# Patient Record
Sex: FEMALE | Race: WHITE | NOT HISPANIC OR LATINO | Employment: UNEMPLOYED | URBAN - METROPOLITAN AREA
[De-identification: names, ages, dates, MRNs, and addresses within clinical notes are randomized per-mention and may not be internally consistent; named-entity substitution may affect disease eponyms.]

---

## 2017-01-04 ENCOUNTER — NON-PROVIDER VISIT (OUTPATIENT)
Dept: VASCULAR LAB | Facility: MEDICAL CENTER | Age: 42
End: 2017-01-04
Attending: INTERNAL MEDICINE
Payer: COMMERCIAL

## 2017-01-04 DIAGNOSIS — R03.0 ELEVATED BLOOD PRESSURE READING WITHOUT DIAGNOSIS OF HYPERTENSION: ICD-10-CM

## 2017-01-04 PROCEDURE — 93788 AMBL BP MNTR W/SW A/R: CPT

## 2017-01-09 ENCOUNTER — TELEPHONE (OUTPATIENT)
Dept: VASCULAR LAB | Facility: MEDICAL CENTER | Age: 42
End: 2017-01-09

## 2017-01-09 NOTE — TELEPHONE ENCOUNTER
ABPM reviewed.  Patient with mean daytime 106/74 with normal nocturnal dip.  Some episodes of relative hypotension.  May be able to decrease meds a bit.  Will discuss details with patient at f/u appt in a couple of weeks.    Michael J Bloch, MD  Vascular Care    Cc: CARLINE Dasilva MD

## 2017-02-07 ENCOUNTER — TELEPHONE (OUTPATIENT)
Dept: VASCULAR LAB | Facility: MEDICAL CENTER | Age: 42
End: 2017-02-07

## 2017-02-07 ENCOUNTER — OFFICE VISIT (OUTPATIENT)
Dept: VASCULAR LAB | Facility: MEDICAL CENTER | Age: 42
End: 2017-02-07
Attending: INTERNAL MEDICINE
Payer: COMMERCIAL

## 2017-02-07 VITALS
WEIGHT: 148 LBS | HEIGHT: 65 IN | SYSTOLIC BLOOD PRESSURE: 120 MMHG | BODY MASS INDEX: 24.66 KG/M2 | DIASTOLIC BLOOD PRESSURE: 91 MMHG | HEART RATE: 87 BPM

## 2017-02-07 DIAGNOSIS — E78.00 HYPERCHOLESTEROLEMIA: ICD-10-CM

## 2017-02-07 DIAGNOSIS — I10 ESSENTIAL HYPERTENSION: ICD-10-CM

## 2017-02-07 PROCEDURE — 99212 OFFICE O/P EST SF 10 MIN: CPT | Performed by: NURSE PRACTITIONER

## 2017-02-07 PROCEDURE — 99213 OFFICE O/P EST LOW 20 MIN: CPT | Performed by: NURSE PRACTITIONER

## 2017-02-07 ASSESSMENT — ENCOUNTER SYMPTOMS
BLOOD IN STOOL: 0
NERVOUS/ANXIOUS: 1
ABDOMINAL PAIN: 0
MYALGIAS: 0
DEPRESSION: 0
FOCAL WEAKNESS: 0
DIZZINESS: 0
VOMITING: 0
CLAUDICATION: 0
LOSS OF CONSCIOUSNESS: 0
DIARRHEA: 0
DIAPHORESIS: 0
CHILLS: 0
SEIZURES: 0
BRUISES/BLEEDS EASILY: 0
SHORTNESS OF BREATH: 0
FEVER: 0
HEADACHES: 0
COUGH: 0
WEAKNESS: 0
PALPITATIONS: 0
NAUSEA: 1
ORTHOPNEA: 0

## 2017-02-07 NOTE — MR AVS SNAPSHOT
"        Minerva Knott Primo   2017 4:00 PM   Office Visit   MRN: [id number]    Department:  Vascular Medicine   Dept Phone:  [phone]    Description:  Female : 1975   Provider:  GENO Tyson           Reason for Visit     Follow-Up           Allergies as of 2017     Allergen Noted Reactions    Sulfa Drugs 2014       hives      You were diagnosed with     Hypercholesterolemia   [078788]       Essential hypertension   [9017905]         Vital Signs     Blood Pressure Pulse Height Weight Body Mass Index Smoking Status    120/91 mmHg 87 1.651 m (5' 5\") 67.132 kg (148 lb) 24.63 kg/m2 Never Smoker       Basic Information     Date Of Birth Sex Race Ethnicity Preferred Language    1975 Female White Non- English      Your appointments     Mar 28, 2017  3:40 PM   Follow Up Visit with GENO Butterfield   [facility]  (--)    [address] [facility] [address] [address]   [phone]              Problem List              ICD-10-CM Priority Class Noted - Resolved    Anxiety associated with depression F41.8   2013 - Present    Weight gain R63.5   2016 - Present    Hypercholesterolemia E78.00   2016 - Present    Essential hypertension I10   10/26/2016 - Present      Health Maintenance        Date Due Completion Dates    MAMMOGRAM 2016, 2011    IMM INFLUENZA (1) 2016 10/21/2015, 2014, 10/27/2011    PAP SMEAR 3/11/2018 3/11/2015    IMM DTaP/Tdap/Td Vaccine (2 - Td) 2026            Current Immunizations     Influenza TIV (IM) 10/21/2015, 2014, 10/27/2011    Tdap Vaccine 2016      Below and/or attached are the medications your provider expects you to take. Review all of your home medications and newly ordered medications with your provider and/or pharmacist. Follow medication instructions as directed by your provider and/or pharmacist. Please keep your medication list with " you and share with your provider. Update the information when medications are discontinued, doses are changed, or new medications (including over-the-counter products) are added; and carry medication information at all times in the event of emergency situations     Allergies:  SULFA DRUGS - (reactions not documented)               Medications  Valid as of: February 07, 2017 -  4:29 PM    Generic Name Brand Name Tablet Size Instructions for use    Amlodipine Besylate-Valsartan (Tab) EXFORGE 5-320 MG Take 1 Tab by mouth every day.        .                 Medicines prescribed today were sent to:     [facility]AC[facility] #[id number] - [place], [place] - [address]    [address] [place] [address]    Phone: [phone] Fax: [phone]    Open 24 Hours?: No      Medication refill instructions:       If your prescription bottle indicates you have medication refills left, it is not necessary to call your provider’s office. Please contact your pharmacy and they will refill your medication.    If your prescription bottle indicates you do not have any refills left, you may request refills at any time through one of the following ways: The online [employer] system (except Urgent Care), by calling your provider’s office, or by asking your pharmacy to contact your provider’s office with a refill request. Medication refills are processed only during regular business hours and may not be available until the next business day. Your provider may request additional information or to have a follow-up visit with you prior to refilling your medication.   *Please Note: Medication refills are assigned a new Rx number when refilled electronically. Your pharmacy may indicate that no refills were authorized even though a new prescription for the same medication is available at the pharmacy. Please request the medicine by name with the pharmacy before contacting your provider for a refill.        Your To Do List     Future Labs/Procedures Complete  By Expires    COMP METABOLIC PANEL  As directed 2/7/2018    LIPID PROFILE  As directed 2/7/2018         MyChart Access Code: Activation code not generated  Current [place] Status: Active

## 2017-02-08 ENCOUNTER — TELEPHONE (OUTPATIENT)
Dept: VASCULAR LAB | Facility: MEDICAL CENTER | Age: 42
End: 2017-02-08

## 2017-02-08 DIAGNOSIS — E78.00 HYPERCHOLESTEROLEMIA: ICD-10-CM

## 2017-02-08 NOTE — PROGRESS NOTES
"  FOLLOW-UP VASCULAR VISIT  Subjective:   Minerva Tillman is a 40 y.o. female who presents today 02/07/17 for   Chief Complaint   Patient presents with   • Follow-Up     HPI:  Patient here for f/u of hypertension.    ABPM completed.  Good medication adherence since last visit.  No CV symptoms.  Denies SOB, headaches, dizziness/lightheadedness, palpitations.  Recently had BLE swelling on longer flight-- resolved.  Had labwork done-- did not f/u with PCP.    DIET AND EXERCISE:  Weight Change: no change  Diet: common adult, relatively heart healthy  Exercise: moderate regular exercise program - 30 min walk daily + kickboxing a couple of days per week.    Review of Systems   Constitutional: Negative for fever, chills, malaise/fatigue and diaphoresis.   Respiratory: Negative for cough and shortness of breath.    Cardiovascular: Negative for chest pain, palpitations, orthopnea, claudication and leg swelling.   Gastrointestinal: Positive for nausea. Negative for vomiting, abdominal pain, diarrhea and blood in stool.   Musculoskeletal: Negative for myalgias and joint pain.   Skin: Negative for itching and rash.   Neurological: Negative for dizziness, focal weakness, seizures, loss of consciousness, weakness and headaches.   Endo/Heme/Allergies: Does not bruise/bleed easily.   Psychiatric/Behavioral: Negative for depression. The patient is nervous/anxious.       Objective:     Filed Vitals:    02/07/17 1608   BP: 120/91   Pulse: 87   Height: 1.651 m (5' 5\")   Weight: 67.132 kg (148 lb)      Body mass index is 24.63 kg/(m^2).  Physical Exam   Constitutional: She is oriented to person, place, and time. She appears well-developed and well-nourished. No distress.   HENT:   Head: Normocephalic.   Eyes: Pupils are equal, round, and reactive to light.   Neck: Normal range of motion.   Cardiovascular: Normal rate, regular rhythm, normal heart sounds and intact distal pulses.    No murmur heard.  Pulmonary/Chest: Effort normal " and breath sounds normal. No respiratory distress. She has no wheezes.   Musculoskeletal: Normal range of motion. She exhibits no edema.   Neurological: She is alert and oriented to person, place, and time. No cranial nerve deficit. Coordination normal.   Skin: Skin is warm and dry. No rash noted. She is not diaphoretic. No erythema. No pallor.   Psychiatric: She has a normal mood and affect. Her behavior is normal. Thought content normal.   Vitals reviewed.    Lab Results   Component Value Date    CHOLSTRLTOT 287* 10/27/2016    CHOLSTRLTOT 262 10/01/2015    CHOLSTRLTOT 261* 06/02/2015    * 10/27/2016    * 06/02/2015    HDL 79 10/27/2016    HDL 97 10/01/2015    HDL 93 06/02/2015    TRIGLYCERIDE 92 10/27/2016    TRIGLYCERIDE 145 10/01/2015    TRIGLYCERIDE 70 06/02/2015    LDLPART 967 10/01/2015    LDLPART 21.3 10/01/2015    SMLLDL <90 10/01/2015    LHDLPART 19.9 10/01/2015    LVLDLPT 6.1 10/01/2015    LDLCHOL 136 10/01/2015    HDLSIZE 10.4 10/01/2015    VLDLSIZE 49.2 10/01/2015    HDLPART 35.8 10/01/2015    LPIRSCORE 27 10/01/2015           Lab Results   Component Value Date    SODIUM 135 10/27/2016    SODIUM 141 06/02/2015    POTASSIUM 4.2 10/27/2016    POTASSIUM 4.4 06/02/2015    CHLORIDE 101 10/27/2016    CHLORIDE 102 06/02/2015    CO2 23 10/27/2016    CO2 21 06/02/2015    GLUCOSE 84 10/27/2016    GLUCOSE 70 06/02/2015    BUN 13 10/27/2016    BUN 15 06/02/2015    CREATININE 0.72 10/27/2016    CREATININE 0.82 06/02/2015    BUNCREATRAT 18 06/02/2015    IFAFRICA >60 10/27/2016    IFAFRICA 103 06/02/2015    IFNOTAFR >60 10/27/2016    IFNOTAFR 89 06/02/2015      Lab Results   Component Value Date    TSH 3.410 06/02/2015      Lab Results   Component Value Date    WBC 6.7 10/27/2016    WBC 5.6 06/02/2015    RBC 4.37 10/27/2016    RBC 4.18 06/02/2015    HEMOGLOBIN 15.9 10/27/2016    HEMOGLOBIN 14.6 06/02/2015    HEMATOCRIT 45.9 10/27/2016    HEMATOCRIT 42.5 06/02/2015    .0* 10/27/2016    *  06/02/2015    MCH 36.4* 10/27/2016    MCH 34.9* 06/02/2015    MCHC 34.6 10/27/2016    MCHC 34.4 06/02/2015    MPV 9.2 10/27/2016      EKG 9/24/2015- unremarkable; no lvh    Medical Decision Making:  Today's Assessment / Status / Plan:     1. Hypercholesterolemia  LIPID PROFILE   2. Essential hypertension  COMP METABOLIC PANEL                                                                                      Patient Type: Primary Prevention    Etiology of Established CVD if Present: None    Lipid Management: Qualifies for Statin Therapy Based on 2013 ACC/AHA Guidelines: no  Calculated 10-Year Risk of ASCVD: 1%  Currently on Statin: No  Patient does not qualify for lipid-lowering therapy based on ACC AHA guidelines  Labwork from October .  She states she may not have been fasting.  Will repeat.  - Continue lifestyle modification  - Recheck fasting lipids    Blood Pressure Management:Goal: JNC8 (2013) Office BP Goal:<140/90; Under Control: no  She does have a family history so I suspect primary hypertension.  Normal to high renin HTN.  No micro albunuria  Has stopped NSAIDs  Never had swelling on amlodipine-- although some bilateral LE swelling on recent plane flight.  Has not had any further swelling.  24 ABPM shows mean daytime 106/74 with some episodes of hypotension-- may be slightly over treated.  - Continue Exforge (fixed dose combination valsartan and amlodipine) 320/5 daily  - STOP spironolactone 12.5mg daily (restart for BP sustained above 150/100 x 3 readings)  - Call if notices any leg swelling  - Continue to limit NSAIDs to minimal effective dose  - Continue home blood pressure monitoring    Other:  1. Elevated liver enzymes.  Labwork from October shows elevated AST/ALT.  Will repeat.  - CMP prior to next visit.    Glycemic Status: Normal    Anti-Platelet/Anti-Coagulant Tx: not indicated    Smoking: Continue complete avoidance    Physical Activity: Continue daily exercise-- planning to meet with  her  to increase her physical activity.    Weight Management and Nutrition: Dietary plan was discussed with patient at this visit including heart healthy diet.    Other:    Instructed to follow-up with PCP for remainder of adult medical needs: yes  We will partner with other providers in the management of established vascular disease and cardiometabolic risk factors.    Studies to Be Obtained: None  Labs to Be Obtained: CMP, lipids    Follow up in: 6 weeks    Sophia Arguello, A.P.N.    LDL-C may not be an accurate representation of overall LDLP or apoB load given low trigs.  Would check nmr lipoprofile, lp(a) and crp with next blood work  CT scan for coronary calcium may be helpful as well    Michael Bloch, MD  Vascular Care      Romelia Dasilva MD

## 2017-03-07 ENCOUNTER — HOSPITAL ENCOUNTER (OUTPATIENT)
Dept: RADIOLOGY | Facility: MEDICAL CENTER | Age: 42
End: 2017-03-07
Attending: OBSTETRICS & GYNECOLOGY
Payer: COMMERCIAL

## 2017-03-07 DIAGNOSIS — N64.4 BREAST PAIN: ICD-10-CM

## 2017-03-07 PROCEDURE — G0204 DX MAMMO INCL CAD BI: HCPCS

## 2017-03-08 DIAGNOSIS — I10 ESSENTIAL HYPERTENSION: ICD-10-CM

## 2017-03-10 RX ORDER — AMLODIPINE AND VALSARTAN 5; 320 MG/1; MG/1
TABLET ORAL
Qty: 30 TAB | Refills: 4 | Status: SHIPPED | OUTPATIENT
Start: 2017-03-10 | End: 2017-06-21 | Stop reason: SDUPTHER

## 2017-03-28 ENCOUNTER — APPOINTMENT (OUTPATIENT)
Dept: VASCULAR LAB | Facility: MEDICAL CENTER | Age: 42
End: 2017-03-28
Payer: COMMERCIAL

## 2017-04-14 ENCOUNTER — HOSPITAL ENCOUNTER (OUTPATIENT)
Dept: LAB | Facility: MEDICAL CENTER | Age: 42
End: 2017-04-14
Attending: OBSTETRICS & GYNECOLOGY
Payer: COMMERCIAL

## 2017-04-14 ENCOUNTER — HOSPITAL ENCOUNTER (OUTPATIENT)
Dept: RADIOLOGY | Facility: MEDICAL CENTER | Age: 42
End: 2017-04-14
Attending: OBSTETRICS & GYNECOLOGY
Payer: COMMERCIAL

## 2017-04-14 DIAGNOSIS — R10.2 ADNEXAL TENDERNESS, RIGHT: ICD-10-CM

## 2017-04-14 LAB
BASOPHILS # BLD AUTO: 0.9 % (ref 0–1.8)
BASOPHILS # BLD: 0.07 K/UL (ref 0–0.12)
EOSINOPHIL # BLD AUTO: 0.05 K/UL (ref 0–0.51)
EOSINOPHIL NFR BLD: 0.7 % (ref 0–6.9)
ERYTHROCYTE [DISTWIDTH] IN BLOOD BY AUTOMATED COUNT: 45.5 FL (ref 35.9–50)
HCT VFR BLD AUTO: 43.1 % (ref 37–47)
HGB BLD-MCNC: 14.7 G/DL (ref 12–16)
IMM GRANULOCYTES # BLD AUTO: 0.02 K/UL (ref 0–0.11)
IMM GRANULOCYTES NFR BLD AUTO: 0.3 % (ref 0–0.9)
LYMPHOCYTES # BLD AUTO: 1.72 K/UL (ref 1–4.8)
LYMPHOCYTES NFR BLD: 22.7 % (ref 22–41)
MCH RBC QN AUTO: 37.3 PG (ref 27–33)
MCHC RBC AUTO-ENTMCNC: 34.1 G/DL (ref 33.6–35)
MCV RBC AUTO: 109.4 FL (ref 81.4–97.8)
MONOCYTES # BLD AUTO: 0.89 K/UL (ref 0–0.85)
MONOCYTES NFR BLD AUTO: 11.7 % (ref 0–13.4)
NEUTROPHILS # BLD AUTO: 4.83 K/UL (ref 2–7.15)
NEUTROPHILS NFR BLD: 63.7 % (ref 44–72)
NRBC # BLD AUTO: 0 K/UL
NRBC BLD AUTO-RTO: 0 /100 WBC
PLATELET # BLD AUTO: 289 K/UL (ref 164–446)
PMV BLD AUTO: 9.9 FL (ref 9–12.9)
RBC # BLD AUTO: 3.94 M/UL (ref 4.2–5.4)
WBC # BLD AUTO: 7.6 K/UL (ref 4.8–10.8)

## 2017-04-14 PROCEDURE — 76830 TRANSVAGINAL US NON-OB: CPT

## 2017-04-14 PROCEDURE — 85025 COMPLETE CBC W/AUTO DIFF WBC: CPT

## 2017-04-14 PROCEDURE — 36415 COLL VENOUS BLD VENIPUNCTURE: CPT

## 2017-04-27 ENCOUNTER — HOSPITAL ENCOUNTER (OUTPATIENT)
Dept: LAB | Facility: MEDICAL CENTER | Age: 42
End: 2017-04-27
Attending: NURSE PRACTITIONER
Payer: COMMERCIAL

## 2017-04-27 DIAGNOSIS — I10 ESSENTIAL HYPERTENSION: ICD-10-CM

## 2017-04-27 DIAGNOSIS — E78.00 HYPERCHOLESTEROLEMIA: ICD-10-CM

## 2017-04-27 LAB
ALBUMIN SERPL BCP-MCNC: 4.6 G/DL (ref 3.2–4.9)
ALBUMIN/GLOB SERPL: 1.5 G/DL
ALP SERPL-CCNC: 63 U/L (ref 30–99)
ALT SERPL-CCNC: 54 U/L (ref 2–50)
ANION GAP SERPL CALC-SCNC: 9 MMOL/L (ref 0–11.9)
AST SERPL-CCNC: 40 U/L (ref 12–45)
BILIRUB SERPL-MCNC: 0.7 MG/DL (ref 0.1–1.5)
BUN SERPL-MCNC: 11 MG/DL (ref 8–22)
CALCIUM SERPL-MCNC: 9.4 MG/DL (ref 8.5–10.5)
CHLORIDE SERPL-SCNC: 105 MMOL/L (ref 96–112)
CHOLEST SERPL-MCNC: 288 MG/DL (ref 100–199)
CO2 SERPL-SCNC: 24 MMOL/L (ref 20–33)
CREAT SERPL-MCNC: 0.78 MG/DL (ref 0.5–1.4)
CRP SERPL HS-MCNC: 4.6 MG/L (ref 0–7.5)
GFR SERPL CREATININE-BSD FRML MDRD: >60 ML/MIN/1.73 M 2
GLOBULIN SER CALC-MCNC: 3.1 G/DL (ref 1.9–3.5)
GLUCOSE SERPL-MCNC: 82 MG/DL (ref 65–99)
HDLC SERPL-MCNC: 86 MG/DL
LDLC SERPL CALC-MCNC: 178 MG/DL
POTASSIUM SERPL-SCNC: 4.2 MMOL/L (ref 3.6–5.5)
PROT SERPL-MCNC: 7.7 G/DL (ref 6–8.2)
SODIUM SERPL-SCNC: 138 MMOL/L (ref 135–145)
TRIGL SERPL-MCNC: 119 MG/DL (ref 0–149)

## 2017-04-27 PROCEDURE — 83695 ASSAY OF LIPOPROTEIN(A): CPT

## 2017-04-27 PROCEDURE — 80053 COMPREHEN METABOLIC PANEL: CPT

## 2017-04-27 PROCEDURE — 86141 C-REACTIVE PROTEIN HS: CPT

## 2017-04-27 PROCEDURE — 80061 LIPID PANEL: CPT | Mod: 91

## 2017-04-27 PROCEDURE — 80061 LIPID PANEL: CPT

## 2017-04-27 PROCEDURE — 36415 COLL VENOUS BLD VENIPUNCTURE: CPT

## 2017-04-27 PROCEDURE — 83704 LIPOPROTEIN BLD QUAN PART: CPT

## 2017-04-28 ENCOUNTER — TELEPHONE (OUTPATIENT)
Dept: VASCULAR LAB | Facility: MEDICAL CENTER | Age: 42
End: 2017-04-28

## 2017-04-28 ENCOUNTER — OFFICE VISIT (OUTPATIENT)
Dept: VASCULAR LAB | Facility: MEDICAL CENTER | Age: 42
End: 2017-04-28
Attending: INTERNAL MEDICINE
Payer: COMMERCIAL

## 2017-04-28 VITALS
BODY MASS INDEX: 24.99 KG/M2 | WEIGHT: 150 LBS | SYSTOLIC BLOOD PRESSURE: 116 MMHG | HEART RATE: 79 BPM | HEIGHT: 65 IN | DIASTOLIC BLOOD PRESSURE: 79 MMHG

## 2017-04-28 DIAGNOSIS — E78.00 HYPERCHOLESTEROLEMIA: ICD-10-CM

## 2017-04-28 LAB — LPA SERPL-MCNC: 4 MG/DL

## 2017-04-28 PROCEDURE — 99212 OFFICE O/P EST SF 10 MIN: CPT | Performed by: NURSE PRACTITIONER

## 2017-04-28 PROCEDURE — 99213 OFFICE O/P EST LOW 20 MIN: CPT | Performed by: NURSE PRACTITIONER

## 2017-04-28 RX ORDER — ATORVASTATIN CALCIUM 10 MG/1
10 TABLET, FILM COATED ORAL
Qty: 30 TAB | Refills: 6 | Status: SHIPPED | OUTPATIENT
Start: 2017-04-28 | End: 2019-04-09

## 2017-04-28 ASSESSMENT — ENCOUNTER SYMPTOMS
COUGH: 0
SHORTNESS OF BREATH: 0
WHEEZING: 0
HEADACHES: 0
CLAUDICATION: 0
FOCAL WEAKNESS: 0
NERVOUS/ANXIOUS: 0
WEIGHT LOSS: 0
MYALGIAS: 0
PALPITATIONS: 0

## 2017-04-28 NOTE — PROGRESS NOTES
"  FOLLOW-UP VASCULAR VISIT  Subjective:   Minerva Tillman is a 42 y.o. female who presents today 4/28/17 for   Chief Complaint   Patient presents with   • Follow-Up     HPI:  Patient here for f/u of hypertension and hypercholesterolemia  Home BP's 120's/70's  No CV complaints/symptoms  Denies SOB, headaches, dizziness/lightheadedness, or palpitations  No swelling  Has focused a lot on healthy eating/exercise since last visit  Had labs drawn      DIET AND EXERCISE:  Weight Change: stable  Diet: common adult, relatively heart healthy; increased fruits/veggies and lean meats  Exercise: moderate regular exercise program - 30 min walk daily + kickboxing a couple of days per week.    Review of Systems   Constitutional: Negative for weight loss and malaise/fatigue.   Respiratory: Negative for cough, shortness of breath and wheezing.    Cardiovascular: Negative for chest pain, palpitations, claudication and leg swelling.   Musculoskeletal: Negative for myalgias and joint pain.   Neurological: Negative for focal weakness and headaches.   Psychiatric/Behavioral: The patient is not nervous/anxious.       Objective:     Filed Vitals:    04/28/17 1347   BP: 116/79   Pulse: 79   Height: 1.651 m (5' 5\")   Weight: 68.04 kg (150 lb)      Body mass index is 24.96 kg/(m^2).  Physical Exam   Constitutional: She is oriented to person, place, and time. She appears well-developed and well-nourished. No distress.   Cardiovascular: Normal rate, regular rhythm, normal heart sounds and intact distal pulses.    No murmur heard.  Pulmonary/Chest: Effort normal and breath sounds normal. No respiratory distress. She has no wheezes.   Musculoskeletal: Normal range of motion. She exhibits no edema.   Neurological: She is alert and oriented to person, place, and time.   Skin: Skin is warm and dry. She is not diaphoretic.   Psychiatric: She has a normal mood and affect. Her behavior is normal. Thought content normal.   Vitals reviewed.    Lab " Results   Component Value Date    CHOLSTRLTOT 288* 04/27/2017    CHOLSTRLTOT 262 10/01/2015    CHOLSTRLTOT 261* 06/02/2015    * 04/27/2017    * 06/02/2015    HDL 86 04/27/2017    HDL 97 10/01/2015    HDL 93 06/02/2015    TRIGLYCERIDE 119 04/27/2017    TRIGLYCERIDE 145 10/01/2015    TRIGLYCERIDE 70 06/02/2015    LDLPART 967 10/01/2015    LDLPART 21.3 10/01/2015    SMLLDL <90 10/01/2015    LHDLPART 19.9 10/01/2015    LVLDLPT 6.1 10/01/2015    LDLCHOL 136 10/01/2015    HDLSIZE 10.4 10/01/2015    VLDLSIZE 49.2 10/01/2015    HDLPART 35.8 10/01/2015    LPIRSCORE 27 10/01/2015           Lab Results   Component Value Date    SODIUM 138 04/27/2017    SODIUM 141 06/02/2015    POTASSIUM 4.2 04/27/2017    POTASSIUM 4.4 06/02/2015    CHLORIDE 105 04/27/2017    CHLORIDE 102 06/02/2015    CO2 24 04/27/2017    CO2 21 06/02/2015    GLUCOSE 82 04/27/2017    GLUCOSE 70 06/02/2015    BUN 11 04/27/2017    BUN 15 06/02/2015    CREATININE 0.78 04/27/2017    CREATININE 0.82 06/02/2015    BUNCREATRAT 18 06/02/2015    IFAFRICA >60 04/27/2017    IFAFRICA 103 06/02/2015    IFNOTAFR >60 04/27/2017    IFNOTAFR 89 06/02/2015      Lab Results   Component Value Date    TSH 3.410 06/02/2015      Lab Results   Component Value Date    WBC 7.6 04/14/2017    WBC 5.6 06/02/2015    RBC 3.94* 04/14/2017    RBC 4.18 06/02/2015    HEMOGLOBIN 14.7 04/14/2017    HEMOGLOBIN 14.6 06/02/2015    HEMATOCRIT 43.1 04/14/2017    HEMATOCRIT 42.5 06/02/2015    .4* 04/14/2017    * 06/02/2015    MCH 37.3* 04/14/2017    MCH 34.9* 06/02/2015    MCHC 34.1 04/14/2017    MCHC 34.4 06/02/2015    MPV 9.9 04/14/2017      EKG 9/24/2015- unremarkable; no lvh    Medical Decision Making:  Today's Assessment / Status / Plan:     1. Hypercholesterolemia  atorvastatin (LIPITOR) 10 MG Tab    NMR LIPO PROFILE    C-REACTIVE PROTEIN CARDIAC    COMP METABOLIC PANEL    CT-CARDIAC SCORING    CANCELED: [facility] CT HEART W/O CONTRAST QUANT EVAL CORONARY CALCIUM                                                                                       Patient Type: Primary Prevention    Etiology of Established CVD if Present: None    Lipid Management: Qualifies for Statin Therapy Based on 2013 ACC/AHA Guidelines: no  Calculated 10-Year Risk of ASCVD: 1%  Currently on Statin: No, but given her treatment for HTN and LDL>160, will start statin at this visit  4/27/17 NonHDL-202, LDL-178  - Start atorvastatin 10mg qHS  - Call for myalgias  - Continue lifestyle modification  - Recheck fasting lipids in 4-6wks    Blood Pressure Management:Goal: JNC8 (2013) Office BP Goal:<140/90; Under Control: yes  She does have a family history so I suspect primary hypertension.  Normal to high renin HTN.  No microalbuminuria  Has stopped NSAIDs  24 ABPM shows mean daytime 106/74 with some episodes of hypotension-- may be slightly over treated.  - Continue Exforge (fixed dose combination valsartan and amlodipine) 320/5 daily  - Continue to limit NSAIDs to minimal effective dose  - Continue home blood pressure monitoring      Glycemic Status: Normal    Anti-Platelet/Anti-Coagulant Tx: not indicated    Smoking: Continue complete avoidance    Physical Activity: Continue improved exercise regimen    Weight Management and Nutrition: Dietary plan was discussed with patient at this visit including heart healthy diet.      Instructed to follow-up with PCP for remainder of adult medical needs: yes  We will partner with other providers in the management of established vascular disease and cardiometabolic risk factors.    Studies to Be Obtained: CT scan for coronary calcium  Labs to Be Obtained: NMR, CRP, CMP prior to next visit    Follow up in: 6 weeks with Dr. Bloch, as previously scheduled    GENO Dickerson    CC:  Romelia Dasilva MD

## 2017-04-28 NOTE — MR AVS SNAPSHOT
"        Minerva Tillman   2017 1:40 PM   Office Visit   MRN: [id number]    Department:  Vascular Medicine   Dept Phone:  [phone]    Description:  Female : 1975   Provider:  GENO Dickerson           Reason for Visit     Follow-Up           Allergies as of 2017     Allergen Noted Reactions    Sulfa Drugs 2014       hives      You were diagnosed with     Hypercholesterolemia   [[id number]]         Vital Signs     Blood Pressure Pulse Height Weight Body Mass Index Smoking Status    116/79 mmHg 79 1.651 m (5' 5\") 68.04 kg (150 lb) 24.96 kg/m2 Never Smoker       Basic Information     Date Of Birth Sex Race Ethnicity Preferred Language    1975 Female White Non- English      Your appointments     2017 11:00 AM   Follow Up Visit with Michael J Bloch, M.D.   [facility]  (--)    [address] [address]   [phone]              Problem List              ICD-10-CM Priority Class Noted - Resolved    Anxiety associated with depression F41.8   2013 - Present    Weight gain R63.5   2016 - Present    Hypercholesterolemia E78.00   2016 - Present    Essential hypertension I10   10/26/2016 - Present      Health Maintenance        Date Due Completion Dates    MAMMOGRAM 3/7/2018 3/7/2017, 2015, 2011    PAP SMEAR 3/11/2018 3/11/2015    IMM DTaP/Tdap/Td Vaccine (2 - Td) 2026            Current Immunizations     Influenza TIV (IM) 10/21/2015, 2014, 10/27/2011    Tdap Vaccine 2016      Below and/or attached are the medications your provider expects you to take. Review all of your home medications and newly ordered medications with your provider and/or pharmacist. Follow medication instructions as directed by your provider and/or pharmacist. Please keep your medication list with you and share with your provider. Update the information when medications are " discontinued, doses are changed, or new medications (including over-the-counter products) are added; and carry medication information at all times in the event of emergency situations     Allergies:  SULFA DRUGS - (reactions not documented)               Medications  Valid as of: April 28, 2017 -  2:03 PM    Generic Name Brand Name Tablet Size Instructions for use    Amlodipine Besylate-Valsartan (Tab) EXFORGE 5-320 MG TAKE 1 TABLET BY MOUTH EVERY DAY        Atorvastatin Calcium (Tab) LIPITOR 10 MG Take 1 Tab by mouth every bedtime.        .                 Medicines prescribed today were sent to:     [facility]/PHARMACY #[id number] - [place], [place] - [address]    [address] [place] [address]    Phone: [phone] Fax: [phone]    Open 24 Hours?: No      Medication refill instructions:       If your prescription bottle indicates you have medication refills left, it is not necessary to call your provider’s office. Please contact your pharmacy and they will refill your medication.    If your prescription bottle indicates you do not have any refills left, you may request refills at any time through one of the following ways: The online [employer] system (except Urgent Care), by calling your provider’s office, or by asking your pharmacy to contact your provider’s office with a refill request. Medication refills are processed only during regular business hours and may not be available until the next business day. Your provider may request additional information or to have a follow-up visit with you prior to refilling your medication.   *Please Note: Medication refills are assigned a new Rx number when refilled electronically. Your pharmacy may indicate that no refills were authorized even though a new prescription for the same medication is available at the pharmacy. Please request the medicine by name with the pharmacy before contacting your provider for a refill.        Your To Do List     Future Labs/Procedures  Complete By Expires    NMR LIPO PROFILE  As directed 4/28/2018         [employer] Access Code: Activation code not generated  Current [employer] Status: Active

## 2017-04-30 LAB
CHOLEST SERPL-MCNC: 264 MG/DL (ref 100–199)
HDL PARTICAL NO Q4363: 40.8 UMOL/L
HDL SERPL QN: 9.7 NM
HDLC SERPL-MCNC: 73 MG/DL
HLD.LARGE SERPL-SCNC: 11.8 UMOL/L
LDL MED SERPL QN: 21.9 NM
LDL SERPL QN: 21.9 NM
LDL SERPL-SCNC: 1658 NMOL/L
LDL SMALL SERPL-SCNC: 131 NMOL/L
LDL SMALL SERPL-SCNC: 131 NMOL/L
LDLC SERPL CALC-MCNC: 169 MG/DL (ref 0–99)
LP IR SCORE Q4364: 57
TRIGL SERPL-MCNC: 110 MG/DL (ref 0–149)
VLDL LARGE SERPL-SCNC: 4.4 NMOL/L
VLDL SERPL QN: 77 NM

## 2017-05-08 ENCOUNTER — HOSPITAL ENCOUNTER (OUTPATIENT)
Dept: RADIOLOGY | Facility: MEDICAL CENTER | Age: 42
End: 2017-05-08
Attending: NURSE PRACTITIONER
Payer: COMMERCIAL

## 2017-05-08 DIAGNOSIS — E78.00 HYPERCHOLESTEROLEMIA: ICD-10-CM

## 2017-05-08 PROCEDURE — 4410556 CT-CARDIAC SCORING

## 2017-06-16 ENCOUNTER — HOSPITAL ENCOUNTER (OUTPATIENT)
Dept: LAB | Facility: MEDICAL CENTER | Age: 42
End: 2017-06-16
Attending: NURSE PRACTITIONER
Payer: COMMERCIAL

## 2017-06-16 DIAGNOSIS — E78.00 HYPERCHOLESTEROLEMIA: ICD-10-CM

## 2017-06-16 LAB
ALBUMIN SERPL BCP-MCNC: 4.2 G/DL (ref 3.2–4.9)
ALBUMIN/GLOB SERPL: 1.4 G/DL
ALP SERPL-CCNC: 68 U/L (ref 30–99)
ALT SERPL-CCNC: 48 U/L (ref 2–50)
ANION GAP SERPL CALC-SCNC: 10 MMOL/L (ref 0–11.9)
AST SERPL-CCNC: 41 U/L (ref 12–45)
BILIRUB SERPL-MCNC: 0.8 MG/DL (ref 0.1–1.5)
BUN SERPL-MCNC: 11 MG/DL (ref 8–22)
CALCIUM SERPL-MCNC: 9.3 MG/DL (ref 8.5–10.5)
CHLORIDE SERPL-SCNC: 104 MMOL/L (ref 96–112)
CO2 SERPL-SCNC: 25 MMOL/L (ref 20–33)
CREAT SERPL-MCNC: 0.7 MG/DL (ref 0.5–1.4)
CRP SERPL HS-MCNC: 2 MG/L (ref 0–7.5)
GFR SERPL CREATININE-BSD FRML MDRD: >60 ML/MIN/1.73 M 2
GLOBULIN SER CALC-MCNC: 3.1 G/DL (ref 1.9–3.5)
GLUCOSE SERPL-MCNC: 87 MG/DL (ref 65–99)
POTASSIUM SERPL-SCNC: 4.2 MMOL/L (ref 3.6–5.5)
PROT SERPL-MCNC: 7.3 G/DL (ref 6–8.2)
SODIUM SERPL-SCNC: 139 MMOL/L (ref 135–145)

## 2017-06-16 PROCEDURE — 36415 COLL VENOUS BLD VENIPUNCTURE: CPT

## 2017-06-16 PROCEDURE — 83704 LIPOPROTEIN BLD QUAN PART: CPT

## 2017-06-16 PROCEDURE — 80061 LIPID PANEL: CPT

## 2017-06-16 PROCEDURE — 80053 COMPREHEN METABOLIC PANEL: CPT

## 2017-06-16 PROCEDURE — 86141 C-REACTIVE PROTEIN HS: CPT

## 2017-06-20 ENCOUNTER — OFFICE VISIT (OUTPATIENT)
Dept: VASCULAR LAB | Facility: MEDICAL CENTER | Age: 42
End: 2017-06-20
Attending: INTERNAL MEDICINE
Payer: COMMERCIAL

## 2017-06-20 VITALS
BODY MASS INDEX: 24.32 KG/M2 | HEIGHT: 65 IN | SYSTOLIC BLOOD PRESSURE: 110 MMHG | WEIGHT: 146 LBS | HEART RATE: 74 BPM | DIASTOLIC BLOOD PRESSURE: 78 MMHG

## 2017-06-20 DIAGNOSIS — E78.00 HYPERCHOLESTEROLEMIA: ICD-10-CM

## 2017-06-20 DIAGNOSIS — I10 ESSENTIAL HYPERTENSION: ICD-10-CM

## 2017-06-20 LAB
CHOLEST SERPL-MCNC: 253 MG/DL (ref 100–199)
HDL PARTICAL NO Q4363: 43.3 UMOL/L
HDL SERPL QN: 10 NM
HDLC SERPL-MCNC: 88 MG/DL
HLD.LARGE SERPL-SCNC: 13.8 UMOL/L
LDL MED SERPL QN: 22.1 NM
LDL SERPL QN: 22.1 NM
LDL SERPL-SCNC: 982 NMOL/L
LDL SMALL SERPL-SCNC: <90 NMOL/L
LDL SMALL SERPL-SCNC: <90 NMOL/L
LDLC SERPL CALC-MCNC: 141 MG/DL (ref 0–99)
LP IR SCORE Q4364: 30
TRIGL SERPL-MCNC: 120 MG/DL (ref 0–149)
VLDL LARGE SERPL-SCNC: 5.5 NMOL/L
VLDL SERPL QN: 52.1 NM

## 2017-06-20 PROCEDURE — 99212 OFFICE O/P EST SF 10 MIN: CPT

## 2017-06-20 PROCEDURE — 99213 OFFICE O/P EST LOW 20 MIN: CPT | Performed by: INTERNAL MEDICINE

## 2017-06-20 ASSESSMENT — ENCOUNTER SYMPTOMS
NERVOUS/ANXIOUS: 0
WHEEZING: 0
MYALGIAS: 0
HEADACHES: 0
SHORTNESS OF BREATH: 0
FOCAL WEAKNESS: 0
PALPITATIONS: 0
COUGH: 0
CLAUDICATION: 0
WEIGHT LOSS: 0

## 2017-06-20 NOTE — PROGRESS NOTES
"  FOLLOW-UP VASCULAR VISIT  Subjective:   Minerva Tillman is a 42 y.o. female who presents today 6/20/17 for vascular followup  No chief complaint on file.    HPI:  Patient here for f/u of hypertension and hypercholesterolemia  Home BP's 110s-120's/70's-80s  No CV complaints/symptoms  Denies SOB, headaches, dizziness/lightheadedness, or palpitations  No swelling  No myalgias on statin  Blood work pending    DIET AND EXERCISE:  Weight Change: down a few pounds  Diet: common adult, relatively heart healthy; increased fruits/veggies and lean meats  Exercise: moderate regular exercise program - 30 min walk daily + kickboxing a couple of days per week, some resistance training.    Review of Systems   Constitutional: Negative for weight loss and malaise/fatigue.   Respiratory: Negative for cough, shortness of breath and wheezing.    Cardiovascular: Negative for chest pain, palpitations, claudication and leg swelling.   Musculoskeletal: Negative for myalgias and joint pain.   Neurological: Negative for focal weakness and headaches.   Psychiatric/Behavioral: The patient is not nervous/anxious.       Objective:     Filed Vitals:    06/20/17 1109   BP: 110/78   Pulse: 74   Height: 1.651 m (5' 5\")   Weight: 66.225 kg (146 lb)      Body mass index is 24.3 kg/(m^2).  Physical Exam   Constitutional: She is oriented to person, place, and time. She appears well-developed and well-nourished. No distress.   Cardiovascular: Normal rate, regular rhythm, normal heart sounds and intact distal pulses.    No murmur heard.  Pulmonary/Chest: Effort normal and breath sounds normal. No respiratory distress. She has no wheezes.   Musculoskeletal: Normal range of motion. She exhibits no edema.   Neurological: She is alert and oriented to person, place, and time.   Skin: Skin is warm and dry. She is not diaphoretic.   Psychiatric: She has a normal mood and affect. Her behavior is normal. Thought content normal.   Vitals reviewed.    Lab " Results   Component Value Date    CHOLSTRLTOT 288* 04/27/2017    CHOLSTRLTOT 264* 04/27/2017    * 04/27/2017    HDL 86 04/27/2017    HDL 73 04/27/2017    TRIGLYCERIDE 119 04/27/2017    TRIGLYCERIDE 110 04/27/2017    LDLPART 1658* 04/27/2017    LDLPART 21.9 04/27/2017    SMLLDL 131 04/27/2017    LHDLPART 11.8 04/27/2017    LVLDLPT 4.4* 04/27/2017    LDLCHOL 169* 04/27/2017    HDLSIZE 9.7 04/27/2017    VLDLSIZE 77.0* 04/27/2017    HDLPART 40.8 04/27/2017    LPIRSCORE 57* 04/27/2017           Lab Results   Component Value Date    SODIUM 139 06/16/2017    POTASSIUM 4.2 06/16/2017    CHLORIDE 104 06/16/2017    CO2 25 06/16/2017    GLUCOSE 87 06/16/2017    BUN 11 06/16/2017    CREATININE 0.70 06/16/2017    IFAFR[place] >60 06/16/2017    IFNOTAFR >60 06/16/2017          Lab Results   Component Value Date    WBC 7.6 04/14/2017    RBC 3.94* 04/14/2017    HEMOGLOBIN 14.7 04/14/2017    HEMATOCRIT 43.1 04/14/2017    .4* 04/14/2017    MCH 37.3* 04/14/2017    MCHC 34.1 04/14/2017    MPV 9.9 04/14/2017      EKG 9/24/2015- unremarkable; no lvh    Coronary Calcium Score (april 2017) - Zero    Medical Decision Making:  Today's Assessment / Status / Plan:     1. Hypercholesterolemia     2. Essential hypertension                                                                                        Patient Type: Primary Prevention    Etiology of Established CVD if Present: None    Lipid Management: Qualifies for Statin Therapy Based on 2013 ACC/AHA Guidelines: no  Calculated 10-Year Risk of ASCVD: 1%  Currently on Statin: Yes  Lp(a) normal  Cor calcium score = 0  Baseline LDL high  CRP intermediate  Repeat Lipids pending - will call with results  - continue atorva 10 mg daily pending results of lipid panel  - Call for myalgias  - Continue lifestyle modification  - Recheck fasting lipids in 6 months    Blood Pressure Management:Goal: JNC8 (2013) Office BP Goal:<140/90; Under Control: yes  She does have a family history so I  suspect primary hypertension.  Normal to high renin HTN.  No microalbuminuria  Has stopped NSAIDs  BP appears under reasonable control at home, in office and on abpm  - Continue Exforge (fixed dose combination valsartan and amlodipine) 320/5 daily  - Continue to limit NSAIDs to minimal effective dose  - Continue home blood pressure monitoring    Glycemic Status: Normal    Anti-Platelet/Anti-Coagulant Tx: not indicated    Smoking: Continue complete avoidance    Physical Activity: Continue improved exercise regimen    Weight Management and Nutrition: Dietary plan was discussed with patient at this visit including heart healthy diet.    Instructed to follow-up with PCP for remainder of adult medical needs: yes  We will partner with other providers in the management of established vascular disease and cardiometabolic risk factors.    Studies to Be Obtained: none  Labs to Be Obtained: NMR, CRP, CMP, urine for albumin prior to next visit    Follow up in: 6 months    Michael J Bloch, M.D.    CC:  Romelia Dasilva MD

## 2017-06-26 ENCOUNTER — TELEPHONE (OUTPATIENT)
Dept: VASCULAR LAB | Facility: MEDICAL CENTER | Age: 42
End: 2017-06-26

## 2017-06-26 NOTE — TELEPHONE ENCOUNTER
"Let patient know results of Lipid panel reviewed by Dr. Bloch shown in message below:    \" Please call her and let her know that her lipid panel looks awesome!   Have her stay on her atorvastatin for now, but you can tell her if it continues to look this good in 6 months we may consider stopping.\"  "

## 2017-07-24 DIAGNOSIS — Z01.812 PRE-OPERATIVE LABORATORY EXAMINATION: ICD-10-CM

## 2017-07-24 DIAGNOSIS — Z01.810 PRE-OPERATIVE CARDIOVASCULAR EXAMINATION: ICD-10-CM

## 2017-07-24 LAB
ANION GAP SERPL CALC-SCNC: 10 MMOL/L (ref 0–11.9)
BASOPHILS # BLD AUTO: 1 % (ref 0–1.8)
BASOPHILS # BLD: 0.08 K/UL (ref 0–0.12)
BUN SERPL-MCNC: 14 MG/DL (ref 8–22)
CALCIUM SERPL-MCNC: 10 MG/DL (ref 8.5–10.5)
CHLORIDE SERPL-SCNC: 104 MMOL/L (ref 96–112)
CO2 SERPL-SCNC: 22 MMOL/L (ref 20–33)
CREAT SERPL-MCNC: 0.66 MG/DL (ref 0.5–1.4)
EKG IMPRESSION: NORMAL
EOSINOPHIL # BLD AUTO: 0.14 K/UL (ref 0–0.51)
EOSINOPHIL NFR BLD: 1.8 % (ref 0–6.9)
ERYTHROCYTE [DISTWIDTH] IN BLOOD BY AUTOMATED COUNT: 42.4 FL (ref 35.9–50)
GFR SERPL CREATININE-BSD FRML MDRD: >60 ML/MIN/1.73 M 2
GLUCOSE SERPL-MCNC: 102 MG/DL (ref 65–99)
HCG SERPL QL: NEGATIVE
HCT VFR BLD AUTO: 41.2 % (ref 37–47)
HGB BLD-MCNC: 14.2 G/DL (ref 12–16)
IMM GRANULOCYTES # BLD AUTO: 0.02 K/UL (ref 0–0.11)
IMM GRANULOCYTES NFR BLD AUTO: 0.3 % (ref 0–0.9)
LYMPHOCYTES # BLD AUTO: 1.62 K/UL (ref 1–4.8)
LYMPHOCYTES NFR BLD: 21.2 % (ref 22–41)
MCH RBC QN AUTO: 35.9 PG (ref 27–33)
MCHC RBC AUTO-ENTMCNC: 34.5 G/DL (ref 33.6–35)
MCV RBC AUTO: 104.3 FL (ref 81.4–97.8)
MONOCYTES # BLD AUTO: 0.78 K/UL (ref 0–0.85)
MONOCYTES NFR BLD AUTO: 10.2 % (ref 0–13.4)
NEUTROPHILS # BLD AUTO: 5.01 K/UL (ref 2–7.15)
NEUTROPHILS NFR BLD: 65.5 % (ref 44–72)
NRBC # BLD AUTO: 0 K/UL
NRBC BLD AUTO-RTO: 0 /100 WBC
PLATELET # BLD AUTO: 320 K/UL (ref 164–446)
PMV BLD AUTO: 9.7 FL (ref 9–12.9)
POTASSIUM SERPL-SCNC: 3.6 MMOL/L (ref 3.6–5.5)
RBC # BLD AUTO: 3.95 M/UL (ref 4.2–5.4)
SODIUM SERPL-SCNC: 136 MMOL/L (ref 135–145)
WBC # BLD AUTO: 7.7 K/UL (ref 4.8–10.8)

## 2017-07-24 PROCEDURE — 84703 CHORIONIC GONADOTROPIN ASSAY: CPT

## 2017-07-24 PROCEDURE — 93010 ELECTROCARDIOGRAM REPORT: CPT | Performed by: INTERNAL MEDICINE

## 2017-07-24 PROCEDURE — 36415 COLL VENOUS BLD VENIPUNCTURE: CPT

## 2017-07-24 PROCEDURE — 93005 ELECTROCARDIOGRAM TRACING: CPT

## 2017-07-24 PROCEDURE — 85025 COMPLETE CBC W/AUTO DIFF WBC: CPT

## 2017-07-24 PROCEDURE — 80048 BASIC METABOLIC PNL TOTAL CA: CPT

## 2017-07-24 RX ORDER — AMLODIPINE AND VALSARTAN 5; 320 MG/1; MG/1
1 TABLET ORAL EVERY MORNING
COMMUNITY
End: 2019-04-09

## 2017-08-02 ENCOUNTER — HOSPITAL ENCOUNTER (OUTPATIENT)
Facility: MEDICAL CENTER | Age: 42
End: 2017-08-02
Attending: OBSTETRICS & GYNECOLOGY | Admitting: OBSTETRICS & GYNECOLOGY
Payer: COMMERCIAL

## 2017-08-02 VITALS
BODY MASS INDEX: 25.56 KG/M2 | OXYGEN SATURATION: 95 % | HEIGHT: 64 IN | WEIGHT: 149.69 LBS | TEMPERATURE: 97.7 F | RESPIRATION RATE: 18 BRPM | HEART RATE: 74 BPM

## 2017-08-02 PROBLEM — N92.0 EXCESSIVE OR FREQUENT MENSTRUATION: Status: ACTIVE | Noted: 2017-08-02

## 2017-08-02 LAB
HCG UR QL: NEGATIVE
SP GR UR REFRACTOMETRY: 1.02

## 2017-08-02 PROCEDURE — 160009 HCHG ANES TIME/MIN: Performed by: OBSTETRICS & GYNECOLOGY

## 2017-08-02 PROCEDURE — 160029 HCHG SURGERY MINUTES - 1ST 30 MINS LEVEL 4: Performed by: OBSTETRICS & GYNECOLOGY

## 2017-08-02 PROCEDURE — 700101 HCHG RX REV CODE 250

## 2017-08-02 PROCEDURE — 700111 HCHG RX REV CODE 636 W/ 250 OVERRIDE (IP)

## 2017-08-02 PROCEDURE — 160047 HCHG PACU  - EA ADDL 30 MINS PHASE II: Performed by: OBSTETRICS & GYNECOLOGY

## 2017-08-02 PROCEDURE — 81025 URINE PREGNANCY TEST: CPT

## 2017-08-02 PROCEDURE — 700102 HCHG RX REV CODE 250 W/ 637 OVERRIDE(OP)

## 2017-08-02 PROCEDURE — 160025 RECOVERY II MINUTES (STATS): Performed by: OBSTETRICS & GYNECOLOGY

## 2017-08-02 PROCEDURE — 501667 HCHG TUBING, HYSTEROSCOPY: Performed by: OBSTETRICS & GYNECOLOGY

## 2017-08-02 PROCEDURE — 502240 HCHG MISC OR SUPPLY RC 0272: Performed by: OBSTETRICS & GYNECOLOGY

## 2017-08-02 PROCEDURE — 160036 HCHG PACU - EA ADDL 30 MINS PHASE I: Performed by: OBSTETRICS & GYNECOLOGY

## 2017-08-02 PROCEDURE — A9270 NON-COVERED ITEM OR SERVICE: HCPCS

## 2017-08-02 PROCEDURE — 160046 HCHG PACU - 1ST 60 MINS PHASE II: Performed by: OBSTETRICS & GYNECOLOGY

## 2017-08-02 PROCEDURE — 502560 HCHG KIT, NOVASURE ENDOMETRIAL: Performed by: OBSTETRICS & GYNECOLOGY

## 2017-08-02 PROCEDURE — 160002 HCHG RECOVERY MINUTES (STAT): Performed by: OBSTETRICS & GYNECOLOGY

## 2017-08-02 PROCEDURE — A6404 STERILE GAUZE > 48 SQ IN: HCPCS | Performed by: OBSTETRICS & GYNECOLOGY

## 2017-08-02 PROCEDURE — 88305 TISSUE EXAM BY PATHOLOGIST: CPT | Mod: 59

## 2017-08-02 PROCEDURE — 500766 HCHG KIT, D & E COLLECTION: Performed by: OBSTETRICS & GYNECOLOGY

## 2017-08-02 PROCEDURE — 160035 HCHG PACU - 1ST 60 MINS PHASE I: Performed by: OBSTETRICS & GYNECOLOGY

## 2017-08-02 PROCEDURE — 160048 HCHG OR STATISTICAL LEVEL 1-5: Performed by: OBSTETRICS & GYNECOLOGY

## 2017-08-02 PROCEDURE — 502587 HCHG PACK, D&C: Performed by: OBSTETRICS & GYNECOLOGY

## 2017-08-02 RX ORDER — KETOROLAC TROMETHAMINE 30 MG/ML
30 INJECTION, SOLUTION INTRAMUSCULAR; INTRAVENOUS
Status: DISCONTINUED | OUTPATIENT
Start: 2017-08-02 | End: 2017-08-02 | Stop reason: HOSPADM

## 2017-08-02 RX ORDER — ONDANSETRON 2 MG/ML
INJECTION INTRAMUSCULAR; INTRAVENOUS
Status: COMPLETED
Start: 2017-08-02 | End: 2017-08-02

## 2017-08-02 RX ORDER — SODIUM CHLORIDE, SODIUM LACTATE, POTASSIUM CHLORIDE, CALCIUM CHLORIDE 600; 310; 30; 20 MG/100ML; MG/100ML; MG/100ML; MG/100ML
INJECTION, SOLUTION INTRAVENOUS CONTINUOUS
Status: DISCONTINUED | OUTPATIENT
Start: 2017-08-02 | End: 2017-08-02 | Stop reason: HOSPADM

## 2017-08-02 RX ORDER — GLYCOPYRROLATE 0.2 MG/ML
INJECTION INTRAMUSCULAR; INTRAVENOUS
Status: DISCONTINUED
Start: 2017-08-02 | End: 2017-08-02 | Stop reason: HOSPADM

## 2017-08-02 RX ORDER — PROMETHAZINE HYDROCHLORIDE 25 MG/1
25 SUPPOSITORY RECTAL EVERY 4 HOURS PRN
Status: DISCONTINUED | OUTPATIENT
Start: 2017-08-02 | End: 2017-08-02 | Stop reason: HOSPADM

## 2017-08-02 RX ORDER — ONDANSETRON 2 MG/ML
4 INJECTION INTRAMUSCULAR; INTRAVENOUS EVERY 6 HOURS PRN
Status: DISCONTINUED | OUTPATIENT
Start: 2017-08-02 | End: 2017-08-02 | Stop reason: HOSPADM

## 2017-08-02 RX ORDER — HYDROCODONE BITARTRATE AND ACETAMINOPHEN 5; 325 MG/1; MG/1
2 TABLET ORAL EVERY 6 HOURS PRN
Status: DISCONTINUED | OUTPATIENT
Start: 2017-08-02 | End: 2017-08-02 | Stop reason: HOSPADM

## 2017-08-02 RX ORDER — HYDROCODONE BITARTRATE AND ACETAMINOPHEN 5; 325 MG/1; MG/1
1 TABLET ORAL EVERY 4 HOURS PRN
Status: DISCONTINUED | OUTPATIENT
Start: 2017-08-02 | End: 2017-08-02 | Stop reason: HOSPADM

## 2017-08-02 RX ORDER — GLYCOPYRROLATE 0.2 MG/ML
0.1 INJECTION INTRAMUSCULAR; INTRAVENOUS ONCE
Status: DISCONTINUED | OUTPATIENT
Start: 2017-08-02 | End: 2017-08-02 | Stop reason: HOSPADM

## 2017-08-02 RX ORDER — PROMETHAZINE HYDROCHLORIDE 25 MG/1
SUPPOSITORY RECTAL
Status: COMPLETED
Start: 2017-08-02 | End: 2017-08-02

## 2017-08-02 RX ORDER — MEPERIDINE HYDROCHLORIDE 25 MG/ML
INJECTION INTRAMUSCULAR; INTRAVENOUS; SUBCUTANEOUS
Status: COMPLETED
Start: 2017-08-02 | End: 2017-08-02

## 2017-08-02 RX ORDER — BUPIVACAINE HYDROCHLORIDE AND EPINEPHRINE 2.5; 5 MG/ML; UG/ML
INJECTION, SOLUTION EPIDURAL; INFILTRATION; INTRACAUDAL; PERINEURAL
Status: DISCONTINUED | OUTPATIENT
Start: 2017-08-02 | End: 2017-08-02 | Stop reason: HOSPADM

## 2017-08-02 RX ORDER — IBUPROFEN 200 MG
600 TABLET ORAL EVERY 6 HOURS PRN
Status: DISCONTINUED | OUTPATIENT
Start: 2017-08-02 | End: 2017-08-02 | Stop reason: HOSPADM

## 2017-08-02 RX ADMIN — MEPERIDINE HYDROCHLORIDE 25 MG: 25 INJECTION INTRAMUSCULAR; INTRAVENOUS; SUBCUTANEOUS at 13:39

## 2017-08-02 RX ADMIN — PROMETHAZINE HYDROCHLORIDE 25 MG: 25 SUPPOSITORY RECTAL at 13:52

## 2017-08-02 RX ADMIN — ONDANSETRON 4 MG: 2 INJECTION INTRAMUSCULAR; INTRAVENOUS at 13:26

## 2017-08-02 RX ADMIN — SODIUM CHLORIDE, SODIUM LACTATE, POTASSIUM CHLORIDE, CALCIUM CHLORIDE 1000 ML: 600; 310; 30; 20 INJECTION, SOLUTION INTRAVENOUS at 11:00

## 2017-08-02 ASSESSMENT — PAIN SCALES - GENERAL
PAINLEVEL_OUTOF10: 3
PAINLEVEL_OUTOF10: 0

## 2017-08-02 NOTE — OP REPORT
DATE OF SERVICE:  08/02/2017    PREOPERATIVE DIAGNOSES:  Heavy painful periods, pelvic pain.    POSTOPERATIVE DIAGNOSES:  Heavy painful periods, pelvic pain.    PROCEDURES:  Dilation and curettage, hysteroscopy, and NovaSure endometrial   ablation.    SURGEON:  Hedy Hardy MD    ANESTHESIOLOGIST:  Hossein Emmanuel MD    ANESTHESIA:  General endotracheal.    ESTIMATED BLOOD LOSS:  Minimal.    SPECIMEN:  ECC and EMC.    FINDINGS AT THE TIME OF SURGERY:  Exam under anesthesia reveals a normal sized   and positioned uterus, which is smooth in contour and there are no adnexal   masses.  Hysteroscopic findings showed the cervix to be normal, the uterine   cavity to appear normal, the ostia were visualized bilaterally.  The settings   for NovaSure were 4 cm length, 4.4 cm width, 50 seconds total ablation time   and power was 97.  Hysteroscopic findings post-ablation showed there to be a   great ablative effect throughout the cavity.    COMPLICATIONS:  None.    TECHNIQUE:  Patient was taken to the operating room where general anesthesia   was placed.  She was then placed in the [facility] with excellent   positioning, prepped and draped in the normal sterile fashion.  A weighted   speculum was placed in the posterior vagina, Ogden retractor placed anteriorly.    The anterior lip of the cervix was then grasped with a single tooth   tenaculum and 0.25% Marcaine with epinephrine was then injected at the   anterior lip site as well as at the 3 o'clock and 9 o'clock position deep into   the uterosacral complex.  The cervix was then easily dilated to 8 mm and the   diagnostic scope was placed, the findings were as above.  The scope was   removed and a fractional dilation and curettage was then performed.  A   Kevorkian curette was used for the cervical specimen, this was sent as ECC,   and a small sharp curette was used for the endometrial curettings, this was   sent separately to pathology.  The uterus was sounded to 8  cm.  The cervix was   4 cm in length, so we entered 4 cm as the length in the NovaSure machine.    The NovaSure device was then introduced and deployed with appropriate seating   technique.  The width was noted to be 4.4 cm and this was placed in the   NovaSure machine as well.  Once in the appropriate positioning, I then did a   cavity assessment test, which we passed without difficulty.  I then did the   ablation.  It lasted 50 seconds before automatically turning off.  The device   was then removed and hysteroscopy postprocedure revealed the above findings.    The tenaculum was then removed and silver nitrate placed at the tenaculum site   as well as at the ectocervix.  The speculum was removed.  The patient   tolerated the procedure very well and was brought to recovery room in stable   condition.       ____________________________________     NAHUM HAMMONDS MD E[facility] / AMOL    DD:  08/02/2017 13:15:41  DT:  08/02/2017 13:37:20    D#:  [id number]  Job#:  [id number]

## 2017-08-02 NOTE — H&P
DATE OF OPERATION:  2017    CHIEF COMPLAINT:  Heavy painful periods.    HISTORY OF PRESENT ILLNESS:  Patient is a 42-year-old , AB1 with heavy   painful periods her whole life.  The patient also has pain.  They last for   7-10 days.  There is terrible cramping and the problem for 15 years.  I   suspect adenomyosis.  She did have a pelvic ultrasound done and endometrial   biopsy, these were essentially unremarkable.  The patient initially wanted to   have a hysterectomy, but was denied by her insurance company.  At this time,   the patient would like to proceed with NovaSure endometrial ablation as her   friend had good results with this.  Risks, benefits, alternatives and   procedure were discussed with the patient in detail and she agrees to proceed.    PAST MEDICAL HISTORY:  High blood pressure, depression, anxiety, and kidney   infection.    PAST SURGICAL HISTORY:  Breast augmentation in .    MENSTRUAL HISTORY:  The patient underwent menarche at age 11.  Her periods   last for 5-7 days and are quite heavy and incapacitating.    OB HISTORY:  She has had 4 pregnancies, 3 babies born alive, the last delivery   was in .    MEDICATIONS:  Include amlodipine and valsartan.    SOCIAL HISTORY:  The patient does not smoke, drinks 5 drinks a week, does not   do recreational drugs.    ALLERGIES:  SULFA.    PHYSICAL EXAMINATION:  VITAL SIGNS:  The patient's blood pressure is 110/64.  Her weight is 149.    Please see Epic vitals.  HEENT:  Within normal limits.  NECK:  Supple without lymphadenopathy or thyromegaly.  CARDIOVASCULAR:  Regular rate and rhythm.  LUNGS:  Clear to auscultation.  BACK:  No CVA tenderness.  ABDOMEN:  Soft and nontender, nondistended.  No masses are palpable.  PELVIC:  EGBUS appears normal.  Vagina appears normal.  Cervix appears normal.    Bimanual exam reveals a fairly normal size, but tender uterus, which is   otherwise smooth in contour and there are no adnexal  masses.  EXTREMITIES:  Benign.    ASSESSMENT:  1.  A 42-year-old , AB1.  2.  Heavy painful periods for many years.  3.  Pelvic pressure.  4.  Desires NovaSure endometrial ablation.  5.  Otherwise, overall healthy.    PLAN:  The patient is scheduled for a NovaSure endometrial ablation with   hysteroscopy, D and C.  Risks, benefits, alternatives and procedure were   discussed with the patient in detail and she agrees to proceed.  Preoperative   labs will be obtained.  Preoperative antibiotics will be administered.  If she   has any problems or questions, she can contact my office.       ____________________________________     MD PASCUAL LARSON / AMOL    DD:  2017 22:03:03  DT:  2017 22:23:04    D#:  [id number]  Job#:  [id number]

## 2017-08-02 NOTE — DISCHARGE INSTRUCTIONS
ACTIVITY: Rest and take it easy for the first 24 hours.  A responsible adult is recommended to remain with you during that time.  It is normal to feel sleepy.  We encourage you to not do anything that requires balance, judgment or coordination.    MILD FLU-LIKE SYMPTOMS ARE NORMAL. YOU MAY EXPERIENCE GENERALIZED MUSCLE ACHES, THROAT IRRITATION, HEADACHE AND/OR SOME NAUSEA.    FOR 24 HOURS DO NOT:  Drive, operate machinery or run household appliances.  Drink beer or alcoholic beverages.   Make important decisions or sign legal documents.    SPECIAL INSTRUCTIONS: Keep bowels soft and regular, Use Colace or Milk of Magnesia as directed Over the Counter    DIET: To avoid nausea, slowly advance diet as tolerated, avoiding spicy or greasy foods for the first day.  Add more substantial food to your diet according to your physician's instructions.  Babies can be fed formula or breast milk as soon as they are hungry.  INCREASE FLUIDS AND FIBER TO AVOID CONSTIPATION.    SURGICAL DRESSING/BATHING: Bath or shower okay    FOLLOW-UP APPOINTMENT:  A follow-up appointment should be arranged with your doctor; call to schedule.    You should CALL YOUR PHYSICIAN if you develop:  Fever greater than 101 degrees F.  Pain not relieved by medication, or persistent nausea or vomiting.  Excessive bleeding (blood soaking through dressing) or unexpected drainage from the wound.  Extreme redness or swelling around the incision site, drainage of pus or foul smelling drainage.  Inability to urinate or empty your bladder within 8 hours.  Problems with breathing or chest pain.    You should call 911 if you develop problems with breathing or chest pain.  If you are unable to contact your doctor or surgical center, you should go to the nearest emergency room or urgent care center.  Physician's telephone #: Dr. Hardy, [phone]    If any questions arise, call your doctor.  If your doctor is not available, please feel free to call the Surgical Center  at [phone].  The Center is open Monday through Friday from 7AM to 7PM.  You can also call the HEALTH HOTLINE open 24 hours/day, 7 days/week and speak to a nurse at [phone], or toll free at [phone].    A registered nurse may call you a few days after your surgery to see how you are doing after your procedure.    MEDICATIONS: Resume taking daily medication.  Take prescribed pain medication with food.  If no medication is prescribed, you may take non-aspirin pain medication if needed.  PAIN MEDICATION CAN BE VERY CONSTIPATING.  Take a stool softener or laxative such as senokot, pericolace, or milk of magnesia if needed.    Prescription given for (May use Ibuprofen over the counter for pain).     If your physician has prescribed pain medication that includes Acetaminophen (Tylenol), do not take additional Acetaminophen (Tylenol) while taking the prescribed medication.    Depression / Suicide Risk    As you are discharged from this [facility] facility, it is important to learn how to keep safe from harming yourself.    Recognize the warning signs:  · Abrupt changes in personality, positive or negative- including increase in energy   · Giving away possessions  · Change in eating patterns- significant weight changes-  positive or negative  · Change in sleeping patterns- unable to sleep or sleeping all the time   · Unwillingness or inability to communicate  · Depression  · Unusual sadness, discouragement and loneliness  · Talk of wanting to die  · Neglect of personal appearance   · Rebelliousness- reckless behavior  · Withdrawal from people/activities they love  · Confusion- inability to concentrate     If you or a loved one observes any of these behaviors or has concerns about self-harm, here's what you can do:  · Talk about it- your feelings and reasons for harming yourself  · Remove any means that you might use to hurt yourself (examples: pills, rope, extension cords, firearm)  · Get professional  help from the community (Mental Health, Substance Abuse, psychological counseling)  · Do not be alone:Call your Safe Contact- someone whom you trust who will be there for you.  · Call your local CRISIS HOTLINE 505-5978 or 414-358-2242  · Call your local Children's Mobile Crisis Response Team [place] (249) 872-0824 or www.datatracker  · Call the toll free National Suicide Prevention Hotlines   · National Suicide Prevention Lifeline 886-993-FICW (3460)  · National Hope Line Network 800-SUICIDE (431-3364)

## 2017-08-02 NOTE — IP AVS SNAPSHOT
8/2/2017    Minerva Tillman  [address] [address]    Dear Minerva:    [facility] wants to ensure your discharge home is safe and you or your loved ones have had all of your questions answered regarding your care after you leave the hospital.    Below is a list of resources and contact information should you have any questions regarding your hospital stay, follow-up instructions, or active medical symptoms.    Questions or Concerns Regarding… Contact   Medical Questions Related to Your Discharge  (7 days a week, 8am-5pm) Contact a Nurse Care Coordinator   [phone]   Medical Questions Not Related to Your Discharge  (24 hours a day / 7 days a week)  Contact the Nurse Health Line   [phone]    Medications or Discharge Instructions Refer to your discharge packet   or contact your [facility] Primary Care Provider   [phone]   Follow-up Appointment(s) Schedule your appointment via DataContact   or contact Scheduling [phone]   Billing Review your statement via DataContact  or contact Billing [phone]   Medical Records Review your records via DataContact   or contact Medical Records [phone]     You may receive a telephone call within two days of discharge. This call is to make certain you understand your discharge instructions and have the opportunity to have any questions answered. You can also easily access your medical information, test results and upcoming appointments via the DataContact free online health management tool. You can learn more and sign up at [employer]/DataContact. For assistance setting up your DataContact account, please call [phone].    Once again, we want to ensure your discharge home is safe and that you have a clear understanding of any next steps in your care. If you have any questions or concerns, please do not hesitate to contact us, we are here for you. Thank you for choosing [facility] for your healthcare needs.    Sincerely,    Your [facility] Healthcare Team

## 2017-08-02 NOTE — IP AVS SNAPSHOT
" Home Care Instructions                                                                                                                Name:Minerva Tillman  Medical Record Number:[id number]  CSN: [id number]    YOB: 1975   Age: 42 y.o.  Sex: female  HT:1.626 m (5' 4\") WT: 67.9 kg (149 lb 11.1 oz)          Admit Date: 8/2/2017     Discharge Date:   Today's Date: 8/2/2017  Attending Doctor:  Hedy Hardy M.D.                  Allergies:  Sulfa drugs                Discharge Instructions         ACTIVITY: Rest and take it easy for the first 24 hours.  A responsible adult is recommended to remain with you during that time.  It is normal to feel sleepy.  We encourage you to not do anything that requires balance, judgment or coordination.    MILD FLU-LIKE SYMPTOMS ARE NORMAL. YOU MAY EXPERIENCE GENERALIZED MUSCLE ACHES, THROAT IRRITATION, HEADACHE AND/OR SOME NAUSEA.    FOR 24 HOURS DO NOT:  Drive, operate machinery or run household appliances.  Drink beer or alcoholic beverages.   Make important decisions or sign legal documents.    SPECIAL INSTRUCTIONS: Keep bowels soft and regular, Use Colace or Milk of Magnesia as directed Over the Counter    DIET: To avoid nausea, slowly advance diet as tolerated, avoiding spicy or greasy foods for the first day.  Add more substantial food to your diet according to your physician's instructions.  Babies can be fed formula or breast milk as soon as they are hungry.  INCREASE FLUIDS AND FIBER TO AVOID CONSTIPATION.    SURGICAL DRESSING/BATHING: Bath or shower okay    FOLLOW-UP APPOINTMENT:  A follow-up appointment should be arranged with your doctor; call to schedule.    You should CALL YOUR PHYSICIAN if you develop:  Fever greater than 101 degrees F.  Pain not relieved by medication, or persistent nausea or vomiting.  Excessive bleeding (blood soaking through dressing) or unexpected drainage from the wound.  Extreme redness or swelling around the incision site, " drainage of pus or foul smelling drainage.  Inability to urinate or empty your bladder within 8 hours.  Problems with breathing or chest pain.    You should call 911 if you develop problems with breathing or chest pain.  If you are unable to contact your doctor or surgical center, you should go to the nearest emergency room or urgent care center.  Physician's telephone #: Dr. Hardy, [phone]    If any questions arise, call your doctor.  If your doctor is not available, please feel free to call the Surgical Center at [phone].  The Center is open Monday through Friday from 7AM to 7PM.  You can also call the HEALTH HOTLINE open 24 hours/day, 7 days/week and speak to a nurse at [phone], or toll free at [phone].    A registered nurse may call you a few days after your surgery to see how you are doing after your procedure.    MEDICATIONS: Resume taking daily medication.  Take prescribed pain medication with food.  If no medication is prescribed, you may take non-aspirin pain medication if needed.  PAIN MEDICATION CAN BE VERY CONSTIPATING.  Take a stool softener or laxative such as senokot, pericolace, or milk of magnesia if needed.    Prescription given for (May use Ibuprofen over the counter for pain).     If your physician has prescribed pain medication that includes Acetaminophen (Tylenol), do not take additional Acetaminophen (Tylenol) while taking the prescribed medication.    Depression / Suicide Risk    As you are discharged from this [facility] facility, it is important to learn how to keep safe from harming yourself.    Recognize the warning signs:  · Abrupt changes in personality, positive or negative- including increase in energy   · Giving away possessions  · Change in eating patterns- significant weight changes-  positive or negative  · Change in sleeping patterns- unable to sleep or sleeping all the time   · Unwillingness or inability to communicate  · Depression  · Unusual sadness,  discouragement and loneliness  · Talk of wanting to die  · Neglect of personal appearance   · Rebelliousness- reckless behavior  · Withdrawal from people/activities they love  · Confusion- inability to concentrate     If you or a loved one observes any of these behaviors or has concerns about self-harm, here's what you can do:  · Talk about it- your feelings and reasons for harming yourself  · Remove any means that you might use to hurt yourself (examples: pills, rope, extension cords, firearm)  · Get professional help from the community (Mental Health, Substance Abuse, psychological counseling)  · Do not be alone:Call your Safe Contact- someone whom you trust who will be there for you.  · Call your local CRISIS HOTLINE 796-9446 or 479-439-6211  · Call your local Children's Mobile Crisis Response Team [place] (069) 921-5632 or www.[place]  · Call the toll free National Suicide Prevention Hotlines   · National Suicide Prevention Lifeline 445-225-BCTQ (3602)  · [place] [facility] Hope Line Network 800-SUICIDE (341-9419)       Medication List      CONTINUE taking these medications        Instructions    Morning Afternoon Evening Bedtime    atorvastatin 10 MG Tabs   Commonly known as:  LIPITOR        Take 1 Tab by mouth every bedtime.   Dose:  10 mg                        EXFORGE 5-320 MG per tablet   Generic drug:  amlodipine-valsartan        Take 1 Tab by mouth every morning.   Dose:  1 Tab                        QC WOMENS DAILY MULTIVITAMIN PO        Take 1 Tab by mouth every morning.   Dose:  1 Tab                                Medication Information     Above and/or attached are the medications your physician expects you to take upon discharge. Review all of your home medications and newly ordered medications with your doctor and/or pharmacist. Follow medication instructions as directed by your doctor and/or pharmacist. Please keep your medication list with you and share with your physician. Update the  information when medications are discontinued, doses are changed, or new medications (including over-the-counter products) are added; and carry medication information at all times in the event of emergency situations.        Resources     Quit Smoking / Tobacco Use:    I understand the use of any tobacco products increases my chance of suffering from future heart disease or stroke and could cause other illnesses which may shorten my life. Quitting the use of tobacco products is the single most important thing I can do to improve my health. For further information on smoking / tobacco cessation call a Toll Free Quit Line at 1-600.552.2046 (*National Cancer [place]) or 1-312.753.3238 (American Lung Association) or you can access the web based program at www.lungusa.org.    [place] Tobacco Users Help Line:  (157) 436-7869       Toll Free: 1-535.121.2481  Quit Tobacco Program [facility] (361)195-1405    Crisis Hotline:    [place] Crisis Hotline:  0-900-RBRVLWZ or 1-692.225.1340    [place] Crisis Hotline:    1-274.169.5217 or 664-400-5056    Discharge Survey:   Thank you for choosing [facility]. We hope we did everything we could to make your hospital stay a pleasant one. You may be receiving a survey and we would appreciate your time and participation in answering the questions. Your input is very valuable to us in our efforts to improve our service to our patients and their families.            Signatures     My signature on this form indicates that:    1. I acknowledge receipt and understanding of these Home Care Instruction.  2. My questions regarding this information have been answered to my satisfaction.  3. I have formulated a plan with my discharge nurse to obtain my prescribed medications for home.    __________________________________      __________________________________                   Patient Signature                                 Guardian/Responsible Adult  Signature      __________________________________                 __________       ________                       Nurse Signature                                               Date                 Time

## 2017-08-02 NOTE — OR SURGEON
Operative Report    PreOp Diagnosis: Heavy painful periods, pelvic pain     PostOp Diagnosis: Same    Procedure(s):  HYSTEROSCOPY NOVASURE - Wound Class: Clean Contaminated  DILATION AND CURETTAGE - Wound Class: Clean Contaminated    Surgeon(s):  Hedy Hardy M.D.    Anesthesiologist/Type of Anesthesia:  Anesthesiologist: Hossein Emmanuel M.D./General    Surgical Staff:  Circulator: Nirmal Lin R.N.; Neida Bernabe R.N.  Scrub Person: Kaykay Delano    Specimens:  ECC, EMC    Estimated Blood Loss: Min    Findings: Uterus is normal size and position,  Cavity appears normal with scant tissue, settings 4 cm length, 4.4 cm width, 50 secs, power 97, h/s post ablation shows great ablative effect throughout cavity    Complications: None        8/2/2017 1:08 PM Hedy Hardy

## 2017-10-20 ENCOUNTER — HOSPITAL ENCOUNTER (OUTPATIENT)
Facility: MEDICAL CENTER | Age: 42
End: 2017-10-20
Attending: NURSE PRACTITIONER
Payer: COMMERCIAL

## 2017-10-20 ENCOUNTER — OFFICE VISIT (OUTPATIENT)
Dept: URGENT CARE | Facility: CLINIC | Age: 42
End: 2017-10-20
Payer: COMMERCIAL

## 2017-10-20 VITALS
BODY MASS INDEX: 26.22 KG/M2 | DIASTOLIC BLOOD PRESSURE: 78 MMHG | RESPIRATION RATE: 18 BRPM | TEMPERATURE: 97.7 F | OXYGEN SATURATION: 98 % | HEIGHT: 63 IN | HEART RATE: 72 BPM | WEIGHT: 148 LBS | SYSTOLIC BLOOD PRESSURE: 140 MMHG

## 2017-10-20 DIAGNOSIS — R30.0 DYSURIA: ICD-10-CM

## 2017-10-20 DIAGNOSIS — Z87.440 HISTORY OF RECURRENT UTI (URINARY TRACT INFECTION): ICD-10-CM

## 2017-10-20 LAB
APPEARANCE UR: CLEAR
BILIRUB UR STRIP-MCNC: NORMAL MG/DL
COLOR UR AUTO: YELLOW
GLUCOSE UR STRIP.AUTO-MCNC: NORMAL MG/DL
INT CON NEG: NEGATIVE
INT CON POS: POSITIVE
KETONES UR STRIP.AUTO-MCNC: NORMAL MG/DL
LEUKOCYTE ESTERASE UR QL STRIP.AUTO: NORMAL
NITRITE UR QL STRIP.AUTO: NORMAL
PH UR STRIP.AUTO: 6 [PH] (ref 5–8)
POC URINE PREGNANCY TEST: NORMAL
PROT UR QL STRIP: NORMAL MG/DL
RBC UR QL AUTO: NORMAL
SP GR UR STRIP.AUTO: 1
UROBILINOGEN UR STRIP-MCNC: NORMAL MG/DL

## 2017-10-20 PROCEDURE — 99000 SPECIMEN HANDLING OFFICE-LAB: CPT | Performed by: FAMILY MEDICINE

## 2017-10-20 PROCEDURE — 99214 OFFICE O/P EST MOD 30 MIN: CPT | Performed by: FAMILY MEDICINE

## 2017-10-20 PROCEDURE — 81025 URINE PREGNANCY TEST: CPT | Performed by: FAMILY MEDICINE

## 2017-10-20 PROCEDURE — 81002 URINALYSIS NONAUTO W/O SCOPE: CPT | Performed by: FAMILY MEDICINE

## 2017-10-20 PROCEDURE — 87086 URINE CULTURE/COLONY COUNT: CPT

## 2017-10-20 RX ORDER — NITROFURANTOIN 25; 75 MG/1; MG/1
100 CAPSULE ORAL EVERY 12 HOURS
Qty: 14 CAP | Refills: 0 | Status: SHIPPED | OUTPATIENT
Start: 2017-10-20 | End: 2017-10-27

## 2017-10-20 RX ORDER — NITROFURANTOIN 25; 75 MG/1; MG/1
100 CAPSULE ORAL EVERY 12 HOURS
Qty: 14 CAP | Refills: 0 | Status: SHIPPED | OUTPATIENT
Start: 2017-10-20 | End: 2017-10-20 | Stop reason: SDUPTHER

## 2017-10-20 ASSESSMENT — ENCOUNTER SYMPTOMS: CHILLS: 1

## 2017-10-20 NOTE — PROGRESS NOTES
Subjective:      Minerva Tillman is a 42 y.o. female who presents with Dysuria (frequency, bodyaches, chills, prone to UTI's )          She's been having UTI like sx for past week including overall malaise, body aches and noticeable blood in urine this morning. She does have a hx of UTI with the most recent being 4 months ago.  Her  Dr. Tillman treated her with Keflex 500mg QID x3days. She has had little relief in sx. She had an ablation done 3 months ago with no complication following. She has not had a menstrual cycle since.       Dysuria    This is a recurrent problem. The current episode started 1 to 4 weeks ago. The problem has been gradually worsening. The quality of the pain is described as aching. There has been no fever. She is sexually active. There is no history of pyelonephritis. Associated symptoms include chills, frequency and hematuria. She has tried antibiotics for the symptoms. The treatment provided no relief. Her past medical history is significant for recurrent UTIs.   She had a remote h/o pyelo with Hospitalization when she was 11. Denies any current vomiting or flank pain. She has felt slightly feverish  Her last urine culture in our system was in 2014 which showed Escherichia coli at greater than 100,000 colony-forming units that was resistant to quinolones.      painReview of Systems   Constitutional: Positive for chills and malaise/fatigue.   Genitourinary: Positive for dysuria, frequency and hematuria.     PMH:  has a past medical history of Anxiety; Depression; Gynecological disorder; High cholesterol; Hypertension; Snoring; and Urinary tract infection.  MEDS:   Current Outpatient Prescriptions:   •  nitrofurantoin monohydr macro (MACROBID) 100 MG Cap, Take 1 Cap by mouth every 12 hours for 7 days., Disp: 14 Cap, Rfl: 0  •  Multiple Vitamins-Minerals (QC WOMENS DAILY MULTIVITAMIN PO), Take 1 Tab by mouth every morning., Disp: , Rfl:   •  amlodipine-valsartan (EXFORGE) 5-320 MG  "per tablet, Take 1 Tab by mouth every morning., Disp: , Rfl:   •  atorvastatin (LIPITOR) 10 MG Tab, Take 1 Tab by mouth every bedtime., Disp: 30 Tab, Rfl: 6  ALLERGIES:   Allergies   Allergen Reactions   • Sulfa Drugs Hives     RYC=[id number]     SURGHX:   Past Surgical History:   Procedure Laterality Date   • HYSTEROSCOPY NOVASURE-2  8/2/2017    Procedure: HYSTEROSCOPY NOVASURE;  Surgeon: Hedy Hardy M.D.;  Location: SURGERY [facility];  Service:    • DILATION AND CURETTAGE  8/2/2017    Procedure: DILATION AND CURETTAGE;  Surgeon: Hedy Hardy M.D.;  Location: SURGERY [facility];  Service:    • PB ENLARGE BREAST WITH IMPLANT  2006   • MAMMOPLASTY AUGMENTATION       SOCHX:  reports that she has never smoked. She has never used smokeless tobacco. She reports that she drinks about 8.4 oz of alcohol per week . She reports that she does not use drugs.  FH: Family history was reviewed, no pertinent findings to report     Objective:     /78   Pulse 72   Temp 36.5 °C (97.7 °F)   Resp 18   Ht 1.6 m (5' 3\")   Wt 67.1 kg (148 lb)   SpO2 98%   Breastfeeding? No   BMI 26.22 kg/m²      Physical Exam   Constitutional: She is oriented to person, place, and time. She appears well-developed and well-nourished.   HENT:   Mouth/Throat: Oropharynx is clear and moist.   Cardiovascular: Normal rate and regular rhythm.    Pulmonary/Chest: Effort normal.   Abdominal: Soft. There is no CVA tenderness.   Musculoskeletal: Normal range of motion.   Neurological: She is alert and oriented to person, place, and time.   Skin: Skin is warm and dry.   Psychiatric: She has a normal mood and affect.     Diagnostics: urine dip with rbc's hcg negative       Assessment/Plan:     1. Dysuria    - POCT Urinalysis  - POCT PREGNANCY  - Urine Culture; Future  - nitrofurantoin monohydr macro (MACROBID) 100 MG Cap; Take 1 Cap by mouth every 12 hours for 7 days.  Dispense: 14 Cap; Refill: 0    2. History of recurrent UTI (urinary " tract infection)    - Urine Culture; Future  - nitrofurantoin monohydr macro (MACROBID) 100 MG Cap; Take 1 Cap by mouth every 12 hours for 7 days.  Dispense: 14 Cap; Refill: 0   We have placed her on this based on her most recent susceptibilities of urine culture in our system  ER precautions are given.    Patient treated in conjunction with Valente SKINNER during her training.

## 2017-10-21 DIAGNOSIS — R30.0 DYSURIA: ICD-10-CM

## 2017-10-21 DIAGNOSIS — Z87.440 HISTORY OF RECURRENT UTI (URINARY TRACT INFECTION): ICD-10-CM

## 2017-10-23 LAB
BACTERIA UR CULT: NORMAL
SIGNIFICANT IND 70042: NORMAL
SOURCE SOURCE: NORMAL

## 2017-11-02 ENCOUNTER — HOSPITAL ENCOUNTER (OUTPATIENT)
Dept: RADIOLOGY | Facility: MEDICAL CENTER | Age: 42
End: 2017-11-02
Attending: COLON & RECTAL SURGERY
Payer: COMMERCIAL

## 2017-11-02 DIAGNOSIS — R10.9 STOMACH ACHE: ICD-10-CM

## 2017-11-02 DIAGNOSIS — N20.0 URIC ACID NEPHROLITHIASIS: ICD-10-CM

## 2017-11-02 PROCEDURE — 74020 DX-ABDOMEN-2 VIEWS: CPT

## 2017-12-12 ENCOUNTER — TELEPHONE (OUTPATIENT)
Dept: VASCULAR LAB | Facility: MEDICAL CENTER | Age: 42
End: 2017-12-12

## 2018-01-05 ENCOUNTER — OFFICE VISIT (OUTPATIENT)
Dept: URGENT CARE | Facility: CLINIC | Age: 43
End: 2018-01-05
Payer: COMMERCIAL

## 2018-01-05 VITALS
TEMPERATURE: 97.5 F | RESPIRATION RATE: 16 BRPM | DIASTOLIC BLOOD PRESSURE: 96 MMHG | BODY MASS INDEX: 25.51 KG/M2 | OXYGEN SATURATION: 97 % | SYSTOLIC BLOOD PRESSURE: 140 MMHG | WEIGHT: 149.4 LBS | HEART RATE: 82 BPM | HEIGHT: 64 IN

## 2018-01-05 DIAGNOSIS — H10.32 ACUTE CONJUNCTIVITIS OF LEFT EYE, UNSPECIFIED ACUTE CONJUNCTIVITIS TYPE: ICD-10-CM

## 2018-01-05 PROCEDURE — 99213 OFFICE O/P EST LOW 20 MIN: CPT | Performed by: PHYSICIAN ASSISTANT

## 2018-01-05 RX ORDER — POLYMYXIN B SULFATE AND TRIMETHOPRIM 1; 10000 MG/ML; [USP'U]/ML
SOLUTION OPHTHALMIC
Qty: 5 ML | Refills: 0 | Status: SHIPPED | OUTPATIENT
Start: 2018-01-05 | End: 2019-04-09

## 2018-01-05 ASSESSMENT — ENCOUNTER SYMPTOMS
EYE ITCHING: 1
VOMITING: 0
BLURRED VISION: 0
WHEEZING: 0
SORE THROAT: 0
CHILLS: 0
EYE REDNESS: 1
NAUSEA: 0
PHOTOPHOBIA: 0
FOREIGN BODY SENSATION: 0
EYE DISCHARGE: 1
FEVER: 0
COUGH: 0
EYE PAIN: 0
DOUBLE VISION: 0

## 2018-01-05 NOTE — PROGRESS NOTES
"Subjective:      Minerva Tillman is a 42 y.o. female who presents with Itchy Eye (x4-5days, left itchy eye, crusted shut)            Eye Problem    The left eye is affected. This is a new problem. Episode onset: 3 days ago. The problem occurs constantly. There was no injury mechanism. The pain is at a severity of 0/10. The patient is experiencing no pain. There is no known exposure to pink eye. She does not wear contacts. Associated symptoms include an eye discharge, eye redness and itching. Pertinent negatives include no blurred vision, double vision, fever, foreign body sensation, nausea, photophobia, recent URI or vomiting. She has tried nothing for the symptoms.       Review of Systems   Constitutional: Negative for chills, fever and malaise/fatigue.   HENT: Negative for congestion and sore throat.    Eyes: Positive for discharge, redness and itching. Negative for blurred vision, double vision, photophobia and pain.   Respiratory: Negative for cough and wheezing.    Gastrointestinal: Negative for nausea and vomiting.   Genitourinary:        History of recurrent UTIs     Skin: Negative for itching and rash.   All other systems reviewed and are negative.         Objective:     /96   Pulse 82   Temp 36.4 °C (97.5 °F)   Resp 16   Ht 1.626 m (5' 4\")   Wt 67.8 kg (149 lb 6.4 oz)   SpO2 97%   BMI 25.64 kg/m²    PMH:  has a past medical history of Anxiety; Depression; Gynecological disorder; High cholesterol; Hypertension; Snoring; and Urinary tract infection.  MEDS:   Current Outpatient Prescriptions:   •  polymixin-trimethoprim (POLYTRIM) 43400-3.1 UNIT/ML-% Solution, Use 1 gtt to left eye q4 hours x 7 days., Disp: 5 mL, Rfl: 0  •  Multiple Vitamins-Minerals (QC WOMENS DAILY MULTIVITAMIN PO), Take 1 Tab by mouth every morning., Disp: , Rfl:   •  amlodipine-valsartan (EXFORGE) 5-320 MG per tablet, Take 1 Tab by mouth every morning., Disp: , Rfl:   •  atorvastatin (LIPITOR) 10 MG Tab, Take 1 Tab by " mouth every bedtime., Disp: 30 Tab, Rfl: 6  ALLERGIES:   Allergies   Allergen Reactions   • Sulfa Drugs Hives     EVB=[id number]     SURGHX:   Past Surgical History:   Procedure Laterality Date   • HYSTEROSCOPY NOVASURE-2  8/2/2017    Procedure: HYSTEROSCOPY NOVASURE;  Surgeon: Hedy Hardy M.D.;  Location: SURGERY [facility];  Service:    • DILATION AND CURETTAGE  8/2/2017    Procedure: DILATION AND CURETTAGE;  Surgeon: Hedy Hardy M.D.;  Location: SURGERY [facility];  Service:    • PB ENLARGE BREAST WITH IMPLANT  2006   • MAMMOPLASTY AUGMENTATION       SOCHX:  reports that she has never smoked. She has never used smokeless tobacco. She reports that she drinks about 8.4 oz of alcohol per week . She reports that she does not use drugs.  FH: Family history was reviewed, no pertinent findings to report    Physical Exam   Constitutional: She is oriented to person, place, and time. She appears well-developed and well-nourished.   HENT:   Head: Normocephalic and atraumatic.   Nose: Nose normal.   Mouth/Throat: Oropharynx is clear and moist.   Eyes: EOM and lids are normal. Pupils are equal, round, and reactive to light. Left eye exhibits discharge. No foreign body present in the left eye. Left conjunctiva is injected.   Slit lamp exam:       The left eye shows no corneal abrasion and no fluorescein uptake.   Neck: Normal range of motion. Neck supple.   Cardiovascular: Normal rate.    Pulmonary/Chest: Effort normal. No respiratory distress.   Neurological: She is alert and oriented to person, place, and time.   Skin: Skin is warm. Capillary refill takes less than 2 seconds. No rash noted.   Psychiatric: She has a normal mood and affect. Her behavior is normal. Judgment and thought content normal.               Assessment/Plan:     1. Acute conjunctivitis of left eye, unspecified acute conjunctivitis type  - polymixin-trimethoprim (POLYTRIM) 36287-4.1 UNIT/ML-% Solution; Use 1 gtt to left eye q4 hours x  7 days.  Dispense: 5 mL; Refill: 0    Discard current eye cosmetics, avoid rubbing. Pt. Without red Flag symptoms- discussed symptoms that would require [id number], pain, change to vision, foreign body sensation, or not improving.  RTC PRN

## 2018-01-24 ENCOUNTER — HOSPITAL ENCOUNTER (OUTPATIENT)
Dept: LAB | Facility: MEDICAL CENTER | Age: 43
End: 2018-01-24
Attending: INTERNAL MEDICINE
Payer: COMMERCIAL

## 2018-01-24 DIAGNOSIS — E78.00 HYPERCHOLESTEROLEMIA: ICD-10-CM

## 2018-01-24 DIAGNOSIS — I10 ESSENTIAL HYPERTENSION: ICD-10-CM

## 2018-01-24 PROCEDURE — 83704 LIPOPROTEIN BLD QUAN PART: CPT

## 2018-01-24 PROCEDURE — 82043 UR ALBUMIN QUANTITATIVE: CPT

## 2018-01-24 PROCEDURE — 86141 C-REACTIVE PROTEIN HS: CPT

## 2018-01-24 PROCEDURE — 82570 ASSAY OF URINE CREATININE: CPT

## 2018-01-24 PROCEDURE — 36415 COLL VENOUS BLD VENIPUNCTURE: CPT

## 2018-01-24 PROCEDURE — 80061 LIPID PANEL: CPT

## 2018-01-24 PROCEDURE — 80053 COMPREHEN METABOLIC PANEL: CPT

## 2018-01-25 LAB
ALBUMIN SERPL BCP-MCNC: 4.5 G/DL (ref 3.2–4.9)
ALBUMIN/GLOB SERPL: 1.5 G/DL
ALP SERPL-CCNC: 63 U/L (ref 30–99)
ALT SERPL-CCNC: 45 U/L (ref 2–50)
ANION GAP SERPL CALC-SCNC: 11 MMOL/L (ref 0–11.9)
AST SERPL-CCNC: 64 U/L (ref 12–45)
BILIRUB SERPL-MCNC: 1.2 MG/DL (ref 0.1–1.5)
BUN SERPL-MCNC: 9 MG/DL (ref 8–22)
CALCIUM SERPL-MCNC: 9.5 MG/DL (ref 8.4–10.2)
CHLORIDE SERPL-SCNC: 103 MMOL/L (ref 96–112)
CO2 SERPL-SCNC: 22 MMOL/L (ref 20–33)
CREAT SERPL-MCNC: 0.81 MG/DL (ref 0.5–1.4)
CREAT UR-MCNC: 159.3 MG/DL
CRP SERPL HS-MCNC: 0.3 MG/L (ref 0–7.5)
GLOBULIN SER CALC-MCNC: 3.1 G/DL (ref 1.9–3.5)
GLUCOSE SERPL-MCNC: 83 MG/DL (ref 65–99)
MICROALBUMIN UR-MCNC: 1 MG/DL
MICROALBUMIN/CREAT UR: 6 MG/G (ref 0–30)
POTASSIUM SERPL-SCNC: 3.9 MMOL/L (ref 3.6–5.5)
PROT SERPL-MCNC: 7.6 G/DL (ref 6–8.2)
SODIUM SERPL-SCNC: 136 MMOL/L (ref 135–145)

## 2018-01-28 LAB
CHOLEST SERPL-MCNC: 337 MG/DL
HDL PARTICAL NO Q4363: ABNORMAL UMOL/L
HDL SIZE Q4361: ABNORMAL NM
HDLC SERPL-MCNC: 58 MG/DL (ref 40–59)
HLD.LARGE SERPL-SCNC: ABNORMAL UMOL/L
L VLDL PART NO Q4357: ABNORMAL NMOL/L
LDL SERPL QN: ABNORMAL NM
LDL SERPL-SCNC: ABNORMAL NMOL/L
LDL SMALL SERPL-SCNC: ABNORMAL NMOL/L
LDLC SERPL CALC-MCNC: ABNORMAL MG/DL
PATHOLOGY STUDY: ABNORMAL
TRIGL SERPL-MCNC: 420 MG/DL (ref 30–149)
VLDL SIZE Q4362: ABNORMAL NM

## 2018-01-29 ENCOUNTER — APPOINTMENT (OUTPATIENT)
Dept: VASCULAR LAB | Facility: MEDICAL CENTER | Age: 43
End: 2018-01-29
Payer: COMMERCIAL

## 2018-01-30 ENCOUNTER — OFFICE VISIT (OUTPATIENT)
Dept: VASCULAR LAB | Facility: MEDICAL CENTER | Age: 43
End: 2018-01-30
Attending: PSYCHIATRY & NEUROLOGY
Payer: COMMERCIAL

## 2018-01-30 VITALS
HEIGHT: 64 IN | WEIGHT: 146.4 LBS | BODY MASS INDEX: 25 KG/M2 | SYSTOLIC BLOOD PRESSURE: 112 MMHG | HEART RATE: 75 BPM | DIASTOLIC BLOOD PRESSURE: 77 MMHG

## 2018-01-30 DIAGNOSIS — E78.00 HYPERCHOLESTEROLEMIA: ICD-10-CM

## 2018-01-30 DIAGNOSIS — I10 ESSENTIAL HYPERTENSION: ICD-10-CM

## 2018-01-30 DIAGNOSIS — E78.5 DYSLIPIDEMIA: ICD-10-CM

## 2018-01-30 PROCEDURE — 99213 OFFICE O/P EST LOW 20 MIN: CPT | Performed by: INTERNAL MEDICINE

## 2018-01-30 ASSESSMENT — ENCOUNTER SYMPTOMS
PALPITATIONS: 0
CLAUDICATION: 0
MYALGIAS: 0
WHEEZING: 0
SHORTNESS OF BREATH: 0
HEADACHES: 0
FOCAL WEAKNESS: 0
NERVOUS/ANXIOUS: 0
WEIGHT LOSS: 0
COUGH: 0

## 2018-01-30 NOTE — PROGRESS NOTES
"  FOLLOW-UP VASCULAR VISIT  Subjective:   Minerva Tillman is a 42 y.o. female who presents [id number] for vascular followup  Chief Complaint   Patient presents with   • Follow-Up     HPI:  Patient here for f/u of hypertension and hypercholesterolemia  Home BP's 110s-120's/70's-80s  Had some leg swelling on long flights.  No CV complaints/symptoms  Denies SOB, headaches, dizziness/lightheadedness, or palpitations  No swelling  Stopped atorvastatin a couple of months ago.  No myalgias - just wanted to try a trial of TLC  Not currently on vit d    DIET AND EXERCISE:  Weight Change: down a few pounds  Diet: common adult, relatively heart healthy; increased fruits/veggies and lean meats  Exercise: moderate regular exercise program - 30 min walk daily + kickboxing a couple of days per week, some resistance training.  Less alcohol    Review of Systems   Constitutional: Negative for malaise/fatigue and weight loss.   Respiratory: Negative for cough, shortness of breath and wheezing.    Cardiovascular: Negative for chest pain, palpitations, claudication and leg swelling.   Musculoskeletal: Negative for joint pain and myalgias.   Neurological: Negative for focal weakness and headaches.   Psychiatric/Behavioral: The patient is not nervous/anxious.       Objective:     Vitals:    01/30/18 0903   BP: 112/77   Pulse: 75   Weight: 66.4 kg (146 lb 6.4 oz)   Height: 1.626 m (5' 4\")      Body mass index is 25.13 kg/m².  Physical Exam   Constitutional: She is oriented to person, place, and time. She appears well-developed and well-nourished. No distress.   Cardiovascular: Normal rate, regular rhythm, normal heart sounds and intact distal pulses.    No murmur heard.  Pulmonary/Chest: Effort normal and breath sounds normal. No respiratory distress. She has no wheezes.   Musculoskeletal: Normal range of motion. She exhibits no edema.   Neurological: She is alert and oriented to person, place, and time.   Skin: Skin is warm and " dry. She is not diaphoretic.   Psychiatric: She has a normal mood and affect. Her behavior is normal. Thought content normal.   Vitals reviewed.    Lab Results   Component Value Date    CHOLSTRLTOT 337 (H) 01/24/2018    CHOLSTRLTOT 288 (H) 04/27/2017     (H) 04/27/2017    HDL 58 01/24/2018    HDL 86 04/27/2017    TRIGLYCERIDE 420 (H) 01/24/2018    TRIGLYCERIDE 119 04/27/2017    LDLPART See Note 01/24/2018    LDLPART Interference 01/24/2018    SMLLDL See Note 01/24/2018    LHDLPART See Note 01/24/2018    LVLDLPT See Note 01/24/2018    LDLCHOL Not Applicable 01/24/2018    HDLSIZE See Note 01/24/2018    VLDLSIZE See Note 01/24/2018    HDLPART See Note 01/24/2018    LPIRSCORE 30 06/16/2017           Lab Results   Component Value Date    SODIUM 136 01/24/2018    POTASSIUM 3.9 01/24/2018    CHLORIDE 103 01/24/2018    CO2 22 01/24/2018    GLUCOSE 83 01/24/2018    BUN 9 01/24/2018    CREATININE 0.81 01/24/2018    IFAFRICA >60 01/24/2018    IFNOTAFR >60 01/24/2018          Lab Results   Component Value Date    WBC 7.7 07/24/2017    RBC 3.95 (L) 07/24/2017    HEMOGLOBIN 14.2 07/24/2017    HEMATOCRIT 41.2 07/24/2017    .3 (H) 07/24/2017    MCH 35.9 (H) 07/24/2017    MCHC 34.5 07/24/2017    MPV 9.7 07/24/2017      EKG 9/24/2015- unremarkable; no lvh    Coronary Calcium Score (april 2017) - Zero    Medical Decision Making:  Today's Assessment / Status / Plan:     1. Essential hypertension  COMP METABOLIC PANEL   2. Hypercholesterolemia  COMP METABOLIC PANEL    LIPOPROTEIN QT BLOOD BY NMR    CRP HIGH SENSITIVE (CARDIAC)    TSH    Cholecalciferol (VITAMIN D3) 2000 units Chew Tab   3. Dyslipidemia  Cholecalciferol (VITAMIN D3) 2000 units Chew Tab                                                                                      Patient Type: Primary Prevention    Etiology of Established CVD if Present: None    Lipid Management: Qualifies for Statin Therapy Based on 2013 ACC/AHA Guidelines: no  Calculated 10-Year  Risk of ASCVD: 1%  Currently on Statin: Yes  Lp(a) normal  Cor calcium score = 0  Baseline LDL high  CRP intermediate  Repeat Lipids panel significantly worse since stopping atorva - ? Outlier - nontheless patient willing to go back on atrova for long term risk  - restart atorva 10  - add vit d 2000 IU daily  - Call for myalgias  - Continue lifestyle modification  - Recheck fasting lipids in 6 months    Blood Pressure Management:  ACC/AHA goal BP <130/80  BP in office at goal; home readings a bit more elevated but still seem reasonable  Previous ABPM with good control  She does have a family history so I suspect primary hypertension.  Normal to high renin HTN.  No microalbuminuria  Has stopped NSAIDs  Minimal, tolerable swelling  - Continue Exforge (fixed dose combination valsartan and amlodipine) 320/5 daily  - Continue to limit NSAIDs to minimal effective dose  - Continue home blood pressure monitoring    Glycemic Status: Normal    Anti-Platelet/Anti-Coagulant Tx: not indicated    Smoking: Continue complete avoidance    Physical Activity: Continue improved exercise regimen    Weight Management and Nutrition: Dietary plan was discussed with patient at this visit including heart healthy diet.    Instructed to follow-up with PCP for remainder of adult medical needs: yes  We will partner with other providers in the management of established vascular disease and cardiometabolic risk factors.    Studies to Be Obtained: none  Labs to Be Obtained: NMR, CRP, CMP, TSH, urine for albumin prior to next visit    Follow up in: 6 months    Michael J Bloch, M.D.    CC:  Romelia Dasilva MD

## 2018-06-06 ENCOUNTER — HOSPITAL ENCOUNTER (OUTPATIENT)
Dept: RADIOLOGY | Facility: MEDICAL CENTER | Age: 43
End: 2018-06-06
Attending: COLON & RECTAL SURGERY
Payer: COMMERCIAL

## 2018-06-06 ENCOUNTER — HOSPITAL ENCOUNTER (OUTPATIENT)
Dept: LAB | Facility: MEDICAL CENTER | Age: 43
End: 2018-06-06
Attending: COLON & RECTAL SURGERY
Payer: COMMERCIAL

## 2018-06-06 DIAGNOSIS — R11.15 PERSISTENT VOMITING: ICD-10-CM

## 2018-06-06 DIAGNOSIS — R11.10 VOMITING, INTRACTABILITY OF VOMITING NOT SPECIFIED, PRESENCE OF NAUSEA NOT SPECIFIED, UNSPECIFIED VOMITING TYPE: ICD-10-CM

## 2018-06-06 DIAGNOSIS — R10.11 ABDOMINAL PAIN, RIGHT UPPER QUADRANT: ICD-10-CM

## 2018-06-06 LAB
ALBUMIN SERPL BCP-MCNC: 4.5 G/DL (ref 3.2–4.9)
ALBUMIN/GLOB SERPL: 1.4 G/DL
ALP SERPL-CCNC: 69 U/L (ref 30–99)
ALT SERPL-CCNC: 89 U/L (ref 2–50)
ANION GAP SERPL CALC-SCNC: 13 MMOL/L (ref 0–11.9)
AST SERPL-CCNC: 110 U/L (ref 12–45)
BASOPHILS # BLD AUTO: 1 % (ref 0–1.8)
BASOPHILS # BLD: 0.09 K/UL (ref 0–0.12)
BILIRUB SERPL-MCNC: 0.9 MG/DL (ref 0.1–1.5)
BUN SERPL-MCNC: 11 MG/DL (ref 8–22)
CALCIUM SERPL-MCNC: 10.1 MG/DL (ref 8.5–10.5)
CHLORIDE SERPL-SCNC: 100 MMOL/L (ref 96–112)
CO2 SERPL-SCNC: 24 MMOL/L (ref 20–33)
CREAT SERPL-MCNC: 0.84 MG/DL (ref 0.5–1.4)
EOSINOPHIL # BLD AUTO: 0.03 K/UL (ref 0–0.51)
EOSINOPHIL NFR BLD: 0.3 % (ref 0–6.9)
ERYTHROCYTE [DISTWIDTH] IN BLOOD BY AUTOMATED COUNT: 45.8 FL (ref 35.9–50)
GLOBULIN SER CALC-MCNC: 3.2 G/DL (ref 1.9–3.5)
GLUCOSE SERPL-MCNC: 93 MG/DL (ref 65–99)
HCT VFR BLD AUTO: 46.4 % (ref 37–47)
HGB BLD-MCNC: 16.4 G/DL (ref 12–16)
IMM GRANULOCYTES # BLD AUTO: 0.02 K/UL (ref 0–0.11)
IMM GRANULOCYTES NFR BLD AUTO: 0.2 % (ref 0–0.9)
LYMPHOCYTES # BLD AUTO: 1.58 K/UL (ref 1–4.8)
LYMPHOCYTES NFR BLD: 18.1 % (ref 22–41)
MCH RBC QN AUTO: 37.5 PG (ref 27–33)
MCHC RBC AUTO-ENTMCNC: 35.3 G/DL (ref 33.6–35)
MCV RBC AUTO: 106.2 FL (ref 81.4–97.8)
MONOCYTES # BLD AUTO: 1.11 K/UL (ref 0–0.85)
MONOCYTES NFR BLD AUTO: 12.7 % (ref 0–13.4)
NEUTROPHILS # BLD AUTO: 5.92 K/UL (ref 2–7.15)
NEUTROPHILS NFR BLD: 67.7 % (ref 44–72)
NRBC # BLD AUTO: 0 K/UL
NRBC BLD-RTO: 0 /100 WBC
PLATELET # BLD AUTO: 334 K/UL (ref 164–446)
PMV BLD AUTO: 8.9 FL (ref 9–12.9)
POTASSIUM SERPL-SCNC: 4.2 MMOL/L (ref 3.6–5.5)
PROT SERPL-MCNC: 7.7 G/DL (ref 6–8.2)
RBC # BLD AUTO: 4.37 M/UL (ref 4.2–5.4)
SODIUM SERPL-SCNC: 137 MMOL/L (ref 135–145)
WBC # BLD AUTO: 8.8 K/UL (ref 4.8–10.8)

## 2018-06-06 PROCEDURE — 85025 COMPLETE CBC W/AUTO DIFF WBC: CPT

## 2018-06-06 PROCEDURE — 74019 RADEX ABDOMEN 2 VIEWS: CPT

## 2018-06-06 PROCEDURE — 80053 COMPREHEN METABOLIC PANEL: CPT

## 2018-06-06 PROCEDURE — 76700 US EXAM ABDOM COMPLETE: CPT

## 2018-06-06 PROCEDURE — 36415 COLL VENOUS BLD VENIPUNCTURE: CPT

## 2018-07-05 ENCOUNTER — HOSPITAL ENCOUNTER (OUTPATIENT)
Dept: RADIOLOGY | Facility: MEDICAL CENTER | Age: 43
End: 2018-07-05
Attending: OBSTETRICS & GYNECOLOGY
Payer: COMMERCIAL

## 2018-07-05 DIAGNOSIS — Z12.39 SCREENING BREAST EXAMINATION: ICD-10-CM

## 2018-07-05 PROCEDURE — 77067 SCR MAMMO BI INCL CAD: CPT

## 2018-09-12 ENCOUNTER — PATIENT MESSAGE (OUTPATIENT)
Dept: MEDICAL GROUP | Age: 43
End: 2018-09-12

## 2018-09-12 DIAGNOSIS — J30.89 SEASONAL ALLERGIC RHINITIS DUE TO OTHER ALLERGIC TRIGGER: ICD-10-CM

## 2018-09-12 DIAGNOSIS — Z01.82 ENCOUNTER FOR ALLERGY TESTING: ICD-10-CM

## 2018-09-12 NOTE — TELEPHONE ENCOUNTER
1. Caller Name: Minerva Tillman                                           Call Back Number: [phone] (home)         Patient approves a detailed voicemail message: no    2. SPECIFIC Action To Be Taken: Referral pending, please sign.    3. Diagnosis/Clinical Reason for Request: Allergy testing    4. Specialty & Provider Name/Lab/Imaging Location: Allergy    5. Is appointment scheduled for requested order/referral: no    Patient was informed they will receive a return phone call from the office ONLY if there are any questions before processing their request. Advised to call back if they haven't received a call from the referral department in 5 days.

## 2019-02-05 ENCOUNTER — PATIENT MESSAGE (OUTPATIENT)
Dept: MEDICAL GROUP | Age: 44
End: 2019-02-05

## 2019-02-07 ENCOUNTER — APPOINTMENT (RX ONLY)
Dept: URBAN - METROPOLITAN AREA CLINIC 35 | Facility: CLINIC | Age: 44
Setting detail: DERMATOLOGY
End: 2019-02-07

## 2019-02-07 DIAGNOSIS — L82.1 OTHER SEBORRHEIC KERATOSIS: ICD-10-CM

## 2019-02-07 DIAGNOSIS — L72.0 EPIDERMAL CYST: ICD-10-CM

## 2019-02-07 PROBLEM — L20.84 INTRINSIC (ALLERGIC) ECZEMA: Status: ACTIVE | Noted: 2019-02-07

## 2019-02-07 PROBLEM — L55.1 SUNBURN OF SECOND DEGREE: Status: ACTIVE | Noted: 2019-02-07

## 2019-02-07 PROBLEM — L57.0 ACTINIC KERATOSIS: Status: ACTIVE | Noted: 2019-02-07

## 2019-02-07 PROBLEM — F32.9 MAJOR DEPRESSIVE DISORDER, SINGLE EPISODE, UNSPECIFIED: Status: ACTIVE | Noted: 2019-02-07

## 2019-02-07 PROBLEM — I10 ESSENTIAL (PRIMARY) HYPERTENSION: Status: ACTIVE | Noted: 2019-02-07

## 2019-02-07 PROBLEM — L70.0 ACNE VULGARIS: Status: ACTIVE | Noted: 2019-02-07

## 2019-02-07 PROCEDURE — 99212 OFFICE O/P EST SF 10 MIN: CPT

## 2019-02-07 PROCEDURE — ? COUNSELING

## 2019-02-07 ASSESSMENT — LOCATION ZONE DERM: LOCATION ZONE: FACE

## 2019-02-07 ASSESSMENT — LOCATION DETAILED DESCRIPTION DERM
LOCATION DETAILED: RIGHT MEDIAL MALAR CHEEK
LOCATION DETAILED: RIGHT CENTRAL MALAR CHEEK

## 2019-02-07 ASSESSMENT — LOCATION SIMPLE DESCRIPTION DERM: LOCATION SIMPLE: RIGHT CHEEK

## 2019-02-07 NOTE — HPI: SKIN LESIONS
Is This A New Presentation, Or A Follow-Up?: Follow Up Skin Lesions
How Severe Is Your Skin Lesion?: mild
Have Your Skin Lesions Been Treated?: been treated
Additional History: Previously used 5 fluorouracil cream for actinic keratoses in this location and had an appropriate reaction with resolution.
When Was It Treated?: 11/2017

## 2019-02-07 NOTE — PROCEDURE: REASSURANCE
Detail Level: Detailed
Include Location In Plan?: No
Additional Note: Can be treated if becomes bothersome but pt declines tx today.

## 2019-02-08 ENCOUNTER — TELEPHONE (OUTPATIENT)
Dept: VASCULAR LAB | Facility: MEDICAL CENTER | Age: 44
End: 2019-02-08

## 2019-02-14 ENCOUNTER — HOSPITAL ENCOUNTER (OUTPATIENT)
Dept: LAB | Facility: MEDICAL CENTER | Age: 44
End: 2019-02-14
Attending: PHYSICIAN ASSISTANT
Payer: COMMERCIAL

## 2019-02-14 LAB
ALBUMIN SERPL BCP-MCNC: 4.6 G/DL (ref 3.2–4.9)
ALBUMIN/GLOB SERPL: 1.8 G/DL
ALP SERPL-CCNC: 62 U/L (ref 30–99)
ALT SERPL-CCNC: 66 U/L (ref 2–50)
ANION GAP SERPL CALC-SCNC: 15 MMOL/L (ref 0–11.9)
AST SERPL-CCNC: 94 U/L (ref 12–45)
BASOPHILS # BLD AUTO: 1.5 % (ref 0–1.8)
BASOPHILS # BLD: 0.08 K/UL (ref 0–0.12)
BILIRUB SERPL-MCNC: 0.6 MG/DL (ref 0.1–1.5)
BUN SERPL-MCNC: 9 MG/DL (ref 8–22)
CALCIUM SERPL-MCNC: 9.1 MG/DL (ref 8.5–10.5)
CHLORIDE SERPL-SCNC: 104 MMOL/L (ref 96–112)
CO2 SERPL-SCNC: 21 MMOL/L (ref 20–33)
CREAT SERPL-MCNC: 0.78 MG/DL (ref 0.5–1.4)
EOSINOPHIL # BLD AUTO: 0.05 K/UL (ref 0–0.51)
EOSINOPHIL NFR BLD: 1 % (ref 0–6.9)
ERYTHROCYTE [DISTWIDTH] IN BLOOD BY AUTOMATED COUNT: 51.5 FL (ref 35.9–50)
FASTING STATUS PATIENT QL REPORTED: NORMAL
GLOBULIN SER CALC-MCNC: 2.5 G/DL (ref 1.9–3.5)
GLUCOSE SERPL-MCNC: 72 MG/DL (ref 65–99)
HCT VFR BLD AUTO: 46.5 % (ref 37–47)
HGB BLD-MCNC: 15.9 G/DL (ref 12–16)
IMM GRANULOCYTES # BLD AUTO: 0.02 K/UL (ref 0–0.11)
IMM GRANULOCYTES NFR BLD AUTO: 0.4 % (ref 0–0.9)
LYMPHOCYTES # BLD AUTO: 1.41 K/UL (ref 1–4.8)
LYMPHOCYTES NFR BLD: 27 % (ref 22–41)
MCH RBC QN AUTO: 39.2 PG (ref 27–33)
MCHC RBC AUTO-ENTMCNC: 34.2 G/DL (ref 33.6–35)
MCV RBC AUTO: 114.5 FL (ref 81.4–97.8)
MONOCYTES # BLD AUTO: 0.75 K/UL (ref 0–0.85)
MONOCYTES NFR BLD AUTO: 14.4 % (ref 0–13.4)
NEUTROPHILS # BLD AUTO: 2.91 K/UL (ref 2–7.15)
NEUTROPHILS NFR BLD: 55.7 % (ref 44–72)
NRBC # BLD AUTO: 0 K/UL
NRBC BLD-RTO: 0 /100 WBC
PLATELET # BLD AUTO: 232 K/UL (ref 164–446)
PMV BLD AUTO: 9.8 FL (ref 9–12.9)
POTASSIUM SERPL-SCNC: 3.7 MMOL/L (ref 3.6–5.5)
PROT SERPL-MCNC: 7.1 G/DL (ref 6–8.2)
RBC # BLD AUTO: 4.06 M/UL (ref 4.2–5.4)
SODIUM SERPL-SCNC: 140 MMOL/L (ref 135–145)
T4 FREE SERPL-MCNC: 0.65 NG/DL (ref 0.53–1.43)
TSH SERPL DL<=0.005 MIU/L-ACNC: 4.97 UIU/ML (ref 0.38–5.33)
WBC # BLD AUTO: 5.2 K/UL (ref 4.8–10.8)

## 2019-02-14 PROCEDURE — 84443 ASSAY THYROID STIM HORMONE: CPT

## 2019-02-14 PROCEDURE — 85025 COMPLETE CBC W/AUTO DIFF WBC: CPT

## 2019-02-14 PROCEDURE — 36415 COLL VENOUS BLD VENIPUNCTURE: CPT

## 2019-02-14 PROCEDURE — 80053 COMPREHEN METABOLIC PANEL: CPT

## 2019-02-14 PROCEDURE — 84439 ASSAY OF FREE THYROXINE: CPT

## 2019-03-04 ENCOUNTER — TELEPHONE (OUTPATIENT)
Dept: VASCULAR LAB | Facility: MEDICAL CENTER | Age: 44
End: 2019-03-04

## 2019-04-09 ENCOUNTER — OFFICE VISIT (OUTPATIENT)
Dept: VASCULAR LAB | Facility: MEDICAL CENTER | Age: 44
End: 2019-04-09
Attending: INTERNAL MEDICINE
Payer: COMMERCIAL

## 2019-04-09 VITALS
DIASTOLIC BLOOD PRESSURE: 90 MMHG | SYSTOLIC BLOOD PRESSURE: 143 MMHG | WEIGHT: 134.6 LBS | HEART RATE: 79 BPM | HEIGHT: 64 IN | BODY MASS INDEX: 22.98 KG/M2

## 2019-04-09 DIAGNOSIS — I10 ESSENTIAL HYPERTENSION: ICD-10-CM

## 2019-04-09 DIAGNOSIS — E78.5 DYSLIPIDEMIA: ICD-10-CM

## 2019-04-09 PROCEDURE — 99213 OFFICE O/P EST LOW 20 MIN: CPT | Performed by: INTERNAL MEDICINE

## 2019-04-09 PROCEDURE — 99212 OFFICE O/P EST SF 10 MIN: CPT

## 2019-04-09 RX ORDER — VALSARTAN 160 MG/1
160 TABLET ORAL DAILY
Qty: 30 TAB | Refills: 3 | Status: SHIPPED | OUTPATIENT
Start: 2019-04-09 | End: 2019-06-14 | Stop reason: SDUPTHER

## 2019-04-09 ASSESSMENT — ENCOUNTER SYMPTOMS
MYALGIAS: 0
WEIGHT LOSS: 0
NERVOUS/ANXIOUS: 0
SHORTNESS OF BREATH: 0
WHEEZING: 0
HEADACHES: 0
CLAUDICATION: 0
COUGH: 0
PALPITATIONS: 0
FOCAL WEAKNESS: 0

## 2019-04-09 NOTE — PROGRESS NOTES
"  FOLLOW-UP VASCULAR VISIT  Subjective:   Minerva Tillman is a 42 y.o. female who presents today 4-9-19 for vascular followup  Chief Complaint   Patient presents with   • Follow-Up     HPI:  Patient here for f/u of hypertension and hypercholesterolemia  Has not been seen in over one year  Has been off all meds for 6 months  BP was in high 80s diastolic at home, but hasn't taken in a few months  No symptoms of high bp  Off statin  No myalgias  Did have occ leg swelling  No cv complaints  Lost 20 pounds  Less etoh    DIET AND EXERCISE:  Weight Change: down about 20 pounds  Diet: common adult, relatively heart healthy; increased fruits/veggies and lean meats  Exercise: moderate regular exercise program - 30 min walk daily + kickboxing a couple of days per week, some resistance training.  Less alcohol    Review of Systems   Constitutional: Negative for malaise/fatigue and weight loss.   Respiratory: Negative for cough, shortness of breath and wheezing.    Cardiovascular: Negative for chest pain, palpitations, claudication and leg swelling.   Musculoskeletal: Negative for joint pain and myalgias.   Neurological: Negative for focal weakness and headaches.   Psychiatric/Behavioral: The patient is not nervous/anxious.       Objective:     Vitals:    04/09/19 1000 04/09/19 1004   BP: 152/91 143/90   BP Location: Left arm Left arm   Patient Position: Sitting Sitting   BP Cuff Size: Small adult Small adult   Pulse: 81 79   Weight: 61.1 kg (134 lb 9.6 oz)    Height: 1.62 m (5' 3.78\")       Body mass index is 23.26 kg/m².  Physical Exam   Constitutional: She is oriented to person, place, and time. She appears well-developed and well-nourished. No distress.   Cardiovascular: Normal rate, regular rhythm, normal heart sounds and intact distal pulses.    No murmur heard.  Pulmonary/Chest: Effort normal and breath sounds normal. No respiratory distress. She has no wheezes.   Musculoskeletal: Normal range of motion. She exhibits " no edema.   Neurological: She is alert and oriented to person, place, and time.   Skin: Skin is warm and dry. She is not diaphoretic.   Psychiatric: She has a normal mood and affect. Her behavior is normal. Thought content normal.   Vitals reviewed.    Lab Results   Component Value Date    CHOLSTRLTOT 337 (H) 01/24/2018    CHOLSTRLTOT 288 (H) 04/27/2017     (H) 04/27/2017    HDL 58 01/24/2018    HDL 86 04/27/2017    TRIGLYCERIDE 420 (H) 01/24/2018    TRIGLYCERIDE 119 04/27/2017    LDLPART See Note 01/24/2018    LDLPART Interference 01/24/2018    SMLLDL See Note 01/24/2018    LHDLPART See Note 01/24/2018    LVLDLPT See Note 01/24/2018    LDLCHOL Not Applicable 01/24/2018    HDLSIZE See Note 01/24/2018    VLDLSIZE See Note 01/24/2018    HDLPART See Note 01/24/2018    LPIRSCORE 30 06/16/2017           Lab Results   Component Value Date    SODIUM 140 02/14/2019    POTASSIUM 3.7 02/14/2019    CHLORIDE 104 02/14/2019    CO2 21 02/14/2019    GLUCOSE 72 02/14/2019    BUN 9 02/14/2019    CREATININE 0.78 02/14/2019    IF[place] >60 02/14/2019    IFNOTAFR >60 02/14/2019          Lab Results   Component Value Date    WBC 5.2 02/14/2019    RBC 4.06 (L) 02/14/2019    HEMOGLOBIN 15.9 02/14/2019    HEMATOCRIT 46.5 02/14/2019    .5 (H) 02/14/2019    MCH 39.2 (H) 02/14/2019    MCHC 34.2 02/14/2019    MPV 9.8 02/14/2019      EKG 9/24/2015- unremarkable; no lvh    Coronary Calcium Score (april 2017) - Zero    Medical Decision Making:  Today's Assessment / Status / Plan:     1. Essential hypertension  Comp Metabolic Panel    Lipid Profile    MICROALBUMIN CREAT RATIO URINE    TSH   2. Dyslipidemia  Comp Metabolic Panel    Lipid Profile    MICROALBUMIN CREAT RATIO URINE    TSH                                                                                      Patient Type: Primary Prevention    Etiology of Established CVD if Present: None    Lipid Management: Qualifies for Statin Therapy Based on 2013 ACC/AHA Guidelines:  no  Calculated 10-Year Risk of ASCVD: 1%  Currently on Statin: No  Lp(a) normal  Cor calcium score = 0  Baseline LDL high  CRP intermediate  Repeat Lipids panel significantly worse since stopping atorva - ? Outlier - nontheless patient willing to go back on atrova for long term risk  Plan:  - hold statin pending blood work  - Recheck fasting lipid panel    Blood Pressure Management:  ACC/AHA goal BP <130/80  Previous ABPM with good control on valsartan-amlodipine  Now off meds high in office and at home  May be easier to control now that weight down  She does have a family history so I suspect primary hypertension.  Normal to high renin HTN.  No microalbuminuria in past  Limited NSAIDs  Did have some leg swelling on amlodipine  Plan:  - restart valsartan  - Continue to limit NSAIDs to minimal effective dose  - restart home blood pressure monitoring  - consider addition of thiazide if not at goal    Glycemic Status: Normal   - recheck fasting glucose    Anti-Platelet/Anti-Coagulant Tx: not indicated    Smoking: Continue complete avoidance    Physical Activity: Continue improved exercise regimen    Weight Management and Nutrition: Dietary plan was discussed with patient at this visit including heart healthy diet.    Instructed to follow-up with PCP for remainder of adult medical needs: yes  We will partner with other providers in the management of established vascular disease and cardiometabolic risk factors.    Studies to Be Obtained: none  Labs to Be Obtained: as above    Follow up in: 2 months    Michael J Bloch, M.D.    CC:  Romelia Dasilva MD

## 2019-05-02 ENCOUNTER — APPOINTMENT (RX ONLY)
Dept: URBAN - METROPOLITAN AREA CLINIC 35 | Facility: CLINIC | Age: 44
Setting detail: DERMATOLOGY
End: 2019-05-02

## 2019-05-02 DIAGNOSIS — Z71.89 OTHER SPECIFIED COUNSELING: ICD-10-CM

## 2019-05-02 DIAGNOSIS — L81.4 OTHER MELANIN HYPERPIGMENTATION: ICD-10-CM

## 2019-05-02 DIAGNOSIS — H61.03 CHONDRITIS OF EXTERNAL EAR: ICD-10-CM

## 2019-05-02 DIAGNOSIS — D22 MELANOCYTIC NEVI: ICD-10-CM

## 2019-05-02 PROBLEM — H61.032 CHONDRITIS OF LEFT EXTERNAL EAR: Status: ACTIVE | Noted: 2019-05-02

## 2019-05-02 PROBLEM — D22.5 MELANOCYTIC NEVI OF TRUNK: Status: ACTIVE | Noted: 2019-05-02

## 2019-05-02 PROCEDURE — ? COUNSELING

## 2019-05-02 PROCEDURE — 99213 OFFICE O/P EST LOW 20 MIN: CPT

## 2019-05-02 PROCEDURE — ? ADDITIONAL NOTES

## 2019-05-02 ASSESSMENT — LOCATION ZONE DERM
LOCATION ZONE: NECK
LOCATION ZONE: EAR
LOCATION ZONE: ARM
LOCATION ZONE: TRUNK

## 2019-05-02 ASSESSMENT — LOCATION SIMPLE DESCRIPTION DERM
LOCATION SIMPLE: RIGHT UPPER BACK
LOCATION SIMPLE: POSTERIOR NECK
LOCATION SIMPLE: LEFT EAR
LOCATION SIMPLE: RIGHT SHOULDER

## 2019-05-02 ASSESSMENT — LOCATION DETAILED DESCRIPTION DERM
LOCATION DETAILED: RIGHT LATERAL TRAPEZIAL NECK
LOCATION DETAILED: RIGHT SUPERIOR UPPER BACK
LOCATION DETAILED: LEFT ANTIHELIX
LOCATION DETAILED: RIGHT POSTERIOR SHOULDER

## 2019-05-02 NOTE — PROCEDURE: ADDITIONAL NOTES
Detail Level: Detailed
Additional Notes: Recommended the Elta product line for moisturizer.
Additional Notes: No treatment today.  Will continue to monitor.

## 2019-05-06 ENCOUNTER — APPOINTMENT (RX ONLY)
Dept: URBAN - METROPOLITAN AREA CLINIC 35 | Facility: CLINIC | Age: 44
Setting detail: DERMATOLOGY
End: 2019-05-06

## 2019-05-22 ENCOUNTER — HOSPITAL ENCOUNTER (OUTPATIENT)
Dept: LAB | Facility: MEDICAL CENTER | Age: 44
End: 2019-05-22
Attending: INTERNAL MEDICINE
Payer: COMMERCIAL

## 2019-05-22 DIAGNOSIS — I10 ESSENTIAL HYPERTENSION: ICD-10-CM

## 2019-05-22 DIAGNOSIS — E78.5 DYSLIPIDEMIA: ICD-10-CM

## 2019-05-22 LAB
ALBUMIN SERPL BCP-MCNC: 4.5 G/DL (ref 3.2–4.9)
ALBUMIN/GLOB SERPL: 1.4 G/DL
ALP SERPL-CCNC: 67 U/L (ref 30–99)
ALT SERPL-CCNC: 56 U/L (ref 2–50)
ANION GAP SERPL CALC-SCNC: 12 MMOL/L (ref 0–11.9)
AST SERPL-CCNC: 61 U/L (ref 12–45)
BILIRUB SERPL-MCNC: 1.4 MG/DL (ref 0.1–1.5)
BUN SERPL-MCNC: 13 MG/DL (ref 8–22)
CALCIUM SERPL-MCNC: 9.7 MG/DL (ref 8.5–10.5)
CHLORIDE SERPL-SCNC: 101 MMOL/L (ref 96–112)
CHOLEST SERPL-MCNC: 287 MG/DL (ref 100–199)
CO2 SERPL-SCNC: 25 MMOL/L (ref 20–33)
CREAT SERPL-MCNC: 0.97 MG/DL (ref 0.5–1.4)
CREAT UR-MCNC: 400.6 MG/DL
GLOBULIN SER CALC-MCNC: 3.2 G/DL (ref 1.9–3.5)
GLUCOSE SERPL-MCNC: 96 MG/DL (ref 65–99)
HDLC SERPL-MCNC: 88 MG/DL
LDLC SERPL CALC-MCNC: 184 MG/DL
MICROALBUMIN UR-MCNC: 0.9 MG/DL
MICROALBUMIN/CREAT UR: 2 MG/G (ref 0–30)
POTASSIUM SERPL-SCNC: 3.9 MMOL/L (ref 3.6–5.5)
PROT SERPL-MCNC: 7.7 G/DL (ref 6–8.2)
SODIUM SERPL-SCNC: 138 MMOL/L (ref 135–145)
TRIGL SERPL-MCNC: 76 MG/DL (ref 0–149)
TSH SERPL DL<=0.005 MIU/L-ACNC: 8.76 UIU/ML (ref 0.38–5.33)

## 2019-05-22 PROCEDURE — 36415 COLL VENOUS BLD VENIPUNCTURE: CPT

## 2019-05-22 PROCEDURE — 80061 LIPID PANEL: CPT

## 2019-05-22 PROCEDURE — 82043 UR ALBUMIN QUANTITATIVE: CPT

## 2019-05-22 PROCEDURE — 84443 ASSAY THYROID STIM HORMONE: CPT

## 2019-05-22 PROCEDURE — 80053 COMPREHEN METABOLIC PANEL: CPT

## 2019-05-22 PROCEDURE — 82570 ASSAY OF URINE CREATININE: CPT

## 2019-05-28 ENCOUNTER — HOSPITAL ENCOUNTER (OUTPATIENT)
Dept: LAB | Facility: MEDICAL CENTER | Age: 44
End: 2019-05-28
Attending: COLON & RECTAL SURGERY
Payer: COMMERCIAL

## 2019-05-28 PROCEDURE — 84439 ASSAY OF FREE THYROXINE: CPT

## 2019-05-28 PROCEDURE — 36415 COLL VENOUS BLD VENIPUNCTURE: CPT

## 2019-05-29 ENCOUNTER — OFFICE VISIT (OUTPATIENT)
Dept: MEDICAL GROUP | Age: 44
End: 2019-05-29
Payer: COMMERCIAL

## 2019-05-29 VITALS
SYSTOLIC BLOOD PRESSURE: 110 MMHG | OXYGEN SATURATION: 97 % | TEMPERATURE: 98.4 F | BODY MASS INDEX: 23.32 KG/M2 | HEART RATE: 84 BPM | DIASTOLIC BLOOD PRESSURE: 72 MMHG | WEIGHT: 136.6 LBS | HEIGHT: 64 IN

## 2019-05-29 DIAGNOSIS — I10 ESSENTIAL HYPERTENSION: ICD-10-CM

## 2019-05-29 DIAGNOSIS — E03.9 ACQUIRED HYPOTHYROIDISM: ICD-10-CM

## 2019-05-29 DIAGNOSIS — E55.9 VITAMIN D INSUFFICIENCY: ICD-10-CM

## 2019-05-29 DIAGNOSIS — R79.89 ELEVATED LFTS: ICD-10-CM

## 2019-05-29 DIAGNOSIS — K70.0 FATTY LIVER, ALCOHOLIC: ICD-10-CM

## 2019-05-29 DIAGNOSIS — E78.00 HYPERCHOLESTEROLEMIA: ICD-10-CM

## 2019-05-29 PROBLEM — N92.0 EXCESSIVE OR FREQUENT MENSTRUATION: Status: RESOLVED | Noted: 2017-08-02 | Resolved: 2019-05-29

## 2019-05-29 LAB — T4 FREE SERPL-MCNC: 0.66 NG/DL (ref 0.53–1.43)

## 2019-05-29 PROCEDURE — 99214 OFFICE O/P EST MOD 30 MIN: CPT | Performed by: INTERNAL MEDICINE

## 2019-05-29 RX ORDER — LEVOTHYROXINE SODIUM 0.03 MG/1
25 TABLET ORAL
Qty: 90 TAB | Refills: 3 | Status: SHIPPED | OUTPATIENT
Start: 2019-05-29 | End: 2019-09-11

## 2019-05-29 ASSESSMENT — PATIENT HEALTH QUESTIONNAIRE - PHQ9: CLINICAL INTERPRETATION OF PHQ2 SCORE: 0

## 2019-05-29 ASSESSMENT — PAIN SCALES - GENERAL: PAINLEVEL: NO PAIN

## 2019-05-29 NOTE — LETTER
[facility] hospital  Romelia Dasilva M.D.  [address]  [place] [address]  Fax: [phone]   Authorization for Release/Disclosure of   Protected Health Information   Name: GIULIA HERNANDEZ : 1975 SSN: [id number]   Address: [address] [place] [address]  [place] [address] Phone:    [phone] (home)    I authorize the entity listed below to release/disclose the PHI below to:   Critical access hospital/Romelia Dasilva M.D. and Romelia Dasilva M.D.   Provider or Entity Name:  [facility]: Dr. Hardy   [facility], Zip  [address] [place] [address] Phone  [phone]      Fax: [phone]     Reason for request: continuity of care   Information to be released:    [  ] LAST COLONOSCOPY,  including any PATH REPORT and follow-up  [  ] LAST FIT/COLOGUARD RESULT [  ] LAST DEXA  [  ] LAST MAMMOGRAM  [XXX ] LAST PAP  [  ] LAST LABS [  ] RETINA EXAM REPORT  [  ] IMMUNIZATION RECORDS  [  ] Release all info      [  ] Check here and initial the line next to each item to release ALL health information INCLUDING  _____ Care and treatment for drug and / or alcohol abuse  _____ HIV testing, infection status, or AIDS  _____ Genetic Testing    DATES OF SERVICE OR TIME PERIOD TO BE DISCLOSED: _____________  I understand and acknowledge that:  * This Authorization may be revoked at any time by you in writing, except if your health information has already been used or disclosed.  * Your health information that will be used or disclosed as a result of you signing this authorization could be re-disclosed by the recipient. If this occurs, your re-disclosed health information may no longer be protected by State or Federal laws.  * You may refuse to sign this Authorization. Your refusal will not affect your ability to obtain treatment.  * This Authorization becomes effective upon signing and will  on (date) __________.      If no date is indicated, this Authorization will  one (1) year from the signature date.       Name: Minerva Hedy Primo    Signature:   Date:     5/29/2019       PLEASE FAX REQUESTED RECORDS BACK TO: [phone]

## 2019-05-29 NOTE — PROGRESS NOTES
Received signed consent for last Pap. Pt is scheduled to have Pap in 07/17/19 w/Dr. Hardy. Scanned Consent to pt media and Faxed to Dr. Hardy's office.

## 2019-05-29 NOTE — PROGRESS NOTES
Subjective:   Minerva Hernandez is a 44 y.o. female here today for evaluation and management of:    Hypercholesterolemia  Chronic. Patient is not currently controlling with medication. I did review recent blood work with the patient.  Patient reported that she is healthy diet and does regular physical exercise.  She has follow-up appointment with Dr. Bloch next week to discuss her hypertension and cholesterol treatment.    Results for MINERVA HERNANDEZ (MRN [id number]) as of 5/29/2019 14:47   Ref. Range 6/16/2017 09:40 1/24/2018 15:07 5/22/2019 07:52   Cholesterol,Tot Latest Ref Range: 100 - 199 mg/dL 253 (H) 337 (H) 287 (H)   Triglycerides Latest Ref Range: 0 - 149 mg/dL 120 420 (H) 76   HDL Latest Ref Range: >=40 mg/dL 88 58 88   EER LipoFit by NMR Unknown  See Note    LDL Latest Ref Range: <100 mg/dL   184 (H)       Essential hypertension  Chronic. She last saw Dr. Bloch (vascular medicine) 4/9/19 at which point she was reinitiated on Valsartan 160 mg QD. She denies side effects from this medication. She reports her systolic ranges from 120-140 and for the past 3 weeks her diastolic has not been below 100. She has a follow up appointment with Dr. Bloch on 6/4/19.     Fatty liver, alcoholic  Elevated LFTs  Chronic. Recent blood work had AST of 61 and ALT of 56, which I reviewed with patient today in clinic. 6/6/18 she had US of liver which showed fatty liver, cysts in liver up to 2.5 cm, and no solid tumor. She denies any current hormone treatment. No previous screening for HepB or HepC. She drinks daily alcohol, at least 2 glasses of wine each day. She previously had tremors with alcohol cessation and cut down to 1 glass of daily, but has gradually started drinking again.  Patient denies seizure or delirium tremor.  She denies jaundice, tremors, decreased appetite, nausea. No family history of liver disease, no previous tobacco use.    Results for MINERVA HERNANDEZ (MRN [id number]) as of 5/29/2019 14:47    Ref. Range 2/14/2019 08:17 5/22/2019 07:52   AST(SGOT) Latest Ref Range: 12 - 45 U/L 94 (H) 61 (H)   ALT(SGPT) Latest Ref Range: 2 - 50 U/L 66 (H) 56 (H)     Vitamin D insufficiency  Chronic. Patient states she has been taking vitamin D3 5000 IU QD. She denies side effects from this supplement.    Results for GIULIA HERNANDEZ (MRN [id number]) as of 5/29/2019 14:47   Ref. Range 2/1/2013 08:46   25-Hydroxy   Vitamin D 25 Latest Ref Range: 30.0 - 100.0 ng/mL 19.0 (L)     Acquired hypothyroidism  Recent blood work showed TSH of 8.760, which is an isolated elevated level for her. Previously 4.970 on 2/14/19. She has pending blood work for free T-4. She states for the past 3 months she has had poor sleep quality, severe night sweats, increased fatigue, increased irritability, worsened memory, and a mild headache. She denies thyroid radiation exposure as a child.  She was treated with [place] [facility] in 2010 for hypothyroid by gynecologist.  She only took [place] Thy[facility] for 1 year.  She requested to have thyroid hormone treatment as she has symptoms of low thyroid and has elevated TSH on recent blood test.    Results for GIULIA HERNANDEZ (MRN [id number]) as of 5/29/2019 14:47   Ref. Range 2/14/2019 08:17 5/22/2019 07:52   TSH Latest Ref Range: 0.380 - 5.330 uIU/mL 4.970 8.760 (H)         Current medicines (including changes today)  Current Outpatient Prescriptions   Medication Sig Dispense Refill   • levothyroxine (SYNTHROID) 25 MCG Tab Take 1 Tab by mouth Every morning on an empty stomach. 90 Tab 3   • valsartan (DIOVAN) 160 MG Tab Take 1 Tab by mouth every day. 30 Tab 3   • Cholecalciferol (VITAMIN D3) 2000 units Chew Tab Take 1 Cap by mouth every day. 30 Tab 11   • Multiple Vitamins-Minerals (QC WOMENS DAILY MULTIVITAMIN PO) Take 1 Tab by mouth every morning.       No current facility-administered medications for this visit.      She  has a past medical history of Anxiety; Depression; Gynecological disorder; High  "cholesterol; Hypertension; Snoring; and Urinary tract infection.    ROS   No chest pain, no shortness of breath, no abdominal pain, no jaundice, no tremor, no nausea, no decreased appetite       Objective:     /72 (BP Location: Right arm, Patient Position: Sitting, BP Cuff Size: Adult)   Pulse 84   Temp 36.9 °C (98.4 °F) (Temporal)   Ht 1.62 m (5' 3.78\")   Wt 62 kg (136 lb 9.6 oz)   SpO2 97%  Body mass index is 23.61 kg/m².   Physical Exam:  General: Alert, oriented and no acute distress.  Eye contact is good, speech goal directed, affect calm  HEENT: conjunctiva non-injected, sclera non-icteric.  Oral mucous membranes pink and moist with no lesions.  Pinna normal.   Neck No supraclavicular, submandibular, submental lymphadenopathy or masses in the neck or supraclavicular regions.  Lungs: Normal respiratory effort, clear to auscultation bilaterally with good excursion.  CV: regular rate and rhythm. No murmurs. No carotid bruits.  Abdomen: soft, non distended, nontender, Bowel sound normal. No hepatosplenomegaly appreciated on exam.  Ext: no edema, color normal, vascularity normal, temperature normal        Assessment and Plan:   The following treatment plan was discussed     1. Hypercholesterolemia  - Cholesterol is still elevated but is slightly improved. Plan to discuss treatment plan with Dr. Bloch (vascular medicine) at follow up in 1 week.  - Advised to eat low fat, low carbohydrate and high fiber diet as well as do cardio physical exercise regularly.   - CBC WITH DIFFERENTIAL; Future  - Comp Metabolic Panel; Future  - TSH; Future  - FREE THYROXINE; Future    2. Essential hypertension  - Well-controlled.  Patient to continue current regimen of Valsartan 160 mg QD. BP in clinic today of 110/72.  - Recommend to monitor blood pressure and heart rate at home.  Advised to eat low-sodium diet.  - CBC WITH DIFFERENTIAL; Future  - Comp Metabolic Panel; Future    3. Fatty liver, alcoholic  - US of liver " 6/2018 indicated fatty liver and cysts. Patient to have follow up US in the future.  - Patient declines assistance with alcohol cessation, stating she plans to cut back on alcohol intake. For symptoms of alcohol withdrawal symptoms she should seek medical treatment.  Patient denies to refer to psychiatrist or any specialist for alcohol cessation.  She states that she is able to cut down slowly by herself.  Denies previous seizure or delirium tremor with complete withdrawal.  - HEPATITIS PANEL ACUTE(4 COMPONENTS); Future  - US-RUQ; Future    4. Elevated LFTs  - See above (#3).  Patient has not been screening for acute viral hepatitis.  Given elevated LFT, I will order acute viral hepatitis panel.  - HEPATITIS PANEL ACUTE(4 COMPONENTS); Future  - US-RUQ; Future    5. Vitamin D insufficiency  - Patient advised on proper dose of vitamin D supplement. Plan to recheck prior to next visit.  - VITAMIN D,25 HYDROXY; Future    6. Acquired hypothyroidism  - Patient initiated on levothyroxine 25 mcg every morning on empty stomach. I did discuss potential side effects and risks of this medication with the patient today.  Recheck lab in 3 months for follow-up.  - levothyroxine (SYNTHROID) 25 MCG Tab; Take 1 Tab by mouth Every morning on an empty stomach.  Dispense: 90 Tab; Refill: 3    7. Health Maintenance  - 06/2019 appointment with Dr. Carballo (GYN) for pap smear.  - Mammogram completed in 2018 with Dr. Carballo, who will continue to manage.    Follow up: Return in about 3 months (around 8/29/2019), or if symptoms worsen or fail to improve, for Hypertension, Hyperlipidemia, Hypothyroid, Vitamin D insufficiency, Lab review.      Please note that this dictation was created using voice recognition software. I have made every reasonable attempt to correct obvious errors, but I expect that there may have unintended errors in text, spelling, punctuation, or grammar that I did not discover.

## 2019-06-04 ENCOUNTER — OFFICE VISIT (OUTPATIENT)
Dept: VASCULAR LAB | Facility: MEDICAL CENTER | Age: 44
End: 2019-06-04
Attending: INTERNAL MEDICINE
Payer: COMMERCIAL

## 2019-06-04 VITALS
DIASTOLIC BLOOD PRESSURE: 86 MMHG | HEART RATE: 75 BPM | BODY MASS INDEX: 22.93 KG/M2 | HEIGHT: 64 IN | WEIGHT: 134.3 LBS | SYSTOLIC BLOOD PRESSURE: 125 MMHG

## 2019-06-04 DIAGNOSIS — E78.5 DYSLIPIDEMIA: ICD-10-CM

## 2019-06-04 DIAGNOSIS — I10 ESSENTIAL HYPERTENSION: ICD-10-CM

## 2019-06-04 DIAGNOSIS — E03.9 ACQUIRED HYPOTHYROIDISM: ICD-10-CM

## 2019-06-04 PROCEDURE — 99214 OFFICE O/P EST MOD 30 MIN: CPT | Performed by: INTERNAL MEDICINE

## 2019-06-04 PROCEDURE — 99212 OFFICE O/P EST SF 10 MIN: CPT

## 2019-06-04 RX ORDER — CETIRIZINE HYDROCHLORIDE 10 MG/1
10 TABLET ORAL DAILY
COMMUNITY
End: 2021-05-25

## 2019-06-04 RX ORDER — VITAMIN E 268 MG
400 CAPSULE ORAL DAILY
COMMUNITY
End: 2021-05-25

## 2019-06-04 RX ORDER — ATORVASTATIN CALCIUM 10 MG/1
10 TABLET, FILM COATED ORAL DAILY
Qty: 30 TAB | Refills: 11 | Status: SHIPPED | OUTPATIENT
Start: 2019-06-04 | End: 2021-08-06

## 2019-06-04 ASSESSMENT — ENCOUNTER SYMPTOMS
MYALGIAS: 0
SHORTNESS OF BREATH: 0
COUGH: 0
HEADACHES: 0
CLAUDICATION: 0
WEIGHT LOSS: 0
NERVOUS/ANXIOUS: 0
PALPITATIONS: 0
WHEEZING: 0
FOCAL WEAKNESS: 0

## 2019-06-04 NOTE — PROGRESS NOTES
"  FOLLOW-UP VASCULAR VISIT  Subjective:   Minerva Tillman is a 42 y.o. female who presents today 6-4-19 for vascular followup  Chief Complaint   Patient presents with   • Follow-Up     HPI:  Patient here for f/u of hypertension hypothyroidism and hypercholesterolemia  Not on statin yet  No cv complaints  Started valsartan  No symptoms of high bp  Home BPs are all above 90 diastolic  Has been fatigued and had trouble sleeping  Prescribed levothyroxine by pcp, but has not yet started taking it    DIET AND EXERCISE:  Weight Change: down about 20 pounds and maintained  Diet: common adult, relatively heart healthy; increased fruits/veggies and lean meats  Exercise: moderate regular exercise program - 30 min walk daily + kickboxing a couple of days per week, some resistance training.  Less alcohol    Review of Systems   Constitutional: Negative for malaise/fatigue and weight loss.   Respiratory: Negative for cough, shortness of breath and wheezing.    Cardiovascular: Negative for chest pain, palpitations, claudication and leg swelling.   Musculoskeletal: Negative for joint pain and myalgias.   Neurological: Negative for focal weakness and headaches.   Psychiatric/Behavioral: The patient is not nervous/anxious.       Objective:     Vitals:    06/04/19 1154   BP: 125/86   BP Location: Left arm   Patient Position: Sitting   BP Cuff Size: Small adult   Pulse: 75   Weight: 60.9 kg (134 lb 4.8 oz)   Height: 1.62 m (5' 3.78\")      Body mass index is 23.21 kg/m².  Physical Exam   Constitutional: She is oriented to person, place, and time. She appears well-developed and well-nourished. No distress.   Cardiovascular: Normal rate, regular rhythm, normal heart sounds and intact distal pulses.    No murmur heard.  Pulmonary/Chest: Effort normal and breath sounds normal. No respiratory distress. She has no wheezes.   Musculoskeletal: Normal range of motion. She exhibits no edema.   Neurological: She is alert and oriented to " person, place, and time.   Skin: Skin is warm and dry. She is not diaphoretic.   Psychiatric: She has a normal mood and affect. Her behavior is normal. Thought content normal.   Vitals reviewed.    Lab Results   Component Value Date    CHOLSTRLTOT 287 (H) 05/22/2019     (H) 05/22/2019    HDL 88 05/22/2019    TRIGLYCERIDE 76 05/22/2019    LDLPART See Note 01/24/2018    LDLPART Interference 01/24/2018    SMLLDL See Note 01/24/2018    LHDLPART See Note 01/24/2018    LVLDLPT See Note 01/24/2018    LDLCHOL Not Applicable 01/24/2018    HDLSIZE See Note 01/24/2018    VLDLSIZE See Note 01/24/2018    HDLPART See Note 01/24/2018    LPIRSCORE 30 06/16/2017           Lab Results   Component Value Date    SODIUM 138 05/22/2019    POTASSIUM 3.9 05/22/2019    CHLORIDE 101 05/22/2019    CO2 25 05/22/2019    GLUCOSE 96 05/22/2019    BUN 13 05/22/2019    CREATININE 0.97 05/22/2019    IFAFRICA >60 05/22/2019    IFNOTAFR >60 05/22/2019          Lab Results   Component Value Date    WBC 5.2 02/14/2019    RBC 4.06 (L) 02/14/2019    HEMOGLOBIN 15.9 02/14/2019    HEMATOCRIT 46.5 02/14/2019    .5 (H) 02/14/2019    MCH 39.2 (H) 02/14/2019    MCHC 34.2 02/14/2019    MPV 9.8 02/14/2019      EKG 9/24/2015- unremarkable; no lvh    Coronary Calcium Score (april 2017) - Zero    Medical Decision Making:  Today's Assessment / Status / Plan:     1. Essential hypertension  atorvastatin (LIPITOR) 10 MG Tab    Comp Metabolic Panel   2. Dyslipidemia  Comp Metabolic Panel    Lipid Profile    REFERRAL TO VASCULAR MEDICINE Reason for Referral? Lipids   3. Acquired hypothyroidism  TSH    ANTITHYROGLOBULIN AB                                                                                      Patient Type: Primary Prevention    Etiology of Established CVD if Present: None    Lipid Management: Qualifies for Statin Therapy Based on 2013 ACC/AHA Guidelines: no  Calculated 10-Year Risk of ASCVD: 1%  Currently on Statin: No  Lp(a) normal  Cor calcium  score = 0  Baseline LDL high  CRP intermediate  Repeat Lipids panel significantly worse since stopping atorva  Despite cor calcium score, lifetime ASCVD risk remains elevated  Plan:  - restart atorva 10 mg daily  - Recheck fasting lipid panel in a few months    Blood Pressure Management:  ACC/AHA goal BP <130/80  Previous ABPM with good control on valsartan-amlodipine  Had leg swelling on amlodipine  BP not adequately controlled at home or in the office - mostly diastolic  Her home bp monitor appears to be accurate  She does have a family history so I suspect primary hypertension.  Normal to high renin HTN.  No microalbuminuria  Limited NSAIDs  She is interested in device trial  Plan:  - continue valsartan monotherapy for now  - she will consider device study and come in for screening visit  - if decides not to participate or does not qualify, we will add hctz  - Continue to limit NSAIDs to minimal effective dose  - continue home blood pressure monitoring    Glycemic Status: Normal   - continue lifestyle modification    Anti-Platelet/Anti-Coagulant Tx: not indicated    Smoking: Continue complete avoidance    Physical Activity: Continue improved exercise regimen    Weight Management and Nutrition: maintain weight loss    Other:    1.  Hypothyroidism - symptomatic.  Agree with trial of low dose levothyroxine as started by pcp. Check tsh, t4 and anti-tpo ab      2.  High LFTs - likey fatty liver, but will defer any further diagnostic evaluation to pcp.  <3xULN so not a contraindication to statin use.  Will repeat LFTs in a few months.    Instructed to follow-up with PCP for remainder of adult medical needs: yes  We will partner with other providers in the management of established vascular disease and cardiometabolic risk factors.    Studies to Be Obtained: none  Labs to Be Obtained: as above    Follow up in: 3 months    Michael J Bloch, M.D.    CC:  Romelia Dasilva MD

## 2019-06-14 RX ORDER — VALSARTAN 160 MG/1
160 TABLET ORAL DAILY
Qty: 30 TAB | Refills: 9 | Status: SHIPPED | OUTPATIENT
Start: 2019-06-14 | End: 2019-07-16

## 2019-07-16 ENCOUNTER — TELEPHONE (OUTPATIENT)
Dept: VASCULAR LAB | Facility: MEDICAL CENTER | Age: 44
End: 2019-07-16

## 2019-07-16 RX ORDER — VALSARTAN AND HYDROCHLOROTHIAZIDE 160; 25 MG/1; MG/1
1 TABLET ORAL DAILY
Qty: 30 TAB | Refills: 3 | Status: SHIPPED | OUTPATIENT
Start: 2019-07-16 | End: 2019-11-13

## 2019-07-16 NOTE — TELEPHONE ENCOUNTER
BPs have been elevated.   Will change valsartan to valsartan hct  Follow up as scheduled with blood work.    Michael Bloch, MD  [facility]

## 2019-07-16 NOTE — TELEPHONE ENCOUNTER
Patient left message asking that amlodipine be added to her regimen  I called her back and left a message saying that if her blood pressure is high we actually add hydrochlorthiazide rather than amlodipine given the swelling she has had with amlodipine in the past.    Await patient call back    Michael Bloch, MD  [facility]

## 2019-07-31 ENCOUNTER — HOSPITAL ENCOUNTER (OUTPATIENT)
Dept: RADIOLOGY | Facility: MEDICAL CENTER | Age: 44
End: 2019-07-31
Attending: OBSTETRICS & GYNECOLOGY
Payer: COMMERCIAL

## 2019-07-31 DIAGNOSIS — N63.25 BREAST LUMP ON LEFT SIDE AT 3 O'CLOCK POSITION: ICD-10-CM

## 2019-07-31 DIAGNOSIS — N64.4 MASTODYNIA: ICD-10-CM

## 2019-07-31 PROCEDURE — G0279 TOMOSYNTHESIS, MAMMO: HCPCS

## 2019-07-31 PROCEDURE — 76642 ULTRASOUND BREAST LIMITED: CPT | Mod: LT

## 2019-08-29 ENCOUNTER — HOSPITAL ENCOUNTER (OUTPATIENT)
Dept: LAB | Facility: MEDICAL CENTER | Age: 44
End: 2019-08-29
Attending: INTERNAL MEDICINE
Payer: COMMERCIAL

## 2019-08-29 DIAGNOSIS — E78.5 DYSLIPIDEMIA: ICD-10-CM

## 2019-08-29 DIAGNOSIS — E55.9 VITAMIN D INSUFFICIENCY: ICD-10-CM

## 2019-08-29 DIAGNOSIS — I10 ESSENTIAL HYPERTENSION: ICD-10-CM

## 2019-08-29 DIAGNOSIS — K70.0 FATTY LIVER, ALCOHOLIC: ICD-10-CM

## 2019-08-29 DIAGNOSIS — R79.89 ELEVATED LFTS: ICD-10-CM

## 2019-08-29 DIAGNOSIS — E78.00 HYPERCHOLESTEROLEMIA: ICD-10-CM

## 2019-08-29 DIAGNOSIS — E03.9 ACQUIRED HYPOTHYROIDISM: ICD-10-CM

## 2019-08-29 LAB
25(OH)D3 SERPL-MCNC: 35 NG/ML (ref 30–100)
ALBUMIN SERPL BCP-MCNC: 4.7 G/DL (ref 3.2–4.9)
ALBUMIN SERPL BCP-MCNC: 4.7 G/DL (ref 3.2–4.9)
ALBUMIN/GLOB SERPL: 1.5 G/DL
ALBUMIN/GLOB SERPL: 1.6 G/DL
ALP SERPL-CCNC: 60 U/L (ref 30–99)
ALP SERPL-CCNC: 64 U/L (ref 30–99)
ALT SERPL-CCNC: 56 U/L (ref 2–50)
ALT SERPL-CCNC: 59 U/L (ref 2–50)
ANION GAP SERPL CALC-SCNC: 13 MMOL/L (ref 0–11.9)
ANION GAP SERPL CALC-SCNC: 15 MMOL/L (ref 0–11.9)
AST SERPL-CCNC: 59 U/L (ref 12–45)
AST SERPL-CCNC: 62 U/L (ref 12–45)
BASOPHILS # BLD AUTO: 1.5 % (ref 0–1.8)
BASOPHILS # BLD: 0.12 K/UL (ref 0–0.12)
BILIRUB SERPL-MCNC: 1 MG/DL (ref 0.1–1.5)
BILIRUB SERPL-MCNC: 1 MG/DL (ref 0.1–1.5)
BUN SERPL-MCNC: 12 MG/DL (ref 8–22)
BUN SERPL-MCNC: 13 MG/DL (ref 8–22)
CALCIUM SERPL-MCNC: 9.5 MG/DL (ref 8.5–10.5)
CALCIUM SERPL-MCNC: 9.5 MG/DL (ref 8.5–10.5)
CHLORIDE SERPL-SCNC: 96 MMOL/L (ref 96–112)
CHLORIDE SERPL-SCNC: 96 MMOL/L (ref 96–112)
CHOLEST SERPL-MCNC: 244 MG/DL (ref 100–199)
CO2 SERPL-SCNC: 25 MMOL/L (ref 20–33)
CO2 SERPL-SCNC: 26 MMOL/L (ref 20–33)
CREAT SERPL-MCNC: 0.89 MG/DL (ref 0.5–1.4)
CREAT SERPL-MCNC: 0.93 MG/DL (ref 0.5–1.4)
EOSINOPHIL # BLD AUTO: 0.11 K/UL (ref 0–0.51)
EOSINOPHIL NFR BLD: 1.3 % (ref 0–6.9)
ERYTHROCYTE [DISTWIDTH] IN BLOOD BY AUTOMATED COUNT: 46 FL (ref 35.9–50)
GLOBULIN SER CALC-MCNC: 3 G/DL (ref 1.9–3.5)
GLOBULIN SER CALC-MCNC: 3.1 G/DL (ref 1.9–3.5)
GLUCOSE SERPL-MCNC: 77 MG/DL (ref 65–99)
GLUCOSE SERPL-MCNC: 80 MG/DL (ref 65–99)
HCT VFR BLD AUTO: 47.1 % (ref 37–47)
HDLC SERPL-MCNC: 73 MG/DL
HGB BLD-MCNC: 15.5 G/DL (ref 12–16)
IMM GRANULOCYTES # BLD AUTO: 0.02 K/UL (ref 0–0.11)
IMM GRANULOCYTES NFR BLD AUTO: 0.2 % (ref 0–0.9)
LDLC SERPL CALC-MCNC: 128 MG/DL
LYMPHOCYTES # BLD AUTO: 2.07 K/UL (ref 1–4.8)
LYMPHOCYTES NFR BLD: 25.1 % (ref 22–41)
MCH RBC QN AUTO: 35.3 PG (ref 27–33)
MCHC RBC AUTO-ENTMCNC: 32.9 G/DL (ref 33.6–35)
MCV RBC AUTO: 107.3 FL (ref 81.4–97.8)
MONOCYTES # BLD AUTO: 0.97 K/UL (ref 0–0.85)
MONOCYTES NFR BLD AUTO: 11.7 % (ref 0–13.4)
NEUTROPHILS # BLD AUTO: 4.97 K/UL (ref 2–7.15)
NEUTROPHILS NFR BLD: 60.2 % (ref 44–72)
NRBC # BLD AUTO: 0 K/UL
NRBC BLD-RTO: 0 /100 WBC
PLATELET # BLD AUTO: 277 K/UL (ref 164–446)
PMV BLD AUTO: 9.6 FL (ref 9–12.9)
POTASSIUM SERPL-SCNC: 3.5 MMOL/L (ref 3.6–5.5)
POTASSIUM SERPL-SCNC: 3.5 MMOL/L (ref 3.6–5.5)
PROT SERPL-MCNC: 7.7 G/DL (ref 6–8.2)
PROT SERPL-MCNC: 7.8 G/DL (ref 6–8.2)
RBC # BLD AUTO: 4.39 M/UL (ref 4.2–5.4)
SODIUM SERPL-SCNC: 135 MMOL/L (ref 135–145)
SODIUM SERPL-SCNC: 136 MMOL/L (ref 135–145)
TRIGL SERPL-MCNC: 216 MG/DL (ref 0–149)
TSH SERPL DL<=0.005 MIU/L-ACNC: 5.47 UIU/ML (ref 0.38–5.33)
WBC # BLD AUTO: 8.3 K/UL (ref 4.8–10.8)

## 2019-08-29 PROCEDURE — 84439 ASSAY OF FREE THYROXINE: CPT

## 2019-08-29 PROCEDURE — 85025 COMPLETE CBC W/AUTO DIFF WBC: CPT

## 2019-08-29 PROCEDURE — 82306 VITAMIN D 25 HYDROXY: CPT

## 2019-08-29 PROCEDURE — 80074 ACUTE HEPATITIS PANEL: CPT

## 2019-08-29 PROCEDURE — 80053 COMPREHEN METABOLIC PANEL: CPT | Mod: 91

## 2019-08-29 PROCEDURE — 36415 COLL VENOUS BLD VENIPUNCTURE: CPT

## 2019-08-29 PROCEDURE — 84443 ASSAY THYROID STIM HORMONE: CPT

## 2019-08-29 PROCEDURE — 84443 ASSAY THYROID STIM HORMONE: CPT | Mod: 91

## 2019-08-29 PROCEDURE — 80053 COMPREHEN METABOLIC PANEL: CPT

## 2019-08-29 PROCEDURE — 80061 LIPID PANEL: CPT

## 2019-08-29 PROCEDURE — 86800 THYROGLOBULIN ANTIBODY: CPT

## 2019-08-30 LAB
HAV IGM SERPL QL IA: NEGATIVE
HBV CORE IGM SER QL: NEGATIVE
HBV SURFACE AG SER QL: NEGATIVE
HCV AB SER QL: NEGATIVE
T4 FREE SERPL-MCNC: 0.72 NG/DL (ref 0.53–1.43)
TSH SERPL DL<=0.005 MIU/L-ACNC: 5.66 UIU/ML (ref 0.38–5.33)

## 2019-08-31 LAB — THYROGLOB AB SERPL-ACNC: <0.9 IU/ML (ref 0–4)

## 2019-08-31 NOTE — RESULT ENCOUNTER NOTE
Please inform patient that she still has elevated liver enzymes, slightly elevated TSH, and slightly low potassium. Please advise to follow up in clinic to discuss the results and treatment option.     Romelia Dasilva MD

## 2019-09-04 ENCOUNTER — TELEPHONE (OUTPATIENT)
Dept: MEDICAL GROUP | Age: 44
End: 2019-09-04

## 2019-09-05 NOTE — TELEPHONE ENCOUNTER
Phone Number Called: [phone] (home)       Call outcome: left message for patient to call back regarding message below    Message: needs to be informed of note below.

## 2019-09-05 NOTE — TELEPHONE ENCOUNTER
----- Message from Romelia Dasilva M.D. sent at 8/31/2019 12:00 PM PDT -----  Please inform patient that she still has elevated liver enzymes, slightly elevated TSH, and slightly low potassium. Please advise to follow up in clinic to discuss the results and treatment option.     Romelia Dasilva MD

## 2019-09-06 NOTE — TELEPHONE ENCOUNTER
Patient advised of message below     [place] and [place] next [place]. She will contact office to schedule.

## 2019-09-11 ENCOUNTER — OFFICE VISIT (OUTPATIENT)
Dept: VASCULAR LAB | Facility: MEDICAL CENTER | Age: 44
End: 2019-09-11
Attending: INTERNAL MEDICINE
Payer: COMMERCIAL

## 2019-09-11 VITALS
WEIGHT: 138.4 LBS | HEART RATE: 92 BPM | BODY MASS INDEX: 23.63 KG/M2 | HEIGHT: 64 IN | SYSTOLIC BLOOD PRESSURE: 128 MMHG | DIASTOLIC BLOOD PRESSURE: 95 MMHG

## 2019-09-11 DIAGNOSIS — E03.9 HYPOTHYROIDISM, UNSPECIFIED TYPE: ICD-10-CM

## 2019-09-11 DIAGNOSIS — G47.33 OSA (OBSTRUCTIVE SLEEP APNEA): ICD-10-CM

## 2019-09-11 DIAGNOSIS — E87.6 HYPOKALEMIA: ICD-10-CM

## 2019-09-11 DIAGNOSIS — E78.5 DYSLIPIDEMIA: ICD-10-CM

## 2019-09-11 DIAGNOSIS — R03.0 WHITE COAT SYNDROME WITH HIGH BLOOD PRESSURE BUT WITHOUT HYPERTENSION: ICD-10-CM

## 2019-09-11 PROCEDURE — 99212 OFFICE O/P EST SF 10 MIN: CPT

## 2019-09-11 PROCEDURE — 99214 OFFICE O/P EST MOD 30 MIN: CPT | Performed by: INTERNAL MEDICINE

## 2019-09-11 RX ORDER — POTASSIUM CHLORIDE 1.5 G/1.58G
20 POWDER, FOR SOLUTION ORAL DAILY
Qty: 1 PACKET | Refills: 11 | Status: SHIPPED | OUTPATIENT
Start: 2019-09-11 | End: 2019-11-13

## 2019-09-11 RX ORDER — LEVOTHYROXINE SODIUM 0.03 MG/1
25 TABLET ORAL
Qty: 40 TAB | Refills: 11 | Status: SHIPPED | OUTPATIENT
Start: 2019-09-11 | End: 2019-11-13

## 2019-09-11 ASSESSMENT — ENCOUNTER SYMPTOMS
WEIGHT LOSS: 0
NERVOUS/ANXIOUS: 0
MYALGIAS: 0
PALPITATIONS: 0
WHEEZING: 0
HEADACHES: 0
DEPRESSION: 0
SHORTNESS OF BREATH: 0
CLAUDICATION: 0
FOCAL WEAKNESS: 0
COUGH: 0

## 2019-09-11 NOTE — PATIENT INSTRUCTIONS
CHOLESTEROL:  - continue atorvastatin for now  - continue Vit D    HYPERTENSION  - continue valsartan HCT  - we will arrange for a 24 hour blood pressure monitor  - add a potassium packet every day (new prescription)    THYROID  - increase levothyroxine to 9 tablets per week.    We will order a sleep study    Blood work (non-fasting) one week before followup    Follow up:  Nov 13 at 220p    Michael Bloch, MD  [facility]  [phone]

## 2019-09-11 NOTE — TELEPHONE ENCOUNTER
Phone Number Called: [phone] (home)       Call outcome: spoke to patient regarding message below    Message: Called & spoke to pt. Advised pt call is in regards of test results & scheduling follow up visit w/Dr. Dasilva. Pt stated she was driving during call & will call back later today.

## 2019-09-11 NOTE — PROGRESS NOTES
"  FOLLOW-UP VASCULAR VISIT  Subjective:   Minerva Tillman is a 44 y.o. female who presents today 9-11-19 for vascular followup  Chief Complaint   Patient presents with   • Follow-Up     HPI:  Patient here for f/u of high bp, hypothyroidism and hypercholesterolemia, renetta and low k  Started atorvastatin - having a lot of muscle cramps but no willa myalgias  On vit D  No cv complaints  Started valsartan and then increased to valsartan hct  No symptoms of high bp  Home bps are really quite variable - many readings with great control but some high diastolics  Fatigue and trouble sleeping better since starting levo  Still snoring; + daytime somnulence    DIET AND EXERCISE:  Weight Change: down about 20 pounds and maintained  Diet: common adult, relatively heart healthy; increased fruits/veggies and lean meats  Exercise: moderate regular exercise program - 30 min walk daily + kickboxing a couple of days per week, some resistance training.  Less alcohol    Review of Systems   Constitutional: Negative for malaise/fatigue and weight loss.   Respiratory: Negative for cough, shortness of breath and wheezing.    Cardiovascular: Negative for chest pain, palpitations, claudication and leg swelling.   Musculoskeletal: Negative for joint pain and myalgias.   Neurological: Negative for focal weakness and headaches.   Psychiatric/Behavioral: Negative for depression. The patient is not nervous/anxious.       Objective:     Vitals:    09/11/19 1341 09/11/19 1344   BP: 143/95 128/95   BP Location: Left arm Left arm   Patient Position: Sitting Sitting   BP Cuff Size: Small adult Small adult   Pulse: 90 92   Weight: 62.8 kg (138 lb 6.4 oz)    Height: 1.62 m (5' 3.78\")       Body mass index is 23.92 kg/m².  Physical Exam   Constitutional: She is oriented to person, place, and time. No distress.   Cardiovascular: Normal rate, regular rhythm, normal heart sounds and intact distal pulses.   No murmur heard.  Pulmonary/Chest: Effort normal " and breath sounds normal. No respiratory distress. She has no wheezes. She has no rales.   Musculoskeletal: She exhibits no edema or tenderness.   Neurological: She is alert and oriented to person, place, and time. No cranial nerve deficit. Coordination normal.   Skin: She is not diaphoretic.   Psychiatric: She has a normal mood and affect. Her behavior is normal.   Vitals reviewed.    Lab Results   Component Value Date    CHOLSTRLTOT 244 (H) 08/29/2019     (H) 08/29/2019    HDL 73 08/29/2019    TRIGLYCERIDE 216 (H) 08/29/2019    LDLPART See Note 01/24/2018    LDLPART Interference 01/24/2018    SMLLDL See Note 01/24/2018    LHDLPART See Note 01/24/2018    LVLDLPT See Note 01/24/2018    LDLCHOL Not Applicable 01/24/2018    HDLSIZE See Note 01/24/2018    VLDLSIZE See Note 01/24/2018    HDLPART See Note 01/24/2018           Lab Results   Component Value Date    SODIUM 136 08/29/2019    POTASSIUM 3.5 (L) 08/29/2019    CHLORIDE 96 08/29/2019    CO2 25 08/29/2019    GLUCOSE 77 08/29/2019    BUN 12 08/29/2019    CREATININE 0.89 08/29/2019    IF[place]FR[place] >60 08/29/2019    IFNOTAFR >60 08/29/2019          Lab Results   Component Value Date    WBC 8.3 08/29/2019    RBC 4.39 08/29/2019    HEMOGLOBIN 15.5 08/29/2019    HEMATOCRIT 47.1 (H) 08/29/2019    .3 (H) 08/29/2019    MCH 35.3 (H) 08/29/2019    MCHC 32.9 (L) 08/29/2019    MPV 9.6 08/29/2019      EKG 9/24/2015- unremarkable; no lvh    Coronary Calcium Score (april 2017) - Zero    Medical Decision Making:  Today's Assessment / Status / Plan:     1. White coat syndrome with high blood pressure but without hypertension  REFERRAL TO 24-HOUR BLOOD PRESSURE MONITORING    TSH    THYROXINE (TOTAL)    Basic Metabolic Panel   2. Hypothyroidism, unspecified type  TSH    THYROXINE (TOTAL)    Basic Metabolic Panel   3. Dyslipidemia     4. CLOTILDE (obstructive sleep apnea)  REFERRAL TO SLEEP STUDIES   5. Hypokalemia                                                                                         Patient Type: Primary Prevention    Etiology of Established CVD if Present: None    Lipid Management: Qualifies for Statin Therapy Based on 2013 ACC/AHA Guidelines: no  Calculated 10-Year Risk of ASCVD: 1%  Currently on Statin: No  Lp(a) normal  Cor calcium score = 0  Baseline LDL high  CRP intermediate  Repeat Lipids panel significantly worse since stopping atorva  Despite cor calcium score, lifetime ASCVD risk remains elevated  Possible statin induced myalgias, but sounds more like muscle cramps  Improvement in lipids, but control still not optimal  Plan:  - continue atorva 10 mg daily for now  - continue vit d 5000 IU daily to help tolerability  - intensify treatment of thyroid to help tolerability  - Recheck fasting lipid panel in a few months    Blood Pressure Management:  ACC/AHA goal BP <130/80  Previous ABPM with good control on valsartan-amlodipine  Had leg swelling on amlodipine  Her home bp monitor appears to be accurate  BP elevated in office with decent control at home suggestive of white coat  She does have a family history so I suspect primary hypertension.  Normal to high renin HTN.  No microalbuminuria  Limited NSAIDs  She is not currently interested in device trial  Mild hypokalemia on thiazide  Plan:  - continue valsartan hct for now  - add kcl  - Continue to limit NSAIDs to minimal effective dose  - continue home blood pressure monitoring  - check abpm  - screen for renetta per below    Glycemic Status: Normal   - continue lifestyle modification    Anti-Platelet/Anti-Coagulant Tx: not indicated    Smoking: Continue complete avoidance    Physical Activity: Continue improved exercise regimen    Weight Management and Nutrition: maintain weight loss    Other:    1.  Hypothyroidism - symptomatic, but improved with starting levo. TSH not optimal.  Will increase to 9 tabs (25 micrograms each) a week.  Recheck tsh in a couple of months.      2.  High LFTs - likey fatty liver, but will  defer any further diagnostic evaluation to pcp. <3xULN so not a contraindication to statin use.  Await liver sono ordered by pcp. Limit etoh    3.  Possible CLOTILDE - snores and with daytime somnulence. Will check sleep study    Instructed to follow-up with PCP for remainder of adult medical needs: yes  We will partner with other providers in the management of established vascular disease and cardiometabolic risk factors.    Studies to Be Obtained: none  Labs to Be Obtained: as above    Follow up in: 3 months    Michael J Bloch, M.D.    CC:  Romelia Dasilva MD

## 2019-09-12 ENCOUNTER — HOSPITAL ENCOUNTER (OUTPATIENT)
Dept: RADIOLOGY | Facility: MEDICAL CENTER | Age: 44
End: 2019-09-12
Attending: INTERNAL MEDICINE
Payer: COMMERCIAL

## 2019-09-12 DIAGNOSIS — K70.0 FATTY LIVER, ALCOHOLIC: ICD-10-CM

## 2019-09-12 DIAGNOSIS — R79.89 ELEVATED LFTS: ICD-10-CM

## 2019-09-12 PROCEDURE — 76705 ECHO EXAM OF ABDOMEN: CPT

## 2019-09-18 ENCOUNTER — OFFICE VISIT (OUTPATIENT)
Dept: MEDICAL GROUP | Age: 44
End: 2019-09-18
Payer: COMMERCIAL

## 2019-09-18 VITALS
OXYGEN SATURATION: 100 % | DIASTOLIC BLOOD PRESSURE: 72 MMHG | SYSTOLIC BLOOD PRESSURE: 110 MMHG | WEIGHT: 139.4 LBS | TEMPERATURE: 98.3 F | BODY MASS INDEX: 24.7 KG/M2 | HEART RATE: 82 BPM | HEIGHT: 63 IN

## 2019-09-18 DIAGNOSIS — K70.0 FATTY LIVER, ALCOHOLIC: ICD-10-CM

## 2019-09-18 DIAGNOSIS — E78.00 HYPERCHOLESTEROLEMIA: ICD-10-CM

## 2019-09-18 DIAGNOSIS — E03.9 ACQUIRED HYPOTHYROIDISM: ICD-10-CM

## 2019-09-18 DIAGNOSIS — R71.8 ELEVATED HEMATOCRIT: ICD-10-CM

## 2019-09-18 DIAGNOSIS — I10 ESSENTIAL HYPERTENSION: ICD-10-CM

## 2019-09-18 PROCEDURE — 99214 OFFICE O/P EST MOD 30 MIN: CPT | Performed by: INTERNAL MEDICINE

## 2019-09-18 SDOH — HEALTH STABILITY: MENTAL HEALTH: HOW OFTEN DO YOU HAVE 6 OR MORE DRINKS ON ONE OCCASION?: NEVER

## 2019-09-18 SDOH — HEALTH STABILITY: MENTAL HEALTH: HOW MANY STANDARD DRINKS CONTAINING ALCOHOL DO YOU HAVE ON A TYPICAL DAY?: 1 OR 2

## 2019-09-18 SDOH — HEALTH STABILITY: MENTAL HEALTH: HOW OFTEN DO YOU HAVE A DRINK CONTAINING ALCOHOL?: 4 OR MORE TIMES A WEEK

## 2019-09-18 ASSESSMENT — PAIN SCALES - GENERAL: PAINLEVEL: NO PAIN

## 2019-09-18 NOTE — PROGRESS NOTES
Subjective:   Minerva Hernandez is a 44 y.o. female here today for evaluation and management of:    Fatty liver, alcoholic  Patient has a history of elevated LFT's and was previously drinking 2-3 glasses of wine nightly, however she's gradually decreased her alcohol intake. She reports now drinking 1 glass of wine nightly, rarely 2, and has no alcohol at least 2 days a week. Today we reviewed blood work from 8/29/19 which indicated elevated LFT's with AST 62 and ALT 56.  Viral hepatitis A, B, C are negative on 8/29/2019.    Liver-US impression from 08/12/19 indicated:  1.  Increased hepatic echotexture is consistent with fatty infiltration.  2.  No sonographic evidence for biliary obstruction  3.  Multiple hepatic cysts    Results for MINERVA HERNANDEZ (MRN [id number]) as of 9/18/2019 11:33   Ref. Range 2/14/2019 08:17 5/22/2019 07:52 8/29/2019 11:45 8/29/2019 11:46   AST(SGOT) Latest Ref Range: 12 - 45 U/L 94 (H) 61 (H) 59 (H) 62 (H)   ALT(SGPT) Latest Ref Range: 2 - 50 U/L 66 (H) 56 (H) 59 (H) 56 (H)     I reviewed liver ultrasound report and blood test result with her in clinic today.    Hypercholesterolemia  Chronic. Patient reports compliancy with atorvastatin 10 mg QD, which she was initiated on 3 months ago by Dr. Michael Bloch (Vascular medicine), and she denies any associated side effects. Today we reviewed blood work from 8/29/19 which overall showed improvement with the exception of elevated triglycerides at 216. She does currently take a daily omega-3 supplement and reports recent diet and exercise modifications. She reports exercising at least 30 minutes every day.    I reviewed recent blood work with the patient in clinic today.  Results for MINERVA HERNANDEZ (MRN [id number]) as of 9/18/2019 11:33   Ref. Range 1/24/2018 15:07 5/22/2019 07:52 8/29/2019 11:45   Cholesterol,Tot Latest Ref Range: 100 - 199 mg/dL 337 (H) 287 (H) 244 (H)   Triglycerides Latest Ref Range: 0 - 149 mg/dL 420 (H) 76 216 (H)  "  HDL Latest Ref Range: >=40 mg/dL 58 88 73   EER LipoFit by NMR Unknown See Note     LDL Latest Ref Range: <100 mg/dL  184 (H) 128 (H)       Essential hypertension  Chronic. Patient reports compliancy with valsartan-hydrochlorothiazide 160-25 mg QD and denies any associated side effects. Her BP in clinic today is 110/72. She does regularly follow with Dr. Michael Bloch (Vascular medicine). She denies any chest pain or shortness of breath.      Acquired hypothyroidism  Chronic. Patient reports compliancy with levothyroxine 25 mcg every morning on an empty stomach and denies any associated side effects. Patient was last seen by Dr. Michael Bloch (Vascular medicine) on 6/04/19 at which point he advised her to maintain current regimen however to take a double dose on Saturday's and Sunday's. She additionally started taking a daily potassium supplement. Blood work from 8/29/19 indicated elevated TSH at 5.660.    I reviewed recent blood work with the patient in clinic today.  Results for GIULIA HERNANDEZ (MRN [id number]) as of 9/18/2019 11:33   Ref. Range 2/14/2019 08:17 5/22/2019 07:52 8/29/2019 11:45 8/29/2019 11:46   TSH Latest Ref Range: 0.380 - 5.330 uIU/mL 4.970 8.760 (H) 5.470 (H) 5.660 (H)   Free T-4 Latest Ref Range: 0.53 - 1.43 ng/dL 0.65   0.72       Elevated hematocrit  Patient's  does believe she has sleep apnea, as she snores and sometimes \"gasps herself awake\". She does have an upcoming sleep study. Her hematocrit was elevated at 47.1 on 8/29/19, which we reviewed today.     Results for GIULIA HERNANDEZ (MRN [id number]) as of 9/18/2019 11:55   Ref. Range 6/6/2018 09:52 2/14/2019 08:17 8/29/2019 11:46   Hematocrit Latest Ref Range: 37.0 - 47.0 % 46.4 46.5 47.1 (H)       Current medicines (including changes today)  Current Outpatient Medications   Medication Sig Dispense Refill   • levothyroxine (SYNTHROID) 25 MCG Tab Take 1 Tab by mouth Every morning on an empty stomach. Tab daily except 2 tabs " "on sunday and thur 40 Tab 11   • potassium chloride (KLOR-CON) 20 MEQ Pack Take 1 Packet by mouth every day. 1 Packet 11   • valsartan-hydrochlorothiazide (DIOVAN-HCT) 160-25 MG per tablet Take 1 Tab by mouth every day. 30 Tab 3   • Omega-3 300 MG Cap Take  by mouth.     • vitamin e (VITAMIN E) 400 UNIT Cap Take 400 Units by mouth every day.     • Garlic 1000 MG Cap Take  by mouth.     • cetirizine (ZYRTEC) 10 MG Tab Take 10 mg by mouth every day.     • atorvastatin (LIPITOR) 10 MG Tab Take 1 Tab by mouth every day. 30 Tab 11   • Cholecalciferol (VITAMIN D3) 2000 units Chew Tab Take 1 Cap by mouth every day. 30 Tab 11   • Multiple Vitamins-Minerals (QC WOMENS DAILY MULTIVITAMIN PO) Take 1 Tab by mouth every morning.       No current facility-administered medications for this visit.      She  has a past medical history of Anxiety, Depression, Gynecological disorder, High cholesterol, Hypertension, Snoring, and Urinary tract infection.    ROS   No chest pain, no shortness of breath, no abdominal pain       Objective:     /72 (BP Location: Right arm, Patient Position: Sitting, BP Cuff Size: Adult)   Pulse 82   Temp 36.8 °C (98.3 °F) (Temporal)   Ht 1.597 m (5' 2.87\")   Wt 63.2 kg (139 lb 6.4 oz)   SpO2 100%  Body mass index is 24.79 kg/m².   Physical Exam:  General: Alert, oriented and no acute distress.  Eye contact is good, speech goal directed, affect calm  HEENT: conjunctiva non-injected, sclera non-icteric.  Oral mucous membranes pink and moist with no lesions.  Pinna normal.  Lungs: Normal respiratory effort, clear to auscultation bilaterally with good excursion.  CV: regular rate and rhythm. No murmurs.  Abdomen: soft, non distended, nontender, Bowel sound normal.  Ext: no edema, color normal, vascularity normal, temperature normal  Musculoskeletal exam: moving all extremities freely      Assessment and Plan:   The following treatment plan was discussed:    1. Hypercholesterolemia  - Cholesterol level " is not at goal yet.  Continue current regimen, atorvastatin 10 mg QD. Plan to recheck with blood work 1-2 weeks prior to next visit.   - Advised to eat low fat, low carbohydrate and high fiber diet as well as do cardio physical exercise regularly.   - Comp Metabolic Panel; Future  - Lipid Profile; Future    2. Essential hypertension  - Well-controlled.  Patient to continue current regimen of valsartan-hydrochlorothiazide 160-25 mg QD. BP in clinic today of 110/72.  - Recommend to monitor blood pressure and heart rate at home.  Advised to eat low-sodium diet.  - CBC WITH DIFFERENTIAL; Future  - Comp Metabolic Panel; Future    3. Acquired hypothyroidism  - Not yet well controlled. Patient should continue current regimen as advised by Dr. Bloch (Vascular medicine), taking levothyroxine 25 mcg every morning on an empty stomach, for 5 days a week and taking 50 mcg dose on Saturday's and Sunday's.   - We reviewed potential side effects of this medication as well as symptoms of hyper- and hypothyroidism.   - Recheck labs 1-2 weeks prior to next visit.  Patient already has TSH tests ordered by Dr. Bloch to be completed before her next appointment with him on 11/13/2019.  - TSH; Future  - FREE THYROXINE; Future    4. Elevated hematocrit  - Patient with elevated hematocrit at 47.1 on 8/29/19. She believes she may have sleep apnea and has an upcoming sleep study.  - CBC WITH DIFFERENTIAL; Future    5. Fatty liver, alcoholic  - Discussed the nature of the disease with patient.  Advised to continuing working on cut down alcohol intake and lifestyle modification with healthy diet and regular physical exercise.  Continue to control cholesterol with current regimens.  Patient will recheck CMP in 6 months.    6. Health Maintenance: Patient advised to receive flu vaccine at local pharmacy due to clinic shortage. We reviewed potential risks, benefits, and side effects.     Follow up: Return in about 6 months (around 3/18/2020), or if  symptoms worsen or fail to improve, for Hyperlipidemia, Hypertension, Hypothyroid, elevated hematocrit, lab review.      I, Martina De Santiago (Scribe), am scribing for, and in the presence of, Romelia Dasilva M.D..    Electronically signed by: Martina De Santiago (Scribe), 9/18/2019.    I, Romelia Dasilva M.D., personally performed the services described in this documentation, as scribed by Martina De Santiago in my presence, and it is both accurate and complete.    Please note that this dictation was created using voice recognition software. I have made every reasonable attempt to correct obvious errors, but I expect that there may have unintended errors in text, spelling, punctuation, or grammar that I did not discover.

## 2019-09-23 ENCOUNTER — TELEPHONE (OUTPATIENT)
Dept: VASCULAR LAB | Facility: MEDICAL CENTER | Age: 44
End: 2019-09-23

## 2019-09-23 NOTE — TELEPHONE ENCOUNTER
Called patient to schedule ABPM appointment. LM instructing patient to call back when available to schedule.    Frank Bull, Med. Ass't  [facility] [place]

## 2019-10-02 ENCOUNTER — NON-PROVIDER VISIT (OUTPATIENT)
Dept: VASCULAR LAB | Facility: MEDICAL CENTER | Age: 44
End: 2019-10-02
Attending: INTERNAL MEDICINE
Payer: COMMERCIAL

## 2019-10-02 DIAGNOSIS — R03.0 ELEVATED BLOOD PRESSURE READING WITHOUT DIAGNOSIS OF HYPERTENSION: ICD-10-CM

## 2019-10-02 PROCEDURE — 93788 AMBL BP MNTR W/SW A/R: CPT

## 2019-10-02 PROCEDURE — 93790 AMBL BP MNTR W/SW I&R: CPT | Performed by: INTERNAL MEDICINE

## 2019-10-02 PROCEDURE — 93786 AMBL BP MNTR W/SW REC ONLY: CPT

## 2019-10-03 ENCOUNTER — TELEPHONE (OUTPATIENT)
Dept: VASCULAR LAB | Facility: MEDICAL CENTER | Age: 44
End: 2019-10-03

## 2019-10-03 ENCOUNTER — APPOINTMENT (OUTPATIENT)
Dept: VASCULAR LAB | Facility: MEDICAL CENTER | Age: 44
End: 2019-10-03
Attending: INTERNAL MEDICINE
Payer: COMMERCIAL

## 2019-10-03 NOTE — PROGRESS NOTES
Ambulatory blood pressure monitor results reviewed  Full report under media tab  Reasonable data acquisition    Mean daytime: 112/84 but with considerable variability    Nocturnal dip: Present - if anything exaggerated.    Clinical correlation needed.  Will d/w patient at follow up visit    Michael Bloch, MD  [facility]

## 2019-10-03 NOTE — TELEPHONE ENCOUNTER
Called and spoke to patient regarding ABPM results. Explained that per Dr. Bloch, her readings were all over the place but nothing to be immediately concerned about. Instructed her to continue with current treatment until next appointment in November. Patient understands and will do so.    Frank Bull, [facility]

## 2019-10-29 ENCOUNTER — APPOINTMENT (RX ONLY)
Dept: URBAN - METROPOLITAN AREA CLINIC 35 | Facility: CLINIC | Age: 44
Setting detail: DERMATOLOGY
End: 2019-10-29

## 2019-10-29 DIAGNOSIS — L57.0 ACTINIC KERATOSIS: ICD-10-CM

## 2019-10-29 PROCEDURE — 99212 OFFICE O/P EST SF 10 MIN: CPT

## 2019-10-29 PROCEDURE — ? PRESCRIPTION

## 2019-10-29 PROCEDURE — ? COUNSELING

## 2019-10-29 PROCEDURE — ? ADDITIONAL NOTES

## 2019-10-29 PROCEDURE — ? TREATMENT REGIMEN

## 2019-10-29 RX ORDER — FLUOROURACIL 50 MG/G
CREAM TOPICAL BID
Qty: 1 | Refills: 0 | Status: ERX | COMMUNITY
Start: 2019-10-29

## 2019-10-29 RX ORDER — CALCIPOTRIENE 50 UG/G
ACTINIC KERATOSIS CREAM TOPICAL BID
Qty: 1 | Refills: 1 | Status: ERX | COMMUNITY
Start: 2019-10-29

## 2019-10-29 RX ADMIN — CALCIPOTRIENE THIN LAYER: 50 CREAM TOPICAL at 00:00

## 2019-10-29 RX ADMIN — FLUOROURACIL THIN LAYER: 50 CREAM TOPICAL at 00:00

## 2019-10-29 ASSESSMENT — LOCATION SIMPLE DESCRIPTION DERM
LOCATION SIMPLE: RIGHT CHEEK
LOCATION SIMPLE: LEFT CHEEK

## 2019-10-29 ASSESSMENT — LOCATION ZONE DERM: LOCATION ZONE: FACE

## 2019-10-29 ASSESSMENT — LOCATION DETAILED DESCRIPTION DERM
LOCATION DETAILED: LEFT INFERIOR MEDIAL MALAR CHEEK
LOCATION DETAILED: RIGHT INFERIOR CENTRAL MALAR CHEEK

## 2019-10-29 NOTE — PROCEDURE: TREATMENT REGIMEN
Detail Level: Zone
Initiate Treatment: Fluorouracil 5% topical cream apply to face twice daily for four days\\nCalcipotriene 0.05% topical cream apply directly on top of fluorouracil twice daily to face for four days

## 2019-10-29 NOTE — PROCEDURE: ADDITIONAL NOTES
Detail Level: Detailed
Additional Notes: Patient planning to schedule cosmetic consult regarding laser treatments.

## 2019-11-06 ENCOUNTER — HOSPITAL ENCOUNTER (OUTPATIENT)
Dept: LAB | Facility: MEDICAL CENTER | Age: 44
End: 2019-11-06
Attending: INTERNAL MEDICINE
Payer: COMMERCIAL

## 2019-11-06 DIAGNOSIS — E78.00 HYPERCHOLESTEROLEMIA: ICD-10-CM

## 2019-11-06 DIAGNOSIS — E03.9 ACQUIRED HYPOTHYROIDISM: ICD-10-CM

## 2019-11-06 DIAGNOSIS — I10 ESSENTIAL HYPERTENSION: ICD-10-CM

## 2019-11-06 DIAGNOSIS — R71.8 ELEVATED HEMATOCRIT: ICD-10-CM

## 2019-11-06 DIAGNOSIS — E03.9 HYPOTHYROIDISM, UNSPECIFIED TYPE: ICD-10-CM

## 2019-11-06 DIAGNOSIS — R03.0 WHITE COAT SYNDROME WITH HIGH BLOOD PRESSURE BUT WITHOUT HYPERTENSION: ICD-10-CM

## 2019-11-06 LAB
ALBUMIN SERPL BCP-MCNC: 4.7 G/DL (ref 3.2–4.9)
ALBUMIN/GLOB SERPL: 1.7 G/DL
ALP SERPL-CCNC: 58 U/L (ref 30–99)
ALT SERPL-CCNC: 34 U/L (ref 2–50)
ANION GAP SERPL CALC-SCNC: 13 MMOL/L (ref 0–11.9)
AST SERPL-CCNC: 53 U/L (ref 12–45)
BASOPHILS # BLD AUTO: 1.3 % (ref 0–1.8)
BASOPHILS # BLD: 0.09 K/UL (ref 0–0.12)
BILIRUB SERPL-MCNC: 0.9 MG/DL (ref 0.1–1.5)
BUN SERPL-MCNC: 13 MG/DL (ref 8–22)
CALCIUM SERPL-MCNC: 9.1 MG/DL (ref 8.5–10.5)
CHLORIDE SERPL-SCNC: 98 MMOL/L (ref 96–112)
CHOLEST SERPL-MCNC: 177 MG/DL (ref 100–199)
CO2 SERPL-SCNC: 26 MMOL/L (ref 20–33)
CREAT SERPL-MCNC: 0.83 MG/DL (ref 0.5–1.4)
EOSINOPHIL # BLD AUTO: 0.08 K/UL (ref 0–0.51)
EOSINOPHIL NFR BLD: 1.1 % (ref 0–6.9)
ERYTHROCYTE [DISTWIDTH] IN BLOOD BY AUTOMATED COUNT: 44.7 FL (ref 35.9–50)
GLOBULIN SER CALC-MCNC: 2.7 G/DL (ref 1.9–3.5)
GLUCOSE SERPL-MCNC: 79 MG/DL (ref 65–99)
HCT VFR BLD AUTO: 43.1 % (ref 37–47)
HDLC SERPL-MCNC: 70 MG/DL
HGB BLD-MCNC: 15 G/DL (ref 12–16)
IMM GRANULOCYTES # BLD AUTO: 0.03 K/UL (ref 0–0.11)
IMM GRANULOCYTES NFR BLD AUTO: 0.4 % (ref 0–0.9)
LDLC SERPL CALC-MCNC: 58 MG/DL
LYMPHOCYTES # BLD AUTO: 2.04 K/UL (ref 1–4.8)
LYMPHOCYTES NFR BLD: 28.5 % (ref 22–41)
MCH RBC QN AUTO: 36.7 PG (ref 27–33)
MCHC RBC AUTO-ENTMCNC: 34.8 G/DL (ref 33.6–35)
MCV RBC AUTO: 105.4 FL (ref 81.4–97.8)
MONOCYTES # BLD AUTO: 0.91 K/UL (ref 0–0.85)
MONOCYTES NFR BLD AUTO: 12.7 % (ref 0–13.4)
NEUTROPHILS # BLD AUTO: 4 K/UL (ref 2–7.15)
NEUTROPHILS NFR BLD: 56 % (ref 44–72)
NRBC # BLD AUTO: 0 K/UL
NRBC BLD-RTO: 0 /100 WBC
PLATELET # BLD AUTO: 261 K/UL (ref 164–446)
PMV BLD AUTO: 9.6 FL (ref 9–12.9)
POTASSIUM SERPL-SCNC: 3.2 MMOL/L (ref 3.6–5.5)
PROT SERPL-MCNC: 7.4 G/DL (ref 6–8.2)
RBC # BLD AUTO: 4.09 M/UL (ref 4.2–5.4)
SODIUM SERPL-SCNC: 137 MMOL/L (ref 135–145)
T4 SERPL-MCNC: 7.3 UG/DL (ref 4–12)
TRIGL SERPL-MCNC: 244 MG/DL (ref 0–149)
WBC # BLD AUTO: 7.2 K/UL (ref 4.8–10.8)

## 2019-11-06 PROCEDURE — 85025 COMPLETE CBC W/AUTO DIFF WBC: CPT

## 2019-11-06 PROCEDURE — 80053 COMPREHEN METABOLIC PANEL: CPT

## 2019-11-06 PROCEDURE — 84439 ASSAY OF FREE THYROXINE: CPT

## 2019-11-06 PROCEDURE — 36415 COLL VENOUS BLD VENIPUNCTURE: CPT

## 2019-11-06 PROCEDURE — 80061 LIPID PANEL: CPT

## 2019-11-06 PROCEDURE — 84443 ASSAY THYROID STIM HORMONE: CPT

## 2019-11-06 PROCEDURE — 84436 ASSAY OF TOTAL THYROXINE: CPT

## 2019-11-07 LAB
T4 FREE SERPL-MCNC: 0.87 NG/DL (ref 0.53–1.43)
TSH SERPL DL<=0.005 MIU/L-ACNC: 5.09 UIU/ML (ref 0.38–5.33)

## 2019-11-13 ENCOUNTER — OFFICE VISIT (OUTPATIENT)
Dept: VASCULAR LAB | Facility: MEDICAL CENTER | Age: 44
End: 2019-11-13
Attending: INTERNAL MEDICINE
Payer: COMMERCIAL

## 2019-11-13 VITALS
HEIGHT: 63 IN | HEART RATE: 96 BPM | BODY MASS INDEX: 24.27 KG/M2 | DIASTOLIC BLOOD PRESSURE: 83 MMHG | WEIGHT: 137 LBS | SYSTOLIC BLOOD PRESSURE: 115 MMHG

## 2019-11-13 DIAGNOSIS — E03.9 ACQUIRED HYPOTHYROIDISM: ICD-10-CM

## 2019-11-13 DIAGNOSIS — I10 ESSENTIAL HYPERTENSION: ICD-10-CM

## 2019-11-13 DIAGNOSIS — E78.5 DYSLIPIDEMIA: ICD-10-CM

## 2019-11-13 DIAGNOSIS — E03.9 HYPOTHYROIDISM, UNSPECIFIED TYPE: ICD-10-CM

## 2019-11-13 PROCEDURE — 99212 OFFICE O/P EST SF 10 MIN: CPT

## 2019-11-13 PROCEDURE — 99214 OFFICE O/P EST MOD 30 MIN: CPT | Performed by: INTERNAL MEDICINE

## 2019-11-13 RX ORDER — VALSARTAN 160 MG/1
160 TABLET ORAL DAILY
Qty: 30 TAB | Refills: 3 | Status: SHIPPED | OUTPATIENT
Start: 2019-11-13 | End: 2020-04-30

## 2019-11-13 RX ORDER — LEVOTHYROXINE SODIUM 0.05 MG/1
50 TABLET ORAL
Qty: 30 TAB | Refills: 11 | Status: SHIPPED | OUTPATIENT
Start: 2019-11-13 | End: 2020-07-22

## 2019-11-13 RX ORDER — SPIRONOLACTONE 25 MG/1
25 TABLET ORAL DAILY
Qty: 30 TAB | Refills: 3 | Status: SHIPPED | OUTPATIENT
Start: 2019-11-13 | End: 2020-04-30

## 2019-11-13 ASSESSMENT — ENCOUNTER SYMPTOMS
FOCAL WEAKNESS: 0
WEIGHT LOSS: 0
WHEEZING: 0
SHORTNESS OF BREATH: 0
CLAUDICATION: 0
COUGH: 0
HEADACHES: 0
PALPITATIONS: 0
DEPRESSION: 0
NERVOUS/ANXIOUS: 0
MYALGIAS: 0

## 2019-11-13 NOTE — PROGRESS NOTES
"  FOLLOW-UP VASCULAR VISIT  Subjective:   Minerva Tillman is a 44 y.o. female who presents today 11-13-19 for vascular followup  Chief Complaint   Patient presents with   • Follow-Up     HPI:  Patient here for f/u of high bp, hypothyroidism and hypercholesterolemia, renetta and low k  No myalgias on atorva  On vit D  No cv complaints  No further leg swelling since stopped amlodipine  No symptoms of high bp  Had abpm  Home readings 110s-130s/90s-100s  Better energy on increased levo, but not perfect.  Less snoring and daytime somnulence with weight loss  Has sleep study scheduled for feb.     DIET AND EXERCISE:  Weight Change: down about 20 pounds and maintained  Diet: common adult, relatively heart healthy; increased fruits/veggies and lean meats  Exercise: moderate regular exercise program - 30 min walk daily + kickboxing a couple of days per week, some resistance training.    Less alcohol    Review of Systems   Constitutional: Negative for malaise/fatigue and weight loss.   Respiratory: Negative for cough, shortness of breath and wheezing.    Cardiovascular: Negative for chest pain, palpitations, claudication and leg swelling.   Musculoskeletal: Negative for joint pain and myalgias.   Neurological: Negative for focal weakness and headaches.   Psychiatric/Behavioral: Negative for depression. The patient is not nervous/anxious.       Objective:     Vitals:    11/13/19 1440   BP: 115/83   BP Location: Left arm   Patient Position: Sitting   BP Cuff Size: Adult   Pulse: 96   Weight: 62.1 kg (137 lb)   Height: 1.6 m (5' 3\")      Body mass index is 24.27 kg/m².  Physical Exam   Constitutional: She is oriented to person, place, and time. No distress.   Cardiovascular: Normal rate, regular rhythm, normal heart sounds and intact distal pulses.   No murmur heard.  Pulmonary/Chest: Effort normal and breath sounds normal. No respiratory distress. She has no wheezes. She has no rales.   Musculoskeletal:         General: No " tenderness or edema.   Neurological: She is alert and oriented to person, place, and time. No cranial nerve deficit. Coordination normal.   Skin: She is not diaphoretic.   Psychiatric: She has a normal mood and affect. Her behavior is normal.   Vitals reviewed.    Lab Results   Component Value Date    CHOLSTRLTOT 177 11/06/2019    LDL 58 11/06/2019    HDL 70 11/06/2019    TRIGLYCERIDE 244 (H) 11/06/2019    LDLPART See Note 01/24/2018    LDLPART Interference 01/24/2018    SMLLDL See Note 01/24/2018    LHDLPART See Note 01/24/2018    LVLDLPT See Note 01/24/2018    LDLCHOL Not Applicable 01/24/2018    HDLSIZE See Note 01/24/2018    VLDLSIZE See Note 01/24/2018    HDLPART See Note 01/24/2018           Lab Results   Component Value Date    SODIUM 137 11/06/2019    POTASSIUM 3.2 (L) 11/06/2019    CHLORIDE 98 11/06/2019    CO2 26 11/06/2019    GLUCOSE 79 11/06/2019    BUN 13 11/06/2019    CREATININE 0.83 11/06/2019    IF[place] >60 11/06/2019    IFNOTAFR >60 11/06/2019          Lab Results   Component Value Date    WBC 7.2 11/06/2019    RBC 4.09 (L) 11/06/2019    HEMOGLOBIN 15.0 11/06/2019    HEMATOCRIT 43.1 11/06/2019    .4 (H) 11/06/2019    MCH 36.7 (H) 11/06/2019    MCHC 34.8 11/06/2019    MPV 9.6 11/06/2019      EKG 9/24/2015- unremarkable; no lvh    Coronary Calcium Score (april 2017) - Zero    RUQ sono sep 2019:  1.  Increased hepatic echotexture is consistent with fatty infiltration.  2.  No sonographic evidence for biliary obstruction  3.  Multiple hepatic cysts    ABPM oct 2019:  Mean daytime: 112/84 but with considerable variability  Nocturnal dip: Present - if anything exaggerated.    Medical Decision Making:  Today's Assessment / Status / Plan:     1. Hypothyroidism, unspecified type     2. Dyslipidemia  Lipid Profile    Comp Metabolic Panel   3. Essential hypertension  Comp Metabolic Panel    MICROALBUMIN CREAT RATIO URINE    RENIN ACTIVITY    METANEPHRINES PLASMA    ALDOSTERONE   4. Acquired  hypothyroidism  TSH                                                                                      Patient Type: Primary Prevention    Etiology of Established CVD if Present: None    Lipid Management: Qualifies for Statin Therapy Based on 2018 ACC/AHA Guidelines: no  Calculated 10-Year Risk of ASCVD: 1%  Currently on Statin: No  Lp(a) normal  Cor calcium score = 0  Baseline LDL high  CRP intermediate  Despite cor calcium score, lifetime ASCVD risk remains elevated  Possible statin induced myalgias, but sounds more like muscle cramps - reasonable on decreased dose  LDL and nonHDL under reasonable control; trigs still a bit high  Plan:  - continue atorva 10 mg daily for now  - continue vit d 5000 IU daily to help tolerability  - intensify treatment of thyroid to help tolerability  - intensify tlc to treat trigs  - Recheck fasting lipid panel in a few months    Blood Pressure Management:  ACC/AHA goal BP <130/80  Previous ABPM with good control on valsartan-amlodipine  Had leg swelling on amlodipine  Her home bp monitor appears to be accurate  Diastolics high at home, but may be artifact of monitor  Diastolic control much better on repeat abpm and in office  Has significant variability on abpm with exaggerated nocturnal dip  She does have a family history so I suspect primary hypertension.  Previous PRA not c/w PA, but will recheck  Hypokalemia persistent on hctz  No microalbuminuria  Limited NSAIDs  She is not currently interested in device trial  Plan:  - change valsartan hct to valsartan 160 mg daily  - change hct to spironolactone 25 mg daily  - hold k supp  - Continue to limit NSAIDs to minimal effective dose  - continue home blood pressure monitoring  - screen for renetta per below  - consider repeat abpm in future    Glycemic Status: Normal   - continue lifestyle modification    Anti-Platelet/Anti-Coagulant Tx: not indicated    Smoking: Continue complete avoidance    Physical Activity: Continue improved  exercise regimen    Weight Management and Nutrition: maintain weight loss    Other:    1.  Hypothyroidism - symptomatic, but improved with starting levo. TSH still not optimal.  Will increase levo to 0.050 6 days per week.  Recheck tsh in a couple of months.      2.  High LFTs - likey fatty liver, and u/s c/w fatty liver, but will defer any further diagnostic evaluation to pcp. <3xULN so not a contraindication to statin use.      3.  Possible CLOTILDE - snores and with daytime somnulence - but improved with weight loss. await sleep study    Instructed to follow-up with PCP for remainder of adult medical needs: yes  We will partner with other providers in the management of established vascular disease and cardiometabolic risk factors.    Studies to Be Obtained: none  Labs to Be Obtained: as above    Follow up in: 3 months    Michael J Bloch, M.D.    CC:  Romelia Dasilva MD

## 2019-11-13 NOTE — PATIENT INSTRUCTIONS
CHOLESTEROL  - continue atorvastatin    BLOOD PRESSURE  - stop valsartan hct  - stop potassium  - start valsartan 160 mg daily  - start spironolactone 25 mg daily    THYROID   - change levothyroxine to 0.050 mg 6 days per week

## 2019-11-27 ENCOUNTER — RX ONLY (OUTPATIENT)
Age: 44
Setting detail: RX ONLY
End: 2019-11-27

## 2020-02-12 ENCOUNTER — APPOINTMENT (OUTPATIENT)
Dept: VASCULAR LAB | Facility: MEDICAL CENTER | Age: 45
End: 2020-02-12
Payer: COMMERCIAL

## 2020-02-20 ENCOUNTER — APPOINTMENT (OUTPATIENT)
Dept: SLEEP MEDICINE | Facility: MEDICAL CENTER | Age: 45
End: 2020-02-20

## 2020-04-07 ENCOUNTER — APPOINTMENT (OUTPATIENT)
Dept: VASCULAR LAB | Facility: MEDICAL CENTER | Age: 45
End: 2020-04-07
Payer: COMMERCIAL

## 2020-04-08 ENCOUNTER — TELEPHONE (OUTPATIENT)
Dept: VASCULAR LAB | Facility: MEDICAL CENTER | Age: 45
End: 2020-04-08

## 2020-04-29 ENCOUNTER — OFFICE VISIT (OUTPATIENT)
Dept: VASCULAR LAB | Facility: MEDICAL CENTER | Age: 45
End: 2020-04-29
Attending: INTERNAL MEDICINE
Payer: COMMERCIAL

## 2020-04-29 DIAGNOSIS — I10 ESSENTIAL HYPERTENSION: ICD-10-CM

## 2020-04-29 DIAGNOSIS — E78.5 DYSLIPIDEMIA: ICD-10-CM

## 2020-04-29 DIAGNOSIS — E03.9 HYPOTHYROIDISM, UNSPECIFIED TYPE: ICD-10-CM

## 2020-04-29 PROCEDURE — 99213 OFFICE O/P EST LOW 20 MIN: CPT | Mod: 95,CR | Performed by: INTERNAL MEDICINE

## 2020-04-29 ASSESSMENT — ENCOUNTER SYMPTOMS
WEIGHT LOSS: 0
MYALGIAS: 0
DEPRESSION: 0
WHEEZING: 0
PALPITATIONS: 0
CLAUDICATION: 0
COUGH: 0
HEADACHES: 0
FOCAL WEAKNESS: 0
NERVOUS/ANXIOUS: 0
SHORTNESS OF BREATH: 0

## 2020-04-29 NOTE — PROGRESS NOTES
FOLLOW-UP VASCULAR VISIT  Subjective:   Minerva Tillman is a 44 y.o. female who presents today 4-29-20 for vascular followup    Visit completed via Facetime due to restrictions of COVID-19 pandemic.  All issues as below were discussed and addressed by no physical exam was performed unless allowed by visual confirmation on Facetime.  If it was felt that patient should be evaluated in the clinic, there were directed there.  Patient verbally consented to Facetime tele-video visit.     HPI:  Patient seen for f/u of high bp, hypothyroidism and hypercholesterolemia, renetta and low k  No myalgias on atorva  On vit D  No cv complaints  No further leg swelling since stopped amlodipine  No symptoms of high bp  Home readings mostly 130s/mid 80s  Better energy on increased levo - feels great  Less snoring and daytime somnulence with weight loss and decreased etoh  Decided against sleep study  Did not get blood work done    DIET AND EXERCISE:  Weight Change: down about 20 pounds and maintained or lost a bit more  Diet: common adult, relatively heart healthy; increased fruits/veggies and lean meats  Exercise: moderate regular exercise program - every day  Less alcohol    Review of Systems   Constitutional: Negative for malaise/fatigue and weight loss.   Respiratory: Negative for cough, shortness of breath and wheezing.    Cardiovascular: Negative for chest pain, palpitations, claudication and leg swelling.   Musculoskeletal: Negative for joint pain and myalgias.   Neurological: Negative for focal weakness and headaches.   Psychiatric/Behavioral: Negative for depression. The patient is not nervous/anxious.       Objective:     There were no vitals filed for this visit.   There is no height or weight on file to calculate BMI.  Physical Exam   Constitutional: She is oriented to person, place, and time. No distress.   HENT:   Head: Normocephalic and atraumatic.   Eyes: Conjunctivae and EOM are normal. No scleral icterus.    Pulmonary/Chest: Effort normal.   Neurological: She is alert and oriented to person, place, and time. No cranial nerve deficit. Coordination normal.   Skin: No rash noted. She is not diaphoretic. No pallor.   Psychiatric: She has a normal mood and affect. Her behavior is normal.     Lab Results   Component Value Date    CHOLSTRLTOT 177 11/06/2019    LDL 58 11/06/2019    HDL 70 11/06/2019    TRIGLYCERIDE 244 (H) 11/06/2019           Lab Results   Component Value Date    SODIUM 137 11/06/2019    POTASSIUM 3.2 (L) 11/06/2019    CHLORIDE 98 11/06/2019    CO2 26 11/06/2019    GLUCOSE 79 11/06/2019    BUN 13 11/06/2019    CREATININE 0.83 11/06/2019    IFAFRICA >60 11/06/2019    IFNOTAFR >60 11/06/2019          Lab Results   Component Value Date    WBC 7.2 11/06/2019    RBC 4.09 (L) 11/06/2019    HEMOGLOBIN 15.0 11/06/2019    HEMATOCRIT 43.1 11/06/2019    .4 (H) 11/06/2019    MCH 36.7 (H) 11/06/2019    MCHC 34.8 11/06/2019    MPV 9.6 11/06/2019      EKG 9/24/2015- unremarkable; no lvh    Coronary Calcium Score (april 2017) - Zero    RUQ sono sep 2019:  1.  Increased hepatic echotexture is consistent with fatty infiltration.  2.  No sonographic evidence for biliary obstruction  3.  Multiple hepatic cysts    ABPM oct 2019:  Mean daytime: 112/84 but with considerable variability  Nocturnal dip: Present - if anything exaggerated.    Medical Decision Making:  Today's Assessment / Status / Plan:     1. Hypothyroidism, unspecified type     2. Dyslipidemia     3. Essential hypertension                                                                                        Patient Type: Primary Prevention    Etiology of Established CVD if Present: None    Lipid Management: Qualifies for Statin Therapy Based on 2018 ACC/AHA Guidelines: no  Calculated 10-Year Risk of ASCVD: 1%  Currently on Statin: No  Lp(a) normal  Cor calcium score = 0  Baseline LDL high  CRP intermediate  Despite cor calcium score, lifetime ASCVD risk  remains elevated  Possible statin induced myalgias, but sounds more like muscle cramps - reasonable on decreased dose  LDL and nonHDL under reasonable control; trigs still a bit high  Plan:  - continue atorva 10 mg daily for now  - continue vit d 5000 IU daily to help tolerability  - continue intensified tlc to treat trigs  - Recheck fasting lipid panel in a few months    Blood Pressure Management:  ACC/AHA goal BP <130/80  Previous ABPM with good control on valsartan-amlodipine  Had leg swelling on amlodipine  Her home bp monitor appears to be accurate  Diastolics high at home, but may be artifact of monitor  Diastolic control much better on repeat abpm and in office  Has significant variability on abpm with exaggerated nocturnal dip  She does have a family history so I suspect primary hypertension.  Previous PRA not c/w PA, but will recheck  Hypokalemia persistent on hctz - now changed to cherelle  No microalbuminuria  Limited NSAIDs  She was not previously interested in device trial  Plan:  - continue valsartan 160 mg daily  - continue spironolactone 25 mg daily  - Continue to limit NSAIDs to minimal effective dose  - continue to limit etoh  - continue home blood pressure monitoring  - screen for clotilde per below  - consider repeat abpm in future    Glycemic Status: Normal   - continue lifestyle modification    Anti-Platelet/Anti-Coagulant Tx: not indicated    Smoking: Continue complete avoidance    Physical Activity: Continue improved exercise regimen    Weight Management and Nutrition: maintain weight loss    Other:    1.  Hypothyroidism - symptoms improved with decrease in dose. continue levo to 0.050 6 days per week.  Recheck tsh in a couple of months.      2.  High LFTs - likey fatty liver, and u/s c/w fatty liver, but will defer any further diagnostic evaluation to pcp. <3xULN so not a contraindication to statin use. recheck cmp. Limit etoh     3.  Possible CLOTILDE - previously snored and with daytime somnulence -  but improved with weight loss. Normal dipping status on abpm speaks against significant renetta. Agree with deferring sleep study at present    Instructed to follow-up with PCP for remainder of adult medical needs: yes  We will partner with other providers in the management of established vascular disease and cardiometabolic risk factors.    Studies to Be Obtained: none  Labs to Be Obtained: as per previous order    Follow up in: 3 months    Michael J Bloch, M.D.    CC:  Romelia Dasilva MD

## 2020-05-07 ENCOUNTER — TELEMEDICINE (OUTPATIENT)
Dept: MEDICAL GROUP | Facility: MEDICAL CENTER | Age: 45
End: 2020-05-07
Payer: COMMERCIAL

## 2020-05-07 VITALS
DIASTOLIC BLOOD PRESSURE: 85 MMHG | TEMPERATURE: 98.6 F | HEIGHT: 62 IN | HEART RATE: 101 BPM | BODY MASS INDEX: 24.29 KG/M2 | SYSTOLIC BLOOD PRESSURE: 113 MMHG | WEIGHT: 132 LBS

## 2020-05-07 DIAGNOSIS — I10 ESSENTIAL HYPERTENSION: ICD-10-CM

## 2020-05-07 DIAGNOSIS — E55.9 VITAMIN D INSUFFICIENCY: ICD-10-CM

## 2020-05-07 DIAGNOSIS — K70.0 ALCOHOLIC FATTY LIVER: ICD-10-CM

## 2020-05-07 DIAGNOSIS — E78.00 HYPERCHOLESTEROLEMIA: ICD-10-CM

## 2020-05-07 DIAGNOSIS — E03.9 ACQUIRED HYPOTHYROIDISM: ICD-10-CM

## 2020-05-07 PROCEDURE — 99214 OFFICE O/P EST MOD 30 MIN: CPT | Mod: 95,CR | Performed by: FAMILY MEDICINE

## 2020-05-07 ASSESSMENT — FIBROSIS 4 INDEX: FIB4 SCORE: 1.6

## 2020-05-07 NOTE — PROGRESS NOTES
Telemedicine Visit: Established Patient     This encounter was conducted via [place].   Verbal consent was obtained. Patient's identity was verified.    Subjective:   CC:   Minerva Tillman is a 45 y.o. female presenting for evaluation and management of:    Essential hypertension  Chronic problem.  The patient is currently managed by Dr. Perry, vascular medicine.  The patient is currently on valsartan 160 mg, Spironolactone 25 mg.  They are continuing work-up.  She has labs that she will be doing prior to her next visit in July.    Hypercholesterolemia  Chronic problem.  Currently on a atorvastatin 10 mg.  The patient again is followed by Dr. Perry, vascular medicine.    Acquired hypothyroidism  Chronic problem.  The patient was recently diagnosed.  She is currently on levothyroxine 50 mcg.  She notes significant improvement in her symptoms.  Her dose was recently increased, repeat TSH has been ordered which she will get done in July.  She has noticed significant improvement in her weight since starting the medication.    Vitamin D insufficiency  Chronic problem.  The patient is currently on vitamin D supplementation.    Fatty liver, alcoholic  Chronic problem.  Etiology unclear, likely fatty liver disease.  Repeat CMP has been ordered.  Her AST/ALT do appear to be trending downward with weight loss.  Peak 1 year ago with an AST of 94 and ALT 66 most recent labs done 11/6/2019 with AST 53 and ALT 34.  Currently on vitamin E.      ROS   Denies any recent fevers or chills. No nausea or vomiting. No chest pains or shortness of breath.     Allergies   Allergen Reactions   • Sulfa Drugs Hives     ELP=[id number]       Current medicines (including changes today)  Current Outpatient Medications   Medication Sig Dispense Refill   • spironolactone (ALDACTONE) 25 MG Tab TAKE ONE TABLET BY MOUTH EVERY DAY 30 Tab 11   • valsartan (DIOVAN) 160 MG Tab TAKE ONE TABLET BY MOUTH EVERY DAY 30 Tab 11   • levothyroxine (SYNTHROID) 50 MCG  "Tab Take 1 Tab by mouth Every morning on an empty stomach. 30 Tab 11   • Omega-3 300 MG Cap Take  by mouth.     • vitamin e (VITAMIN E) 400 UNIT Cap Take 400 Units by mouth every day.     • cetirizine (ZYRTEC) 10 MG Tab Take 10 mg by mouth every day.     • atorvastatin (LIPITOR) 10 MG Tab Take 1 Tab by mouth every day. 30 Tab 11   • Cholecalciferol (VITAMIN D3) 2000 units Chew Tab Take 1 Cap by mouth every day. 30 Tab 11   • Multiple Vitamins-Minerals (QC WOMENS DAILY MULTIVITAMIN PO) Take 1 Tab by mouth every morning.       No current facility-administered medications for this visit.        Patient Active Problem List    Diagnosis Date Noted   • Elevated hematocrit 09/18/2019   • Fatty liver, alcoholic 05/29/2019   • Vitamin D insufficiency 05/29/2019   • Acquired hypothyroidism 05/29/2019   • Essential hypertension 10/26/2016   • Weight gain 08/26/2016   • Hypercholesterolemia 08/26/2016   • Anxiety associated with depression 01/13/2013       Family History   Problem Relation Age of Onset   • Cancer Maternal Grandmother 60        breast and skin   • Stroke Maternal Grandmother    • Hypertension Mother    • Hyperlipidemia Mother    • Depression Father    • Hypertension Father    • Asthma Father    • Hyperlipidemia Father    • Hypertension Brother    • Cancer Maternal Grandfather 60        esphogus   • Other Paternal Grandmother         complications from surgery   • Hypertension Brother        She  has a past medical history of Anxiety, Depression, Gynecological disorder, High cholesterol, Hypertension, Snoring, and Urinary tract infection.  She  has a past surgical history that includes pr enlarge breast with implant (2006); mammoplasty augmentation; hysteroscopy novasure-2 (8/2/2017); and dilation and curettage (8/2/2017).       Objective:   Vitals obtained by patient:  Blood pressure: 113/85, Pulse: 101, Temp: 98.6, Height: 5'2\" and Weight: 132lb    Physical Exam:  Constitutional: Alert, no distress, " well-groomed.  Skin: No rashes in visible areas.  Eye: Round. Conjunctiva clear, lids normal. No icterus.   ENMT: Lips pink without lesions, good dentition, moist mucous membranes. Phonation normal.  Neck: No masses, no thyromegaly. Moves freely without pain.  CV: Pulse as reported by patient  Respiratory: Unlabored respiratory effort, no cough or audible wheeze  Psych: Alert and oriented x3, normal affect and mood.       Assessment and Plan:   The following treatment plan was discussed:     1. Essential hypertension  Chronic problem.  Currently on Spironolactone 25 mg and valsartan 160 mg.  Blood pressure well controlled.  Followed by Dr. Bloch, vascular medicine.  Labs ordered, the patient plans to get these done in July.    2. Hypercholesterolemia  Chronic problem.  Currently on atorvastatin 10 mg.  Last lipid panel showed significant improvement in LDL.  Triglycerides remain elevated.  Repeat lipid panel in place, patient plans to get these done in the next couple months.    3. Acquired hypothyroidism  Chronic problem, the patient notes significant symptomatic improvement on levothyroxine 50 mcg.  TSH ordered.    4. Vitamin D insufficiency  Chronic problem.  Currently on vitamin D.    5. Fatty liver, alcoholic  Chronic problem.  Currently on vitamin E.  Likely fatty liver.  The patient has had a downtrending and liver function testing in correlation with weight loss.  Now with a BMI of 24.  As above, CMP was ordered by her vascular medicine specialist, she will get this done in July.      Follow-up: Return in about 1 year (around 5/7/2021), or Pending labs.

## 2020-05-08 NOTE — ASSESSMENT & PLAN NOTE
Chronic problem.  The patient is currently managed by Dr. Perry, vascular medicine.  The patient is currently on valsartan 160 mg, Spironolactone 25 mg.  They are continuing work-up.  She has labs that she will be doing prior to her next visit in July.

## 2020-05-08 NOTE — ASSESSMENT & PLAN NOTE
Chronic problem.  Currently on a atorvastatin 10 mg.  The patient again is followed by Dr. Perry, vascular medicine.

## 2020-05-08 NOTE — ASSESSMENT & PLAN NOTE
Chronic problem.  The patient was recently diagnosed.  She is currently on levothyroxine 50 mcg.  She notes significant improvement in her symptoms.  Her dose was recently increased, repeat TSH has been ordered which she will get done in July.  She has noticed significant improvement in her weight since starting the medication.

## 2020-06-11 ENCOUNTER — OFFICE VISIT (OUTPATIENT)
Dept: MEDICAL GROUP | Facility: MEDICAL CENTER | Age: 45
End: 2020-06-11
Payer: COMMERCIAL

## 2020-06-11 VITALS
HEART RATE: 86 BPM | BODY MASS INDEX: 23.92 KG/M2 | DIASTOLIC BLOOD PRESSURE: 70 MMHG | HEIGHT: 63 IN | OXYGEN SATURATION: 94 % | SYSTOLIC BLOOD PRESSURE: 106 MMHG | WEIGHT: 135 LBS | TEMPERATURE: 98.1 F

## 2020-06-11 DIAGNOSIS — F51.01 PRIMARY INSOMNIA: ICD-10-CM

## 2020-06-11 DIAGNOSIS — F41.9 ANXIETY: ICD-10-CM

## 2020-06-11 DIAGNOSIS — F33.0 MILD EPISODE OF RECURRENT MAJOR DEPRESSIVE DISORDER (HCC): ICD-10-CM

## 2020-06-11 PROCEDURE — 99214 OFFICE O/P EST MOD 30 MIN: CPT | Performed by: FAMILY MEDICINE

## 2020-06-11 RX ORDER — BUSPIRONE HYDROCHLORIDE 10 MG/1
TABLET ORAL
Qty: 60 TAB | Refills: 1 | Status: SHIPPED
Start: 2020-06-11 | End: 2020-08-13

## 2020-06-11 RX ORDER — TRAZODONE HYDROCHLORIDE 50 MG/1
50-100 TABLET ORAL
Qty: 30 TAB | Refills: 0 | Status: SHIPPED | OUTPATIENT
Start: 2020-06-11 | End: 2021-08-06

## 2020-06-11 ASSESSMENT — ANXIETY QUESTIONNAIRES
7. FEELING AFRAID AS IF SOMETHING AWFUL MIGHT HAPPEN: NEARLY EVERY DAY
4. TROUBLE RELAXING: MORE THAN HALF THE DAYS
3. WORRYING TOO MUCH ABOUT DIFFERENT THINGS: MORE THAN HALF THE DAYS
5. BEING SO RESTLESS THAT IT IS HARD TO SIT STILL: NOT AT ALL
1. FEELING NERVOUS, ANXIOUS, OR ON EDGE: MORE THAN HALF THE DAYS
6. BECOMING EASILY ANNOYED OR IRRITABLE: MORE THAN HALF THE DAYS
2. NOT BEING ABLE TO STOP OR CONTROL WORRYING: MORE THAN HALF THE DAYS
GAD7 TOTAL SCORE: 13

## 2020-06-11 ASSESSMENT — PATIENT HEALTH QUESTIONNAIRE - PHQ9
CLINICAL INTERPRETATION OF PHQ2 SCORE: 2
5. POOR APPETITE OR OVEREATING: 3 - NEARLY EVERY DAY
SUM OF ALL RESPONSES TO PHQ QUESTIONS 1-9: 16

## 2020-06-11 ASSESSMENT — FIBROSIS 4 INDEX: FIB4 SCORE: 1.6

## 2020-06-11 NOTE — ASSESSMENT & PLAN NOTE
The patient does report a significant amount of stress recently.   Feels situational.   Not sleeping well  Generally sleeps from 8pm to midnight, then doesn't fall asleep again until early in the morning.

## 2020-06-11 NOTE — ASSESSMENT & PLAN NOTE
Chronic problem. The patient has struggled with anxiety for about 15 yrs now. Has not been on meds for the past 4 meds.   Meditation, weighted blanket, relaxing teas.   Has been on a daily medication in the past.

## 2020-06-12 NOTE — PROGRESS NOTES
Subjective:     CC: Diagnoses of Anxiety, Mild episode of recurrent major depressive disorder (HCC), and Primary insomnia were pertinent to this visit.    HPI: Patient is a 45 y.o. female established patient who presents today with the following concerns.       Anxiety  Chronic problem. The patient has struggled with anxiety for about 15 yrs now. Has not been on meds for the past 4 meds. She is unsure what meds she was on in the past. She does know that she had significant weight gain.   She uses Meditation, weighted blanket, relaxing teas to help.   She has a very difficult time sleeping.   She has a significant amount of stress at home. Marital strife.       Mild episode of recurrent major depressive disorder (HCC)  The patient does report a significant amount of stress recently which is leading to her depression. Mostly marital strife.    Feels situational.   Not sleeping well  Generally sleeps from 8pm to midnight, then doesn't fall asleep again until early in the morning.   She is drinking rather heavily, about 1 bottle of wine every evening.         Past Medical History:   Diagnosis Date   • Anxiety    • Depression    • Gynecological disorder    • High cholesterol    • Hypertension    • Snoring    • Urinary tract infection        Social History     Tobacco Use   • Smoking status: Never Smoker   • Smokeless tobacco: Never Used   Substance Use Topics   • Alcohol use: Yes     Alcohol/week: 8.4 oz     Types: 14 Glasses of wine per week     Frequency: 4 or more times a week     Drinks per session: 1 or 2     Binge frequency: Never     Comment: 1-2 glasses per day   • Drug use: No       Current Outpatient Medications Ordered in Epic   Medication Sig Dispense Refill   • traZODone (DESYREL) 50 MG Tab Take 1-2 Tabs by mouth every bedtime. 30 Tab 0   • busPIRone (BUSPAR) 10 MG Tab tablet Take 1/2 tab po BID x 1 week then increase to 10mg BID there after. 60 Tab 1   • spironolactone (ALDACTONE) 25 MG Tab TAKE ONE TABLET  "BY MOUTH EVERY DAY 30 Tab 11   • valsartan (DIOVAN) 160 MG Tab TAKE ONE TABLET BY MOUTH EVERY DAY 30 Tab 11   • levothyroxine (SYNTHROID) 50 MCG Tab Take 1 Tab by mouth Every morning on an empty stomach. 30 Tab 11   • Omega-3 300 MG Cap Take  by mouth.     • vitamin e (VITAMIN E) 400 UNIT Cap Take 400 Units by mouth every day.     • cetirizine (ZYRTEC) 10 MG Tab Take 10 mg by mouth every day.     • atorvastatin (LIPITOR) 10 MG Tab Take 1 Tab by mouth every day. 30 Tab 11   • Cholecalciferol (VITAMIN D3) 2000 units Chew Tab Take 1 Cap by mouth every day. 30 Tab 11   • Multiple Vitamins-Minerals (QC WOMENS DAILY MULTIVITAMIN PO) Take 1 Tab by mouth every morning.       No current [facility]-ordered facility-administered medications on file.        Allergies:  Sulfa drugs    ROS:  Pulm: no sob, no cough  CV: no chest pain, no palpitations        Objective:       Exam:  /70 (BP Location: Right arm, Patient Position: Sitting, BP Cuff Size: Adult long)   Pulse 86   Temp 36.7 °C (98.1 °F) (Temporal)   Ht 1.6 m (5' 3\")   Wt 61.2 kg (135 lb)   SpO2 94%   BMI 23.91 kg/m²  Body mass index is 23.91 kg/m².    General: Normal appearing. No distress.  HEAD: NCAT  EYES: conjunctiva clear, lids without ptosis, pupils equal and reactive to light  EARS: ears normal shape and contour.  MOUTH: normal dentition   Neck:  Normal ROM  Pulmonary: CTAB, no W/R/R. Normal effort. Normal respiratory rate.  Cardiovascular: RRR, no M/R/G. Well perfused. No LE edema  Neurologic: Grossly normal, no focal deficits  Skin: Warm and dry.  No obvious lesions.  Musculoskeletal: Normal gait and station.   Psych: Visibly anxious throughout exam. Normal mood and affect. Alert and oriented x3. Judgment and insight is normal.        Assessment & Plan:     45 y.o. female with the following -     1. Anxiety  New to discuss. Patient struggling with anxiety, insomnia and depression. Attending marital therapy but no person therapy currently. Referral placed " to Dr. Avilez for counseling, especially helpful for sleep. Plan to start buspar.   - REFERRAL TO PSYCHOLOGY  - busPIRone (BUSPAR) 10 MG Tab tablet; Take 1/2 tab po BID x 1 week then increase to 10mg BID there after.  Dispense: 60 Tab; Refill: 1    2. Mild episode of recurrent major depressive disorder (HCC)  New problem. See above for plan.   - REFERRAL TO PSYCHOLOGY    3. Primary insomnia  New problem. Referral placed to Dr. AVILEZ. Rx given for trazodone.   - REFERRAL TO PSYCHOLOGY  - traZODone (DESYREL) 50 MG Tab; Take 1-2 Tabs by mouth every bedtime.  Dispense: 30 Tab; Refill: 0    4. Alcohol Abuse  New problem. Patient has been drinking a bottle of wine per night for the past 6 months or so. We discussed the absolute need to cut back and stop entirely. She is willing to go to therapy. She will try to cut back. Plan to f/u in 1  Month.     Return in about 4 weeks (around 7/9/2020).    Please note that this dictation was created using voice recognition software. I have made every reasonable attempt to correct obvious errors, but I expect that there are errors of grammar and possibly content that I did not discover before finalizing the note.

## 2020-07-08 ENCOUNTER — TELEPHONE (OUTPATIENT)
Dept: VASCULAR LAB | Facility: MEDICAL CENTER | Age: 45
End: 2020-07-08

## 2020-07-08 DIAGNOSIS — I10 ESSENTIAL HYPERTENSION: ICD-10-CM

## 2020-07-08 RX ORDER — VALSARTAN 160 MG/1
160 TABLET ORAL
Qty: 30 TAB | Refills: 11 | OUTPATIENT
Start: 2020-07-08

## 2020-07-08 RX ORDER — OLMESARTAN MEDOXOMIL 20 MG/1
20 TABLET ORAL DAILY
Qty: 90 TAB | Refills: 3 | Status: SHIPPED
Start: 2020-07-08 | End: 2020-07-22

## 2020-07-08 NOTE — PROGRESS NOTES
Pt requesting change from valsartan due to low availability.  Start olmesartan 20mg.    Sophia AHN  [facility]

## 2020-07-08 NOTE — TELEPHONE ENCOUNTER
LM for pt letting her know we have sent in olmesartan due to Valsartan being out of stock sent to [facility]

## 2020-07-08 NOTE — TELEPHONE ENCOUNTER
"Pt lft vm requesting a different medication because  valsartan has been out of stock and she has not taken any for a month now because they cant get it from the  and her blood pressure has been running \"pretty high\"   "

## 2020-07-14 ENCOUNTER — HOSPITAL ENCOUNTER (OUTPATIENT)
Dept: LAB | Facility: MEDICAL CENTER | Age: 45
End: 2020-07-14
Attending: INTERNAL MEDICINE
Payer: COMMERCIAL

## 2020-07-14 DIAGNOSIS — E03.9 ACQUIRED HYPOTHYROIDISM: ICD-10-CM

## 2020-07-14 DIAGNOSIS — E78.5 DYSLIPIDEMIA: ICD-10-CM

## 2020-07-14 DIAGNOSIS — I10 ESSENTIAL HYPERTENSION: ICD-10-CM

## 2020-07-14 LAB
ALBUMIN SERPL BCP-MCNC: 5 G/DL (ref 3.2–4.9)
ALBUMIN/GLOB SERPL: 1.9 G/DL
ALP SERPL-CCNC: 72 U/L (ref 30–99)
ALT SERPL-CCNC: 57 U/L (ref 2–50)
ANION GAP SERPL CALC-SCNC: 15 MMOL/L (ref 7–16)
AST SERPL-CCNC: 65 U/L (ref 12–45)
BILIRUB SERPL-MCNC: 0.9 MG/DL (ref 0.1–1.5)
BUN SERPL-MCNC: 10 MG/DL (ref 8–22)
CALCIUM SERPL-MCNC: 10 MG/DL (ref 8.5–10.5)
CHLORIDE SERPL-SCNC: 99 MMOL/L (ref 96–112)
CHOLEST SERPL-MCNC: 214 MG/DL (ref 100–199)
CO2 SERPL-SCNC: 22 MMOL/L (ref 20–33)
CREAT SERPL-MCNC: 0.75 MG/DL (ref 0.5–1.4)
CREAT UR-MCNC: 230.19 MG/DL
FASTING STATUS PATIENT QL REPORTED: NORMAL
GLOBULIN SER CALC-MCNC: 2.6 G/DL (ref 1.9–3.5)
GLUCOSE SERPL-MCNC: 99 MG/DL (ref 65–99)
HDLC SERPL-MCNC: 109 MG/DL
LDLC SERPL CALC-MCNC: 90 MG/DL
MICROALBUMIN UR-MCNC: <1.2 MG/DL
MICROALBUMIN/CREAT UR: NORMAL MG/G (ref 0–30)
POTASSIUM SERPL-SCNC: 4.3 MMOL/L (ref 3.6–5.5)
PROT SERPL-MCNC: 7.6 G/DL (ref 6–8.2)
SODIUM SERPL-SCNC: 136 MMOL/L (ref 135–145)
TRIGL SERPL-MCNC: 75 MG/DL (ref 0–149)
TSH SERPL DL<=0.005 MIU/L-ACNC: 6.04 UIU/ML (ref 0.38–5.33)

## 2020-07-14 PROCEDURE — 82088 ASSAY OF ALDOSTERONE: CPT

## 2020-07-14 PROCEDURE — 84244 ASSAY OF RENIN: CPT

## 2020-07-14 PROCEDURE — 84443 ASSAY THYROID STIM HORMONE: CPT

## 2020-07-14 PROCEDURE — 80053 COMPREHEN METABOLIC PANEL: CPT

## 2020-07-14 PROCEDURE — 80061 LIPID PANEL: CPT

## 2020-07-14 PROCEDURE — 36415 COLL VENOUS BLD VENIPUNCTURE: CPT

## 2020-07-14 PROCEDURE — 82043 UR ALBUMIN QUANTITATIVE: CPT

## 2020-07-14 PROCEDURE — 83835 ASSAY OF METANEPHRINES: CPT

## 2020-07-14 PROCEDURE — 82570 ASSAY OF URINE CREATININE: CPT

## 2020-07-17 LAB — ALDOST SERPL-MCNC: 44.5 NG/DL

## 2020-07-18 LAB
METANEPHS SERPL-SCNC: 0.16 NMOL/L (ref 0–0.49)
NORMETANEPHRINE SERPL-SCNC: 0.63 NMOL/L (ref 0–0.89)
RENIN PLAS-CCNC: 9.5 NG/ML/HR

## 2020-07-22 ENCOUNTER — OFFICE VISIT (OUTPATIENT)
Dept: VASCULAR LAB | Facility: MEDICAL CENTER | Age: 45
End: 2020-07-22
Attending: INTERNAL MEDICINE
Payer: COMMERCIAL

## 2020-07-22 DIAGNOSIS — E03.9 HYPOTHYROIDISM, UNSPECIFIED TYPE: ICD-10-CM

## 2020-07-22 DIAGNOSIS — E78.5 DYSLIPIDEMIA: ICD-10-CM

## 2020-07-22 DIAGNOSIS — I10 ESSENTIAL HYPERTENSION: ICD-10-CM

## 2020-07-22 PROCEDURE — 99213 OFFICE O/P EST LOW 20 MIN: CPT | Mod: 95,CR | Performed by: INTERNAL MEDICINE

## 2020-07-22 RX ORDER — OLMESARTAN MEDOXOMIL 40 MG/1
40 TABLET ORAL DAILY
Qty: 90 TAB | Refills: 3 | Status: SHIPPED | OUTPATIENT
Start: 2020-07-22 | End: 2021-08-06

## 2020-07-22 RX ORDER — LEVOTHYROXINE SODIUM 0.05 MG/1
50 TABLET ORAL
Qty: 90 TAB | Refills: 3 | Status: SHIPPED | OUTPATIENT
Start: 2020-07-22 | End: 2022-07-24

## 2020-07-22 RX ORDER — OLMESARTAN MEDOXOMIL 40 MG/1
40 TABLET ORAL DAILY
Qty: 30 TAB | Refills: 11 | Status: SHIPPED | OUTPATIENT
Start: 2020-07-22 | End: 2022-12-20

## 2020-07-22 RX ORDER — LEVOTHYROXINE SODIUM 0.05 MG/1
50 TABLET ORAL
Qty: 30 TAB | Refills: 11
Start: 2020-07-22 | End: 2020-07-22

## 2020-07-22 ASSESSMENT — ENCOUNTER SYMPTOMS
WHEEZING: 0
SHORTNESS OF BREATH: 0
PALPITATIONS: 0
MYALGIAS: 0
HEADACHES: 0
FOCAL WEAKNESS: 0
DEPRESSION: 0
WEIGHT LOSS: 0
NERVOUS/ANXIOUS: 0
COUGH: 0
CLAUDICATION: 0

## 2020-07-22 NOTE — PROGRESS NOTES
FOLLOW-UP VASCULAR VISIT  Subjective:   Minerva Tillman is a 44 y.o. female who presents today 7-22-20 for vascular followup    Visit completed via Facetime due to restrictions of COVID-19 pandemic.  All issues as below were discussed and addressed by no physical exam was performed unless allowed by visual confirmation on Facetime.  If it was felt that patient should be evaluated in the clinic, there were directed there.  Patient verbally consented to Facetime tele-video visit.     HPI:  Patient seen for f/u of high bp, hypothyroidism and hypercholesterolemia, renetta and low k  Now on losartan nstead of valsartan  Remains on spironolactone  Had a break in therapy due to issues with pharmacy  Had high BP and n/v with vertigo when off meds  Now back on meds her symptoms have resolved  DBPs are in 90s-100s  No myalgias on atorva  On vit D  No cv complaints  Better energy on increased levo - feels great  Less snoring and daytime somnulence with weight loss and decreased etoh  Decided against sleep study    DIET AND EXERCISE:  Weight Change: down about 20 pounds and maintained or lost a bit more  Diet: common adult, relatively heart healthy; increased fruits/veggies and lean meats  Exercise: moderate regular exercise program - every day  Less alcohol    Review of Systems   Constitutional: Negative for malaise/fatigue and weight loss.   Respiratory: Negative for cough, shortness of breath and wheezing.    Cardiovascular: Negative for chest pain, palpitations, claudication and leg swelling.   Musculoskeletal: Negative for joint pain and myalgias.   Neurological: Negative for focal weakness and headaches.   Psychiatric/Behavioral: Negative for depression. The patient is not nervous/anxious.       Objective:     There were no vitals filed for this visit.   There is no height or weight on file to calculate BMI.  Physical Exam   Constitutional: She is oriented to person, place, and time. No distress.   HENT:   Head:  Normocephalic and atraumatic.   Eyes: Conjunctivae and EOM are normal. No scleral icterus.   Pulmonary/Chest: Effort normal.   Neurological: She is alert and oriented to person, place, and time. No cranial nerve deficit. Coordination normal.   Skin: No rash noted. She is not diaphoretic. No pallor.   Psychiatric: She has a normal mood and affect. Her behavior is normal.     Lab Results   Component Value Date    CHOLSTRLTOT 214 (H) 07/14/2020    LDL 90 07/14/2020     07/14/2020    TRIGLYCERIDE 75 07/14/2020           Lab Results   Component Value Date    SODIUM 136 07/14/2020    POTASSIUM 4.3 07/14/2020    CHLORIDE 99 07/14/2020    CO2 22 07/14/2020    GLUCOSE 99 07/14/2020    BUN 10 07/14/2020    CREATININE 0.75 07/14/2020    IFAFRICA >60 07/14/2020    IFNOTAFR >60 07/14/2020      Lab Results   Component Value Date    ALDOSTERONE 44.5 07/14/2020      Lab Results   Component Value Date    WBC 7.2 11/06/2019    RBC 4.09 (L) 11/06/2019    HEMOGLOBIN 15.0 11/06/2019    HEMATOCRIT 43.1 11/06/2019    .4 (H) 11/06/2019    MCH 36.7 (H) 11/06/2019    MCHC 34.8 11/06/2019    MPV 9.6 11/06/2019      EKG 9/24/2015- unremarkable; no lvh    Coronary Calcium Score (april 2017) - Zero    RUQ sono sep 2019:  1.  Increased hepatic echotexture is consistent with fatty infiltration.  2.  No sonographic evidence for biliary obstruction  3.  Multiple hepatic cysts    ABPM oct 2019:  Mean daytime: 112/84 but with considerable variability  Nocturnal dip: Present - if anything exaggerated.    Medical Decision Making:  Today's Assessment / Status / Plan:     1. Essential hypertension     2. Hypothyroidism, unspecified type     3. Dyslipidemia                                                                                        Patient Type: Primary Prevention    Etiology of Established CVD if Present: None    Lipid Management: Qualifies for Statin Therapy Based on 2018 ACC/AHA Guidelines: no  Calculated 10-Year Risk of ASCVD:  1%  Currently on Statin: No  Lp(a) normal  Cor calcium score = 0  Baseline LDL high  CRP intermediate  Despite cor calcium score, lifetime ASCVD risk remains elevated  Possible statin induced myalgias, but sounds more like muscle cramps - reasonable on decreased dose  LDL and nonHDL under reasonable control;   Trigs much improved  Plan:  - continue atorva 10 mg daily for now  - continue vit d 5000 IU daily to help tolerability  - continue intensified tlc to treat trigs  - Recheck fasting lipid panel in 6-12 months    Blood Pressure Management:  ACC/AHA goal BP <130/80  Previous ABPM with good control on valsartan-amlodipine  Had leg swelling on amlodipine  Her home bp monitor appears to be accurate  Diastolics high at home,   BP went up considerably with break in therapy  Plasma mets normal  Alphonse/PRA c/w secondary aldosteronism  Diastolic control still no optimal likely due to fairly weak ARB  Has significant variability on abpm with exaggerated nocturnal dip  She does have a family history so I suspect primary hypertension.  Hypokalemia persistent on hctz - now changed to cherelle  No microalbuminuria  Limited NSAIDs  She was not previously interested in device trial  Plan:  -Change losartan to olmesartan 40 mg a day  - continue spironolactone 25 mg daily  - Continue to limit NSAIDs to minimal effective dose  - continue to limit etoh  - continue home blood pressure monitoring  - screen for renetta per below  - consider repeat abpm in future    Glycemic Status: Normal   - continue lifestyle modification    Anti-Platelet/Anti-Coagulant Tx: not indicated    Smoking: Continue complete avoidance    Physical Activity: Continue improved exercise regimen    Weight Management and Nutrition: maintain weight loss    Other:    1.  Hypothyroidism -asymptomatic but TSH consistent with suboptimal control.  Increase levothyroxine to 7 days a week.  Recheck tsh in a couple of months.      2.  High LFTs - likey fatty liver, and u/s c/w  fatty liver, but will defer any further diagnostic evaluation to pcp. <3xULN so not a contraindication to statin use. recheck cmp in a few months. Limit etoh     3.  Possible CLOTILDE - previously snored and with daytime somnulence - but improved with weight loss. Normal dipping status on abpm speaks against significant clotilde. Agree with deferring sleep study at present    Instructed to follow-up with PCP for remainder of adult medical needs: yes  We will partner with other providers in the management of established vascular disease and cardiometabolic risk factors.    Studies to Be Obtained: none  Labs to Be Obtained: None currently    Follow up in: 6 weeks    Michael J Bloch, M.D.    CC:  Romelia Dasilva MD

## 2020-08-03 ENCOUNTER — TELEPHONE (OUTPATIENT)
Dept: VASCULAR LAB | Facility: MEDICAL CENTER | Age: 45
End: 2020-08-03

## 2020-08-03 DIAGNOSIS — I10 ESSENTIAL HYPERTENSION: ICD-10-CM

## 2020-08-03 RX ORDER — SPIRONOLACTONE 25 MG/1
25 TABLET ORAL 2 TIMES DAILY
Qty: 60 TAB | Refills: 11
Start: 2020-08-03 | End: 2021-08-06

## 2020-08-04 ENCOUNTER — TELEPHONE (OUTPATIENT)
Dept: VASCULAR LAB | Facility: MEDICAL CENTER | Age: 45
End: 2020-08-04

## 2020-08-04 NOTE — TELEPHONE ENCOUNTER
Received notification of her  that her blood pressures been elevated    Will increase spironolactone to twice daily for now.  Continue olmesartan.    Her blood pressure elevations have been associated with headache and some nausea and vomiting.    She may be having an element of migraine.  We may want to consider a novel beta-blocker like bystolic if above is not effective    She should report back with her blood pressures in 7 to 10 days    MA to discuss with patient    Michael Bloch, MD  [facility].

## 2020-08-04 NOTE — TELEPHONE ENCOUNTER
LM instructing pt to increase her spironolactone to 2 times daily.  She can continue the olmesartan.  Call us in 7 to 10 days and let us know how her blood pressure is doing.

## 2020-08-13 ENCOUNTER — OFFICE VISIT (OUTPATIENT)
Dept: MEDICAL GROUP | Facility: MEDICAL CENTER | Age: 45
End: 2020-08-13
Payer: COMMERCIAL

## 2020-08-13 VITALS
BODY MASS INDEX: 23.74 KG/M2 | OXYGEN SATURATION: 95 % | HEIGHT: 63 IN | WEIGHT: 134 LBS | SYSTOLIC BLOOD PRESSURE: 118 MMHG | DIASTOLIC BLOOD PRESSURE: 64 MMHG | HEART RATE: 104 BPM | TEMPERATURE: 97.5 F

## 2020-08-13 DIAGNOSIS — G43.109 MIGRAINE WITH AURA AND WITHOUT STATUS MIGRAINOSUS, NOT INTRACTABLE: ICD-10-CM

## 2020-08-13 DIAGNOSIS — F41.9 ANXIETY: ICD-10-CM

## 2020-08-13 DIAGNOSIS — H91.92 DECREASED HEARING, LEFT: ICD-10-CM

## 2020-08-13 PROCEDURE — 99214 OFFICE O/P EST MOD 30 MIN: CPT | Performed by: FAMILY MEDICINE

## 2020-08-13 RX ORDER — ONDANSETRON 4 MG/1
4-8 TABLET, ORALLY DISINTEGRATING ORAL EVERY 6 HOURS PRN
Qty: 10 TAB | Refills: 1 | Status: SHIPPED
Start: 2020-08-13 | End: 2021-08-06

## 2020-08-13 RX ORDER — SUMATRIPTAN 25 MG/1
25-100 TABLET, FILM COATED ORAL
COMMUNITY
End: 2021-05-25

## 2020-08-13 RX ORDER — FLUTICASONE PROPIONATE 50 MCG
1 SPRAY, SUSPENSION (ML) NASAL 2 TIMES DAILY
Qty: 16 G | Refills: 1 | Status: SHIPPED
Start: 2020-08-13 | End: 2021-08-06

## 2020-08-13 ASSESSMENT — FIBROSIS 4 INDEX: FIB4 SCORE: 1.52

## 2020-08-13 NOTE — ASSESSMENT & PLAN NOTE
Woke up int he mornign with the symptoms  Very dizzy  Extreme pain  Vomiting  Does have imitrex at home, but took too late  Has only had 5 total but has had 2 int he past month.   Had tingling

## 2020-08-13 NOTE — PROGRESS NOTES
Subjective:     CC: Diagnoses of Decreased hearing, left, Migraine with aura and without status migrainosus, not intractable, and Anxiety were pertinent to this visit.    HPI: Patient is a 45 y.o. female established patient who presents today to discuss the following.       Decreased hearing, left  New problem. Started about 2 week sago. Has also had some pain and pressure in the face on that side.  Denies any fevers or chills.  Denies any dizziness aside from when she got her to severe migraines.    Migraine with aura and without status migrainosus, not intractable  Chronic problem.  The patient states that she has had 2 severe migraines in the past couple weeks.  Woke up in the morning with the symptoms.  She reports extreme dizziness, severe pain, intractable vomiting.  She does have Imitrex at home but took them too late.  The patient states she is only ever had 5 migraines in her life.  The last one was a few years ago.  She did note tingling of the extremities during her migraine as well.  She had severe light and sound sensitivity.  She has had an endometrial ablation so no longer has vaginal bleeding, however, does think that she may be perimenopausal as she has been experiencing some hot flashes.    Anxiety  Chronic problem. Seeing Dr. Diaz now. Notes some improvement. Not taking the buspar. Cut back on drinking significantly.       Past Medical History:   Diagnosis Date   • Anxiety    • Depression    • Gynecological disorder    • High cholesterol    • Hypertension    • Snoring    • Urinary tract infection        Social History     Tobacco Use   • Smoking status: Never Smoker   • Smokeless tobacco: Never Used   Substance Use Topics   • Alcohol use: Yes     Alcohol/week: 8.4 oz     Types: 14 Glasses of wine per week     Frequency: 4 or more times a week     Drinks per session: 1 or 2     Binge frequency: Never     Comment: 1-2 glasses per day   • Drug use: No       Current Outpatient Medications Ordered  "in Epic   Medication Sig Dispense Refill   • SUMAtriptan (IMITREX) 25 MG Tab tablet Take  mg by mouth Once PRN for Migraine.     • ondansetron (ZOFRAN ODT) 4 MG TABLET DISPERSIBLE Take 1-2 Tabs by mouth every 6 hours as needed for Nausea. 10 Tab 1   • fluticasone (FLONASE) 50 MCG/ACT nasal spray Spray 1 Spray in nose 2 times a day. 16 g 1   • spironolactone (ALDACTONE) 25 MG Tab Take 1 Tab by mouth 2 times a day. 60 Tab 11   • levothyroxine (SYNTHROID) 50 MCG Tab Take 1 Tab by mouth Every morning on an empty stomach. 90 Tab 3   • olmesartan (BENICAR) 40 MG Tab Take 1 Tab by mouth every day. 90 Tab 3   • Omega-3 300 MG Cap Take  by mouth.     • vitamin e (VITAMIN E) 400 UNIT Cap Take 400 Units by mouth every day.     • cetirizine (ZYRTEC) 10 MG Tab Take 10 mg by mouth every day.     • atorvastatin (LIPITOR) 10 MG Tab Take 1 Tab by mouth every day. 30 Tab 11   • Cholecalciferol (VITAMIN D3) 2000 units Chew Tab Take 1 Cap by mouth every day. 30 Tab 11   • Multiple Vitamins-Minerals (QC WOMENS DAILY MULTIVITAMIN PO) Take 1 Tab by mouth every morning.     • traZODone (DESYREL) 50 MG Tab Take 1-2 Tabs by mouth every bedtime. (Patient not taking: Reported on 8/13/2020) 30 Tab 0     No current Epic-ordered facility-administered medications on file.        Allergies:  Sulfa drugs    Health Maintenance: Completed    ROS:  Pulm: no sob, no cough  CV: no chest pain, no palpitations        Objective:       Exam:  /64 (BP Location: Right arm, Patient Position: Sitting, BP Cuff Size: Adult long)   Pulse (!) 104   Temp 36.4 °C (97.5 °F) (Temporal)   Ht 1.6 m (5' 3\")   Wt 60.8 kg (134 lb)   SpO2 95%   BMI 23.74 kg/m²  Body mass index is 23.74 kg/m².    General: Normal appearing. No distress.  HEAD: NCAT  EYES: conjunctiva clear, lids without ptosis, pupils equal and reactive to light  EARS: ears normal shape and contour.  MOUTH: normal dentition   Neck:  Normal ROM  Pulmonary: Clear to auscultation bilaterally, no " wheezes rales or rhonchi.  Normal effort. Normal respiratory rate.  Cardiovascular: Regular rate and rhythm, no murmurs rubs or gallops.  Well perfused. No LE edema  Neurologic: Grossly normal, no focal deficits  Skin: Warm and dry.  No obvious lesions.  Musculoskeletal: Normal gait and station.   Psych: Normal mood and affect. Alert and oriented x3. Judgment and insight is normal.      Labs: 7/14/2020 results reviewed and discussed with the patient, questions answered.    Assessment & Plan:     45 y.o. female with the following -     1. Decreased hearing, left  New problem.  Given that the patient notes some pressure and pain in the left maxillary sinuses I believe her decreased hearing is likely due to eustachian tube dysfunction.  Advised to try Flonase for the next couple weeks.  If no improvement, advised to please follow-up and we can consider formal hearing evaluation.  - fluticasone (FLONASE) 50 MCG/ACT nasal spray; Spray 1 Spray in nose 2 times a day.  Dispense: 16 g; Refill: 1    2. Migraine with aura and without status migrainosus, not intractable  New to discuss.  The patient reports 2 severe migraines over the past couple weeks.  I did give her prescription for Zofran.  She has Imitrex at home.  I advised when she first notices the migraine to take Zofran, Imitrex, Benadryl and Aleve or Advil.  If her symptoms are as severe the next go around I advised that she could come in for a Toradol shot.  - ondansetron (ZOFRAN ODT) 4 MG TABLET DISPERSIBLE; Take 1-2 Tabs by mouth every 6 hours as needed for Nausea.  Dispense: 10 Tab; Refill: 1    3. Anxiety  Chronic problem, the patient does note some improvement since starting personal therapy.  She did not end up trying the BuSpar.  She has been cutting down on her alcohol.      Return if symptoms worsen or fail to improve.    Please note that this dictation was created using voice recognition software. I have made every reasonable attempt to correct obvious  errors, but I expect that there are errors of grammar and possibly content that I did not discover before finalizing the note.

## 2020-08-13 NOTE — ASSESSMENT & PLAN NOTE
Chronic problem. Seeing Dr. Diaz now. Notes some improvement. Not taking the buspar. Cut back significantly.

## 2020-08-14 ENCOUNTER — TELEPHONE (OUTPATIENT)
Dept: VASCULAR LAB | Facility: MEDICAL CENTER | Age: 45
End: 2020-08-14

## 2020-08-14 NOTE — TELEPHONE ENCOUNTER
Patient called in to let us know that since the medication change that Dr. Bloch made, she is doing very well and her bp is doing great.

## 2020-09-17 ENCOUNTER — OFFICE VISIT (OUTPATIENT)
Dept: VASCULAR LAB | Facility: MEDICAL CENTER | Age: 45
End: 2020-09-17
Attending: INTERNAL MEDICINE
Payer: COMMERCIAL

## 2020-09-17 DIAGNOSIS — G47.00 INSOMNIA, UNSPECIFIED TYPE: ICD-10-CM

## 2020-09-17 DIAGNOSIS — G47.33 OSA (OBSTRUCTIVE SLEEP APNEA): ICD-10-CM

## 2020-09-17 DIAGNOSIS — E03.9 HYPOTHYROIDISM, UNSPECIFIED TYPE: ICD-10-CM

## 2020-09-17 PROCEDURE — 99213 OFFICE O/P EST LOW 20 MIN: CPT | Mod: 95,CR | Performed by: INTERNAL MEDICINE

## 2020-09-17 ASSESSMENT — ENCOUNTER SYMPTOMS
WEIGHT LOSS: 0
WHEEZING: 0
PALPITATIONS: 0
MYALGIAS: 0
DEPRESSION: 0
COUGH: 0
FOCAL WEAKNESS: 0
HEADACHES: 0
NERVOUS/ANXIOUS: 0
SHORTNESS OF BREATH: 0
CLAUDICATION: 0

## 2020-09-17 NOTE — PROGRESS NOTES
FOLLOW-UP VASCULAR VISIT  Subjective:   Minerva Tillman is a 44 y.o. female who presents today 9-17-20 for vascular followup    Visit completed via Facetime due to restrictions of COVID-19 pandemic.  All issues as below were discussed and addressed by no physical exam was performed unless allowed by visual confirmation on Facetime.  If it was felt that patient should be evaluated in the clinic, there were directed there.  Patient verbally consented to Facetime tele-video visit.     HPI:  Patient seen for f/u of high bp, hypothyroidism and hypercholesterolemia, renetta and low k  Has diagnosed with migraine - started on triptan prn.   Now on olmesartan   Remains on spironolactone bid  No myalgias on statin  No further vertigo  BPs mostly 100s-120s/60s  A bit of lightheadedness  On vit D  No cv complaints  Still with some fatigue    DIET AND EXERCISE:  Weight Change: down about 20 pounds and maintained or lost a bit more  Diet: common adult, relatively heart healthy; increased fruits/veggies and lean meats  Exercise: moderate regular exercise program - every day  Less alcohol    Review of Systems   Constitutional: Negative for malaise/fatigue and weight loss.   Respiratory: Negative for cough, shortness of breath and wheezing.    Cardiovascular: Negative for chest pain, palpitations, claudication and leg swelling.   Musculoskeletal: Negative for joint pain and myalgias.   Neurological: Negative for focal weakness and headaches.   Psychiatric/Behavioral: Negative for depression. The patient is not nervous/anxious.       Objective:     There were no vitals filed for this visit.   There is no height or weight on file to calculate BMI.  Physical Exam   Constitutional: She is oriented to person, place, and time. No distress.   HENT:   Head: Normocephalic and atraumatic.   Eyes: Conjunctivae and EOM are normal. No scleral icterus.   Pulmonary/Chest: Effort normal.   Neurological: She is alert and oriented to person,  place, and time. No cranial nerve deficit. Coordination normal.   Skin: No rash noted. She is not diaphoretic. No pallor.   Psychiatric: She has a normal mood and affect. Her behavior is normal.     Lab Results   Component Value Date    CHOLSTRLTOT 214 (H) 07/14/2020    LDL 90 07/14/2020     07/14/2020    TRIGLYCERIDE 75 07/14/2020           Lab Results   Component Value Date    SODIUM 136 07/14/2020    POTASSIUM 4.3 07/14/2020    CHLORIDE 99 07/14/2020    CO2 22 07/14/2020    GLUCOSE 99 07/14/2020    BUN 10 07/14/2020    CREATININE 0.75 07/14/2020    IFAFRICA >60 07/14/2020    IFNOTAFR >60 07/14/2020      Lab Results   Component Value Date    ALDOSTERONE 44.5 07/14/2020   PRA 9.5  Plasma free metanephrines normal    Lab Results   Component Value Date    WBC 7.2 11/06/2019    RBC 4.09 (L) 11/06/2019    HEMOGLOBIN 15.0 11/06/2019    HEMATOCRIT 43.1 11/06/2019    .4 (H) 11/06/2019    MCH 36.7 (H) 11/06/2019    MCHC 34.8 11/06/2019    MPV 9.6 11/06/2019      EKG 9/24/2015- unremarkable; no lvh    Coronary Calcium Score (april 2017) - Zero    RUQ sono sep 2019:  1.  Increased hepatic echotexture is consistent with fatty infiltration.  2.  No sonographic evidence for biliary obstruction  3.  Multiple hepatic cysts    ABPM oct 2019:  Mean daytime: 112/84 but with considerable variability  Nocturnal dip: Present - if anything exaggerated.    Medical Decision Making:  Today's Assessment / Status / Plan:     1. Hypothyroidism, unspecified type  TSH    THYROXINE (TOTAL)   2. Insomnia, unspecified type  REFERRAL TO PULMONARY AND SLEEP MEDICINE Sleep Medicine   3. CLOTILDE (obstructive sleep apnea)  REFERRAL TO PULMONARY AND SLEEP MEDICINE Sleep Medicine                                                                                      Patient Type: Primary Prevention    Etiology of Established CVD if Present: None    Lipid Management: Qualifies for Statin Therapy Based on 2018 ACC/AHA Guidelines: no  Calculated  10-Year Risk of ASCVD: 1%  Currently on Statin: No  Lp(a) normal  Cor calcium score = 0  Baseline LDL high  CRP intermediate  Despite cor calcium score, lifetime ASCVD risk remains elevated  Possible statin induced myalgias, but sounds more like muscle cramps - reasonable on decreased dose  LDL and nonHDL under reasonable control;   Trigs much improved  Plan:  - continue atorva 10 mg daily for now  - continue vit d 5000 IU daily to help tolerability  - continue intensified tlc to treat trigs  - Recheck fasting lipid panel in 6-12 months    Blood Pressure Management:  ACC/AHA goal BP <130/80  Previous ABPM with good control on valsartan-amlodipine  Had leg swelling on amlodipine  Her home bp monitor appears to be accurate  Diastolics previously high at home, now much improved  BP went up considerably with break in therapy  Plasma mets normal  Alphonse/PRA c/w secondary aldosteronism  Has significant variability on abpm with exaggerated nocturnal dip  She does have a family history so I suspect primary hypertension.  Hypokalemia persistent on hctz - now changed to cherelle  No microalbuminuria  Limited NSAIDs  She was not previously interested in device trial  Plan:  -Continue olmesartan 40 mg a day  - continue spironolactone 25 mg twice daily  - Continue to limit NSAIDs to minimal effective dose  - continue to limit etoh  - continue home blood pressure monitoring  - screen for renetta per below  - consider repeat abpm in future    Glycemic Status: Normal   - continue lifestyle modification    Anti-Platelet/Anti-Coagulant Tx: not indicated    Smoking: Continue complete avoidance    Physical Activity: Continue improved exercise regimen    Weight Management and Nutrition: maintain weight loss    Other:    1.  Hypothyroidism -TSH consistent with suboptimal control.  Perhaps symptomatic increase levothyroxine to 8 pills a week.  Recheck tsh in a couple of months.      2.  High LFTs - likey fatty liver, and u/s c/w fatty liver, but  will defer any further diagnostic evaluation to pcp. <3xULN so not a contraindication to statin use. recheck cmp in a few months. Limit etoh     3.  Possible CLOTILDE -intermittent snoring and with daytime somnulence - but improved with weight loss.  Also seems to have some insomnia normal dipping status on abpm speaks against significant clotilde.  Will refer to sleep medicine    4.  Migraine headache -recently diagnosed.  Continue as needed triptan.  Otherwise defer management to PCP    Instructed to follow-up with PCP for remainder of adult medical needs: yes  We will partner with other providers in the management of established vascular disease and cardiometabolic risk factors.    Studies to Be Obtained: none  Labs to Be Obtained: TSH and T4 prior to next visit    Follow up in: 3 months    Michael J Bloch, M.D.    CC:  CHRISTIANA Young

## 2021-01-15 ENCOUNTER — HOSPITAL ENCOUNTER (OUTPATIENT)
Dept: LAB | Facility: MEDICAL CENTER | Age: 46
End: 2021-01-15
Attending: COLON & RECTAL SURGERY
Payer: COMMERCIAL

## 2021-01-15 PROCEDURE — 36415 COLL VENOUS BLD VENIPUNCTURE: CPT

## 2021-01-15 PROCEDURE — 86769 SARS-COV-2 COVID-19 ANTIBODY: CPT

## 2021-01-16 LAB — SARS-COV-2 AB SERPL QL IA: NORMAL

## 2021-05-25 ENCOUNTER — APPOINTMENT (OUTPATIENT)
Dept: RADIOLOGY | Facility: MEDICAL CENTER | Age: 46
End: 2021-05-25
Attending: EMERGENCY MEDICINE
Payer: COMMERCIAL

## 2021-05-25 ENCOUNTER — HOSPITAL ENCOUNTER (OUTPATIENT)
Facility: MEDICAL CENTER | Age: 46
End: 2021-05-26
Attending: EMERGENCY MEDICINE | Admitting: INTERNAL MEDICINE
Payer: COMMERCIAL

## 2021-05-25 DIAGNOSIS — R41.82 ALTERED MENTAL STATUS, UNSPECIFIED ALTERED MENTAL STATUS TYPE: ICD-10-CM

## 2021-05-25 DIAGNOSIS — R55 SYNCOPE, UNSPECIFIED SYNCOPE TYPE: ICD-10-CM

## 2021-05-25 DIAGNOSIS — S09.90XA CLOSED HEAD INJURY, INITIAL ENCOUNTER: ICD-10-CM

## 2021-05-25 PROBLEM — F10.929 ACUTE ALCOHOL INTOXICATION (HCC): Status: ACTIVE | Noted: 2021-05-25

## 2021-05-25 PROBLEM — R47.1 DYSARTHRIA: Status: ACTIVE | Noted: 2021-05-25

## 2021-05-25 LAB
ABO + RH BLD: NORMAL
ABO GROUP BLD: NORMAL
ALBUMIN SERPL BCP-MCNC: 4.8 G/DL (ref 3.2–4.9)
ALBUMIN/GLOB SERPL: 1.8 G/DL
ALP SERPL-CCNC: 101 U/L (ref 30–99)
ALT SERPL-CCNC: 74 U/L (ref 2–50)
ANION GAP SERPL CALC-SCNC: 18 MMOL/L (ref 7–16)
APTT PPP: 29.6 SEC (ref 24.7–36)
AST SERPL-CCNC: 132 U/L (ref 12–45)
BASOPHILS # BLD AUTO: 1.2 % (ref 0–1.8)
BASOPHILS # BLD: 0.07 K/UL (ref 0–0.12)
BILIRUB SERPL-MCNC: 0.6 MG/DL (ref 0.1–1.5)
BLD GP AB SCN SERPL QL: NORMAL
BUN SERPL-MCNC: 10 MG/DL (ref 8–22)
CALCIUM SERPL-MCNC: 9.4 MG/DL (ref 8.4–10.2)
CHLORIDE SERPL-SCNC: 101 MMOL/L (ref 96–112)
CO2 SERPL-SCNC: 21 MMOL/L (ref 20–33)
CREAT SERPL-MCNC: 0.62 MG/DL (ref 0.5–1.4)
EKG IMPRESSION: NORMAL
EOSINOPHIL # BLD AUTO: 0.03 K/UL (ref 0–0.51)
EOSINOPHIL NFR BLD: 0.5 % (ref 0–6.9)
ERYTHROCYTE [DISTWIDTH] IN BLOOD BY AUTOMATED COUNT: 51.8 FL (ref 35.9–50)
ETHANOL BLD-MCNC: 275.5 MG/DL (ref 0–10)
GLOBULIN SER CALC-MCNC: 2.6 G/DL (ref 1.9–3.5)
GLUCOSE BLD-MCNC: 79 MG/DL (ref 65–99)
GLUCOSE SERPL-MCNC: 86 MG/DL (ref 65–99)
HCT VFR BLD AUTO: 38.5 % (ref 37–47)
HGB BLD-MCNC: 13.1 G/DL (ref 12–16)
IMM GRANULOCYTES # BLD AUTO: 0.08 K/UL (ref 0–0.11)
IMM GRANULOCYTES NFR BLD AUTO: 1.4 % (ref 0–0.9)
INR PPP: 1.02 (ref 0.87–1.13)
LYMPHOCYTES # BLD AUTO: 2.18 K/UL (ref 1–4.8)
LYMPHOCYTES NFR BLD: 37 % (ref 22–41)
MCH RBC QN AUTO: 38.4 PG (ref 27–33)
MCHC RBC AUTO-ENTMCNC: 34 G/DL (ref 33.6–35)
MCV RBC AUTO: 112.9 FL (ref 81.4–97.8)
MONOCYTES # BLD AUTO: 0.85 K/UL (ref 0–0.85)
MONOCYTES NFR BLD AUTO: 14.4 % (ref 0–13.4)
NEUTROPHILS # BLD AUTO: 2.68 K/UL (ref 2–7.15)
NEUTROPHILS NFR BLD: 45.5 % (ref 44–72)
NRBC # BLD AUTO: 0 K/UL
NRBC BLD-RTO: 0 /100 WBC
NT-PROBNP SERPL IA-MCNC: 111 PG/ML (ref 0–125)
PLATELET # BLD AUTO: 158 K/UL (ref 164–446)
PMV BLD AUTO: 8.7 FL (ref 9–12.9)
POTASSIUM SERPL-SCNC: 4.3 MMOL/L (ref 3.6–5.5)
PROT SERPL-MCNC: 7.4 G/DL (ref 6–8.2)
PROTHROMBIN TIME: 13.1 SEC (ref 12–14.6)
RBC # BLD AUTO: 3.41 M/UL (ref 4.2–5.4)
RH BLD: NORMAL
SODIUM SERPL-SCNC: 140 MMOL/L (ref 135–145)
TROPONIN T SERPL-MCNC: <6 NG/L (ref 6–19)
WBC # BLD AUTO: 5.9 K/UL (ref 4.8–10.8)

## 2021-05-25 PROCEDURE — 86900 BLOOD TYPING SEROLOGIC ABO: CPT

## 2021-05-25 PROCEDURE — 85730 THROMBOPLASTIN TIME PARTIAL: CPT

## 2021-05-25 PROCEDURE — 96374 THER/PROPH/DIAG INJ IV PUSH: CPT

## 2021-05-25 PROCEDURE — U0003 INFECTIOUS AGENT DETECTION BY NUCLEIC ACID (DNA OR RNA); SEVERE ACUTE RESPIRATORY SYNDROME CORONAVIRUS 2 (SARS-COV-2) (CORONAVIRUS DISEASE [COVID-19]), AMPLIFIED PROBE TECHNIQUE, MAKING USE OF HIGH THROUGHPUT TECHNOLOGIES AS DESCRIBED BY CMS-2020-01-R: HCPCS

## 2021-05-25 PROCEDURE — 99285 EMERGENCY DEPT VISIT HI MDM: CPT

## 2021-05-25 PROCEDURE — 85610 PROTHROMBIN TIME: CPT

## 2021-05-25 PROCEDURE — 70450 CT HEAD/BRAIN W/O DYE: CPT

## 2021-05-25 PROCEDURE — 700111 HCHG RX REV CODE 636 W/ 250 OVERRIDE (IP): Performed by: EMERGENCY MEDICINE

## 2021-05-25 PROCEDURE — 85025 COMPLETE CBC W/AUTO DIFF WBC: CPT

## 2021-05-25 PROCEDURE — 86901 BLOOD TYPING SEROLOGIC RH(D): CPT

## 2021-05-25 PROCEDURE — 70496 CT ANGIOGRAPHY HEAD: CPT

## 2021-05-25 PROCEDURE — 700117 HCHG RX CONTRAST REV CODE 255: Performed by: EMERGENCY MEDICINE

## 2021-05-25 PROCEDURE — U0005 INFEC AGEN DETEC AMPLI PROBE: HCPCS

## 2021-05-25 PROCEDURE — 70498 CT ANGIOGRAPHY NECK: CPT

## 2021-05-25 PROCEDURE — 36415 COLL VENOUS BLD VENIPUNCTURE: CPT

## 2021-05-25 PROCEDURE — 84484 ASSAY OF TROPONIN QUANT: CPT

## 2021-05-25 PROCEDURE — G0378 HOSPITAL OBSERVATION PER HR: HCPCS

## 2021-05-25 PROCEDURE — 700102 HCHG RX REV CODE 250 W/ 637 OVERRIDE(OP): Performed by: INTERNAL MEDICINE

## 2021-05-25 PROCEDURE — 80053 COMPREHEN METABOLIC PANEL: CPT

## 2021-05-25 PROCEDURE — 93005 ELECTROCARDIOGRAM TRACING: CPT | Performed by: EMERGENCY MEDICINE

## 2021-05-25 PROCEDURE — 99220 PR INITIAL OBSERVATION CARE,LEVL III: CPT | Performed by: INTERNAL MEDICINE

## 2021-05-25 PROCEDURE — 86850 RBC ANTIBODY SCREEN: CPT

## 2021-05-25 PROCEDURE — 83880 ASSAY OF NATRIURETIC PEPTIDE: CPT

## 2021-05-25 PROCEDURE — 71045 X-RAY EXAM CHEST 1 VIEW: CPT

## 2021-05-25 PROCEDURE — A9270 NON-COVERED ITEM OR SERVICE: HCPCS | Performed by: INTERNAL MEDICINE

## 2021-05-25 PROCEDURE — 700101 HCHG RX REV CODE 250: Performed by: INTERNAL MEDICINE

## 2021-05-25 PROCEDURE — 0042T CT-CEREBRAL PERFUSION ANALYSIS: CPT

## 2021-05-25 PROCEDURE — 82962 GLUCOSE BLOOD TEST: CPT

## 2021-05-25 PROCEDURE — 82077 ASSAY SPEC XCP UR&BREATH IA: CPT

## 2021-05-25 RX ORDER — LEVOTHYROXINE SODIUM 0.05 MG/1
50 TABLET ORAL
Status: DISCONTINUED | OUTPATIENT
Start: 2021-05-26 | End: 2021-05-26 | Stop reason: HOSPADM

## 2021-05-25 RX ORDER — LORAZEPAM 2 MG/ML
1 INJECTION INTRAMUSCULAR
Status: DISCONTINUED | OUTPATIENT
Start: 2021-05-25 | End: 2021-05-26 | Stop reason: HOSPADM

## 2021-05-25 RX ORDER — BISACODYL 10 MG
10 SUPPOSITORY, RECTAL RECTAL
Status: DISCONTINUED | OUTPATIENT
Start: 2021-05-25 | End: 2021-05-26 | Stop reason: HOSPADM

## 2021-05-25 RX ORDER — LORAZEPAM 1 MG/1
4 TABLET ORAL
Status: DISCONTINUED | OUTPATIENT
Start: 2021-05-25 | End: 2021-05-26 | Stop reason: HOSPADM

## 2021-05-25 RX ORDER — PROMETHAZINE HYDROCHLORIDE 25 MG/1
12.5-25 SUPPOSITORY RECTAL EVERY 4 HOURS PRN
Status: DISCONTINUED | OUTPATIENT
Start: 2021-05-25 | End: 2021-05-26 | Stop reason: HOSPADM

## 2021-05-25 RX ORDER — ATORVASTATIN CALCIUM 20 MG/1
20 TABLET, FILM COATED ORAL DAILY
Status: DISCONTINUED | OUTPATIENT
Start: 2021-05-26 | End: 2021-05-26 | Stop reason: HOSPADM

## 2021-05-25 RX ORDER — LORAZEPAM 1 MG/1
1 TABLET ORAL EVERY 4 HOURS PRN
Status: DISCONTINUED | OUTPATIENT
Start: 2021-05-25 | End: 2021-05-26 | Stop reason: HOSPADM

## 2021-05-25 RX ORDER — LORAZEPAM 1 MG/1
3 TABLET ORAL
Status: DISCONTINUED | OUTPATIENT
Start: 2021-05-25 | End: 2021-05-26 | Stop reason: HOSPADM

## 2021-05-25 RX ORDER — PROCHLORPERAZINE EDISYLATE 5 MG/ML
5-10 INJECTION INTRAMUSCULAR; INTRAVENOUS EVERY 4 HOURS PRN
Status: DISCONTINUED | OUTPATIENT
Start: 2021-05-25 | End: 2021-05-26 | Stop reason: HOSPADM

## 2021-05-25 RX ORDER — GAUZE BANDAGE 2" X 2"
100 BANDAGE TOPICAL DAILY
Status: DISCONTINUED | OUTPATIENT
Start: 2021-05-26 | End: 2021-05-26 | Stop reason: HOSPADM

## 2021-05-25 RX ORDER — LORAZEPAM 1 MG/1
2 TABLET ORAL
Status: DISCONTINUED | OUTPATIENT
Start: 2021-05-25 | End: 2021-05-26 | Stop reason: HOSPADM

## 2021-05-25 RX ORDER — LORAZEPAM 2 MG/ML
0.5 INJECTION INTRAMUSCULAR ONCE
Status: COMPLETED | OUTPATIENT
Start: 2021-05-25 | End: 2021-05-25

## 2021-05-25 RX ORDER — PROMETHAZINE HYDROCHLORIDE 25 MG/1
12.5-25 TABLET ORAL EVERY 4 HOURS PRN
Status: DISCONTINUED | OUTPATIENT
Start: 2021-05-25 | End: 2021-05-26 | Stop reason: HOSPADM

## 2021-05-25 RX ORDER — ONDANSETRON 2 MG/ML
4 INJECTION INTRAMUSCULAR; INTRAVENOUS EVERY 4 HOURS PRN
Status: DISCONTINUED | OUTPATIENT
Start: 2021-05-25 | End: 2021-05-26 | Stop reason: HOSPADM

## 2021-05-25 RX ORDER — IBUPROFEN 200 MG
400 TABLET ORAL EVERY 6 HOURS PRN
COMMUNITY
End: 2021-08-06

## 2021-05-25 RX ORDER — AMOXICILLIN 250 MG
2 CAPSULE ORAL 2 TIMES DAILY
Status: DISCONTINUED | OUTPATIENT
Start: 2021-05-25 | End: 2021-05-26 | Stop reason: HOSPADM

## 2021-05-25 RX ORDER — LORAZEPAM 2 MG/ML
0.5 INJECTION INTRAMUSCULAR EVERY 4 HOURS PRN
Status: DISCONTINUED | OUTPATIENT
Start: 2021-05-25 | End: 2021-05-26 | Stop reason: HOSPADM

## 2021-05-25 RX ORDER — OLMESARTAN MEDOXOMIL 20 MG/1
40 TABLET ORAL DAILY
Status: DISCONTINUED | OUTPATIENT
Start: 2021-05-26 | End: 2021-05-26 | Stop reason: HOSPADM

## 2021-05-25 RX ORDER — LORAZEPAM 2 MG/ML
1.5 INJECTION INTRAMUSCULAR
Status: DISCONTINUED | OUTPATIENT
Start: 2021-05-25 | End: 2021-05-26 | Stop reason: HOSPADM

## 2021-05-25 RX ORDER — ACETAMINOPHEN 325 MG/1
650 TABLET ORAL EVERY 6 HOURS PRN
Status: DISCONTINUED | OUTPATIENT
Start: 2021-05-25 | End: 2021-05-26 | Stop reason: HOSPADM

## 2021-05-25 RX ORDER — POLYETHYLENE GLYCOL 3350 17 G/17G
1 POWDER, FOR SOLUTION ORAL
Status: DISCONTINUED | OUTPATIENT
Start: 2021-05-25 | End: 2021-05-26 | Stop reason: HOSPADM

## 2021-05-25 RX ORDER — ONDANSETRON 4 MG/1
4 TABLET, ORALLY DISINTEGRATING ORAL EVERY 4 HOURS PRN
Status: DISCONTINUED | OUTPATIENT
Start: 2021-05-25 | End: 2021-05-26 | Stop reason: HOSPADM

## 2021-05-25 RX ORDER — FOLIC ACID 1 MG/1
1 TABLET ORAL DAILY
Status: DISCONTINUED | OUTPATIENT
Start: 2021-05-26 | End: 2021-05-26 | Stop reason: HOSPADM

## 2021-05-25 RX ORDER — LORAZEPAM 2 MG/ML
2 INJECTION INTRAMUSCULAR
Status: DISCONTINUED | OUTPATIENT
Start: 2021-05-25 | End: 2021-05-26 | Stop reason: HOSPADM

## 2021-05-25 RX ORDER — LORAZEPAM 0.5 MG/1
0.5 TABLET ORAL EVERY 4 HOURS PRN
Status: DISCONTINUED | OUTPATIENT
Start: 2021-05-25 | End: 2021-05-26 | Stop reason: HOSPADM

## 2021-05-25 RX ADMIN — THIAMINE HYDROCHLORIDE: 100 INJECTION, SOLUTION INTRAMUSCULAR; INTRAVENOUS at 21:11

## 2021-05-25 RX ADMIN — LORAZEPAM 0.5 MG: 0.5 TABLET ORAL at 21:20

## 2021-05-25 RX ADMIN — IOHEXOL 100 ML: 350 INJECTION, SOLUTION INTRAVENOUS at 17:08

## 2021-05-25 RX ADMIN — IOHEXOL 40 ML: 350 INJECTION, SOLUTION INTRAVENOUS at 17:08

## 2021-05-25 RX ADMIN — LORAZEPAM 0.5 MG: 2 INJECTION INTRAMUSCULAR; INTRAVENOUS at 18:03

## 2021-05-25 ASSESSMENT — ENCOUNTER SYMPTOMS
FEVER: 0
MYALGIAS: 0
FALLS: 1
NAUSEA: 0
DIZZINESS: 0
SPEECH CHANGE: 1
DEPRESSION: 0
CHILLS: 0
HEADACHES: 0
SHORTNESS OF BREATH: 0
VOMITING: 0
ABDOMINAL PAIN: 0

## 2021-05-25 ASSESSMENT — LIFESTYLE VARIABLES
NAUSEA AND VOMITING: NO NAUSEA AND NO VOMITING
TOTAL SCORE: 3
CONSUMPTION TOTAL: POSITIVE
AUDITORY DISTURBANCES: NOT PRESENT
HEADACHE, FULLNESS IN HEAD: NOT PRESENT
ANXIETY: MILDLY ANXIOUS
AGITATION: SOMEWHAT MORE THAN NORMAL ACTIVITY
TREMOR: *
TOTAL SCORE: 1
PAROXYSMAL SWEATS: NO SWEAT VISIBLE
TOTAL SCORE: 1
HEADACHE, FULLNESS IN HEAD: NOT PRESENT
ORIENTATION AND CLOUDING OF SENSORIUM: ORIENTED AND CAN DO SERIAL ADDITIONS
TREMOR: *
EVER HAD A DRINK FIRST THING IN THE MORNING TO STEADY YOUR NERVES TO GET RID OF A HANGOVER: NO
VISUAL DISTURBANCES: NOT PRESENT
NAUSEA AND VOMITING: NO NAUSEA AND NO VOMITING
AVERAGE NUMBER OF DAYS PER WEEK YOU HAVE A DRINK CONTAINING ALCOHOL: 6
PAROXYSMAL SWEATS: NO SWEAT VISIBLE
ON A TYPICAL DAY WHEN YOU DRINK ALCOHOL HOW MANY DRINKS DO YOU HAVE: 3
ALCOHOL_USE: YES
EVER FELT BAD OR GUILTY ABOUT YOUR DRINKING: NO
HOW MANY TIMES IN THE PAST YEAR HAVE YOU HAD 5 OR MORE DRINKS IN A DAY: 2
AGITATION: NORMAL ACTIVITY
AUDITORY DISTURBANCES: NOT PRESENT
ANXIETY: MILDLY ANXIOUS
VISUAL DISTURBANCES: NOT PRESENT
ORIENTATION AND CLOUDING OF SENSORIUM: ORIENTED AND CAN DO SERIAL ADDITIONS
HAVE PEOPLE ANNOYED YOU BY CRITICIZING YOUR DRINKING: NO
HAVE YOU EVER FELT YOU SHOULD CUT DOWN ON YOUR DRINKING: YES
TOTAL SCORE: 1
TOTAL SCORE: 5

## 2021-05-25 ASSESSMENT — COGNITIVE AND FUNCTIONAL STATUS - GENERAL
DAILY ACTIVITIY SCORE: 23
MOBILITY SCORE: 23
SUGGESTED CMS G CODE MODIFIER DAILY ACTIVITY: CI
SUGGESTED CMS G CODE MODIFIER MOBILITY: CI
TOILETING: A LITTLE
WALKING IN HOSPITAL ROOM: A LITTLE

## 2021-05-25 ASSESSMENT — FIBROSIS 4 INDEX
FIB4 SCORE: 1.55
FIB4 SCORE: 4.56

## 2021-05-25 ASSESSMENT — PATIENT HEALTH QUESTIONNAIRE - PHQ9
1. LITTLE INTEREST OR PLEASURE IN DOING THINGS: NOT AT ALL
SUM OF ALL RESPONSES TO PHQ9 QUESTIONS 1 AND 2: 0
2. FEELING DOWN, DEPRESSED, IRRITABLE, OR HOPELESS: NOT AT ALL

## 2021-05-25 NOTE — ED NOTES
Pt to er with spouse with c/o memoroy loss, intermitt confusion and word finding difficulties since fall weeks ago.  describes syncopal event prior to fall, pt roomed immed and code stroke called per erp dr chapa. Er charge also aware, pt amb to room, participating in triage

## 2021-05-26 ENCOUNTER — APPOINTMENT (OUTPATIENT)
Dept: RADIOLOGY | Facility: MEDICAL CENTER | Age: 46
End: 2021-05-26
Attending: INTERNAL MEDICINE
Payer: COMMERCIAL

## 2021-05-26 ENCOUNTER — APPOINTMENT (OUTPATIENT)
Dept: CARDIOLOGY | Facility: MEDICAL CENTER | Age: 46
End: 2021-05-26
Attending: INTERNAL MEDICINE
Payer: COMMERCIAL

## 2021-05-26 VITALS
DIASTOLIC BLOOD PRESSURE: 98 MMHG | TEMPERATURE: 98.5 F | HEART RATE: 84 BPM | RESPIRATION RATE: 16 BRPM | BODY MASS INDEX: 23.56 KG/M2 | HEIGHT: 64 IN | SYSTOLIC BLOOD PRESSURE: 140 MMHG | WEIGHT: 138.01 LBS | OXYGEN SATURATION: 95 %

## 2021-05-26 LAB
ALBUMIN SERPL BCP-MCNC: 4 G/DL (ref 3.2–4.9)
ALBUMIN/GLOB SERPL: 1.6 G/DL
ALP SERPL-CCNC: 80 U/L (ref 30–99)
ALT SERPL-CCNC: 57 U/L (ref 2–50)
ANION GAP SERPL CALC-SCNC: 16 MMOL/L (ref 7–16)
AST SERPL-CCNC: 85 U/L (ref 12–45)
BASOPHILS # BLD AUTO: 1 % (ref 0–1.8)
BASOPHILS # BLD: 0.05 K/UL (ref 0–0.12)
BILIRUB SERPL-MCNC: 0.7 MG/DL (ref 0.1–1.5)
BUN SERPL-MCNC: 8 MG/DL (ref 8–22)
CALCIUM SERPL-MCNC: 8.9 MG/DL (ref 8.4–10.2)
CHLORIDE SERPL-SCNC: 100 MMOL/L (ref 96–112)
CHOLEST SERPL-MCNC: 268 MG/DL (ref 100–199)
CO2 SERPL-SCNC: 23 MMOL/L (ref 20–33)
CREAT SERPL-MCNC: 0.67 MG/DL (ref 0.5–1.4)
EOSINOPHIL # BLD AUTO: 0.01 K/UL (ref 0–0.51)
EOSINOPHIL NFR BLD: 0.2 % (ref 0–6.9)
ERYTHROCYTE [DISTWIDTH] IN BLOOD BY AUTOMATED COUNT: 50.7 FL (ref 35.9–50)
EST. AVERAGE GLUCOSE BLD GHB EST-MCNC: 91 MG/DL
FOLATE SERPL-MCNC: 26.5 NG/ML
GLOBULIN SER CALC-MCNC: 2.5 G/DL (ref 1.9–3.5)
GLUCOSE SERPL-MCNC: 73 MG/DL (ref 65–99)
HBA1C MFR BLD: 4.8 % (ref 4–5.6)
HCT VFR BLD AUTO: 35.7 % (ref 37–47)
HDLC SERPL-MCNC: 112 MG/DL
HGB BLD-MCNC: 12.2 G/DL (ref 12–16)
IMM GRANULOCYTES # BLD AUTO: 0.03 K/UL (ref 0–0.11)
IMM GRANULOCYTES NFR BLD AUTO: 0.6 % (ref 0–0.9)
LDLC SERPL CALC-MCNC: 147 MG/DL
LV EJECT FRACT  99904: 60
LV EJECT FRACT MOD 2C 99903: 62.44
LV EJECT FRACT MOD 4C 99902: 55.67
LV EJECT FRACT MOD BP 99901: 57.17
LYMPHOCYTES # BLD AUTO: 0.93 K/UL (ref 1–4.8)
LYMPHOCYTES NFR BLD: 19.4 % (ref 22–41)
MCH RBC QN AUTO: 38.7 PG (ref 27–33)
MCHC RBC AUTO-ENTMCNC: 34.2 G/DL (ref 33.6–35)
MCV RBC AUTO: 113.3 FL (ref 81.4–97.8)
MONOCYTES # BLD AUTO: 0.94 K/UL (ref 0–0.85)
MONOCYTES NFR BLD AUTO: 19.6 % (ref 0–13.4)
NEUTROPHILS # BLD AUTO: 2.84 K/UL (ref 2–7.15)
NEUTROPHILS NFR BLD: 59.2 % (ref 44–72)
NRBC # BLD AUTO: 0.02 K/UL
NRBC BLD-RTO: 0.4 /100 WBC
PLATELET # BLD AUTO: 138 K/UL (ref 164–446)
PMV BLD AUTO: 9.3 FL (ref 9–12.9)
POTASSIUM SERPL-SCNC: 3.8 MMOL/L (ref 3.6–5.5)
PROT SERPL-MCNC: 6.5 G/DL (ref 6–8.2)
RBC # BLD AUTO: 3.15 M/UL (ref 4.2–5.4)
SARS-COV-2 RNA RESP QL NAA+PROBE: NOTDETECTED
SODIUM SERPL-SCNC: 139 MMOL/L (ref 135–145)
SPECIMEN SOURCE: NORMAL
T4 FREE SERPL-MCNC: 1 NG/DL (ref 0.93–1.7)
TRIGL SERPL-MCNC: 43 MG/DL (ref 0–149)
TSH SERPL DL<=0.005 MIU/L-ACNC: 8.97 UIU/ML (ref 0.38–5.33)
VIT B12 SERPL-MCNC: 419 PG/ML (ref 211–911)
WBC # BLD AUTO: 4.8 K/UL (ref 4.8–10.8)

## 2021-05-26 PROCEDURE — G0378 HOSPITAL OBSERVATION PER HR: HCPCS

## 2021-05-26 PROCEDURE — 82746 ASSAY OF FOLIC ACID SERUM: CPT

## 2021-05-26 PROCEDURE — 99217 PR OBSERVATION CARE DISCHARGE: CPT | Performed by: STUDENT IN AN ORGANIZED HEALTH CARE EDUCATION/TRAINING PROGRAM

## 2021-05-26 PROCEDURE — 700111 HCHG RX REV CODE 636 W/ 250 OVERRIDE (IP): Performed by: INTERNAL MEDICINE

## 2021-05-26 PROCEDURE — 96376 TX/PRO/DX INJ SAME DRUG ADON: CPT

## 2021-05-26 PROCEDURE — 84443 ASSAY THYROID STIM HORMONE: CPT

## 2021-05-26 PROCEDURE — 97535 SELF CARE MNGMENT TRAINING: CPT

## 2021-05-26 PROCEDURE — 93306 TTE W/DOPPLER COMPLETE: CPT

## 2021-05-26 PROCEDURE — 70553 MRI BRAIN STEM W/O & W/DYE: CPT

## 2021-05-26 PROCEDURE — 92610 EVALUATE SWALLOWING FUNCTION: CPT

## 2021-05-26 PROCEDURE — 700102 HCHG RX REV CODE 250 W/ 637 OVERRIDE(OP): Performed by: INTERNAL MEDICINE

## 2021-05-26 PROCEDURE — 83036 HEMOGLOBIN GLYCOSYLATED A1C: CPT

## 2021-05-26 PROCEDURE — 700117 HCHG RX CONTRAST REV CODE 255: Performed by: INTERNAL MEDICINE

## 2021-05-26 PROCEDURE — 82607 VITAMIN B-12: CPT

## 2021-05-26 PROCEDURE — 97165 OT EVAL LOW COMPLEX 30 MIN: CPT

## 2021-05-26 PROCEDURE — A9576 INJ PROHANCE MULTIPACK: HCPCS | Performed by: INTERNAL MEDICINE

## 2021-05-26 PROCEDURE — 99244 OFF/OP CNSLTJ NEW/EST MOD 40: CPT | Performed by: PSYCHIATRY & NEUROLOGY

## 2021-05-26 PROCEDURE — 85025 COMPLETE CBC W/AUTO DIFF WBC: CPT

## 2021-05-26 PROCEDURE — 80061 LIPID PANEL: CPT

## 2021-05-26 PROCEDURE — 93306 TTE W/DOPPLER COMPLETE: CPT | Mod: 26 | Performed by: INTERNAL MEDICINE

## 2021-05-26 PROCEDURE — 97161 PT EVAL LOW COMPLEX 20 MIN: CPT

## 2021-05-26 PROCEDURE — 84439 ASSAY OF FREE THYROXINE: CPT

## 2021-05-26 PROCEDURE — A9270 NON-COVERED ITEM OR SERVICE: HCPCS | Performed by: INTERNAL MEDICINE

## 2021-05-26 PROCEDURE — 80053 COMPREHEN METABOLIC PANEL: CPT

## 2021-05-26 RX ORDER — LORAZEPAM 2 MG/ML
1 INJECTION INTRAMUSCULAR
Status: COMPLETED | OUTPATIENT
Start: 2021-05-26 | End: 2021-05-26

## 2021-05-26 RX ORDER — FOLIC ACID 1 MG/1
1 TABLET ORAL DAILY
Qty: 30 TABLET | Refills: 0 | Status: SHIPPED | OUTPATIENT
Start: 2021-05-27 | End: 2021-06-26

## 2021-05-26 RX ORDER — LANOLIN ALCOHOL/MO/W.PET/CERES
100 CREAM (GRAM) TOPICAL DAILY
Qty: 30 TABLET | Refills: 0 | Status: SHIPPED | OUTPATIENT
Start: 2021-05-27 | End: 2021-06-26

## 2021-05-26 RX ADMIN — LEVOTHYROXINE SODIUM 50 MCG: 0.05 TABLET ORAL at 05:22

## 2021-05-26 RX ADMIN — THERA TABS 1 TABLET: TAB at 05:21

## 2021-05-26 RX ADMIN — THIAMINE HCL TAB 100 MG 100 MG: 100 TAB at 05:21

## 2021-05-26 RX ADMIN — GADOTERIDOL 13 ML: 279.3 INJECTION, SOLUTION INTRAVENOUS at 09:24

## 2021-05-26 RX ADMIN — LORAZEPAM 0.5 MG: 0.5 TABLET ORAL at 05:31

## 2021-05-26 RX ADMIN — FOLIC ACID 1 MG: 1 TABLET ORAL at 05:22

## 2021-05-26 RX ADMIN — LORAZEPAM 0.5 MG: 0.5 TABLET ORAL at 01:10

## 2021-05-26 RX ADMIN — LORAZEPAM 0.5 MG: 0.5 TABLET ORAL at 02:17

## 2021-05-26 RX ADMIN — LORAZEPAM 1 MG: 2 INJECTION INTRAMUSCULAR; INTRAVENOUS at 08:03

## 2021-05-26 ASSESSMENT — LIFESTYLE VARIABLES
NAUSEA AND VOMITING: NO NAUSEA AND NO VOMITING
TOTAL SCORE: 4
TREMOR: *
ORIENTATION AND CLOUDING OF SENSORIUM: ORIENTED AND CAN DO SERIAL ADDITIONS
VISUAL DISTURBANCES: NOT PRESENT
PAROXYSMAL SWEATS: NO SWEAT VISIBLE
TREMOR: *
ANXIETY: *
HEADACHE, FULLNESS IN HEAD: NOT PRESENT
ORIENTATION AND CLOUDING OF SENSORIUM: ORIENTED AND CAN DO SERIAL ADDITIONS
PAROXYSMAL SWEATS: NO SWEAT VISIBLE
VISUAL DISTURBANCES: NOT PRESENT
AGITATION: SOMEWHAT MORE THAN NORMAL ACTIVITY
AUDITORY DISTURBANCES: NOT PRESENT
TREMOR: *
VISUAL DISTURBANCES: NOT PRESENT
HEADACHE, FULLNESS IN HEAD: NOT PRESENT
TREMOR: *
HEADACHE, FULLNESS IN HEAD: NOT PRESENT
PAROXYSMAL SWEATS: NO SWEAT VISIBLE
VISUAL DISTURBANCES: NOT PRESENT
NAUSEA AND VOMITING: NO NAUSEA AND NO VOMITING
TREMOR: *
AUDITORY DISTURBANCES: NOT PRESENT
NAUSEA AND VOMITING: NO NAUSEA AND NO VOMITING
HEADACHE, FULLNESS IN HEAD: NOT PRESENT
ANXIETY: MILDLY ANXIOUS
NAUSEA AND VOMITING: NO NAUSEA AND NO VOMITING
ANXIETY: MILDLY ANXIOUS
NAUSEA AND VOMITING: NO NAUSEA AND NO VOMITING
AGITATION: NORMAL ACTIVITY
AUDITORY DISTURBANCES: NOT PRESENT
VISUAL DISTURBANCES: NOT PRESENT
NAUSEA AND VOMITING: NO NAUSEA AND NO VOMITING
TOTAL SCORE: 5
ORIENTATION AND CLOUDING OF SENSORIUM: ORIENTED AND CAN DO SERIAL ADDITIONS
ANXIETY: MILDLY ANXIOUS
PAROXYSMAL SWEATS: NO SWEAT VISIBLE
TREMOR: MODERATE TREMOR WITH ARMS EXTENDED
AUDITORY DISTURBANCES: NOT PRESENT
ORIENTATION AND CLOUDING OF SENSORIUM: ORIENTED AND CAN DO SERIAL ADDITIONS
TOTAL SCORE: 5
ORIENTATION AND CLOUDING OF SENSORIUM: ORIENTED AND CAN DO SERIAL ADDITIONS
PAROXYSMAL SWEATS: BARELY PERCEPTIBLE SWEATING. PALMS MOIST
AUDITORY DISTURBANCES: NOT PRESENT
TOTAL SCORE: 8
VISUAL DISTURBANCES: NOT PRESENT
HEADACHE, FULLNESS IN HEAD: NOT PRESENT
AUDITORY DISTURBANCES: NOT PRESENT
AGITATION: SOMEWHAT MORE THAN NORMAL ACTIVITY
ORIENTATION AND CLOUDING OF SENSORIUM: ORIENTED AND CAN DO SERIAL ADDITIONS
ANXIETY: MILDLY ANXIOUS
AGITATION: NORMAL ACTIVITY
AGITATION: SOMEWHAT MORE THAN NORMAL ACTIVITY
AGITATION: NORMAL ACTIVITY
ANXIETY: MILDLY ANXIOUS
TOTAL SCORE: 5
TOTAL SCORE: 4
HEADACHE, FULLNESS IN HEAD: NOT PRESENT
PAROXYSMAL SWEATS: BARELY PERCEPTIBLE SWEATING. PALMS MOIST

## 2021-05-26 ASSESSMENT — COGNITIVE AND FUNCTIONAL STATUS - GENERAL
STANDING UP FROM CHAIR USING ARMS: A LITTLE
TURNING FROM BACK TO SIDE WHILE IN FLAT BAD: A LITTLE
CLIMB 3 TO 5 STEPS WITH RAILING: A LITTLE
TOILETING: A LITTLE
MOVING TO AND FROM BED TO CHAIR: A LITTLE
WALKING IN HOSPITAL ROOM: A LITTLE
DAILY ACTIVITIY SCORE: 18
MOVING FROM LYING ON BACK TO SITTING ON SIDE OF FLAT BED: A LITTLE
MOBILITY SCORE: 18
SUGGESTED CMS G CODE MODIFIER MOBILITY: CK
EATING MEALS: A LITTLE
DRESSING REGULAR UPPER BODY CLOTHING: A LITTLE
SUGGESTED CMS G CODE MODIFIER DAILY ACTIVITY: CK
HELP NEEDED FOR BATHING: A LITTLE
DRESSING REGULAR LOWER BODY CLOTHING: A LITTLE
PERSONAL GROOMING: A LITTLE

## 2021-05-26 ASSESSMENT — ACTIVITIES OF DAILY LIVING (ADL): TOILETING: INDEPENDENT

## 2021-05-26 ASSESSMENT — GAIT ASSESSMENTS
GAIT LEVEL OF ASSIST: MINIMAL ASSIST
DISTANCE (FEET): 75
ASSISTIVE DEVICE: NONE;FRONT WHEEL WALKER

## 2021-05-26 NOTE — PROGRESS NOTES
Tele strip at 0244 shows SR.      Measurements from am strip were as follows:  FL=0.20  QRS=0.10  QT=0.40    Tele Shift Summary:    Rhythm : SR  Rate : 81-86  Ectopy : Per CCT Jennifer, pt had no ectopy.     Telemetry monitoring strips placed in pt's chart.

## 2021-05-26 NOTE — ASSESSMENT & PLAN NOTE
Possibly orthostatic as  states patient's blood pressure low after event  Also possibly 2/2 vasovagal vs alcohol   Echo pending   CTA head neck wnl   Tele monitoring

## 2021-05-26 NOTE — ASSESSMENT & PLAN NOTE
Hold ARB and spironolactone for now patient reports she has not been taking them for concern of hypotension

## 2021-05-26 NOTE — PROGRESS NOTES
Report received from TONYA Kline. Dx, medications, O2 needs, and poc reviewed. Safety precautions in place and pt has no sign of distress or acute needs at this time. Care fully assumed. Will continue to monitor.

## 2021-05-26 NOTE — CARE PLAN
Problem: Nutritional:  Goal: Achieve adequate nutritional intake  Description: Patient will consume >50% of meals  Outcome: Not Met

## 2021-05-26 NOTE — CONSULTS
"Hospital Neurology Consult:    Referring Physician: Ly Grider M.D.    Reason for consultation: Cognitive difficulties    HPI: Minerva Tillman is a 46 y.o. female with history of anxiety, depression, HTN, HLD, alcohol abuse presenting to the hospital for cognitive difficulties and consulted for cognitive difficulties. Approximately 3 weeks ago she had a syncopal event. She states she \"felt something was wrong\" and tried to make it to a table but instead fell from a standing height and hit her head on carpet covered concrete. She had LOC prior to hitting the ground, but remained unconscious for \"several minutes\". She came back to and since then she's been having some cognitive difficulties. She complains of speech difficulties; specifically w/ finding words and difficulty spelling when writing. These symptoms appear to be getting worse since onset. She also complains of memory blackouts, at times she will forget where her  went after he went out for a run.     She presented to [facility] for the above symptoms on 5/25/21 and a stroke alert was called. No telemedicine evaluation was pursued given that LKW was well over 4.5 hours. CT Head W/O CST showed mild atrophy, and CTA head/neck did not show LVO.    She states she drinks 3-4 glasses of wine per day, and is cutting back.     ROS:     As above. All other systems reviewed and are negative.    Past Medical History:    has a past medical history of Anxiety, Depression, Gynecological disorder, High cholesterol, Hypertension, Snoring, and Urinary tract infection.    FHx:  family history includes Asthma in her father; Cancer (age of onset: 60) in her maternal grandfather and maternal grandmother; Depression in her father; Hyperlipidemia in her father and mother; Hypertension in her brother, brother, father, and mother; Other in her paternal grandmother; Stroke in her maternal grandmother.    SHx:   reports that she has never smoked. She has never used smokeless " tobacco. She reports current alcohol use of about 8.4 oz of alcohol per week. She reports that she does not use drugs.    Allergies:  Allergies   Allergen Reactions   • Sulfa Drugs Hives     [id number]       Medications:    Current Facility-Administered Medications:   •  senna-docusate (PERICOLACE or SENOKOT S) 8.6-50 MG per tablet 2 tablet, 2 tablet, Oral, BID **AND** polyethylene glycol/lytes (MIRALAX) PACKET 1 Packet, 1 Packet, Oral, QDAY PRN **AND** magnesium hydroxide (MILK OF MAGNESIA) suspension 30 mL, 30 mL, Oral, QDAY PRN **AND** bisacodyl (DULCOLAX) suppository 10 mg, 10 mg, Rectal, QDAY PRN, Shivani Dela Cruz, D.O.  •  enoxaparin (LOVENOX) inj 40 mg, 40 mg, Subcutaneous, DAILY, Shivani Dela Cruz, D.O.  •  ondansetron (ZOFRAN) syringe/vial injection 4 mg, 4 mg, Intravenous, Q4HRS PRN, Shivani Dela Cruz, D.O.  •  ondansetron (ZOFRAN ODT) dispertab 4 mg, 4 mg, Oral, Q4HRS PRN, Shivani Dela Cruz, D.O.  •  promethazine (PHENERGAN) tablet 12.5-25 mg, 12.5-25 mg, Oral, Q4HRS PRN, Shivani Dela Cruz, D.O.  •  promethazine (PHENERGAN) suppository 12.5-25 mg, 12.5-25 mg, Rectal, Q4HRS PRN, Shivani Dela Cruz, D.O.  •  prochlorperazine (COMPAZINE) injection 5-10 mg, 5-10 mg, Intravenous, Q4HRS PRN, Shivani Dela Cruz, D.O.  •  acetaminophen (Tylenol) tablet 650 mg, 650 mg, Oral, Q6HRS PRN, Shivani Dela Cruz, D.O.  •  atorvastatin (LIPITOR) tablet 20 mg, 20 mg, Oral, DAILY, Shivani Dela Cruz D.O.  •  levothyroxine (SYNTHROID) tablet 50 mcg, 50 mcg, Oral, AM ES, Shivani Dela Cruz D.O., 50 mcg at 05/26/21 0522  •  [Held by provider] olmesartan (BENICAR) tablet 40 mg, 40 mg, Oral, DAILY, Shivani Dela Cruz D.O.  •  [COMPLETED] detox IV 1000 mL (D5LR + magnesium 1 g + thiamine 100 mg + folic acid 1 mg) infusion, , Intravenous, Once, Given at 05/25/21 2111 **AND** thiamine (Vitamin B-1) tablet 100 mg, 100 mg, Oral, DAILY, 100 mg at 05/26/21 0521 **AND** multivitamin (THERAGRAN) tablet 1 tablet, 1 tablet, Oral, DAILY, 1 tablet at 05/26/21 0521  **AND** folic acid (FOLVITE) tablet 1 mg, 1 mg, Oral, DAILY, Shivani Dela Cruz D.O., 1 mg at 05/26/21 0522  •  LORazepam (ATIVAN) tablet 0.5 mg, 0.5 mg, Oral, Q4HRS PRN, Shivani Dela Cruz D.O., 0.5 mg at 05/26/21 0531  •  LORazepam (ATIVAN) tablet 1 mg, 1 mg, Oral, Q4HRS PRN **OR** LORazepam (ATIVAN) injection 0.5 mg, 0.5 mg, Intravenous, Q4HRS PRN, KAT Varela.O.  •  LORazepam (ATIVAN) tablet 2 mg, 2 mg, Oral, Q2HRS PRN **OR** LORazepam (ATIVAN) injection 1 mg, 1 mg, Intravenous, Q2HRS PRN, KAT Varela.O.  •  LORazepam (ATIVAN) tablet 3 mg, 3 mg, Oral, Q HOUR PRN **OR** LORazepam (ATIVAN) injection 1.5 mg, 1.5 mg, Intravenous, Q HOUR PRN, Shivani Dela Cruz D.O.  •  LORazepam (ATIVAN) tablet 4 mg, 4 mg, Oral, Q15 MIN PRN **OR** LORazepam (ATIVAN) injection 2 mg, 2 mg, Intravenous, Q15 MIN PRN, Shivani Dela Cruz D.O.    Vitals:   Vitals:    05/25/21 1947 05/25/21 2014 05/25/21 2344 05/26/21 0400   BP: 113/76 122/74 119/81 122/81   Pulse: 78 75 88 89   Resp: 20 20 16 16   Temp:  36.8 °C (98.2 °F) 37.1 °C (98.7 °F) 37.2 °C (99 °F)   TempSrc:  Oral Oral Oral   SpO2: 90% 94% 92% 94%   Weight:  62.6 kg (138 lb 0.1 oz)     Height:           Labs:  Lab Results   Component Value Date/Time    PROTHROMBTM 13.1 05/25/2021 04:53 PM    INR 1.02 05/25/2021 04:53 PM      Lab Results   Component Value Date/Time    WBC 4.8 05/26/2021 05:37 AM    WBC 5.2 02/01/2013 08:46 AM    RBC 3.15 (L) 05/26/2021 05:37 AM    RBC 4.30 02/01/2013 08:46 AM    HEMOGLOBIN 12.2 05/26/2021 05:37 AM    HEMATOCRIT 35.7 (L) 05/26/2021 05:37 AM    .3 (H) 05/26/2021 05:37 AM    MCV 99 (H) 02/01/2013 08:46 AM    MCH 38.7 (H) 05/26/2021 05:37 AM    MCH 34.4 (H) 02/01/2013 08:46 AM    MCHC 34.2 05/26/2021 05:37 AM    MPV 9.3 05/26/2021 05:37 AM    NEUTSPOLYS 59.20 05/26/2021 05:37 AM    LYMPHOCYTES 19.40 (L) 05/26/2021 05:37 AM    MONOCYTES 19.60 (H) 05/26/2021 05:37 AM    EOSINOPHILS 0.20 05/26/2021 05:37 AM    BASOPHILS 1.00 05/26/2021 05:37  AM      Lab Results   Component Value Date/Time    SODIUM 139 05/26/2021 05:37 AM    POTASSIUM 3.8 05/26/2021 05:37 AM    CHLORIDE 100 05/26/2021 05:37 AM    CO2 23 05/26/2021 05:37 AM    GLUCOSE 73 05/26/2021 05:37 AM    BUN 8 05/26/2021 05:37 AM    CREATININE 0.67 05/26/2021 05:37 AM    CREATININE 0.83 02/01/2013 08:46 AM    BUNCREATRAT 18 06/02/2015 08:49 AM    BUNCREATRAT 14 02/01/2013 08:46 AM      Lab Results   Component Value Date/Time    CHOLSTRLTOT 268 (H) 05/26/2021 05:37 AM     (H) 05/26/2021 05:37 AM     05/26/2021 05:37 AM    TRIGLYCERIDE 43 05/26/2021 05:37 AM       Lab Results   Component Value Date/Time    ALKPHOSPHAT 80 05/26/2021 05:37 AM    ASTSGOT 85 (H) 05/26/2021 05:37 AM    ALTSGPT 57 (H) 05/26/2021 05:37 AM    TBILIRUBIN 0.7 05/26/2021 05:37 AM      Lab Results   Component Value Date/Time    TSH 3.410 06/02/2015 08:49 AM    TSH 3.110 02/01/2013 08:46 AM     Lab Results   Component Value Date/Time    FREET4 1.00 05/26/2021 05:37 AM    FREET4 0.87 11/06/2019 08:28 AM     Imaging/Testing:  MRI Brain W/WO CST on 5/26/21 personally reviewed w/o evidence of ischemia.     CT Head W/O CST reviewed in chart.     CTA Head/Neck reviewed in chart.     Physical Exam:     General: 45 y/o female in bed in NAD  Cardio: Normal S1/S2. No peripheral edema.   Pulm: CTAX2. No respiratory distress.   Skin: Warm, dry, no rashes or lesions   Psychiatric: Appropriate affect. No active psychosis.  HEENT: Atraumatic head, normal sclera and conjunctiva, moist oral mucosa. No lid lag.  Abdomen: Soft, non tender. No masses or hepatosplenomegaly.    Neurologic:  Mental Status:  AAOx4. Able to follow commands/cross midline. Speech fluent/nondysarthric. Language functions intact. No neglect/apraxia.  Cranial Nerves:  PERRL. EOMi. Face symmetric, palate/tongue midline.   Motor:  Normal muscle tone and bulk. Strength is 4+/5 throughout. Presence of exaggerated physiological tremor  Reflexes:  Deferred  Coordination: Finger-nose w/o ataxia.   Sensation: Normal to light touch throughout  Gait/Station: Deferred    Assessment/Plan:    Minerva Tillman is a 46 y.o. female with history of anxiety, depression, HTN, HLD, alcohol abuse presenting to the hospital for cognitive difficulties and consulted for cognitive difficulties. The patient's neuroimaging has been reassuring. At this time it's most likely her cognitive difficulties are secondary to concussion. I believe her concussion might be more severe than initially thought given her cognitive symptoms. I also explained to her that she has some atrophy in her brain (likely a sequela of alcohol consumption) which likely lowers her cognitive reserve. Would recommend PMR evaluation for concussion management. Will also need neuropsychologic testing. EEG can be pursued on an outpatient basis.     Plan:  -PMR consult.  -MRI reassuring.   -Labs reassuring.  -Would check Vitamin B12 and Folate levels given alcohol consumption. Note MCV of 113. (ordered)  -EEG outpatient.   -No further recommendations at this time. Please call w/ further questions or concerns.   -Plan discussed with consulting physician and patient's nurse.     Babar Horn M.D., Diplomat of the American Board of Psychiatry and Neurology  Diplomat of [employer] Epilepsy Subspecialty   Assistant Clinical Professor, [facility] Neurology Consultant

## 2021-05-26 NOTE — DISCHARGE PLANNING
ATTN: Case Management  RE: Referral for Home Health    Reason for referral denial: due to a limited capacity we are unable to see this patient. Currently, we are only taking SCP at this time.              Unfortunately, we are not able to accept this referral for the reason listed above. If further clarity is needed, our Transitional Care Specialists are available to discuss any barriers to service at [phone].      We look forward to collaborating with you in the future,  [facility]

## 2021-05-26 NOTE — THERAPY
"Physical Therapy   Initial Evaluation     Patient Name: Minerva Tillman  Age:  46 y.o., Sex:  female  Medical Record #: [id number]  Today's Date: 5/26/2021     Precautions: (P) Fall Risk    Assessment  Patient is 46 y.o. female with syncope and fall 3 weeks ago, now with reports of slurred speech, difficulty concentrating, and weakness since initial event.  Other PMH includes anxiety, depression, HCL, HTN. Pt with alcohol intoxication at admission. Pt reports she lives with spouse and 16 year old daughter in 2 story home with \"a few\" steps to enter. She reports needs are met on main level. She reports she was independent with self care and mobility at baseline sans AD. Today, pt reporting she feels she is getting stronger, but noting some deficit of 4+/5 on LEs with testing today. Pt also requiring increased time and effort with bed mobility, due in part to report of R side rib pain from her fall. Pt with ataxic wide base gait with increased lateral sway, though had no overt LOB with ambulation. Pt was not able to demonstrate safe performance of stairs today. Pt will continue to benefit from skilled PT to address deficits, and to ensure safety with gait and stairs prior to D/C home. Recommend pt to have SBA with all mobility. She will benefit from further PT in the home health setting upon D/C home once she demonstrates safety with stairs.    Plan    Recommend Physical Therapy 5 times per week until therapy goals are met for the following treatments:  Bed Mobility, Gait Training, Neuro Re-Education / Balance, Stair Training, Therapeutic Activities and Therapeutic Exercises    DC Equipment Recommendations: (P) None (declines FWW)  Discharge Recommendations: (P) Other - (home health PT with support from family for gait)       Subjective    Pt reporting she is feeling stronger today.     Objective       05/26/21 1045   Pain 0 - 10 Group   Location Rib Cage   Location Orientation Right   Therapist Pain Assessment " "During Activity   Prior Living Situation   Prior Services Home-Independent   Housing / Facility 2 Story House   Steps Into Home 4   Steps In Home   (1 flight; needs met on main level)   Bathroom Set up Walk In Shower;Shower Chair   Equipment Owned None   Lives with - Patient's Self Care Capacity Spouse;Child Less than 18 Years of Age  (daughter age 16; distance learning)   Comments pt reports indpendence with self care and mobility at PLOF;  deficits new over past 3 weeks   Prior Level of Functional Mobility   Bed Mobility Independent   Transfer Status Independent   Ambulation Independent   Distance Ambulation (Feet)   (community)   Assistive Devices Used None   Stairs Independent   History of Falls   History of Falls Yes   Date of Last Fall   (syncope with fall 3 weeks ago)   Cognition    Level of Consciousness Alert   Comments pleasant, cooperative, quiet   Strength Lower Body   Comments 4/5 bilat hip flexion, 4+/5 grossly for remainder bilat LEs   Sensation Lower Body   Comments denies N/T   Lower Body Muscle Tone   Comments resting tremor   Neurological Concerns   Comments tremor   Coordination Lower Body    Coordination Lower Body  WDL   Balance Assessment   Sitting Balance (Static) Fair   Sitting Balance (Dynamic) Fair   Standing Balance (Static) Fair   Standing Balance (Dynamic) Fair -   Weight Shift Sitting Fair   Weight Shift Standing Fair   Comments no AD   Gait Analysis   Gait Level Of Assist Minimal Assist  (SBA)   Assistive Device None;Front Wheel Walker   Distance (Feet) 75   # of Times Distance was Traveled 1   Deviation Ataxic;Increased Base Of Support;Decreased Heel Strike;Decreased Toe Off;Other (Comment)  (guarded gait pattern; slow pace)   # of Stairs Climbed 0   Weight Bearing Status no restrictions   Comments attempted gait with FWW, but pt stating she felt it was \"getting in the way\" and stated she felt better sans AD; increased lateral sway with wide MARIIA   Bed Mobility    Supine to Sit " Minimal Assist  (SBA)   Scooting Supervised   Rolling   (SBA)   Comments slow and effortful with roll to left; reviewed logroll; required increased time and effor to complete bed mobility   Functional Mobility   Sit to Stand Minimal Assist  (CGA)   Toilet Transfers Minimal Assist  (SBA)   Transfer Method Stand Step   Mobility supine->sit EOB->stand->amb->toilet->amb->chair   Comments tremor/shaky with activity   How much difficulty does the patient currently have...   Turning over in bed (including adjusting bedclothes, sheets and blankets)? 3   Sitting down on and standing up from a chair with arms (e.g., wheelchair, bedside commode, etc.) 3   Moving from lying on back to sitting on the side of the bed? 3   How much help from another person does the patient currently need...   Moving to and from a bed to a chair (including a wheelchair)? 3   Need to walk in a hospital room? 3   Climbing 3-5 steps with a railing? 3   6 clicks Mobility Score 18   Activity Tolerance   Sitting in Chair left up in chair   Sitting Edge of Bed 5 minutes approx   Standing 2x4 minutes approx   Comments limited by fatigue   Patient / Family Goals    Patient / Family Goal #1 go home today   Short Term Goals    Short Term Goal # 1 Pt will demonstrate bed mobility with supv within 6 visits in order to return home   Short Term Goal # 2 Pt will transfer bed<->chair with supv within 6 visits in order to return home   Short Term Goal # 3 Pt will ambulate 300' with supv and LRAD or no AD within 6 visits in order to return home   Short Term Goal # 4 Pt will perform 4 steps with rail with SBA within 6 visits as required to enter/exit home   Education Group   Education Provided Role of Physical Therapist   Role of Physical Therapist Patient Response Patient;Acceptance;Explanation;Verbal Demonstration   Problem List    Problems Impaired Bed Mobility;Impaired Transfers;Impaired Ambulation;Functional Strength Deficit;Impaired Balance;Decreased Activity  Tolerance   Anticipated Discharge Equipment and Recommendations   DC Equipment Recommendations None  (declines FWW)   Discharge Recommendations Other -  (home health PT with support from family for gait)

## 2021-05-26 NOTE — PROGRESS NOTES
47 y/o female w/ a fall 3 weeks ago and then w/ subsequent worsening deficits of word finding difficulties, difficulty w/ concentration and w/ writing. Came to [facility] for evaluation and stroke code was called. Given that last known well is weeks ago this does not qualify as a stroke code but rather stroke IR. Will follow images, if they are unremarkable then will need an MRI Brain W/WO CST +/- EEG (if available) and I will evaluate in the am.

## 2021-05-26 NOTE — THERAPY
"Speech Language Pathology   Clinical Swallow Evaluation     Patient Name: Minerva Tillman  AGE:  46 y.o., SEX:  female  Medical Record #: [id number]  Today's Date: 5/26/2021     Precautions: Fall Risk  Comments: shaky, unsteady on his feet    Assessment    Per H&P, Patient is 46 y.o. female \"who presented 5/25/2021 with syncope and difficulty with word finding. Patient had syncopal episode with fall 3 weeks ago and has had worsening concentration, writing skills, and word finding difficulties. Patient denied any chest pain or dyspnea prior to her fall. She states she fell forward had a swollen lip and painful ribs since... Etoh level 275 on admission; Monitor for withdrawal symptoms.\" CXR: No evidence of acute cardiopulmonary process. CT Head: No evidence of acute intracranial process. Brain MRI: \"No evidence of acute infarction in the brain parenchyma; age-related cerebral atrophy.\" No SLP hx on file.     Pt was seen today for CSE. Pt was AAOx4 and fully participatory with therapy objectives. Pt denied hx of dysphagia, PNA and/or GERD. Oral mech exam was grossly WFL/intact; however, Pt reported her bite is \"off\" since she fell on her face a few weeks ago. Pt was provided with PO trials (as noted below.) Pt demonstrated slow, appropriate feeding rate and small bolus size independently. No coughing, choking or other clinical s/sx of aspiration/penetration appreciated with any consistency/texture tested today. Pt denied globus sensation and oral cavity was clean/clear.    At this time, recommend to continue current diet of Regular solids, Thin liquids and meds per tolerance. Please hold PO if any difficulties/concerns or change in status. Thank you.    Plan    Recommend Speech Therapy for Evaluation only for the following treatments:  Dysphagia Training and Patient / Family / Caregiver Education.    Discharge Recommendations: Anticipate that the patient will have no further speech therapy needs after discharge from " the hospital     Objective     05/26/21 1001   Prior Level Of Function   Communication Within Functional Limits   Swallow Within Functional Limits   Dentition Intact   Oral Motor Eval    Is Patient Able to Complete Oral Motor Eval Yes, Within Normal Limits   Laryngeal Function   Voice Quality Within Functional Limits  (soft spoken)   Volutional Cough Moderate  (weak cough, Pt reports due to pain in ribs when )   Excursion Upon Swallow Complete   Oral Food Presentation   Single Swallow Thin (0) Within Functional Limits   Serial Swallow Thin (0) Within Functional Limits   Liquidised (3) Within Functional Limits   Pureed (4) Within Functional Limits   Soft & Bite-Sized (6) - (Dysphagia III) Within Functional Limits   Regular (7) Within Functional Limits   Regular-Easy to Chew (7) Within Functional Limits   Self Feeding Independent   Dysphagia Strategies / Recommendations   Strategies / Interventions Recommended (Yes / No) Yes   Diet / Liquid Recommendation Regular (7);Thin (0)   Medication Administration  Whole with Liquid Wash   Therapy Interventions No Swallowing Intervention Required   Education Group   Education Provided Dysphagia   Dysphagia Patient Response Patient;Acceptance;Explanation;Verbal Demonstration;Action Demonstration

## 2021-05-26 NOTE — THERAPY
Occupational Therapy   Initial Evaluation     Patient Name: Minerva Tillman  Age:  46 y.o., Sex:  female  Medical Record #: [id number]  Today's Date: 5/26/2021     Precautions  Precautions: Fall Risk  Comments: shaky, unsteady on his feet    Assessment  Patient is 46 y.o. female with a diagnosis of word finding difficulty after a syncopal episode and fall about 3 weeks ago. Pt injured R sided ribs and did hit head in the fall.  Blood alcohol was elevated on admit, pt demos tremors in BUE and some through trunk at times.  Pt currently requires Aviva (close CGA) for all mobility and self care tasks.  She is a high risk for falls, and coordination is impaired putting her in harms way with IADl's like meal prep etc.  Pt needs 24 hour supervision and assist to walk at this time, and would benefit from follow up with HH therapy.  OT will follow up while in house.      Plan    Recommend Occupational Therapy 3 times per week until therapy goals are met for the following treatments:  Adaptive Equipment, Neuro Re-Education / Balance, Self Care/Activities of Daily Living, Therapeutic Activities and Therapeutic Exercises.    DC Equipment Recommendations: Unable to determine at this time  Discharge Recommendations: Recommend home health for continued occupational therapy services (and 24 hour supervision (assist with mobility and ADl's))       Initial Contact Note    Initial Contact Note Order Received and Verified, Occupational Therapy Evaluation in Progress with Full Report to Follow.   Prior Living Situation   Prior Services Home-Independent   Housing / Facility 2 Story House   Steps Into Home 4   Steps In Home   (flight, doesn't have to access regularly)   Bathroom Set up Walk In Shower;Shower Chair  (built in shower seat)   Equipment Owned Tub / Shower Seat;Hand Held Shower   Lives with - Patient's Self Care Capacity Spouse;Child Less than 18 Years of Age  (15 yo daughter distance learning from home)   Comments Pt reports  Indep with ADl's and mobilit prior to syncope and fall 3 weeks ago.  Dtr is doing school from home   Prior Level of ADL Function   Self Feeding Independent   Grooming / Hygiene Independent   Bathing Independent   Dressing Independent   Toileting Independent   Comments some difficulty the last few weeks 2/2 rib fractures   Prior Level of IADL Function   Medication Management Independent   Laundry Independent   Kitchen Mobility Independent   Finances Independent   Home Management Independent   Shopping Independent   Prior Level Of Mobility Independent Without Device in Community   Driving / Transportation Driving Independent   Occupation (Pre-Hospital Vocational) Not Employed   Leisure Interests Unable To Determine At This Time   Comments Family has been helping with all this since recent fall with injury   History of Falls   History of Falls Yes   Date of Last Fall   (syncope with fall 3 weeks ago)   Cognition    Cognition / Consciousness WDL   Level of Consciousness Alert   Comments pleasant, cooperative, reserved.  Occasional hesitation with word finding but was able to communicate effectively.  voice slightly shaky    Active ROM Upper Body   Active ROM Upper Body  WDL   Dominant Hand Right   Strength Upper Body   Upper Body Strength  WDL   Sensation Upper Body   Upper Extremity Sensation  WDL   Coordination Upper Body   Coordination X   Fine Motor Coordination impaired by tremors BUE   Comments educated pt to be cautious with hot or sharp objects, drink soup from a cup, coffee from a cup with a lid, use both hands to stabilize   Balance Assessment   Sitting Balance (Static) Fair   Sitting Balance (Dynamic) Fair   Standing Balance (Static) Fair   Standing Balance (Dynamic) Fair -   Weight Shift Sitting Fair   Weight Shift Standing Fair   Comments no device (close CGA)   Bed Mobility    Supine to Sit Minimal Assist  (HOB up with rails)   Scooting Supervised   Comments cued for log roll left, appears to have some  "extra effort required   ADL Assessment   Grooming Supervision  (seated for grooming, face wash, oral care)   Lower Body Dressing Minimal Assist   Toileting Minimal Assist   Functional Mobility   Sit to Stand Minimal Assist  (CGA)   Toilet Transfers Minimal Assist   Transfer Method Stand Step   Mobility amb in room, out into nolan short distance, Aviva for balance without FWW, refusing walker, states she feels \"steadier without it\"   Visual Perception   Comments not specific, but states vision is \"fuzzy\" since she hit her head in with her fall   Activity Tolerance   Sitting in Chair up in chair at end of session   Sitting Edge of Bed 5 min   Standing 2 min, 5 min   Comments limited by fatigue,    Patient / Family Goals   Patient / Family Goal #1 home today   Short Term Goals   Short Term Goal # 1 pt will dress full bosy supervsied in 4 visits   Short Term Goal # 2 Pt will stand 10 min at sink to groom supervised in 4 visits   Short Term Goal # 3 Pt will toilet supervised in 4 visits               "

## 2021-05-26 NOTE — DIETARY
"Nutrition services: Day 0 of admit.  Minerva Tillman is a 46 y.o. female with admitting DX of Syncope and collapse.    Consult received for MST of 3 on nutrition screen due to report of 14-23 lb wt loss x 6 months and poor PO PTA.      Assessment:  Height: 162.6 cm (5' 4\")  Weight: 62.6 kg (138 lb 0.1 oz)(bed scale)  Body mass index is 23.69 kg/m²., BMI classification: WNL  Diet: cardiac    Evaluation:   1. DX includes dysarthria, acute alcohol intoxication, hypothyroid, hx of fatty liver, HTN.   2. RD met with pt in her room. Pt reports 26 lb (16%) wt loss x 2 months. Weight loss is significant. Pt reports that she has had a poor appetite due to stress. She used to eat 3 meals a day and a snack or two. Appetite has diminished to where she is drinking a protein shake for breakfast and some V-8 drinks during the day. She would prepare a healthy, well balanced dinner but she would not have the appetite to eat it. She agreed that her intake has been < 50% of normal. She also admitted to not drinking enough fluids PTA. Pt does not appear malnourished. No fat/muscle loss noted in temporal or orbital region.   3. Pt's uneaten breakfast was at bedside. She did not want Boost because she said she is not going to be in the hospital long. She agreed to Greek yogurt TID with meals. RD encouraged pt to eat regular meals that include fruits, vegetables, whole grains and lean protein. This could help alleviate stress, improve mood and overall improve how pt feels. Pt expressed understanding.   4. Labs: 5/26/21: cholesterol=268, WUU=079  5. Meds: thiamine, MVI, folic acid    Malnutrition Risk: Pt meets criteria for severe malnutrition in the context of social circumstances starvation related due to diminished appetite caused by stress AEB report of 16% wt loss x 2 months and PO < 50% x 2 months.     Recommendations/Plan:  1. Add Greek yogurt to meals TID    2. Encourage intake of >50%  3. Document intake of all meals as % " taken in ADL's to provide interdisciplinary communication across all shifts.   4. Monitor weight.  5. Nutrition rep will continue to see patient for ongoing meal and snack preferences.     RD will follow.

## 2021-05-26 NOTE — ED PROVIDER NOTES
ED Provider Note        Primary care provider: Unique Young M.D.    I verified that the patient was wearing a mask and I was wearing appropriate PPE every time I entered the room. The patient's mask was on the patient at all times during my encounter except for a brief view of the oropharynx.      CHIEF COMPLAINT  Chief Complaint   Patient presents with   • Inability To Get Words Out     3 weeks   • Fall     3 weeks ago    • Syncope     with fall       HPI:   Minerva Tillman is a 46 y.o. female who presents to the Emergency Department with chief complaint of multiple complaints.  Patient's brought in by .  Apparently the patient had a syncopal event 3 weeks prior.  Patient's  stated that he witnessed this.  Stated that she was standing and collapsed without warning.  She struck the right side of her chest and also struck the face.  Regained consciousness shortly thereafter.  She was not evaluated after this event.  Patient and  report that since the time of the event patient seems to be having progressive decline.  The patient reports that she is having trouble spelling words she is having trouble with simple arithmetic she also reports frequent difficulty with word finding and reports what she describes as black holes in her memory.  In regards to the syncope she reports no chest pain prior to the event no palpitations prior to the event no headache prior to the event.  She has had moderate headache since the time but attributes this to hitting her face on the ground.  No fevers no chills no cough congestion she has pain over the right side of her chest where she fell she has no exertional chest pain or shortness of breath no abdominal pain no problems with urination or bowel movements.  Patient reports 1 to 2 glasses of wine daily no other ingestion no other acute symptoms or concerns at this time and she is on no anticoagulation.    REVIEW OF SYSTEMS  10 systems reviewed and  "otherwise negative, pertinent positives and negatives listed in the history of present illness.    PAST MEDICAL HISTORY   has a past medical history of Anxiety, Depression, Gynecological disorder, High cholesterol, Hypertension, Snoring, and Urinary tract infection.    SURGICAL HISTORY   has a past surgical history that includes breast augmentation with implant (2006); mammoplasty augmentation; hysteroscopy novasure-2 (8/2/2017); and dilation and curettage (8/2/2017).    SOCIAL HISTORY  Social History     Tobacco Use   • Smoking status: Never Smoker   • Smokeless tobacco: Never Used   Substance Use Topics   • Alcohol use: Yes     Alcohol/week: 8.4 oz     Types: 14 Glasses of wine per week     Comment: 1-2 glasses per day   • Drug use: No      Social History     Substance and Sexual Activity   Drug Use No       FAMILY HISTORY  Non-Contributory    CURRENT MEDICATIONS  Home Medications    **Home medications have not yet been reviewed for this encounter**         ALLERGIES  Allergies   Allergen Reactions   • Sulfa Drugs Hives     DVE=[id number]       PHYSICAL EXAM  VITAL SIGNS: /100   Pulse 78   Temp 36.3 °C (97.3 °F) (Temporal)   Resp 16   Ht 1.626 m (5' 4\")   Wt 61.4 kg (135 lb 5.8 oz)   SpO2 92%   Breastfeeding No   BMI 23.23 kg/m²   Pulse ox interpretation: I interpret this pulse ox as normal.  Constitutional: Alert and oriented x 3, minimal distress  HEENT: Atraumatic normocephalic, pupils are equal round, extraocular movements are intact. The nares is clear, external ears are normal, mouth shows moist mucous membranes  Neck: Supple, no JVD no tracheal deviation  Cardiovascular: Regular rate and rhythm no murmur rub or gallop   Thorax & Lungs: No respiratory distress, no wheezes rales or rhonchi, right-sided chest wall tenderness  GI: Soft nontender nondistended positive bowel sounds, no peritoneal signs  Skin: Warm dry no obvious acute rash or lesion  Musculoskeletal: Moving all extremities with normal " range strength, no acute  deformity  Neurologic: Anxious, slow to respond at times, slight slurring of speech difficulty with word finding.  Equal smile equal shoulder shrug equal  strength bilateral hands able to maintain feet off bed for 5 seconds bilaterally.  No other focal abnormality.  Psychiatric: Appropriate affect for situation at this time      DIAGNOSTIC STUDIES / PROCEDURES  LABS      Results for orders placed or performed during the hospital encounter of 05/25/21   CBC WITH DIFFERENTIAL   Result Value Ref Range    WBC 5.9 4.8 - 10.8 K/uL    RBC 3.41 (L) 4.20 - 5.40 M/uL    Hemoglobin 13.1 12.0 - 16.0 g/dL    Hematocrit 38.5 37.0 - 47.0 %    .9 (H) 81.4 - 97.8 fL    MCH 38.4 (H) 27.0 - 33.0 pg    MCHC 34.0 33.6 - 35.0 g/dL    RDW 51.8 (H) 35.9 - 50.0 fL    Platelet Count 158 (L) 164 - 446 K/uL    MPV 8.7 (L) 9.0 - 12.9 fL    Neutrophils-Polys 45.50 44.00 - 72.00 %    Lymphocytes 37.00 22.00 - 41.00 %    Monocytes 14.40 (H) 0.00 - 13.40 %    Eosinophils 0.50 0.00 - 6.90 %    Basophils 1.20 0.00 - 1.80 %    Immature Granulocytes 1.40 (H) 0.00 - 0.90 %    Nucleated RBC 0.00 /100 WBC    Neutrophils (Absolute) 2.68 2.00 - 7.15 K/uL    Lymphs (Absolute) 2.18 1.00 - 4.80 K/uL    Monos (Absolute) 0.85 0.00 - 0.85 K/uL    Eos (Absolute) 0.03 0.00 - 0.51 K/uL    Baso (Absolute) 0.07 0.00 - 0.12 K/uL    Immature Granulocytes (abs) 0.08 0.00 - 0.11 K/uL    NRBC (Absolute) 0.00 K/uL   COMP METABOLIC PANEL   Result Value Ref Range    Sodium 140 135 - 145 mmol/L    Potassium 4.3 3.6 - 5.5 mmol/L    Chloride 101 96 - 112 mmol/L    Co2 21 20 - 33 mmol/L    Anion Gap 18.0 (H) 7.0 - 16.0    Glucose 86 65 - 99 mg/dL    Bun 10 8 - 22 mg/dL    Creatinine 0.62 0.50 - 1.40 mg/dL    Calcium 9.4 8.4 - 10.2 mg/dL    AST(SGOT) 132 (H) 12 - 45 U/L    ALT(SGPT) 74 (H) 2 - 50 U/L    Alkaline Phosphatase 101 (H) 30 - 99 U/L    Total Bilirubin 0.6 0.1 - 1.5 mg/dL    Albumin 4.8 3.2 - 4.9 g/dL    Total Protein 7.4 6.0 - 8.2  g/dL    Globulin 2.6 1.9 - 3.5 g/dL    A-G Ratio 1.8 g/dL   PROTHROMBIN TIME   Result Value Ref Range    PT 13.1 12.0 - 14.6 sec    INR 1.02 0.87 - 1.13   APTT   Result Value Ref Range    APTT 29.6 24.7 - 36.0 sec   COD (ADULT)   Result Value Ref Range    ABO Grouping Only O     Rh Grouping Only POS     Antibody Screen-Cod NEG    TROPONIN   Result Value Ref Range    Troponin T <6 6 - 19 ng/L   ABO Rh Confirm   Result Value Ref Range    ABO Rh Confirm O POS    SARS-CoV-2 PCR (24 hour In-House): Collect NP swab in VTM    Specimen: Respirate   Result Value Ref Range    SARS-CoV-2 Source NP Swab    proBrain Natriuretic Peptide, NT   Result Value Ref Range    NT-proBNP 111 0 - 125 pg/mL   ESTIMATED GFR   Result Value Ref Range    GFR If African American >60 >60 mL/min/1.73 m 2    GFR If Non African American >60 >60 mL/min/1.73 m 2   ETHYL ALCOHOL (BLOOD)   Result Value Ref Range    Diagnostic Alcohol 275.5 (H) 0.0 - 10.0 mg/dL   EKG (NOW)   Result Value Ref Range    Report       [facility] Emergency Dept[facility]    Test Date:  2021  Pt Name:    GIULIA HERNANDEZ                   Department: [facility]  MRN:        [id number]                      [facility]  Gender:     Female                       Technician: [facility]  :        1975                   Requested By:NATY GAVIN  Order #:    [id number]                    Reading MD: ANTY GAVIN MD    Measurements  Intervals                                Axis  Rate:       85                           P:          67  DC:         144                          QRS:        64  QRSD:       100                          T:          38  QT:         384  QTc:        457    Interpretive Statements  Sinus rhythm at a rate of 85.  T wave inversion anterior precordial leads no  other acute ischemic or arrhythmic changes.  Electronically Signed On 2021 17:05:36 PDT by NATY GAVIN MD     POCT glucose device results   Result Value Ref  Range    Glucose - Accu-Ck 79 65 - 99 mg/dL       All labs reviewed by me.      RADIOLOGY  CT-CTA NECK WITH & W/O-POST PROCESSING   Final Result      CT angiogram of the neck within normal limits.      CT-CTA HEAD WITH & W/O-POST PROCESS   Final Result      CT angiogram of the [place] within normal limits.      CT-CEREBRAL PERFUSION ANALYSIS   Final Result      1.  Cerebral blood flow less than 30% likely representing completed infarct = 0 mL.      2.  T Max more than 6 seconds likely representing combination of completed infarct and ischemia = 0 mL.      3.  Mismatched volume likely representing ischemic brain/penumbra = None      4.  Please note that the cerebral perfusion was performed on the limited brain tissue around the basal ganglia region. Infarct/ischemia outside the CT perfusion sections can be missed in this study.      DX-CHEST-PORTABLE (1 VIEW)   Final Result      No evidence of acute cardiopulmonary process.      CT-HEAD W/O   Final Result      1.  No evidence of acute intracranial process.      2.  Mild atrophy.      MR-BRAIN-WITH & W/O    (Results Pending)   EC-ECHOCARDIOGRAM COMPLETE W/ CONT    (Results Pending)     The radiologist's interpretation of all radiological studies have been reviewed by me.    COURSE & MEDICAL DECISION MAKING  Pertinent Labs & Imaging studies reviewed. (See chart for details)    5:05 PM - Patient seen and examined at bedside.     Coagulation studies were ordered in light of evaluation of possible ischemia    Patient noted to have slightly elevated blood pressure likely circumstantial secondary to presenting complaint. Referred to primary care physician for further evaluation.      Medical Decision Making: Patient's had progressive altered mental status over the last 3 weeks after a fall.  Patient does have slightly elevated liver enzymes.  She has history of alcohol abuse in the past she reports to me that she is cut down greatly that she is only drinking 1 to 2  "glasses of wine daily.  In light of altered mental status and abnormal liver enzymes diagnostic alcohol was obtained and is approximately 280.  I think that this can explain a large amount of the symptoms and presentation however I do not feel as though this completely explains the patient's abnormal neurologic findings and complaints over the last 3 weeks.  Patient was discussed with Dr. Horn upon arrival prior to results from this information.  At that time we discussed MRI brain and neuro consult tomorrow.  I think that this is still appropriate to complete still think that she needs further work-up for her syncope as well.  I discussed case with Dr. Dela Cruz and she is admitted to the hospital service for ongoing management admitted in guarded condition.    /74   Pulse 75   Temp 36.8 °C (98.2 °F) (Oral)   Resp 20   Ht 1.626 m (5' 4\")   Wt 62.6 kg (138 lb 0.1 oz)   SpO2 94%   Breastfeeding No   BMI 23.69 kg/m²             FINAL IMPRESSION  1. Syncope, unspecified syncope type Active   2. Closed head injury, initial encounter Active   3. Altered mental status, unspecified altered mental status type Active    4.  Possible postconcussive syndrome  5.  Alcohol intoxication  6.  Alcohol pattern transaminitis      This dictation has been created using voice recognition software and/or scribes. The accuracy of the dictation is limited by the abilities of the software and the expertise of the scribes. I expect there may be some errors of grammar and possibly content. I made every attempt to manually correct the errors within my dictation. However, errors related to voice recognition software and/or scribes may still exist and should be interpreted within the appropriate context.            "

## 2021-05-26 NOTE — CARE PLAN
The patient is Watcher - Medium risk of patient condition declining or worsening         Progress made toward(s) clinical / shift goals:  Pt sleeping    Patient is not progressing towards the following goals:

## 2021-05-26 NOTE — ASSESSMENT & PLAN NOTE
Hx of   AST/ALT elevated from prior labs   Recheck CMP in am   Previous hepatitis panel- negative   Previous abd US showed- Echogenic liver consistent with fatty infiltration with multiple hepatic cysts

## 2021-05-26 NOTE — FACE TO FACE
Face to Face Supporting Documentation - Home Health    The encounter with this patient was in whole or in part the primary reason for home health admission.    Date of encounter:   Patient:                    MRN:                       YOB: 2022  Minerva Tillman  [id number]  1975     Home health to see patient for:  Skilled Nursing care for assessment, interventions & education    Skilled need for:  New Onset Medical Diagnosis consussion    Skilled nursing interventions to include:  Comment: unsteady gait    Homebound status evidenced by:  Need the aid of supportive devices such as crutches, canes, wheelchairs or walkers. Leaving home requires a considerable and taxing effort. There is a normal inability to leave the home.    Community Physician to provide follow up care: Unique Young M.D.     Optional Interventions? No      I certify the face to face encounter for this home health care referral meets the CMS requirements and the encounter/clinical assessment with the patient was, in whole, or in part, for the medical condition(s) listed above, which is the primary reason for home health care. Based on my clinical findings: the service(s) are medically necessary, support the need for home health care, and the homebound criteria are met.  I certify that this patient has had a face to face encounter by myself.  Ly Grider M.D. - NPI: [id number]

## 2021-05-26 NOTE — H&P
Hospital Medicine History & Physical Note    Date of Service  5/25/2021    Primary Care Physician  Unique Young M.D.    Consultants  Neuro- Dr. Horn     Code Status  Full Code    Chief Complaint  Chief Complaint   Patient presents with   • Inability To Get Words Out     3 weeks   • Fall     3 weeks ago    • Syncope     with fall       History of Presenting Illness  46 y.o. female who presented 5/25/2021 with syncope and difficulty with word finding. Patient had syncopal episode with fall 3 weeks ago and has had worsening concentration, writing skills, and word finding difficulties. Patient denied any chest pain or dyspnea prior to her fall. She states she fell forward had a swollen lip and painful ribs since. Per  at bedside no post ictal state, and they contributed the event to orthostatics as she was hypotensive after the event. Since the fall, she reports increasing difficulty with word finding and her family members have commented on her slurred speech.  Due to her slurred speech and difficulty with word finding code stroke was called on arrival to the ER.  Patient denies any fevers, chills, chest pain, shortness of breath, or recent sick contacts.    Work-up in the ER platelets 158, , ALT 74, and alcohol level 275.5.  CT head, CT perfusion, CTA head and neck showed no acute findings.  Neurology has been consulted by the ED and patient will be admitted for further work-up.     Review of Systems  Review of Systems   Constitutional: Negative for chills and fever.   Respiratory: Negative for shortness of breath.    Cardiovascular: Negative for chest pain.   Gastrointestinal: Negative for abdominal pain, nausea and vomiting.   Genitourinary: Negative for dysuria.   Musculoskeletal: Positive for falls. Negative for myalgias.   Skin: Negative for rash.   Neurological: Positive for speech change. Negative for dizziness and headaches.        Word finding difficulties    Psychiatric/Behavioral:  Negative for depression.   All other systems reviewed and are negative.      Past Medical History   has a past medical history of Anxiety, Depression, Gynecological disorder, High cholesterol, Hypertension, Snoring, and Urinary tract infection.    Surgical History   has a past surgical history that includes pr breast augmentation with implant (2006); mammoplasty augmentation; hysteroscopy novasure-2 (8/2/2017); and dilation and curettage (8/2/2017).     Family History  family history includes Asthma in her father; Cancer (age of onset: 60) in her maternal grandfather and maternal grandmother; Depression in her father; Hyperlipidemia in her father and mother; Hypertension in her brother, brother, father, and mother; Other in her paternal grandmother; Stroke in her maternal grandmother.     Social History   reports that she has never smoked. She has never used smokeless tobacco. She reports current alcohol use of about 8.4 oz of alcohol per week. She reports that she does not use drugs.    Allergies  Allergies   Allergen Reactions   • Sulfa Drugs Hives     [id number]       Medications  Prior to Admission Medications   Prescriptions Last Dose Informant Patient Reported? Taking?   Cholecalciferol (VITAMIN D3) 2000 units Chew Tab Not Taking at Unknown time Patient No No   Sig: Take 1 Cap by mouth every day.   Patient not taking: Reported on 5/25/2021   atorvastatin (LIPITOR) 10 MG Tab Not Taking at Unknown time Patient No No   Sig: Take 1 Tab by mouth every day.   Patient not taking: Reported on 5/25/2021   fluticasone (FLONASE) 50 MCG/ACT nasal spray Not Taking at Unknown time Patient No No   Sig: Spray 1 Spray in nose 2 times a day.   Patient not taking: Reported on 5/25/2021   ibuprofen (MOTRIN) 200 MG Tab 5/19/2021 at unknown Patient Yes Yes   Sig: Take 400 mg by mouth every 6 hours as needed for Mild Pain.   levothyroxine (SYNTHROID) 50 MCG Tab 5/25/2021 at 0800 Patient No No   Sig: Take 1 Tab by mouth Every morning  on an empty stomach.   olmesartan (BENICAR) 40 MG Tab Not Taking at Unknown time Patient No No   Sig: Take 1 Tab by mouth every day.   Patient not taking: Reported on 5/25/2021   ondansetron (ZOFRAN ODT) 4 MG TABLET DISPERSIBLE Not Taking at Unknown time Patient No No   Sig: Take 1-2 Tabs by mouth every 6 hours as needed for Nausea.   Patient not taking: Reported on 5/25/2021   spironolactone (ALDACTONE) 25 MG Tab Not Taking at Unknown time Patient No No   Sig: Take 1 Tab by mouth 2 times a day.   Patient not taking: Reported on 5/25/2021   traZODone (DESYREL) 50 MG Tab Not Taking at Unknown time Patient No No   Sig: Take 1-2 Tabs by mouth every bedtime.   Patient not taking: Reported on 5/25/2021      Facility-Administered Medications: None       Physical Exam  Temp:  [36.3 °C (97.3 °F)] 36.3 °C (97.3 °F)  Pulse:  [78-85] 85  Resp:  [12-16] 12  BP: (127-139)/() 127/83  SpO2:  [92 %-96 %] 96 %    Physical Exam  Vitals and nursing note reviewed.   Constitutional:       General: She is not in acute distress.     Appearance: She is not ill-appearing.   HENT:      Head: Normocephalic and atraumatic.   Eyes:      Extraocular Movements: Extraocular movements intact.      Conjunctiva/sclera: Conjunctivae normal.      Pupils: Pupils are equal, round, and reactive to light.      Comments: Mild horizontal nystagmus   Cardiovascular:      Rate and Rhythm: Normal rate and regular rhythm.      Pulses: Normal pulses.      Heart sounds: Normal heart sounds.   Pulmonary:      Effort: Pulmonary effort is normal. No respiratory distress.      Breath sounds: Normal breath sounds.   Abdominal:      General: Abdomen is flat. There is no distension.      Palpations: Abdomen is soft.      Tenderness: There is no abdominal tenderness.   Musculoskeletal:         General: Normal range of motion.      Cervical back: Normal range of motion and neck supple.      Right lower leg: No edema.      Left lower leg: No edema.   Skin:      General: Skin is warm and dry.   Neurological:      General: No focal deficit present.      Mental Status: She is alert and oriented to person, place, and time.      Sensory: No sensory deficit.      Motor: No weakness.      Comments: Dysarthria  Mild word finding difficulties  Finger-nose-finger intact  No pronator drift   Psychiatric:         Mood and Affect: Mood is anxious.         Behavior: Behavior normal.         Laboratory:  Recent Labs     05/25/21  1653   WBC 5.9   RBC 3.41*   HEMOGLOBIN 13.1   HEMATOCRIT 38.5   .9*   MCH 38.4*   MCHC 34.0   RDW 51.8*   PLATELETCT 158*   MPV 8.7*     Recent Labs     05/25/21  1653   SODIUM 140   POTASSIUM 4.3   CHLORIDE 101   CO2 21   GLUCOSE 86   BUN 10   CREATININE 0.62   CALCIUM 9.4     Recent Labs     05/25/21  1653   ALTSGPT 74*   ASTSGOT 132*   ALKPHOSPHAT 101*   TBILIRUBIN 0.6   GLUCOSE 86     Recent Labs     05/25/21  1653   APTT 29.6   INR 1.02     Recent Labs     05/25/21  1653   NTPROBNP 111         Recent Labs     05/25/21  1653   TROPONINT <6       Imaging:  CT-CTA NECK WITH & W/O-POST PROCESSING   Final Result      CT angiogram of the neck within normal limits.      CT-CTA HEAD WITH & W/O-POST PROCESS   Final Result      CT angiogram of the [place] within normal limits.      CT-CEREBRAL PERFUSION ANALYSIS   Final Result      1.  Cerebral blood flow less than 30% likely representing completed infarct = 0 mL.      2.  T Max more than 6 seconds likely representing combination of completed infarct and ischemia = 0 mL.      3.  Mismatched volume likely representing ischemic brain/penumbra = None      4.  Please note that the cerebral perfusion was performed on the limited brain tissue around the basal ganglia region. Infarct/ischemia outside the CT perfusion sections can be missed in this study.      DX-CHEST-PORTABLE (1 VIEW)   Final Result      No evidence of acute cardiopulmonary process.      CT-HEAD W/O   Final Result      1.  No evidence of  acute intracranial process.      2.  Mild atrophy.      MR-BRAIN-WITH & W/O    (Results Pending)   EC-ECHOCARDIOGRAM COMPLETE W/ CONT    (Results Pending)         Assessment/Plan:  I anticipate this patient is appropriate for observation status at this time.    Dysarthria- (present on admission)  Assessment & Plan  with word finding difficulties   Code stroke, imaging reviewed and negative for acute findings   Possibly 2/2 alcohol use  Neurology consulted  MRI brain pending  Metabolic workup pending     Acute alcohol intoxication (HCC)- (present on admission)  Assessment & Plan  Etoh level 275 on admission   Banana bag  MVI, folate, thiamine   Monitor for withdrawal symptoms     Syncope  Assessment & Plan  Possibly orthostatic as  states patient's blood pressure low after event  Also possibly 2/2 vasovagal vs alcohol   Echo pending   CTA head neck wnl   Tele monitoring     Acquired hypothyroidism- (present on admission)  Assessment & Plan  Continue levothyroxine   Check TSH/T4    Fatty liver, alcoholic- (present on admission)  Assessment & Plan  Hx of   AST/ALT elevated from prior labs   Recheck CMP in am   Previous hepatitis panel- negative   Previous abd US showed- Echogenic liver consistent with fatty infiltration with multiple hepatic cysts    Essential hypertension- (present on admission)  Assessment & Plan  Hold ARB and spironolactone for now patient reports she has not been taking them for concern of hypotension    DVT prophylaxis: Lovenox

## 2021-05-26 NOTE — DISCHARGE PLANNING
Anticipated Discharge Disposition: D/C to Home with HH    Action: Pt will be discharging home, per PT/OT recommendations, she is appropriate for HH. LSW met with pt at bedside to discuss discharge plan and obtain choice for HH. Pt agreeable, LSW answered questions regarding what to expect and services offered through HH. Pt made choice for [facility] given she has [facility].    LSW provided pt with a list of providers for drug and alcohol counseling covered through her insurance.    Barriers to Discharge: HH.    Plan: Pt anticipated to d/c home with HH.

## 2021-05-26 NOTE — DISCHARGE PLANNING
Received Choice form at 2157  Agency/Facility Name: [facility]  Referral sent per Choice form @ 7356

## 2021-05-26 NOTE — ASSESSMENT & PLAN NOTE
Etoh level 275 on admission   Banana bag  MVI, folate, thiamine   Monitor for withdrawal symptoms

## 2021-05-26 NOTE — ED NOTES
Med Rec completed: per Pt at bedside.    Pt reports only taking synthroid regularly since her fall 3 weeks ago.     No ORAL antibiotics in last 14 days    Preferred [facility]acy: [facility] P: [phone]    Pt confirmed following allergies:  Allergies   Allergen Reactions   • Sulfa Drugs Hives     [id number]        Pt's home medications:   Medication Sig   • ibuprofen (MOTRIN) 200 MG Tab Take 400 mg by mouth every 6 hours as needed for Mild Pain.   • ondansetron (ZOFRAN ODT) 4 MG TABLET DISPERSIBLE Take 1-2 Tabs by mouth every 6 hours as needed for Nausea. (Patient not taking: Reported on 5/25/2021)   • fluticasone (FLONASE) 50 MCG/ACT nasal spray Spray 1 Spray in nose 2 times a day. (Patient not taking: Reported on 5/25/2021)   • [DISCONTINUED] SUMAtriptan (IMITREX) 25 MG Tab tablet Take  mg by mouth Once PRN for Migraine.   • spironolactone (ALDACTONE) 25 MG Tab Take 1 Tab by mouth 2 times a day. (Patient not taking: Reported on 5/25/2021)   • levothyroxine (SYNTHROID) 50 MCG Tab Take 1 Tab by mouth Every morning on an empty stomach.   • olmesartan (BENICAR) 40 MG Tab Take 1 Tab by mouth every day. (Patient not taking: Reported on 5/25/2021)   • traZODone (DESYREL) 50 MG Tab Take 1-2 Tabs by mouth every bedtime. (Patient not taking: Reported on 5/25/2021)   • atorvastatin (LIPITOR) 10 MG Tab Take 1 Tab by mouth every day. (Patient not taking: Reported on 5/25/2021)   • Cholecalciferol (VITAMIN D3) 2000 units Chew Tab Take 1 Cap by mouth every day. (Patient not taking: Reported on 5/25/2021)

## 2021-05-26 NOTE — PROGRESS NOTES
2 RN skin check complete  Device in place telebox, compression sox  Skin assessed under devices inact  Confirmed pressure ulcers found on n/a  New potential pressure ulcers noted on n/a wound consult placed n/a  The following interventions in place n/a.

## 2021-05-26 NOTE — ASSESSMENT & PLAN NOTE
with word finding difficulties   Code stroke, imaging reviewed and negative for acute findings   Possibly 2/2 alcohol use  Neurology consulted  MRI brain pending  Metabolic workup pending

## 2021-05-27 NOTE — PROGRESS NOTES
Patient discharge education completed with all questions answered. Cardiac diet and exercise reinforced. Patient verbalized understanding with t/b 100%. Patient stable with no signs of distress or acute needs at this time. Patient refused hosptial transport to [facility] for discharge. Patient and patient  walked to main lob with all belongings.

## 2021-05-27 NOTE — DISCHARGE SUMMARY
Discharge Summary    CHIEF COMPLAINT ON ADMISSION  Chief Complaint   Patient presents with   • Inability To Get Words Out     3 weeks   • Fall     3 weeks ago    • Syncope     with fall       Reason for Admission  Fall, memory loss, confusion     Admission Date  5/25/2021    CODE STATUS  Full Code    HPI & HOSPITAL COURSE  46 y.o. female who presented 5/25/2021 with syncope and difficulty with word finding. Patient had syncopal episode with fall 3 weeks ago and has had worsening concentration, writing skills, and word finding difficulties. Patient denied any chest pain or dyspnea prior to her fall. She states she fell forward had a swollen lip and painful ribs since. Since the fall, she reports increasing difficulty with word finding and her family members have commented on her slurred speech.  Due to her slurred speech and difficulty with word finding code stroke was called on arrival to the ER.  Patient denies any fevers, chills, chest pain, shortness of breath, or recent sick contacts.    Work-up in the ER platelets 158, , ALT 74, and alcohol level 275.5. CT head, CT perfusion, CTA head and neck showed no acute findings.     MRI showed Age-related cerebral atrophy. No evidence of acute infarction in the brain parenchyma.    Neurology is consulted,  rec PMR consult, EEG outpatient. PT/OT rec HHC. However due to patient's acute alcoholic intoxications, HHC has been declined.     Patient is eager to go home tonight. I highly recommends patient to stay one night to have physiatry evaluation and monitor for any signs of alcohol withdrawal. However per patient and , she has strong family support and  and daughters will be with her all the time.  is a MD and is aware the signs of alcohol withdrawal including anxiety, tremors, seizures and signs of DT. They understood they will seek medical attention ASAP in the case patient developed these signs.     I discussed alcohol cessation with  patient. Resources have been provided to her as well.      Therefore, she is discharged in fair and stable condition to home with close outpatient follow-up. I prescribed with MV, folate and vitB1. Outpatient PT/OT/speech training scripts were prescribed.     The patient recovered much more quickly than anticipated on admission.    Discharge Date  5/26/21    FOLLOW UP ITEMS POST DISCHARGE  PCP  Neurology for outpatient EEG  PT/OT/speech    DISCHARGE DIAGNOSES  Active Problems:    Essential hypertension POA: Yes    Fatty liver, alcoholic POA: Yes      Overview: IMO load March 2020    Acquired hypothyroidism POA: Yes    Syncope POA: Unknown    Dysarthria POA: Yes    Acute alcohol intoxication (HCC) POA: Yes  Resolved Problems:    * No resolved hospital problems. *      FOLLOW UP  No future appointments.  Unique Young M.D.  [address] [facility] [facility]  [address] [place] [id number]  [phone]    In 1 week      neurology    In 1 week  for outpatient EEG      MEDICATIONS ON DISCHARGE     Medication List      START taking these medications      Instructions   folic acid 1 MG Tabs  Start taking on: May 27, 2021  Commonly known as: FOLVITE   Take 1 tablet by mouth every day for 30 days.  Dose: 1 mg     multivitamin Tabs  Start taking on: May 27, 2021   Take 1 tablet by mouth every day for 30 days.  Dose: 1 tablet     thiamine 100 MG tablet  Start taking on: May 27, 2021  Commonly known as: THIAMINE   Take 1 tablet by mouth every day for 30 days.  Dose: 100 mg        CONTINUE taking these medications      Instructions   atorvastatin 10 MG Tabs  Commonly known as: LIPITOR   Take 1 Tab by mouth every day.  Dose: 10 mg     fluticasone 50 MCG/ACT nasal spray  Commonly known as: FLONASE   Spray 1 Spray in nose 2 times a day.  Dose: 1 Spray     ibuprofen 200 MG Tabs  Commonly known as: MOTRIN   Take 400 mg by mouth every 6 hours as needed for Mild Pain.  Dose: 400 mg     levothyroxine 50 MCG Tabs  Commonly known as: SYNTHROID    Take 1 Tab by mouth Every morning on an empty stomach.  Dose: 50 mcg     olmesartan 40 MG Tabs  Commonly known as: BENICAR   Take 1 Tab by mouth every day.  Dose: 40 mg     ondansetron 4 MG Tbdp  Commonly known as: Zofran ODT   Take 1-2 Tabs by mouth every 6 hours as needed for Nausea.  Dose: 4-8 mg     spironolactone 25 MG Tabs  Commonly known as: ALDACTONE   Take 1 Tab by mouth 2 times a day.  Dose: 25 mg     traZODone 50 MG Tabs  Commonly known as: DESYREL   Take 1-2 Tabs by mouth every bedtime.  Dose:  mg     Vitamin D3 50 MCG (2000 UT) Chew   Take 1 Cap by mouth every day.  Dose: 1 capsule            Allergies  Allergies   Allergen Reactions   • Sulfa Drugs Hives     LAY=[id number]       DIET  Orders Placed This Encounter   Procedures   • Diet Order Diet: Cardiac     Standing Status:   Standing     Number of Occurrences:   1     Order Specific Question:   Diet:     Answer:   Cardiac [6]       ACTIVITY  As tolerated.  Weight bearing as tolerated    CONSULTATIONS  neurology    PROCEDURES  none    LABORATORY  Lab Results   Component Value Date    SODIUM 139 05/26/2021    POTASSIUM 3.8 05/26/2021    CHLORIDE 100 05/26/2021    CO2 23 05/26/2021    GLUCOSE 73 05/26/2021    BUN 8 05/26/2021    CREATININE 0.67 05/26/2021    CREATININE 0.83 02/01/2013        Lab Results   Component Value Date    WBC 4.8 05/26/2021    WBC 5.2 02/01/2013    HEMOGLOBIN 12.2 05/26/2021    HEMATOCRIT 35.7 (L) 05/26/2021    PLATELETCT 138 (L) 05/26/2021        Total time of the discharge process exceeds 40 minutes.

## 2021-05-27 NOTE — DISCHARGE INSTRUCTIONS
Discharge Instructions    Discharged to home by car with relative. Discharged via walking, hospital escort: Yes.  Special equipment needed: Walker    Be sure to schedule a follow-up appointment with your primary care doctor or any specialists as instructed.     Discharge Plan:        I understand that a diet low in cholesterol, fat, and sodium is recommended for good health. Unless I have been given specific instructions below for another diet, I accept this instruction as my diet prescription.   Other diet: Cardiac    Special Instructions: None    · Is patient discharged on Warfarin / Coumadin?   No     Depression / Suicide Risk    As you are discharged from this [facility] facility, it is important to learn how to keep safe from harming yourself.    Recognize the warning signs:  · Abrupt changes in personality, positive or negative- including increase in energy   · Giving away possessions  · Change in eating patterns- significant weight changes-  positive or negative  · Change in sleeping patterns- unable to sleep or sleeping all the time   · Unwillingness or inability to communicate  · Depression  · Unusual sadness, discouragement and loneliness  · Talk of wanting to die  · Neglect of personal appearance   · Rebelliousness- reckless behavior  · Withdrawal from people/activities they love  · Confusion- inability to concentrate     If you or a loved one observes any of these behaviors or has concerns about self-harm, here's what you can do:  · Talk about it- your feelings and reasons for harming yourself  · Remove any means that you might use to hurt yourself (examples: pills, rope, extension cords, firearm)  · Get professional help from the community (Mental Health, Substance Abuse, psychological counseling)  · Do not be alone:Call your Safe Contact- someone whom you trust who will be there for you.  · Call your local CRISIS HOTLINE 759-9987 or 389-388-2081  · Call your local Children's Mobile Crisis Response  Team [facility] (599) 536-8353 or www.WOWIO  · Call the toll free National Suicide Prevention Hotlines   · National Suicide Prevention Lifeline 670-033-NQRZ (3393)  · National Hope Line Network 800-SUICIDE (803-9584)    Discharge Instructions per Ly Grider M.D.    1. Please follow-up with PCP as outpatient. Please refer your PCP for outpatient PT/OT/speech training.  2. Please follow up with neurology as outpatient. Outpatient EEG is advised.  3. Please stop drinking alcohol. Alcohol cessation resources have been provided you.  4. You are at high risk for alcohol withdrawal. If you develop worsening tremor/shaking, seizures or delirium, please seek medical attention ASAP    DIET: cardiac diet    ACTIVITY: As tolerated    DIAGNOSIS: Dysarthria, alcohol intoxication, concussion of head    Return to ER in the event of new or worsening symptoms. Please note importance of compliance and the patient has agreed to proceed with all medical recommendations and follow up plan indicated above. All medications come with benefits and risks. Risks may include permanent injury or death and these risks can be minimized with close reassessment and monitoring. Please make it to your scheduled follow ups with PCP and neurology

## 2021-06-21 ENCOUNTER — DOCUMENTATION (OUTPATIENT)
Dept: VASCULAR LAB | Facility: MEDICAL CENTER | Age: 46
End: 2021-06-21

## 2021-07-21 ENCOUNTER — APPOINTMENT (OUTPATIENT)
Dept: VASCULAR LAB | Facility: MEDICAL CENTER | Age: 46
End: 2021-07-21
Payer: COMMERCIAL

## 2021-07-27 ENCOUNTER — DOCUMENTATION (OUTPATIENT)
Dept: VASCULAR LAB | Facility: MEDICAL CENTER | Age: 46
End: 2021-07-27

## 2021-07-28 NOTE — PROGRESS NOTES
Patient overdue for vascular medicine follow-up  She canceled another appointment this month  Pending further patient contact, we will defer all further management to PCP unless otherwise requested    Michael Bloch, MD  Vascular Medicine    Cc: CHRISTIANA Young

## 2021-08-06 ENCOUNTER — OFFICE VISIT (OUTPATIENT)
Dept: MEDICAL GROUP | Facility: MEDICAL CENTER | Age: 46
End: 2021-08-06
Payer: COMMERCIAL

## 2021-08-06 VITALS
BODY MASS INDEX: 23.21 KG/M2 | SYSTOLIC BLOOD PRESSURE: 102 MMHG | DIASTOLIC BLOOD PRESSURE: 70 MMHG | OXYGEN SATURATION: 96 % | TEMPERATURE: 98.2 F | HEIGHT: 63 IN | HEART RATE: 86 BPM | WEIGHT: 131 LBS

## 2021-08-06 DIAGNOSIS — R41.3 MEMORY CHANGE: ICD-10-CM

## 2021-08-06 DIAGNOSIS — R79.89 ELEVATED LFTS: ICD-10-CM

## 2021-08-06 DIAGNOSIS — E03.9 ACQUIRED HYPOTHYROIDISM: ICD-10-CM

## 2021-08-06 DIAGNOSIS — F10.10 ALCOHOL ABUSE: ICD-10-CM

## 2021-08-06 DIAGNOSIS — F10.29 ALCOHOL DEPENDENCE WITH UNSPECIFIED ALCOHOL-INDUCED DISORDER (HCC): ICD-10-CM

## 2021-08-06 DIAGNOSIS — R71.8 ELEVATED MCV: ICD-10-CM

## 2021-08-06 PROCEDURE — 99214 OFFICE O/P EST MOD 30 MIN: CPT | Performed by: FAMILY MEDICINE

## 2021-08-06 ASSESSMENT — PATIENT HEALTH QUESTIONNAIRE - PHQ9
5. POOR APPETITE OR OVEREATING: NOT AT ALL
9. THOUGHTS THAT YOU WOULD BE BETTER OFF DEAD, OR OF HURTING YOURSELF: NOT AT ALL
1. LITTLE INTEREST OR PLEASURE IN DOING THINGS: NOT AT ALL
SUM OF ALL RESPONSES TO PHQ9 QUESTIONS 1 AND 2: 0
3. TROUBLE FALLING OR STAYING ASLEEP OR SLEEPING TOO MUCH: NOT AT ALL
8. MOVING OR SPEAKING SO SLOWLY THAT OTHER PEOPLE COULD HAVE NOTICED. OR THE OPPOSITE, BEING SO FIGETY OR RESTLESS THAT YOU HAVE BEEN MOVING AROUND A LOT MORE THAN USUAL: NOT AT ALL
SUM OF ALL RESPONSES TO PHQ QUESTIONS 1-9: 0
6. FEELING BAD ABOUT YOURSELF - OR THAT YOU ARE A FAILURE OR HAVE LET YOURSELF OR YOUR FAMILY DOWN: NOT AL ALL
2. FEELING DOWN, DEPRESSED, IRRITABLE, OR HOPELESS: NOT AT ALL
4. FEELING TIRED OR HAVING LITTLE ENERGY: NOT AT ALL
7. TROUBLE CONCENTRATING ON THINGS, SUCH AS READING THE NEWSPAPER OR WATCHING TELEVISION: NOT AT ALL

## 2021-08-06 ASSESSMENT — FIBROSIS 4 INDEX: FIB4 SCORE: 3.83

## 2021-08-07 NOTE — PROGRESS NOTES
Subjective:     CC: Diagnoses of Alcohol abuse, Elevated MCV, Elevated LFTs, Acquired hypothyroidism, and Alcohol dependence with unspecified alcohol-induced disorder (HCC) were pertinent to this visit.    HPI: Patient is a 46 y.o. female established patient who presents today to follow up on ER visit.  Patient was seen in the ER on 5/25/2021.  There was concern for possible stroke as she was having difficulty speaking and had a fall.  In the ER she was found to have elevated liver function testing and a high alcohol level.  CT head CT perfusion and CTA of the head and neck showed no concerning findings.  MRI showed age-related cerebral atrophy, no evidence of infarct.    Alcohol abuse  Usually drinks 1 bottle of wine per day.   Drinks small amounts throughout the day. If she does not she starts to have very significant w/d symptoms. She is open to doing the IOP.   + Tremors    Elevated liver function testing  Chronic problem, due to alcohol use.  The patient has been cutting down significantly.      Past Medical History:   Diagnosis Date   • Anxiety    • Depression    • Gynecological disorder    • High cholesterol    • Hypertension    • Snoring    • Urinary tract infection        Social History     Tobacco Use   • Smoking status: Never Smoker   • Smokeless tobacco: Never Used   Substance Use Topics   • Alcohol use: Yes     Alcohol/week: 8.4 oz     Types: 14 Glasses of wine per week     Comment: 1-2 glasses per day   • Drug use: No       Current Outpatient Medications Ordered in Epic   Medication Sig Dispense Refill   • levothyroxine (SYNTHROID) 50 MCG Tab Take 1 Tab by mouth Every morning on an empty stomach. 90 Tab 3     No current Epic-ordered facility-administered medications on file.       Allergies:  Sulfa drugs    Health Maintenance: Completed    ROS:  Pulm: no sob, no cough  CV: no chest pain, no palpitations      Objective:       Exam:  /70 (BP Location: Left arm, Patient Position: Sitting, BP Cuff  "Size: Adult long)   Pulse 86   Temp 36.8 °C (98.2 °F) (Temporal)   Ht 1.6 m (5' 3\")   Wt 59.4 kg (131 lb)   SpO2 96%   BMI 23.21 kg/m²  Body mass index is 23.21 kg/m².    General: Normal appearing. No distress.  HEAD: NCAT  EYES: conjunctiva clear, lids without ptosis, pupils equal and reactive to light  EARS: ears normal shape and contour.  MOUTH: normal dentition   Neck:  Normal ROM  Pulmonary: Clear to auscultation bilaterally, no wheezes rales or rhonchi.  Normal effort. Normal respiratory rate.  Cardiovascular: Regular rate and rhythm, no murmurs rubs or gallops.  Well perfused. No LE edema  Neurologic: Grossly normal, no focal deficits  Skin: Warm and dry.  No obvious lesions.  Musculoskeletal: Normal gait and station.   Psych: Normal mood and affect. Alert and oriented x3. Judgment and insight is normal.    Labs: 5/26/21 Results reviewed and discussed with the patient, questions answered.    Assessment & Plan:     46 y.o. female with the following -     1. Alcohol abuse      2. Elevated MCV  - CBC WITH DIFFERENTIAL; Future    3. Elevated LFTs  - Comp Metabolic Panel; Future    4. Acquired hypothyroidism  - TSH WITH REFLEX TO FT4; Future    5. Alcohol dependence with unspecified alcohol-induced disorder (HCC)  Chronic problem.  Patient has significant alcohol dependence.  She has been cutting down but is unable to quit that she has severe withdrawal symptoms when she goes more than few hours without drinking during the day.  Referral placed to the intensive outpatient program.  - REFERRAL TO BEHAVIORAL HEALTH      Return in about 4 weeks (around 9/3/2021).    Please note that this dictation was created using voice recognition software. I have made every reasonable attempt to correct obvious errors, but I expect that there are errors of grammar and possibly content that I did not discover before finalizing the note.      "

## 2021-09-04 NOTE — TELEPHONE ENCOUNTER
----- Message from Michael J Bloch, M.D. sent at 10/3/2019 12:14 PM PDT -----  Regarding: abpm  Please let her know that I reviewed her abpm.  Her bps are sort of all over the place, but none of them look too bad.  We can go over the details at follow up visit.    Bloch   bell in reach.

## 2022-04-18 ENCOUNTER — HOSPITAL ENCOUNTER (OUTPATIENT)
Dept: LAB | Facility: MEDICAL CENTER | Age: 47
End: 2022-04-18
Attending: FAMILY MEDICINE
Payer: COMMERCIAL

## 2022-04-18 DIAGNOSIS — R79.89 ELEVATED LFTS: ICD-10-CM

## 2022-04-18 DIAGNOSIS — E03.9 ACQUIRED HYPOTHYROIDISM: ICD-10-CM

## 2022-04-18 DIAGNOSIS — R71.8 ELEVATED MCV: ICD-10-CM

## 2022-04-18 LAB
ALBUMIN SERPL BCP-MCNC: 4.3 G/DL (ref 3.2–4.9)
ALBUMIN/GLOB SERPL: 1.8 G/DL
ALP SERPL-CCNC: 72 U/L (ref 30–99)
ALT SERPL-CCNC: 41 U/L (ref 2–50)
ANION GAP SERPL CALC-SCNC: 11 MMOL/L (ref 7–16)
AST SERPL-CCNC: 64 U/L (ref 12–45)
BASOPHILS # BLD AUTO: 1.3 % (ref 0–1.8)
BASOPHILS # BLD: 0.08 K/UL (ref 0–0.12)
BILIRUB SERPL-MCNC: 1.3 MG/DL (ref 0.1–1.5)
BUN SERPL-MCNC: 7 MG/DL (ref 8–22)
CALCIUM SERPL-MCNC: 9.5 MG/DL (ref 8.5–10.5)
CHLORIDE SERPL-SCNC: 100 MMOL/L (ref 96–112)
CO2 SERPL-SCNC: 25 MMOL/L (ref 20–33)
CREAT SERPL-MCNC: 0.5 MG/DL (ref 0.5–1.4)
EOSINOPHIL # BLD AUTO: 0.02 K/UL (ref 0–0.51)
EOSINOPHIL NFR BLD: 0.3 % (ref 0–6.9)
ERYTHROCYTE [DISTWIDTH] IN BLOOD BY AUTOMATED COUNT: 50 FL (ref 35.9–50)
GFR SERPLBLD CREATININE-BSD FMLA CKD-EPI: 115 ML/MIN/1.73 M 2
GLOBULIN SER CALC-MCNC: 2.4 G/DL (ref 1.9–3.5)
GLUCOSE SERPL-MCNC: 100 MG/DL (ref 65–99)
HCT VFR BLD AUTO: 41.6 % (ref 37–47)
HGB BLD-MCNC: 14.1 G/DL (ref 12–16)
IMM GRANULOCYTES # BLD AUTO: 0.03 K/UL (ref 0–0.11)
IMM GRANULOCYTES NFR BLD AUTO: 0.5 % (ref 0–0.9)
LYMPHOCYTES # BLD AUTO: 0.98 K/UL (ref 1–4.8)
LYMPHOCYTES NFR BLD: 15.7 % (ref 22–41)
MCH RBC QN AUTO: 38.6 PG (ref 27–33)
MCHC RBC AUTO-ENTMCNC: 33.9 G/DL (ref 33.6–35)
MCV RBC AUTO: 114 FL (ref 81.4–97.8)
MONOCYTES # BLD AUTO: 1.17 K/UL (ref 0–0.85)
MONOCYTES NFR BLD AUTO: 18.8 % (ref 0–13.4)
NEUTROPHILS # BLD AUTO: 3.96 K/UL (ref 2–7.15)
NEUTROPHILS NFR BLD: 63.4 % (ref 44–72)
NRBC # BLD AUTO: 0 K/UL
NRBC BLD-RTO: 0 /100 WBC
PLATELET # BLD AUTO: 266 K/UL (ref 164–446)
PMV BLD AUTO: 9.4 FL (ref 9–12.9)
POTASSIUM SERPL-SCNC: 3.7 MMOL/L (ref 3.6–5.5)
PROT SERPL-MCNC: 6.7 G/DL (ref 6–8.2)
RBC # BLD AUTO: 3.65 M/UL (ref 4.2–5.4)
SODIUM SERPL-SCNC: 136 MMOL/L (ref 135–145)
T4 FREE SERPL-MCNC: 1.19 NG/DL (ref 0.93–1.7)
TSH SERPL DL<=0.005 MIU/L-ACNC: 3.62 UIU/ML (ref 0.38–5.33)
WBC # BLD AUTO: 6.2 K/UL (ref 4.8–10.8)

## 2022-04-18 PROCEDURE — 84443 ASSAY THYROID STIM HORMONE: CPT

## 2022-04-18 PROCEDURE — 80053 COMPREHEN METABOLIC PANEL: CPT

## 2022-04-18 PROCEDURE — 85025 COMPLETE CBC W/AUTO DIFF WBC: CPT

## 2022-04-18 PROCEDURE — 36415 COLL VENOUS BLD VENIPUNCTURE: CPT

## 2022-04-18 PROCEDURE — 84439 ASSAY OF FREE THYROXINE: CPT

## 2022-05-09 ENCOUNTER — OFFICE VISIT (OUTPATIENT)
Dept: MEDICAL GROUP | Facility: MEDICAL CENTER | Age: 47
End: 2022-05-09
Payer: COMMERCIAL

## 2022-05-09 VITALS
TEMPERATURE: 97 F | HEART RATE: 84 BPM | BODY MASS INDEX: 23.21 KG/M2 | SYSTOLIC BLOOD PRESSURE: 126 MMHG | OXYGEN SATURATION: 96 % | WEIGHT: 131 LBS | HEIGHT: 63 IN | DIASTOLIC BLOOD PRESSURE: 78 MMHG

## 2022-05-09 DIAGNOSIS — K70.0 ALCOHOLIC FATTY LIVER: ICD-10-CM

## 2022-05-09 DIAGNOSIS — D75.89 MACROCYTOSIS: ICD-10-CM

## 2022-05-09 DIAGNOSIS — M25.50 PAIN IN JOINT, MULTIPLE SITES: ICD-10-CM

## 2022-05-09 DIAGNOSIS — R60.9 SWELLING: ICD-10-CM

## 2022-05-09 DIAGNOSIS — M25.50 ARTHRALGIA, UNSPECIFIED JOINT: ICD-10-CM

## 2022-05-09 DIAGNOSIS — T78.40XA ALLERGY, INITIAL ENCOUNTER: ICD-10-CM

## 2022-05-09 DIAGNOSIS — I10 ESSENTIAL HYPERTENSION: ICD-10-CM

## 2022-05-09 DIAGNOSIS — M79.10 MYALGIA: ICD-10-CM

## 2022-05-09 DIAGNOSIS — F10.10 ALCOHOL ABUSE: ICD-10-CM

## 2022-05-09 PROCEDURE — 99214 OFFICE O/P EST MOD 30 MIN: CPT | Performed by: FAMILY MEDICINE

## 2022-05-09 RX ORDER — SUMATRIPTAN 50 MG/1
TABLET, FILM COATED ORAL
COMMUNITY
Start: 2021-11-04 | End: 2022-05-09

## 2022-05-09 RX ORDER — LEVOTHYROXINE SODIUM 0.05 MG/1
50 TABLET ORAL
COMMUNITY
Start: 2022-02-10 | End: 2024-03-05

## 2022-05-09 RX ORDER — EPINEPHRINE 0.3 MG/.3ML
INJECTION SUBCUTANEOUS
Qty: 1 EACH | Refills: 0 | Status: SHIPPED
Start: 2022-05-09 | End: 2022-12-20

## 2022-05-09 RX ORDER — SUMATRIPTAN 25 MG/1
TABLET, FILM COATED ORAL
COMMUNITY
End: 2022-05-09

## 2022-05-09 RX ORDER — OLMESARTAN MEDOXOMIL 40 MG/1
TABLET ORAL
COMMUNITY
Start: 2022-01-19 | End: 2022-05-09

## 2022-05-09 ASSESSMENT — PATIENT HEALTH QUESTIONNAIRE - PHQ9: CLINICAL INTERPRETATION OF PHQ2 SCORE: 0

## 2022-05-09 ASSESSMENT — FIBROSIS 4 INDEX: FIB4 SCORE: 1.8

## 2022-05-09 NOTE — ASSESSMENT & PLAN NOTE
New problem.   Wrists, ankles and knees.   Quite evident.  She does have pain in the feet and ankles in the morning.  This can be rather severe.  The pain generally improves throughout the day.   Pain is worst first thing in the morning.   The swelling stays present.   Present now for 2 months.

## 2022-05-09 NOTE — PROGRESS NOTES
Subjective:     CC: Diagnoses of Swelling, Alcohol abuse, Essential hypertension, Arthralgia, unspecified joint, Myalgia, Pain in joint, multiple sites, Allergy, initial encounter, Macrocytosis, and Fatty liver, alcoholic were pertinent to this visit.    HPI: Patient is a 47 y.o. female established patient who presents today with the following concerns.      Swelling  New problem.   Wrists, ankles and knees.   Quite evident.  She does have pain in the feet and ankles in the morning.  This can be rather severe.  The pain generally improves throughout the day.   Pain is worst first thing in the morning.   The swelling stays present.   Present now for 2 months.     Alcohol abuse  Continues to drink 2 glass of wine between 5 and 8 pm.   AST remains elevated at 64, ALT and alk phos have normalized.    Essential hypertension  Has been off of BP med  BP well controlled  Very healthy diet.     Allergy  Noted flushing  Welts  After eating strawberries and blood orange  Very itchy  This has only occurred once, last night.    Macrocytosis  Chronic problem, thought to be due to alcohol abuse.  Most recent MCV was 114.  Normal hemoglobin and hematocrit.  Monocytes are elevated as well.  Slightly low lymphocytes.    Fatty liver, alcoholic  Chronic problem.  LFTs remain elevated.  They are improved since last check.  The patient reports that she is only drinking 2 glasses of wine per day, this is a significant reduction.  She did not do the intensive outpatient program.    Past Medical History:   Diagnosis Date   • Anxiety    • Depression    • Gynecological disorder    • High cholesterol    • Hypertension    • Snoring    • Urinary tract infection        Social History     Tobacco Use   • Smoking status: Never Smoker   • Smokeless tobacco: Never Used   Substance Use Topics   • Alcohol use: Yes     Alcohol/week: 8.4 oz     Types: 14 Glasses of wine per week     Comment: 1-2 glasses per day   • Drug use: No       Current Outpatient  "Medications Ordered in Epic   Medication Sig Dispense Refill   • EPINEPHrine (EPIPEN) 0.3 MG/0.3ML Solution Auto-injector solution for injection Inject 0.3 mL into the thigh one time for 1 dose. 1 Each 0   • levothyroxine (SYNTHROID) 50 MCG Tab Take 1 Tab by mouth Every morning on an empty stomach. 90 Tab 3   • levothyroxine (SYNTHROID) 50 MCG Tab Take 1 Tablet by mouth every morning on an empty stomach. (Patient not taking: Reported on 5/9/2022)       No current [facility]-ordered facility-administered medications on file.       Allergies:  Sulfa drugs    Health Maintenance: Completed    Objective:       Exam:  /78 (BP Location: Right arm, Patient Position: Sitting, BP Cuff Size: Adult long)   Pulse 84   Temp 36.1 °C (97 °F) (Temporal)   Ht 1.6 m (5' 3\")   Wt 59.4 kg (131 lb)   SpO2 96%   BMI 23.21 kg/m²  Body mass index is 23.21 kg/m².      General: Normal appearing. No distress.  HEAD: NCAT  EYES: conjunctiva clear, lids without ptosis, pupils equal  and reactive to light  EARS: ears normal shape and contour, canals are clear bilaterally, TMs clear   Neck: Supple without masses. Thyroid is not enlarged. Normal ROM  Pulmonary: Clear to ausculation.  Normal effort. No rales, ronchi, or wheezing.  Cardiovascular: Regular rate and rhythm, no murmur. No LE edema  Neurologic: Grossly normal, no focal deficits  Lymph: No cervical or supraclavicular lymph nodes are palpable  Skin: Warm and dry.  No obvious lesions.  Musculoskeletal: Normal gait and station.  Significant swelling noted in bilateral wrists and bilateral ankles.  Psych: Normal mood and affect. Alert and oriented x3. Judgment and insight is normal.     Labs: 4/18/2022 results reviewed and discussed with the patient, questions answered.    Assessment & Plan:     47 y.o. female with the following -     1. Swelling  New problem, present for about 2 months now.  Etiology quite unclear.  Noted swollen areas around her wrists and ankles.  Ordered the " following labs.  Follow-up in 1 month.  - Sed Rate; Future  - CRP QUANTITIVE (NON-CARDIAC); Future  - URIC ACID; Future  - KAELA IGG KOLE W/RFLX TO KAELA IGG IFA; Future  - URINALYSIS; Future  - Referral to Allergy    2. Alcohol abuse  Chronic problem, stressed the importance of complete cessation of alcohol use.  She has improved her usage.  However, her MCV remains quite elevated, AST remains elevated, ALT and alk phos improved.  T bili normal.    3. Essential hypertension  Chronic problem, off of medications at this point, seems to be resolved.  Blood pressure looks great.    4. Arthralgia, unspecified joint  New problem.  See above for swelling.  - CCP ANTIBODY; Future  - RHEUMATOID ARTHRITIS FACTOR; Future  - Sed Rate; Future  - CRP QUANTITIVE (NON-CARDIAC); Future  - URIC ACID; Future  - KAELA IGG KOLE W/RFLX TO KAELA IGG IFA; Future  - URINALYSIS; Future    5. Myalgia  - KAELA IGG KOLE W/RFLX TO KAELA IGG IFA; Future  - URINALYSIS; Future    6. Pain in joint, multiple sites  - KAELA IGG KOLE W/RFLX TO KAELA IGG IFA; Future  - URINALYSIS; Future    7. Allergy, initial encounter  - EPINEPHrine (EPIPEN) 0.3 MG/0.3ML Solution Auto-injector solution for injection; Inject 0.3 mL into the thigh one time for 1 dose.  Dispense: 1 Each; Refill: 0  - Referral to Allergy    8. Macrocytosis  Chronic problem, likely due to alcohol usage.  Stressed the importance of complete cessation.  Platelet count is now normal.  Plan to continue monitor.    9. Fatty liver, alcoholic  Chronic problem, see above.      Return in about 4 weeks (around 6/6/2022) for Follow-up labs.    Please note that this dictation was created using voice recognition software. I have made every reasonable attempt to correct obvious errors, but I expect that there are errors of grammar and possibly content that I did not discover before finalizing the note.

## 2022-05-09 NOTE — ASSESSMENT & PLAN NOTE
Chronic problem, thought to be due to alcohol abuse.  Most recent MCV was 114.  Normal hemoglobin and hematocrit.  Monocytes are elevated as well.  Slightly low lymphocytes.

## 2022-05-09 NOTE — ASSESSMENT & PLAN NOTE
Chronic problem.  LFTs remain elevated.  They are improved since last check.  The patient reports that she is only drinking 2 glasses of wine per day, this is a significant reduction.

## 2022-05-20 ENCOUNTER — HOSPITAL ENCOUNTER (OUTPATIENT)
Dept: RADIOLOGY | Facility: MEDICAL CENTER | Age: 47
End: 2022-05-20
Attending: FAMILY MEDICINE
Payer: COMMERCIAL

## 2022-05-20 DIAGNOSIS — Z12.31 VISIT FOR SCREENING MAMMOGRAM: ICD-10-CM

## 2022-05-20 PROCEDURE — 77063 BREAST TOMOSYNTHESIS BI: CPT

## 2022-05-26 ENCOUNTER — HOSPITAL ENCOUNTER (OUTPATIENT)
Dept: LAB | Facility: MEDICAL CENTER | Age: 47
End: 2022-05-26
Attending: FAMILY MEDICINE
Payer: COMMERCIAL

## 2022-05-26 DIAGNOSIS — M79.10 MYALGIA: ICD-10-CM

## 2022-05-26 DIAGNOSIS — M25.50 ARTHRALGIA, UNSPECIFIED JOINT: ICD-10-CM

## 2022-05-26 DIAGNOSIS — R60.9 SWELLING: ICD-10-CM

## 2022-05-26 DIAGNOSIS — M25.50 PAIN IN JOINT, MULTIPLE SITES: ICD-10-CM

## 2022-05-26 LAB
AMORPH CRY #/AREA URNS HPF: PRESENT /HPF
APPEARANCE UR: ABNORMAL
BACTERIA #/AREA URNS HPF: ABNORMAL /HPF
BILIRUB UR QL STRIP.AUTO: NEGATIVE
COLOR UR: YELLOW
CRP SERPL HS-MCNC: <0.3 MG/DL (ref 0–0.75)
EPI CELLS #/AREA URNS HPF: ABNORMAL /HPF
ERYTHROCYTE [SEDIMENTATION RATE] IN BLOOD BY WESTERGREN METHOD: 10 MM/HOUR (ref 0–25)
GLUCOSE UR STRIP.AUTO-MCNC: NEGATIVE MG/DL
HYALINE CASTS #/AREA URNS LPF: ABNORMAL /LPF
KETONES UR STRIP.AUTO-MCNC: NEGATIVE MG/DL
LEUKOCYTE ESTERASE UR QL STRIP.AUTO: NEGATIVE
MICRO URNS: ABNORMAL
NITRITE UR QL STRIP.AUTO: POSITIVE
PH UR STRIP.AUTO: 6 [PH] (ref 5–8)
PROT UR QL STRIP: NEGATIVE MG/DL
RBC # URNS HPF: ABNORMAL /HPF
RBC UR QL AUTO: NEGATIVE
RHEUMATOID FACT SER IA-ACNC: 15 IU/ML (ref 0–14)
SP GR UR STRIP.AUTO: 1.01
URATE SERPL-MCNC: 6.9 MG/DL (ref 1.9–8.2)
UROBILINOGEN UR STRIP.AUTO-MCNC: 0.2 MG/DL
WBC #/AREA URNS HPF: ABNORMAL /HPF

## 2022-05-26 PROCEDURE — 86431 RHEUMATOID FACTOR QUANT: CPT

## 2022-05-26 PROCEDURE — 85652 RBC SED RATE AUTOMATED: CPT

## 2022-05-26 PROCEDURE — 84550 ASSAY OF BLOOD/URIC ACID: CPT

## 2022-05-26 PROCEDURE — 86038 ANTINUCLEAR ANTIBODIES: CPT

## 2022-05-26 PROCEDURE — 36415 COLL VENOUS BLD VENIPUNCTURE: CPT

## 2022-05-26 PROCEDURE — 86200 CCP ANTIBODY: CPT

## 2022-05-26 PROCEDURE — 81001 URINALYSIS AUTO W/SCOPE: CPT

## 2022-05-26 PROCEDURE — 86140 C-REACTIVE PROTEIN: CPT

## 2022-05-27 LAB — NUCLEAR IGG SER QL IA: NORMAL

## 2022-05-28 LAB — CCP IGG SERPL-ACNC: 4 UNITS (ref 0–19)

## 2022-05-31 DIAGNOSIS — R41.3 MEMORY CHANGE: ICD-10-CM

## 2022-06-15 ENCOUNTER — TELEMEDICINE (OUTPATIENT)
Dept: BEHAVIORAL HEALTH | Facility: CLINIC | Age: 47
End: 2022-06-15
Payer: COMMERCIAL

## 2022-06-15 DIAGNOSIS — F10.20 ALCOHOL USE DISORDER, MODERATE, DEPENDENCE (HCC): ICD-10-CM

## 2022-06-15 DIAGNOSIS — F10.20 ALCOHOL USE DISORDER, SEVERE, DEPENDENCE (HCC): ICD-10-CM

## 2022-06-15 DIAGNOSIS — G47.00 INSOMNIA, UNSPECIFIED TYPE: ICD-10-CM

## 2022-06-15 DIAGNOSIS — F33.2 SEVERE EPISODE OF RECURRENT MAJOR DEPRESSIVE DISORDER, WITHOUT PSYCHOTIC FEATURES (HCC): ICD-10-CM

## 2022-06-15 DIAGNOSIS — F41.1 GAD (GENERALIZED ANXIETY DISORDER): ICD-10-CM

## 2022-06-15 DIAGNOSIS — F40.10 SOCIAL ANXIETY DISORDER: ICD-10-CM

## 2022-06-15 PROBLEM — G47.09 OTHER INSOMNIA: Status: ACTIVE | Noted: 2020-06-11

## 2022-06-15 PROCEDURE — 99204 OFFICE O/P NEW MOD 45 MIN: CPT | Mod: 95 | Performed by: PSYCHIATRY & NEUROLOGY

## 2022-06-15 PROCEDURE — 90833 PSYTX W PT W E/M 30 MIN: CPT | Mod: 95 | Performed by: PSYCHIATRY & NEUROLOGY

## 2022-06-15 RX ORDER — TRAZODONE HYDROCHLORIDE 50 MG/1
TABLET ORAL
Qty: 90 TABLET | Refills: 1 | Status: SHIPPED
Start: 2022-06-15 | End: 2022-07-24

## 2022-06-15 RX ORDER — ACAMPROSATE CALCIUM 333 MG/1
666 TABLET, DELAYED RELEASE ORAL 3 TIMES DAILY
Qty: 270 TABLET | Refills: 1 | Status: SHIPPED
Start: 2022-06-15 | End: 2022-07-24

## 2022-06-15 RX ORDER — ALBUTEROL SULFATE 90 UG/1
AEROSOL, METERED RESPIRATORY (INHALATION)
COMMUNITY
Start: 2022-06-01 | End: 2022-07-24

## 2022-06-15 RX ORDER — HYDROXYZINE HYDROCHLORIDE 25 MG/1
TABLET, FILM COATED ORAL
Qty: 120 TABLET | Refills: 1 | Status: SHIPPED
Start: 2022-06-15 | End: 2022-12-20

## 2022-06-15 ASSESSMENT — PATIENT HEALTH QUESTIONNAIRE - PHQ9
CLINICAL INTERPRETATION OF PHQ2 SCORE: 6
SUM OF ALL RESPONSES TO PHQ QUESTIONS 1-9: 21
5. POOR APPETITE OR OVEREATING: 3 - NEARLY EVERY DAY
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
## 2022-06-20 ENCOUNTER — OFFICE VISIT (OUTPATIENT)
Dept: MEDICAL GROUP | Facility: MEDICAL CENTER | Age: 47
End: 2022-06-20
Payer: COMMERCIAL

## 2022-06-20 VITALS
HEART RATE: 84 BPM | SYSTOLIC BLOOD PRESSURE: 112 MMHG | BODY MASS INDEX: 24.22 KG/M2 | TEMPERATURE: 97 F | OXYGEN SATURATION: 94 % | WEIGHT: 136.69 LBS | DIASTOLIC BLOOD PRESSURE: 84 MMHG | HEIGHT: 63 IN | RESPIRATION RATE: 16 BRPM

## 2022-06-20 DIAGNOSIS — F10.20 ALCOHOL USE DISORDER, SEVERE, DEPENDENCE (HCC): ICD-10-CM

## 2022-06-20 DIAGNOSIS — R60.9 SWELLING: ICD-10-CM

## 2022-06-20 DIAGNOSIS — N30.00 ACUTE CYSTITIS WITHOUT HEMATURIA: ICD-10-CM

## 2022-06-20 PROCEDURE — 99214 OFFICE O/P EST MOD 30 MIN: CPT | Performed by: FAMILY MEDICINE

## 2022-06-20 ASSESSMENT — FIBROSIS 4 INDEX: FIB4 SCORE: 1.8

## 2022-06-20 NOTE — ASSESSMENT & PLAN NOTE
New problem.   Wrists, ankles and knees.   Quite evident.  She does have pain in the feet and ankles in the morning.  This can be rather severe.  The pain generally improves throughout the day.   Pain is worst first thing in the morning.   The swelling stays present.   Present now for 2 months.     Present but improving  Not too painful

## 2022-06-20 NOTE — PROGRESS NOTES
Subjective:     CC: Diagnoses of Swelling, Alcohol use disorder, severe, dependence (HCC), and Acute cystitis without hematuria were pertinent to this visit.    HPI: Patient is a 47 y.o. female established patient who presents today to follow up on swelling and the following.       Swelling  New problem.   Wrists, ankles and knees.   Quite evident.  She does have pain in the feet and ankles in the morning.  This can be rather severe.  The pain generally improves throughout the day.   Pain is worst first thing in the morning.   The swelling stays present.   Present now for 2 months.     Present but improving  Not too painful      Alcohol use disorder, severe, dependence (HCC)  Seen by 2 different psychiatrists  Started on multiple different medications but has not started yet.   Drinking 2 glasses of wine per day    Acute cystitis without hematuria  On abx  Found incidentally but   No flank pain  Urgency has improved  Overall improving but not back at baseline  Finished 5 day course of macrobid but seen by gyn for annual and symptoms had recurred so started on same 5 day course of macrobid.       Past Medical History:   Diagnosis Date   • Anxiety    • Depression    • Gynecological disorder    • High cholesterol    • Hypertension    • Snoring    • Urinary tract infection        Social History     Tobacco Use   • Smoking status: Never Smoker   • Smokeless tobacco: Never Used   Substance Use Topics   • Alcohol use: Yes     Alcohol/week: 8.4 oz     Types: 14 Glasses of wine per week     Comment: 1-2 glasses per day   • Drug use: No       Current Outpatient Medications Ordered in Epic   Medication Sig Dispense Refill   • hydrOXYzine HCl (ATARAX) 25 MG Tab Take 1/2 to 2 tablets by mouth up to 2 times a day as needed for anxiety or insomnia 120 Tablet 1   • EPINEPHrine (EPIPEN) 0.3 MG/0.3ML Solution Auto-injector solution for injection Inject 0.3 mL into the thigh one time for 1 dose. 1 Each 0   • levothyroxine (SYNTHROID)  "50 MCG Tab Take 1 Tab by mouth Every morning on an empty stomach. 90 Tab 3   • sertraline (ZOLOFT) 50 MG Tab Take 1 tablet by mouth once a day for 7 days, then take 2 tablets by mouth once a day, for anxiety and depression (Patient not taking: Reported on 6/20/2022) 60 Tablet 1   • acamprosate (CAMPRAL) 333 MG tablet Take 2 Tablets by mouth 3 times a day. For alcohol related anxiety. (Patient not taking: Reported on 6/20/2022) 270 Tablet 1   • traZODone (DESYREL) 50 MG Tab Take 1/2 to 3 tablets by mouth at bedtime as needed for insomnia. (Patient not taking: Reported on 6/20/2022) 90 Tablet 1   • albuterol 108 (90 Base) MCG/ACT Aero Soln inhalation aerosol  (Patient not taking: Reported on 6/20/2022)     • levothyroxine (SYNTHROID) 50 MCG Tab Take 1 Tablet by mouth every morning on an empty stomach. (Patient not taking: Reported on 5/9/2022)       No current [facility]-ordered facility-administered medications on file.       Allergies:  Sulfa drugs    Health Maintenance: Completed        Objective:       Exam:  /84 (BP Location: Left arm, Patient Position: Sitting, BP Cuff Size: Adult long)   Pulse 84   Temp 36.1 °C (97 °F) (Temporal)   Resp 16   Ht 1.6 m (5' 3\")   Wt 62 kg (136 lb 11 oz)   SpO2 94%   BMI 24.21 kg/m²  Body mass index is 24.21 kg/m².    General: Normal appearing. No distress.  HEAD: NCAT  EYES: conjunctiva clear, lids without ptosis, pupils equal and reactive to light  EARS: ears normal shape and contour.  Neck:  Normal ROM  Pulmonary: Normal effort. Normal respiratory rate.  Cardiovascular: Well perfused. No LE edema  Neurologic: Grossly normal, no focal deficits  Skin: Warm and dry.  No obvious lesions.  Musculoskeletal: Normal gait and station.   Psych: Normal mood and affect. Alert and oriented x3. Judgment and insight is normal.      Labs: 5/26/22 Results reviewed and discussed with the patient, questions answered.    Assessment & Plan:     47 y.o. female with the following -     1. " Swelling  Chronic problem. Seems to be improving. Etiology unclear.  Reviewed labs including TSH, KAELA, uric acid, CRP, sed rate, anti-CCP antibody, UA and RF.  RF was very slightly elevated but not significantly.  The patient like to hold off on any further work-up or referrals at this time.    2. Alcohol use disorder, severe, dependence (HCC)  3. Anxiety  4. Depression  Chronic problem. Working with psych now. Has been prescribed multiple different medications, she is planning to consider starting these once her UTI has been treated.    5. Acute cystitis without hematuria  Patient did require another round of abx. On them now. Her symptoms have resolved at this point. However, advised if any return to please let me know and we can consider treatment with another abx.  The meantime, we will request her urine culture from her gynecologist, Dr. Hardy.      Return in about 3 months (around 9/20/2022).    Please note that this dictation was created using voice recognition software. I have made every reasonable attempt to correct obvious errors, but I expect that there are errors of grammar and possibly content that I did not discover before finalizing the note.

## 2022-06-20 NOTE — ASSESSMENT & PLAN NOTE
On abx  Found incidentally but   No flank pain  Urgency has improved  Overall improving but not back at baseline  Finished 5 day course of macrobid but seen by gyn for annual and symptoms had recurred so started on same 5 day course of macrobid.

## 2022-06-20 NOTE — ASSESSMENT & PLAN NOTE
Seen by 2 different psychiatrists  Started on multiple different medications but has not started yet.   Drinking 2 glasses of wine per day

## 2022-07-24 ENCOUNTER — HOSPITAL ENCOUNTER (EMERGENCY)
Facility: MEDICAL CENTER | Age: 47
End: 2022-07-25
Attending: EMERGENCY MEDICINE
Payer: COMMERCIAL

## 2022-07-24 VITALS
OXYGEN SATURATION: 93 % | RESPIRATION RATE: 19 BRPM | WEIGHT: 135.58 LBS | HEART RATE: 99 BPM | TEMPERATURE: 97.3 F | DIASTOLIC BLOOD PRESSURE: 103 MMHG | BODY MASS INDEX: 24.02 KG/M2 | SYSTOLIC BLOOD PRESSURE: 153 MMHG | HEIGHT: 63 IN

## 2022-07-24 DIAGNOSIS — R45.851 SUICIDAL THOUGHTS: ICD-10-CM

## 2022-07-24 DIAGNOSIS — F10.10 CHRONIC ALCOHOL ABUSE: ICD-10-CM

## 2022-07-24 DIAGNOSIS — F10.920 ACUTE ALCOHOLIC INTOXICATION WITHOUT COMPLICATION (HCC): ICD-10-CM

## 2022-07-24 DIAGNOSIS — F32.2 CURRENT SEVERE EPISODE OF MAJOR DEPRESSIVE DISORDER WITHOUT PSYCHOTIC FEATURES, UNSPECIFIED WHETHER RECURRENT (HCC): ICD-10-CM

## 2022-07-24 DIAGNOSIS — R45.851 PLANNING TO COMMIT SUICIDE: ICD-10-CM

## 2022-07-24 LAB
AMPHET UR QL SCN: NEGATIVE
BARBITURATES UR QL SCN: NEGATIVE
BENZODIAZ UR QL SCN: NEGATIVE
BZE UR QL SCN: NEGATIVE
CANNABINOIDS UR QL SCN: NEGATIVE
METHADONE UR QL SCN: NEGATIVE
OPIATES UR QL SCN: NEGATIVE
OXYCODONE UR QL SCN: NEGATIVE
PCP UR QL SCN: NEGATIVE
POC BREATHALIZER: 0.05 PERCENT (ref 0–0.01)
POC BREATHALIZER: 0.1 PERCENT (ref 0–0.01)
POC BREATHALIZER: 0.16 PERCENT (ref 0–0.01)
POC BREATHALIZER: 0.37 PERCENT (ref 0–0.01)
PROPOXYPH UR QL SCN: NEGATIVE

## 2022-07-24 PROCEDURE — 99285 EMERGENCY DEPT VISIT HI MDM: CPT

## 2022-07-24 PROCEDURE — 302970 POC BREATHALIZER: Performed by: EMERGENCY MEDICINE

## 2022-07-24 PROCEDURE — A9270 NON-COVERED ITEM OR SERVICE: HCPCS | Performed by: EMERGENCY MEDICINE

## 2022-07-24 PROCEDURE — 700102 HCHG RX REV CODE 250 W/ 637 OVERRIDE(OP): Performed by: EMERGENCY MEDICINE

## 2022-07-24 PROCEDURE — 700111 HCHG RX REV CODE 636 W/ 250 OVERRIDE (IP): Performed by: EMERGENCY MEDICINE

## 2022-07-24 PROCEDURE — 90791 PSYCH DIAGNOSTIC EVALUATION: CPT

## 2022-07-24 PROCEDURE — 96372 THER/PROPH/DIAG INJ SC/IM: CPT

## 2022-07-24 PROCEDURE — 80307 DRUG TEST PRSMV CHEM ANLYZR: CPT

## 2022-07-24 RX ORDER — NITROFURANTOIN 25; 75 MG/1; MG/1
100 CAPSULE ORAL 2 TIMES DAILY
Status: SHIPPED | COMMUNITY
End: 2022-12-20

## 2022-07-24 RX ORDER — HYDROXYZINE HYDROCHLORIDE 25 MG/1
12.5-5 TABLET, FILM COATED ORAL 3 TIMES DAILY PRN
Status: DISCONTINUED | OUTPATIENT
Start: 2022-07-24 | End: 2022-07-25 | Stop reason: HOSPADM

## 2022-07-24 RX ORDER — ESCITALOPRAM OXALATE 10 MG/1
5 TABLET ORAL DAILY
Status: DISCONTINUED | OUTPATIENT
Start: 2022-07-24 | End: 2022-07-25 | Stop reason: HOSPADM

## 2022-07-24 RX ORDER — LORAZEPAM 2 MG/1
2 TABLET ORAL
Status: DISCONTINUED | OUTPATIENT
Start: 2022-07-24 | End: 2022-07-25 | Stop reason: HOSPADM

## 2022-07-24 RX ORDER — LORAZEPAM 2 MG/1
4 TABLET ORAL
Status: DISCONTINUED | OUTPATIENT
Start: 2022-07-24 | End: 2022-07-25 | Stop reason: HOSPADM

## 2022-07-24 RX ORDER — LEVOTHYROXINE SODIUM 0.05 MG/1
50 TABLET ORAL
Status: DISCONTINUED | OUTPATIENT
Start: 2022-07-24 | End: 2022-07-25 | Stop reason: HOSPADM

## 2022-07-24 RX ORDER — CHLORDIAZEPOXIDE HYDROCHLORIDE 25 MG/1
50 CAPSULE, GELATIN COATED ORAL EVERY 6 HOURS
Status: DISCONTINUED | OUTPATIENT
Start: 2022-07-24 | End: 2022-07-25 | Stop reason: HOSPADM

## 2022-07-24 RX ORDER — CLONAZEPAM 0.5 MG/1
0.5 TABLET ORAL DAILY
Status: DISCONTINUED | OUTPATIENT
Start: 2022-07-28 | End: 2022-07-25 | Stop reason: HOSPADM

## 2022-07-24 RX ORDER — ESCITALOPRAM OXALATE 10 MG/1
10 TABLET ORAL DAILY
COMMUNITY

## 2022-07-24 RX ORDER — CHLORDIAZEPOXIDE HYDROCHLORIDE 25 MG/1
100 CAPSULE, GELATIN COATED ORAL ONCE
Status: COMPLETED | OUTPATIENT
Start: 2022-07-24 | End: 2022-07-24

## 2022-07-24 RX ORDER — LORAZEPAM 2 MG/ML
2 INJECTION INTRAMUSCULAR
Status: DISCONTINUED | OUTPATIENT
Start: 2022-07-24 | End: 2022-07-25 | Stop reason: HOSPADM

## 2022-07-24 RX ORDER — LORAZEPAM 1 MG/1
0.5 TABLET ORAL EVERY 4 HOURS PRN
Status: DISCONTINUED | OUTPATIENT
Start: 2022-07-24 | End: 2022-07-25 | Stop reason: HOSPADM

## 2022-07-24 RX ORDER — DIAZEPAM 5 MG/1
5 TABLET ORAL ONCE
Status: COMPLETED | OUTPATIENT
Start: 2022-07-24 | End: 2022-07-24

## 2022-07-24 RX ORDER — LORAZEPAM 1 MG/1
1 TABLET ORAL EVERY 4 HOURS PRN
Status: DISCONTINUED | OUTPATIENT
Start: 2022-07-24 | End: 2022-07-25 | Stop reason: HOSPADM

## 2022-07-24 RX ORDER — CLONAZEPAM 0.5 MG/1
0.5 TABLET ORAL DAILY
Status: SHIPPED | COMMUNITY
End: 2022-12-20

## 2022-07-24 RX ORDER — CHLORDIAZEPOXIDE HYDROCHLORIDE 25 MG/1
25 CAPSULE, GELATIN COATED ORAL EVERY 6 HOURS
Status: DISCONTINUED | OUTPATIENT
Start: 2022-07-25 | End: 2022-07-25 | Stop reason: HOSPADM

## 2022-07-24 RX ORDER — MIDAZOLAM HYDROCHLORIDE 5 MG/ML
5 INJECTION INTRAMUSCULAR; INTRAVENOUS ONCE
Status: COMPLETED | OUTPATIENT
Start: 2022-07-24 | End: 2022-07-24

## 2022-07-24 RX ADMIN — ESCITALOPRAM OXALATE 5 MG: 10 TABLET ORAL at 17:22

## 2022-07-24 RX ADMIN — LEVOTHYROXINE SODIUM 50 MCG: 0.05 TABLET ORAL at 17:22

## 2022-07-24 RX ADMIN — CHLORDIAZEPOXIDE HYDROCHLORIDE 100 MG: 25 CAPSULE ORAL at 03:25

## 2022-07-24 RX ADMIN — LORAZEPAM 2 MG: 2 TABLET ORAL at 19:17

## 2022-07-24 RX ADMIN — CHLORDIAZEPOXIDE HYDROCHLORIDE 50 MG: 25 CAPSULE ORAL at 17:22

## 2022-07-24 RX ADMIN — DIAZEPAM 5 MG: 5 TABLET ORAL at 03:04

## 2022-07-24 RX ADMIN — MIDAZOLAM HYDROCHLORIDE 5 MG: 5 INJECTION, SOLUTION INTRAMUSCULAR; INTRAVENOUS at 04:10

## 2022-07-24 RX ADMIN — LORAZEPAM 0.5 MG: 1 TABLET ORAL at 15:24

## 2022-07-24 ASSESSMENT — LIFESTYLE VARIABLES
AUDITORY DISTURBANCES: NOT PRESENT
AGITATION: SOMEWHAT MORE THAN NORMAL ACTIVITY
TREMOR: *
HEADACHE, FULLNESS IN HEAD: NOT PRESENT
TOTAL SCORE: 13
HEADACHE, FULLNESS IN HEAD: NOT PRESENT
ANXIETY: NO ANXIETY (AT EASE)
NAUSEA AND VOMITING: NO NAUSEA AND NO VOMITING
TREMOR: *
AGITATION: NORMAL ACTIVITY
TOTAL SCORE: 7
NAUSEA AND VOMITING: MILD NAUSEA WITH NO VOMITING
VISUAL DISTURBANCES: NOT PRESENT
NAUSEA AND VOMITING: NO NAUSEA AND NO VOMITING
ANXIETY: MODERATELY ANXIOUS OR GUARDED, SO ANXIETY IS INFERRED
TOTAL SCORE: 4
ORIENTATION AND CLOUDING OF SENSORIUM: ORIENTED AND CAN DO SERIAL ADDITIONS
PAROXYSMAL SWEATS: BARELY PERCEPTIBLE SWEATING. PALMS MOIST
AGITATION: NORMAL ACTIVITY
VISUAL DISTURBANCES: NOT PRESENT
AUDITORY DISTURBANCES: NOT PRESENT
VISUAL DISTURBANCES: NOT PRESENT
TREMOR: MODERATE TREMOR WITH ARMS EXTENDED
PAROXYSMAL SWEATS: NO SWEAT VISIBLE
PAROXYSMAL SWEATS: BARELY PERCEPTIBLE SWEATING. PALMS MOIST
HEADACHE, FULLNESS IN HEAD: NOT PRESENT
AUDITORY DISTURBANCES: NOT PRESENT
ANXIETY: NO ANXIETY (AT EASE)
ORIENTATION AND CLOUDING OF SENSORIUM: ORIENTED AND CAN DO SERIAL ADDITIONS
ORIENTATION AND CLOUDING OF SENSORIUM: ORIENTED AND CAN DO SERIAL ADDITIONS

## 2022-07-24 ASSESSMENT — FIBROSIS 4 INDEX: FIB4 SCORE: 1.8

## 2022-07-24 NOTE — ED NOTES
Pt. Continues to rest in view of 1:1 sitter with  at bedside. No needs voiced or identified at this time.

## 2022-07-24 NOTE — ED NOTES
Pt states she is feeling a bit better following ativan admin. Denies further needs at this time. In view of 1:1 sitter.

## 2022-07-24 NOTE — PROGRESS NOTES
"ED Provider Progress Note    ED Observation Progress Note    Date of Service: 07/24/22    Interval History  Patient continues to hold in the emergency department.  We are awaiting sobriety for evaluation by the alert team.  I anticipate placement at an inpatient psychiatric facility for alcohol abuse and suicidal ideation.    Physical Exam  /83   Pulse 66   Temp 36.1 °C (96.9 °F) (Temporal)   Resp 19   Ht 1.6 m (5' 3\")   Wt 61.5 kg (135 lb 9.3 oz)   SpO2 97%   BMI 24.02 kg/m² .    Constitutional: Resting comfortably.  HENT:  Grossly normal  Eyes: Grossly normal  Neck: Normal range of motion  Cardiovascular: Normal heart rate   Thorax & Lungs: No respiratory distress  Abdomen: Nontender  Skin:  No pathologic rash.   Extremities: Well perfused  Psychiatric: Affect normal    Labs  Results for orders placed or performed during the hospital encounter of 07/24/22   Urine Drug Screen   Result Value Ref Range    Amphetamines Urine Negative Negative    Barbiturates Negative Negative    Benzodiazepines Negative Negative    Cocaine Metabolite Negative Negative    Methadone Negative Negative    Opiates Negative Negative    Oxycodone Negative Negative    Phencyclidine -Pcp Negative Negative    Propoxyphene Negative Negative    Cannabinoid Metab Negative Negative   POC BREATHALIZER   Result Value Ref Range    POC Breathalizer 0.371 (A) 0.00 - 0.01 Percent   POC BREATHALIZER   Result Value Ref Range    POC Breathalizer 0.162 (A) 0.00 - 0.01 Percent       Radiology  No orders to display       Problem List  1. Acute alcoholic intoxication without complication (HCC)    2. Chronic alcohol abuse    3. Current severe episode of major depressive disorder without psychotic features, unspecified whether recurrent (HCC)    4. Suicidal thoughts    5. Planning to commit suicide            Electronically signed by: Vignesh Larson M.D., 7/24/2022 10:42 AM    "

## 2022-07-24 NOTE — ED NOTES
Pt. Awake and seated on edge of bed. Requesting phone. Process of legal hold explained to pt; she verbalized understanding. Ambulatory to restroom at this time with 1:1 sitter.

## 2022-07-24 NOTE — ED NOTES
Patient noted to have oxygen saturation 88% on room air. Patient placed on 2 L nasal cannula. Oxygen saturation 93% at this time.

## 2022-07-24 NOTE — ED PROVIDER NOTES
"ED Provider Note     Scribed for Yamini Mcduffie D.O. by Nichole Germain. 7/24/2022, 2:21 AM.     Primary care provider: Unique Young M.D.  Means of arrival: Walk-in         History obtained from: Patient  History limited by: None     CHIEF COMPLAINT  Chief Complaint   Patient presents with   • Suicidal Ideation     Pt was looking for husbands firearms to try and shot herself, pt was unable to find firearms.  Pt states she is done with living.  Pt sees Dr Almeida and was started on psych meds 3 weeks ago.  Pt has been having suicidal ideation more frequently in the last couple of days per .       HPI  Minerva Tillman is a 47 y.o. female who presents to the Emergency Department for suicidal ideation onset prior to arrival. The patient states that she is \"hanging in there.\"  She reports that about a year and a half ago she had a major fall that caused a brain injury. The patient states that the fall was not severe, but it took her months to recover. She reports that since her fall she has not been the same and started having suicidal thoughts. Her  notes that since the fall the patient has also experienced increased irritability, anxiety, and lapses in memory. In addition to her fall, the patient states that her SI can be attributed to her current stressful family situation. She states she \"doesn't know why to go on\" and like she's \"giving up,\" but that she also \"doesn't want to do something terrible\" to her kids. The patient's current plan for suicide is to use her 's fire arm, however he states that he has them locked up and  from the ammunition. This evening she reports that her plan was to crush up her medication and overdose. She denies symptoms of recurrent headaches. She states that she is currently seeing Dr. Almeida (Psychiatrist) with an appointment tomorrow, but denies seeing a Neurologist due to constant postponement of appointment. The patient notes that she " drank a substantial amount of wine prior to arrival. The patient's  states she has slight hypertension and is currently on medication. She denies a history of medical issues including diabetes or cancer.     REVIEW OF SYSTEMS  Pertinent positives include suicidal ideation. Pertinent negatives include no headache.   See HPI for further details. All other systems are negative.    PAST MEDICAL HISTORY  Past Medical History:   Diagnosis Date   • Anxiety    • Depression    • Gynecological disorder    • High cholesterol    • Hypertension    • Snoring    • Urinary tract infection      FAMILY HISTORY  Family History   Problem Relation Age of Onset   • Cancer Maternal Grandmother 60        breast and skin   • Stroke Maternal Grandmother    • Hypertension Mother    • Hyperlipidemia Mother    • Depression Father    • Hypertension Father    • Asthma Father    • Hyperlipidemia Father    • Hypertension Brother    • Cancer Maternal Grandfather 60        esphogus   • Other Paternal Grandmother         complications from surgery   • Hypertension Brother      SOCIAL HISTORY  Social History     Tobacco Use   • Smoking status: Never Smoker   • Smokeless tobacco: Never Used   Substance Use Topics   • Alcohol use: Yes     Alcohol/week: 8.4 oz     Types: 14 Glasses of wine per week     Comment: 1-2 glasses per day   • Drug use: No      Social History     Substance and Sexual Activity   Drug Use No     SURGICAL HISTORY  Past Surgical History:   Procedure Laterality Date   • HYSTEROSCOPY NOVASURE-2  8/2/2017    Procedure: HYSTEROSCOPY NOVASURE;  Surgeon: Hedy Hardy M.D.;  Location: SURGERY [facility];  Service:    • DILATION AND CURETTAGE  8/2/2017    Procedure: DILATION AND CURETTAGE;  Surgeon: Hedy Hardy M.D.;  Location: SURGERY [facility];  Service:    • NE BREAST AUGMENTATION WITH IMPLANT  2006   • MAMMOPLASTY AUGMENTATION       CURRENT MEDICATIONS    Current Facility-Administered Medications:   •   "midazolam (Versed) 5 mg/mL (IM or IN only), 5 mg, Intramuscular, Once, Yamini Mcduffie D.O.    Current Outpatient Medications:   •  sertraline (ZOLOFT) 50 MG Tab, Take 1 tablet by mouth once a day for 7 days, then take 2 tablets by mouth once a day, for anxiety and depression (Patient not taking: Reported on 6/20/2022), Disp: 60 Tablet, Rfl: 1  •  hydrOXYzine HCl (ATARAX) 25 MG Tab, Take 1/2 to 2 tablets by mouth up to 2 times a day as needed for anxiety or insomnia, Disp: 120 Tablet, Rfl: 1  •  acamprosate (CAMPRAL) 333 MG tablet, Take 2 Tablets by mouth 3 times a day. For alcohol related anxiety. (Patient not taking: Reported on 6/20/2022), Disp: 270 Tablet, Rfl: 1  •  traZODone (DESYREL) 50 MG Tab, Take 1/2 to 3 tablets by mouth at bedtime as needed for insomnia. (Patient not taking: Reported on 6/20/2022), Disp: 90 Tablet, Rfl: 1  •  albuterol 108 (90 Base) MCG/ACT Aero Soln inhalation aerosol, , Disp: , Rfl:   •  levothyroxine (SYNTHROID) 50 MCG Tab, Take 1 Tablet by mouth every morning on an empty stomach. (Patient not taking: Reported on 5/9/2022), Disp: , Rfl:   •  EPINEPHrine (EPIPEN) 0.3 MG/0.3ML Solution Auto-injector solution for injection, Inject 0.3 mL into the thigh one time for 1 dose., Disp: 1 Each, Rfl: 0  •  levothyroxine (SYNTHROID) 50 MCG Tab, Take 1 Tab by mouth Every morning on an empty stomach., Disp: 90 Tab, Rfl: 3    ALLERGIES  Allergies   Allergen Reactions   • Sulfa Drugs Hives     [id number]     PHYSICAL EXAM  VITAL SIGNS: BP (!) 151/103   Pulse 90   Temp 36.1 °C (96.9 °F) (Temporal)   Resp 16   Ht 1.6 m (5' 3\")   Wt 61.5 kg (135 lb 9.3 oz)   SpO2 98%   BMI 24.02 kg/m²     Constitutional: Very intoxicated, but very pleasant. Patient is well developed, well nourished.  Tearful, repetitive with active suicidal thoughts and plan.  HENT: Normocephalic, atraumatic.Nose normal with no mucosal edema or drainage. Oropharynx moist with strong odor of alcohol on her breath.  Eyes: PERRL, EOMI, " Conjunctiva without erythema    Neck: Supple   Lymphatic: No lymphadenopathy noted.   Cardiovascular: Normal heart rate and Regular rhythm. No murmur  Thorax & Lungs: Clear and equal breath sounds with good excursion. No respiratory distress, no rhonchi, wheezing or rales.   Abdomen: Bowel sounds normal in all four quadrants. Soft,nontender  Skin: Warm, Dry  Back: No cervical, thoracic, or lumbosacral tenderness.  Extremities: Peripheral pulses 4/4 No edema, No tenderness  Musculoskeletal: Normal range of motion in all major joints.  Neurologic: Alert & oriented x 3, Normal motor function, Normal sensory function,  DTR's 4/4 bilaterally.  Psychiatric: Active thoughts of suicide.  Patient very tearful, agitated and anxious.  Actively states that if she can find one of her husbands firearms that she would take an overdose of pills.  Patient states she has no reason to go on except maybe her kids.    DIAGNOSTICS/PROCEDURES    LABS  Results for orders placed or performed during the hospital encounter of 07/24/22   Urine Drug Screen   Result Value Ref Range    Amphetamines Urine Negative Negative    Barbiturates Negative Negative    Benzodiazepines Negative Negative    Cocaine Metabolite Negative Negative    Methadone Negative Negative    Opiates Negative Negative    Oxycodone Negative Negative    Phencyclidine -Pcp Negative Negative    Propoxyphene Negative Negative    Cannabinoid Metab Negative Negative   POC BREATHALIZER   Result Value Ref Range    POC Breathalizer 0.371 (A) 0.00 - 0.01 Percent       Labs reviewed by me    COURSE & MEDICAL DECISION MAKING  Pertinent Labs & Imaging studies reviewed. (See chart for details)    2:21 AM - Patient seen and evaluated at bedside. Ordered for Urine Drug Screen to evaluate.     Patient has been placed on a legal hold, blood alcohol level is 0.371 currently.  Her urine drug screen is negative.  The plan will be to have a psychiatric evaluation when the patient's blood alcohol  level is less than 0.08, I feel very strongly that the patient needs to be placed inpatient for adequate care since she has such an extensive history.  Her care will be signed out to our dayshift physician Dr. Larson for further referral to psychiatry.  She is currently in guarded condition.  Patient has been treated with Valium, Librium and Versed and is still very anxious and agitated.    FINAL IMPRESSION  1. Acute alcoholic intoxication without complication (HCC)    2. Chronic alcohol abuse    3. Current severe episode of major depressive disorder without psychotic features, unspecified whether recurrent (HCC)    4. Suicidal thoughts    5. Planning to commit suicide         Nichole TIPTON (Olimpia), am scribing for, and in the presence of, Yamini Mcduffie D.O..    Electronically signed by: Nichole Germain (Olimpia), 7/24/2022    IYamini D.O. personally performed the services described in this documentation, as scribed by Nichole Germain in my presence, and it is both accurate and complete.    The note accurately reflects work and decisions made by me.  Yamini Mcduffie D.O.  7/24/2022  4:09 AM

## 2022-07-24 NOTE — DISCHARGE PLANNING
Alert Team/[facility]   Note:    [facility] ALERT TEAM DISCHARGE PLANNING NOTE    Date:  7/24/22  Patient Name:  Minerva Tillman - 47 y.o. - Discharge Planning  MRN:  [id number]   YOB: 1975  ADMISSION DATE:  7/24/2022      Writer forwarded referral packet for inpatient psychiatric care to the following community providers:  [facility], [facility]   Items  included in the referral packet:   _x____Face Sheet   _x____Pages 1 and 2 of completed legal hold   _x____Alert Team/Psych Assessment   _n/a____H&P   _x____UDS   _x____Blood Alcohol   _x____Vital signs   _____Pregnancy Test (if applicable)   _x____Medications List   _____Covid Screen
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
## 2022-07-24 NOTE — ED NOTES
Pt. Continues to rest at this time.  at bedside. States she has withdrawn from alcohol in the past; main symptoms tremors and anxiety. Not currently experiencing at this time. Denies further needs. Remains in view of 1:1 sitter.
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
## 2022-07-24 NOTE — DISCHARGE PLANNING
Alert Team/[facility]   Note:    Randa @ [facility] called. No beds currently available. Transferred to Bedside RN @ ext [phone] for medical clarifications.

## 2022-07-24 NOTE — ED NOTES
Pt kept getting out of bed  Redirected multiple times back to bed  ERP notified  Medicated per MAR

## 2022-07-24 NOTE — ED NOTES
Patient's home medications have been reviewed by the pharmacy team.     Past Medical History:   Diagnosis Date   • Anxiety    • Depression    • Gynecological disorder    • High cholesterol    • Hypertension    • Snoring    • Urinary tract infection        Patient's Medications   New Prescriptions    No medications on file   Previous Medications    CLONAZEPAM (KLONOPIN) 0.5 MG TAB    Take 0.5 mg by mouth every day.    EPINEPHRINE (EPIPEN) 0.3 MG/0.3ML SOLUTION AUTO-INJECTOR SOLUTION FOR INJECTION    Inject 0.3 mL into the thigh one time for 1 dose.    ESCITALOPRAM (LEXAPRO) 5 MG TABLET    Take 5 mg by mouth every day.    HYDROXYZINE HCL (ATARAX) 25 MG TAB    Take 1/2 to 2 tablets by mouth up to 2 times a day as needed for anxiety or insomnia    LEVOTHYROXINE (SYNTHROID) 50 MCG TAB    Take 50 mcg by mouth every morning on an empty stomach.    MULTIVITAMIN (THERAGRAN) TAB    Take 1 Tablet by mouth every day.    NITROFURANTOIN (MACROBID) 100 MG CAP    Take 100 mg by mouth 2 times a day. Pt started on 6/25/2022 for 7 day course   Modified Medications    No medications on file   Discontinued Medications    ACAMPROSATE (CAMPRAL) 333 MG TABLET    Take 2 Tablets by mouth 3 times a day. For alcohol related anxiety.    ALBUTEROL 108 (90 BASE) MCG/ACT AERO SOLN INHALATION AEROSOL        LEVOTHYROXINE (SYNTHROID) 50 MCG TAB    Take 1 Tab by mouth Every morning on an empty stomach.    SERTRALINE (ZOLOFT) 50 MG TAB    Take 1 tablet by mouth once a day for 7 days, then take 2 tablets by mouth once a day, for anxiety and depression    TRAZODONE (DESYREL) 50 MG TAB    Take 1/2 to 3 tablets by mouth at bedtime as needed for insomnia.            No recommendations at this time. Home medications have been reordered as appropriate.      Isabel Wong, PharmD

## 2022-07-24 NOTE — PROGRESS NOTES
ED Provider Progress Note    2:57 PM the patient was seen by psychiatry and will need admission to a psychiatric facility.  She is currently very shaky due to her withdrawal from alcohol.  I have started her on Librium and diazepam tablets using the CIWA protocol.

## 2022-07-24 NOTE — ED NOTES
Med rec updated and complete, per pt   Allergies reviewed, per pt   Pt reports that she is not taking SERTRALINE 50MG or TRAZODONE 50MG

## 2022-07-25 PROCEDURE — A9270 NON-COVERED ITEM OR SERVICE: HCPCS | Performed by: EMERGENCY MEDICINE

## 2022-07-25 PROCEDURE — 700102 HCHG RX REV CODE 250 W/ 637 OVERRIDE(OP): Performed by: EMERGENCY MEDICINE

## 2022-07-25 RX ADMIN — CHLORDIAZEPOXIDE HYDROCHLORIDE 50 MG: 25 CAPSULE ORAL at 00:00

## 2022-07-25 NOTE — ED NOTES
Shannon from [facility] ([phone]) called and nurse to nurse report was given.   Patient accepted by Dr. Murphy and will be transported to [facility] by [facility].

## 2022-07-25 NOTE — DISCHARGE PLANNING
Medical Social Work    Referral: Legal hold Transfer to Mental Health Facility    Intervention: CHARLES received a call from bedside RN that pt has been accepted to [facility].  MSW contacted Yenifer with [facility] who verified they have accepted pt; receiving physician is Dr. Murphy.    CHARLES arranged for transportation to be set up through [place] with [facility].      The pt will be picked up at 2115.     CHARLES notified the RN of the departure time as well as accepting facility.     CHARLES created transfer packet and placed on chart. Original Legal Hold placed in packet.     Plan: Pt will transfer to [facility] at 2115.

## 2022-07-25 NOTE — ED NOTES
Patient medicated per MAR prior to discharge with [facility] team to [facility].  All belongings previously sent home with .  Patient ambulated independently to ambulance with EMS.

## 2022-07-25 NOTE — ED NOTES
Patient resting comfortably, resp even and unlabored, NAD, and no needs at this time.  Patient continues on 1:1 observation with sitter in LOS and room checklist in place.

## 2022-07-25 NOTE — ED NOTES
Report from TONYA Crooks  Patient resting comfortably, resp even and unlabored, NAD, and no needs at this time.  Patient continues on 1:1 observation with sitter in LOS and room checklist in place.

## 2022-08-12 ENCOUNTER — HOSPITAL ENCOUNTER (OUTPATIENT)
Dept: LAB | Facility: MEDICAL CENTER | Age: 47
End: 2022-08-12
Attending: PSYCHIATRY & NEUROLOGY
Payer: COMMERCIAL

## 2022-08-12 LAB
ALBUMIN SERPL BCP-MCNC: 3.9 G/DL (ref 3.2–4.9)
ALBUMIN/GLOB SERPL: 1.4 G/DL
ALP SERPL-CCNC: 94 U/L (ref 30–99)
ALT SERPL-CCNC: 33 U/L (ref 2–50)
ANION GAP SERPL CALC-SCNC: 11 MMOL/L (ref 7–16)
AST SERPL-CCNC: 37 U/L (ref 12–45)
BASOPHILS # BLD AUTO: 1.8 % (ref 0–1.8)
BASOPHILS # BLD: 0.12 K/UL (ref 0–0.12)
BILIRUB SERPL-MCNC: 0.4 MG/DL (ref 0.1–1.5)
BUN SERPL-MCNC: 5 MG/DL (ref 8–22)
CALCIUM SERPL-MCNC: 9.5 MG/DL (ref 8.5–10.5)
CHLORIDE SERPL-SCNC: 104 MMOL/L (ref 96–112)
CHOLEST SERPL-MCNC: 195 MG/DL (ref 100–199)
CO2 SERPL-SCNC: 25 MMOL/L (ref 20–33)
CREAT SERPL-MCNC: 0.7 MG/DL (ref 0.5–1.4)
EOSINOPHIL # BLD AUTO: 0.17 K/UL (ref 0–0.51)
EOSINOPHIL NFR BLD: 2.5 % (ref 0–6.9)
ERYTHROCYTE [DISTWIDTH] IN BLOOD BY AUTOMATED COUNT: 46.5 FL (ref 35.9–50)
EST. AVERAGE GLUCOSE BLD GHB EST-MCNC: 88 MG/DL
FASTING STATUS PATIENT QL REPORTED: NORMAL
GFR SERPLBLD CREATININE-BSD FMLA CKD-EPI: 106 ML/MIN/1.73 M 2
GLOBULIN SER CALC-MCNC: 2.7 G/DL (ref 1.9–3.5)
GLUCOSE SERPL-MCNC: 86 MG/DL (ref 65–99)
HBA1C MFR BLD: 4.7 % (ref 4–5.6)
HCT VFR BLD AUTO: 44.6 % (ref 37–47)
HDLC SERPL-MCNC: 37 MG/DL
HGB BLD-MCNC: 15.1 G/DL (ref 12–16)
IMM GRANULOCYTES # BLD AUTO: 0.04 K/UL (ref 0–0.11)
IMM GRANULOCYTES NFR BLD AUTO: 0.6 % (ref 0–0.9)
LDLC SERPL CALC-MCNC: 116 MG/DL
LYMPHOCYTES # BLD AUTO: 1.63 K/UL (ref 1–4.8)
LYMPHOCYTES NFR BLD: 24 % (ref 22–41)
MCH RBC QN AUTO: 37.2 PG (ref 27–33)
MCHC RBC AUTO-ENTMCNC: 33.9 G/DL (ref 33.6–35)
MCV RBC AUTO: 109.9 FL (ref 81.4–97.8)
MONOCYTES # BLD AUTO: 1.02 K/UL (ref 0–0.85)
MONOCYTES NFR BLD AUTO: 15 % (ref 0–13.4)
NEUTROPHILS # BLD AUTO: 3.82 K/UL (ref 2–7.15)
NEUTROPHILS NFR BLD: 56.1 % (ref 44–72)
NRBC # BLD AUTO: 0 K/UL
NRBC BLD-RTO: 0 /100 WBC
PLATELET # BLD AUTO: 422 K/UL (ref 164–446)
PMV BLD AUTO: 9.4 FL (ref 9–12.9)
POTASSIUM SERPL-SCNC: 4.4 MMOL/L (ref 3.6–5.5)
PROT SERPL-MCNC: 6.6 G/DL (ref 6–8.2)
RBC # BLD AUTO: 4.06 M/UL (ref 4.2–5.4)
SODIUM SERPL-SCNC: 140 MMOL/L (ref 135–145)
T4 FREE SERPL-MCNC: 1.07 NG/DL (ref 0.93–1.7)
TRIGL SERPL-MCNC: 209 MG/DL (ref 0–149)
TSH SERPL DL<=0.005 MIU/L-ACNC: 4.9 UIU/ML (ref 0.38–5.33)
WBC # BLD AUTO: 6.8 K/UL (ref 4.8–10.8)

## 2022-08-12 PROCEDURE — 80053 COMPREHEN METABOLIC PANEL: CPT

## 2022-08-12 PROCEDURE — 84439 ASSAY OF FREE THYROXINE: CPT

## 2022-08-12 PROCEDURE — 85025 COMPLETE CBC W/AUTO DIFF WBC: CPT

## 2022-08-12 PROCEDURE — 83036 HEMOGLOBIN GLYCOSYLATED A1C: CPT

## 2022-08-12 PROCEDURE — 80061 LIPID PANEL: CPT

## 2022-08-12 PROCEDURE — 84443 ASSAY THYROID STIM HORMONE: CPT

## 2022-08-12 PROCEDURE — 36415 COLL VENOUS BLD VENIPUNCTURE: CPT

## 2022-08-29 ENCOUNTER — OFFICE VISIT (OUTPATIENT)
Dept: NEUROLOGY | Facility: MEDICAL CENTER | Age: 47
End: 2022-08-29
Attending: PSYCHIATRY & NEUROLOGY
Payer: COMMERCIAL

## 2022-08-29 VITALS
DIASTOLIC BLOOD PRESSURE: 78 MMHG | TEMPERATURE: 97.1 F | BODY MASS INDEX: 23.79 KG/M2 | HEIGHT: 63 IN | OXYGEN SATURATION: 97 % | HEART RATE: 62 BPM | WEIGHT: 134.26 LBS | SYSTOLIC BLOOD PRESSURE: 132 MMHG

## 2022-08-29 DIAGNOSIS — Z87.820 HISTORY OF CONCUSSION: ICD-10-CM

## 2022-08-29 DIAGNOSIS — Z86.39 HISTORY OF VITAMIN D DEFICIENCY: ICD-10-CM

## 2022-08-29 PROBLEM — E05.90 HYPERTHYROIDISM: Status: ACTIVE | Noted: 2019-05-29

## 2022-08-29 PROCEDURE — 99215 OFFICE O/P EST HI 40 MIN: CPT | Performed by: PSYCHIATRY & NEUROLOGY

## 2022-08-29 PROCEDURE — 99212 OFFICE O/P EST SF 10 MIN: CPT | Performed by: PSYCHIATRY & NEUROLOGY

## 2022-08-29 RX ORDER — NALTREXONE HYDROCHLORIDE 50 MG/1
TABLET, FILM COATED ORAL
COMMUNITY
Start: 2022-08-23 | End: 2022-12-20

## 2022-08-29 RX ORDER — NALTREXONE HYDROCHLORIDE 50 MG/1
50 TABLET, FILM COATED ORAL DAILY
COMMUNITY
Start: 2022-08-09 | End: 2022-12-20

## 2022-08-29 ASSESSMENT — FIBROSIS 4 INDEX: FIB4 SCORE: 0.73

## 2022-08-29 ASSESSMENT — PATIENT HEALTH QUESTIONNAIRE - PHQ9: CLINICAL INTERPRETATION OF PHQ2 SCORE: 0

## 2022-08-29 ASSESSMENT — ANXIETY QUESTIONNAIRES
4. TROUBLE RELAXING: NOT AT ALL
2. NOT BEING ABLE TO STOP OR CONTROL WORRYING: NOT AT ALL
3. WORRYING TOO MUCH ABOUT DIFFERENT THINGS: NOT AT ALL
6. BECOMING EASILY ANNOYED OR IRRITABLE: SEVERAL DAYS
1. FEELING NERVOUS, ANXIOUS, OR ON EDGE: NOT AT ALL
GAD7 TOTAL SCORE: 1
IF YOU CHECKED OFF ANY PROBLEMS ON THIS QUESTIONNAIRE, HOW DIFFICULT HAVE THESE PROBLEMS MADE IT FOR YOU TO DO YOUR WORK, TAKE CARE OF THINGS AT HOME, OR GET ALONG WITH OTHER PEOPLE: NOT DIFFICULT AT ALL
5. BEING SO RESTLESS THAT IT IS HARD TO SIT STILL: NOT AT ALL
7. FEELING AFRAID AS IF SOMETHING AWFUL MIGHT HAPPEN: NOT AT ALL

## 2022-08-29 NOTE — PROGRESS NOTES
"Reason for Neurology Consult:  History of Concussion in May 2021    History of present illness:    Minerva Tillman 47 y.o. right handed woman who is from [facility] and has been in [place] x 28 years. Lives with .   years and kinder garden teacher for many years.    She had a concussion event in April 2021 when she was at [facility] with her . It was a normal day but she had been feeling significant stress and she was drinking alcohol daily (typically drinking a bottle her day but had not drank any more than  her typical amount in the recent few days). She was standing and the next thing she recognized she had been on the ground. For a split second she thought something was going to happen and then went to and went forward on her head (could not brace herself)- hit the side of a table and then a concrete floor (this was witnessed). She was completely out for a few minutes (what she was told). No immediate prodrome and  is a physician and put in her in the car and took her home.     She had a bump on the left frontal hairline and some bruises on her ribs.    By # 2 or #3 she started to notice that her  would come home that day and she could not tell him what she had done or where had been that day. That continued for 3-4 weeks. She went to [facility] after trying to going to [facility] and she was not able to be seen. She was drinking \"more\" and this was in retrospect noticeable or confirmed by a blood alcohol level around 248.  She was not able to tell me how much she was drinking in the coming weeks.    Discharge summary reviewed from 5/26/2021 as below:    46 y.o. female who presented 5/25/2021 with syncope and difficulty with word finding. Patient had syncopal episode with fall 3 weeks ago and has had worsening concentration, writing skills, and word finding difficulties. Patient denied any chest pain or dyspnea prior to her fall. She states " she fell forward had a swollen lip and painful ribs since. Since the fall, she reports increasing difficulty with word finding and her family members have commented on her slurred speech.  Due to her slurred speech and difficulty with word finding code stroke was called on arrival to the ER.  Patient denies any fevers, chills, chest pain, shortness of breath, or recent sick contacts.     Work-up in the ER platelets 158, , ALT 74, and alcohol level 275.5. CT head, CT perfusion, CTA head and neck showed no acute findings.      MRI showed Age-related cerebral atrophy. No evidence of acute infarction in the brain parenchyma.     Neurology is consulted,  rec PMR consult, EEG outpatient. PT/OT rec HHC. However due to patient's acute alcoholic intoxications, HHC has been declined.       Within 2-3 days from the above event she was feeling better (and  had not been drinking)- the say at the hospital scared her.    She has stopped completely drinking about 1 month ago with help.  She had been having a gradual increase in her alcohol (up to 2 bottles a day of wine for months prior to her stopping 1 month ago).    She a psychiatrist (Dr. Almeida) for treatment of anxiety and Depression (which was mainly anxiety> followed by depression which led to more drinking of wine).    She describes her mood as being very good in the recent weeks.    There is no evidence for a progressive encephalopathy at this point.  Cognitively she feels that she is functioning at her good baseline with no limitations her daily activities including using a computer, her cell phone, her home skills, driving> all have been good.    She denies any evolving problems in gait or balance in the last 12 months.    She denies any involuntary movements of body,head or limbs in the last 12 months.    Average sleep- 8 hours most nights; infrequent arousals, self refreshing sleep in the recent months.    She has never had an alcohol withdrawal or other  "seizure known to have occurred in her adult life.    No further history of syncope or near syncopal events in adult life.    Note: I reviewed Dr Tara Horn's note reviewed from 5/26/2021.       Family- Father (alcohol dependence)- mid 70's.  Mother: Dementia > onset in her mid to late 60's and now age 73 (has a \"mild\" dementia- lives with her ).29  2 brothers: alive and well    Patient Active Problem List    Diagnosis Date Noted    Severe episode of recurrent major depressive disorder, without psychotic features (HCC) 06/15/2022    Social anxiety disorder 06/15/2022    FREDA (generalized anxiety disorder) 06/15/2022    Alcohol use disorder, severe, dependence (HCC) 06/15/2022    Swelling 05/09/2022    Allergy 05/09/2022    Macrocytosis 05/09/2022    Alcohol abuse 08/06/2021    Syncope 05/25/2021    Dysarthria 05/25/2021    Acute alcohol intoxication (HCC) 05/25/2021    Decreased hearing, left 08/13/2020    Migraine with aura and without status migrainosus, not intractable 08/13/2020    Anxiety 06/11/2020    Mild episode of recurrent major depressive disorder (HCC) 06/11/2020    Other insomnia 06/11/2020    Elevated hematocrit 09/18/2019    Fatty liver, alcoholic 05/29/2019    Vitamin D insufficiency 05/29/2019    Acquired hypothyroidism 05/29/2019    Essential hypertension 10/26/2016    Weight gain 08/26/2016    Hypercholesterolemia 08/26/2016    Anxiety associated with depression 01/13/2013    Acute cystitis without hematuria 02/15/2011       Past medical history:   Past Medical History:   Diagnosis Date    Anxiety     Depression     Gynecological disorder     High cholesterol     Hypertension     Snoring     Urinary tract infection        Past surgical history:   Past Surgical History:   Procedure Laterality Date    HYSTEROSCOPY NOVASURE-2  8/2/2017    Procedure: HYSTEROSCOPY NOVASURE;  Surgeon: Hedy Hardy M.D.;  Location: SURGERY [facility];  Service:     DILATION AND CURETTAGE  8/2/2017    " Procedure: DILATION AND CURETTAGE;  Surgeon: Hedy Hardy M.D.;  Location: SURGERY [facility];  Service:     VA BREAST AUGMENTATION WITH IMPLANT  2006    MAMMOPLASTY AUGMENTATION           Social history:   Social History     Socioeconomic History    Marital status:      Spouse name: Not on file    Number of children: Not on file    Years of education: Not on file    Highest education level: Not on file   Occupational History    Not on file   Tobacco Use    Smoking status: Never    Smokeless tobacco: Never   Substance and Sexual Activity    Alcohol use: Yes     Alcohol/week: 8.4 oz     Types: 14 Glasses of wine per week     Comment: 1-2 glasses per day    Drug use: No    Sexual activity: Yes     Partners: Male     Comment: ,    Other Topics Concern    Not on file   Social History Narrative    Not on file     Social Determinants of Health     Financial Resource Strain: Not on file   Food Insecurity: Not on file   Transportation Needs: Not on file   Physical Activity: Not on file   Stress: Not on file   Social Connections: Not on file   Intimate Partner Violence: Not on file   Housing Stability: Not on file       Family history:   Family History   Problem Relation Age of Onset    Cancer Maternal Grandmother 60        breast and skin    Stroke Maternal Grandmother     Hypertension Mother     Hyperlipidemia Mother     Depression Father     Hypertension Father     Asthma Father     Hyperlipidemia Father     Hypertension Brother     Cancer Maternal Grandfather 60        esphogus    Other Paternal Grandmother         complications from surgery    Hypertension Brother          Current medications:   Current Outpatient Medications   Medication    naltrexone (DEPADE) 50 MG Tab    escitalopram (LEXAPRO) 5 MG tablet    multivitamin (THERAGRAN) Tab    hydrOXYzine HCl (ATARAX) 25 MG Tab    levothyroxine (SYNTHROID) 50 MCG Tab    EPINEPHrine (EPIPEN) 0.3 MG/0.3ML Solution Auto-injector solution for  "injection    clonazePAM (KLONOPIN) 0.5 MG Tab    nitrofurantoin (MACROBID) 100 MG Cap     No current facility-administered medications for this visit.       Medication Allergy:  Allergies   Allergen Reactions    Sulfa Drugs Hives     [id number]    Strawberry Hives           Physical examination:   Vitals:    08/29/22 0847   BP: 132/78   BP Location: Right arm   Patient Position: Sitting   BP Cuff Size: Adult   Pulse: 62   Temp: 36.2 °C (97.1 °F)   TempSrc: Temporal   SpO2: 97%   Weight: 60.9 kg (134 lb 4.2 oz)   Height: 1.6 m (5' 3\")       Normal cephalic atraumatic.  There is full range of movement around the neck in all directions without restrictions or discrete pain evoked triggers.  No lower extremity edema.      Neurological  Exam:      Mohit Cognitive Assessment (MOCA) Version 7.1    Years of Education: Associates Degree     TOTAL SCORE: 29/30  (to be scanned into the MEDIA section in the E.M.R.)          Mental status: Awake, alert and fully oriented to person, place, time, and situation. Normal attention, concentration, and fund of knowledge for education level.  Did not appear/act combative,irritable,anxious,paranoid/delusional or aggressive to or with me.    Speech and language: Speech is fluent without errors, clear, intact to repetition, and intact to naming.     Follows 3 step motor commands in sequence without significant delay and correctly.    Cranial nerve exam:  II: Pupils are equally round and reactive to light. Visual fields are intact by confrontation.  III, IV, VI: EOMI, no diplopia, no ptosis.  V: Sensation to light touch is normal over V1-3 distributions bilaterally.  .  VII: Facial movements are symmetrical. There is no facial droop. .  VIII: Hearing intact to soft speech and finger rub bilaterally  IX: Palate elevates symmetrically, uvula is midline. Dysarthria is not present.  XI: Shoulder shrug are symmetrical and strong.   XII: Tongue protrudes midline.      Motor exam:  Muscle tone is " normal in all 4 limbs. and No abnormal movements appreciated.    Muscle strength:    Neck Flexors/Extensors: 5/5       Right  Left  Deltoid   5/5  5/5      Biceps   5/5  5/5  Triceps              5/5  5/5   Wrist extensors 5/5  5/5  Wrist flexors  5/5  5/5     5/5  5/5  Interossei  5/5  5/5  Thenar (APB)  5/5  5/5   Hip flexors  5/5  5/5  Quadriceps  5/5  5/5    Hamstrings  5/5  5/5  Dorsiflexors  5/5  5/5  Plantarflexors  5/5  5/5  Toe extension  5/5  5/5        Reflexes:       Right  Left  Biceps   2/4  2/4  Triceps              2/4  2/4  Brachioradialis             2/4  2/4  Knee jerk  2/4  2/4  Ankle jerk  2/4  2/4     Frontal release signs are absent    bilaterally toes are downgoing to plantar stimulation..    Coordination (finger-to-nose, heel/knee/shin, rapid alternating movements) was normal.     There was no ataxia, no tremors, and no dysmetria.     Station and gait were normal.     Easily stands up from exam chair without retropulsion,veering,leaning,swaying (to either side).       FREDA 7 of 1 today.  PHQ9 of 0 today  Labs and Tests: Reviewed from last year.  Last B12- 419 (5/2021)  Folate- 26.5 (5/2021)  4/2022: TSH of 3.620        NEUROIMAGING:     Brain MRI reviewed from May 2021        Impression/Plans/Recommendations:    Mild Concussion- May 2021- resolved with any clear residual at this point.    MOCA score today of 29/30 which is encouraging.    2. Alcohol Dependence- in remission for 1 month.    3. Anxiety/Depression- under surveillance by Dr. Almeida    Plans:    A. Discussed long risk reduction strategies for overall brain health- such as exercise and proper diet.    B. Will  check B1,B9 and B12 blood levels     C. See no reason at this time for EEG or another Brain MRI.        I have performed  a history and physical exam and a directed /focused  ROS today.    Total time spent today or this patient's care was 45 minutes and included reviewing  the diagnostic workup to date (such as labs and  imaging as well as interpreting such tests relevant to this patient's neurological condition),  reviewing/obtaining separately obtained history (from patient)  for today's neurological problem(s) ,counseling and educating the patient and family member on issues related to cognition/memory and cognitive health factors and documenting  the clinical information in the EMR.    Follow up: PRN at this time        Tai Joel MD  [facility].   [facility]

## 2022-12-20 ENCOUNTER — HOSPITAL ENCOUNTER (OUTPATIENT)
Facility: MEDICAL CENTER | Age: 47
End: 2022-12-20
Attending: FAMILY MEDICINE

## 2022-12-20 ENCOUNTER — OFFICE VISIT (OUTPATIENT)
Dept: MEDICAL GROUP | Facility: MEDICAL CENTER | Age: 47
End: 2022-12-20
Payer: COMMERCIAL

## 2022-12-20 VITALS
HEIGHT: 63 IN | DIASTOLIC BLOOD PRESSURE: 60 MMHG | OXYGEN SATURATION: 96 % | HEART RATE: 84 BPM | WEIGHT: 134.65 LBS | SYSTOLIC BLOOD PRESSURE: 114 MMHG | BODY MASS INDEX: 23.86 KG/M2 | TEMPERATURE: 97.7 F

## 2022-12-20 DIAGNOSIS — R35.0 URINARY FREQUENCY: ICD-10-CM

## 2022-12-20 DIAGNOSIS — E78.5 DYSLIPIDEMIA: ICD-10-CM

## 2022-12-20 LAB
APPEARANCE UR: NORMAL
BILIRUB UR STRIP-MCNC: NEGATIVE MG/DL
COLOR UR AUTO: YELLOW
GLUCOSE UR STRIP.AUTO-MCNC: NEGATIVE MG/DL
INT CON NEG: NEGATIVE
INT CON POS: POSITIVE
KETONES UR STRIP.AUTO-MCNC: NEGATIVE MG/DL
LEUKOCYTE ESTERASE UR QL STRIP.AUTO: NEGATIVE
NITRITE UR QL STRIP.AUTO: NEGATIVE
PH UR STRIP.AUTO: 5 [PH] (ref 5–8)
POC URINE PREGNANCY TEST: NEGATIVE
PROT UR QL STRIP: NEGATIVE MG/DL
RBC UR QL AUTO: NORMAL
SP GR UR STRIP.AUTO: 1.01
UROBILINOGEN UR STRIP-MCNC: NORMAL MG/DL

## 2022-12-20 PROCEDURE — 81025 URINE PREGNANCY TEST: CPT | Performed by: FAMILY MEDICINE

## 2022-12-20 PROCEDURE — 87086 URINE CULTURE/COLONY COUNT: CPT

## 2022-12-20 PROCEDURE — 87591 N.GONORRHOEAE DNA AMP PROB: CPT

## 2022-12-20 PROCEDURE — 87491 CHLMYD TRACH DNA AMP PROBE: CPT

## 2022-12-20 PROCEDURE — 81002 URINALYSIS NONAUTO W/O SCOPE: CPT | Performed by: FAMILY MEDICINE

## 2022-12-20 PROCEDURE — 99214 OFFICE O/P EST MOD 30 MIN: CPT | Performed by: FAMILY MEDICINE

## 2022-12-20 RX ORDER — OLMESARTAN MEDOXOMIL 40 MG/1
40 TABLET ORAL
Qty: 30 TABLET | Refills: 11 | COMMUNITY
Start: 2022-12-20 | End: 2023-12-20

## 2022-12-20 ASSESSMENT — FIBROSIS 4 INDEX: FIB4 SCORE: 0.73

## 2022-12-20 NOTE — PROGRESS NOTES
This medical record contains text that has been entered with the assistance of computer voice recognition and dictation software.  Therefore, it may contain unintended errors in text, spelling, punctuation, or grammar        Chief Complaint   Patient presents with    Fatigue     Muscle and body aches. Chills and urgency to urinate x 1 wk.  Was DX: UTI 1 wk ago       Minerva Tillman is a 47 y.o. female here evaluation and management of:         Current Outpatient Medications   Medication Sig Dispense Refill    olmesartan (BENICAR) 40 MG Tab Take 1 Tablet by mouth every day. 30 Tablet 11    escitalopram (LEXAPRO) 5 MG tablet Take 5 mg by mouth every day.      multivitamin (THERAGRAN) Tab Take 1 Tablet by mouth every day.      levothyroxine (SYNTHROID) 50 MCG Tab Take 50 mcg by mouth every morning on an empty stomach.       No current facility-administered medications for this visit.     Patient Active Problem List    Diagnosis Date Noted    Urinary frequency 12/20/2022    Severe episode of recurrent major depressive disorder, without psychotic features (HCC) 06/15/2022    Social anxiety disorder 06/15/2022    FREDA (generalized anxiety disorder) 06/15/2022    Alcohol use disorder, severe, dependence (HCC) 06/15/2022    Swelling 05/09/2022    Allergy 05/09/2022    Macrocytosis 05/09/2022    Alcohol abuse 08/06/2021    Syncope 05/25/2021    Dysarthria 05/25/2021    Acute alcohol intoxication (HCC) 05/25/2021    Decreased hearing, left 08/13/2020    Migraine with aura and without status migrainosus, not intractable 08/13/2020    Anxiety 06/11/2020    Mild episode of recurrent major depressive disorder (HCC) 06/11/2020    Other insomnia 06/11/2020    Elevated hematocrit 09/18/2019    Fatty liver, alcoholic 05/29/2019    Vitamin D insufficiency 05/29/2019    Hypothyroidism 05/29/2019    Essential hypertension 10/26/2016    Weight gain 08/26/2016    Hypercholesterolemia 08/26/2016    Anxiety associated with depression  "01/13/2013    Acute cystitis without hematuria 02/15/2011     Past Surgical History:   Procedure Laterality Date    HYSTEROSCOPY NOVASURE-2  8/2/2017    Procedure: HYSTEROSCOPY NOVASURE;  Surgeon: Hedy Hardy M.D.;  Location: SURGERY [facility];  Service:     DILATION AND CURETTAGE  8/2/2017    Procedure: DILATION AND CURETTAGE;  Surgeon: Hedy Hardy M.D.;  Location: SURGERY [facility];  Service:     [place] BREAST AUGMENTATION WITH IMPLANT  2006    MAMMOPLASTY AUGMENTATION        Social History     Tobacco Use    Smoking status: Never    Smokeless tobacco: Never   Substance Use Topics    Alcohol use: Yes     Alcohol/week: 8.4 oz     Types: 14 Glasses of wine per week     Comment: 1-2 glasses per day    Drug use: No     Family History   Problem Relation Age of Onset    Cancer Maternal Grandmother 60        breast and skin    Stroke Maternal Grandmother     Hypertension Mother     Hyperlipidemia Mother     Depression Father     Hypertension Father     Asthma Father     Hyperlipidemia Father     Hypertension Brother     Cancer Maternal Grandfather 60        esphogus    Other Paternal Grandmother         complications from surgery    Hypertension Brother            ROS    all review of system completed and negative except for those listed above     Objective:     /60 (BP Location: Left arm, Patient Position: Sitting, BP Cuff Size: Adult long)   Pulse 84   Temp 36.5 °C (97.7 °F) (Temporal)   Ht 1.6 m (5' 3\")   Wt 61.1 kg (134 lb 10.3 oz)   SpO2 96%  Body mass index is 23.85 kg/m².  Physical Exam:    Constitutional: Alert, no distress.  Skin: Warm, dry, good turgor, no rashes in visible areas.  Eye: Equal, round and reactive, conjunctiva clear, lids normal.  ENMT: Lips without lesions, good dentition, oropharynx clear.  Neck: Trachea midline, no masses, no thyromegaly. No cervical or supraclavicular lymphadenopathy.  Respiratory: Unlabored respiratory effort, lungs clear to auscultation, " no wheezes, no ronchi.  Cardiovascular: Normal S1, S2, no murmur, no edema.  Abdomen: Soft, non-tender, no masses, no hepatosplenomegaly.  Psych: Alert and oriented x3, normal affect and mood.          Assessment and Plan:   The following treatment plan was discussed        Problem List Items Addressed This Visit       Urinary frequency     Patient was on a cruise and about 7 days ago developed urinary frequency, dark urine (unsure if blood or concentrated from dehydration), and no dysuria with no back pain no fever but violent nausea and vomiting   She is improving from n/v standpoint but still advancing her diet as tolerated   Was seen by rohit SHANNON and treated for UTI with cipro x 7 days   Not sent for culture however     She continues to have urinary frequency and continues to deny fever flank pain and dysuria     Urine sent for culture GCCT and u preg     Follow up sheduled in clinic to review results     Consider pelvic floor consultation urogyn Dr Melgoza at follow up as she states after 3 children she does have pelvic floor dysfunction     30+ min spent              Relevant Orders    URINE CULTURE(NEW)    Chlamydia/GC, PCR (Urine)    HCG QUALITATIVE UR     Other Visit Diagnoses       Dyslipidemia        Relevant Medications    olmesartan (BENICAR) 40 MG Tab                  Instructed to follow up if symptoms worsen or fail to improve, ER/UC precautions discussed as well    Ariana Corbin MD  [facility], Family Medicine   [address] [place] [address]   Sumit ORTIZ [id number]  Phone: [phone]

## 2022-12-20 NOTE — ASSESSMENT & PLAN NOTE
Patient was on a cruise and about 7 days ago developed urinary frequency, dark urine (unsure if blood or concentrated from dehydration), and no dysuria with no back pain no fever but violent nausea and vomiting   She is improving from n/v standpoint but still advancing her diet as tolerated   Was seen by rohit SHANNON and treated for UTI with cipro x 7 days   Not sent for culture however     She continues to have urinary frequency and continues to deny fever flank pain and dysuria     Urine sent for culture GCCT and u preg     Follow up sheduled in clinic to review results     Consider pelvic floor consultation urogyn Dr Melgoza at follow up as she states after 3 children she does have pelvic floor dysfunction     30+ min spent

## 2022-12-21 LAB
C TRACH DNA SPEC QL NAA+PROBE: NEGATIVE
N GONORRHOEA DNA SPEC QL NAA+PROBE: NEGATIVE
SPECIMEN SOURCE: NORMAL

## 2022-12-23 LAB
BACTERIA UR CULT: NORMAL
SIGNIFICANT IND 70042: NORMAL
SITE SITE: NORMAL
SOURCE SOURCE: NORMAL

## 2023-05-30 ENCOUNTER — HOSPITAL ENCOUNTER (INPATIENT)
Facility: MEDICAL CENTER | Age: 48
LOS: 1 days | DRG: 690 | End: 2023-05-31
Attending: EMERGENCY MEDICINE | Admitting: GENERAL PRACTICE
Payer: COMMERCIAL

## 2023-05-30 ENCOUNTER — APPOINTMENT (OUTPATIENT)
Dept: RADIOLOGY | Facility: MEDICAL CENTER | Age: 48
DRG: 690 | End: 2023-05-30
Attending: EMERGENCY MEDICINE
Payer: COMMERCIAL

## 2023-05-30 DIAGNOSIS — N39.0 ACUTE UTI: ICD-10-CM

## 2023-05-30 DIAGNOSIS — R11.2 NAUSEA AND VOMITING, UNSPECIFIED VOMITING TYPE: ICD-10-CM

## 2023-05-30 DIAGNOSIS — F10.939 ALCOHOL WITHDRAWAL SYNDROME WITH COMPLICATION (HCC): ICD-10-CM

## 2023-05-30 DIAGNOSIS — E87.29 ALCOHOLIC KETOACIDOSIS: ICD-10-CM

## 2023-05-30 DIAGNOSIS — N30.00 ACUTE CYSTITIS WITHOUT HEMATURIA: ICD-10-CM

## 2023-05-30 PROBLEM — R10.84 GENERALIZED ABDOMINAL PAIN: Status: ACTIVE | Noted: 2023-05-30

## 2023-05-30 PROBLEM — F10.930 ALCOHOL WITHDRAWAL SYNDROME, UNCOMPLICATED (HCC): Status: ACTIVE | Noted: 2023-05-30

## 2023-05-30 PROBLEM — R17 SERUM TOTAL BILIRUBIN ELEVATED: Status: ACTIVE | Noted: 2023-05-30

## 2023-05-30 PROBLEM — R74.01 TRANSAMINITIS: Status: ACTIVE | Noted: 2023-05-30

## 2023-05-30 LAB
ALBUMIN SERPL BCP-MCNC: 5 G/DL (ref 3.2–4.9)
ALBUMIN/GLOB SERPL: 2 G/DL
ALP SERPL-CCNC: 113 U/L (ref 30–99)
ALT SERPL-CCNC: 120 U/L (ref 2–50)
ANION GAP SERPL CALC-SCNC: 25 MMOL/L (ref 7–16)
APPEARANCE UR: ABNORMAL
AST SERPL-CCNC: 187 U/L (ref 12–45)
BACTERIA #/AREA URNS HPF: ABNORMAL /HPF
BASOPHILS # BLD AUTO: 1.5 % (ref 0–1.8)
BASOPHILS # BLD: 0.09 K/UL (ref 0–0.12)
BILIRUB SERPL-MCNC: 2 MG/DL (ref 0.1–1.5)
BILIRUB UR QL STRIP.AUTO: ABNORMAL
BUN SERPL-MCNC: 7 MG/DL (ref 8–22)
CALCIUM ALBUM COR SERPL-MCNC: 8.6 MG/DL (ref 8.5–10.5)
CALCIUM SERPL-MCNC: 9.4 MG/DL (ref 8.5–10.5)
CHLORIDE SERPL-SCNC: 93 MMOL/L (ref 96–112)
CO2 SERPL-SCNC: 20 MMOL/L (ref 20–33)
COLOR UR: ABNORMAL
CREAT SERPL-MCNC: 0.73 MG/DL (ref 0.5–1.4)
EOSINOPHIL # BLD AUTO: 0.01 K/UL (ref 0–0.51)
EOSINOPHIL NFR BLD: 0.2 % (ref 0–6.9)
EPI CELLS #/AREA URNS HPF: ABNORMAL /HPF
ERYTHROCYTE [DISTWIDTH] IN BLOOD BY AUTOMATED COUNT: 45 FL (ref 35.9–50)
ETHANOL BLD-MCNC: <10.1 MG/DL
GFR SERPLBLD CREATININE-BSD FMLA CKD-EPI: 101 ML/MIN/1.73 M 2
GLOBULIN SER CALC-MCNC: 2.5 G/DL (ref 1.9–3.5)
GLUCOSE SERPL-MCNC: 165 MG/DL (ref 65–99)
GLUCOSE UR STRIP.AUTO-MCNC: 100 MG/DL
HCG SERPL QL: NEGATIVE
HCT VFR BLD AUTO: 43.7 % (ref 37–47)
HGB BLD-MCNC: 15.5 G/DL (ref 12–16)
IMM GRANULOCYTES # BLD AUTO: 0.07 K/UL (ref 0–0.11)
IMM GRANULOCYTES NFR BLD AUTO: 1.1 % (ref 0–0.9)
KETONES UR STRIP.AUTO-MCNC: 15 MG/DL
LEUKOCYTE ESTERASE UR QL STRIP.AUTO: ABNORMAL
LIPASE SERPL-CCNC: 23 U/L (ref 11–82)
LYMPHOCYTES # BLD AUTO: 0.88 K/UL (ref 1–4.8)
LYMPHOCYTES NFR BLD: 14.3 % (ref 22–41)
MCH RBC QN AUTO: 36.3 PG (ref 27–33)
MCHC RBC AUTO-ENTMCNC: 35.5 G/DL (ref 32.2–35.5)
MCV RBC AUTO: 102.3 FL (ref 81.4–97.8)
MICRO URNS: ABNORMAL
MONOCYTES # BLD AUTO: 1.16 K/UL (ref 0–0.85)
MONOCYTES NFR BLD AUTO: 18.9 % (ref 0–13.4)
MUCOUS THREADS #/AREA URNS HPF: ABNORMAL /HPF
NEUTROPHILS # BLD AUTO: 3.93 K/UL (ref 1.82–7.42)
NEUTROPHILS NFR BLD: 64 % (ref 44–72)
NITRITE UR QL STRIP.AUTO: POSITIVE
NRBC # BLD AUTO: 0.03 K/UL
NRBC BLD-RTO: 0.5 /100 WBC (ref 0–0.2)
OTHER ELEMENTS URNS MICRO: ABNORMAL
PH UR STRIP.AUTO: 6.5 [PH] (ref 5–8)
PLATELET # BLD AUTO: 153 K/UL (ref 164–446)
PMV BLD AUTO: 9.8 FL (ref 9–12.9)
POTASSIUM SERPL-SCNC: 3.8 MMOL/L (ref 3.6–5.5)
PROT SERPL-MCNC: 7.5 G/DL (ref 6–8.2)
PROT UR QL STRIP: >=300 MG/DL
RBC # BLD AUTO: 4.27 M/UL (ref 4.2–5.4)
RBC # URNS HPF: ABNORMAL /HPF
RBC UR QL AUTO: ABNORMAL
SODIUM SERPL-SCNC: 138 MMOL/L (ref 135–145)
SP GR UR STRIP.AUTO: >=1.03
UROBILINOGEN UR STRIP.AUTO-MCNC: 4 MG/DL
WBC # BLD AUTO: 6.1 K/UL (ref 4.8–10.8)
WBC #/AREA URNS HPF: ABNORMAL /HPF

## 2023-05-30 PROCEDURE — 700105 HCHG RX REV CODE 258: Performed by: EMERGENCY MEDICINE

## 2023-05-30 PROCEDURE — 84703 CHORIONIC GONADOTROPIN ASSAY: CPT

## 2023-05-30 PROCEDURE — 36415 COLL VENOUS BLD VENIPUNCTURE: CPT

## 2023-05-30 PROCEDURE — 83605 ASSAY OF LACTIC ACID: CPT

## 2023-05-30 PROCEDURE — 96375 TX/PRO/DX INJ NEW DRUG ADDON: CPT

## 2023-05-30 PROCEDURE — 80053 COMPREHEN METABOLIC PANEL: CPT

## 2023-05-30 PROCEDURE — 99223 1ST HOSP IP/OBS HIGH 75: CPT | Mod: AI | Performed by: GENERAL PRACTICE

## 2023-05-30 PROCEDURE — 99285 EMERGENCY DEPT VISIT HI MDM: CPT

## 2023-05-30 PROCEDURE — 85025 COMPLETE CBC W/AUTO DIFF WBC: CPT

## 2023-05-30 PROCEDURE — 81001 URINALYSIS AUTO W/SCOPE: CPT

## 2023-05-30 PROCEDURE — 87040 BLOOD CULTURE FOR BACTERIA: CPT | Mod: 91

## 2023-05-30 PROCEDURE — 700111 HCHG RX REV CODE 636 W/ 250 OVERRIDE (IP): Performed by: EMERGENCY MEDICINE

## 2023-05-30 PROCEDURE — 82010 KETONE BODYS QUAN: CPT

## 2023-05-30 PROCEDURE — 82077 ASSAY SPEC XCP UR&BREATH IA: CPT

## 2023-05-30 PROCEDURE — 96374 THER/PROPH/DIAG INJ IV PUSH: CPT

## 2023-05-30 PROCEDURE — 770006 HCHG ROOM/CARE - MED/SURG/GYN SEMI*

## 2023-05-30 PROCEDURE — 74177 CT ABD & PELVIS W/CONTRAST: CPT

## 2023-05-30 PROCEDURE — 700117 HCHG RX CONTRAST REV CODE 255: Performed by: EMERGENCY MEDICINE

## 2023-05-30 PROCEDURE — 83690 ASSAY OF LIPASE: CPT

## 2023-05-30 RX ORDER — LORAZEPAM 2 MG/ML
2 INJECTION INTRAMUSCULAR ONCE
Status: COMPLETED | OUTPATIENT
Start: 2023-05-30 | End: 2023-05-30

## 2023-05-30 RX ORDER — GAUZE BANDAGE 2" X 2"
100 BANDAGE TOPICAL DAILY
Status: DISCONTINUED | OUTPATIENT
Start: 2023-05-31 | End: 2023-05-31 | Stop reason: HOSPADM

## 2023-05-30 RX ORDER — OLMESARTAN MEDOXOMIL 20 MG/1
40 TABLET ORAL DAILY
Status: DISCONTINUED | OUTPATIENT
Start: 2023-05-31 | End: 2023-05-31 | Stop reason: HOSPADM

## 2023-05-30 RX ORDER — LORAZEPAM 1 MG/1
1 TABLET ORAL EVERY 4 HOURS PRN
Status: DISCONTINUED | OUTPATIENT
Start: 2023-05-30 | End: 2023-05-31 | Stop reason: HOSPADM

## 2023-05-30 RX ORDER — ONDANSETRON 2 MG/ML
4 INJECTION INTRAMUSCULAR; INTRAVENOUS EVERY 4 HOURS PRN
Status: DISCONTINUED | OUTPATIENT
Start: 2023-05-30 | End: 2023-05-31 | Stop reason: HOSPADM

## 2023-05-30 RX ORDER — LORAZEPAM 2 MG/1
2 TABLET ORAL
Status: DISCONTINUED | OUTPATIENT
Start: 2023-05-30 | End: 2023-05-31 | Stop reason: HOSPADM

## 2023-05-30 RX ORDER — PROMETHAZINE HYDROCHLORIDE 25 MG/1
12.5-25 SUPPOSITORY RECTAL EVERY 4 HOURS PRN
Status: DISCONTINUED | OUTPATIENT
Start: 2023-05-30 | End: 2023-05-31 | Stop reason: HOSPADM

## 2023-05-30 RX ORDER — ENOXAPARIN SODIUM 100 MG/ML
40 INJECTION SUBCUTANEOUS DAILY
Status: DISCONTINUED | OUTPATIENT
Start: 2023-05-31 | End: 2023-05-31 | Stop reason: HOSPADM

## 2023-05-30 RX ORDER — LORAZEPAM 2 MG/ML
1 INJECTION INTRAMUSCULAR
Status: DISCONTINUED | OUTPATIENT
Start: 2023-05-30 | End: 2023-05-31 | Stop reason: HOSPADM

## 2023-05-30 RX ORDER — ESCITALOPRAM OXALATE 10 MG/1
5 TABLET ORAL DAILY
Status: DISCONTINUED | OUTPATIENT
Start: 2023-05-31 | End: 2023-05-31 | Stop reason: HOSPADM

## 2023-05-30 RX ORDER — LORAZEPAM 0.5 MG/1
0.5 TABLET ORAL EVERY 4 HOURS PRN
Status: DISCONTINUED | OUTPATIENT
Start: 2023-05-30 | End: 2023-05-31 | Stop reason: HOSPADM

## 2023-05-30 RX ORDER — SODIUM CHLORIDE 9 MG/ML
INJECTION, SOLUTION INTRAVENOUS CONTINUOUS
Status: ACTIVE | OUTPATIENT
Start: 2023-05-31 | End: 2023-05-31

## 2023-05-30 RX ORDER — ACETAMINOPHEN 325 MG/1
650 TABLET ORAL EVERY 6 HOURS PRN
Status: DISCONTINUED | OUTPATIENT
Start: 2023-05-30 | End: 2023-05-30

## 2023-05-30 RX ORDER — FOLIC ACID 1 MG/1
1 TABLET ORAL DAILY
Status: DISCONTINUED | OUTPATIENT
Start: 2023-05-31 | End: 2023-05-31 | Stop reason: HOSPADM

## 2023-05-30 RX ORDER — LORAZEPAM 2 MG/ML
2 INJECTION INTRAMUSCULAR
Status: DISCONTINUED | OUTPATIENT
Start: 2023-05-30 | End: 2023-05-31 | Stop reason: HOSPADM

## 2023-05-30 RX ORDER — ONDANSETRON 2 MG/ML
4 INJECTION INTRAMUSCULAR; INTRAVENOUS ONCE
Status: COMPLETED | OUTPATIENT
Start: 2023-05-30 | End: 2023-05-30

## 2023-05-30 RX ORDER — LORAZEPAM 2 MG/1
4 TABLET ORAL
Status: DISCONTINUED | OUTPATIENT
Start: 2023-05-30 | End: 2023-05-31 | Stop reason: HOSPADM

## 2023-05-30 RX ORDER — PROCHLORPERAZINE EDISYLATE 5 MG/ML
5-10 INJECTION INTRAMUSCULAR; INTRAVENOUS EVERY 4 HOURS PRN
Status: DISCONTINUED | OUTPATIENT
Start: 2023-05-30 | End: 2023-05-31 | Stop reason: HOSPADM

## 2023-05-30 RX ORDER — ONDANSETRON 4 MG/1
4 TABLET, ORALLY DISINTEGRATING ORAL EVERY 4 HOURS PRN
Status: DISCONTINUED | OUTPATIENT
Start: 2023-05-30 | End: 2023-05-31 | Stop reason: HOSPADM

## 2023-05-30 RX ORDER — LEVOTHYROXINE SODIUM 0.05 MG/1
50 TABLET ORAL
Status: DISCONTINUED | OUTPATIENT
Start: 2023-05-31 | End: 2023-05-31 | Stop reason: HOSPADM

## 2023-05-30 RX ORDER — LORAZEPAM 2 MG/ML
1.5 INJECTION INTRAMUSCULAR
Status: DISCONTINUED | OUTPATIENT
Start: 2023-05-30 | End: 2023-05-31 | Stop reason: HOSPADM

## 2023-05-30 RX ORDER — PROMETHAZINE HYDROCHLORIDE 25 MG/1
12.5-25 TABLET ORAL EVERY 4 HOURS PRN
Status: DISCONTINUED | OUTPATIENT
Start: 2023-05-30 | End: 2023-05-31 | Stop reason: HOSPADM

## 2023-05-30 RX ORDER — HYDROXYZINE 50 MG/1
25 TABLET, FILM COATED ORAL 3 TIMES DAILY PRN
Status: DISCONTINUED | OUTPATIENT
Start: 2023-05-30 | End: 2023-05-31 | Stop reason: HOSPADM

## 2023-05-30 RX ORDER — SODIUM CHLORIDE, SODIUM LACTATE, POTASSIUM CHLORIDE, CALCIUM CHLORIDE 600; 310; 30; 20 MG/100ML; MG/100ML; MG/100ML; MG/100ML
1000 INJECTION, SOLUTION INTRAVENOUS ONCE
Status: COMPLETED | OUTPATIENT
Start: 2023-05-30 | End: 2023-05-30

## 2023-05-30 RX ORDER — CEFTRIAXONE 2 G/1
2000 INJECTION, POWDER, FOR SOLUTION INTRAMUSCULAR; INTRAVENOUS ONCE
Status: COMPLETED | OUTPATIENT
Start: 2023-05-30 | End: 2023-05-30

## 2023-05-30 RX ORDER — LORAZEPAM 2 MG/ML
0.5 INJECTION INTRAMUSCULAR EVERY 4 HOURS PRN
Status: DISCONTINUED | OUTPATIENT
Start: 2023-05-30 | End: 2023-05-31 | Stop reason: HOSPADM

## 2023-05-30 RX ADMIN — CEFTRIAXONE SODIUM 2000 MG: 2 INJECTION, POWDER, FOR SOLUTION INTRAMUSCULAR; INTRAVENOUS at 23:42

## 2023-05-30 RX ADMIN — ONDANSETRON 4 MG: 2 INJECTION INTRAMUSCULAR; INTRAVENOUS at 22:30

## 2023-05-30 RX ADMIN — SODIUM CHLORIDE, POTASSIUM CHLORIDE, SODIUM LACTATE AND CALCIUM CHLORIDE 1000 ML: 600; 310; 30; 20 INJECTION, SOLUTION INTRAVENOUS at 22:30

## 2023-05-30 RX ADMIN — LORAZEPAM 2 MG: 2 INJECTION INTRAMUSCULAR; INTRAVENOUS at 22:30

## 2023-05-30 ASSESSMENT — LIFESTYLE VARIABLES
ORIENTATION AND CLOUDING OF SENSORIUM: ORIENTED AND CAN DO SERIAL ADDITIONS
TOTAL SCORE: 9
NAUSEA AND VOMITING: INTERMITTENT NAUSEA WITH DRY HEAVES
PAROXYSMAL SWEATS: NO SWEAT VISIBLE
ANXIETY: MILDLY ANXIOUS
VISUAL DISTURBANCES: NOT PRESENT
TREMOR: MODERATE TREMOR WITH ARMS EXTENDED
HEADACHE, FULLNESS IN HEAD: NOT PRESENT
AGITATION: NORMAL ACTIVITY
AUDITORY DISTURBANCES: NOT PRESENT

## 2023-05-30 ASSESSMENT — ENCOUNTER SYMPTOMS
ABDOMINAL PAIN: 1
WEAKNESS: 1
NAUSEA: 1

## 2023-05-30 ASSESSMENT — FIBROSIS 4 INDEX: FIB4 SCORE: 0.75

## 2023-05-31 ENCOUNTER — APPOINTMENT (OUTPATIENT)
Dept: RADIOLOGY | Facility: MEDICAL CENTER | Age: 48
DRG: 690 | End: 2023-05-31
Attending: GENERAL PRACTICE
Payer: COMMERCIAL

## 2023-05-31 ENCOUNTER — PHARMACY VISIT (OUTPATIENT)
Dept: PHARMACY | Facility: MEDICAL CENTER | Age: 48
End: 2023-05-31
Payer: COMMERCIAL

## 2023-05-31 VITALS
OXYGEN SATURATION: 99 % | WEIGHT: 136.02 LBS | SYSTOLIC BLOOD PRESSURE: 136 MMHG | HEART RATE: 83 BPM | RESPIRATION RATE: 18 BRPM | TEMPERATURE: 97.4 F | BODY MASS INDEX: 24.1 KG/M2 | DIASTOLIC BLOOD PRESSURE: 94 MMHG | HEIGHT: 63 IN

## 2023-05-31 PROBLEM — E83.42 HYPOMAGNESEMIA: Status: ACTIVE | Noted: 2023-05-31

## 2023-05-31 PROBLEM — E87.6 HYPOKALEMIA: Status: ACTIVE | Noted: 2023-05-31

## 2023-05-31 LAB
ALBUMIN SERPL BCP-MCNC: 4 G/DL (ref 3.2–4.9)
ALBUMIN/GLOB SERPL: 2.1 G/DL
ALP SERPL-CCNC: 84 U/L (ref 30–99)
ALT SERPL-CCNC: 89 U/L (ref 2–50)
ANION GAP SERPL CALC-SCNC: 17 MMOL/L (ref 7–16)
AST SERPL-CCNC: 127 U/L (ref 12–45)
B-OH-BUTYR SERPL-MCNC: 3.05 MMOL/L (ref 0.02–0.27)
BILIRUB SERPL-MCNC: 1.2 MG/DL (ref 0.1–1.5)
BUN SERPL-MCNC: 6 MG/DL (ref 8–22)
CALCIUM ALBUM COR SERPL-MCNC: 8.5 MG/DL (ref 8.5–10.5)
CALCIUM SERPL-MCNC: 8.5 MG/DL (ref 8.5–10.5)
CHLORIDE SERPL-SCNC: 96 MMOL/L (ref 96–112)
CO2 SERPL-SCNC: 24 MMOL/L (ref 20–33)
CREAT SERPL-MCNC: 0.65 MG/DL (ref 0.5–1.4)
ERYTHROCYTE [DISTWIDTH] IN BLOOD BY AUTOMATED COUNT: 46.6 FL (ref 35.9–50)
GFR SERPLBLD CREATININE-BSD FMLA CKD-EPI: 108 ML/MIN/1.73 M 2
GLOBULIN SER CALC-MCNC: 1.9 G/DL (ref 1.9–3.5)
GLUCOSE SERPL-MCNC: 134 MG/DL (ref 65–99)
HAV IGM SERPL QL IA: NORMAL
HBV CORE IGM SER QL: NORMAL
HBV SURFACE AG SER QL: NORMAL
HCT VFR BLD AUTO: 38 % (ref 37–47)
HCV AB SER QL: NORMAL
HGB BLD-MCNC: 13.3 G/DL (ref 12–16)
LACTATE SERPL-SCNC: 2.8 MMOL/L (ref 0.5–2)
MAGNESIUM SERPL-MCNC: 1.4 MG/DL (ref 1.5–2.5)
MCH RBC QN AUTO: 36.7 PG (ref 27–33)
MCHC RBC AUTO-ENTMCNC: 35 G/DL (ref 32.2–35.5)
MCV RBC AUTO: 105 FL (ref 81.4–97.8)
PHOSPHATE SERPL-MCNC: 2.9 MG/DL (ref 2.5–4.5)
PLATELET # BLD AUTO: 103 K/UL (ref 164–446)
PLATELETS.RETICULATED NFR BLD AUTO: 4.9 % (ref 0.6–13.1)
PMV BLD AUTO: 10.2 FL (ref 9–12.9)
POTASSIUM SERPL-SCNC: 3.4 MMOL/L (ref 3.6–5.5)
PROT SERPL-MCNC: 5.9 G/DL (ref 6–8.2)
RBC # BLD AUTO: 3.62 M/UL (ref 4.2–5.4)
SODIUM SERPL-SCNC: 137 MMOL/L (ref 135–145)
T4 FREE SERPL-MCNC: 1.03 NG/DL (ref 0.93–1.7)
TSH SERPL DL<=0.005 MIU/L-ACNC: 12.2 UIU/ML (ref 0.38–5.33)
WBC # BLD AUTO: 4.1 K/UL (ref 4.8–10.8)

## 2023-05-31 PROCEDURE — 36415 COLL VENOUS BLD VENIPUNCTURE: CPT

## 2023-05-31 PROCEDURE — 80053 COMPREHEN METABOLIC PANEL: CPT

## 2023-05-31 PROCEDURE — 84439 ASSAY OF FREE THYROXINE: CPT

## 2023-05-31 PROCEDURE — 700102 HCHG RX REV CODE 250 W/ 637 OVERRIDE(OP): Performed by: GENERAL PRACTICE

## 2023-05-31 PROCEDURE — 84443 ASSAY THYROID STIM HORMONE: CPT

## 2023-05-31 PROCEDURE — 99239 HOSP IP/OBS DSCHRG MGMT >30: CPT | Performed by: INTERNAL MEDICINE

## 2023-05-31 PROCEDURE — 83735 ASSAY OF MAGNESIUM: CPT

## 2023-05-31 PROCEDURE — 80074 ACUTE HEPATITIS PANEL: CPT

## 2023-05-31 PROCEDURE — 84100 ASSAY OF PHOSPHORUS: CPT

## 2023-05-31 PROCEDURE — 85055 RETICULATED PLATELET ASSAY: CPT

## 2023-05-31 PROCEDURE — 700111 HCHG RX REV CODE 636 W/ 250 OVERRIDE (IP): Performed by: INTERNAL MEDICINE

## 2023-05-31 PROCEDURE — 700101 HCHG RX REV CODE 250: Performed by: GENERAL PRACTICE

## 2023-05-31 PROCEDURE — 700102 HCHG RX REV CODE 250 W/ 637 OVERRIDE(OP): Performed by: INTERNAL MEDICINE

## 2023-05-31 PROCEDURE — 700105 HCHG RX REV CODE 258: Performed by: GENERAL PRACTICE

## 2023-05-31 PROCEDURE — HZ2ZZZZ DETOXIFICATION SERVICES FOR SUBSTANCE ABUSE TREATMENT: ICD-10-PCS | Performed by: GENERAL PRACTICE

## 2023-05-31 PROCEDURE — 85027 COMPLETE CBC AUTOMATED: CPT

## 2023-05-31 PROCEDURE — A9270 NON-COVERED ITEM OR SERVICE: HCPCS | Performed by: INTERNAL MEDICINE

## 2023-05-31 PROCEDURE — 76705 ECHO EXAM OF ABDOMEN: CPT

## 2023-05-31 PROCEDURE — A9270 NON-COVERED ITEM OR SERVICE: HCPCS | Performed by: GENERAL PRACTICE

## 2023-05-31 PROCEDURE — RXMED WILLOW AMBULATORY MEDICATION CHARGE: Performed by: INTERNAL MEDICINE

## 2023-05-31 RX ORDER — OMEPRAZOLE 20 MG/1
20 CAPSULE, DELAYED RELEASE ORAL 2 TIMES DAILY
Status: DISCONTINUED | OUTPATIENT
Start: 2023-05-31 | End: 2023-05-31 | Stop reason: HOSPADM

## 2023-05-31 RX ORDER — CEFDINIR 300 MG/1
300 CAPSULE ORAL 2 TIMES DAILY
Qty: 14 CAPSULE | Refills: 0 | Status: ACTIVE | OUTPATIENT
Start: 2023-05-31 | End: 2023-06-07

## 2023-05-31 RX ORDER — ONDANSETRON 4 MG/1
4 TABLET, ORALLY DISINTEGRATING ORAL EVERY 6 HOURS PRN
Qty: 30 TABLET | Refills: 0 | Status: SHIPPED | OUTPATIENT
Start: 2023-05-31 | End: 2024-03-05

## 2023-05-31 RX ORDER — POTASSIUM CHLORIDE 20 MEQ/1
40 TABLET, EXTENDED RELEASE ORAL ONCE
Status: COMPLETED | OUTPATIENT
Start: 2023-05-31 | End: 2023-05-31

## 2023-05-31 RX ORDER — MAGNESIUM SULFATE HEPTAHYDRATE 40 MG/ML
2 INJECTION, SOLUTION INTRAVENOUS ONCE
Status: COMPLETED | OUTPATIENT
Start: 2023-05-31 | End: 2023-05-31

## 2023-05-31 RX ADMIN — THIAMINE HYDROCHLORIDE: 100 INJECTION, SOLUTION INTRAMUSCULAR; INTRAVENOUS at 03:20

## 2023-05-31 RX ADMIN — OLMESARTAN MEDOXOMIL 40 MG: 20 TABLET, FILM COATED ORAL at 06:21

## 2023-05-31 RX ADMIN — LEVOTHYROXINE SODIUM 50 MCG: 0.05 TABLET ORAL at 06:22

## 2023-05-31 RX ADMIN — SODIUM CHLORIDE: 9 INJECTION, SOLUTION INTRAVENOUS at 07:49

## 2023-05-31 RX ADMIN — FOLIC ACID 1 MG: 1 TABLET ORAL at 06:21

## 2023-05-31 RX ADMIN — IOHEXOL 100 ML: 350 INJECTION, SOLUTION INTRAVENOUS at 00:00

## 2023-05-31 RX ADMIN — THERA TABS 1 TABLET: TAB at 06:21

## 2023-05-31 RX ADMIN — POTASSIUM CHLORIDE 40 MEQ: 1500 TABLET, EXTENDED RELEASE ORAL at 07:45

## 2023-05-31 RX ADMIN — ESCITALOPRAM OXALATE 5 MG: 10 TABLET ORAL at 06:21

## 2023-05-31 RX ADMIN — OMEPRAZOLE 20 MG: 20 CAPSULE, DELAYED RELEASE ORAL at 07:45

## 2023-05-31 RX ADMIN — MAGNESIUM SULFATE HEPTAHYDRATE 2 G: 2 INJECTION, SOLUTION INTRAVENOUS at 07:50

## 2023-05-31 RX ADMIN — Medication 100 MG: at 06:22

## 2023-05-31 ASSESSMENT — LIFESTYLE VARIABLES
HEADACHE, FULLNESS IN HEAD: NOT PRESENT
VISUAL DISTURBANCES: NOT PRESENT
ANXIETY: NO ANXIETY (AT EASE)
HAVE YOU EVER FELT YOU SHOULD CUT DOWN ON YOUR DRINKING: YES
PAROXYSMAL SWEATS: NO SWEAT VISIBLE
AGITATION: NORMAL ACTIVITY
TOTAL SCORE: 4
DOES PATIENT WANT TO STOP DRINKING: YES
TREMOR: *
HEADACHE, FULLNESS IN HEAD: NOT PRESENT
AGITATION: NORMAL ACTIVITY
EVER HAD A DRINK FIRST THING IN THE MORNING TO STEADY YOUR NERVES TO GET RID OF A HANGOVER: YES
DOES PATIENT WANT TO TALK TO SOMEONE ABOUT QUITTING: NO
AUDITORY DISTURBANCES: NOT PRESENT
ORIENTATION AND CLOUDING OF SENSORIUM: ORIENTED AND CAN DO SERIAL ADDITIONS
ANXIETY: NO ANXIETY (AT EASE)
NAUSEA AND VOMITING: MILD NAUSEA WITH NO VOMITING
PAROXYSMAL SWEATS: NO SWEAT VISIBLE
TOTAL SCORE: 4
TOTAL SCORE: 3
ORIENTATION AND CLOUDING OF SENSORIUM: ORIENTED AND CAN DO SERIAL ADDITIONS
TOTAL SCORE: 3
AVERAGE NUMBER OF DAYS PER WEEK YOU HAVE A DRINK CONTAINING ALCOHOL: 5
EVER FELT BAD OR GUILTY ABOUT YOUR DRINKING: YES
ALCOHOL_USE: YES
NAUSEA AND VOMITING: NO NAUSEA AND NO VOMITING
CONSUMPTION TOTAL: POSITIVE
HOW MANY TIMES IN THE PAST YEAR HAVE YOU HAD 5 OR MORE DRINKS IN A DAY: 20
TREMOR: MODERATE TREMOR WITH ARMS EXTENDED
ON A TYPICAL DAY WHEN YOU DRINK ALCOHOL HOW MANY DRINKS DO YOU HAVE: 2
AUDITORY DISTURBANCES: NOT PRESENT
VISUAL DISTURBANCES: NOT PRESENT
TOTAL SCORE: 3
HAVE PEOPLE ANNOYED YOU BY CRITICIZING YOUR DRINKING: NO

## 2023-05-31 ASSESSMENT — PAIN DESCRIPTION - PAIN TYPE: TYPE: ACUTE PAIN

## 2023-05-31 ASSESSMENT — FIBROSIS 4 INDEX: FIB4 SCORE: 5.47

## 2023-05-31 NOTE — CARE PLAN
The patient is Stable - Low risk of patient condition declining or worsening    Shift Goals  Clinical Goals: VSS, CIWA monitoring  Patient Goals: rest    Progress made toward(s) clinical / shift goals:  progressing    Patient is not progressing towards the following goals:

## 2023-05-31 NOTE — ED PROVIDER NOTES
ED Provider Note    CHIEF COMPLAINT  Chief Complaint   Patient presents with    N/V     Pt hasn't been able to keep anything down for the last couple of months, but getting worse over the last few weeks. Per , the vomiting is intermittent. Some days are better than others. They were starting to do a GI work up and get a CT scan but the vomiting got worse.     Weakness    Low Back Pain     Started to have low back pain today.        EXTERNAL RECORDS REVIEWED  Outpatient Notes outpatient notes from primary care as well as neurology for recent visit for concussion    HPI/ROS  LIMITATION TO HISTORY   Select: : None  OUTSIDE HISTORIAN(S):  Family  at bedside    Minerva Tillman is a 48 y.o. female who presents to the emergency department multiple complaints.  Patient's had intractable nausea and vomiting over the last several days.  States that she is only been able to eat a little bit of applesauce and drink very small amounts of fluid.  She denies any blood in her emesis she reports decreased bowel movements but no blood in her stool she has had concentrated urine and feels as though she is not urinating as much as she usually is.  She has diffuse abdominal pain that localizes to the epigastrium.  No chest pain or shortness of breath she has had slight chills no measured fever she also suffers from anxiety and feels as though this has been heightened recently.  She does drink alcohol she states that she usually drinks 1 to 2 glasses night occasionally more.    PAST MEDICAL HISTORY   has a past medical history of Anxiety, Depression, Gynecological disorder, High cholesterol, Hypertension, Snoring, and Urinary tract infection.    SURGICAL HISTORY   has a past surgical history that includes breast augmentation with implant (2006); mammoplasty augmentation; hysteroscopy novasure-2 (8/2/2017); and dilation and curettage (8/2/2017).    FAMILY HISTORY  Family History   Problem Relation Age of Onset    Cancer  "Maternal Grandmother 60        breast and skin    Stroke Maternal Grandmother     Hypertension Mother     Hyperlipidemia Mother     Depression Father     Hypertension Father     Asthma Father     Hyperlipidemia Father     Hypertension Brother     Cancer Maternal Grandfather 60        esphogus    Other Paternal Grandmother         complications from surgery    Hypertension Brother        SOCIAL HISTORY  Social History     Tobacco Use    Smoking status: Never    Smokeless tobacco: Never   Substance and Sexual Activity    Alcohol use: Yes     Alcohol/week: 8.4 oz     Types: 14 Glasses of wine per week     Comment: 1-2 glasses per day    Drug use: No    Sexual activity: Yes     Partners: Male     Comment: ,        CURRENT MEDICATIONS  Home Medications       Reviewed by Felicitas Feldman R.N. (Registered Nurse) on 05/30/23 at 2125  Med List Status: Not Addressed     Medication Last Dose Status   escitalopram (LEXAPRO) 5 MG tablet  Active   levothyroxine (SYNTHROID) 50 MCG Tab  Active   multivitamin (THERAGRAN) Tab  Active   olmesartan (BENICAR) 40 MG Tab  Active                    ALLERGIES  Allergies   Allergen Reactions    Sulfa Drugs Hives     NAE=[id number]    Strawberry Hives       PHYSICAL EXAM  VITAL SIGNS: BP (!) 147/17   Pulse (!) 121   Temp 35.9 °C (96.6 °F) (Temporal)   Resp 18   Ht 1.6 m (5' 3\")   Wt 61.7 kg (136 lb 0.4 oz)   SpO2 94% Comment: Room air  BMI 24.10 kg/m²    Pulse ox interpretation: I interpret this pulse ox as normal.  Constitutional: Alert and oriented x 3, moderate distress  HEENT: Atraumatic normocephalic, pupils are equal round reactive to light extraocular movements are intact. The nares is clear, external ears are normal, scleral icterus present bilaterally, mouth shows moist mucous membranes normal dentition for age  Neck: Supple, no JVD no tracheal deviation  Cardiovascular: Tachycardic no murmur rub or gallop 2+ pulses peripherally x4  Thorax & Lungs: No respiratory distress, " no wheezes rales or rhonchi, No chest tenderness.   GI: Tender diffusely with localization to the epigastrium  Skin: Warm dry no acute rash or lesion  Musculoskeletal: Moving all extremities with full range and 5 of 5 strength no acute  deformity  Neurologic: Cranial nerves III through XII are grossly intact no sensory deficit no cerebellar dysfunction, tremulous  Psychiatric: Appropriate affect for situation at this time         DIAGNOSTIC STUDIES / PROCEDURES  Results for orders placed or performed during the hospital encounter of 05/30/23   CBC WITH DIFFERENTIAL   Result Value Ref Range    WBC 6.1 4.8 - 10.8 K/uL    RBC 4.27 4.20 - 5.40 M/uL    Hemoglobin 15.5 12.0 - 16.0 g/dL    Hematocrit 43.7 37.0 - 47.0 %    .3 (H) 81.4 - 97.8 fL    MCH 36.3 (H) 27.0 - 33.0 pg    MCHC 35.5 32.2 - 35.5 g/dL    RDW 45.0 35.9 - 50.0 fL    Platelet Count 153 (L) 164 - 446 K/uL    MPV 9.8 9.0 - 12.9 fL    Neutrophils-Polys 64.00 44.00 - 72.00 %    Lymphocytes 14.30 (L) 22.00 - 41.00 %    Monocytes 18.90 (H) 0.00 - 13.40 %    Eosinophils 0.20 0.00 - 6.90 %    Basophils 1.50 0.00 - 1.80 %    Immature Granulocytes 1.10 (H) 0.00 - 0.90 %    Nucleated RBC 0.50 (H) 0.00 - 0.20 /100 WBC    Neutrophils (Absolute) 3.93 1.82 - 7.42 K/uL    Lymphs (Absolute) 0.88 (L) 1.00 - 4.80 K/uL    Monos (Absolute) 1.16 (H) 0.00 - 0.85 K/uL    Eos (Absolute) 0.01 0.00 - 0.51 K/uL    Baso (Absolute) 0.09 0.00 - 0.12 K/uL    Immature Granulocytes (abs) 0.07 0.00 - 0.11 K/uL    NRBC (Absolute) 0.03 K/uL   COMP METABOLIC PANEL   Result Value Ref Range    Sodium 138 135 - 145 mmol/L    Potassium 3.8 3.6 - 5.5 mmol/L    Chloride 93 (L) 96 - 112 mmol/L    Co2 20 20 - 33 mmol/L    Anion Gap 25.0 (H) 7.0 - 16.0    Glucose 165 (H) 65 - 99 mg/dL    Bun 7 (L) 8 - 22 mg/dL    Creatinine 0.73 0.50 - 1.40 mg/dL    Calcium 9.4 8.5 - 10.5 mg/dL    AST(SGOT) 187 (H) 12 - 45 U/L    ALT(SGPT) 120 (H) 2 - 50 U/L    Alkaline Phosphatase 113 (H) 30 - 99 U/L    Total  Bilirubin 2.0 (H) 0.1 - 1.5 mg/dL    Albumin 5.0 (H) 3.2 - 4.9 g/dL    Total Protein 7.5 6.0 - 8.2 g/dL    Globulin 2.5 1.9 - 3.5 g/dL    A-G Ratio 2.0 g/dL   LIPASE   Result Value Ref Range    Lipase 23 11 - 82 U/L   HCG QUAL SERUM   Result Value Ref Range    Beta-Hcg Qualitative Serum Negative Negative   URINALYSIS,CULTURE IF INDICATED    Specimen: Urine   Result Value Ref Range    Color Orange (A)     Character Cloudy (A)     Specific Gravity >=1.030 <1.035    Ph 6.5 5.0 - 8.0    Glucose 100 (A) Negative mg/dL    Ketones 15 (A) Negative mg/dL    Protein >=300 (A) Negative mg/dL    Bilirubin Large (A) Negative    Urobilinogen, Urine 4.0 Negative    Nitrite Positive (A) Negative    Leukocyte Esterase Small (A) Negative    Occult Blood Trace (A) Negative    Micro Urine Req Microscopic    URINE MICROSCOPIC (W/UA)   Result Value Ref Range    WBC Packed (A) /hpf    RBC 2-5 (A) /hpf    Bacteria Moderate (A) None /hpf    Epithelial Cells Moderate (A) /hpf    Mucous Threads Many /hpf    Urine Other Mod Clue Cells.    DIAGNOSTIC ALCOHOL   Result Value Ref Range    Diagnostic Alcohol <10.1 <10.1 mg/dL   CORRECTED CALCIUM   Result Value Ref Range    Correct Calcium 8.6 8.5 - 10.5 mg/dL   ESTIMATED GFR   Result Value Ref Range    GFR (CKD-EPI) 101 >60 mL/min/1.73 m 2   BETA-HYDROXYBUTYRIC ACID   Result Value Ref Range    beta-Hydroxybutyric Acid 3.05 (H) 0.02 - 0.27 mmol/L   LACTIC ACID   Result Value Ref Range    Lactic Acid 2.8 (H) 0.5 - 2.0 mmol/L   CBC WITHOUT DIFFERENTIAL   Result Value Ref Range    WBC 4.1 (L) 4.8 - 10.8 K/uL    RBC 3.62 (L) 4.20 - 5.40 M/uL    Hemoglobin 13.3 12.0 - 16.0 g/dL    Hematocrit 38.0 37.0 - 47.0 %    .0 (H) 81.4 - 97.8 fL    MCH 36.7 (H) 27.0 - 33.0 pg    MCHC 35.0 32.2 - 35.5 g/dL    RDW 46.6 35.9 - 50.0 fL    Platelet Count 103 (L) 164 - 446 K/uL    MPV 10.2 9.0 - 12.9 fL   Comp Metabolic Panel   Result Value Ref Range    Sodium 137 135 - 145 mmol/L    Potassium 3.4 (L) 3.6 - 5.5  mmol/L    Chloride 96 96 - 112 mmol/L    Co2 24 20 - 33 mmol/L    Anion Gap 17.0 (H) 7.0 - 16.0    Glucose 134 (H) 65 - 99 mg/dL    Bun 6 (L) 8 - 22 mg/dL    Creatinine 0.65 0.50 - 1.40 mg/dL    Calcium 8.5 8.5 - 10.5 mg/dL    AST(SGOT) 127 (H) 12 - 45 U/L    ALT(SGPT) 89 (H) 2 - 50 U/L    Alkaline Phosphatase 84 30 - 99 U/L    Total Bilirubin 1.2 0.1 - 1.5 mg/dL    Albumin 4.0 3.2 - 4.9 g/dL    Total Protein 5.9 (L) 6.0 - 8.2 g/dL    Globulin 1.9 1.9 - 3.5 g/dL    A-G Ratio 2.1 g/dL   MAGNESIUM   Result Value Ref Range    Magnesium 1.4 (L) 1.5 - 2.5 mg/dL   PHOSPHORUS   Result Value Ref Range    Phosphorus 2.9 2.5 - 4.5 mg/dL   HEPATITIS PANEL ACUTE(4 COMPONENTS)   Result Value Ref Range    Hepatitis B Surface Antigen Non-Reactive Non-Reactive    Hepatitis B Cors Ab,IgM Non-Reactive Non-Reactive    Hepatitis A Virus Ab, IgM Non-Reactive Non-Reactive    Hepatitis C Antibody Non-Reactive Non-Reactive   TSH WITH REFLEX TO FT4   Result Value Ref Range    TSH 12.200 (H) 0.380 - 5.330 uIU/mL   IMMATURE PLT FRACTION   Result Value Ref Range    Imm. Plt Fraction 4.9 0.6 - 13.1 %   ESTIMATED GFR   Result Value Ref Range    GFR (CKD-EPI) 108 >60 mL/min/1.73 m 2   CORRECTED CALCIUM   Result Value Ref Range    Correct Calcium 8.5 8.5 - 10.5 mg/dL   FREE THYROXINE   Result Value Ref Range    Free T-4 1.03 0.93 - 1.70 ng/dL         RADIOLOGY  US-RUQ   Final Result      1.  No acute sonographic abnormality. No gallstones.   2.  Hepatic steatosis.   3.  Septated 4.8 cm cyst near the gallbladder fossa. It does not require follow-up.      CT-ABDOMEN-PELVIS WITH   Final Result      1.  No acute abnormality in the abdomen or pelvis.   2.  Hepatic steatosis.   3.  Multiple benign hepatic cysts, which do not require imaging follow-up.            COURSE & MEDICAL DECISION MAKING    ED Observation Status? No; Patient does not meet criteria for ED Observation.     INITIAL ASSESSMENT, COURSE AND PLAN  Care Narrative: Pleasant 48-year-old  female presents with abdominal pain and persistent nausea and vomiting.  Her labs demonstrate an elevated anion gap at 25 and moderate alcoholic pattern transaminitis as well as minor elevation of alk phos and total bilirubin.  Patient also has evidence of urinary tract infection.  She is tachycardic into the 120s at arrival.  This tachycardia is likely multifactorial however concerning for sepsis in light of her intractable nausea and vomiting and chills.  She is given a liter of LR she is given a dose of 2 g of IV Rocephin.  Her blood cultures and urine cultures pending.  We will obtain CT scan of the abdomen and pelvis due to her severe abdominal pain however at this point is clear that she will need to be admitted to the hospital.  I discussed the case up to this point with hospitalist .  Patient admitted for ongoing evaluation and treatment in guarded condition.      HYDRATION: Based on the patient's presentation of Abnormal Fluid Loss, Dehydration, and Sepsis the patient was given IV fluids. IV Hydration was used because oral hydration was not adequate alone. Upon recheck following hydration, the patient was reevaluated and had improvement of symptoms..      ADDITIONAL PROBLEM LIST    DISPOSITION AND DISCUSSIONS    FINAL DIAGNOSIS  1. Acute UTI Active   2. Alcohol withdrawal syndrome with complication (HCC) Active   3. Alcoholic ketoacidosis Active   4.  Lactic acidosis  5.  Transaminitis

## 2023-05-31 NOTE — ASSESSMENT & PLAN NOTE
[facility] protocol in place  Detox bag with thiamine, multivitamin and folate  P.o. vitamins ordered  Alcohol cessation encouraged

## 2023-05-31 NOTE — PROGRESS NOTES
Received report from off-going nurse.   Assumed pt care at change of shift.   Assessment completed.   Pt is A&Ox 4, CIWA completed and documented in the flowsheet.  Denies pain, no apparent signs of discomfort.   Bed is in low and locked position, call light and belongings in reach, reminded to use call light.   No needs at this time.

## 2023-05-31 NOTE — DISCHARGE INSTRUCTIONS
Urinary Tract Infection, Adult  A urinary tract infection (UTI) is an infection of any part of the urinary tract. The urinary tract includes:  The kidneys.  The ureters.  The bladder.  The urethra.  These organs make, store, and get rid of pee (urine) in the body.  What are the causes?  This is caused by germs (bacteria) in your genital area. These germs grow and cause swelling (inflammation) of your urinary tract.  What increases the risk?  You are more likely to develop this condition if:  You have a small, thin tube (catheter) to drain pee.  You cannot control when you pee or poop (incontinence).  You are female, and:  You use these methods to prevent pregnancy:  A medicine that kills sperm (spermicide).  A device that blocks sperm (diaphragm).  You have low levels of a female hormone (estrogen).  You are pregnant.  You have genes that add to your risk.  You are sexually active.  You take antibiotic medicines.  You have trouble peeing because of:  A prostate that is bigger than normal, if you are male.  A blockage in the part of your body that drains pee from the bladder (urethra).  A kidney stone.  A nerve condition that affects your bladder (neurogenic bladder).  Not getting enough to drink.  Not peeing often enough.  You have other conditions, such as:  Diabetes.  A weak disease-fighting system (immune system).  Sickle cell disease.  Gout.  Injury of the spine.  What are the signs or symptoms?  Symptoms of this condition include:  Needing to pee right away (urgently).  Peeing often.  Peeing small amounts often.  Pain or burning when peeing.  Blood in the pee.  Pee that smells bad or not like normal.  Trouble peeing.  Pee that is cloudy.  Fluid coming from the vagina, if you are female.  Pain in the belly or lower back.  Other symptoms include:  Throwing up (vomiting).  No urge to eat.  Feeling mixed up (confused).  Being tired and grouchy (irritable).  A fever.  Watery poop (diarrhea).  How is this  treated?  This condition may be treated with:  Antibiotic medicine.  Other medicines.  Drinking enough water.  Follow these instructions at home:    Medicines  Take over-the-counter and prescription medicines only as told by your doctor.  If you were prescribed an antibiotic medicine, take it as told by your doctor. Do not stop taking it even if you start to feel better.  General instructions  Make sure you:  Pee until your bladder is empty.  Do not hold pee for a long time.  Empty your bladder after sex.  Wipe from front to back after pooping if you are a female. Use each tissue one time when you wipe.  Drink enough fluid to keep your pee pale yellow.  Keep all follow-up visits as told by your doctor. This is important.  Contact a doctor if:  You do not get better after 1-2 days.  Your symptoms go away and then come back.  Get help right away if:  You have very bad back pain.  You have very bad pain in your lower belly.  You have a fever.  You are sick to your stomach (nauseous).  You are throwing up.  Summary  A urinary tract infection (UTI) is an infection of any part of the urinary tract.  This condition is caused by germs in your genital area.  There are many risk factors for a UTI. These include having a small, thin tube to drain pee and not being able to control when you pee or poop.  Treatment includes antibiotic medicines for germs.  Drink enough fluid to keep your pee pale yellow.  This information is not intended to replace advice given to you by your health care provider. Make sure you discuss any questions you have with your health care provider.  Document Released: 06/05/2009 Document Revised: 12/05/2019 Document Reviewed: 06/27/2019  [employer] Patient Education © 2020 [employer]

## 2023-05-31 NOTE — ASSESSMENT & PLAN NOTE
UA positive  Urine culture pending  Started patient on Rocephin   CT imaging with IV contrast ordered by ERP pending

## 2023-05-31 NOTE — PROGRESS NOTES
4 Eyes Skin Assessment Completed by Harley RN and TONYA Hill.    Head WDL  Ears WDL  Nose WDL  Mouth WDL  Neck WDL  Breast/Chest WDL  Shoulder Blades WDL  Spine WDL  (R) Arm/Elbow/Hand WDL  (L) Arm/Elbow/Hand WDL  Abdomen WDL  Groin WDL  Scrotum/Coccyx/Buttocks WDL  (R) Leg WDL  (L) Leg WDL  (R) Heel/Foot/Toe WDL  (L) Heel/Foot/Toe WDL          Devices In Places NA      Interventions In Place Pillows    Possible Skin Injury No    Pictures Uploaded Into Epic N/A  Wound Consult Placed N/A  RN Wound Prevention Protocol Ordered No

## 2023-05-31 NOTE — PROGRESS NOTES
Discharge instructions reviewed and signed, all questions answered, PIV removed, awaiting Meds to Beds.    Late entry: Patient left DC lounge prior to receiving Meds to Beds.  Medication returned to [place] pharmacy.

## 2023-05-31 NOTE — ASSESSMENT & PLAN NOTE
Likely secondary to continued alcohol use  Hepatitis panel ordered  RUQ US ordered  CMP ordered to trend

## 2023-05-31 NOTE — DISCHARGE SUMMARY
Discharge Summary    CHIEF COMPLAINT ON ADMISSION  Chief Complaint   Patient presents with    N/V     Pt hasn't been able to keep anything down for the last couple of months, but getting worse over the last few weeks. Per , the vomiting is intermittent. Some days are better than others. They were starting to do a GI work up and get a CT scan but the vomiting got worse.     Weakness    Low Back Pain     Started to have low back pain today.        Reason for Admission  Vomitting      Admission Date  5/30/2023    CODE STATUS  Full Code    HPI & HOSPITAL COURSE  Minerva Tillman is a 48 y.o. female with hypertension, hypothyroidism, alcohol use disorder (drinks 2 to 3 glasses of wine per night), mood disorder, admitted 5/30/2023 with intractable nausea, vomiting, and abdominal pain for several months which significantly worsened over the past week or so.  On evaluation, lipase was negative.  She had elevated total bilirubin and transaminitis.  Blood alcohol level was negative.  She had metabolic acidosis.  Urinalysis was positive for UTI with packed WBC, positive leukocyte esterase and nitrite.  WBC count was normal.  Electrolytes and renal function were normal.  Right upper quadrant ultrasound showed no acute sonographic abnormality with no gallstones, with note of hepatic steatosis, and septated 4.8 cm cyst near the gallbladder fossa which does not require follow-up per radiology.  CT of the abdomen and pelvis showed no acute abnormality, again with hepatic steatosis and multiple benign hepatic cysts which also do not require imaging follow-up per radiology.    She is started on IV ceftriaxone, along with IV fluids.  She was put on CIWA protocol and was given detox back to monitor for alcohol withdrawal.  She clinically improved.  Her presenting symptoms of nausea and vomiting resolved.  Her WBC count remained normal.  She did have low potassium and magnesium which were replaced.  Her LFTs also trended  down, which was felt to be alcohol related.  Viral hepatitis panel was negative.  Free T4 was normal.    She remained hemodynamically stable and afebrile.  She did not show signs of alcohol withdrawal.  She had improved sense of wellbeing. I have personally seen and examined the patient on the day of discharge. With her clinical improvement, she was deemed ready to discharge from the hospital as she did not have any further hospitalization needs. Patient felt comfortable going home. The discharge plan was discussed with the patient, with which she was agreeable to.     Therefore, she is discharged in good and stable condition to home with close outpatient follow-up.    The patient recovered much more quickly than anticipated on admission.    Discharge Date  5/31/2023      FOLLOW UP ITEMS POST DISCHARGE  -She will continue on oral Omnicef and complete 7 days course.  I have given her prescription for as needed Zofran for breakthrough nausea.  -Follow-up with PCP.  -counseled to seek immediate medical attention, or return to the ED for recurrent or worsening symptoms.      DISCHARGE DIAGNOSES  Principal Problem:    Alcohol use disorder, severe, dependence (HCC) (POA: Yes)  Active Problems:    Hypokalemia (POA: No)    Hypomagnesemia (POA: Yes)    Acute cystitis without hematuria (POA: Yes)    Anxiety associated with depression (POA: Yes)    Essential hypertension (POA: Yes)    Hypothyroidism (POA: Yes)    Transaminitis (POA: Yes)    Nausea & vomiting (POA: Yes)    Generalized abdominal pain (POA: Yes)    Serum total bilirubin elevated (POA: Yes)  Resolved Problems:    * No resolved hospital problems. *      FOLLOW UP  Future Appointments   Date Time Provider Department Center   6/8/2023 12:30 PM [facility] CT [address] [facility]     No follow-up provider specified.    MEDICATIONS ON DISCHARGE     Medication List        START taking these medications        Instructions   cefdinir 300 MG Caps  Commonly known as: OMNICEF   Take  1 Capsule by mouth 2 times a day for 7 days.  Dose: 300 mg     ondansetron 4 MG Tbdp  Commonly known as: ZOFRAN ODT   Dissolve 1 Tablet by mouth every 6 hours as needed for Nausea/Vomiting.  Dose: 4 mg            CONTINUE taking these medications        Instructions   escitalopram 5 MG tablet  Commonly known as: Lexapro   Take 5 mg by mouth every day.  Dose: 5 mg     levothyroxine 50 MCG Tabs  Commonly known as: SYNTHROID   Take 50 mcg by mouth every morning on an empty stomach.  Dose: 50 mcg     multivitamin Tabs   Take 1 Tablet by mouth every day.  Dose: 1 Tablet     olmesartan 40 MG Tabs  Commonly known as: BENICAR   Take 1 Tablet by mouth every day.  Dose: 40 mg              Allergies  Allergies   Allergen Reactions    Sulfa Drugs Hives     [id number]    Strawberry Hives       DIET  Orders Placed This Encounter   Procedures    Diet Order Diet: Clear Liquid     Standing Status:   Standing     Number of Occurrences:   1     Order Specific Question:   Diet:     Answer:   Clear Liquid [10]       ACTIVITY  As tolerated.  Weight bearing as tolerated    CONSULTATIONS  None    PROCEDURES  None    LABORATORY  Lab Results   Component Value Date    SODIUM 137 05/31/2023    POTASSIUM 3.4 (L) 05/31/2023    CHLORIDE 96 05/31/2023    CO2 24 05/31/2023    GLUCOSE 134 (H) 05/31/2023    BUN 6 (L) 05/31/2023    CREATININE 0.65 05/31/2023    CREATININE 0.83 02/01/2013        Lab Results   Component Value Date    WBC 4.1 (L) 05/31/2023    WBC 5.2 02/01/2013    HEMOGLOBIN 13.3 05/31/2023    HEMATOCRIT 38.0 05/31/2023    PLATELETCT 103 (L) 05/31/2023        Total time of the discharge process = 35 minutes.

## 2023-05-31 NOTE — ED NOTES
Patient ambulated to room from [facility]. Changed into gown, connected to monitor. Agree with triage note. Charted for ERP. Call light within reach.

## 2023-05-31 NOTE — ASSESSMENT & PLAN NOTE
Several days of abdominal pain, nausea and vomiting  Lipase negative  CT imaging of the abdomen pelvis with IV contrast pending  Symptomatic management  IV fluids

## 2023-05-31 NOTE — H&P
Hospital Medicine History & Physical Note    Date of Service  5/30/2023    Primary Care Physician  Unique Young M.D.    Consultants  None    Specialist Names: N/A    Code Status  Full Code    Chief Complaint  Chief Complaint   Patient presents with    N/V     Pt hasn't been able to keep anything down for the last couple of months, but getting worse over the last few weeks. Per , the vomiting is intermittent. Some days are better than others. They were starting to do a GI work up and get a CT scan but the vomiting got worse.     Weakness    Low Back Pain     Started to have low back pain today.        History of Presenting Illness  This is a 48-year-old female with past medical history of hypertension, hypothyroidism, alcohol use disorder, and mood disorder who was admitted on 5/30/2023 with nausea, vomiting and abdominal pain for the past several days.    Patient states that she has been having intractable nausea vomiting abdominal pain for the past several months however it is getting significantly worse over the past week or so.  There were intentions to have some imaging outpatient for further work-up however due to persistent vomiting unable to have this imaging performed.    Last drink consumed night prior to admission.  Patient states she consumes 2 to 3 glasses of wine a night.  Lipase negative.  Labs significant for elevated T bilirubin and transaminitis.  Alcohol level negative.  CT imaging of the abdomen pelvis pending.  Right upper quadrant ultrasound pending.  [facility] protocol in place.    Labs significant for metabolic acidosis,  UA positive for UTI, urine culture blood culture pending.  CT imaging of the abdomen pelvis ordered.  Patient does have history of pyelonephritis.  Patient started on IV fluids and IV antibiotics.    I discussed the plan of care with patient and bedside RN.    Review of Systems  Review of Systems   Constitutional:  Positive for malaise/fatigue.   Gastrointestinal:   Positive for abdominal pain and nausea.   Neurological:  Positive for weakness.   All other systems reviewed and are negative.      Past Medical History   has a past medical history of Anxiety, Depression, Gynecological disorder, High cholesterol, Hypertension, Snoring, and Urinary tract infection.    Surgical History   has a past surgical history that includes pr breast augmentation with implant (2006); mammoplasty augmentation; hysteroscopy novasure-2 (8/2/2017); and dilation and curettage (8/2/2017).     Family History  family history includes Asthma in her father; Cancer (age of onset: 60) in her maternal grandfather and maternal grandmother; Depression in her father; Hyperlipidemia in her father and mother; Hypertension in her brother, brother, father, and mother; Other in her paternal grandmother; Stroke in her maternal grandmother.   Family history reviewed with patient. There is no family history that is pertinent to the chief complaint.     Social History   reports that she has never smoked. She has never used smokeless tobacco. She reports current alcohol use of about 8.4 oz of alcohol per week. She reports that she does not use drugs.    Allergies  Allergies   Allergen Reactions    Sulfa Drugs Hives     [id number]    Strawberry Hives       Medications  Prior to Admission Medications   Prescriptions Last Dose Informant Patient Reported? Taking?   escitalopram (LEXAPRO) 5 MG tablet  Patient Yes No   Sig: Take 5 mg by mouth every day.   levothyroxine (SYNTHROID) 50 MCG Tab  Patient Yes No   Sig: Take 50 mcg by mouth every morning on an empty stomach.   multivitamin (THERAGRAN) Tab  Patient Yes No   Sig: Take 1 Tablet by mouth every day.   olmesartan (BENICAR) 40 MG Tab   Yes No   Sig: Take 1 Tablet by mouth every day.      Facility-Administered Medications: None       Physical Exam  Temp:  [35.9 °C (96.6 °F)] 35.9 °C (96.6 °F)  Pulse:  [] 95  Resp:  [18] 18  BP: (147-156)/(17-98) 156/98  SpO2:  [91 %-94  %] 91 %  Blood Pressure: (!) 156/98   Temperature: 35.9 °C (96.6 °F)   Pulse: 95   Respiration: 18   Pulse Oximetry: 91 %       Physical Exam  Vitals and nursing note reviewed.   Constitutional:       General: She is not in acute distress.     Appearance: She is ill-appearing.   HENT:      Head: Normocephalic and atraumatic.      Mouth/Throat:      Mouth: Mucous membranes are dry.      Pharynx: No oropharyngeal exudate.   Eyes:      Extraocular Movements: Extraocular movements intact.      Pupils: Pupils are equal, round, and reactive to light.   Cardiovascular:      Rate and Rhythm: Normal rate and regular rhythm.      Pulses: Normal pulses.      Heart sounds: No murmur heard.     No friction rub. No gallop.   Pulmonary:      Effort: Pulmonary effort is normal. No respiratory distress.      Breath sounds: No wheezing, rhonchi or rales.   Abdominal:      General: Bowel sounds are normal. There is no distension.      Palpations: Abdomen is soft. There is no mass.      Tenderness: There is abdominal tenderness.   Musculoskeletal:         General: No swelling or tenderness. Normal range of motion.      Cervical back: Normal range of motion. No rigidity. No muscular tenderness.      Right lower leg: No edema.      Left lower leg: No edema.   Skin:     General: Skin is warm and dry.      Capillary Refill: Capillary refill takes less than 2 seconds.      Findings: No erythema or rash.   Neurological:      General: No focal deficit present.      Mental Status: She is alert and oriented to person, place, and time.      Motor: No weakness.      Gait: Gait normal.         Laboratory:  Recent Labs     05/30/23 2202   WBC 6.1   RBC 4.27   HEMOGLOBIN 15.5   HEMATOCRIT 43.7   .3*   MCH 36.3*   MCHC 35.5   RDW 45.0   PLATELETCT 153*   MPV 9.8     Recent Labs     05/30/23 2202   SODIUM 138   POTASSIUM 3.8   CHLORIDE 93*   CO2 20   GLUCOSE 165*   BUN 7*   CREATININE 0.73   CALCIUM 9.4     Recent Labs     05/30/23 2202    ALTSGPT 120*   ASTSGOT 187*   ALKPHOSPHAT 113*   TBILIRUBIN 2.0*   LIPASE 23   GLUCOSE 165*         No results for input(s): NTPROBNP in the last 72 hours.      No results for input(s): TROPONINT in the last 72 hours.    Imaging:  CT-ABDOMEN-PELVIS WITH    (Results Pending)   US-RUQ    (Results Pending)       no X-Ray or EKG requiring interpretation    Assessment/Plan:  Justification for Admission Status  I anticipate this patient will require at least two midnights for appropriate medical management, necessitating inpatient admission because will require IV antibiotics for UTI, and close monitoring for alcohol withdrawal symptoms.    Patient will need a Med/Surg bed on MEDICAL service .  The need is secondary to will require IV antibiotics for UTI, and close monitoring for alcohol withdrawal symptoms.    * Alcohol use disorder, severe, dependence (HCC)- (present on admission)  Assessment & Plan  CIWA protocol in place  Detox bag with thiamine, multivitamin and folate  P.o. vitamins ordered  Alcohol cessation encouraged    Acute cystitis without hematuria- (present on admission)  Assessment & Plan  UA positive  Urine culture pending  Started patient on Rocephin   CT imaging with IV contrast ordered by ERP pending      Serum total bilirubin elevated  Assessment & Plan  CT imaging and ultrasound pending  Serial CMP ordered  Avoid hepatotoxic agents    Generalized abdominal pain  Assessment & Plan  Lipase negative  CT imaging of the abdomen pelvis with IV contrast pending    Nausea & vomiting  Assessment & Plan  Several days of abdominal pain, nausea and vomiting  Lipase negative  CT imaging of the abdomen pelvis with IV contrast pending  Symptomatic management  IV fluids    Transaminitis  Assessment & Plan  Likely secondary to continued alcohol use  Hepatitis panel ordered  RUQ US ordered  CMP ordered to trend    Hypothyroidism- (present on admission)  Assessment & Plan  Resume patient's Synthroid  TSH  pending    Essential hypertension- (present on admission)  Assessment & Plan  Resume patient's home medications    Anxiety associated with depression- (present on admission)  Assessment & Plan  Resume patient's home medications        VTE prophylaxis: SCDs/TEDs and enoxaparin ppx

## 2023-06-05 LAB
BACTERIA BLD CULT: NORMAL
BACTERIA BLD CULT: NORMAL
SIGNIFICANT IND 70042: NORMAL
SIGNIFICANT IND 70042: NORMAL
SITE SITE: NORMAL
SITE SITE: NORMAL
SOURCE SOURCE: NORMAL
SOURCE SOURCE: NORMAL

## 2023-07-28 ENCOUNTER — HOSPITAL ENCOUNTER (OUTPATIENT)
Dept: RADIOLOGY | Facility: MEDICAL CENTER | Age: 48
End: 2023-07-28
Attending: COLON & RECTAL SURGERY
Payer: COMMERCIAL

## 2023-07-28 DIAGNOSIS — R10.11 ABDOMINAL PAIN, RIGHT UPPER QUADRANT: ICD-10-CM

## 2023-07-28 PROCEDURE — 76705 ECHO EXAM OF ABDOMEN: CPT

## 2023-07-31 ENCOUNTER — APPOINTMENT (OUTPATIENT)
Dept: RADIOLOGY | Facility: MEDICAL CENTER | Age: 48
End: 2023-07-31
Attending: COLON & RECTAL SURGERY
Payer: COMMERCIAL

## 2023-10-27 ENCOUNTER — HOSPITAL ENCOUNTER (INPATIENT)
Facility: MEDICAL CENTER | Age: 48
LOS: 1 days | DRG: 897 | End: 2023-10-28
Attending: EMERGENCY MEDICINE | Admitting: INTERNAL MEDICINE
Payer: COMMERCIAL

## 2023-10-27 ENCOUNTER — APPOINTMENT (OUTPATIENT)
Dept: RADIOLOGY | Facility: MEDICAL CENTER | Age: 48
DRG: 897 | End: 2023-10-27
Attending: EMERGENCY MEDICINE
Payer: COMMERCIAL

## 2023-10-27 DIAGNOSIS — F10.139 ALCOHOL ABUSE WITH WITHDRAWAL (HCC): ICD-10-CM

## 2023-10-27 DIAGNOSIS — N39.0 ACUTE UTI: ICD-10-CM

## 2023-10-27 DIAGNOSIS — K21.9 GASTROESOPHAGEAL REFLUX DISEASE WITHOUT ESOPHAGITIS: ICD-10-CM

## 2023-10-27 DIAGNOSIS — E83.42 HYPOMAGNESEMIA: ICD-10-CM

## 2023-10-27 LAB
ALBUMIN SERPL BCP-MCNC: 4.2 G/DL (ref 3.2–4.9)
ALBUMIN/GLOB SERPL: 1.4 G/DL
ALP SERPL-CCNC: 184 U/L (ref 30–99)
ALT SERPL-CCNC: 92 U/L (ref 2–50)
ANION GAP SERPL CALC-SCNC: 19 MMOL/L (ref 7–16)
APPEARANCE UR: ABNORMAL
AST SERPL-CCNC: 186 U/L (ref 12–45)
BACTERIA #/AREA URNS HPF: ABNORMAL /HPF
BASOPHILS # BLD AUTO: 0.7 % (ref 0–1.8)
BASOPHILS # BLD: 0.05 K/UL (ref 0–0.12)
BILIRUB SERPL-MCNC: 2.1 MG/DL (ref 0.1–1.5)
BILIRUB UR QL STRIP.AUTO: ABNORMAL
BUN SERPL-MCNC: 6 MG/DL (ref 8–22)
CALCIUM ALBUM COR SERPL-MCNC: 8.7 MG/DL (ref 8.5–10.5)
CALCIUM SERPL-MCNC: 8.9 MG/DL (ref 8.4–10.2)
CHLORIDE SERPL-SCNC: 97 MMOL/L (ref 96–112)
CO2 SERPL-SCNC: 22 MMOL/L (ref 20–33)
COLOR UR: ABNORMAL
CREAT SERPL-MCNC: 0.68 MG/DL (ref 0.5–1.4)
EOSINOPHIL # BLD AUTO: 0.02 K/UL (ref 0–0.51)
EOSINOPHIL NFR BLD: 0.3 % (ref 0–6.9)
EPI CELLS #/AREA URNS HPF: ABNORMAL /HPF
ERYTHROCYTE [DISTWIDTH] IN BLOOD BY AUTOMATED COUNT: 45.3 FL (ref 35.9–50)
GFR SERPLBLD CREATININE-BSD FMLA CKD-EPI: 106 ML/MIN/1.73 M 2
GLOBULIN SER CALC-MCNC: 2.9 G/DL (ref 1.9–3.5)
GLUCOSE SERPL-MCNC: 138 MG/DL (ref 65–99)
GLUCOSE UR STRIP.AUTO-MCNC: 100 MG/DL
HCG SERPL QL: NEGATIVE
HCT VFR BLD AUTO: 40.8 % (ref 37–47)
HGB BLD-MCNC: 14 G/DL (ref 12–16)
IMM GRANULOCYTES # BLD AUTO: 0.05 K/UL (ref 0–0.11)
IMM GRANULOCYTES NFR BLD AUTO: 0.7 % (ref 0–0.9)
KETONES UR STRIP.AUTO-MCNC: 40 MG/DL
LACTATE SERPL-SCNC: 1.3 MMOL/L (ref 0.5–2)
LACTATE SERPL-SCNC: 2.3 MMOL/L (ref 0.5–2)
LEUKOCYTE ESTERASE UR QL STRIP.AUTO: ABNORMAL
LYMPHOCYTES # BLD AUTO: 0.8 K/UL (ref 1–4.8)
LYMPHOCYTES NFR BLD: 10.5 % (ref 22–41)
MCH RBC QN AUTO: 36.4 PG (ref 27–33)
MCHC RBC AUTO-ENTMCNC: 34.3 G/DL (ref 32.2–35.5)
MCV RBC AUTO: 106 FL (ref 81.4–97.8)
MICRO URNS: ABNORMAL
MONOCYTES # BLD AUTO: 1.18 K/UL (ref 0–0.85)
MONOCYTES NFR BLD AUTO: 15.5 % (ref 0–13.4)
MUCOUS THREADS #/AREA URNS HPF: ABNORMAL /HPF
NEUTROPHILS # BLD AUTO: 5.5 K/UL (ref 1.82–7.42)
NEUTROPHILS NFR BLD: 72.3 % (ref 44–72)
NITRITE UR QL STRIP.AUTO: POSITIVE
NRBC # BLD AUTO: 0.02 K/UL
NRBC BLD-RTO: 0.3 /100 WBC (ref 0–0.2)
PH UR STRIP.AUTO: 7.5 [PH] (ref 5–8)
PLATELET # BLD AUTO: 145 K/UL (ref 164–446)
PMV BLD AUTO: 10.2 FL (ref 9–12.9)
POTASSIUM SERPL-SCNC: 3.6 MMOL/L (ref 3.6–5.5)
PROT SERPL-MCNC: 7.1 G/DL (ref 6–8.2)
PROT UR QL STRIP: 100 MG/DL
RBC # BLD AUTO: 3.85 M/UL (ref 4.2–5.4)
RBC # URNS HPF: ABNORMAL /HPF
RBC UR QL AUTO: NEGATIVE
SODIUM SERPL-SCNC: 138 MMOL/L (ref 135–145)
SP GR UR STRIP.AUTO: 1.02
WBC # BLD AUTO: 7.6 K/UL (ref 4.8–10.8)
WBC #/AREA URNS HPF: ABNORMAL /HPF

## 2023-10-27 PROCEDURE — 96375 TX/PRO/DX INJ NEW DRUG ADDON: CPT

## 2023-10-27 PROCEDURE — 74018 RADEX ABDOMEN 1 VIEW: CPT

## 2023-10-27 PROCEDURE — 84703 CHORIONIC GONADOTROPIN ASSAY: CPT

## 2023-10-27 PROCEDURE — 770006 HCHG ROOM/CARE - MED/SURG/GYN SEMI*

## 2023-10-27 PROCEDURE — 81001 URINALYSIS AUTO W/SCOPE: CPT

## 2023-10-27 PROCEDURE — 36415 COLL VENOUS BLD VENIPUNCTURE: CPT

## 2023-10-27 PROCEDURE — 94760 N-INVAS EAR/PLS OXIMETRY 1: CPT

## 2023-10-27 PROCEDURE — 700105 HCHG RX REV CODE 258: Performed by: EMERGENCY MEDICINE

## 2023-10-27 PROCEDURE — 99223 1ST HOSP IP/OBS HIGH 75: CPT | Performed by: INTERNAL MEDICINE

## 2023-10-27 PROCEDURE — 96374 THER/PROPH/DIAG INJ IV PUSH: CPT

## 2023-10-27 PROCEDURE — 85025 COMPLETE CBC W/AUTO DIFF WBC: CPT

## 2023-10-27 PROCEDURE — HZ2ZZZZ DETOXIFICATION SERVICES FOR SUBSTANCE ABUSE TREATMENT: ICD-10-PCS | Performed by: INTERNAL MEDICINE

## 2023-10-27 PROCEDURE — 83605 ASSAY OF LACTIC ACID: CPT | Mod: 91

## 2023-10-27 PROCEDURE — 87040 BLOOD CULTURE FOR BACTERIA: CPT | Mod: 91

## 2023-10-27 PROCEDURE — 700111 HCHG RX REV CODE 636 W/ 250 OVERRIDE (IP): Mod: JZ | Performed by: EMERGENCY MEDICINE

## 2023-10-27 PROCEDURE — 99285 EMERGENCY DEPT VISIT HI MDM: CPT

## 2023-10-27 PROCEDURE — 80053 COMPREHEN METABOLIC PANEL: CPT

## 2023-10-27 RX ORDER — ESCITALOPRAM OXALATE 10 MG/1
5 TABLET ORAL DAILY
Status: DISCONTINUED | OUTPATIENT
Start: 2023-10-28 | End: 2023-10-28 | Stop reason: HOSPADM

## 2023-10-27 RX ORDER — OLMESARTAN MEDOXOMIL 20 MG/1
40 TABLET ORAL DAILY
Status: DISCONTINUED | OUTPATIENT
Start: 2023-10-28 | End: 2023-10-28 | Stop reason: HOSPADM

## 2023-10-27 RX ORDER — LORAZEPAM 2 MG/ML
1 INJECTION INTRAMUSCULAR ONCE
Status: COMPLETED | OUTPATIENT
Start: 2023-10-27 | End: 2023-10-27

## 2023-10-27 RX ORDER — LEVOTHYROXINE SODIUM 0.05 MG/1
50 TABLET ORAL
Status: DISCONTINUED | OUTPATIENT
Start: 2023-10-28 | End: 2023-10-28 | Stop reason: HOSPADM

## 2023-10-27 RX ORDER — PROMETHAZINE HYDROCHLORIDE 25 MG/1
12.5-25 TABLET ORAL EVERY 4 HOURS PRN
Status: DISCONTINUED | OUTPATIENT
Start: 2023-10-27 | End: 2023-10-28 | Stop reason: HOSPADM

## 2023-10-27 RX ORDER — LORAZEPAM 2 MG/ML
1 INJECTION INTRAMUSCULAR
Status: DISCONTINUED | OUTPATIENT
Start: 2023-10-27 | End: 2023-10-28 | Stop reason: HOSPADM

## 2023-10-27 RX ORDER — POLYETHYLENE GLYCOL 3350 17 G/17G
1 POWDER, FOR SOLUTION ORAL
Status: DISCONTINUED | OUTPATIENT
Start: 2023-10-27 | End: 2023-10-28 | Stop reason: HOSPADM

## 2023-10-27 RX ORDER — ONDANSETRON 2 MG/ML
4 INJECTION INTRAMUSCULAR; INTRAVENOUS ONCE
Status: COMPLETED | OUTPATIENT
Start: 2023-10-27 | End: 2023-10-27

## 2023-10-27 RX ORDER — GAUZE BANDAGE 2" X 2"
100 BANDAGE TOPICAL DAILY
Status: DISCONTINUED | OUTPATIENT
Start: 2023-10-28 | End: 2023-10-28 | Stop reason: HOSPADM

## 2023-10-27 RX ORDER — LORAZEPAM 1 MG/1
3 TABLET ORAL
Status: DISCONTINUED | OUTPATIENT
Start: 2023-10-27 | End: 2023-10-28 | Stop reason: HOSPADM

## 2023-10-27 RX ORDER — CHLORDIAZEPOXIDE HYDROCHLORIDE 25 MG/1
50 CAPSULE, GELATIN COATED ORAL EVERY 6 HOURS
Status: DISCONTINUED | OUTPATIENT
Start: 2023-10-28 | End: 2023-10-28 | Stop reason: HOSPADM

## 2023-10-27 RX ORDER — LORAZEPAM 0.5 MG/1
0.5 TABLET ORAL EVERY 4 HOURS PRN
Status: DISCONTINUED | OUTPATIENT
Start: 2023-10-27 | End: 2023-10-28 | Stop reason: HOSPADM

## 2023-10-27 RX ORDER — PROMETHAZINE HYDROCHLORIDE 25 MG/1
12.5-25 SUPPOSITORY RECTAL EVERY 4 HOURS PRN
Status: DISCONTINUED | OUTPATIENT
Start: 2023-10-27 | End: 2023-10-28 | Stop reason: HOSPADM

## 2023-10-27 RX ORDER — LORAZEPAM 1 MG/1
1 TABLET ORAL EVERY 4 HOURS PRN
Status: DISCONTINUED | OUTPATIENT
Start: 2023-10-27 | End: 2023-10-28 | Stop reason: HOSPADM

## 2023-10-27 RX ORDER — SODIUM CHLORIDE 9 MG/ML
INJECTION, SOLUTION INTRAVENOUS CONTINUOUS
Status: DISCONTINUED | OUTPATIENT
Start: 2023-10-27 | End: 2023-10-28 | Stop reason: HOSPADM

## 2023-10-27 RX ORDER — LORAZEPAM 2 MG/ML
1.5 INJECTION INTRAMUSCULAR
Status: DISCONTINUED | OUTPATIENT
Start: 2023-10-27 | End: 2023-10-28 | Stop reason: HOSPADM

## 2023-10-27 RX ORDER — PROCHLORPERAZINE EDISYLATE 5 MG/ML
5-10 INJECTION INTRAMUSCULAR; INTRAVENOUS EVERY 4 HOURS PRN
Status: DISCONTINUED | OUTPATIENT
Start: 2023-10-27 | End: 2023-10-28 | Stop reason: HOSPADM

## 2023-10-27 RX ORDER — ONDANSETRON 4 MG/1
4 TABLET, ORALLY DISINTEGRATING ORAL EVERY 4 HOURS PRN
Status: DISCONTINUED | OUTPATIENT
Start: 2023-10-27 | End: 2023-10-28 | Stop reason: HOSPADM

## 2023-10-27 RX ORDER — CEFTRIAXONE 1 G/1
1000 INJECTION, POWDER, FOR SOLUTION INTRAMUSCULAR; INTRAVENOUS ONCE
Status: COMPLETED | OUTPATIENT
Start: 2023-10-27 | End: 2023-10-27

## 2023-10-27 RX ORDER — SODIUM CHLORIDE 9 MG/ML
1000 INJECTION, SOLUTION INTRAVENOUS ONCE
Status: COMPLETED | OUTPATIENT
Start: 2023-10-27 | End: 2023-10-27

## 2023-10-27 RX ORDER — LORAZEPAM 2 MG/ML
2 INJECTION INTRAMUSCULAR
Status: DISCONTINUED | OUTPATIENT
Start: 2023-10-27 | End: 2023-10-28 | Stop reason: HOSPADM

## 2023-10-27 RX ORDER — CHLORDIAZEPOXIDE HYDROCHLORIDE 25 MG/1
25 CAPSULE, GELATIN COATED ORAL EVERY 6 HOURS
Status: DISCONTINUED | OUTPATIENT
Start: 2023-10-29 | End: 2023-10-28 | Stop reason: HOSPADM

## 2023-10-27 RX ORDER — AMOXICILLIN 250 MG
2 CAPSULE ORAL 2 TIMES DAILY
Status: DISCONTINUED | OUTPATIENT
Start: 2023-10-27 | End: 2023-10-28 | Stop reason: HOSPADM

## 2023-10-27 RX ORDER — SODIUM CHLORIDE, SODIUM LACTATE, POTASSIUM CHLORIDE, CALCIUM CHLORIDE 600; 310; 30; 20 MG/100ML; MG/100ML; MG/100ML; MG/100ML
1000 INJECTION, SOLUTION INTRAVENOUS ONCE
Status: COMPLETED | OUTPATIENT
Start: 2023-10-27 | End: 2023-10-27

## 2023-10-27 RX ORDER — LORAZEPAM 1 MG/1
4 TABLET ORAL
Status: DISCONTINUED | OUTPATIENT
Start: 2023-10-27 | End: 2023-10-28 | Stop reason: HOSPADM

## 2023-10-27 RX ORDER — LABETALOL HYDROCHLORIDE 5 MG/ML
10 INJECTION, SOLUTION INTRAVENOUS EVERY 4 HOURS PRN
Status: DISCONTINUED | OUTPATIENT
Start: 2023-10-27 | End: 2023-10-28 | Stop reason: HOSPADM

## 2023-10-27 RX ORDER — LORAZEPAM 1 MG/1
2 TABLET ORAL
Status: DISCONTINUED | OUTPATIENT
Start: 2023-10-27 | End: 2023-10-28 | Stop reason: HOSPADM

## 2023-10-27 RX ORDER — BISACODYL 10 MG
10 SUPPOSITORY, RECTAL RECTAL
Status: DISCONTINUED | OUTPATIENT
Start: 2023-10-27 | End: 2023-10-28 | Stop reason: HOSPADM

## 2023-10-27 RX ORDER — FOLIC ACID 1 MG/1
1 TABLET ORAL DAILY
Status: DISCONTINUED | OUTPATIENT
Start: 2023-10-28 | End: 2023-10-28 | Stop reason: HOSPADM

## 2023-10-27 RX ORDER — ONDANSETRON 2 MG/ML
4 INJECTION INTRAMUSCULAR; INTRAVENOUS EVERY 4 HOURS PRN
Status: DISCONTINUED | OUTPATIENT
Start: 2023-10-27 | End: 2023-10-28 | Stop reason: HOSPADM

## 2023-10-27 RX ORDER — LORAZEPAM 2 MG/ML
0.5 INJECTION INTRAMUSCULAR EVERY 4 HOURS PRN
Status: DISCONTINUED | OUTPATIENT
Start: 2023-10-27 | End: 2023-10-28 | Stop reason: HOSPADM

## 2023-10-27 RX ADMIN — CEFTRIAXONE SODIUM 1000 MG: 1 INJECTION, POWDER, FOR SOLUTION INTRAMUSCULAR; INTRAVENOUS at 22:07

## 2023-10-27 RX ADMIN — SODIUM CHLORIDE, POTASSIUM CHLORIDE, SODIUM LACTATE AND CALCIUM CHLORIDE 1000 ML: 600; 310; 30; 20 INJECTION, SOLUTION INTRAVENOUS at 21:08

## 2023-10-27 RX ADMIN — SODIUM CHLORIDE 1000 ML: 9 INJECTION, SOLUTION INTRAVENOUS at 22:00

## 2023-10-27 RX ADMIN — ONDANSETRON 4 MG: 2 INJECTION INTRAMUSCULAR; INTRAVENOUS at 21:08

## 2023-10-27 RX ADMIN — LORAZEPAM 1 MG: 2 INJECTION INTRAMUSCULAR; INTRAVENOUS at 21:08

## 2023-10-27 ASSESSMENT — ENCOUNTER SYMPTOMS
TREMORS: 1
VOMITING: 1
MYALGIAS: 0
ABDOMINAL PAIN: 0
COUGH: 0
TINGLING: 0
STRIDOR: 0
WEAKNESS: 0
LOSS OF CONSCIOUSNESS: 0
SPUTUM PRODUCTION: 0
CONSTIPATION: 0
PALPITATIONS: 0
DIZZINESS: 0
DEPRESSION: 0
CHILLS: 0
HEADACHES: 0
FALLS: 0
DIARRHEA: 0
FEVER: 0
NAUSEA: 1
SHORTNESS OF BREATH: 0

## 2023-10-27 ASSESSMENT — LIFESTYLE VARIABLES
TREMOR: TREMOR NOT VISIBLE BUT CAN BE FELT, FINGERTIP TO FINGERTIP
HEADACHE, FULLNESS IN HEAD: NOT PRESENT
ANXIETY: NO ANXIETY (AT EASE)
PAROXYSMAL SWEATS: NO SWEAT VISIBLE
ORIENTATION AND CLOUDING OF SENSORIUM: ORIENTED AND CAN DO SERIAL ADDITIONS
PAROXYSMAL SWEATS: NO SWEAT VISIBLE
AGITATION: NORMAL ACTIVITY
AUDITORY DISTURBANCES: NOT PRESENT
ORIENTATION AND CLOUDING OF SENSORIUM: ORIENTED AND CAN DO SERIAL ADDITIONS
HEADACHE, FULLNESS IN HEAD: NOT PRESENT
AUDITORY DISTURBANCES: NOT PRESENT
NAUSEA AND VOMITING: MILD NAUSEA WITH NO VOMITING
TOTAL SCORE: 1
VISUAL DISTURBANCES: NOT PRESENT
NAUSEA AND VOMITING: NO NAUSEA AND NO VOMITING
TREMOR: *
TOTAL SCORE: 4
AGITATION: NORMAL ACTIVITY
ANXIETY: MILDLY ANXIOUS
VISUAL DISTURBANCES: NOT PRESENT

## 2023-10-27 ASSESSMENT — FIBROSIS 4 INDEX: FIB4 SCORE: 6.4

## 2023-10-27 ASSESSMENT — PAIN DESCRIPTION - PAIN TYPE: TYPE: ACUTE PAIN

## 2023-10-28 VITALS
HEIGHT: 63 IN | DIASTOLIC BLOOD PRESSURE: 86 MMHG | HEART RATE: 94 BPM | RESPIRATION RATE: 16 BRPM | TEMPERATURE: 98.1 F | BODY MASS INDEX: 23.91 KG/M2 | SYSTOLIC BLOOD PRESSURE: 122 MMHG | WEIGHT: 134.92 LBS | OXYGEN SATURATION: 94 %

## 2023-10-28 PROBLEM — R74.8 ELEVATED LIVER ENZYMES: Status: ACTIVE | Noted: 2023-10-28

## 2023-10-28 PROBLEM — R73.9 HYPERGLYCEMIA: Status: ACTIVE | Noted: 2023-10-28

## 2023-10-28 PROBLEM — N39.0 UTI (URINARY TRACT INFECTION): Status: ACTIVE | Noted: 2023-10-28

## 2023-10-28 PROBLEM — K70.10 ALCOHOLIC HEPATITIS WITHOUT ASCITES: Status: ACTIVE | Noted: 2023-10-28

## 2023-10-28 LAB
ALBUMIN SERPL BCP-MCNC: 3.5 G/DL (ref 3.2–4.9)
ALBUMIN/GLOB SERPL: 1.5 G/DL
ALP SERPL-CCNC: 151 U/L (ref 30–99)
ALT SERPL-CCNC: 72 U/L (ref 2–50)
ANION GAP SERPL CALC-SCNC: 17 MMOL/L (ref 7–16)
AST SERPL-CCNC: 148 U/L (ref 12–45)
BILIRUB SERPL-MCNC: 2 MG/DL (ref 0.1–1.5)
BUN SERPL-MCNC: 4 MG/DL (ref 8–22)
CALCIUM ALBUM COR SERPL-MCNC: 8.6 MG/DL (ref 8.5–10.5)
CALCIUM SERPL-MCNC: 8.2 MG/DL (ref 8.4–10.2)
CHLORIDE SERPL-SCNC: 102 MMOL/L (ref 96–112)
CO2 SERPL-SCNC: 20 MMOL/L (ref 20–33)
CREAT SERPL-MCNC: 0.57 MG/DL (ref 0.5–1.4)
ERYTHROCYTE [DISTWIDTH] IN BLOOD BY AUTOMATED COUNT: 46.5 FL (ref 35.9–50)
GFR SERPLBLD CREATININE-BSD FMLA CKD-EPI: 111 ML/MIN/1.73 M 2
GLOBULIN SER CALC-MCNC: 2.4 G/DL (ref 1.9–3.5)
GLUCOSE SERPL-MCNC: 92 MG/DL (ref 65–99)
HCT VFR BLD AUTO: 36.6 % (ref 37–47)
HGB BLD-MCNC: 12.4 G/DL (ref 12–16)
HIV 1+2 AB+HIV1 P24 AG SERPL QL IA: NORMAL
LACTATE SERPL-SCNC: 1 MMOL/L (ref 0.5–2)
MAGNESIUM SERPL-MCNC: 1.3 MG/DL (ref 1.5–2.5)
MCH RBC QN AUTO: 36.5 PG (ref 27–33)
MCHC RBC AUTO-ENTMCNC: 33.9 G/DL (ref 32.2–35.5)
MCV RBC AUTO: 107.6 FL (ref 81.4–97.8)
PHOSPHATE SERPL-MCNC: 2.4 MG/DL (ref 2.5–4.5)
PLATELET # BLD AUTO: 104 K/UL (ref 164–446)
PLATELETS.RETICULATED NFR BLD AUTO: 6.1 % (ref 0.6–13.1)
PMV BLD AUTO: 10.6 FL (ref 9–12.9)
POTASSIUM SERPL-SCNC: 3.5 MMOL/L (ref 3.6–5.5)
PROT SERPL-MCNC: 5.9 G/DL (ref 6–8.2)
RBC # BLD AUTO: 3.4 M/UL (ref 4.2–5.4)
SODIUM SERPL-SCNC: 139 MMOL/L (ref 135–145)
WBC # BLD AUTO: 5.2 K/UL (ref 4.8–10.8)

## 2023-10-28 PROCEDURE — 36415 COLL VENOUS BLD VENIPUNCTURE: CPT

## 2023-10-28 PROCEDURE — A9270 NON-COVERED ITEM OR SERVICE: HCPCS | Performed by: INTERNAL MEDICINE

## 2023-10-28 PROCEDURE — 83605 ASSAY OF LACTIC ACID: CPT

## 2023-10-28 PROCEDURE — 85027 COMPLETE CBC AUTOMATED: CPT

## 2023-10-28 PROCEDURE — 83735 ASSAY OF MAGNESIUM: CPT

## 2023-10-28 PROCEDURE — 80053 COMPREHEN METABOLIC PANEL: CPT

## 2023-10-28 PROCEDURE — G0475 HIV COMBINATION ASSAY: HCPCS

## 2023-10-28 PROCEDURE — 84100 ASSAY OF PHOSPHORUS: CPT

## 2023-10-28 PROCEDURE — 700111 HCHG RX REV CODE 636 W/ 250 OVERRIDE (IP): Mod: JZ | Performed by: INTERNAL MEDICINE

## 2023-10-28 PROCEDURE — 85055 RETICULATED PLATELET ASSAY: CPT

## 2023-10-28 PROCEDURE — 700101 HCHG RX REV CODE 250: Performed by: INTERNAL MEDICINE

## 2023-10-28 PROCEDURE — 700102 HCHG RX REV CODE 250 W/ 637 OVERRIDE(OP): Performed by: INTERNAL MEDICINE

## 2023-10-28 PROCEDURE — 700105 HCHG RX REV CODE 258: Performed by: INTERNAL MEDICINE

## 2023-10-28 PROCEDURE — 99239 HOSP IP/OBS DSCHRG MGMT >30: CPT | Performed by: INTERNAL MEDICINE

## 2023-10-28 PROCEDURE — 94760 N-INVAS EAR/PLS OXIMETRY 1: CPT

## 2023-10-28 RX ORDER — GRANULES FOR ORAL 3 G/1
3 POWDER ORAL ONCE
Status: COMPLETED | OUTPATIENT
Start: 2023-10-28 | End: 2023-10-28

## 2023-10-28 RX ORDER — LANOLIN ALCOHOL/MO/W.PET/CERES
100 CREAM (GRAM) TOPICAL DAILY
Qty: 30 TABLET | Refills: 0 | Status: SHIPPED | OUTPATIENT
Start: 2023-10-29 | End: 2023-11-28

## 2023-10-28 RX ORDER — MAGNESIUM SULFATE HEPTAHYDRATE 40 MG/ML
2 INJECTION, SOLUTION INTRAVENOUS ONCE
Status: COMPLETED | OUTPATIENT
Start: 2023-10-28 | End: 2023-10-28

## 2023-10-28 RX ORDER — FOLIC ACID 1 MG/1
1 TABLET ORAL DAILY
Qty: 30 TABLET | Refills: 0 | Status: SHIPPED | OUTPATIENT
Start: 2023-10-29 | End: 2023-11-28

## 2023-10-28 RX ORDER — MAGNESIUM GLYCINATE 100 MG
1 CAPSULE ORAL
COMMUNITY
Start: 2023-10-28 | End: 2024-03-05

## 2023-10-28 RX ORDER — OMEPRAZOLE 20 MG/1
20 CAPSULE, DELAYED RELEASE ORAL 2 TIMES DAILY
Qty: 28 CAPSULE | Refills: 0 | Status: SHIPPED | OUTPATIENT
Start: 2023-10-28 | End: 2023-11-11

## 2023-10-28 RX ORDER — CHLORDIAZEPOXIDE HYDROCHLORIDE 25 MG/1
CAPSULE, GELATIN COATED ORAL
Qty: 13 CAPSULE | Refills: 0 | Status: CANCELLED | OUTPATIENT
Start: 2023-10-28 | End: 2023-11-03

## 2023-10-28 RX ORDER — HYDROXYZINE HYDROCHLORIDE 10 MG/1
10 TABLET, FILM COATED ORAL
COMMUNITY
Start: 2023-08-13

## 2023-10-28 RX ADMIN — CEFTRIAXONE SODIUM 2000 MG: 2 INJECTION, POWDER, FOR SOLUTION INTRAMUSCULAR; INTRAVENOUS at 05:08

## 2023-10-28 RX ADMIN — ESCITALOPRAM OXALATE 5 MG: 10 TABLET ORAL at 05:05

## 2023-10-28 RX ADMIN — FOLIC ACID 1 MG: 1 TABLET ORAL at 05:05

## 2023-10-28 RX ADMIN — CHLORDIAZEPOXIDE HYDROCHLORIDE 50 MG: 25 CAPSULE ORAL at 00:43

## 2023-10-28 RX ADMIN — RIVAROXABAN 10 MG: 10 TABLET, FILM COATED ORAL at 00:43

## 2023-10-28 RX ADMIN — CHLORDIAZEPOXIDE HYDROCHLORIDE 50 MG: 25 CAPSULE ORAL at 05:04

## 2023-10-28 RX ADMIN — GRANULES FOR ORAL SOLUTION 3 G: 3 POWDER ORAL at 11:59

## 2023-10-28 RX ADMIN — MAGNESIUM SULFATE HEPTAHYDRATE 2 G: 2 INJECTION, SOLUTION INTRAVENOUS at 10:58

## 2023-10-28 RX ADMIN — THERA TABS 1 TABLET: TAB at 05:05

## 2023-10-28 RX ADMIN — POTASSIUM BICARBONATE 50 MEQ: 978 TABLET, EFFERVESCENT ORAL at 11:00

## 2023-10-28 RX ADMIN — OLMESARTAN MEDOXOMIL 40 MG: 20 TABLET, FILM COATED ORAL at 05:05

## 2023-10-28 RX ADMIN — THIAMINE HCL TAB 100 MG 100 MG: 100 TAB at 05:04

## 2023-10-28 RX ADMIN — LEVOTHYROXINE SODIUM 50 MCG: 0.05 TABLET ORAL at 05:05

## 2023-10-28 RX ADMIN — CHLORDIAZEPOXIDE HYDROCHLORIDE 50 MG: 25 CAPSULE ORAL at 12:01

## 2023-10-28 RX ADMIN — SODIUM CHLORIDE: 9 INJECTION, SOLUTION INTRAVENOUS at 00:19

## 2023-10-28 RX ADMIN — LORAZEPAM 0.5 MG: 0.5 TABLET ORAL at 00:43

## 2023-10-28 RX ADMIN — LORAZEPAM 0.5 MG: 0.5 TABLET ORAL at 04:59

## 2023-10-28 ASSESSMENT — LIFESTYLE VARIABLES
TREMOR: MODERATE TREMOR WITH ARMS EXTENDED
NAUSEA AND VOMITING: NO NAUSEA AND NO VOMITING
TREMOR: *
HAVE YOU EVER FELT YOU SHOULD CUT DOWN ON YOUR DRINKING: YES
CONSUMPTION TOTAL: POSITIVE
ANXIETY: NO ANXIETY (AT EASE)
PAROXYSMAL SWEATS: NO SWEAT VISIBLE
TOTAL SCORE: 5
HEADACHE, FULLNESS IN HEAD: NOT PRESENT
EVER HAD A DRINK FIRST THING IN THE MORNING TO STEADY YOUR NERVES TO GET RID OF A HANGOVER: YES
TOTAL SCORE: 4
AUDITORY DISTURBANCES: NOT PRESENT
ORIENTATION AND CLOUDING OF SENSORIUM: ORIENTED AND CAN DO SERIAL ADDITIONS
HEADACHE, FULLNESS IN HEAD: NOT PRESENT
ALCOHOL_USE: YES
TREMOR: MODERATE TREMOR WITH ARMS EXTENDED
NAUSEA AND VOMITING: NO NAUSEA AND NO VOMITING
ANXIETY: NO ANXIETY (AT EASE)
AGITATION: NORMAL ACTIVITY
ORIENTATION AND CLOUDING OF SENSORIUM: ORIENTED AND CAN DO SERIAL ADDITIONS
TOTAL SCORE: 7
AGITATION: NORMAL ACTIVITY
PAROXYSMAL SWEATS: NO SWEAT VISIBLE
HOW MANY TIMES IN THE PAST YEAR HAVE YOU HAD 5 OR MORE DRINKS IN A DAY: 0
NAUSEA AND VOMITING: NO NAUSEA AND NO VOMITING
ON A TYPICAL DAY WHEN YOU DRINK ALCOHOL HOW MANY DRINKS DO YOU HAVE: 2
TOTAL SCORE: 2
PAROXYSMAL SWEATS: NO SWEAT VISIBLE
EVER FELT BAD OR GUILTY ABOUT YOUR DRINKING: NO
HEADACHE, FULLNESS IN HEAD: NOT PRESENT
VISUAL DISTURBANCES: NOT PRESENT
AVERAGE NUMBER OF DAYS PER WEEK YOU HAVE A DRINK CONTAINING ALCOHOL: 6
TOTAL SCORE: 3
PAROXYSMAL SWEATS: BARELY PERCEPTIBLE SWEATING. PALMS MOIST
ORIENTATION AND CLOUDING OF SENSORIUM: ORIENTED AND CAN DO SERIAL ADDITIONS
ANXIETY: *
HAVE PEOPLE ANNOYED YOU BY CRITICIZING YOUR DRINKING: NO
ANXIETY: MILDLY ANXIOUS
TOTAL SCORE: 2
VISUAL DISTURBANCES: NOT PRESENT
AGITATION: NORMAL ACTIVITY
TREMOR: *
AUDITORY DISTURBANCES: NOT PRESENT
HEADACHE, FULLNESS IN HEAD: NOT PRESENT
AUDITORY DISTURBANCES: NOT PRESENT
NAUSEA AND VOMITING: NO NAUSEA AND NO VOMITING
AGITATION: SOMEWHAT MORE THAN NORMAL ACTIVITY
TOTAL SCORE: 2
VISUAL DISTURBANCES: NOT PRESENT
VISUAL DISTURBANCES: NOT PRESENT
ORIENTATION AND CLOUDING OF SENSORIUM: ORIENTED AND CAN DO SERIAL ADDITIONS
AUDITORY DISTURBANCES: NOT PRESENT

## 2023-10-28 ASSESSMENT — COGNITIVE AND FUNCTIONAL STATUS - GENERAL
MOBILITY SCORE: 19
SUGGESTED CMS G CODE MODIFIER MOBILITY: CK
CLIMB 3 TO 5 STEPS WITH RAILING: A LITTLE
TOILETING: A LITTLE
MOVING TO AND FROM BED TO CHAIR: A LITTLE
WALKING IN HOSPITAL ROOM: A LITTLE
DAILY ACTIVITIY SCORE: 23
MOVING FROM LYING ON BACK TO SITTING ON SIDE OF FLAT BED: A LITTLE
STANDING UP FROM CHAIR USING ARMS: A LITTLE
SUGGESTED CMS G CODE MODIFIER DAILY ACTIVITY: CI

## 2023-10-28 ASSESSMENT — PATIENT HEALTH QUESTIONNAIRE - PHQ9
1. LITTLE INTEREST OR PLEASURE IN DOING THINGS: NOT AT ALL
2. FEELING DOWN, DEPRESSED, IRRITABLE, OR HOPELESS: NOT AT ALL
SUM OF ALL RESPONSES TO PHQ9 QUESTIONS 1 AND 2: 0

## 2023-10-28 ASSESSMENT — FIBROSIS 4 INDEX: FIB4 SCORE: 6.55

## 2023-10-28 NOTE — ASSESSMENT & PLAN NOTE
Patient does have positive changes on her urinalysis  Was started on antibiotics as an outpatient but unable to keep them down    Continue IV ceftriaxone 2 g.  Afebrile.

## 2023-10-28 NOTE — CARE PLAN
The patient is Watcher - Medium risk of patient condition declining or worsening    Shift Goals  Clinical Goals: CIWA, IVF  Patient Goals: rest    Progress made toward(s) clinical / shift goals:    Problem: Optimal Care for Alcohol Withdrawal  Goal: Optimal Care for the alcohol withdrawal patient  Outcome: Progressing     Problem: Seizure Precautions  Goal: Implementation of seizure precautions  Outcome: Progressing     Problem: Psychosocial  Goal: Patient's level of anxiety will decrease  Outcome: Progressing     Problem: Fall Risk  Goal: Patient will remain free from falls  Outcome: Progressing       Patient is not progressing towards the following goals:

## 2023-10-28 NOTE — CARE PLAN
Problem: Knowledge Deficit - Standard  Goal: Patient and family/care givers will demonstrate understanding of plan of care, disease process/condition, diagnostic tests and medications  Outcome: Progressing     Problem: Optimal Care for Alcohol Withdrawal  Goal: Optimal Care for the alcohol withdrawal patient  Outcome: Progressing   The patient is Stable - Low risk of patient condition declining or worsening    Shift Goals  Clinical Goals: CIWA, IVF, abx  Patient Goals: DC home  Family Goals: MISTY    Progress made toward(s) clinical / shift goals:  AxOx4. VSS, afebrile. Denies pain. CIWA 4. Denies anxiety, nausea and headache. Tremors still noted. Steady gait. Denies pain with urination. Pt requesting to DC home today for commitment tomorrow. MD aware. Awaiting plan, will monitor.    Patient is not progressing towards the following goals:

## 2023-10-28 NOTE — PROGRESS NOTES
0013 Patient arrived to room 312-2, ambulatory from [facility] to bed with handheld assist.

## 2023-10-28 NOTE — H&P
Hospital Medicine History & Physical Note    Date of Service  10/27/2023    Primary Care Physician  Unique Young M.D.    Consultants  None    Specialist Names: None    Code Status  Full Code    Chief Complaint  Chief Complaint   Patient presents with    N/V    UTI     47 yo female ambulates to triage with reports of N/V since Monday.  Patient reports she was diagnoses with a UTI this am started on antibiotics but unable to keep anything down.  Reports urinary pain started Wednesday.  Reports urine is cloudy and smelly.   Denies fever. Did have IV fluids this AM but still feels dehydrated and not able to keep anything down        History of Presenting Illness  Minerva Tillman is a 48 y.o. female who presented 10/27/2023 with nausea and vomiting.  Patient states she began having nausea and vomiting on Tuesday, worsened on Wednesday.  Patient denied any frequency or dysuria but states he is at the symptoms she gets whenever she gets a urinary tract infection.  Patient did have a urinalysis yesterday and was started on antibiotics but was unable to keep them down.  Upon arrival, she was noted to be tremulous.  Patient states she does drink daily but states only 1 to 2 glasses of wine a day.  States her last drink was on Tuesday. I did discuss the case including labs with the ER physician.    I discussed the plan of care with patient.    Review of Systems  Review of Systems   Constitutional:  Negative for chills, fever and malaise/fatigue.   HENT:  Negative for congestion.    Respiratory:  Negative for cough, sputum production, shortness of breath and stridor.    Cardiovascular:  Negative for chest pain, palpitations and leg swelling.   Gastrointestinal:  Positive for nausea and vomiting. Negative for abdominal pain, constipation and diarrhea.   Genitourinary:  Negative for dysuria and urgency.   Musculoskeletal:  Negative for falls and myalgias.   Neurological:  Positive for tremors. Negative for dizziness,  tingling, loss of consciousness, weakness and headaches.   Psychiatric/Behavioral:  Negative for depression and suicidal ideas.    All other systems reviewed and are negative.      Past Medical History   has a past medical history of Anxiety, Depression, Gynecological disorder, High cholesterol, Hypertension, Snoring, and Urinary tract infection.    Surgical History   has a past surgical history that includes pr breast augmentation with implant (2006); mammoplasty augmentation; hysteroscopy novasure-2 (8/2/2017); and dilation and curettage (8/2/2017).     Family History  family history includes Asthma in her father; Cancer (age of onset: 60) in her maternal grandfather and maternal grandmother; Depression in her father; Hyperlipidemia in her father and mother; Hypertension in her brother, brother, father, and mother; Other in her paternal grandmother; Stroke in her maternal grandmother.   Family history reviewed with patient. There is no family history that is pertinent to the chief complaint.     Social History   reports that she has never smoked. She has never used smokeless tobacco. She reports current alcohol use of about 8.4 oz of alcohol per week. She reports that she does not use drugs.    Allergies  Allergies   Allergen Reactions    Sulfa Drugs Hives     [id number]    Strawberry Hives       Medications  Prior to Admission Medications   Prescriptions Last Dose Informant Patient Reported? Taking?   escitalopram (LEXAPRO) 5 MG tablet  Patient Yes No   Sig: Take 5 mg by mouth every day.   levothyroxine (SYNTHROID) 50 MCG Tab  Patient Yes No   Sig: Take 50 mcg by mouth every morning on an empty stomach.   multivitamin (THERAGRAN) Tab  Patient Yes No   Sig: Take 1 Tablet by mouth every day.   olmesartan (BENICAR) 40 MG Tab   Yes No   Sig: Take 1 Tablet by mouth every day.   ondansetron (ZOFRAN ODT) 4 MG TABLET DISPERSIBLE   No No   Sig: Dissolve 1 Tablet by mouth every 6 hours as needed for Nausea/Vomiting.       Facility-Administered Medications: None       Physical Exam  Temp:  [37.6 °C (99.6 °F)] 37.6 °C (99.6 °F)  Pulse:  [] 84  Resp:  [18] 18  BP: (116-133)/() 116/82  SpO2:  [95 %] 95 %  Blood Pressure: 116/82   Temperature: 37.6 °C (99.6 °F)   Pulse: 84   Respiration: 18   Pulse Oximetry: 95 %       Physical Exam  Vitals and nursing note reviewed.   Constitutional:       General: She is not in acute distress.     Appearance: She is well-developed. She is not diaphoretic.   HENT:      Head: Normocephalic and atraumatic.      Right Ear: External ear normal.      Left Ear: External ear normal.      Nose: Nose normal. No congestion or rhinorrhea.      Mouth/Throat:      Mouth: Mucous membranes are dry.      Pharynx: No oropharyngeal exudate.   Eyes:      General:         Right eye: No discharge.         Left eye: No discharge.   Neck:      Trachea: No tracheal deviation.   Cardiovascular:      Rate and Rhythm: Normal rate and regular rhythm.      Heart sounds: No murmur heard.     No friction rub. No gallop.   Pulmonary:      Effort: Pulmonary effort is normal. No respiratory distress.      Breath sounds: Normal breath sounds. No stridor. No wheezing or rales.   Chest:      Chest wall: No tenderness.   Abdominal:      General: Bowel sounds are normal. There is no distension.      Palpations: Abdomen is soft.      Tenderness: There is no abdominal tenderness.   Musculoskeletal:         General: No tenderness. Normal range of motion.      Cervical back: Neck supple.      Right lower leg: No edema.      Left lower leg: No edema.   Lymphadenopathy:      Cervical: No cervical adenopathy.   Skin:     General: Skin is warm and dry.      Findings: No erythema or rash.   Neurological:      Mental Status: She is alert and oriented to person, place, and time.      Cranial Nerves: No cranial nerve deficit.      Motor: Tremor present.   Psychiatric:         Mood and Affect: Mood normal.         Behavior: Behavior normal.   "       Thought Content: Thought content normal.         Judgment: Judgment normal.         Laboratory:  Recent Labs     10/27/23  2047   WBC 7.6   RBC 3.85*   HEMOGLOBIN 14.0   HEMATOCRIT 40.8   .0*   MCH 36.4*   MCHC 34.3   RDW 45.3   PLATELETCT 145*   MPV 10.2     Recent Labs     10/27/23  2047   SODIUM 138   POTASSIUM 3.6   CHLORIDE 97   CO2 22   GLUCOSE 138*   BUN 6*   CREATININE 0.68   CALCIUM 8.9     Recent Labs     10/27/23  2047   ALTSGPT 92*   ASTSGOT 186*   ALKPHOSPHAT 184*   TBILIRUBIN 2.1*   GLUCOSE 138*         No results for input(s): \"NTPROBNP\" in the last 72 hours.      No results for input(s): \"TROPONINT\" in the last 72 hours.    Imaging:  [id number] [id number]   Final Result      No dilated bowel          X-Ray:  I have personally reviewed the images and compared with prior images.    Assessment/Plan:  Justification for Admission Status  I anticipate this patient will require at least two midnights for appropriate medical management, necessitating inpatient admission because alcohol withdrawal    Patient will need a Med/Surg bed on MEDICAL service .  The need is secondary to alcohol withdrawal.    * Alcohol abuse with withdrawal (HCC)- (present on admission)  Assessment & Plan  Multiple days since last drink  Patient is tremulous   start CIWA protocol as well as Librium  Start multivitamin, thiamine and folate    UTI (urinary tract infection)- (present on admission)  Assessment & Plan  Patient does have positive changes on her urinalysis  Was started on antibiotics as an outpatient but unable to keep them down  Start IV Rocephin    Elevated liver enzymes- (present on admission)  Assessment & Plan  Somewhat chronic however there is a new elevation in total bilirubin but she has had this in the past as well  Think it is likely due to her stressors and dehydration  Start IV fluids  Repeat CMP in the morning  If improvement, no additional work-up  Right upper quadrant is nontender    Hyperglycemia- " (present on admission)  Assessment & Plan  Mild, no need for coverage at this time    Hypothyroidism- (present on admission)  Assessment & Plan  Continue home Synthroid 50 mcg daily    Essential hypertension- (present on admission)  Assessment & Plan  Continue home  olmesartan   start as needed labetalol  Adjust as needed    Anxiety associated with depression- (present on admission)  Assessment & Plan  Continue home Lexapro        VTE prophylaxis: Xarelto 10 mg daily as prophylaxis

## 2023-10-28 NOTE — ED NOTES
Med Rec UPDATED and COMPLETE per PATIENT  Allergies Reviewed  Antibiotcs in past 30 days:YES  Anticoagulant in past 14 days:NO  Preferred pharmacy:[facility] on [address]    Pt states she received an anti-nausea/vomiting injection yesterday morning from her PA (at home infusion visit). Pt unable to verify name of injection.    Pt states she was prescribed 2 types of Antibiotics one from her PA and one from her  (). Patient states she only took the Antibiotic her  prescribed. Pt unable to verify name. Pt states she picked it up in the beginning of the week.    Pt states she hasn't been able to keep anything down these past couple days    Home pharmacy opens @ [phone]    Addendum:  Per [facility] Cefdinir 300 mg BID x7 DS picked up 10/27/2023    Per  patient took 1 dose of Cefazolin 500 mg orally 2 days ago

## 2023-10-28 NOTE — ASSESSMENT & PLAN NOTE
Multiple days since last drink  Patient is tremulous   start CIWA protocol as well as Librium  Start multivitamin, thiamine and folate    -CIWA up to 7, patient given Ativan and Librium

## 2023-10-28 NOTE — ED TRIAGE NOTES
"Chief Complaint   Patient presents with    N/V    UTI     47 yo female ambulates to triage with reports of N/V since Monday.  Patient reports she was diagnoses with a UTI this am started on antibiotics but unable to keep anything down.  Reports urinary pain started Wednesday.  Reports urine is cloudy and smelly.   Denies fever. Did have IV fluids this AM but still feels dehydrated and not able to keep anything down     BP (!) 133/102   Pulse (!) 101   Temp 37.6 °C (99.6 °F) (Temporal)   Resp 18   Ht 1.6 m (5' 2.99\")   Wt 59.4 kg (130 lb 15.3 oz)   SpO2 95%   BMI 23.20 kg/m²     "

## 2023-10-28 NOTE — PROGRESS NOTES
4 Eyes Skin Assessment Completed by TONYA Velazquez and TONYA Gonzalez.    Head WDL  Ears Bilateral scars  Nose WDL  Mouth WDL  Neck WDL  Breast/Chest WDL  Shoulder Blades WDL  Spine WDL  (R) Arm/Elbow/Hand Scab  (L) Arm/Elbow/Hand Bruising and Scab  Abdomen WDL  Groin WDL  Scrotum/Coccyx/Buttocks Redness and Blanching  (R) Leg Scab  (L) Leg Scab  (R) Heel/Foot/Toe Scab, dry, callus  (L) Heel/Foot/Toe Scab, dry, callus          Devices In Places Blood Pressure Cuff and Pulse Ox      Interventions In Place Pillows    Possible Skin Injury No    Pictures Uploaded Into Epic N/A  Wound Consult Placed N/A  RN Wound Prevention Protocol Ordered No

## 2023-10-28 NOTE — ASSESSMENT & PLAN NOTE
- I reviewed prior right upper quadrant ultrasound obtained on 7/28/2023, showed hepatomegaly and steatosis

## 2023-10-28 NOTE — DISCHARGE SUMMARY
"Discharge Summary    CHIEF COMPLAINT ON ADMISSION  Chief Complaint   Patient presents with    N/V    UTI     47 yo female ambulates to triage with reports of N/V since Monday.  Patient reports she was diagnoses with a UTI this am started on antibiotics but unable to keep anything down.  Reports urinary pain started Wednesday.  Reports urine is cloudy and smelly.   Denies fever. Did have IV fluids this AM but still feels dehydrated and not able to keep anything down        Reason for Admission  Vomiting, Urinary pain     Admission Date  10/27/2023    CODE STATUS  Full Code    HPI & HOSPITAL COURSE  This is \"Minerva Tillman is a 48 y.o. female who presented 10/27/2023 with nausea and vomiting.  Patient states she began having nausea and vomiting on Tuesday, worsened on Wednesday.  Patient denied any frequency or dysuria but states he is at the symptoms she gets whenever she gets a urinary tract infection.  Patient did have a urinalysis yesterday and was started on antibiotics but was unable to keep them down.  Upon arrival, she was noted to be tremulous.  Patient states she does drink daily but states only 1 to 2 glasses of wine a day.  States her last drink was on Tuesday. I did discuss the case including labs with the ER physician.\" (As per admitting physician Dr. Raines).    For her UTI she was treated with ceftriaxone a total of 3 g given and she opted for fosfomycin 3 g one-time dose.  I asked her to follow-up with her urine cultures that she obtained from an at home PA from [facility], send to [facility].  WBC 7.6 with 4 cytopenia, neutrophil predominance and monocyte predominant.  Checking HIV status.    Patient did have elevating LFTs which is likely due to alcoholic hepatitis.  She does have a low Madrey score does not require any steroids.  I did  the patient on her alcohol use, I recommended gastroenterology consultation on the outpatient setting as she is turning 48 and will need a colonoscopy as " well.  I recommended for her to have repeat LFTs with her PCP.     We did discuss her alcohol use and she feels that this is more seldom and not heavy user.  She does not feel that she came in for alcohol withdrawal issues and her tremors are chronic possibly benign tremors.  I did offer for Librium but patient feels that she does not require and her medications given in this hospitalization did not give her any benefit.  She chose to defer any Librium prescription at this time.    I reviewed prior right upper quadrant ultrasound obtained on 7/28/2023, showed hepatomegaly and steatosis, Common bile duct 0.41 cm, no evidence of cholelithiasis or pericholecystic fluid.    Her tremors may also be due to hypomagnesemia, she is at 1.3.  We replace with 2 g IV.  Along with that patient is also dehydrated and she was given IV fluids.  Blood cultures were no growth to date.    On the day of discharge, patient requested to be discharged.  I did recommend for her to stay in the hospital as there may be need to have further work-up.  She stated she had an emergency that she had to attend to tomorrow morning and needed to be home.  I did explain all of the above to her and she has explained she will have good follow-up.    Therefore, she is discharged in good and stable condition to home with close outpatient follow-up.    The patient recovered much more quickly than anticipated on admission.    Discharge Date  10/28/2023    FOLLOW UP ITEMS POST DISCHARGE  With PCP    DISCHARGE DIAGNOSES  Principal Problem:    Alcohol abuse with withdrawal (HCC) (POA: Yes)  Active Problems:    Anxiety associated with depression (POA: Yes)    Essential hypertension (POA: Yes)    Fatty liver, alcoholic (POA: Yes)      Overview: IMO load March 2020    Hypothyroidism (POA: Yes)    Hypokalemia (POA: Yes)    Hypomagnesemia (POA: Yes)    Hyperglycemia (POA: Yes)    Elevated liver enzymes (POA: Yes)    UTI (urinary tract infection) (POA: Yes)     Alcoholic hepatitis without ascites (POA: Yes)  Resolved Problems:    * No resolved hospital problems. *      FOLLOW UP  Future Appointments   Date Time Provider Department Center   11/1/2023  9:00 AM GENO Umanzor [facility]   11/1/2023 10:30 AM ANUJA MAIN XR [facility]   11/1/2023 11:00 AM [facility]     Unique Young M.D.  [address]  [address]  [facility] [address]  [phone]    Schedule an appointment as soon as possible for a visit in 1 week(s)  Please follow-up for alcohol use.  You may require ongoing treatment.  Please consider any rehab if needed.  -Please reevaluate liver function with either a CMP or liver function test.  This is likely alcohol or weight related.  Right upper quadrant ultrasound did show hepatic steatosis but no confirmation on cirrhosis.  Please refer to gastroenterology to be evaluated for her liver function and colonoscopy maintenance exams.  -Please recheck CBC, BMP and magnesium level.  Patient did have moderately low magnesium levels that may make her have tremors.  This was replaced.  - Please follow-up on urinary tract infection if any further symptoms.  Patient was given ceftriaxone and then a single dose of fosfomycin, currently we do not have any urine cultures.      MEDICATIONS ON DISCHARGE     Medication List        START taking these medications        Instructions   folic acid 1 MG Tabs  Start taking on: October 29, 2023  Commonly known as: Folvite   Take 1 Tablet by mouth every day for 30 days.  Dose: 1 mg     Magnesium Glycinate 100 MG Caps   Take 1 Capsule by mouth every day with lunch.  Dose: 1 Capsule     omeprazole 20 MG delayed-release capsule  Commonly known as: PriLOSEC   Take 1 Capsule by mouth 2 times a day for 14 days.  Dose: 20 mg     thiamine 100 MG tablet  Start taking on: October 29, 2023  Commonly known as: Thiamine   Take 1 Tablet by mouth every day for 30 days.  Dose: 100 mg             CONTINUE taking these medications        Instructions   escitalopram 5 MG tablet  Commonly known as: Lexapro   Take 5 mg by mouth every day.  Dose: 5 mg     hydrOXYzine HCl 10 MG Tabs  Commonly known as: Atarax   Take 10 mg by mouth 1 time a day as needed for Anxiety.  Dose: 10 mg     levothyroxine 50 MCG Tabs  Commonly known as: Synthroid   Take 50 mcg by mouth every morning on an empty stomach.  Dose: 50 mcg     multivitamin Tabs   Take 1 Tablet by mouth every day.  Dose: 1 Tablet     olmesartan 40 MG Tabs  Commonly known as: Benicar   Take 40 mg by mouth 1 time a day as needed. HBP  Dose: 40 mg     ondansetron 4 MG Tbdp  Commonly known as: Zofran ODT   Dissolve 1 Tablet by mouth every 6 hours as needed for Nausea/Vomiting.  Dose: 4 mg              Allergies  Allergies   Allergen Reactions    Strawberry Hives    Sulfa Drugs Hives     [id number]=[id number]       DIET  Orders Placed This Encounter   Procedures    Diet Order Diet: Regular     Standing Status:   Standing     Number of Occurrences:   1     Order Specific Question:   Diet:     Answer:   Regular [1]       ACTIVITY  As tolerated.  Weight bearing as tolerated    CONSULTATIONS  none    PROCEDURES  none    LABORATORY  Lab Results   Component Value Date    SODIUM 139 10/28/2023    POTASSIUM 3.5 (L) 10/28/2023    CHLORIDE 102 10/28/2023    CO2 20 10/28/2023    GLUCOSE 92 10/28/2023    BUN 4 (L) 10/28/2023    CREATININE 0.57 10/28/2023    CREATININE 0.83 02/01/2013        Lab Results   Component Value Date    WBC 5.2 10/28/2023    WBC 5.2 02/01/2013    HEMOGLOBIN 12.4 10/28/2023    HEMATOCRIT 36.6 (L) 10/28/2023    PLATELETCT 104 (L) 10/28/2023        [id number] VIEW   Final Result      No dilated bowel            Total time of the discharge process exceeds 39 minutes.  More than 50% of time was spent face to face with patient.  This included but not limited to review of hospital course with patient, treatment goals upon discharge, recommendations to PCP, continued  and new medications and their adverse reactions, and nursing instructions for patient.

## 2023-10-28 NOTE — PROGRESS NOTES
Pt dc'ing home.  to pick her up soon. Last CIWA 4, scheduled librium given. Tolerated lunch, reports feeling much better. AVS given, no questions or concerns. Knows to  meds at preferred pharmacy. IV dc'd. No concerns.

## 2023-10-28 NOTE — ED PROVIDER NOTES
ED Provider Note    CHIEF COMPLAINT  Chief Complaint   Patient presents with    N/V    UTI     47 yo female ambulates to triage with reports of N/V since Monday.  Patient reports she was diagnoses with a UTI this am started on antibiotics but unable to keep anything down.  Reports urinary pain started Wednesday.  Reports urine is cloudy and smelly.   Denies fever. Did have IV fluids this AM but still feels dehydrated and not able to keep anything down        EXTERNAL RECORDS REVIEWED  Outpatient Notes   [facility].    HPI/ROS  LIMITATION TO HISTORY   Select: : None  OUTSIDE HISTORIAN(S):  Family .  He states patient has had history of sepsis, from UTI, and has had to be admitted to the hospital in the past.    Minerva Tillman is a 48 y.o. female who presents here for evaluation of nausea vomiting, and UTI.  Patient seen earlier for the same, has had multiple episodes of nausea vomiting earlier today, diagnosed with UTI.  However she was unable to fill and take her meds secondary to the vomiting.  Patient states that she has had low-grade fevers as well, and has a history of this in the past.  She has history of urosepsis, and has had to be in admitted to the hospital for the same. Pt has some tremors as well.     PAST MEDICAL HISTORY   has a past medical history of Anxiety, Depression, Gynecological disorder, High cholesterol, Hypertension, Snoring, and Urinary tract infection.    SURGICAL HISTORY   has a past surgical history that includes breast augmentation with implant (2006); mammoplasty augmentation; hysteroscopy novasure-2 (8/2/2017); and dilation and curettage (8/2/2017).    FAMILY HISTORY  Family History   Problem Relation Age of Onset    Cancer Maternal Grandmother 60        breast and skin    Stroke Maternal Grandmother     Hypertension Mother     Hyperlipidemia Mother     Depression Father     Hypertension Father     Asthma Father     Hyperlipidemia Father     Hypertension Brother  "    Cancer Maternal Grandfather 60        esphogus    Other Paternal Grandmother         complications from surgery    Hypertension Brother        SOCIAL HISTORY  Social History     Tobacco Use    Smoking status: Never    Smokeless tobacco: Never   Vaping Use    Vaping Use: Never used   Substance and Sexual Activity    Alcohol use: Yes     Alcohol/week: 8.4 oz     Types: 14 Glasses of wine per week     Comment: 1-2 glasses per day    Drug use: No    Sexual activity: Yes     Partners: Male     Comment: ,        CURRENT MEDICATIONS  Home Medications       Reviewed by April Kolb R.N. (Registered Nurse) on 10/27/23 at [facility]  Med List Status: Not Addressed     Medication Last Dose Status   escitalopram (LEXAPRO) 5 MG tablet  Active   levothyroxine (SYNTHROID) 50 MCG Tab  Active   multivitamin (THERAGRAN) Tab  Active   olmesartan (BENICAR) 40 MG Tab  Active   ondansetron (ZOFRAN ODT) 4 MG TABLET DISPERSIBLE  Active                    ALLERGIES  Allergies   Allergen Reactions    Sulfa Drugs Hives     [id number]    Strawberry Hives       PHYSICAL EXAM  VITAL SIGNS: /82   Pulse 84   Temp 37.6 °C (99.6 °F) (Temporal)   Resp 18   Ht 1.6 m (5' 2.99\")   Wt 59.4 kg (130 lb 15.3 oz)   SpO2 95%   BMI 23.20 kg/m²    Constitutional: Well developed, well nourished. Mild acute distress.  HEENT: Normocephalic, atraumatic. Posterior pharynx clear and dry  Eyes:  EOMI. Normal sclera.  Neck: Supple, Full range of motion, nontender.  Chest/Pulmonary: clear to ausculation. Symmetrical expansion.   Cardio: tachycardic rate and rhythm with no murmur.   Abdomen: Soft, nontender. No peritoneal signs. No guarding. No palpable masses.  Musculoskeletal: No deformity, no edema, neurovascular intact.   Neuro: Clear speech, appropriate, cooperative, cranial nerves II-XII grossly intact. Tremors noted.   Psych: anxious mood and affect      DIAGNOSTIC STUDIES / PROCEDURES  Results for orders placed or performed during the hospital " encounter of 10/27/23   CBC WITH DIFFERENTIAL   Result Value Ref Range    WBC 7.6 4.8 - 10.8 K/uL    RBC 3.85 (L) 4.20 - 5.40 M/uL    Hemoglobin 14.0 12.0 - 16.0 g/dL    Hematocrit 40.8 37.0 - 47.0 %    .0 (H) 81.4 - 97.8 fL    MCH 36.4 (H) 27.0 - 33.0 pg    MCHC 34.3 32.2 - 35.5 g/dL    RDW 45.3 35.9 - 50.0 fL    Platelet Count 145 (L) 164 - 446 K/uL    MPV 10.2 9.0 - 12.9 fL    Neutrophils-Polys 72.30 (H) 44.00 - 72.00 %    Lymphocytes 10.50 (L) 22.00 - 41.00 %    Monocytes 15.50 (H) 0.00 - 13.40 %    Eosinophils 0.30 0.00 - 6.90 %    Basophils 0.70 0.00 - 1.80 %    Immature Granulocytes 0.70 0.00 - 0.90 %    Nucleated RBC 0.30 (H) 0.00 - 0.20 /100 WBC    Neutrophils (Absolute) 5.50 1.82 - 7.42 K/uL    Lymphs (Absolute) 0.80 (L) 1.00 - 4.80 K/uL    Monos (Absolute) 1.18 (H) 0.00 - 0.85 K/uL    Eos (Absolute) 0.02 0.00 - 0.51 K/uL    Baso (Absolute) 0.05 0.00 - 0.12 K/uL    Immature Granulocytes (abs) 0.05 0.00 - 0.11 K/uL    NRBC (Absolute) 0.02 K/uL   COMP METABOLIC PANEL   Result Value Ref Range    Sodium 138 135 - 145 mmol/L    Potassium 3.6 3.6 - 5.5 mmol/L    Chloride 97 96 - 112 mmol/L    Co2 22 20 - 33 mmol/L    Anion Gap 19.0 (H) 7.0 - 16.0    Glucose 138 (H) 65 - 99 mg/dL    Bun 6 (L) 8 - 22 mg/dL    Creatinine 0.68 0.50 - 1.40 mg/dL    Calcium 8.9 8.4 - 10.2 mg/dL    Correct Calcium 8.7 8.5 - 10.5 mg/dL    AST(SGOT) 186 (H) 12 - 45 U/L    ALT(SGPT) 92 (H) 2 - 50 U/L    Alkaline Phosphatase 184 (H) 30 - 99 U/L    Total Bilirubin 2.1 (H) 0.1 - 1.5 mg/dL    Albumin 4.2 3.2 - 4.9 g/dL    Total Protein 7.1 6.0 - 8.2 g/dL    Globulin 2.9 1.9 - 3.5 g/dL    A-G Ratio 1.4 g/dL   URINALYSIS,CULTURE IF INDICATED    Specimen: Urine, Clean Catch   Result Value Ref Range    Color Brown (A)     Character Cloudy (A)     Specific Gravity 1.020 <1.035    Ph 7.5 5.0 - 8.0    Glucose 100 (A) Negative mg/dL    Ketones 40 (A) Negative mg/dL    Protein 100 (A) Negative mg/dL    Bilirubin Large (A) Negative    Nitrite  Positive (A) Negative    Leukocyte Esterase Large (A) Negative    Occult Blood Negative Negative    Micro Urine Req Microscopic    URINE MICROSCOPIC (W/UA)   Result Value Ref Range    WBC Packed (A) /hpf    RBC 2-5 (A) /hpf    Bacteria Few (A) None /hpf    Epithelial Cells Moderate (A) Few /hpf    Mucous Threads Moderate /hpf   LACTIC ACID   Result Value Ref Range    Lactic Acid 2.3 (H) 0.5 - 2.0 mmol/L   BETA-HCG QUALITATIVE SERUM   Result Value Ref Range    Beta-Hcg Qualitative Serum Negative Negative   ESTIMATED GFR   Result Value Ref Range    GFR (CKD-EPI) 106 >60 mL/min/1.73 m 2         RADIOLOGY  I have independently interpreted the diagnostic imaging associated with this visit and am waiting the final reading from the radiologist.   My preliminary interpretation is as follows: see below  Radiologist interpretation:   [id number] VIEW   Final Result      No dilated bowel            COURSE & MEDICAL DECISION MAKING    Pt will be admitted to the hospitalist service for uti, alcohol withdrawal.    INITIAL ASSESSMENT, COURSE AND PLAN  Care Narrative: This is a 48-year-old female here for evaluation of UTI and nausea vomiting.  Patient states that she also does drink alcohol daily, but has not over the last couple of days.  She does have a fair amount of tremors, in addition to persistent nausea vomiting.  She has a urine that shows leukocyte esterase nitrates and packed WBCs.  She has a mildly elevated lactate 2.3, her vomiting has stopped here, and she has been given IV fluids.  Patient has been given Rocephin as well, and will be admitted for IV antibiotics.    10:26 PM  Dr. Raines will admit the pt.     CRITICAL CARE  The very real possibility of a deterioration of this patient's condition required the highest level of my preparedness for sudden, emergent intervention.  I provided critical care services, which included medication orders, frequent reevaluations of the patient's condition and response to  treatment, ordering and reviewing test results, and discussing the case with various consultants.  The critical care time associated with the care of the patient was 45 minutes. Review chart for interventions. This time is exclusive of any other billable procedures.       DISPOSITION AND DISCUSSIONS  I have discussed management of the patient with the following physicians and KRISTINE's: Hospitalist      FINAL DIAGNOSIS  1. Acute UTI    2.      Critical care time 45 minutes.   3.      Alcohol withdrawal.        Electronically signed by: John Ramirez D.O., 10/27/2023 8:58 PM

## 2023-10-28 NOTE — ASSESSMENT & PLAN NOTE
Somewhat chronic however there is a new elevation in total bilirubin but she has had this in the past as well  Think it is likely due to her stressors and dehydration  If improvement, no additional work-up  Right upper quadrant is nontender    - I reviewed prior right upper quadrant ultrasound obtained on 7/28/2023, showed hepatomegaly and steatosis, Common bile duct 0.41 cm, no evidence of cholelithiasis or pericholecystic fluid.  --I ordered labs for later today, reevaluate LFTs.  Patient has hyperbilirubinemia.  -- 5/2023 acute hepatitis panel was negative  Continue IVF

## 2023-10-31 ENCOUNTER — TELEPHONE (OUTPATIENT)
Dept: HEALTH INFORMATION MANAGEMENT | Facility: OTHER | Age: 48
End: 2023-10-31

## 2024-03-05 ENCOUNTER — APPOINTMENT (OUTPATIENT)
Dept: RADIOLOGY | Facility: MEDICAL CENTER | Age: 49
DRG: 432 | End: 2024-03-05
Attending: STUDENT IN AN ORGANIZED HEALTH CARE EDUCATION/TRAINING PROGRAM
Payer: COMMERCIAL

## 2024-03-05 ENCOUNTER — HOSPITAL ENCOUNTER (INPATIENT)
Facility: MEDICAL CENTER | Age: 49
LOS: 2 days | DRG: 432 | End: 2024-03-08
Attending: STUDENT IN AN ORGANIZED HEALTH CARE EDUCATION/TRAINING PROGRAM | Admitting: STUDENT IN AN ORGANIZED HEALTH CARE EDUCATION/TRAINING PROGRAM
Payer: COMMERCIAL

## 2024-03-05 DIAGNOSIS — J96.01 ACUTE HYPOXIC RESPIRATORY FAILURE (HCC): ICD-10-CM

## 2024-03-05 DIAGNOSIS — F10.930 ALCOHOL WITHDRAWAL SYNDROME WITHOUT COMPLICATION (HCC): ICD-10-CM

## 2024-03-05 DIAGNOSIS — R11.10 VOMITING, UNSPECIFIED VOMITING TYPE, UNSPECIFIED WHETHER NAUSEA PRESENT: ICD-10-CM

## 2024-03-05 DIAGNOSIS — E03.9 HYPOTHYROIDISM, UNSPECIFIED TYPE: ICD-10-CM

## 2024-03-05 DIAGNOSIS — R18.8 OTHER ASCITES: ICD-10-CM

## 2024-03-05 DIAGNOSIS — R52 PAIN: ICD-10-CM

## 2024-03-05 DIAGNOSIS — R19.7 DIARRHEA, UNSPECIFIED TYPE: ICD-10-CM

## 2024-03-05 DIAGNOSIS — R09.02 HYPOXIA: ICD-10-CM

## 2024-03-05 DIAGNOSIS — J98.4 RESTRICTIVE LUNG DISEASE: ICD-10-CM

## 2024-03-05 DIAGNOSIS — K74.69 OTHER CIRRHOSIS OF LIVER (HCC): ICD-10-CM

## 2024-03-05 DIAGNOSIS — N39.0 URINARY TRACT INFECTION WITHOUT HEMATURIA, SITE UNSPECIFIED: ICD-10-CM

## 2024-03-05 DIAGNOSIS — K72.90 DECOMPENSATED HEPATIC CIRRHOSIS (HCC): ICD-10-CM

## 2024-03-05 DIAGNOSIS — K74.60 DECOMPENSATED HEPATIC CIRRHOSIS (HCC): ICD-10-CM

## 2024-03-05 DIAGNOSIS — K70.10 ALCOHOLIC HEPATITIS WITHOUT ASCITES: ICD-10-CM

## 2024-03-05 LAB
ALBUMIN SERPL BCP-MCNC: 3.6 G/DL (ref 3.2–4.9)
ALBUMIN/GLOB SERPL: 1 G/DL
ALP SERPL-CCNC: 288 U/L (ref 30–99)
ALT SERPL-CCNC: 79 U/L (ref 2–50)
AMORPH CRY #/AREA URNS HPF: PRESENT /HPF
ANION GAP SERPL CALC-SCNC: 19 MMOL/L (ref 7–16)
APPEARANCE UR: ABNORMAL
AST SERPL-CCNC: 221 U/L (ref 12–45)
BACTERIA #/AREA URNS HPF: ABNORMAL /HPF
BASOPHILS # BLD AUTO: 0.9 % (ref 0–1.8)
BASOPHILS # BLD: 0.11 K/UL (ref 0–0.12)
BILIRUB SERPL-MCNC: 2.9 MG/DL (ref 0.1–1.5)
BILIRUB UR QL STRIP.AUTO: ABNORMAL
BUN SERPL-MCNC: 2 MG/DL (ref 8–22)
CALCIUM ALBUM COR SERPL-MCNC: 9.3 MG/DL (ref 8.5–10.5)
CALCIUM SERPL-MCNC: 9 MG/DL (ref 8.5–10.5)
CHLORIDE SERPL-SCNC: 96 MMOL/L (ref 96–112)
CO2 SERPL-SCNC: 20 MMOL/L (ref 20–33)
COLOR UR: ABNORMAL
CREAT SERPL-MCNC: 0.44 MG/DL (ref 0.5–1.4)
EOSINOPHIL # BLD AUTO: 0.01 K/UL (ref 0–0.51)
EOSINOPHIL NFR BLD: 0.1 % (ref 0–6.9)
EPI CELLS #/AREA URNS HPF: ABNORMAL /HPF
ERYTHROCYTE [DISTWIDTH] IN BLOOD BY AUTOMATED COUNT: 60.8 FL (ref 35.9–50)
GFR SERPLBLD CREATININE-BSD FMLA CKD-EPI: 118 ML/MIN/1.73 M 2
GLOBULIN SER CALC-MCNC: 3.5 G/DL (ref 1.9–3.5)
GLUCOSE SERPL-MCNC: 86 MG/DL (ref 65–99)
GLUCOSE UR STRIP.AUTO-MCNC: NEGATIVE MG/DL
HCG SERPL QL: NEGATIVE
HCT VFR BLD AUTO: 42.1 % (ref 37–47)
HGB BLD-MCNC: 14.9 G/DL (ref 12–16)
HYALINE CASTS #/AREA URNS LPF: ABNORMAL /LPF
IMM GRANULOCYTES # BLD AUTO: 0.07 K/UL (ref 0–0.11)
IMM GRANULOCYTES NFR BLD AUTO: 0.6 % (ref 0–0.9)
KETONES UR STRIP.AUTO-MCNC: 15 MG/DL
LACTATE SERPL-SCNC: 2.3 MMOL/L (ref 0.5–2)
LEUKOCYTE ESTERASE UR QL STRIP.AUTO: ABNORMAL
LYMPHOCYTES # BLD AUTO: 1.75 K/UL (ref 1–4.8)
LYMPHOCYTES NFR BLD: 14.7 % (ref 22–41)
MCH RBC QN AUTO: 38.8 PG (ref 27–33)
MCHC RBC AUTO-ENTMCNC: 35.4 G/DL (ref 32.2–35.5)
MCV RBC AUTO: 109.6 FL (ref 81.4–97.8)
MICRO URNS: ABNORMAL
MONOCYTES # BLD AUTO: 1.63 K/UL (ref 0–0.85)
MONOCYTES NFR BLD AUTO: 13.7 % (ref 0–13.4)
NEUTROPHILS # BLD AUTO: 8.37 K/UL (ref 1.82–7.42)
NEUTROPHILS NFR BLD: 70 % (ref 44–72)
NITRITE UR QL STRIP.AUTO: POSITIVE
NRBC # BLD AUTO: 0.06 K/UL
NRBC BLD-RTO: 0.5 /100 WBC (ref 0–0.2)
NT-PROBNP SERPL IA-MCNC: 58 PG/ML (ref 0–125)
PH UR STRIP.AUTO: 6 [PH] (ref 5–8)
PLATELET # BLD AUTO: 156 K/UL (ref 164–446)
PMV BLD AUTO: 10.3 FL (ref 9–12.9)
POTASSIUM SERPL-SCNC: 3.7 MMOL/L (ref 3.6–5.5)
PROT SERPL-MCNC: 7.1 G/DL (ref 6–8.2)
PROT UR QL STRIP: 30 MG/DL
RBC # BLD AUTO: 3.84 M/UL (ref 4.2–5.4)
RBC # URNS HPF: ABNORMAL /HPF
RBC UR QL AUTO: ABNORMAL
SODIUM SERPL-SCNC: 135 MMOL/L (ref 135–145)
SP GR UR STRIP.AUTO: 1.02
TRANS CELLS #/AREA URNS HPF: ABNORMAL /HPF
UROBILINOGEN UR STRIP.AUTO-MCNC: 2 MG/DL
WBC # BLD AUTO: 11.9 K/UL (ref 4.8–10.8)
WBC #/AREA URNS HPF: ABNORMAL /HPF

## 2024-03-05 PROCEDURE — 71045 X-RAY EXAM CHEST 1 VIEW: CPT

## 2024-03-05 PROCEDURE — 81001 URINALYSIS AUTO W/SCOPE: CPT

## 2024-03-05 PROCEDURE — 99285 EMERGENCY DEPT VISIT HI MDM: CPT

## 2024-03-05 PROCEDURE — 83880 ASSAY OF NATRIURETIC PEPTIDE: CPT

## 2024-03-05 PROCEDURE — 36415 COLL VENOUS BLD VENIPUNCTURE: CPT

## 2024-03-05 PROCEDURE — 700117 HCHG RX CONTRAST REV CODE 255: Performed by: STUDENT IN AN ORGANIZED HEALTH CARE EDUCATION/TRAINING PROGRAM

## 2024-03-05 PROCEDURE — 84703 CHORIONIC GONADOTROPIN ASSAY: CPT

## 2024-03-05 PROCEDURE — HZ2ZZZZ DETOXIFICATION SERVICES FOR SUBSTANCE ABUSE TREATMENT: ICD-10-PCS | Performed by: INTERNAL MEDICINE

## 2024-03-05 PROCEDURE — 80053 COMPREHEN METABOLIC PANEL: CPT

## 2024-03-05 PROCEDURE — 96374 THER/PROPH/DIAG INJ IV PUSH: CPT

## 2024-03-05 PROCEDURE — 83605 ASSAY OF LACTIC ACID: CPT

## 2024-03-05 PROCEDURE — 700105 HCHG RX REV CODE 258: Performed by: STUDENT IN AN ORGANIZED HEALTH CARE EDUCATION/TRAINING PROGRAM

## 2024-03-05 PROCEDURE — 96375 TX/PRO/DX INJ NEW DRUG ADDON: CPT

## 2024-03-05 PROCEDURE — 87040 BLOOD CULTURE FOR BACTERIA: CPT

## 2024-03-05 PROCEDURE — 74177 CT ABD & PELVIS W/CONTRAST: CPT

## 2024-03-05 PROCEDURE — 700111 HCHG RX REV CODE 636 W/ 250 OVERRIDE (IP): Mod: JZ | Performed by: STUDENT IN AN ORGANIZED HEALTH CARE EDUCATION/TRAINING PROGRAM

## 2024-03-05 PROCEDURE — 85025 COMPLETE CBC W/AUTO DIFF WBC: CPT

## 2024-03-05 RX ORDER — DIAZEPAM 5 MG/ML
10 INJECTION, SOLUTION INTRAMUSCULAR; INTRAVENOUS ONCE
Status: COMPLETED | OUTPATIENT
Start: 2024-03-05 | End: 2024-03-05

## 2024-03-05 RX ORDER — SODIUM CHLORIDE 9 MG/ML
1000 INJECTION, SOLUTION INTRAVENOUS ONCE
Status: COMPLETED | OUTPATIENT
Start: 2024-03-05 | End: 2024-03-05

## 2024-03-05 RX ORDER — ONDANSETRON 2 MG/ML
4 INJECTION INTRAMUSCULAR; INTRAVENOUS ONCE
Status: COMPLETED | OUTPATIENT
Start: 2024-03-05 | End: 2024-03-05

## 2024-03-05 RX ORDER — CEFTRIAXONE 2 G/1
2000 INJECTION, POWDER, FOR SOLUTION INTRAMUSCULAR; INTRAVENOUS ONCE
Status: CANCELLED | OUTPATIENT
Start: 2024-03-05 | End: 2024-03-05

## 2024-03-05 RX ORDER — CEFTRIAXONE 2 G/1
2000 INJECTION, POWDER, FOR SOLUTION INTRAMUSCULAR; INTRAVENOUS ONCE
Status: COMPLETED | OUTPATIENT
Start: 2024-03-05 | End: 2024-03-05

## 2024-03-05 RX ORDER — SODIUM CHLORIDE 9 MG/ML
1000 INJECTION, SOLUTION INTRAVENOUS ONCE
Status: CANCELLED | OUTPATIENT
Start: 2024-03-05 | End: 2024-03-05

## 2024-03-05 RX ADMIN — IOHEXOL 80 ML: 350 INJECTION, SOLUTION INTRAVENOUS at 21:04

## 2024-03-05 RX ADMIN — CEFTRIAXONE SODIUM 2000 MG: 2 INJECTION, POWDER, FOR SOLUTION INTRAMUSCULAR; INTRAVENOUS at 22:36

## 2024-03-05 RX ADMIN — SODIUM CHLORIDE 1000 ML: 9 INJECTION, SOLUTION INTRAVENOUS at 19:44

## 2024-03-05 RX ADMIN — DIAZEPAM 10 MG: 5 INJECTION, SOLUTION INTRAMUSCULAR; INTRAVENOUS at 19:44

## 2024-03-05 RX ADMIN — IOHEXOL 25 ML: 240 INJECTION, SOLUTION INTRATHECAL; INTRAVASCULAR; INTRAVENOUS; ORAL at 19:45

## 2024-03-05 RX ADMIN — ONDANSETRON 4 MG: 2 INJECTION INTRAMUSCULAR; INTRAVENOUS at 19:44

## 2024-03-05 ASSESSMENT — LIFESTYLE VARIABLES
AUDITORY DISTURBANCES: NOT PRESENT
NAUSEA AND VOMITING: *
ORIENTATION AND CLOUDING OF SENSORIUM: ORIENTED AND CAN DO SERIAL ADDITIONS
TACTILE DISTURBANCES: VERY MILD ITCHING, PINS AND NEEDLES SENSATION, BURNING OR NUMBNESS
VISUAL DISTURBANCES: NOT PRESENT
ANXIETY: *
PAROXYSMAL SWEATS: NO SWEAT VISIBLE
TOTAL SCORE: 12
TREMOR: MODERATE TREMOR WITH ARMS EXTENDED
AGITATION: *
HEADACHE, FULLNESS IN HEAD: NOT PRESENT

## 2024-03-05 ASSESSMENT — FIBROSIS 4 INDEX: FIB4 SCORE: 8.39

## 2024-03-06 ENCOUNTER — APPOINTMENT (OUTPATIENT)
Dept: RADIOLOGY | Facility: MEDICAL CENTER | Age: 49
DRG: 432 | End: 2024-03-06
Attending: STUDENT IN AN ORGANIZED HEALTH CARE EDUCATION/TRAINING PROGRAM
Payer: COMMERCIAL

## 2024-03-06 PROBLEM — R18.8 ASCITES OF LIVER: Status: ACTIVE | Noted: 2024-03-06

## 2024-03-06 PROBLEM — R19.7 DIARRHEA: Status: ACTIVE | Noted: 2024-03-06

## 2024-03-06 PROBLEM — J96.01 ACUTE HYPOXIC RESPIRATORY FAILURE (HCC): Status: ACTIVE | Noted: 2024-03-06

## 2024-03-06 PROBLEM — F32.9 MAJOR DEPRESSIVE DISORDER: Status: ACTIVE | Noted: 2024-03-06

## 2024-03-06 PROBLEM — K74.60 DECOMPENSATED HEPATIC CIRRHOSIS (HCC): Status: ACTIVE | Noted: 2024-03-06

## 2024-03-06 PROBLEM — K72.90 DECOMPENSATED HEPATIC CIRRHOSIS (HCC): Status: ACTIVE | Noted: 2024-03-06

## 2024-03-06 PROBLEM — E87.29 HIGH ANION GAP METABOLIC ACIDOSIS: Status: ACTIVE | Noted: 2024-03-06

## 2024-03-06 PROBLEM — D72.829 LEUKOCYTOSIS: Status: ACTIVE | Noted: 2024-03-06

## 2024-03-06 PROBLEM — Z71.89 ADVANCE CARE PLANNING: Status: ACTIVE | Noted: 2024-03-06

## 2024-03-06 LAB
AMMONIA PLAS-SCNC: 61 UMOL/L (ref 11–45)
APTT PPP: 41.9 SEC (ref 24.7–36)
EKG IMPRESSION: NORMAL
ETHANOL BLD-MCNC: <10.1 MG/DL
FLUAV RNA SPEC QL NAA+PROBE: NEGATIVE
FLUBV RNA SPEC QL NAA+PROBE: NEGATIVE
INR PPP: 1.6 (ref 0.87–1.13)
LACTOFERRIN STL QL IA: NEGATIVE
LIPASE SERPL-CCNC: 19 U/L (ref 11–82)
MAGNESIUM SERPL-MCNC: 1.3 MG/DL (ref 1.5–2.5)
PHOSPHATE SERPL-MCNC: 3.1 MG/DL (ref 2.5–4.5)
PROTHROMBIN TIME: 19.2 SEC (ref 12–14.6)
RSV RNA SPEC QL NAA+PROBE: NEGATIVE
SARS-COV-2 RNA RESP QL NAA+PROBE: NOTDETECTED
SPECIMEN SOURCE: NORMAL
T4 FREE SERPL-MCNC: 0.45 NG/DL (ref 0.93–1.7)
TSH SERPL DL<=0.005 MIU/L-ACNC: 57.1 UIU/ML (ref 0.38–5.33)

## 2024-03-06 PROCEDURE — 84302 ASSAY OF SWEAT SODIUM: CPT

## 2024-03-06 PROCEDURE — 700111 HCHG RX REV CODE 636 W/ 250 OVERRIDE (IP): Mod: JZ | Performed by: STUDENT IN AN ORGANIZED HEALTH CARE EDUCATION/TRAINING PROGRAM

## 2024-03-06 PROCEDURE — 700102 HCHG RX REV CODE 250 W/ 637 OVERRIDE(OP): Performed by: STUDENT IN AN ORGANIZED HEALTH CARE EDUCATION/TRAINING PROGRAM

## 2024-03-06 PROCEDURE — 84439 ASSAY OF FREE THYROXINE: CPT

## 2024-03-06 PROCEDURE — 83630 LACTOFERRIN FECAL (QUAL): CPT

## 2024-03-06 PROCEDURE — 85610 PROTHROMBIN TIME: CPT

## 2024-03-06 PROCEDURE — 84100 ASSAY OF PHOSPHORUS: CPT

## 2024-03-06 PROCEDURE — 0241U HCHG SARS-COV-2 COVID-19 NFCT DS RESP RNA 4 TRGT MIC: CPT

## 2024-03-06 PROCEDURE — 84443 ASSAY THYROID STIM HORMONE: CPT

## 2024-03-06 PROCEDURE — 99222 1ST HOSP IP/OBS MODERATE 55: CPT | Performed by: INTERNAL MEDICINE

## 2024-03-06 PROCEDURE — 93005 ELECTROCARDIOGRAM TRACING: CPT | Performed by: STUDENT IN AN ORGANIZED HEALTH CARE EDUCATION/TRAINING PROGRAM

## 2024-03-06 PROCEDURE — 36415 COLL VENOUS BLD VENIPUNCTURE: CPT

## 2024-03-06 PROCEDURE — 99223 1ST HOSP IP/OBS HIGH 75: CPT | Mod: AI,GC | Performed by: STUDENT IN AN ORGANIZED HEALTH CARE EDUCATION/TRAINING PROGRAM

## 2024-03-06 PROCEDURE — 770001 HCHG ROOM/CARE - MED/SURG/GYN PRIV*

## 2024-03-06 PROCEDURE — 82140 ASSAY OF AMMONIA: CPT

## 2024-03-06 PROCEDURE — A9270 NON-COVERED ITEM OR SERVICE: HCPCS | Performed by: INTERNAL MEDICINE

## 2024-03-06 PROCEDURE — 82438 ASSAY OTHER FLUID CHLORIDES: CPT

## 2024-03-06 PROCEDURE — 93010 ELECTROCARDIOGRAM REPORT: CPT | Performed by: INTERNAL MEDICINE

## 2024-03-06 PROCEDURE — 83690 ASSAY OF LIPASE: CPT

## 2024-03-06 PROCEDURE — 82705 FATS/LIPIDS FECES QUAL: CPT

## 2024-03-06 PROCEDURE — 85730 THROMBOPLASTIN TIME PARTIAL: CPT

## 2024-03-06 PROCEDURE — A9270 NON-COVERED ITEM OR SERVICE: HCPCS | Performed by: STUDENT IN AN ORGANIZED HEALTH CARE EDUCATION/TRAINING PROGRAM

## 2024-03-06 PROCEDURE — 700105 HCHG RX REV CODE 258: Performed by: STUDENT IN AN ORGANIZED HEALTH CARE EDUCATION/TRAINING PROGRAM

## 2024-03-06 PROCEDURE — 82077 ASSAY SPEC XCP UR&BREATH IA: CPT

## 2024-03-06 PROCEDURE — 83735 ASSAY OF MAGNESIUM: CPT

## 2024-03-06 PROCEDURE — 76705 ECHO EXAM OF ABDOMEN: CPT

## 2024-03-06 PROCEDURE — 83993 ASSAY FOR CALPROTECTIN FECAL: CPT

## 2024-03-06 PROCEDURE — 84999 UNLISTED CHEMISTRY PROCEDURE: CPT

## 2024-03-06 PROCEDURE — 700102 HCHG RX REV CODE 250 W/ 637 OVERRIDE(OP): Performed by: INTERNAL MEDICINE

## 2024-03-06 RX ORDER — PROMETHAZINE HYDROCHLORIDE 25 MG/1
12.5-25 SUPPOSITORY RECTAL EVERY 4 HOURS PRN
Status: DISCONTINUED | OUTPATIENT
Start: 2024-03-06 | End: 2024-03-08 | Stop reason: HOSPADM

## 2024-03-06 RX ORDER — FOLIC ACID 1 MG/1
1 TABLET ORAL DAILY
Status: DISCONTINUED | OUTPATIENT
Start: 2024-03-06 | End: 2024-03-08 | Stop reason: HOSPADM

## 2024-03-06 RX ORDER — DIAZEPAM 5 MG/ML
10 INJECTION, SOLUTION INTRAMUSCULAR; INTRAVENOUS
Status: DISCONTINUED | OUTPATIENT
Start: 2024-03-06 | End: 2024-03-08 | Stop reason: HOSPADM

## 2024-03-06 RX ORDER — LORAZEPAM 1 MG/1
4 TABLET ORAL
Status: DISCONTINUED | OUTPATIENT
Start: 2024-03-06 | End: 2024-03-08 | Stop reason: HOSPADM

## 2024-03-06 RX ORDER — PROCHLORPERAZINE EDISYLATE 5 MG/ML
5-10 INJECTION INTRAMUSCULAR; INTRAVENOUS EVERY 4 HOURS PRN
Status: DISCONTINUED | OUTPATIENT
Start: 2024-03-06 | End: 2024-03-08 | Stop reason: HOSPADM

## 2024-03-06 RX ORDER — LORAZEPAM 1 MG/1
0.5 TABLET ORAL EVERY 4 HOURS PRN
Status: DISCONTINUED | OUTPATIENT
Start: 2024-03-06 | End: 2024-03-08 | Stop reason: HOSPADM

## 2024-03-06 RX ORDER — DIAZEPAM 5 MG/ML
5 INJECTION, SOLUTION INTRAMUSCULAR; INTRAVENOUS ONCE
Status: COMPLETED | OUTPATIENT
Start: 2024-03-06 | End: 2024-03-06

## 2024-03-06 RX ORDER — HYDROXYZINE HYDROCHLORIDE 10 MG/1
10 TABLET, FILM COATED ORAL
Status: DISCONTINUED | OUTPATIENT
Start: 2024-03-06 | End: 2024-03-08 | Stop reason: HOSPADM

## 2024-03-06 RX ORDER — ESCITALOPRAM OXALATE 10 MG/1
10 TABLET ORAL DAILY
Status: DISCONTINUED | OUTPATIENT
Start: 2024-03-06 | End: 2024-03-08 | Stop reason: HOSPADM

## 2024-03-06 RX ORDER — LORAZEPAM 1 MG/1
3 TABLET ORAL
Status: DISCONTINUED | OUTPATIENT
Start: 2024-03-06 | End: 2024-03-08 | Stop reason: HOSPADM

## 2024-03-06 RX ORDER — ONDANSETRON 4 MG/1
4 TABLET, ORALLY DISINTEGRATING ORAL EVERY 4 HOURS PRN
Status: DISCONTINUED | OUTPATIENT
Start: 2024-03-06 | End: 2024-03-08 | Stop reason: HOSPADM

## 2024-03-06 RX ORDER — FUROSEMIDE 20 MG/1
10 TABLET ORAL
Status: DISCONTINUED | OUTPATIENT
Start: 2024-03-06 | End: 2024-03-06

## 2024-03-06 RX ORDER — GAUZE BANDAGE 2" X 2"
100 BANDAGE TOPICAL DAILY
Status: DISCONTINUED | OUTPATIENT
Start: 2024-03-10 | End: 2024-03-08 | Stop reason: HOSPADM

## 2024-03-06 RX ORDER — ONDANSETRON 2 MG/ML
4 INJECTION INTRAMUSCULAR; INTRAVENOUS EVERY 4 HOURS PRN
Status: DISCONTINUED | OUTPATIENT
Start: 2024-03-06 | End: 2024-03-08 | Stop reason: HOSPADM

## 2024-03-06 RX ORDER — PHYTONADIONE 5 MG/1
5 TABLET ORAL ONCE
Status: COMPLETED | OUTPATIENT
Start: 2024-03-06 | End: 2024-03-06

## 2024-03-06 RX ORDER — ENOXAPARIN SODIUM 100 MG/ML
40 INJECTION SUBCUTANEOUS DAILY
Status: DISCONTINUED | OUTPATIENT
Start: 2024-03-07 | End: 2024-03-07

## 2024-03-06 RX ORDER — FOLIC ACID 1 MG/1
1 TABLET ORAL DAILY
Status: DISCONTINUED | OUTPATIENT
Start: 2024-03-06 | End: 2024-03-06

## 2024-03-06 RX ORDER — SPIRONOLACTONE 50 MG/1
25 TABLET, FILM COATED ORAL
Status: DISCONTINUED | OUTPATIENT
Start: 2024-03-06 | End: 2024-03-08 | Stop reason: HOSPADM

## 2024-03-06 RX ORDER — GABAPENTIN 300 MG/1
300 CAPSULE ORAL NIGHTLY
Status: DISCONTINUED | OUTPATIENT
Start: 2024-03-06 | End: 2024-03-08 | Stop reason: HOSPADM

## 2024-03-06 RX ORDER — PROMETHAZINE HYDROCHLORIDE 25 MG/1
12.5-25 TABLET ORAL EVERY 4 HOURS PRN
Status: DISCONTINUED | OUTPATIENT
Start: 2024-03-06 | End: 2024-03-08 | Stop reason: HOSPADM

## 2024-03-06 RX ORDER — GAUZE BANDAGE 2" X 2"
100 BANDAGE TOPICAL DAILY
Status: DISCONTINUED | OUTPATIENT
Start: 2024-03-06 | End: 2024-03-06

## 2024-03-06 RX ORDER — LORAZEPAM 1 MG/1
2 TABLET ORAL
Status: DISCONTINUED | OUTPATIENT
Start: 2024-03-06 | End: 2024-03-08 | Stop reason: HOSPADM

## 2024-03-06 RX ORDER — LORAZEPAM 1 MG/1
1 TABLET ORAL EVERY 4 HOURS PRN
Status: DISCONTINUED | OUTPATIENT
Start: 2024-03-06 | End: 2024-03-08 | Stop reason: HOSPADM

## 2024-03-06 RX ADMIN — ESCITALOPRAM OXALATE 10 MG: 10 TABLET ORAL at 05:09

## 2024-03-06 RX ADMIN — GABAPENTIN 300 MG: 300 CAPSULE ORAL at 20:04

## 2024-03-06 RX ADMIN — LORAZEPAM 0.5 MG: 1 TABLET ORAL at 05:20

## 2024-03-06 RX ADMIN — SPIRONOLACTONE 25 MG: 50 TABLET ORAL at 05:09

## 2024-03-06 RX ADMIN — THIAMINE HYDROCHLORIDE 500 MG: 100 INJECTION, SOLUTION INTRAMUSCULAR; INTRAVENOUS at 05:11

## 2024-03-06 RX ADMIN — LORAZEPAM 1 MG: 1 TABLET ORAL at 01:35

## 2024-03-06 RX ADMIN — ONDANSETRON 4 MG: 2 INJECTION INTRAMUSCULAR; INTRAVENOUS at 01:35

## 2024-03-06 RX ADMIN — PHYTONADIONE 5 MG: 5 TABLET ORAL at 13:21

## 2024-03-06 RX ADMIN — THERA TABS 1 TABLET: TAB at 05:09

## 2024-03-06 RX ADMIN — RIFAXIMIN 550 MG: 550 TABLET ORAL at 09:26

## 2024-03-06 RX ADMIN — CEFTRIAXONE SODIUM 2000 MG: 10 INJECTION, POWDER, FOR SOLUTION INTRAVENOUS at 17:46

## 2024-03-06 RX ADMIN — LORAZEPAM 1 MG: 1 TABLET ORAL at 22:12

## 2024-03-06 RX ADMIN — FOLIC ACID 1 MG: 1 TABLET ORAL at 05:09

## 2024-03-06 RX ADMIN — RIFAXIMIN 550 MG: 550 TABLET ORAL at 20:04

## 2024-03-06 ASSESSMENT — LIFESTYLE VARIABLES
AGITATION: NORMAL ACTIVITY
VISUAL DISTURBANCES: VERY MILD SENSITIVITY
NAUSEA AND VOMITING: NO NAUSEA AND NO VOMITING
TOTAL SCORE: 8
ANXIETY: NO ANXIETY (AT EASE)
ORIENTATION AND CLOUDING OF SENSORIUM: ORIENTED AND CAN DO SERIAL ADDITIONS
TREMOR: MODERATE TREMOR WITH ARMS EXTENDED
AUDITORY DISTURBANCES: NOT PRESENT
CONSUMPTION TOTAL: POSITIVE
HEADACHE, FULLNESS IN HEAD: NOT PRESENT
AUDITORY DISTURBANCES: NOT PRESENT
EVER HAD A DRINK FIRST THING IN THE MORNING TO STEADY YOUR NERVES TO GET RID OF A HANGOVER: YES
VISUAL DISTURBANCES: NOT PRESENT
AUDITORY DISTURBANCES: NOT PRESENT
TREMOR: *
DOES PATIENT WANT TO TALK TO SOMEONE ABOUT QUITTING: YES
ORIENTATION AND CLOUDING OF SENSORIUM: ORIENTED AND CAN DO SERIAL ADDITIONS
NAUSEA AND VOMITING: NO NAUSEA AND NO VOMITING
ON A TYPICAL DAY WHEN YOU DRINK ALCOHOL HOW MANY DRINKS DO YOU HAVE: 4
VISUAL DISTURBANCES: NOT PRESENT
VISUAL DISTURBANCES: NOT PRESENT
TREMOR: MODERATE TREMOR WITH ARMS EXTENDED
TOTAL SCORE: 4
HEADACHE, FULLNESS IN HEAD: NOT PRESENT
ANXIETY: NO ANXIETY (AT EASE)
TOTAL SCORE: 4
TOTAL SCORE: 4
AGITATION: NORMAL ACTIVITY
AGITATION: NORMAL ACTIVITY
PAROXYSMAL SWEATS: BARELY PERCEPTIBLE SWEATING. PALMS MOIST
HOW MANY TIMES IN THE PAST YEAR HAVE YOU HAD 5 OR MORE DRINKS IN A DAY: 0
NAUSEA AND VOMITING: *
TOTAL SCORE: 5
PAROXYSMAL SWEATS: BARELY PERCEPTIBLE SWEATING. PALMS MOIST
PAROXYSMAL SWEATS: NO SWEAT VISIBLE
TREMOR: MODERATE TREMOR WITH ARMS EXTENDED
AGITATION: NORMAL ACTIVITY
TREMOR: *
EVER FELT BAD OR GUILTY ABOUT YOUR DRINKING: YES
HEADACHE, FULLNESS IN HEAD: NOT PRESENT
AUDITORY DISTURBANCES: NOT PRESENT
VISUAL DISTURBANCES: NOT PRESENT
HEADACHE, FULLNESS IN HEAD: NOT PRESENT
PAROXYSMAL SWEATS: BARELY PERCEPTIBLE SWEATING. PALMS MOIST
AVERAGE NUMBER OF DAYS PER WEEK YOU HAVE A DRINK CONTAINING ALCOHOL: 7
AGITATION: NORMAL ACTIVITY
HAVE PEOPLE ANNOYED YOU BY CRITICIZING YOUR DRINKING: YES
ANXIETY: MODERATELY ANXIOUS OR GUARDED, SO ANXIETY IS INFERRED
ORIENTATION AND CLOUDING OF SENSORIUM: ORIENTED AND CAN DO SERIAL ADDITIONS
ANXIETY: NO ANXIETY (AT EASE)
ALCOHOL_USE: YES
ANXIETY: NO ANXIETY (AT EASE)
PAROXYSMAL SWEATS: NO SWEAT VISIBLE
TREMOR: MODERATE TREMOR WITH ARMS EXTENDED
HAVE YOU EVER FELT YOU SHOULD CUT DOWN ON YOUR DRINKING: YES
PAROXYSMAL SWEATS: NO SWEAT VISIBLE
ORIENTATION AND CLOUDING OF SENSORIUM: ORIENTED AND CAN DO SERIAL ADDITIONS
HEADACHE, FULLNESS IN HEAD: NOT PRESENT
AUDITORY DISTURBANCES: NOT PRESENT
TOTAL SCORE: 4
ANXIETY: MILDLY ANXIOUS
AGITATION: NORMAL ACTIVITY
NAUSEA AND VOMITING: NO NAUSEA AND NO VOMITING
AUDITORY DISTURBANCES: NOT PRESENT
TOTAL SCORE: 4
ORIENTATION AND CLOUDING OF SENSORIUM: ORIENTED AND CAN DO SERIAL ADDITIONS
VISUAL DISTURBANCES: NOT PRESENT
DOES PATIENT WANT TO STOP DRINKING: YES
ORIENTATION AND CLOUDING OF SENSORIUM: ORIENTED AND CAN DO SERIAL ADDITIONS
TOTAL SCORE: 3
NAUSEA AND VOMITING: NO NAUSEA AND NO VOMITING
HEADACHE, FULLNESS IN HEAD: NOT PRESENT
NAUSEA AND VOMITING: NO NAUSEA AND NO VOMITING
TOTAL SCORE: 9

## 2024-03-06 ASSESSMENT — COGNITIVE AND FUNCTIONAL STATUS - GENERAL
MOVING TO AND FROM BED TO CHAIR: A LITTLE
MOVING FROM LYING ON BACK TO SITTING ON SIDE OF FLAT BED: A LITTLE
CLIMB 3 TO 5 STEPS WITH RAILING: A LITTLE
MOBILITY SCORE: 19
DAILY ACTIVITIY SCORE: 23
SUGGESTED CMS G CODE MODIFIER DAILY ACTIVITY: CI
SUGGESTED CMS G CODE MODIFIER MOBILITY: CK
STANDING UP FROM CHAIR USING ARMS: A LITTLE
WALKING IN HOSPITAL ROOM: A LITTLE
TOILETING: A LITTLE

## 2024-03-06 ASSESSMENT — PATIENT HEALTH QUESTIONNAIRE - PHQ9
SUM OF ALL RESPONSES TO PHQ9 QUESTIONS 1 AND 2: 0
2. FEELING DOWN, DEPRESSED, IRRITABLE, OR HOPELESS: NOT AT ALL
1. LITTLE INTEREST OR PLEASURE IN DOING THINGS: NOT AT ALL

## 2024-03-06 ASSESSMENT — FIBROSIS 4 INDEX: FIB4 SCORE: 7.97

## 2024-03-06 ASSESSMENT — ENCOUNTER SYMPTOMS
SHORTNESS OF BREATH: 1
HEADACHES: 0
WEIGHT LOSS: 0
VOMITING: 1
CONSTIPATION: 0
CHILLS: 0
COUGH: 0
PALPITATIONS: 0
ABDOMINAL PAIN: 1
BLOOD IN STOOL: 0
DIARRHEA: 1
DIZZINESS: 0
FEVER: 0
NAUSEA: 1
HEARTBURN: 0
ORTHOPNEA: 0
PND: 0

## 2024-03-06 ASSESSMENT — PAIN DESCRIPTION - PAIN TYPE
TYPE: ACUTE PAIN

## 2024-03-06 NOTE — ASSESSMENT & PLAN NOTE
I spent 5 minutes at bedside with patient discussing work-up, results, diagnosis, prognosis.  CODE STATUS discussed with patient and wants to be DNR/DNI

## 2024-03-06 NOTE — ASSESSMENT & PLAN NOTE
Desatted to 91%, given 2L NC, now 93%, no prior home O2, CXR without any acute process    -Supplemental O2 for O2 saturation greater than 92%  -COVID/RSV/influenza panel ordered

## 2024-03-06 NOTE — DISCHARGE PLANNING
Case Management Discharge Planning    Admission Date: 3/5/2024  GMLOS: 4.9  ALOS: 0    6-Clicks ADL Score: 23  6-Clicks Mobility Score: 19      Anticipated Discharge Dispo: Discharge Disposition: Discharged to home/self care (01)  Discharge Address: [place] [address]  Discharge Contact Phone Number: [phone]    DME Needed: No    Action(s) Taken: DC Assessment Complete (See below)    RN CM met with patient at bedside to complete assessment and discuss DCP.  Patient is alert and orientation and able to verify demographics.     Prior to admission, patient lives in a 3 level home with 12 steps outside and about 20 steps inside. She lives with her spouse and they mostly live on the 1st and 2nd floors.     Patient reports she has [facility], her preferred pharmacy is [facility] [address]. Verified insurance coverage via [employer].    Patient reports independence with ADLS/IADLs  and uses no DME at baseline.Pt is unemployed and has the support of her .     Patient denies recreational drug use but stated she is an alcoholic. Last drink was 3/5/2024 at noon. Patient has been to [facility] and was there for 3 days. RN CM provided Alcohol cessation resources to patient.    Patient also reports depression and anxiety which she gets treatment from her physiatrist Dr. Almeida.     Patient has transport when medically clear for discharge.     Escalations Completed: None    Medically Clear: No    Next Steps: CM will continue to assist with DCP.    Barriers to Discharge: Medical clearance    Is the patient up for discharge tomorrow: No     Care Transition Team Assessment    Information Source  Orientation Level: Oriented X4  Information Given By: Patient  Informant's Name: Minerva  Who is responsible for making decisions for patient? : Patient    Readmission Evaluation  Is this a readmission?: No    Elopement Risk  Legal Hold: No  Ambulatory or Self Mobile in  Wheelchair: Yes  Disoriented: No  Psychiatric Symptoms: None  History of Wandering: No  Elopement this Admit: No  Vocalizing Wanting to Leave: No  Displays Behaviors, Body Language Wanting to Leave: No-Not at Risk for Elopement    Interdisciplinary Discharge Planning  Lives with - Patient's Self Care Capacity: Spouse  Patient or legal guardian wants to designate a caregiver: No  Support Systems: Family Member(s), Friends / Neighbors  Housing / Facility: Unable To Determine At This Time  Durable Medical Equipment: Not Applicable    Discharge Preparedness  What is your plan after discharge?: Home with help  What are your discharge supports?: Spouse, Child  Prior Functional Level: Ambulatory, Independent with Activities of Daily Living, Independent with Medication Management  Difficulity with ADLs: None  Difficulity with IADLs: None    Functional Assesment  Prior Functional Level: Ambulatory, Independent with Activities of Daily Living, Independent with Medication Management    Finances  Financial Barriers to Discharge: No  Prescription Coverage: Yes    Vision / Hearing Impairment  Vision Impairment : Yes  Right Eye Vision: Impaired, Wears Glasses  Left Eye Vision: Impaired, Wears Glasses  Hearing Impairment : No     Advance Directive  Advance Directive?: None    Domestic Abuse  Have you ever been the victim of abuse or violence?: No  Physical Abuse or Sexual Abuse: No  Verbal Abuse or Emotional Abuse: No  Possible Abuse/Neglect Reported to:: Not Applicable    Psychological Assessment  History of Substance Abuse: Alcohol  Date Last Used - Alcohol: 03/05/2024  History of Psychiatric Problems: Yes  Non-compliant with Treatment: No  Newly Diagnosed Illness: No    Discharge Risks or Barriers  Discharge risks or barriers?: Substance abuse  Patient risk factors: Substance abuse    Anticipated Discharge Information  Discharge Disposition: Discharged to home/self care (01)  Discharge Address: [address] [place] [id number]  Discharge Contact Phone Number: [phone]

## 2024-03-06 NOTE — ED NOTES
Pt states she is feeling better after the medication provided, pt reminded the importance of drinking the oral contrast prior to scan

## 2024-03-06 NOTE — CONSULTS
Date of Consultation:  3/6/2024    Patient: : Minerva Tillman  MRN: [id number]    Referring Physician:  Dr. Christopher Armando MD.      GI:Mary Daniels M.D.     Reason for Consultation: Alcohol intoxication, cirrhosis with ascites formation, chronic diarrhea,    History of Present Illness:   The patient is a 49 years old female with medical history of anxiety, depression, alcohol use, cirrhosis from fatty liver and possible alcohol use, ascites formation, presented to hospital for weakness, poor nutrition, abdominal discomfort.    GI team is consulted to have liver evaluation and plan of care.    We saw the patient at bedside, the patient continued to have low-grade abdominal discomfort, some fatigue, both improved slightly since admission.  Still some frequent bowel movement, semiloose stool with some mucus formation.          Past Medical History:   Diagnosis Date    Anxiety     Depression     Gynecological disorder     High cholesterol     Hypertension     Snoring     Urinary tract infection          Past Surgical History:   Procedure Laterality Date    HYSTEROSCOPY NOVASURE-2  8/2/2017    Procedure: HYSTEROSCOPY NOVASURE;  Surgeon: Hedy Hardy M.D.;  Location: SURGERY [facility];  Service:     DILATION AND CURETTAGE  8/2/2017    Procedure: DILATION AND CURETTAGE;  Surgeon: Hedy Hardy M.D.;  Location: SURGERY [facility];  Service:     [place] BREAST AUGMENTATION WITH IMPLANT  2006    MAMMOPLASTY AUGMENTATION         Family History   Problem Relation Age of Onset    Cancer Maternal Grandmother 60        breast and skin    Stroke Maternal Grandmother     Hypertension Mother     Hyperlipidemia Mother     Depression Father     Hypertension Father     Asthma Father     Hyperlipidemia Father     Hypertension Brother     Cancer Maternal Grandfather 60        esphogus    Other Paternal Grandmother         complications from surgery    Hypertension Brother        Social History     Socioeconomic  History    Marital status:    Tobacco Use    Smoking status: Never    Smokeless tobacco: Never   Vaping Use    Vaping Use: Never used   Substance and Sexual Activity    Alcohol use: Yes     Alcohol/week: 8.4 oz     Types: 14 Glasses of wine per week     Comment: 3-4 glasses per day    Drug use: No    Sexual activity: Yes     Partners: Male     Comment: ,        Constitutional: No fevers.  Eyes: No symptoms reported.   Ears/Nose/Throat/Mouth: No choking reported.  Cardiovascular: No chest pain reported.   Respiratory: Denies shortness of breath.  Gastrointestinal/Hepatic: Per H/P.   Skin/Breast: No new rash reported.   Psychiatric: No complaints    HEENT: grossly normal.  Cardiovascular: Please refer to primary team's daily assessment.   Lungs: Please refer to primary team's daily assessment.   Abdomen: No acute abdomen.  Skin: No erythema, No rash.  Neurologic: Alert & oriented x 3, Normal motor function, No focal deficits noted.  PSY: stable mood.       Physical Exam:  Vitals:    03/06/24 0123 03/06/24 0210 03/06/24 0409 03/06/24 0814   BP: 125/85  111/79 119/87   Pulse: 85  83 82   Resp: 16  16 16   Temp: 36.7 °C (98.1 °F)  36.8 °C (98.2 °F) 36.7 °C (98.1 °F)   TempSrc: Temporal  Temporal Temporal   SpO2: 93%  94% 93%   Weight:  62.6 kg (138 lb)     Height:                 Labs:  Recent Labs     03/05/24  1723   WBC 11.9*   RBC 3.84*   HEMOGLOBIN 14.9   HEMATOCRIT 42.1   .6*   MCH 38.8*   MCHC 35.4   RDW 60.8*   PLATELETCT 156*   MPV 10.3     Recent Labs     03/05/24  1723   SODIUM 135   POTASSIUM 3.7   CHLORIDE 96   CO2 20   GLUCOSE 86   BUN 2*     Recent Labs     03/06/24  0406   APTT 41.9*   INR 1.60*       Recent Labs     03/05/24  1723 03/06/24  0406   ASTSGOT 221*  --    ALTSGPT 79*  --    TBILIRUBIN 2.9*  --    ALKPHOSPHAT 288*  --    GLOBULIN 3.5  --    INR  --  1.60*   AMMONIA  --  61*         Imaging:  US-ABDOMEN LTD (SOFT TISSUE)  Narrative: 3/6/2024 5:51 AM    HISTORY/REASON FOR  EXAM:  Distended Abdomen    TECHNIQUE/EXAM DESCRIPTION:  Limited abdominal ultrasound to evaluate for ascites.    COMPARISON: CT 3/5/2024 and additional    FINDINGS:  Stable minimal ascites within the abdomen and pelvis.  Impression: Trace ascites. Not enough ascites for paracentesis.            Impressions:  The patient is a 49 years old female with medical history of anxiety, depression, alcohol use, cirrhosis from fatty liver and possible alcohol use, ascites formation, presented to hospital for weakness, poor nutrition, abdominal discomfort.    #1, possible liver cirrhosis with ascites formation, consistent with fatty liver related and also alcohol use, most important thing is to quit drinking, GI team will hold off on beta-blocker during this acute admission.  The patient will need upper GI scope for variceal screening and the colonoscopy for screening and also irregular bowel movement.  Outpatient setting upper GI scope and colonoscopy will be more optimal, because the patient need good preparation, which is not easy at this time.  Agrees to provide vitamin K IV to see if INR will improve.  Will hold off on steroid right now due to the benefit and risk.    #2, chronic irregular bowel movement, could be from pancreatic insufficiency, other etiology also possible.  Agree with lab tests ordered by the primary team for now.  Outpatient colonoscopy with terminal ileum insertion, and random biopsy of colon, will be the next step after all the blood tests (celiac panel) and stool tests are back.  Again, outpatient colonoscopy with good preparation will be more helpful for her irregular bowel movement situation.    GI team will review the patient's chart on computer daily but we will not see her regularly.  If acute changes are noted, please call us back.      This note was generated using voice recognition software which has a small chance of producing errors of grammar and possibly content. I have made every  reasonable attempt to find and correct any obvious errors, but expect that some may not be found prior to finalization of this note.

## 2024-03-06 NOTE — PROGRESS NOTES
Bedside report received.  Assessment complete.  A&O x 4. Patient calls appropriately.  Patient ambulates with SB assist.    Patient has 0/10 pain. Pain managed with prescribed medications.  Denies N&V. Tolerating cardiac diet, 1800 ml fluid restriction  + void, - flatus, - BM.  Patient denies SOB.  SCD's refused.    Review plan with of care with patient. Call light and personal belongings within reach. Hourly rounding in place. All needs met at this time.

## 2024-03-06 NOTE — ED NOTES
.Bedside report received from off going RN/tech: TONYA Salamanca, assumed care of patient.  POC discussed with patient. Call light within reach, all needs addressed at this time.       Fall risk interventions in place: Move the patient closer to the nurse's station, Patient's personal possessions are with in their safe reach, Place socks on patient, Place fall risk sign on patient's door, Give patient urinal if applicable, Keep floor surfaces clean and dry, and Accompanied to restroom (all applicable per [facility] Fall risk assessment)   Continuous monitoring: Cardiac Leads, Pulse Ox, or Blood Pressure  IVF/IV medications: Infusion per MAR (List Med(s)) NS  Oxygen: Room Air  Bedside sitter: Not Applicable   Isolation: Not Applicable

## 2024-03-06 NOTE — ED PROVIDER NOTES
CHIEF COMPLAINT  Chief Complaint   Patient presents with    Abdominal Pain     Pt reports diarrhea x 1 month, n/v x 1 year and bloating x 2 weeks. Pt states pain is typically lower bilaterally. Pt states diarrhea is mucous with no blood.        LIMITATION TO HISTORY   Select:     TIGIST Tillman is a 49 y.o. female who presents to the Emergency Department for evaluation of nonbloody diarrhea for the past 4 weeks nausea and vomiting with some abdominal bloating, reports she has some lower abdominal pain denies fevers patient drinks 4-5 alcoholic drinks a day has been told she has abnormal liver function test but has never seen a doctor as an outpatient regarding her liver function test she reports she had 1 glass of wine a day and had 5 glasses of wine yesterday    OUTSIDE HISTORIAN(S):  Select:    EXTERNAL RECORDS REVIEWED  Select:       PAST MEDICAL HISTORY  Past Medical History:   Diagnosis Date    Anxiety     Depression     Gynecological disorder     High cholesterol     Hypertension     Snoring     Urinary tract infection      .    SURGICAL HISTORY  Past Surgical History:   Procedure Laterality Date    HYSTEROSCOPY NOVASURE-2  8/2/2017    Procedure: HYSTEROSCOPY NOVASURE;  Surgeon: Hedy Hardy M.D.;  Location: SURGERY [facility];  Service:     DILATION AND CURETTAGE  8/2/2017    Procedure: DILATION AND CURETTAGE;  Surgeon: Hedy Hardy M.D.;  Location: SURGERY [facility];  Service:     [place] BREAST AUGMENTATION WITH IMPLANT  2006    MAMMOPLASTY AUGMENTATION           FAMILY HISTORY  Family History   Problem Relation Age of Onset    Cancer Maternal Grandmother 60        breast and skin    Stroke Maternal Grandmother     Hypertension Mother     Hyperlipidemia Mother     Depression Father     Hypertension Father     Asthma Father     Hyperlipidemia Father     Hypertension Brother     Cancer Maternal Grandfather 60        esphogus    Other Paternal Grandmother         complications  from surgery    Hypertension Brother           SOCIAL HISTORY  Social History     Socioeconomic History    Marital status:      Spouse name: Not on file    Number of children: Not on file    Years of education: Not on file    Highest education level: Not on file   Occupational History    Not on file   Tobacco Use    Smoking status: Never    Smokeless tobacco: Never   Vaping Use    Vaping Use: Never used   Substance and Sexual Activity    Alcohol use: Yes     Alcohol/week: 8.4 oz     Types: 14 Glasses of wine per week     Comment: 3-4 glasses per day    Drug use: No    Sexual activity: Yes     Partners: Male     Comment: ,    Other Topics Concern    Not on file   Social History Narrative    Not on file     Social Determinants of Health     Financial Resource Strain: Not on file   Food Insecurity: Not on file   Transportation Needs: Not on file   Physical Activity: Not on file   Stress: Not on file   Social Connections: Not on file   Intimate Partner Violence: Not on file   Housing Stability: Not on file         CURRENT MEDICATIONS  No current facility-administered medications on file prior to encounter.     Current Outpatient Medications on File Prior to Encounter   Medication Sig Dispense Refill    hydrOXYzine HCl (ATARAX) 10 MG Tab Take 10 mg by mouth 1 time a day as needed for Anxiety.      Magnesium Glycinate 100 MG Cap Take 1 Capsule by mouth every day with lunch.      ondansetron (ZOFRAN ODT) 4 MG TABLET DISPERSIBLE Dissolve 1 Tablet by mouth every 6 hours as needed for Nausea/Vomiting. 30 Tablet 0    escitalopram (LEXAPRO) 5 MG tablet Take 5 mg by mouth every day.      multivitamin (THERAGRAN) Tab Take 1 Tablet by mouth every day.      levothyroxine (SYNTHROID) 50 MCG Tab Take 50 mcg by mouth every morning on an empty stomach.             ALLERGIES  Allergies   Allergen Reactions    Strawberry Hives    Sulfa Drugs Hives     [id number]       PHYSICAL EXAM  VITAL SIGNS:BP 95/62   Pulse 97   Temp  "36.7 °C (98.1 °F) (Temporal)   Resp 12   Ht 1.6 m (5' 3\")   Wt 63 kg (138 lb 14.2 oz)   SpO2 93%   BMI 24.60 kg/m²       GENERAL: Awake and alert  HEAD: Normocephalic and atraumatic  NECK: Normal range of motion, without meningismus  EYES: Pupils Equal, Round, Reactive to Light, extraocular movements intact, conjunctiva white  ENT: Mucous membranes moist, oropharynx clear  PULMONARY: Normal effort, clear to auscultation  CARDIOVASCULAR: No murmurs, clicks or rubs, peripheral pulses 2+  ABDOMINAL: Soft, non-tender, no guarding or rigidity present, no pulsatile masses  BACK: no midline tenderness, no costovertebral tenderness  NEUROLOGICAL: Grossly non-focal neurological examination, speech normal, gait normal, tremulousness of outstretched hands  EXTREMITIES: No edema, normal to inspection  SKIN: Warm and dry.  PSYCHIATRIC: Affect is appropriate    DIAGNOSTIC STUDIES / PROCEDURES  EKG  EKG Interpretation: I independently reviewed the below EKG and did not see signs of a STEMI  Results for orders placed or performed during the hospital encounter of 21   EKG (NOW)   Result Value Ref Range    Report       [facility] Emergency Dept.    Test Date:  2021  Pt Name:    GIULIA HERNANDEZ                   Department: [facility]  MRN:        [id number]                      Room:       -ROOM 10  Gender:     Female                       Technician: CONOR  :        1975                   Requested By:NATY GAVIN  Order #:    [id number]                    Reading MD: NATY GAVIN MD    Measurements  Intervals                                Axis  Rate:       85                           P:          67  KY:         144                          QRS:        64  QRSD:       100                          T:          38  QT:         384  QTc:        457    Interpretive Statements  Sinus rhythm at a rate of 85.  T wave inversion anterior precordial leads no  other acute ischemic or " "arrhythmic changes.  Electronically Signed On 5- 17:05:36 PDT by NATY GAVIN MD               LABS  Labs Reviewed   CBC WITH DIFFERENTIAL - Abnormal; Notable for the following components:       Result Value    WBC 11.9 (*)     RBC 3.84 (*)     .6 (*)     MCH 38.8 (*)     RDW 60.8 (*)     Platelet Count 156 (*)     Lymphocytes 14.70 (*)     Monocytes 13.70 (*)     Nucleated RBC 0.50 (*)     Neutrophils (Absolute) 8.37 (*)     Monos (Absolute) 1.63 (*)     All other components within normal limits   COMP METABOLIC PANEL - Abnormal; Notable for the following components:    Anion Gap 19.0 (*)     Bun 2 (*)     Creatinine 0.44 (*)     AST(SGOT) 221 (*)     ALT(SGPT) 79 (*)     Alkaline Phosphatase 288 (*)     Total Bilirubin 2.9 (*)     All other components within normal limits   URINALYSIS - Abnormal; Notable for the following components:    Color Orange (*)     Character Cloudy (*)     Ketones 15 (*)     Protein 30 (*)     Bilirubin Moderate (*)     Nitrite Positive (*)     Leukocyte Esterase Moderate (*)     Occult Blood Trace (*)     All other components within normal limits   URINE MICROSCOPIC (W/UA) - Abnormal; Notable for the following components:    WBC 10-20 (*)     Bacteria Moderate (*)     Epithelial Cells Moderate (*)     Hyaline Cast 3-5 (*)     All other components within normal limits   LACTIC ACID - Abnormal; Notable for the following components:    Lactic Acid 2.3 (*)     All other components within normal limits   HCG QUAL SERUM   ESTIMATED GFR   BLOOD CULTURE    Narrative:     Per Hospital Policy: Only change Specimen Src: to \"Line\" if                  specified by physician order.   PROBRAIN NATRIURETIC PEPTIDE, NT         RADIOLOGY  I independently reviewed and interpreted the images obtained today in the ER.  Evidence of small amount of ascites    Radiologist interpretation:   DX-CHEST-LIMITED (1 VIEW)   Final Result      No acute cardiopulmonary abnormality identified.    "   CT-ABDOMEN-PELVIS WITH   Final Result      1.  Cirrhosis with portal hypertension      2.  Associated ascites and splenomegaly      3.  Hepatic steatosis, extremely severe      4.  No other significant finding           COURSE & MEDICAL DECISION MAKING    ED COURSE:        INTERVENTIONS BY ME:  Medications   NS (Bolus) 0.9 % infusion 1,000 mL (0 mL Intravenous Stopped 3/5/24 2222)   ondansetron (Zofran) syringe/vial injection 4 mg (4 mg Intravenous Given 3/5/24 1944)   diazePAM (Valium) injection 10 mg (10 mg Intravenous Given 3/5/24 1944)   iohexol (OMNIPAQUE) 240 mg/mL (Oral) (25 mL Oral Given 3/5/24 1945)   iohexol (OMNIPAQUE) 350 mg/mL (IV) (80 mL Intravenous Given 3/5/24 2104)   cefTRIAXone (Rocephin) injection 2,000 mg (2,000 mg Intravenous Given 3/5/24 2236)       Response on recheck:  Reevaluation: 7:30 PM patient was CIWA score 12 will order Valium and reassess  10:30 PM patient more comfortable appearing without alcohol withdrawal became hypoxic after receiving Valium on 2 L/min nasal cannula will obtain x-ray discussed urinary tract infection and CT scan patient agrees with plan of care bedside ultrasound without significant ascites     INITIAL ASSESSMENT, COURSE AND PLAN  Care Narrative:     Patient presenting with nausea vomiting lower abdominal pain for the past few weeks, history of alcoholic hepatitis today blood work is consistent with alcoholic cirrhosis with a notable for mild leukocytosis and urinary tract infection condition.  Patient well-appearing do not suspect sepsis.  Do think lactic acid elevation is secondary to volume loss.  Patient provided benzodiazepines for alcohol withdrawal, after which patient did become hypoxic.     Given patient's new onset of ascites considered performing paracentesis in the Emergency Department however not feel there is large enough volume paracentesis for me to safely perform the patient procedure in the emergency department.  Patient admitted  to the  hospitalist for further workup and care.    Do not have high suspicion for SBP at this time         ADDITIONAL PROBLEM LIST    DISPOSITION AND DISCUSSIONS  Discussion of management with other QHP or appropriate source(s): None     I have discussed management of the patient with the following physicians and KRISTINE's: I disccused the mangament and decision to admit this patient with the Hospitalist. Dr. Riojas    FINAL DIAGNOSIS  1. Diarrhea, unspecified type    2. Vomiting, unspecified vomiting type, unspecified whether nausea present    3. Other cirrhosis of liver (HCC)    4. Other ascites    5. Alcohol withdrawal syndrome without complication (HCC)    6. Urinary tract infection without hematuria, site unspecified             Electronically signed by: Julian Key DO ,11:14 PM 03/05/24

## 2024-03-06 NOTE — ASSESSMENT & PLAN NOTE
Chronic for the past month  Osmotic vs secretory vs steatorrhea vs inflammatory vs motility vs miscellaneous  Less likely infectious due to chronic nature  No alarming symptoms of weight loss or GI bleeding  Possibly secondary to pancreatic insufficiency with history of alcohol abuse     -Stool electrolytes and stool osmolality to differentiate between osmotic   versus secretory diarrhea  -Fecal fat for possible Steatorrhea, will order lipase  -Fecal calprotectin and fecal lactoferrin for possible inflammatory diarrhea  -TSH for possible endocrine causes  -Has never had colonoscopy, order outpatient colonoscopy on discharge  See above

## 2024-03-06 NOTE — ED TRIAGE NOTES
Chief Complaint   Patient presents with    Abdominal Pain     Pt reports diarrhea x 1 month, n/v x 1 year and bloating x 2 weeks. Pt states pain is typically lower bilaterally. Pt states diarrhea is mucous with no blood.        Pt to triage with steady gait for above complaint. Presents with all over abd pain with n/v x 1 year. Pt states she has been having diarrhea x 2 weeks, denies blood in emesis and stool. -dysuria    Pt back to lobby, educated on triage process and encourage to alert staff of any changes.     Vitals:    03/05/24 1702   BP: 125/87   Pulse: (!) 108   Resp: 18   Temp: 36.7 °C (98.1 °F)   SpO2: 92%

## 2024-03-06 NOTE — CARE PLAN
The patient is Watcher - Medium risk of patient condition declining or worsening    Shift Goals  Clinical Goals: safety, labs, CIWA  Patient Goals: rest    Progress made toward(s) clinical / shift goals:    Problem: Optimal Care for Alcohol Withdrawal  Goal: Optimal Care for the alcohol withdrawal patient  Description: Target End Date:  1 to 3 days    1.  Alcohol history screening done on admission  2.  CIWA-AR score assessment (includes assessment of nausea/vomiting, tremor, sweats, anxiety, agitation, tactile, visual and auditory disturbances, headache and orientation/sensorium).  Document on CIWA flowsheet.  3.  Follow CIWA-AR score protocol  4.  Frequent reorientation  5.  Monitor for fluid and electrolyte imbalance.  6.  Assess for respiratory depression due to sedation (pulse ox)  7.  Consider thiamine, multivitamins, folic acid and magnesium sulfate per provider order  8.  Collaborate with Social Workers/Case Management  9.  Collaborate with mental health  Outcome: Progressing     Problem: Seizure Precautions  Goal: Implementation of seizure precautions  Description: Target End Date:  Prior to discharge or change in level of care    1.  Padded side rails up at all times  2.  Suction equipment and oxygen delivery system at bedside  3.  Continuous pulse oximeter in use  4.  Implement fall precautions, bed alarm on, bed in lowest position  5.  IV access (per order)  6.  Provide low stimulus environment, avoid exposure to triggers  7.  Instruct patient to use call light/seizure button if having warning signs of impending seizure  Outcome: Progressing  Note: CIWA in place       Patient is not progressing towards the following goals:

## 2024-03-06 NOTE — PROGRESS NOTES
Pt arrived to unit  AAOx4, CIWA ordered, medicated per mar  2L NC  +void  +BM  Ambulates x1 assist, unsteady gait. Bed alarm on. Bed locked and in low position, nonskid socks in place.   Complaints of nausea, no emesis. Medicated per MAR    POC reviewed, education provided. Comprehension verbalized.

## 2024-03-06 NOTE — ED NOTES
Bedside report received by TONYA Vázquez     Oxygen:NATALI  Fall Risk status: bed in lowest position/locked, call light within reach  Patient Mentation:A+O x 4  Continuous cardiac monitoring: NSR

## 2024-03-06 NOTE — ASSESSMENT & PLAN NOTE
Severe hepatosplenomegaly  Ascites too minimal to tap. On spironolactone. Started low dose Lasix but monitor BP and hypovolemia risk from chronic diarrhea. On SBP prophylaxis (antibiotics). Ordered Vit K for hepatic coagulopathy. Already has diarrhea so ordered rifaxamin for hyperammonemia. Hold off starting nadolol (for portal hypotension)due to risk for hypovolemia/hypotension. Outpatient GI follow up  PENDING Stool culture, ova and parasites, fecal calprotectin, TSH, IgA and t-IgA for celiac disease work-up, Vit D levels for chronic diarrhea workup. Outpatient GI follow up.   COntinue vitamins, thiamine and CIWA protocol for now but withdrawal resolving.   PT/OT, offered HHC.  Ambulate with oximetry for hypoxia, no DVT on Dopplers, normal EF on echo but cannot visualize RV, no leukocytosis/CXR clear, consider CTA Chest if we cannot wean her off.

## 2024-03-06 NOTE — PROGRESS NOTES
4 Eyes Skin Assessment Completed by Jil RN and Inna RN.    Head WDL  Ears Blanching  Nose WDL  Mouth WDL  Neck WDL  Breast/Chest WDL  Shoulder Blades WDL  Spine WDL  (R) Arm/Elbow/Hand WDL  (L) Arm/Elbow/Hand WDL  Abdomen WDL Edema  Groin WDL  Scrotum/Coccyx/Buttocks Blanching  (R) Leg WDL  (L) Leg Scab left shin  (R) Heel/Foot/Toe Blanching dry, cracking  (L) Heel/Foot/Toe Blanching dry, cracking          Devices In Places Blood Pressure Cuff and Pulse Ox      Interventions In Place Gray Ear Foams, Pillows, and Heels Loaded W/Pillows    Possible Skin Injury No    Pictures Uploaded Into Epic N/A  Wound Consult Placed N/A  RN Wound Prevention Protocol Ordered No

## 2024-03-06 NOTE — H&P
[facility] Internal Medicine History & Physical Note    Date of Service  3/6/2024    [facility] Team: GLO   Attending: Erasmo Bradley M.d.  Senior Resident: Dr. Porter    Contact Number: [phone]    Primary Care Physician  No primary care provider on file.    Consultants  NA    Specialist Names: NA    Code Status  DNAR/DNI    Chief Complaint  Chief Complaint   Patient presents with    Abdominal Pain     Pt reports diarrhea x 1 month, n/v x 1 year and bloating x 2 weeks. Pt states pain is typically lower bilaterally. Pt states diarrhea is mucous with no blood.        History of Presenting Illness (HPI):   Patient is a 49 y.o. female w/ a PMHx of hypothyroidism, severe alcohol use disorder, FREDA, social anxiety disorder, MDD, insomnia, who presented to the ED on 3/5/2024 for abdominal pain.    Has had chronic GI issues for the past year, but noticed yellow and persistent diarrhea for the past month. Described as orange color, bristol stool scale 7. Would have tenesmus going to the bathroom twice in an hour, functional fecal incontinence (does not need assistance for ambulation). Denies any sensitivity to gluten, wheat, nor lactose products. Denies any nausea, melena, hematochezia. Denies any recent travel including outside of the country or camping. Denies any sick contacts or recent sickness. Denies eating any raw food including fish and vegetables.    Has associated generalized abdominal discomfort with cramping in the lower abdomen, intermittent denies any associated or alleviating factors. Has associated vomiting without hematemesis (has about 10 seconds to get to the bathroom). Not aggravated with eating. Has never had colonoscopy before, tried to schedule, but did not have referral. Pending new PCP on 3/22/24.     Denies any smoking. Drinks alcohol use, drinks 3-4 glasses of wine a day. Last drank glass of wine this afternoon, had withdrawal feeling with shaking/unsteadiness on feet/anxiety. Denies any hard liquor  usage. Had thoughts about quitting, has tried in the past, would like medication assistance. Denies any recreational drug substance. Has good social support, lives with her .    In the ED, tachycardic 100s, tachypneic 20s, 93% on 2 L NC.  WBC 11.9, CO2 20, AG 19, LA 2.3, BUNs/CR 2/0.44, AST/ALT//79/288.  Dirty UA with moderate epithelial cells and hyaline casts.  CT abdomen/pelvis with cirrhosis and portal hypertension.  Associated ascites and splenomegaly, extremely severe hepatic steatosis.  CXR with no acute cardiopulmonary abnormality.  In the ED, received ondansetron 4 mg IVP, NS 1L bolus, diazepam 10 mg IVP, ceftriaxone 2 mg IVP.  Patient be admitted for management of decompensated cirrhosis.      I discussed the plan of care with patient, family, bedside RN, and pharmacy.    Review of Systems  Review of Systems   Constitutional:  Negative for chills, fever and weight loss.   Respiratory:  Positive for shortness of breath (with exertion). Negative for cough.    Cardiovascular:  Positive for chest pain (with exertion). Negative for palpitations, orthopnea, leg swelling and PND.   Gastrointestinal:  Positive for abdominal pain, diarrhea, nausea and vomiting. Negative for blood in stool, constipation, heartburn and melena.   Genitourinary:  Negative for dysuria and hematuria.   Neurological:  Negative for dizziness and headaches.     Review of systems (+12 systems) unremarkable except for concerns noted by patient or items listed.     Please see HPI for additional ROS.  Past Medical History   has a past medical history of Anxiety, Depression, Gynecological disorder, High cholesterol, Hypertension, Snoring, and Urinary tract infection.    Surgical History   has a past surgical history that includes pr breast augmentation with implant (2006); mammoplasty augmentation; hysteroscopy novasure-2 (8/2/2017); and dilation and curettage (8/2/2017).     Family History  family history includes Asthma in her  father; Cancer (age of onset: 60) in her maternal grandfather and maternal grandmother; Depression in her father; Hyperlipidemia in her father and mother; Hypertension in her brother, brother, father, and mother; Other in her paternal grandmother; Stroke in her maternal grandmother.   Family history reviewed with patient.     Social History  Tobacco: Please see HPI  Alcohol: Please see HPI  Recreational drugs (illegal or prescription): Please see HPI  Employment: Please see HPI  Living Situation: Please see HPI  Recent Travel: Please see HPI  Primary Care Provider: Reviewed    Allergies  Allergies   Allergen Reactions    Strawberry Hives    Sulfa Drugs Hives     [id number]       Medications  Prior to Admission Medications   Prescriptions Last Dose Informant Patient Reported? Taking?   escitalopram (LEXAPRO) 10 MG Tab 3/4/2024 at PM Patient Yes Yes   Sig: Take 10 mg by mouth every day.   hydrOXYzine HCl (ATARAX) 10 MG Tab 3/4/2024 at PM Patient Yes Yes   Sig: Take 10 mg by mouth 1 time a day as needed for Anxiety.      Facility-Administered Medications: None       Physical Exam  Temp:  [36.7 °C (98.1 °F)] 36.7 °C (98.1 °F)  Pulse:  [] 85  Resp:  [12-22] 16  BP: ()/(56-87) 125/85  SpO2:  [90 %-95 %] 93 %  Blood Pressure: 95/62   Temperature: 36.7 °C (98.1 °F)   Pulse: 97   Respiration: 12   Pulse Oximetry: 93 %       Physical Exam  Vitals and nursing note reviewed.   Constitutional:       Appearance: Normal appearance.      Comments: tremulous   HENT:      Head: Normocephalic and atraumatic.      Right Ear: External ear normal.      Left Ear: External ear normal.      Nose: Nose normal.      Mouth/Throat:      Mouth: Mucous membranes are moist.      Pharynx: Oropharynx is clear.   Eyes:      General: Scleral icterus present.      Extraocular Movements: Extraocular movements intact.      Pupils: Pupils are equal, round, and reactive to light.   Cardiovascular:      Rate and Rhythm: Normal rate and regular  "rhythm.      Pulses: Normal pulses.      Heart sounds: Normal heart sounds. No murmur heard.     No friction rub. No gallop.   Pulmonary:      Effort: Pulmonary effort is normal. No respiratory distress.      Breath sounds: Normal breath sounds. No stridor. No wheezing, rhonchi or rales.   Abdominal:      General: There is distension.      Palpations: Abdomen is soft.      Tenderness: There is no abdominal tenderness. There is no guarding or rebound.   Musculoskeletal:         General: Normal range of motion.      Cervical back: Normal range of motion.   Skin:     General: Skin is warm.      Capillary Refill: Capillary refill takes less than 2 seconds.      Coloration: Skin is not jaundiced.   Neurological:      General: No focal deficit present.      Mental Status: She is alert and oriented to person, place, and time. Mental status is at baseline.   Psychiatric:         Mood and Affect: Mood normal.         Behavior: Behavior normal.         Thought Content: Thought content normal.         Judgment: Judgment normal.         Laboratory:  Recent Labs     03/05/24  1723   WBC 11.9*   RBC 3.84*   HEMOGLOBIN 14.9   HEMATOCRIT 42.1   .6*   MCH 38.8*   MCHC 35.4   RDW 60.8*   PLATELETCT 156*   MPV 10.3     Recent Labs     03/05/24  1723   SODIUM 135   POTASSIUM 3.7   CHLORIDE 96   CO2 20   GLUCOSE 86   BUN 2*   CREATININE 0.44*   CALCIUM 9.0     Recent Labs     03/05/24  1723   ALTSGPT 79*   ASTSGOT 221*   ALKPHOSPHAT 288*   TBILIRUBIN 2.9*   GLUCOSE 86         Recent Labs     03/05/24  1723   NTPROBNP 58         No results for input(s): \"TROPONINT\" in the last 72 hours.    Imaging:  DX-CHEST-LIMITED (1 VIEW)   Final Result      No acute cardiopulmonary abnormality identified.      CT-ABDOMEN-PELVIS WITH   Final Result      1.  Cirrhosis with portal hypertension      2.  Associated ascites and splenomegaly      3.  Hepatic steatosis, extremely severe      4.  No other significant finding      US-PARACENTESIS, ABD " WITH IMAGING    (Results Pending)   IR-PARACENTESIS, ABD WITH IMAGING    (Results Pending)       no X-Ray or EKG requiring interpretation    Assessment/Plan:  Problem Representation:   I anticipate this patient will require at least two midnights for appropriate medical management, necessitating inpatient admission because of management of decompensated cirrhosis    Patient will need a Med/Surg bed on ONCOLOGY service .  The need is secondary to management of decompensated cirrhosis.    * Decompensated hepatic cirrhosis (HCC)- (present on admission)  Assessment & Plan  Enlarging abdomen for the past 2 weeks with GI symptoms for the past year, drinks 3-4 glasses of wine per day, likely alcoholic cirrhosis  Hepatitis panel (5/31/2023): Negative  Pending INR/PT to calculate MELD/Child Truong score  CT abdomen/pelvis with cirrhosis and portal hypertension.  Associated ascites and splenomegaly, extremely severe hepatic steatosis.    Likely secondary to alcoholic cirrhosis    -SBP treatment of ceftriaxone 2 g for 5 days  -IR consulted for paracentesis, paracentesis studies ordered  -Start Spironolactone 25 mg,  titrate as tolerated once stable, 2:5 ratio of furosemide to spironolactone  -Order AFP, INR/PTT  -States vaccinated for hepatitis A and B, encourage to check for hepatitis titers outpatient  -Yearly influenza vaccine, pneumococcal vaccine  -Educated about proper protein malnutrition in cirrhotic patients, 1.5 g/kg body weight, 120 g protein per day  -Hepatic dose medications  -Avoid raw seafood  -Outpatient GI follow up    Acute alcohol intoxication (HCC)- (present on admission)  Assessment & Plan  -Fall/seizure/aspiration precautions  -Continue telemetry and continuous pulse oximetry  -IVF and electrolyte repletion  -[facility] protocol  -Folate 1 mg daily x 5days  -Thiamine 100 mg TID x 5 days  -Multivitamin  -Alcohol cessation counseling offered  -Started Gabapentin 300 mg nightly to help with alcohol  cravings  -Consider adding Naltrexone    Diarrhea  Assessment & Plan  Chronic for the past month  Osmotic vs secretory vs steatorrhea vs inflammatory vs motility vs miscellaneous  Less likely infectious due to chronic nature  No alarming symptoms of weight loss or GI bleeding  Possibly secondary to pancreatic insufficiency with history of alcohol abuse     -Stool electrolytes and stool osmolality to differentiate between osmotic   versus secretory diarrhea  -Fecal fat for possible Steatorrhea, will order lipase  -Fecal calprotectin and fecal lactoferrin for possible inflammatory diarrhea  -TSH for possible endocrine causes  -Has never had colonoscopy, order outpatient colonoscopy on discharge    Advance care planning  Assessment & Plan  I spent 5 minutes at bedside with patient discussing work-up, results, diagnosis, prognosis.  CODE STATUS discussed with patient and wants to be DNR/DNI     Generalized anxiety disorder- (present on admission)  Assessment & Plan  Follows with psychiatry    -Continue home hydroxyzine 10 mg as needed as needed    Acute hypoxic respiratory failure (HCC)  Assessment & Plan  Desatted to 91%, given 2L NC, now 93%, no prior home O2, CXR without any acute process    -Supplemental O2 for O2 saturation greater than 92%  -COVID/RSV/influenza panel ordered    Major depressive disorder  Assessment & Plan  Follows with psychiatry    -Continue home escitalopram 10 mg daily    High anion gap metabolic acidosis  Assessment & Plan  CO2 20, AG 19, LA 2.3  Secondary to decompensated cirrhosis    -Management as above for decompensated cirrhosis    Leukocytosis  Assessment & Plan  WBC 11.9, possibly reactive vs concern for SBP    -Ceftriaxone 2g x 5 days    Elevated alkaline phosphatase level- (present on admission)  Assessment & Plan  , secondary to alcohol use    -Management as above for alcohol use        VTE prophylaxis: enoxaparin ppx

## 2024-03-06 NOTE — ASSESSMENT & PLAN NOTE
-Fall/seizure/aspiration precautions  -Continue telemetry and continuous pulse oximetry  -IVF and electrolyte repletion  -[facility] protocol  -Folate 1 mg daily x 5days  -Thiamine 100 mg TID x 5 days  -Multivitamin  -Alcohol cessation counseling offered  -Started Gabapentin 300 mg nightly to help with alcohol cravings  -Consider adding Naltrexone  See above

## 2024-03-06 NOTE — ASSESSMENT & PLAN NOTE
CO2 20, AG 19, LA 2.3  Secondary to decompensated cirrhosis    -Management as above for decompensated cirrhosis

## 2024-03-06 NOTE — ED NOTES
Pharmacy Medication Reconciliation      ~Medication reconciliation updated and complete per patient at bedside.   ~Allergies have been verified and updated   ~No oral ABX within the last 30 days  ~Patient home pharmacy :  [facility]      ~Anticoagulants (rivaroxaban, apixaban, edoxaban, dabigatran, warfarin, enoxaparin) taken in the last 14 days? NO  ~Anticoagulant: N/A Last dose: N/A

## 2024-03-07 ENCOUNTER — APPOINTMENT (OUTPATIENT)
Dept: CARDIOLOGY | Facility: MEDICAL CENTER | Age: 49
DRG: 432 | End: 2024-03-07
Attending: INTERNAL MEDICINE
Payer: COMMERCIAL

## 2024-03-07 ENCOUNTER — APPOINTMENT (OUTPATIENT)
Dept: RADIOLOGY | Facility: MEDICAL CENTER | Age: 49
DRG: 432 | End: 2024-03-07
Attending: INTERNAL MEDICINE
Payer: COMMERCIAL

## 2024-03-07 LAB
25(OH)D3 SERPL-MCNC: 64 NG/ML (ref 30–100)
ALBUMIN SERPL BCP-MCNC: 2.9 G/DL (ref 3.2–4.9)
ALBUMIN/GLOB SERPL: 1.1 G/DL
ALP SERPL-CCNC: 218 U/L (ref 30–99)
ALT SERPL-CCNC: 50 U/L (ref 2–50)
ANION GAP SERPL CALC-SCNC: 14 MMOL/L (ref 7–16)
AST SERPL-CCNC: 137 U/L (ref 12–45)
BASOPHILS # BLD AUTO: 1.4 % (ref 0–1.8)
BASOPHILS # BLD: 0.11 K/UL (ref 0–0.12)
BILIRUB SERPL-MCNC: 2 MG/DL (ref 0.1–1.5)
BUN SERPL-MCNC: 4 MG/DL (ref 8–22)
CALCIUM ALBUM COR SERPL-MCNC: 9.6 MG/DL (ref 8.5–10.5)
CALCIUM SERPL-MCNC: 8.7 MG/DL (ref 8.5–10.5)
CHLORIDE SERPL-SCNC: 101 MMOL/L (ref 96–112)
CO2 SERPL-SCNC: 23 MMOL/L (ref 20–33)
CREAT SERPL-MCNC: 0.48 MG/DL (ref 0.5–1.4)
CRP SERPL HS-MCNC: 3.15 MG/DL (ref 0–0.75)
EOSINOPHIL # BLD AUTO: 0.05 K/UL (ref 0–0.51)
EOSINOPHIL NFR BLD: 0.6 % (ref 0–6.9)
ERYTHROCYTE [DISTWIDTH] IN BLOOD BY AUTOMATED COUNT: 62 FL (ref 35.9–50)
ERYTHROCYTE [SEDIMENTATION RATE] IN BLOOD BY WESTERGREN METHOD: 10 MM/HOUR (ref 0–25)
GFR SERPLBLD CREATININE-BSD FMLA CKD-EPI: 115 ML/MIN/1.73 M 2
GLOBULIN SER CALC-MCNC: 2.6 G/DL (ref 1.9–3.5)
GLUCOSE SERPL-MCNC: 96 MG/DL (ref 65–99)
HCT VFR BLD AUTO: 35.7 % (ref 37–47)
HGB BLD-MCNC: 12.2 G/DL (ref 12–16)
IMM GRANULOCYTES # BLD AUTO: 0.04 K/UL (ref 0–0.11)
IMM GRANULOCYTES NFR BLD AUTO: 0.5 % (ref 0–0.9)
LV EJECT FRACT  99904: 61
LV EJECT FRACT MOD 2C 99903: 62.11
LV EJECT FRACT MOD 4C 99902: 61.22
LV EJECT FRACT MOD BP 99901: 63.58
LYMPHOCYTES # BLD AUTO: 1.63 K/UL (ref 1–4.8)
LYMPHOCYTES NFR BLD: 21 % (ref 22–41)
MCH RBC QN AUTO: 38.4 PG (ref 27–33)
MCHC RBC AUTO-ENTMCNC: 34.2 G/DL (ref 32.2–35.5)
MCV RBC AUTO: 112.3 FL (ref 81.4–97.8)
MONOCYTES # BLD AUTO: 1.34 K/UL (ref 0–0.85)
MONOCYTES NFR BLD AUTO: 17.3 % (ref 0–13.4)
NEUTROPHILS # BLD AUTO: 4.58 K/UL (ref 1.82–7.42)
NEUTROPHILS NFR BLD: 59.2 % (ref 44–72)
NRBC # BLD AUTO: 0.03 K/UL
NRBC BLD-RTO: 0.4 /100 WBC (ref 0–0.2)
PLATELET # BLD AUTO: 119 K/UL (ref 164–446)
PMV BLD AUTO: 10.7 FL (ref 9–12.9)
POTASSIUM SERPL-SCNC: 3.6 MMOL/L (ref 3.6–5.5)
PROCALCITONIN SERPL-MCNC: 0.28 NG/ML
PROT SERPL-MCNC: 5.5 G/DL (ref 6–8.2)
RBC # BLD AUTO: 3.18 M/UL (ref 4.2–5.4)
SODIUM SERPL-SCNC: 138 MMOL/L (ref 135–145)
T4 FREE SERPL-MCNC: 0.42 NG/DL (ref 0.93–1.7)
TSH SERPL DL<=0.005 MIU/L-ACNC: 56.5 UIU/ML (ref 0.38–5.33)
WBC # BLD AUTO: 7.8 K/UL (ref 4.8–10.8)

## 2024-03-07 PROCEDURE — 93306 TTE W/DOPPLER COMPLETE: CPT | Mod: 26 | Performed by: INTERNAL MEDICINE

## 2024-03-07 PROCEDURE — 82784 ASSAY IGA/IGD/IGG/IGM EACH: CPT

## 2024-03-07 PROCEDURE — A9270 NON-COVERED ITEM OR SERVICE: HCPCS | Performed by: INTERNAL MEDICINE

## 2024-03-07 PROCEDURE — 84145 PROCALCITONIN (PCT): CPT

## 2024-03-07 PROCEDURE — 84443 ASSAY THYROID STIM HORMONE: CPT

## 2024-03-07 PROCEDURE — 770001 HCHG ROOM/CARE - MED/SURG/GYN PRIV*

## 2024-03-07 PROCEDURE — 93970 EXTREMITY STUDY: CPT | Mod: 26 | Performed by: INTERNAL MEDICINE

## 2024-03-07 PROCEDURE — 85025 COMPLETE CBC W/AUTO DIFF WBC: CPT

## 2024-03-07 PROCEDURE — 700102 HCHG RX REV CODE 250 W/ 637 OVERRIDE(OP): Performed by: STUDENT IN AN ORGANIZED HEALTH CARE EDUCATION/TRAINING PROGRAM

## 2024-03-07 PROCEDURE — 84439 ASSAY OF FREE THYROXINE: CPT

## 2024-03-07 PROCEDURE — 82306 VITAMIN D 25 HYDROXY: CPT

## 2024-03-07 PROCEDURE — A9270 NON-COVERED ITEM OR SERVICE: HCPCS | Performed by: STUDENT IN AN ORGANIZED HEALTH CARE EDUCATION/TRAINING PROGRAM

## 2024-03-07 PROCEDURE — 700105 HCHG RX REV CODE 258: Performed by: STUDENT IN AN ORGANIZED HEALTH CARE EDUCATION/TRAINING PROGRAM

## 2024-03-07 PROCEDURE — 36415 COLL VENOUS BLD VENIPUNCTURE: CPT

## 2024-03-07 PROCEDURE — 700111 HCHG RX REV CODE 636 W/ 250 OVERRIDE (IP): Performed by: STUDENT IN AN ORGANIZED HEALTH CARE EDUCATION/TRAINING PROGRAM

## 2024-03-07 PROCEDURE — 93306 TTE W/DOPPLER COMPLETE: CPT

## 2024-03-07 PROCEDURE — 93970 EXTREMITY STUDY: CPT

## 2024-03-07 PROCEDURE — 80053 COMPREHEN METABOLIC PANEL: CPT

## 2024-03-07 PROCEDURE — 85652 RBC SED RATE AUTOMATED: CPT

## 2024-03-07 PROCEDURE — 700102 HCHG RX REV CODE 250 W/ 637 OVERRIDE(OP): Performed by: INTERNAL MEDICINE

## 2024-03-07 PROCEDURE — 86140 C-REACTIVE PROTEIN: CPT

## 2024-03-07 PROCEDURE — 99233 SBSQ HOSP IP/OBS HIGH 50: CPT | Performed by: INTERNAL MEDICINE

## 2024-03-07 PROCEDURE — 86364 TISS TRNSGLTMNASE EA IG CLAS: CPT

## 2024-03-07 PROCEDURE — 83630 LACTOFERRIN FECAL (QUAL): CPT

## 2024-03-07 RX ORDER — ALBUTEROL SULFATE 90 UG/1
2 AEROSOL, METERED RESPIRATORY (INHALATION) EVERY 4 HOURS
Status: DISCONTINUED | OUTPATIENT
Start: 2024-03-07 | End: 2024-03-08 | Stop reason: HOSPADM

## 2024-03-07 RX ADMIN — LORAZEPAM 0.5 MG: 1 TABLET ORAL at 17:40

## 2024-03-07 RX ADMIN — THIAMINE HYDROCHLORIDE 500 MG: 100 INJECTION, SOLUTION INTRAMUSCULAR; INTRAVENOUS at 04:45

## 2024-03-07 RX ADMIN — GABAPENTIN 300 MG: 300 CAPSULE ORAL at 20:29

## 2024-03-07 RX ADMIN — ESCITALOPRAM OXALATE 10 MG: 10 TABLET ORAL at 04:41

## 2024-03-07 RX ADMIN — ALBUTEROL SULFATE 2 PUFF: 90 AEROSOL, METERED RESPIRATORY (INHALATION) at 20:29

## 2024-03-07 RX ADMIN — LORAZEPAM 0.5 MG: 1 TABLET ORAL at 21:36

## 2024-03-07 RX ADMIN — ENOXAPARIN SODIUM 40 MG: 100 INJECTION SUBCUTANEOUS at 17:40

## 2024-03-07 RX ADMIN — SPIRONOLACTONE 25 MG: 50 TABLET ORAL at 04:41

## 2024-03-07 RX ADMIN — FOLIC ACID 1 MG: 1 TABLET ORAL at 04:41

## 2024-03-07 RX ADMIN — THERA TABS 1 TABLET: TAB at 04:41

## 2024-03-07 RX ADMIN — CEFTRIAXONE SODIUM 2000 MG: 10 INJECTION, POWDER, FOR SOLUTION INTRAVENOUS at 17:41

## 2024-03-07 RX ADMIN — RIFAXIMIN 550 MG: 550 TABLET ORAL at 04:41

## 2024-03-07 RX ADMIN — RIFAXIMIN 550 MG: 550 TABLET ORAL at 17:39

## 2024-03-07 ASSESSMENT — LIFESTYLE VARIABLES
VISUAL DISTURBANCES: NOT PRESENT
PAROXYSMAL SWEATS: NO SWEAT VISIBLE
ANXIETY: NO ANXIETY (AT EASE)
TOTAL SCORE: 3
NAUSEA AND VOMITING: NO NAUSEA AND NO VOMITING
AGITATION: NORMAL ACTIVITY
TREMOR: *
HEADACHE, FULLNESS IN HEAD: NOT PRESENT
AUDITORY DISTURBANCES: NOT PRESENT
PAROXYSMAL SWEATS: NO SWEAT VISIBLE
AUDITORY DISTURBANCES: NOT PRESENT
PAROXYSMAL SWEATS: NO SWEAT VISIBLE
ANXIETY: NO ANXIETY (AT EASE)
AGITATION: NORMAL ACTIVITY
VISUAL DISTURBANCES: NOT PRESENT
AUDITORY DISTURBANCES: NOT PRESENT
ORIENTATION AND CLOUDING OF SENSORIUM: ORIENTED AND CAN DO SERIAL ADDITIONS
ORIENTATION AND CLOUDING OF SENSORIUM: ORIENTED AND CAN DO SERIAL ADDITIONS
TOTAL SCORE: 7
TOTAL SCORE: 3
AUDITORY DISTURBANCES: NOT PRESENT
TREMOR: *
PAROXYSMAL SWEATS: NO SWEAT VISIBLE
TREMOR: *
AGITATION: NORMAL ACTIVITY
ORIENTATION AND CLOUDING OF SENSORIUM: ORIENTED AND CAN DO SERIAL ADDITIONS
VISUAL DISTURBANCES: NOT PRESENT
NAUSEA AND VOMITING: NO NAUSEA AND NO VOMITING
VISUAL DISTURBANCES: NOT PRESENT
AGITATION: NORMAL ACTIVITY
PAROXYSMAL SWEATS: NO SWEAT VISIBLE
ANXIETY: MILDLY ANXIOUS
AUDITORY DISTURBANCES: NOT PRESENT
TREMOR: *
ORIENTATION AND CLOUDING OF SENSORIUM: ORIENTED AND CAN DO SERIAL ADDITIONS
HEADACHE, FULLNESS IN HEAD: NOT PRESENT
ORIENTATION AND CLOUDING OF SENSORIUM: ORIENTED AND CAN DO SERIAL ADDITIONS
AGITATION: SOMEWHAT MORE THAN NORMAL ACTIVITY
PAROXYSMAL SWEATS: NO SWEAT VISIBLE
AUDITORY DISTURBANCES: NOT PRESENT
AUDITORY DISTURBANCES: NOT PRESENT
TREMOR: *
HEADACHE, FULLNESS IN HEAD: NOT PRESENT
HEADACHE, FULLNESS IN HEAD: NOT PRESENT
PAROXYSMAL SWEATS: NO SWEAT VISIBLE
NAUSEA AND VOMITING: NO NAUSEA AND NO VOMITING
HEADACHE, FULLNESS IN HEAD: NOT PRESENT
VISUAL DISTURBANCES: NOT PRESENT
ORIENTATION AND CLOUDING OF SENSORIUM: ORIENTED AND CAN DO SERIAL ADDITIONS
TOTAL SCORE: 2
VISUAL DISTURBANCES: NOT PRESENT
AGITATION: NORMAL ACTIVITY
AUDITORY DISTURBANCES: NOT PRESENT
TOTAL SCORE: 2
TREMOR: *
NAUSEA AND VOMITING: NO NAUSEA AND NO VOMITING
ANXIETY: NO ANXIETY (AT EASE)
ORIENTATION AND CLOUDING OF SENSORIUM: ORIENTED AND CAN DO SERIAL ADDITIONS
ORIENTATION AND CLOUDING OF SENSORIUM: ORIENTED AND CAN DO SERIAL ADDITIONS
TOTAL SCORE: 4
ANXIETY: NO ANXIETY (AT EASE)
HEADACHE, FULLNESS IN HEAD: NOT PRESENT
TOTAL SCORE: 7
ANXIETY: MODERATELY ANXIOUS OR GUARDED, SO ANXIETY IS INFERRED
NAUSEA AND VOMITING: NO NAUSEA AND NO VOMITING
TREMOR: *
TOTAL SCORE: 2
PAROXYSMAL SWEATS: NO SWEAT VISIBLE
AGITATION: NORMAL ACTIVITY
ANXIETY: *
HEADACHE, FULLNESS IN HEAD: NOT PRESENT
NAUSEA AND VOMITING: NO NAUSEA AND NO VOMITING
VISUAL DISTURBANCES: NOT PRESENT
NAUSEA AND VOMITING: NO NAUSEA AND NO VOMITING
ANXIETY: NO ANXIETY (AT EASE)
HEADACHE, FULLNESS IN HEAD: NOT PRESENT
VISUAL DISTURBANCES: NOT PRESENT
TREMOR: *
AGITATION: NORMAL ACTIVITY
NAUSEA AND VOMITING: NO NAUSEA AND NO VOMITING

## 2024-03-07 ASSESSMENT — ENCOUNTER SYMPTOMS
INSOMNIA: 0
HEMOPTYSIS: 0
WEAKNESS: 0
NAUSEA: 0
EYE PAIN: 0
EYE REDNESS: 0
COUGH: 0
LOSS OF CONSCIOUSNESS: 0
BLOOD IN STOOL: 0
PALPITATIONS: 0
SEIZURES: 0
ABDOMINAL PAIN: 0
DIARRHEA: 0
SHORTNESS OF BREATH: 0
TREMORS: 1
FALLS: 0
CHILLS: 0
DIZZINESS: 0
SENSORY CHANGE: 1
FOCAL WEAKNESS: 0
MYALGIAS: 0
CONSTIPATION: 0
VOMITING: 0
NERVOUS/ANXIOUS: 0
HEADACHES: 0
FEVER: 0
WHEEZING: 0

## 2024-03-07 ASSESSMENT — PAIN DESCRIPTION - PAIN TYPE
TYPE: ACUTE PAIN;CHRONIC PAIN
TYPE: ACUTE PAIN
TYPE: ACUTE PAIN
TYPE: ACUTE PAIN;CHRONIC PAIN
TYPE: ACUTE PAIN

## 2024-03-07 NOTE — DISCHARGE PLANNING
1259  Received Choice Form at: 1259 pm  Agency/Facility Name: [facility]   Sent Referral per Choice Form at: 1259 pm

## 2024-03-07 NOTE — DISCHARGE PLANNING
Case Management Discharge Planning    Admission Date: 3/5/2024  GMLOS: 4.9  ALOS: 1    6-Clicks ADL Score: 23  6-Clicks Mobility Score: 19      Anticipated Discharge Dispo: Discharge Disposition: D/T to home under HHA care in anticipation of covered skilled care (06)  Discharge Address: [address] [place] [address]  Discharge Contact Phone Number: [phone]    DME Needed: Yes    DME Ordered: Yes    Action(s) Taken: Updated Provider/Nurse on Discharge Plan    This RN CM met with Pt and her  at bedside.   Pt is agreeable with [facility] , choices are [facility] and [facility].  [facility]     [facility] declined due to insurance, will send to [facility] .    For Home Oxygen set up, choices are [facility] and [facility], pending oxygen walking sats .Choice faxed to [facility].    Pt has alverto accepted by [facility].     Pending home oxygen orders and ftf if needed on discharge.    Pt's  is Pt's main support.     Escalations Completed: None    Medically Clear: Yes    Next Steps:   CM to continue to assist Pt with discharge as needed    Barriers to Discharge:   Medical clearance  Home oxygen set up?    Is the patient up for discharge tomorrow: No

## 2024-03-07 NOTE — CARE PLAN
The patient is Stable - Low risk of patient condition declining or worsening    Shift Goals  Clinical Goals: monitor for withdrawl, remain free from falls  Patient Goals: rest    Progress made toward(s) clinical / shift goals:  CIWA protocol in use, assessments at least Q4H; patient has not needed any PRNs this shift; patient calls appropriately, frame alarm in place     Patient is not progressing towards the following goals:

## 2024-03-07 NOTE — FACE TO FACE
Face to Face Supporting Documentation - Home Health    The encounter with this patient was in whole or in part the primary reason for home health admission.    Date of encounter:   Patient:                    MRN:                       YOB: 2025  Minerva Tillman  [id number]  1975     Home health to see patient for:  Skilled Nursing care for assessment, interventions & education, Physical Therapy evaluation and treatment, and Occupational therapy evaluation and treatment    Skilled need for:  Medication Management and mobility    Skilled nursing interventions to include:  Comment: as above    Homebound status evidenced by:  Needs the assistance of another person in order to leave the home. Leaving home requires a considerable and taxing effort. There is a normal inability to leave the home.    Community Physician to provide follow up care: No primary care provider on file.     Optional Interventions? No      I certify the face to face encounter for this home health care referral meets the CMS requirements and the encounter/clinical assessment with the patient was, in whole, or in part, for the medical condition(s) listed above, which is the primary reason for home health care. Based on my clinical findings: the service(s) are medically necessary, support the need for home health care, and the homebound criteria are met.  I certify that this patient has had a face to face encounter by myself.  Christopher Armando M.D. - NPI: [id number]

## 2024-03-07 NOTE — PROGRESS NOTES
Report received from RN, assumed care at 0645  Pt is A0X4, and responds appropriately   Pt declines any SOB, chest pain, new onset of numbness/ tingling  Pt rates pain at 0/10, on a scale of 1-10, pt not medicated per MAR  Pt is voiding adequately and without hesitancy  Pt has flatus, normoactive bowel sounds,  BM on 3/7-small per pt  Pt ambulates with a 1x assist   Pt is tolerating a cardiac diet, pt denies any nausea/vomiting  Plan of care discussed, all questions answered. Explained importance of calling before getting OOB and pt verbalizes understanding. Explained importance of oral care. Call light is within reach, treaded slipper socks on, bed in lowest/ locked position, hourly rounding in place, all needs met at this time

## 2024-03-07 NOTE — PROGRESS NOTES
I saw Minerva Hedy Quintana 49 y.o. female presents with Abdominal Pain (Pt reports diarrhea x 1 month, n/v x 1 year and bloating x 2 weeks. Pt states pain is typically lower bilaterally. Pt states diarrhea is mucous with no blood. )   on 3/5/2024     Managing for alcoholism and new onset alcoholic cirrhosis.  She is tremulous on exam.   On CIWA protocol.  Ammonia levels elevated. Already has chronic diarrhea. Ordered rifaximin.   INR 1.6 hepatic coagulopathy Ordered vitamin K  Montez cardant function is 35. High because of INR at this time we have decided no steroids (discussed with Dr. Daniels, GI)  Hold off on starting Lasix, nadolol for now given high risk hypotension and hypovolemia from ongoing diarrhea. Currently patient on spironolactone for now (discussed with Dr. Daniels GI)  Per Dr. Daniels, outpatient w/u for chronic mucus diarrhea. At this time no further need for HIDA or MRCP; stabilize her from an alcohl withdrawal perspective.  Updated Dr. Tillman.    A/P.  Principal Problem:    Decompensated hepatic cirrhosis (HCC) (POA: Yes)  Active Problems:    Acute alcohol intoxication (HCC) (POA: Yes)    Generalized anxiety disorder (POA: Yes)    Elevated alkaline phosphatase level (POA: Yes)    Leukocytosis (POA: Unknown)    High anion gap metabolic acidosis (POA: Unknown)    Major depressive disorder (POA: Unknown)    Advance care planning (POA: Unknown)    Diarrhea (POA: Unknown)    Acute hypoxic respiratory failure (HCC) (POA: Unknown)

## 2024-03-07 NOTE — PROGRESS NOTES
Assumed care of patient from dayshift RN  AAOx4, CIWA q4  2.5L NC  +void  +Bm  Ambulates standby/x1 assist to bathroom  Calls appropriately, bed locked and in low position    POC reviewed, education provided on POC

## 2024-03-07 NOTE — CARE PLAN
The patient is Stable - Low risk of patient condition declining or worsening    Shift Goals  Clinical Goals: ciwa, safety  Patient Goals: sleep    Progress made toward(s) clinical / shift goals:  Safety maintained, Meidcated per MAR for CIWA. Education provided on POC, medications. Comprehension verbalized. Pt resting comfortably, anxiety decreased.    Patient is not progressing towards the following goals:

## 2024-03-08 ENCOUNTER — PHARMACY VISIT (OUTPATIENT)
Dept: PHARMACY | Facility: MEDICAL CENTER | Age: 49
End: 2024-03-08
Payer: COMMERCIAL

## 2024-03-08 ENCOUNTER — APPOINTMENT (OUTPATIENT)
Dept: RADIOLOGY | Facility: MEDICAL CENTER | Age: 49
DRG: 432 | End: 2024-03-08
Attending: INTERNAL MEDICINE
Payer: COMMERCIAL

## 2024-03-08 ENCOUNTER — APPOINTMENT (OUTPATIENT)
Dept: CARDIOLOGY | Facility: MEDICAL CENTER | Age: 49
DRG: 432 | End: 2024-03-08
Attending: INTERNAL MEDICINE
Payer: COMMERCIAL

## 2024-03-08 VITALS
TEMPERATURE: 97.9 F | HEART RATE: 92 BPM | BODY MASS INDEX: 24.45 KG/M2 | RESPIRATION RATE: 17 BRPM | HEIGHT: 63 IN | WEIGHT: 138 LBS | SYSTOLIC BLOOD PRESSURE: 125 MMHG | DIASTOLIC BLOOD PRESSURE: 75 MMHG | OXYGEN SATURATION: 90 %

## 2024-03-08 LAB
ALBUMIN SERPL BCP-MCNC: 2.7 G/DL (ref 3.2–4.9)
ALBUMIN/GLOB SERPL: 1 G/DL
ALP SERPL-CCNC: 199 U/L (ref 30–99)
ALT SERPL-CCNC: 50 U/L (ref 2–50)
ANION GAP SERPL CALC-SCNC: 12 MMOL/L (ref 7–16)
AST SERPL-CCNC: 132 U/L (ref 12–45)
BILIRUB SERPL-MCNC: 1.9 MG/DL (ref 0.1–1.5)
BUN SERPL-MCNC: 3 MG/DL (ref 8–22)
C PARVUM AG STL QL IA.RAPID: NEGATIVE
CALCIUM ALBUM COR SERPL-MCNC: 9.7 MG/DL (ref 8.5–10.5)
CALCIUM SERPL-MCNC: 8.7 MG/DL (ref 8.5–10.5)
CHLORIDE SERPL-SCNC: 102 MMOL/L (ref 96–112)
CO2 SERPL-SCNC: 24 MMOL/L (ref 20–33)
CREAT SERPL-MCNC: 0.54 MG/DL (ref 0.5–1.4)
ERYTHROCYTE [DISTWIDTH] IN BLOOD BY AUTOMATED COUNT: 60.1 FL (ref 35.9–50)
G LAMBLIA AG STL QL IA.RAPID: NEGATIVE
GFR SERPLBLD CREATININE-BSD FMLA CKD-EPI: 112 ML/MIN/1.73 M 2
GLOBULIN SER CALC-MCNC: 2.8 G/DL (ref 1.9–3.5)
GLUCOSE SERPL-MCNC: 104 MG/DL (ref 65–99)
HCT VFR BLD AUTO: 35.2 % (ref 37–47)
HGB BLD-MCNC: 12.4 G/DL (ref 12–16)
LACTOFERRIN STL QL IA: NEGATIVE
MCH RBC QN AUTO: 39 PG (ref 27–33)
MCHC RBC AUTO-ENTMCNC: 35.2 G/DL (ref 32.2–35.5)
MCV RBC AUTO: 110.7 FL (ref 81.4–97.8)
PLATELET # BLD AUTO: 120 K/UL (ref 164–446)
PMV BLD AUTO: 10.6 FL (ref 9–12.9)
POTASSIUM SERPL-SCNC: 3.2 MMOL/L (ref 3.6–5.5)
PROT SERPL-MCNC: 5.5 G/DL (ref 6–8.2)
RBC # BLD AUTO: 3.18 M/UL (ref 4.2–5.4)
RV AG STL QL IA: NEGATIVE
SODIUM SERPL-SCNC: 138 MMOL/L (ref 135–145)
T3FREE SERPL-MCNC: 0.97 PG/ML (ref 2–4.4)
T4 FREE SERPL-MCNC: 0.42 NG/DL (ref 0.93–1.7)
TTG IGA SER IA-ACNC: <1.02 FLU (ref 0–4.99)
WBC # BLD AUTO: 7.7 K/UL (ref 4.8–10.8)

## 2024-03-08 PROCEDURE — 93308 TTE F-UP OR LMTD: CPT

## 2024-03-08 PROCEDURE — 87425 ROTAVIRUS AG IA: CPT

## 2024-03-08 PROCEDURE — 36415 COLL VENOUS BLD VENIPUNCTURE: CPT

## 2024-03-08 PROCEDURE — 87328 CRYPTOSPORIDIUM AG IA: CPT

## 2024-03-08 PROCEDURE — 87046 STOOL CULTR AEROBIC BACT EA: CPT

## 2024-03-08 PROCEDURE — 87045 FECES CULTURE AEROBIC BACT: CPT

## 2024-03-08 PROCEDURE — 700111 HCHG RX REV CODE 636 W/ 250 OVERRIDE (IP): Performed by: STUDENT IN AN ORGANIZED HEALTH CARE EDUCATION/TRAINING PROGRAM

## 2024-03-08 PROCEDURE — 87899 AGENT NOS ASSAY W/OPTIC: CPT

## 2024-03-08 PROCEDURE — 700102 HCHG RX REV CODE 250 W/ 637 OVERRIDE(OP): Performed by: STUDENT IN AN ORGANIZED HEALTH CARE EDUCATION/TRAINING PROGRAM

## 2024-03-08 PROCEDURE — 700105 HCHG RX REV CODE 258: Performed by: STUDENT IN AN ORGANIZED HEALTH CARE EDUCATION/TRAINING PROGRAM

## 2024-03-08 PROCEDURE — 99222 1ST HOSP IP/OBS MODERATE 55: CPT | Mod: GC | Performed by: STUDENT IN AN ORGANIZED HEALTH CARE EDUCATION/TRAINING PROGRAM

## 2024-03-08 PROCEDURE — A9270 NON-COVERED ITEM OR SERVICE: HCPCS | Performed by: STUDENT IN AN ORGANIZED HEALTH CARE EDUCATION/TRAINING PROGRAM

## 2024-03-08 PROCEDURE — RXMED WILLOW AMBULATORY MEDICATION CHARGE: Performed by: INTERNAL MEDICINE

## 2024-03-08 PROCEDURE — 97165 OT EVAL LOW COMPLEX 30 MIN: CPT

## 2024-03-08 PROCEDURE — 85027 COMPLETE CBC AUTOMATED: CPT

## 2024-03-08 PROCEDURE — 700102 HCHG RX REV CODE 250 W/ 637 OVERRIDE(OP): Performed by: INTERNAL MEDICINE

## 2024-03-08 PROCEDURE — 87329 GIARDIA AG IA: CPT

## 2024-03-08 PROCEDURE — 71250 CT THORAX DX C-: CPT

## 2024-03-08 PROCEDURE — 84439 ASSAY OF FREE THYROXINE: CPT

## 2024-03-08 PROCEDURE — 80053 COMPREHEN METABOLIC PANEL: CPT

## 2024-03-08 PROCEDURE — 97162 PT EVAL MOD COMPLEX 30 MIN: CPT

## 2024-03-08 PROCEDURE — 84481 FREE ASSAY (FT-3): CPT

## 2024-03-08 PROCEDURE — 99239 HOSP IP/OBS DSCHRG MGMT >30: CPT | Performed by: INTERNAL MEDICINE

## 2024-03-08 PROCEDURE — 93308 TTE F-UP OR LMTD: CPT | Mod: 26 | Performed by: INTERNAL MEDICINE

## 2024-03-08 PROCEDURE — A9270 NON-COVERED ITEM OR SERVICE: HCPCS | Performed by: INTERNAL MEDICINE

## 2024-03-08 PROCEDURE — 700101 HCHG RX REV CODE 250: Performed by: STUDENT IN AN ORGANIZED HEALTH CARE EDUCATION/TRAINING PROGRAM

## 2024-03-08 RX ORDER — GABAPENTIN 300 MG/1
300 CAPSULE ORAL NIGHTLY
Qty: 90 CAPSULE | Refills: 0 | Status: SHIPPED | OUTPATIENT
Start: 2024-03-08

## 2024-03-08 RX ORDER — SPIRONOLACTONE 25 MG/1
25 TABLET ORAL DAILY
Qty: 30 TABLET | Refills: 3 | Status: SHIPPED | OUTPATIENT
Start: 2024-03-09

## 2024-03-08 RX ORDER — LEVOTHYROXINE SODIUM 0.05 MG/1
50 TABLET ORAL
Status: DISCONTINUED | OUTPATIENT
Start: 2024-03-08 | End: 2024-03-08 | Stop reason: HOSPADM

## 2024-03-08 RX ORDER — LEVOTHYROXINE SODIUM 0.05 MG/1
50 TABLET ORAL
Qty: 30 TABLET | Refills: 0 | Status: SHIPPED | OUTPATIENT
Start: 2024-03-08

## 2024-03-08 RX ORDER — LANOLIN ALCOHOL/MO/W.PET/CERES
100 CREAM (GRAM) TOPICAL DAILY
COMMUNITY
Start: 2024-03-10

## 2024-03-08 RX ORDER — FOLIC ACID 1 MG/1
1 TABLET ORAL DAILY
COMMUNITY
Start: 2024-03-09

## 2024-03-08 RX ORDER — ALBUTEROL SULFATE 90 UG/1
2 AEROSOL, METERED RESPIRATORY (INHALATION) EVERY 4 HOURS
Qty: 8.5 G | Refills: 0 | Status: SHIPPED | OUTPATIENT
Start: 2024-03-08

## 2024-03-08 RX ADMIN — HYDROXYZINE HYDROCHLORIDE 10 MG: 10 TABLET ORAL at 14:43

## 2024-03-08 RX ADMIN — SPIRONOLACTONE 25 MG: 50 TABLET ORAL at 04:53

## 2024-03-08 RX ADMIN — RIFAXIMIN 550 MG: 550 TABLET ORAL at 04:52

## 2024-03-08 RX ADMIN — ALBUTEROL SULFATE 2 PUFF: 90 AEROSOL, METERED RESPIRATORY (INHALATION) at 02:00

## 2024-03-08 RX ADMIN — ALBUTEROL SULFATE 2 PUFF: 90 AEROSOL, METERED RESPIRATORY (INHALATION) at 14:00

## 2024-03-08 RX ADMIN — ALBUTEROL SULFATE 2 PUFF: 90 AEROSOL, METERED RESPIRATORY (INHALATION) at 05:01

## 2024-03-08 RX ADMIN — THERA TABS 1 TABLET: TAB at 04:52

## 2024-03-08 RX ADMIN — ALBUTEROL SULFATE 2 PUFF: 90 AEROSOL, METERED RESPIRATORY (INHALATION) at 18:00

## 2024-03-08 RX ADMIN — ALBUTEROL SULFATE 2 PUFF: 90 AEROSOL, METERED RESPIRATORY (INHALATION) at 10:00

## 2024-03-08 RX ADMIN — LEVOTHYROXINE SODIUM 50 MCG: 0.05 TABLET ORAL at 09:43

## 2024-03-08 RX ADMIN — THIAMINE HYDROCHLORIDE 500 MG: 100 INJECTION, SOLUTION INTRAMUSCULAR; INTRAVENOUS at 04:55

## 2024-03-08 RX ADMIN — LORAZEPAM 0.5 MG: 1 TABLET ORAL at 09:43

## 2024-03-08 RX ADMIN — ESCITALOPRAM OXALATE 10 MG: 10 TABLET ORAL at 04:53

## 2024-03-08 RX ADMIN — RIFAXIMIN 550 MG: 550 TABLET ORAL at 17:26

## 2024-03-08 RX ADMIN — FOLIC ACID 1 MG: 1 TABLET ORAL at 04:52

## 2024-03-08 RX ADMIN — CEFTRIAXONE SODIUM 2000 MG: 10 INJECTION, POWDER, FOR SOLUTION INTRAVENOUS at 17:26

## 2024-03-08 ASSESSMENT — LIFESTYLE VARIABLES
ORIENTATION AND CLOUDING OF SENSORIUM: ORIENTED AND CAN DO SERIAL ADDITIONS
VISUAL DISTURBANCES: NOT PRESENT
AGITATION: NORMAL ACTIVITY
ANXIETY: MODERATELY ANXIOUS OR GUARDED, SO ANXIETY IS INFERRED
AGITATION: NORMAL ACTIVITY
NAUSEA AND VOMITING: NO NAUSEA AND NO VOMITING
TREMOR: *
TREMOR: *
HEADACHE, FULLNESS IN HEAD: NOT PRESENT
AGITATION: NORMAL ACTIVITY
ORIENTATION AND CLOUDING OF SENSORIUM: ORIENTED AND CAN DO SERIAL ADDITIONS
ANXIETY: NO ANXIETY (AT EASE)
ORIENTATION AND CLOUDING OF SENSORIUM: ORIENTED AND CAN DO SERIAL ADDITIONS
AGITATION: NORMAL ACTIVITY
PAROXYSMAL SWEATS: NO SWEAT VISIBLE
AUDITORY DISTURBANCES: NOT PRESENT
VISUAL DISTURBANCES: NOT PRESENT
NAUSEA AND VOMITING: NO NAUSEA AND NO VOMITING
TOTAL SCORE: 2
NAUSEA AND VOMITING: NO NAUSEA AND NO VOMITING
TREMOR: *
VISUAL DISTURBANCES: NOT PRESENT
AUDITORY DISTURBANCES: NOT PRESENT
ANXIETY: NO ANXIETY (AT EASE)
PAROXYSMAL SWEATS: NO SWEAT VISIBLE
HEADACHE, FULLNESS IN HEAD: NOT PRESENT
PAROXYSMAL SWEATS: NO SWEAT VISIBLE
AUDITORY DISTURBANCES: NOT PRESENT
NAUSEA AND VOMITING: NO NAUSEA AND NO VOMITING
PAROXYSMAL SWEATS: NO SWEAT VISIBLE
ANXIETY: NO ANXIETY (AT EASE)
TOTAL SCORE: 2
TOTAL SCORE: 7
AUDITORY DISTURBANCES: NOT PRESENT
VISUAL DISTURBANCES: NOT PRESENT
TOTAL SCORE: 2
TREMOR: *
ORIENTATION AND CLOUDING OF SENSORIUM: ORIENTED AND CAN DO SERIAL ADDITIONS

## 2024-03-08 ASSESSMENT — GAIT ASSESSMENTS
DEVIATION: SHUFFLED GAIT
GAIT LEVEL OF ASSIST: STANDBY ASSIST
DISTANCE (FEET): 40

## 2024-03-08 ASSESSMENT — COGNITIVE AND FUNCTIONAL STATUS - GENERAL
CLIMB 3 TO 5 STEPS WITH RAILING: A LITTLE
DAILY ACTIVITIY SCORE: 24
SUGGESTED CMS G CODE MODIFIER MOBILITY: CJ
MOVING TO AND FROM BED TO CHAIR: A LITTLE
SUGGESTED CMS G CODE MODIFIER DAILY ACTIVITY: CH
STANDING UP FROM CHAIR USING ARMS: A LITTLE
WALKING IN HOSPITAL ROOM: A LITTLE
MOBILITY SCORE: 20

## 2024-03-08 ASSESSMENT — ENCOUNTER SYMPTOMS
NAUSEA: 0
FEVER: 0
NECK PAIN: 0
DIZZINESS: 0
HEADACHES: 0
BLURRED VISION: 0
CHILLS: 0
MYALGIAS: 0
SHORTNESS OF BREATH: 1
ORTHOPNEA: 0
DEPRESSION: 0

## 2024-03-08 ASSESSMENT — ACTIVITIES OF DAILY LIVING (ADL): TOILETING: INDEPENDENT

## 2024-03-08 NOTE — DISCHARGE PLANNING
1200  Agency/Facility Name: [facility]  Sent Referral per Choice Form at: 1200 pm    1323  Agency/Facility Name: [facility]  Spoke To: Beena  Outcome: DPA inquired about pending referral. Per Benea they are able to deliver within 2 hours to bedside.  RN CM notified.    1345  Agency/Facility Name: [facility]  Spoke To: Beena  Outcome: Juan Alberto Hargrove needs to reach Pt to collect payment prior to delivery. Juan Alberto Beena has left voicemail's for both the Pt and contact for the Pt.  RN CM notified.    1431  Agency/Facility Name: [facility]  Spoke To: Ray  Outcome: DPA informed Ray that Pt may go home over the weekend.  RN CM notified.    1347  Agency/Facility Name: [facility]  Spoke To: Deepak  Outcome: DPA inquired pending referral. Juan Albetro Deepak Hargrove is currently away from her desk at this time. Juan Alberto Deepak Hargrove will contact DPA.  RN CM notified.    1607  Agency/Facility Name: [facility]  Spoke To: Beena  Outcome: Juan Alberto [facility] has processed payment from Pt. Juan Alberto [facility] will be delivering to bedside soon.  RN CM notified.

## 2024-03-08 NOTE — CONSULTS
"Pulmonary Consultation:   Date of service: 3/8/2024    Reason for consult: acute hypoxic respiratory failure    Referring Physician: Christopher Armando MD      Hospital course:    \"Patient is a 49 y.o. female w/ a PMHx of hypothyroidism, severe alcohol use disorder, FREDA, social anxiety disorder, MDD, insomnia, who presented to the ED on 3/5/2024 for abdominal pain.     Has had chronic GI issues for the past year, but noticed yellow and persistent diarrhea for the past month. Described as orange color, bristol stool scale 7. Would have tenesmus going to the bathroom twice in an hour, functional fecal incontinence (does not need assistance for ambulation). Denies any sensitivity to gluten, wheat, nor lactose products. Denies any nausea, melena, hematochezia. Denies any recent travel including outside of the country or camping. Denies any sick contacts or recent sickness. Denies eating any raw food including fish and vegetables.     Has associated generalized abdominal discomfort with cramping in the lower abdomen, intermittent denies any associated or alleviating factors. Has associated vomiting without hematemesis (has about 10 seconds to get to the bathroom). Not aggravated with eating. Has never had colonoscopy before, tried to schedule, but did not have referral. Pending new PCP on 3/22/24.      Denies any smoking. Drinks alcohol use, drinks 3-4 glasses of wine a day. Last drank glass of wine this afternoon, had withdrawal feeling with shaking/unsteadiness on feet/anxiety. Denies any hard liquor usage. Had thoughts about quitting, has tried in the past, would like medication assistance. Denies any recreational drug substance. Has good social support, lives with her .     In the ED, tachycardic 100s, tachypneic 20s, 93% on 2 L NC.  WBC 11.9, CO2 20, AG 19, LA 2.3, BUNs/CR 2/0.44, AST/ALT//79/288.  Dirty UA with moderate epithelial cells and hyaline casts.  CT abdomen/pelvis with cirrhosis and portal " "hypertension.  Associated ascites and splenomegaly, extremely severe hepatic steatosis.  CXR with no acute cardiopulmonary abnormality.  In the ED, received ondansetron 4 mg IVP, NS 1L bolus, diazepam 10 mg IVP, ceftriaxone 2 mg IVP.  Patient be admitted for management of decompensated cirrhosis.\"    Interval event:  Afebrile. HR . RR 17-18. SpO2 104//83. SpO2 90-94% on 1.5-3 LPM.  WBC 7.7. K 3.2. . BUN 3. Cr 0.54. . Alk phos 199. Tbili 1.9. Albumin 2.7. Tot prot 5.5 INR 1.6. TSH 56, T4 0.42. On ceftriaxone.      Subjective       Subjective: C/o MCNAIR when walking up the stairs for the past 6 months. Denies h/o smoking or chronic lung disease. Drinks 4-6 glasses of wine daily.    Past medical History:    Past Medical History:   Diagnosis Date    Anxiety     Depression     Gynecological disorder     High cholesterol     Hypertension     Snoring     Urinary tract infection         Past Surgical History:    Past Surgical History:   Procedure Laterality Date    HYSTEROSCOPY NOVASURE-2  8/2/2017    Procedure: HYSTEROSCOPY NOVASURE;  Surgeon: Hedy Hardy M.D.;  Location: SURGERY [facility];  Service:     DILATION AND CURETTAGE  8/2/2017    Procedure: DILATION AND CURETTAGE;  Surgeon: Hedy Hardy M.D.;  Location: SURGERY [facility];  Service:     CO BREAST AUGMENTATION WITH IMPLANT  2006    MAMMOPLASTY AUGMENTATION          Social History: never smoker. 3-4 glasses of wine per day. Denies street drugs.    Family History:  family history includes Asthma in her father; Cancer (age of onset: 60) in her maternal grandfather and maternal grandmother; Depression in her father; Hyperlipidemia in her father and mother; Hypertension in her brother, brother, father, and mother; Other in her paternal grandmother; Stroke in her maternal grandmother.   Family history reviewed with patient.     Allergy:    Allergies   Allergen Reactions    Strawberry Hives    Sulfa Drugs Hives     " [id number]      Review of Systems   Constitutional:  Negative for chills and fever.   HENT:  Negative for hearing loss and tinnitus.    Eyes:  Negative for blurred vision.   Respiratory:  Positive for shortness of breath.         (+) MCNAIR   Cardiovascular:  Negative for chest pain, orthopnea and leg swelling.   Gastrointestinal:  Negative for nausea.   Genitourinary:  Negative for dysuria and urgency.   Musculoskeletal:  Negative for myalgias and neck pain.   Neurological:  Negative for dizziness and headaches.   Psychiatric/Behavioral:  Negative for depression and suicidal ideas.         Objective:       Vitals:  Vitals:    03/08/24 0821   BP: 124/84   Pulse: 91   Resp: 17   Temp: 36.8 °C (98.2 °F)   SpO2: 89%        Physical exam:    General:Well-appearing. in no acute distress  HEENT: Normal conjunctiva, anicteric. Moist mucous membranes.  Heart: RRR. no m/r/g  Lungs: CTAB  Abdomen: soft,  nontender, nondistended  Extremities: No BLE edema  Skin: Warm and dry.   Neuro: A&Ox3  Psych: Appropriate mood and affect        Labs:         Recent Labs     03/05/24 1723 03/07/24 0215 03/08/24 0146   WBC 11.9* 7.8 7.7   RBC 3.84* 3.18* 3.18*   HEMOGLOBIN 14.9 12.2 12.4   HEMATOCRIT 42.1 35.7* 35.2*   .6* 112.3* 110.7*   MCH 38.8* 38.4* 39.0*   RDW 60.8* 62.0* 60.1*   PLATELETCT 156* 119* 120*   MPV 10.3 10.7 10.6   NEUTSPOLYS 70.00 59.20  --    LYMPHOCYTES 14.70* 21.00*  --    MONOCYTES 13.70* 17.30*  --    EOSINOPHILS 0.10 0.60  --    BASOPHILS 0.90 1.40  --          Recent Labs     03/05/24 1723 03/07/24 0215 03/08/24 0146   SODIUM 135 138 138   POTASSIUM 3.7 3.6 3.2*   CHLORIDE 96 101 102   CO2 20 23 24   GLUCOSE 86 96 104*   BUN 2* 4* 3*        Recent Labs     03/05/24 1723 03/07/24 0215 03/08/24 0146   ALBUMIN 3.6 2.9* 2.7*   TBILIRUBIN 2.9* 2.0* 1.9*   ALKPHOSPHAT 288* 218* 199*   TOTPROTEIN 7.1 5.5* 5.5*   ALTSGPT 79* 50 50   ASTSGOT 221* 137* 132*   CREATININE 0.44* 0.48* 0.54        Procalcitonin    Date Value Ref Range Status   03/07/2024 0.28 (H) <0.25 ng/mL Final               NT-proBNP   Date Value Ref Range Status   03/05/2024 58 0 - 125 pg/mL Final       Sed Rate Westergren   Date Value Ref Range Status   03/07/2024 10 0 - 25 mm/hour Final       Occult Blood   Date Value Ref Range Status   03/05/2024 Trace (A) Negative Final           Imaging:       US-EXTREMITY VENOUS LOWER BILAT   Final Result      EC-ECHOCARDIOGRAM COMPLETE W/O CONT   Final Result      US-ABDOMEN LTD (SOFT TISSUE)   Final Result      Trace ascites. Not enough ascites for paracentesis.      DX-CHEST-LIMITED (1 VIEW)   Final Result      No acute cardiopulmonary abnormality identified.      CT-ABDOMEN-PELVIS WITH   Final Result      1.  Cirrhosis with portal hypertension      2.  Associated ascites and splenomegaly      3.  Hepatic steatosis, extremely severe      4.  No other significant finding      IR-PARACENTESIS, ABD WITH IMAGING    (Results Pending)      ECHO from 3/7:     CONCLUSIONS     Normal left ventricular chamber size.  The ejection fraction is measured to be 61 % by Johnson's biplane.  No significant valvular disease.   The inferior vena cava is not well visualized.    Medications:     Home meds:    Home Medications    Medication Sig Taking? Last Dose Authorizing Provider   hydrOXYzine HCl (ATARAX) 10 MG Tab Take 10 mg by mouth 1 time a day as needed for Anxiety. Yes 3/4/2024 at [facility]   escitalopram (LEXAPRO) 10 MG Tab Take 10 mg by mouth every day. Yes 3/4/2024 at [facility]        Scheduled Medications PRN Medications IV Medications   albuterol, 2 Puff, Q4HRS  escitalopram, 10 mg, DAILY  cefTRIAXone (ROCEPHIN) IV, 2,000 mg, Q24HRS  [START ON 3/10/2024] thiamine, 100 mg, DAILY   And  multivitamin, 1 Tablet, DAILY   And  folic acid, 1 mg, DAILY  gabapentin, 300 mg, Nightly  spironolactone, 25 mg, Q DAY  riFAXIMin, 550 mg, BID      ondansetron, 4 mg, Q4HRS PRN  ondansetron, 4 mg, Q4HRS  PRN  promethazine, 12.5-25 mg, Q4HRS PRN  promethazine, 12.5-25 mg, Q4HRS PRN  prochlorperazine, 5-10 mg, Q4HRS PRN  hydrOXYzine HCl, 10 mg, QDAY PRN  LORazepam, 0.5 mg, Q4HRS PRN  LORazepam, 1 mg, Q4HRS PRN  LORazepam, 2 mg, Q2HRS PRN  LORazepam, 3 mg, Q HOUR PRN  LORazepam, 4 mg, Q15 MIN PRN   Or  diazePAM, 10 mg, Q15 MIN PRN             Allergies   Allergen Reactions    Strawberry Hives    Sulfa Drugs Hives     [id number]        Assessment/Plan:     #Acute respiratory failure - on 1-2 LPM NC. No baseline O2.  #Ascites  #Alcohol intoxication - resolved  *No PFTs.  No known history of lung disease. Never smoker.  Further work up needed to identify etiology of hypoxia. Patient ECHO was negative and very low clinical suspicion of PE.  Also, pneumonia is of very low clinical suspicion. Work-up should evaluate for etiologies diffusion limitation, Right-to-Left shunting, and possible V/Q mismatch from parenchymal disease. Hepatopulmonary syndrome is on differential but needs further work up.   - recommend TTE with bubble study to eval for pulm shunt  - recommend CT throax WO  - please place referral to outpatient pulmonology once stable for discharge  - please place referral for outpatient PFTs once stable for discharge      Tylor Lincoln  Resident  [phone]    Discussed with my attending physician,  Haley Fernando MD

## 2024-03-08 NOTE — THERAPY
"Occupational Therapy   Initial Evaluation     Patient Name: Minerva Tillman  Age:  49 y.o., Sex:  female  Medical Record #: [id number]  Today's Date: 3/8/2024     Precautions  Precautions: (P) Fall Risk    Assessment  Patient is 49 y.o. female who presents to acute w/ abdominal pain and diarrhea. Pt found to have new onset of alcoholic cirrhosis. Pt demo'd BADLs at SBA/SPV level w/ intermittent HHA for balance. Will follow pt at low frequency to assist w/ DC planning.     Plan    Occupational Therapy Initial Treatment Plan   Treatment Interventions: (P) Self Care / Activities of Daily Living, Neuro Re-Education / Balance, Therapeutic Exercises, Therapeutic Activity, Adaptive Equipment  Treatment Frequency: (P) 2 Times per Week  Duration: (P) Until Therapy Goals Met    DC Equipment Recommendations: (P) None  Discharge Recommendations: (P) Anticipate that the patient will have no further occupational therapy needs after discharge from the hospital     Subjective    \"My dogs are house dogs, they are too tough to walk\"     Objective      Initial Contact Note    Initial Contact Note Order Received and Verified, Occupational Therapy Evaluation in Progress with Full Report to Follow.   Prior Living Situation   Prior Services None   Housing / Facility [address]   Bathroom Set up Walk In Shower   Equipment Owned Single Point Cane  (owns cane, does not use it)   Lives with - Patient's Self Care Capacity Spouse   Comments Spouse is a surgeon, pt takes care of dogs at home   Prior Level of ADL Function   Self Feeding Independent   Grooming / Hygiene Independent   Bathing Independent   Dressing Independent   Toileting Independent   Prior Level of IADL Function   Medication Management Independent   Laundry Independent   Kitchen Mobility Independent   Finances Independent   Home Management Independent   Shopping Independent   Prior Level Of Mobility Independent Without Device in Community;Independent Without Device in Home "   Driving / Transportation Driving Independent   Occupation (Pre-Hospital Vocational) Homemaker   Precautions   Precautions Fall Risk   Vitals   O2 (LPM) 1.5   O2 Delivery Device Nasal Cannula   Pain 0 - 10 Group   Therapist Pain Assessment Post Activity Pain Same as Prior to Activity;Nurse Notified  (no c/o pain during session)   Cognition    Cognition / Consciousness WDL   Level of Consciousness Alert   Comments pleasent and cooperative   Active ROM Upper Body   Active ROM Upper Body  WDL   Strength Upper Body   Upper Body Strength  WDL   Coordination Upper Body   Comments pt tremulous   Balance Assessment   Sitting Balance (Static) Good   Sitting Balance (Dynamic) Good   Standing Balance (Static) Fair   Standing Balance (Dynamic) Fair   Weight Shift Sitting Good   Weight Shift Standing Fair   Comments w/ intermittent HHA   Bed Mobility    Supine to Sit Supervised   Sit to Supine   (NT in chair post)   Scooting Supervised   Rolling Supervised   ADL Assessment   Grooming Supervision;Standing  (combed hair, washed hands)   Upper Body Dressing Supervision   Lower Body Dressing Supervision  (underpants and socks)   Toileting Supervision   How much help from another person does the patient currently need...   Putting on and taking off regular lower body clothing? 4   Bathing (including washing, rinsing, and drying)? 4   Toileting, which includes using a toilet, bedpan, or urinal? 4   Putting on and taking off regular upper body clothing? 4   Taking care of personal grooming such as brushing teeth? 4   Eating meals? 4   6 Clicks Daily Activity Score 24   Functional Mobility   Sit to Stand Standby Assist   Bed, Chair, Wheelchair Transfer Standby Assist   Toilet Transfers Standby Assist   Mobility within room and bathroom, no AD   Activity Tolerance   Sitting in Chair 10+ min (up post)   Sitting Edge of Bed 5 min   Standing 6 min   Patient / Family Goals   Patient / Family Goal #1 To go home   Short Term Goals   Short  Term Goal # 1 Pt will demo high/low retrieval w/ SPV   Education Group   Role of Occupational Therapist Patient Response Patient;Acceptance;Explanation;Demonstration;Verbal Demonstration;Action Demonstration   Occupational Therapy Initial Treatment Plan    Treatment Interventions Self Care / Activities of Daily Living;Neuro Re-Education / Balance;Therapeutic Exercises;Therapeutic Activity;Adaptive Equipment   Treatment Frequency 2 Times per Week   Duration Until Therapy Goals Met   Problem List   Problem List Decreased Active Daily Living Skills;Decreased Homemaking Skills;Decreased Functional Mobility;Decreased Activity Tolerance;Impaired Postural Control / Balance   Anticipated Discharge Equipment and Recommendations   DC Equipment Recommendations None   Discharge Recommendations Anticipate that the patient will have no further occupational therapy needs after discharge from the hospital   Interdisciplinary Plan of Care Collaboration   IDT Collaboration with  Nursing   Patient Position at End of Therapy Seated;Chair Alarm On;Call Light within Reach;Tray Table within Reach;Phone within Reach   Collaboration Comments report given   Session Information   Date / Session Number  3/8, 1 (1/2, 3/14)

## 2024-03-08 NOTE — DISCHARGE PLANNING
Case Management Discharge Planning    Admission Date: 3/5/2024  GMLOS: 4.9  ALOS: 2    6-Clicks ADL Score: 23  6-Clicks Mobility Score: 19      Anticipated Discharge Dispo: Discharge Disposition: D/T to home under HHA care in anticipation of covered skilled care (06)  Discharge Address: [address] [address]  Discharge Contact Phone Number: [phone]    DME Needed: No    Action(s) Taken: Updated Provider/Nurse on Discharge Plan    Pt  was discussed in IDT rounds with Dr Armando.   Plan is to discharge Pt to home today.    Pt has been accepted by [facility].    This RN CM submitted PA request for  Pt's Xifaxan.   PA number [id number], pending approval.  Per Jas it could take 48 hours. Per Pharmacy cost is around $400.   Informed Pharmacy of the pending PA. Pharmacy to issue Pt's home meds.   Per Rounds Pt needs home oxygen set up. Requested DME  oxygen order and face to face from MD.    Pt is now pending with [facility] for home oxygen set up    Pt has been accepted by [facility] and oxygen will be delivered within 2 hours.    Explained to Pt that she has  a big  deductible with [facility] , $7 thousand .   Pt's alternate is to rent and rental is between $175 to 200 per month. Informed Pt that [facility] will call her for credit card. Pt agrees  with this plan.    Pt requested tank and concentrator to be delivered to bedside.    Per MD , he ordered TTE with bubble study for Pt.   Per Pt she would rather  do the test while she is at [facility] before discharging to home.    [facility] to deliver oxygen at bedside today.           Escalations Completed: None    Medically Clear: No    Next Steps:   CM to continue to assist Pt with discharge as needed     Barriers to Discharge:   Pending medical clearance    Is the patient up for discharge tomorrow: No

## 2024-03-08 NOTE — PROGRESS NOTES
Bedside report received.  Assessment complete.  A&O x 4. Patient calls appropriately.  Patient ambulates with x1 assist using a FWW.    Patient has 0/10 pain.   Denies N&V. Tolerating cardiac diet.  + void, + flatus, + BM.  Patient denies SOB.  Q4 CIWA protocols in place.  Review plan with of care with patient. Call light and personal belongings within reach. Hourly rounding in place. All needs met at this time.

## 2024-03-08 NOTE — CARE PLAN
The patient is Stable - Low risk of patient condition declining or worsening    Shift Goals  Clinical Goals: Pt safety, CIWA  Patient Goals: dc    Progress made toward(s) clinical / shift goals:  Pt remained safe during shift. PT CIWA protocol was followed during shift.     Problem: Optimal Care for Alcohol Withdrawal  Goal: Optimal Care for the alcohol withdrawal patient  3/7/2024 1748 by Zheng Taylor R.N.  Outcome: Progressing  3/7/2024 1747 by Zheng Taylor R.N.  Outcome: Progressing  3/7/2024 1747 by Zheng Taylor R.N.  Outcome: Progressing     Problem: Seizure Precautions  Goal: Implementation of seizure precautions  3/7/2024 1748 by Zheng Taylor R.N.  Outcome: Progressing  3/7/2024 1747 by Zheng Taylor R.N.  Outcome: Progressing  3/7/2024 1747 by Zheng Taylor R.N.  Outcome: Progressing     Problem: Lifestyle Changes  Goal: Patient's ability to identify lifestyle changes and available resources to help reduce recurrence of condition will improve  3/7/2024 1748 by GILMAR [email or web address].  Outcome: Progressing  3/7/2024 1747 by Zheng Taylor R.N.  Outcome: Progressing  3/7/2024 1747 by GILMAR [email or web address].  Outcome: Progressing     Problem: Fall Risk  Goal: Patient will remain free from falls  3/7/2024 1748 by Zheng Taylor R.N.  Outcome: Progressing  3/7/2024 1747 by Zheng Taylor R.N.  Outcome: Progressing  3/7/2024 1747 by Zheng Taylor R.N.  Outcome: Progressing       Patient is not progressing towards the following goals:

## 2024-03-08 NOTE — FACE TO FACE
Face to Face Note  -  Durable Medical Equipment    Christopher Armando M.D. - NPI: [id number]  I certify that this patient is under my care and that they had a durable medical equipment(DME)face to face encounter by myself that meets the physician DME face-to-face encounter requirements with this patient on:    Date of encounter:   Patient:                    MRN:                       YOB: 2025  Minerva Tillman  [id number]  1975     The encounter with the patient was in whole, or in part, for the following medical condition, which is the primary reason for durable medical equipment:  Other - extrathoracic restrictive lung from hepatomegaly    I certify that, based on my findings, the following durable medical equipment is medically necessary:    Oxygen   HOME O2 Saturation Measurements:(Values must be present for Home Oxygen orders)  Room air sat at rest: 87  Room air sat with amb: 85  With liters of O2: 2, O2 sat at rest with O2: 90  With Liters of O2: 4, O2 sat with amb with O2 : 92  Is the patient mobile?: Yes  If patient feels more short of breath, they can go up to 6 liters per minute and contact healthcare provider.    Supporting Symptoms:  hypoxia .    My Clinical findings support the need for the above equipment due to:  Hypoxia

## 2024-03-08 NOTE — PROGRESS NOTES
Report received from RN, assumed care at 0645  Pt is A0X4, and responds appropriately   Pt declines any SOB, chest pain, new onset of numbness/ tingling  Pt rates pain at 0/10, on a scale of 1-10, pt not medicated per MAR  Pt is voiding adequately and without hesitancy  Pt does not have flatus, hypoactive bowel sounds,  BM on 3/7  Pt ambulates with a standby assist   Pt is tolerating a cardiac diet, pt denies any nausea/vomiting  Plan of care discussed, all questions answered. Explained importance of calling before getting OOB and pt verbalizes understanding. Explained importance of oral care. Call light is within reach, treaded slipper socks on, bed in lowest/ locked position, hourly rounding in place, all needs met at this time

## 2024-03-08 NOTE — PROGRESS NOTES
Hospital Medicine Daily Progress Note    Date of Service  3/7/2024    Chief Complaint  Minerva Tillman is a 49 y.o. female admitted 3/5/2024 with Abdominal Pain (Pt reports diarrhea x 1 month, n/v x 1 year and bloating x 2 weeks. Pt states pain is typically lower bilaterally. Pt states diarrhea is mucous with no blood. )       Hospital Course  No notes on file    Interval Problem Update  I reviewed the chart along with vitals, labs, imaging, test (both pending and resulted) and recommendations from specialists and interdisciplinary team.  Managing for alcoholism and new onset alcoholic cirrhosis.  She is tremulous on exam. CIWA however 4. Slightly ataxic.   On CIWA protocol.  Ammonia levels elevated. Already has chronic diarrhea. Ordered rifaximin.   INR 1.6 hepatic coagulopathy Ordered vitamin K  [place]z garciaminant function is 35. High because of INR at this time we have decided no steroids (discussed with Dr. Daniels, GI)  Hold off on starting Lasix, nadolol for now given high risk hypotension and hypovolemia from ongoing diarrhea. Currently patient on spironolactone for now (discussed with Dr. Daniels GI)  Per Dr. Daniels, outpatient w/u for chronic mucus diarrhea. At this time no further need for HIDA or MRCP; stabilize her from an alcohl withdrawal perspective.  Pending stool culture, ova and paracites, calprotectin, IgA celiac studies.   On O2, ordered ambulate with oximetry for hypoxia PENDING. CXR was clear. Ordered echo normal EF but RV not well visualized. Dopplers no DVT.   Ordered PT/OT offered HHC. Hopefully discharge TOMORROW  Updated Dr. Tillman at length.     Patient is has a high medical complexity, complex decision making and is at high risk for complication, morbidity, and mortality, thus requiring the highest level of my preparedness for sudden, emergent intervention. Medical decision making is therefore complex. I provided  services, which included ordering labs and/or imaging, and discussing the case  with various consultants.medication orders, frequent reevaluations of the patient's condition and response to treatment. Time was also devoted to counseling and coordinating care including review of records, pertinent lab data and studies, as well as discussing diagnostic evaluation and work up, planned therapeutic interventions and future disposition of care. Where indicated, the assessment and plan reflect discussion of patient with consultants, other healthcare providers, family members, and additional research needed to obtain further information in formulating the plan of care for Minerva Tillman. Total time spent was 70 minutes.     I have discussed this patient's plan of care and discharge plan at IDT rounds today with Case Management, Nursing, Nursing leadership, and other members of the IDT team.    Consultants/Specialty      Code Status  DNAR/DNI    Disposition  The patient is not medically cleared for discharge to home or a post-acute facility.      I have placed the appropriate orders for post-discharge needs.    Review of Systems  Review of Systems   Constitutional:  Negative for chills and fever.   HENT:  Negative for congestion, hearing loss and nosebleeds.    Eyes:  Negative for pain and redness.   Respiratory:  Negative for cough, hemoptysis, shortness of breath and wheezing.    Cardiovascular:  Negative for chest pain and palpitations.   Gastrointestinal:  Negative for abdominal pain, blood in stool, constipation, diarrhea, nausea and vomiting.   Genitourinary:  Negative for dysuria, frequency and hematuria.   Musculoskeletal:  Negative for falls, joint pain and myalgias.   Skin:  Negative for rash.   Neurological:  Positive for tremors and sensory change. Negative for dizziness, focal weakness, seizures, loss of consciousness, weakness and headaches.   Psychiatric/Behavioral:  The patient is not nervous/anxious and does not have insomnia.    All other systems reviewed and are negative.        Physical Exam  Temp:  [36.8 °C (98.2 °F)-37 °C (98.6 °F)] 37 °C (98.6 °F)  Pulse:  [85-95] 88  Resp:  [16-18] 17  BP: (104-121)/(76-87) 104/76  SpO2:  [90 %-94 %] 90 %    Physical Exam  Vitals and nursing note reviewed.   Constitutional:       General: She is not in acute distress.  HENT:      Head: Normocephalic and atraumatic.      Right Ear: External ear normal.      Left Ear: External ear normal.      Nose: Nose normal.      Mouth/Throat:      Mouth: Mucous membranes are moist.   Eyes:      General: Scleral icterus present.      Conjunctiva/sclera: Conjunctivae normal.   Cardiovascular:      Rate and Rhythm: Normal rate and regular rhythm.      Pulses: Normal pulses.      Heart sounds: No murmur heard.     No friction rub. No gallop.   Pulmonary:      Effort: Pulmonary effort is normal. No respiratory distress.      Breath sounds: Normal breath sounds. No stridor. No wheezing, rhonchi or rales.   Chest:      Chest wall: No tenderness.   Abdominal:      General: Abdomen is flat. Bowel sounds are normal. There is no distension.      Palpations: Abdomen is soft.      Tenderness: There is no abdominal tenderness. There is no guarding or rebound.   Musculoskeletal:         General: No swelling, tenderness or deformity. Normal range of motion.      Cervical back: Normal range of motion and neck supple. No rigidity.   Skin:     General: Skin is warm.      Coloration: Skin is jaundiced.   Neurological:      General: No focal deficit present.      Mental Status: She is alert and oriented to person, place, and time. Mental status is at baseline.      Coordination: Coordination abnormal.      Comments: Tremor   Psychiatric:         Mood and Affect: Mood normal.         Behavior: Behavior normal.         Thought Content: Thought content normal.         Judgment: Judgment normal.         Fluids    Intake/Output Summary (Last 24 hours) at 3/7/2024 1816  Last data filed at 3/7/2024 1400  Gross per 24 hour   Intake 440 ml    Output --   Net 440 ml       Laboratory  Recent Labs     03/05/24  1723 03/07/24  0215   WBC 11.9* 7.8   RBC 3.84* 3.18*   HEMOGLOBIN 14.9 12.2   HEMATOCRIT 42.1 35.7*   .6* 112.3*   MCH 38.8* 38.4*   MCHC 35.4 34.2   RDW 60.8* 62.0*   PLATELETCT 156* 119*   MPV 10.3 10.7     Recent Labs     03/05/24  1723 03/07/24  0215   SODIUM 135 138   POTASSIUM 3.7 3.6   CHLORIDE 96 101   CO2 20 23   GLUCOSE 86 96   BUN 2* 4*   CREATININE 0.44* 0.48*   CALCIUM 9.0 8.7     Recent Labs     03/06/24  0406   APTT 41.9*   INR 1.60*               Imaging  US-EXTREMITY VENOUS LOWER BILAT   Final Result      EC-ECHOCARDIOGRAM COMPLETE W/O CONT   Final Result      US-ABDOMEN LTD (SOFT TISSUE)   Final Result      Trace ascites. Not enough ascites for paracentesis.      DX-CHEST-LIMITED (1 VIEW)   Final Result      No acute cardiopulmonary abnormality identified.      CT-ABDOMEN-PELVIS WITH   Final Result      1.  Cirrhosis with portal hypertension      2.  Associated ascites and splenomegaly      3.  Hepatic steatosis, extremely severe      4.  No other significant finding      IR-PARACENTESIS, ABD WITH IMAGING    (Results Pending)        Assessment/Plan  * Decompensated hepatic cirrhosis, hypoxia, chronic diarrhea, alcoholism (HCC)- (present on admission)  Assessment & Plan  Severe hepatosplenomegaly  Ascites too minimal to tap. On spironolactone. Started low dose Lasix but monitor BP and hypovolemia risk from chronic diarrhea. On SBP prophylaxis (antibiotics). Ordered Vit K for hepatic coagulopathy. Already has diarrhea so ordered rifaxamin for hyperammonemia. Hold off starting nadolol (for portal hypotension)due to risk for hypovolemia/hypotension. Outpatient GI follow up  PENDING Stool culture, ova and parasites, fecal calprotectin, TSH, IgA and t-IgA for celiac disease work-up, Vit D levels for chronic diarrhea workup. Outpatient GI follow up.   COntinue vitamins, thiamine and CIWA protocol for now but withdrawal resolving.    PT/OT, offered HHC.  Ambulate with oximetry for hypoxia, no DVT on Dopplers, normal EF on echo but cannot visualize RV, no leukocytosis/CXR clear, consider CTA Chest if we cannot wean her off.      Acute hypoxic respiratory failure (HCC)  Assessment & Plan  Desatted to 91%, given 2L NC, now 93%, no prior home O2, CXR without any acute process    -Supplemental O2 for O2 saturation greater than 92%  -COVID/RSV/influenza panel ordered    Diarrhea  Assessment & Plan  Chronic for the past month  Osmotic vs secretory vs steatorrhea vs inflammatory vs motility vs miscellaneous  Less likely infectious due to chronic nature  No alarming symptoms of weight loss or GI bleeding  Possibly secondary to pancreatic insufficiency with history of alcohol abuse     -Stool electrolytes and stool osmolality to differentiate between osmotic   versus secretory diarrhea  -Fecal fat for possible Steatorrhea, will order lipase  -Fecal calprotectin and fecal lactoferrin for possible inflammatory diarrhea  -TSH for possible endocrine causes  -Has never had colonoscopy, order outpatient colonoscopy on discharge  See above    Advance care planning  Assessment & Plan  I spent 5 minutes at bedside with patient discussing work-up, results, diagnosis, prognosis.  CODE STATUS discussed with patient and wants to be DNR/DNI     Major depressive disorder  Assessment & Plan  Follows with psychiatry    -Continue home escitalopram 10 mg daily    High anion gap metabolic acidosis  Assessment & Plan  CO2 20, AG 19, LA 2.3  Secondary to decompensated cirrhosis    -Management as above for decompensated cirrhosis    Leukocytosis  Assessment & Plan  WBC 11.9, possibly reactive vs concern for SBP    -Ceftriaxone 2g x 5 days    Elevated alkaline phosphatase level- (present on admission)  Assessment & Plan  , secondary to alcohol use    -Management as above for alcohol use    Generalized anxiety disorder- (present on admission)  Assessment & Plan  Follows  with psychiatry    -Continue home hydroxyzine 10 mg as needed as needed    Acute alcohol intoxication (HCC)- (present on admission)  Assessment & Plan  -Fall/seizure/aspiration precautions  -Continue telemetry and continuous pulse oximetry  -IVF and electrolyte repletion  -[facility] protocol  -Folate 1 mg daily x 5days  -Thiamine 100 mg TID x 5 days  -Multivitamin  -Alcohol cessation counseling offered  -Started Gabapentin 300 mg nightly to help with alcohol cravings  -Consider adding Naltrexone  See above         VTE prophylaxis: Stop Lovenox due to INR 1.6. SCDs    I have performed a physical exam and reviewed and updated ROS and Plan today (3/7/2024). In review of yesterday's note (3/6/2024), there are no changes except as documented above.

## 2024-03-08 NOTE — DISCHARGE SUMMARY
Discharge Summary    CHIEF COMPLAINT ON ADMISSION  Chief Complaint   Patient presents with    Abdominal Pain     Pt reports diarrhea x 1 month, n/v x 1 year and bloating x 2 weeks. Pt states pain is typically lower bilaterally. Pt states diarrhea is mucous with no blood.        Reason for Admission  Abd Pain/Nausea/Vomiting     Admission Date  3/5/2024    CODE STATUS  DNAR/DNI    HPI & HOSPITAL COURSE  This is a 49 y.o. female here with .Abdominal Pain (Pt reports diarrhea x 1 month, n/v x 1 year and bloating x 2 weeks. Pt states pain is typically lower bilaterally. Pt states diarrhea is mucous with no blood. )  Please review Dr. Erasmo Bradley M.D. notes for further details of history of present illness, past medical/social/family histories, allergies and medications. Please review Dr. Daniels, GI, Dr. France, Pulmonology consultation notes.  49 year old female who drinks alcohol heavily with a history of chronic mucoid diarrhea for 3 weeks now presented 3/6/2024 with abdominal pain along with her chronic diarrhea. No hematemesis, hematochezia, melena or coffee ground emesis. Abdominal pain is diffuse, intermittent. Diarrhea is mucoid acoompanied with tenesmus. She is still being referred to a primary provider and has not been referred to GI for colonoscopy yet. She also is tremulous and is willing to undergo detox for withdrawal.   At the ED, she is tachyycardic, tachypneia and put on 2LO2. Mild leukocytosis. Transaminitis with bilirubinemia/CT showed ascites, portal hypertension and severe hepatic steatosis. No evidence of cholelithiasis or dilated ducts. No evidence of bowel pathology.  She was admitted for management of alcoholism, alcoholic hepatitis, cirrhosis, mild hypoxia and diarrhea. She was put on CIWA protocl with PRN Ativan and vitamins with thiamine. She improved and only had chronic tremors and mild likely chronic ataxia. Ascites was too small to tap per IR. She was put on a course of antibiotics  fo SBP prophylaxis. I gave Vit K x 1 for hepatic coagulopathy. I added rifaximin for hyperammonemia. She is on spironolactone. Because of her diarrhea and soft blood pressures, I avoided adding Lasix and nadolol (for portal hypertension) for now. Her discriminant function was 35 but she is not in fulminant hepatitis and she has psychiatric diagnoses of anxiety and depression. We therefore did not start her on any steroids for alcoholic hepatitis. We have consulted Dr. Daniels, gastroenterology who agreed with her liver disease management and also recommended outpatient colonoscopy for chronic diarrhea. I have ordered celiac studies, stool culture, fecal calprotectin, ova and parasites, fecal lactoferrin, rotavirus and these are PENDING but can be followed up outpatient. Her TSH came back elevated at 50+ therefore I will start Synthroid and ordered t3,4. Further work-up such as thyroid imaging, thyroid antibodies    CXR was read as clear. Echo showed normal EF. No DVTs on Dopplers. Exercise oximetry ordered. Needed O23. Pulmon consulted. CT Chest showed no significant opacities, minimal pleural effusions, echo with bubble showed no evidence of shunting. Oxygen arranged. Home Health Care arranged.    At discharge date, Minerva Tillman afebrile and hemodynamically stable.  Minerva Tillman wanted to be discharged today.      Imaging  US-EXTREMITY VENOUS LOWER BILAT   Final Result      EC-ECHOCARDIOGRAM COMPLETE W/O CONT   Final Result      US-ABDOMEN LTD (SOFT TISSUE)   Final Result      Trace ascites. Not enough ascites for paracentesis.      DX-CHEST-LIMITED (1 VIEW)   Final Result      No acute cardiopulmonary abnormality identified.      CT-ABDOMEN-PELVIS WITH   Final Result      1.  Cirrhosis with portal hypertension      2.  Associated ascites and splenomegaly      3.  Hepatic steatosis, extremely severe      4.  No other significant finding      IR-PARACENTESIS, ABD WITH IMAGING    (Results Pending)              Therefore, she is discharged in fair and stable condition to home with organized home healthcare and close outpatient follow-up.    The patient met 2-midnight criteria for an inpatient stay at the time of discharge.    Discharge Date  3/8/2024    FOLLOW UP ITEMS POST DISCHARGE  Stool studies    DISCHARGE DIAGNOSES  Principal Problem:    Decompensated hepatic cirrhosis, hypoxia, chronic diarrhea, alcoholism (HCC) (POA: Yes)  Active Problems:    Acute alcohol intoxication (HCC) (POA: Yes)    Generalized anxiety disorder (POA: Yes)    Elevated alkaline phosphatase level (POA: Yes)    Leukocytosis (POA: Unknown)    High anion gap metabolic acidosis (POA: Unknown)    Major depressive disorder (POA: Unknown)    Advance care planning (POA: Unknown)    Diarrhea (POA: Unknown)    Acute hypoxic respiratory failure (HCC) (POA: Unknown)        FOLLOW UP  Future Appointments   Date Time Provider Department Center   3/22/2024  1:00 PM Donita Asif M.D. [facility] [id number]  [facility] [address]  [phone]      Assign and follow up with primary provider or discharge clinic physician in 1 week. Manage hypothyroidism, get TSH in 2-3 weeks Follow up with gastroenterologist in 1 week. Follow up on stool studies (or reorder them if they weren't collected) and may need outpatient colonoscopy. May need referral for cirrhosis but sobriety necessary for referral to hepatologist   Follow up with Pulmonology in 1 week for hypoxia. NURSING provide resources/pamphlets for Chronic diarrhea, cirrhosis on diuretics (You are on spironolactone). Checking weight, blood pressures and volume state (symptoms of swelling, shortness of breath and dizziness) is advisable. Have primary provider  to check your kidney  function and potassium levels at your visit, alcoholism (NO MORE ALCOHOL), hypothyroidism     MEDICATIONS ON DISCHARGE     Medication List        START taking these medications         Instructions   albuterol 108 (90 Base) MCG/ACT Aers inhalation aerosol   Inhale 2 Puffs every 4 hours.  Dose: 2 Puff     folic acid 1 MG Tabs  Start taking on: March 9, 2024  Commonly known as: Folvite   Take 1 Tablet by mouth every day.  Dose: 1 mg     gabapentin 300 MG Caps  Commonly known as: Neurontin   Take 1 Capsule by mouth every evening.  Dose: 300 mg     levothyroxine 50 MCG Tabs  Commonly known as: Synthroid   Take 1 Tablet by mouth every morning on an empty stomach.  Dose: 50 mcg     multivitamin Tabs  Start taking on: March 9, 2024   Take 1 Tablet by mouth every day.  Dose: 1 Tablet     spironolactone 25 MG Tabs  Start taking on: March 9, 2024  Commonly known as: Aldactone   Take 1 Tablet by mouth every day.  Dose: 25 mg     thiamine 100 MG tablet  Start taking on: March 10, 2024  Commonly known as: Thiamine   Take 1 Tablet by mouth every day.  Dose: 100 mg     Xifaxan 550 MG Tabs tablet  Generic drug: riFAXIMin   Take 1 Tablet by mouth 2 times a day.  Dose: 550 mg            CONTINUE taking these medications        Instructions   escitalopram 10 MG Tabs  Commonly known as: Lexapro   Take 10 mg by mouth every day.  Dose: 10 mg     hydrOXYzine HCl 10 MG Tabs  Commonly known as: Atarax   Take 10 mg by mouth 1 time a day as needed for Anxiety.  Dose: 10 mg              Allergies  Allergies   Allergen Reactions    Strawberry Hives    Sulfa Drugs Hives     [id number]       DIET  Orders Placed This Encounter   Procedures    Diet Order Diet: Cardiac; Fluid modifications: (optional): 1800 ml Fluid Restriction     Standing Status:   Standing     Number of Occurrences:   1     Order Specific Question:   Diet:     Answer:   Cardiac [6]     Order Specific Question:   Fluid modifications: (optional)     Answer:   1800 ml Fluid Restriction [10]       ACTIVITY  Avoid heavy lifting or strenuous activity      CONSULTATIONS  See above    PROCEDURES      LABORATORY  Lab Results   Component Value Date    SODIUM 138  03/08/2024    POTASSIUM 3.2 (L) 03/08/2024    CHLORIDE 102 03/08/2024    CO2 24 03/08/2024    GLUCOSE 104 (H) 03/08/2024    BUN 3 (L) 03/08/2024    CREATININE 0.54 03/08/2024    CREATININE 0.83 02/01/2013        Lab Results   Component Value Date    WBC 7.7 03/08/2024    WBC 5.2 02/01/2013    HEMOGLOBIN 12.4 03/08/2024    HEMATOCRIT 35.2 (L) 03/08/2024    PLATELETCT 120 (L) 03/08/2024        Total time of the discharge process exceeds 50 minutes.

## 2024-03-08 NOTE — CARE PLAN
The patient is Watcher - Medium risk of patient condition declining or worsening    Shift Goals  Clinical Goals: CIWA, patient safety,   Patient Goals: Rest, safety    Progress made toward(s) clinical / shift goals:  Q4 CIWAs performed per MD order. Bed alarm on and audible. Paitent remained safe and free from falls overnight. Patient declined pain overnight.     Patient is not progressing towards the following goals:

## 2024-03-09 LAB
E COLI SXT1+2 STL IA: NORMAL
IGA SERPL-MCNC: 354 MG/DL (ref 68–408)
SIGNIFICANT IND 70042: NORMAL
SITE SITE: NORMAL
SOURCE SOURCE: NORMAL

## 2024-03-09 NOTE — DISCHARGE PLANNING
Care Transition Team Discharge Planning    Anticipated Discharge Information  Discharge Disposition: D/T to home under HHA care in anticipation of covered skilled care (06)  Discharge Address: [address] [place] [address]  Discharge Contact Phone Number: [phone]              Discharge Plan:  Home with Home Health      Pt discharged to home with Family yesterday.  This RN CM received PA decision from [employer] for Pt's Xifaxan and it was denied.   Informed Dr Armando for decision from [facility].    This RN CM tried to call Dr Tillman,  Pt's  to inform him but his phone was busy . This RN CM tried to call Pt but she did not   the call either.   This RN CM left Pt a VM about the decision from [facility] denying her PA for  Xifaxan. Per Pharmacist yesterday the cost was about $400. Informed Dr Armando via voalte of this update.

## 2024-03-09 NOTE — CARE PLAN
The patient is Stable - Low risk of patient condition declining or worsening    Shift Goals  Clinical Goals: pt safety, CIWA  Patient Goals: rest, go home    Progress made toward(s) clinical / shift goals:  Pt remained  Problem: Optimal Care for Alcohol Withdrawal  Goal: Optimal Care for the alcohol withdrawal patient  Outcome: Progressing     Problem: Psychosocial  Goal: Patient's level of anxiety will decrease  Outcome: Progressing     Problem: Lifestyle Changes  Goal: Patient's ability to identify lifestyle changes and available resources to help reduce recurrence of condition will improve  Outcome: Progressing    safe during shift, Pt CIWA protocol followed during shift.     Patient is not progressing towards the following goals:

## 2024-03-09 NOTE — PROGRESS NOTES
Pt cleared for discharge by DR. Armando. Pt given dc paperwork and home medications. Pt dc'd off unit with  and personal belongings. Pt given

## 2024-03-10 LAB
BACTERIA BLD CULT: NORMAL
FAT STL QL: NORMAL
NEUTRAL FAT STL QL: NORMAL
SIGNIFICANT IND 70042: NORMAL
SITE SITE: NORMAL
SOURCE SOURCE: NORMAL

## 2024-03-11 LAB
BACTERIA STL CULT: NORMAL
E COLI SXT1+2 STL IA: NORMAL
SIGNIFICANT IND 70042: NORMAL
SITE SITE: NORMAL
SOURCE SOURCE: NORMAL

## 2024-03-12 LAB — CALPROTECTIN STL-MCNT: 21 UG/G

## 2024-03-14 LAB
CHLORIDE STL-SCNC: NORMAL MMOL/L
POTASSIUM STL-SCNC: NORMAL MMOL/L
SODIUM STL-SCNC: NORMAL MMOL/L

## 2024-03-20 SDOH — ECONOMIC STABILITY: HOUSING INSECURITY: IN THE LAST 12 MONTHS, HOW MANY PLACES HAVE YOU LIVED?: 1

## 2024-03-20 SDOH — ECONOMIC STABILITY: FOOD INSECURITY: WITHIN THE PAST 12 MONTHS, YOU WORRIED THAT YOUR FOOD WOULD RUN OUT BEFORE YOU GOT MONEY TO BUY MORE.: NEVER TRUE

## 2024-03-20 SDOH — ECONOMIC STABILITY: TRANSPORTATION INSECURITY
IN THE PAST 12 MONTHS, HAS LACK OF TRANSPORTATION KEPT YOU FROM MEETINGS, WORK, OR FROM GETTING THINGS NEEDED FOR DAILY LIVING?: NO

## 2024-03-20 SDOH — HEALTH STABILITY: MENTAL HEALTH
STRESS IS WHEN SOMEONE FEELS TENSE, NERVOUS, ANXIOUS, OR CAN'T SLEEP AT NIGHT BECAUSE THEIR MIND IS TROUBLED. HOW STRESSED ARE YOU?: ONLY A LITTLE

## 2024-03-20 SDOH — ECONOMIC STABILITY: INCOME INSECURITY: HOW HARD IS IT FOR YOU TO PAY FOR THE VERY BASICS LIKE FOOD, HOUSING, MEDICAL CARE, AND HEATING?: NOT HARD AT ALL

## 2024-03-20 SDOH — ECONOMIC STABILITY: INCOME INSECURITY: IN THE LAST 12 MONTHS, WAS THERE A TIME WHEN YOU WERE NOT ABLE TO PAY THE MORTGAGE OR RENT ON TIME?: NO

## 2024-03-20 SDOH — HEALTH STABILITY: PHYSICAL HEALTH
ON AVERAGE, HOW MANY DAYS PER WEEK DO YOU ENGAGE IN MODERATE TO STRENUOUS EXERCISE (LIKE A BRISK WALK)?: PATIENT DECLINED

## 2024-03-20 SDOH — ECONOMIC STABILITY: TRANSPORTATION INSECURITY
IN THE PAST 12 MONTHS, HAS THE LACK OF TRANSPORTATION KEPT YOU FROM MEDICAL APPOINTMENTS OR FROM GETTING MEDICATIONS?: NO

## 2024-03-20 SDOH — HEALTH STABILITY: PHYSICAL HEALTH: ON AVERAGE, HOW MANY MINUTES DO YOU ENGAGE IN EXERCISE AT THIS LEVEL?: PATIENT DECLINED

## 2024-03-20 SDOH — ECONOMIC STABILITY: TRANSPORTATION INSECURITY
IN THE PAST 12 MONTHS, HAS LACK OF RELIABLE TRANSPORTATION KEPT YOU FROM MEDICAL APPOINTMENTS, MEETINGS, WORK OR FROM GETTING THINGS NEEDED FOR DAILY LIVING?: NO

## 2024-03-20 SDOH — ECONOMIC STABILITY: HOUSING INSECURITY
IN THE LAST 12 MONTHS, WAS THERE A TIME WHEN YOU DID NOT HAVE A STEADY PLACE TO SLEEP OR SLEPT IN A SHELTER (INCLUDING NOW)?: NO

## 2024-03-20 SDOH — ECONOMIC STABILITY: FOOD INSECURITY: WITHIN THE PAST 12 MONTHS, THE FOOD YOU BOUGHT JUST DIDN'T LAST AND YOU DIDN'T HAVE MONEY TO GET MORE.: NEVER TRUE

## 2024-03-20 ASSESSMENT — SOCIAL DETERMINANTS OF HEALTH (SDOH)
HOW OFTEN DO YOU ATTENT MEETINGS OF THE CLUB OR ORGANIZATION YOU BELONG TO?: NEVER
HOW OFTEN DO YOU ATTENT MEETINGS OF THE CLUB OR ORGANIZATION YOU BELONG TO?: NEVER
HOW OFTEN DO YOU GET TOGETHER WITH FRIENDS OR RELATIVES?: ONCE A WEEK
WITHIN THE PAST 12 MONTHS, YOU WORRIED THAT YOUR FOOD WOULD RUN OUT BEFORE YOU GOT THE MONEY TO BUY MORE: NEVER TRUE
IN A TYPICAL WEEK, HOW MANY TIMES DO YOU TALK ON THE PHONE WITH FAMILY, FRIENDS, OR NEIGHBORS?: THREE TIMES A WEEK
HOW OFTEN DO YOU ATTEND CHURCH OR RELIGIOUS SERVICES?: NEVER
DO YOU BELONG TO ANY CLUBS OR ORGANIZATIONS SUCH AS CHURCH GROUPS UNIONS, FRATERNAL OR ATHLETIC GROUPS, OR SCHOOL GROUPS?: NO
HOW OFTEN DO YOU HAVE SIX OR MORE DRINKS ON ONE OCCASION: PATIENT DECLINED
DO YOU BELONG TO ANY CLUBS OR ORGANIZATIONS SUCH AS CHURCH GROUPS UNIONS, FRATERNAL OR ATHLETIC GROUPS, OR SCHOOL GROUPS?: NO
HOW MANY DRINKS CONTAINING ALCOHOL DO YOU HAVE ON A TYPICAL DAY WHEN YOU ARE DRINKING: PATIENT DOES NOT DRINK
HOW OFTEN DO YOU GET TOGETHER WITH FRIENDS OR RELATIVES?: ONCE A WEEK
HOW OFTEN DO YOU ATTEND CHURCH OR RELIGIOUS SERVICES?: NEVER
IN A TYPICAL WEEK, HOW MANY TIMES DO YOU TALK ON THE PHONE WITH FAMILY, FRIENDS, OR NEIGHBORS?: THREE TIMES A WEEK
HOW HARD IS IT FOR YOU TO PAY FOR THE VERY BASICS LIKE FOOD, HOUSING, MEDICAL CARE, AND HEATING?: NOT HARD AT ALL
HOW OFTEN DO YOU HAVE A DRINK CONTAINING ALCOHOL: NEVER

## 2024-03-20 ASSESSMENT — LIFESTYLE VARIABLES
HOW OFTEN DO YOU HAVE SIX OR MORE DRINKS ON ONE OCCASION: PATIENT DECLINED
SKIP TO QUESTIONS 9-10: 0
AUDIT-C TOTAL SCORE: -1
HOW MANY STANDARD DRINKS CONTAINING ALCOHOL DO YOU HAVE ON A TYPICAL DAY: PATIENT DOES NOT DRINK
HOW OFTEN DO YOU HAVE A DRINK CONTAINING ALCOHOL: NEVER

## 2024-03-22 ENCOUNTER — OFFICE VISIT (OUTPATIENT)
Dept: MEDICAL GROUP | Facility: MEDICAL CENTER | Age: 49
End: 2024-03-22
Payer: COMMERCIAL

## 2024-03-22 VITALS
WEIGHT: 132 LBS | BODY MASS INDEX: 23.39 KG/M2 | SYSTOLIC BLOOD PRESSURE: 116 MMHG | OXYGEN SATURATION: 95 % | TEMPERATURE: 97.1 F | DIASTOLIC BLOOD PRESSURE: 60 MMHG | HEART RATE: 94 BPM | HEIGHT: 63 IN

## 2024-03-22 DIAGNOSIS — E03.9 ACQUIRED HYPOTHYROIDISM: ICD-10-CM

## 2024-03-22 DIAGNOSIS — R16.1 SPLEEN ENLARGED: ICD-10-CM

## 2024-03-22 DIAGNOSIS — E78.00 HYPERCHOLESTEROLEMIA: ICD-10-CM

## 2024-03-22 DIAGNOSIS — K70.11 ALCOHOLIC HEPATITIS WITH ASCITES: ICD-10-CM

## 2024-03-22 DIAGNOSIS — Z23 NEED FOR VACCINATION: ICD-10-CM

## 2024-03-22 DIAGNOSIS — R71.8 ELEVATED MCV: ICD-10-CM

## 2024-03-22 DIAGNOSIS — K70.0 ALCOHOLIC FATTY LIVER: ICD-10-CM

## 2024-03-22 DIAGNOSIS — K72.90 DECOMPENSATED HEPATIC CIRRHOSIS (HCC): Primary | ICD-10-CM

## 2024-03-22 DIAGNOSIS — I10 ESSENTIAL HYPERTENSION: ICD-10-CM

## 2024-03-22 DIAGNOSIS — F10.20 ALCOHOL USE DISORDER, SEVERE, DEPENDENCE (HCC): ICD-10-CM

## 2024-03-22 DIAGNOSIS — K74.60 DECOMPENSATED HEPATIC CIRRHOSIS (HCC): Primary | ICD-10-CM

## 2024-03-22 DIAGNOSIS — E55.9 VITAMIN D INSUFFICIENCY: ICD-10-CM

## 2024-03-22 PROCEDURE — 3078F DIAST BP <80 MM HG: CPT | Performed by: STUDENT IN AN ORGANIZED HEALTH CARE EDUCATION/TRAINING PROGRAM

## 2024-03-22 PROCEDURE — 90471 IMMUNIZATION ADMIN: CPT | Performed by: STUDENT IN AN ORGANIZED HEALTH CARE EDUCATION/TRAINING PROGRAM

## 2024-03-22 PROCEDURE — 90472 IMMUNIZATION ADMIN EACH ADD: CPT | Performed by: STUDENT IN AN ORGANIZED HEALTH CARE EDUCATION/TRAINING PROGRAM

## 2024-03-22 PROCEDURE — 3074F SYST BP LT 130 MM HG: CPT | Performed by: STUDENT IN AN ORGANIZED HEALTH CARE EDUCATION/TRAINING PROGRAM

## 2024-03-22 PROCEDURE — 90636 HEP A/HEP B VACC ADULT IM: CPT | Performed by: STUDENT IN AN ORGANIZED HEALTH CARE EDUCATION/TRAINING PROGRAM

## 2024-03-22 PROCEDURE — 90677 PCV20 VACCINE IM: CPT | Performed by: STUDENT IN AN ORGANIZED HEALTH CARE EDUCATION/TRAINING PROGRAM

## 2024-03-22 PROCEDURE — 99215 OFFICE O/P EST HI 40 MIN: CPT | Mod: 25 | Performed by: STUDENT IN AN ORGANIZED HEALTH CARE EDUCATION/TRAINING PROGRAM

## 2024-03-22 RX ORDER — NADOLOL 20 MG/1
20 TABLET ORAL DAILY
Qty: 30 TABLET | Refills: 2 | Status: SHIPPED | OUTPATIENT
Start: 2024-03-22

## 2024-03-22 RX ORDER — BUSPIRONE HYDROCHLORIDE 10 MG/1
10 TABLET ORAL 2 TIMES DAILY
COMMUNITY
Start: 2023-12-27

## 2024-03-22 RX ORDER — PROPRANOLOL HCL 60 MG
60 CAPSULE, EXTENDED RELEASE 24HR ORAL DAILY
Qty: 30 CAPSULE | Refills: 11 | Status: CANCELLED | OUTPATIENT
Start: 2024-03-22

## 2024-03-22 ASSESSMENT — PATIENT HEALTH QUESTIONNAIRE - PHQ9
SUM OF ALL RESPONSES TO PHQ9 QUESTIONS 1 AND 2: 0
4. FEELING TIRED OR HAVING LITTLE ENERGY: NOT AT ALL
6. FEELING BAD ABOUT YOURSELF - OR THAT YOU ARE A FAILURE OR HAVE LET YOURSELF OR YOUR FAMILY DOWN: NOT AL ALL
7. TROUBLE CONCENTRATING ON THINGS, SUCH AS READING THE NEWSPAPER OR WATCHING TELEVISION: NOT AT ALL
3. TROUBLE FALLING OR STAYING ASLEEP OR SLEEPING TOO MUCH: NOT AT ALL
9. THOUGHTS THAT YOU WOULD BE BETTER OFF DEAD, OR OF HURTING YOURSELF: NOT AT ALL
SUM OF ALL RESPONSES TO PHQ QUESTIONS 1-9: 0
2. FEELING DOWN, DEPRESSED, IRRITABLE, OR HOPELESS: NOT AT ALL
8. MOVING OR SPEAKING SO SLOWLY THAT OTHER PEOPLE COULD HAVE NOTICED. OR THE OPPOSITE, BEING SO FIGETY OR RESTLESS THAT YOU HAVE BEEN MOVING AROUND A LOT MORE THAN USUAL: NOT AT ALL
5. POOR APPETITE OR OVEREATING: NOT AT ALL
1. LITTLE INTEREST OR PLEASURE IN DOING THINGS: NOT AT ALL

## 2024-03-22 ASSESSMENT — FIBROSIS 4 INDEX: FIB4 SCORE: 7.78

## 2024-03-22 NOTE — PROGRESS NOTES
Subjective:     CC:   Chief Complaint   Patient presents with    Establish Care    Lab Results     Wants to review lab results    Other     And discusses condition that she was diagnosed with         HPI:   Minerva presents today to establish care     Today patient denies any acute concerns of diarrhea, abdominal pain, nausea, vomiting.  Denies any blood in stool or hematemesis.  She still feels slightly bloated or swollen and abdominal likely due to her mild ascites.  No fever no chills, no concern for confusion nor altered mentation.  No chest pain or SOB but her O2 stats do fluctuate with exertion or lying down - using O2 2L-4L.    Patient was recently admitted in hospital for management of acute alcoholism tightness, possible cirrhosis and diarrhea.  Ultrasound showed severe genetic steatosis with hepatomegaly .    CT abdomen showed  Cirrhosis with portal hypertension  Associated ascites and splenomegaly  Hepatic steatosis, extremely severe     She has elevated transaminitis with bilirubinemia, elevated ammonia levels as well.  Patient was placed on CIWA protocol.  Also had antibiotics for SBP prophylaxis and vitamin K due to hepatic coagulopathy.  Rifaximin was added for hyperammonemia  Spironolactone is added  Not started on propranolol or nadolol for prophylaxis for portal hypertension because her blood pressure was soft while in hospital.  [facility] as recommended colonoscopy for chronic diarrhea.      Celiac studies-negative for IgE transglutaminase IgA antibodies.  Had elevated inflammatory markers procalcitonin and C-reactive protein   stool culture, fecal calprotectin, ova and parasites, fecal lactoferrin, rotavirus-stool test were all negative.      Hypothyroidism  Patient has chronic history of hypothyroidism previously on medications but patient has been noncompliant and has not taken her medications for a while.  Her thyroid function test was abnormal while in hospital with TSH high at  "56 and free T4 very low at 0.4 and free T30.9.  Patient is restarted on thyroid medication.  Currently on levothyroxine 50 mcg      Health Maintenance: Completed    ROS:  ROS    Review of systems unremarkable except for concerns noted by patient or items listed.    Please see HPI for additional ROS.      Objective:     Exam:  /60 (BP Location: Left arm, Patient Position: Sitting, BP Cuff Size: Adult)   Pulse 94   Temp 36.2 °C (97.1 °F) (Temporal)   Ht 1.6 m (5' 3\")   Wt 59.9 kg (132 lb)   SpO2 95%   BMI 23.38 kg/m²  Body mass index is 23.38 kg/m².    Physical Exam  Constitutional:       Appearance: Normal appearance.   HENT:      Head: Normocephalic.   Eyes:      General: No scleral icterus.  Cardiovascular:      Rate and Rhythm: Normal rate and regular rhythm.      Pulses: Normal pulses.      Heart sounds: Normal heart sounds.   Pulmonary:      Effort: Pulmonary effort is normal.      Breath sounds: Normal breath sounds.   Abdominal:      General: Bowel sounds are normal. There is distension.      Tenderness: There is no abdominal tenderness.   Musculoskeletal:      Right lower leg: No edema.      Left lower leg: No edema.   Skin:     General: Skin is warm.   Neurological:      Mental Status: She is alert and oriented to person, place, and time.   Psychiatric:         Mood and Affect: Mood normal.         Behavior: Behavior normal.             Labs: reviewed     Assessment & Plan:     49 y.o. female with the following -     1. Need for vaccination  - Hepatitis B Vaccine Adult IM  - Hepatitis A Vaccine Adult IM  - Pneumococcal Conjugate Vaccine 20-Valent (6 wks+)    2. Alcohol use disorder, severe, dependence (HCC)  Chronic  Patient has quite alcohol for last 4 weeks  Encouraged to continue abstinence from alcohol  Continue thiamine and folic acid supplements.  Will check B1, B12 and folate levels given elevated MCV  - FOLATE; Future  - VITAMIN B12; Future  - VITAMIN B1; Future    3. Alcoholic fatty " liver  4. Decompensated hepatic cirrhosis, hypoxia, chronic diarrhea, alcoholism (HCC)  Patient with chronic alcoholic fatty liver.  Recent decompensated hepatic cirrhosis with hypoxia and diarrhea  Symptoms have been improved.  Plan  Continue spironolactone 25 mg daily, rifaximin 550 mg twice daily, supplements folic acid and thiamine.  Will repeat labs in 2 months .  20 mg once a day for prophylaxis for portal hypertension given patient has hepatosplenomegaly.  - Comp Metabolic Panel; Future  - CBC WITHOUT DIFFERENTIAL; Future  - Referral to Gastroenterology  - nadolol (CORGARD) 20 MG Tab; Take 1 Tablet by mouth every day.  Dispense: 30 Tablet; Refill: 2    5. Acquired hypothyroidism  Chronic, uncontrolled  Continue Synthroid 50 mcg daily  Repeat TSH in 8 weeks  - TSH WITH REFLEX TO FT4; Future    6. Essential hypertension  Stable  Chronic  Patient's blood pressure readings are within normal range.  Currently on Aldactone 25 mg  Will add smaller dose of nadolol 20 mg once a day for prophylaxis for portal hypertension  Patient is recommended to check blood pressures at least 2 times a week and maintain a log    7.  Elevated MCV  Most likely due to alcohol use  Check folic acid levels and B12 levels continue abstinence from alcohol      I spent a total of 43 minutes with record review, exam, communication with the patient, communication with other providers, and documentation of this encounter.      Return in about 2 months (around 5/22/2024) for medications check, labs f/u.    Please note that this dictation was created using voice recognition software. I have made every reasonable attempt to correct obvious errors, but I expect that there are errors of grammar and possibly content that I did not discover before finalizing the note.

## 2024-04-30 ENCOUNTER — APPOINTMENT (OUTPATIENT)
Dept: RADIOLOGY | Facility: MEDICAL CENTER | Age: 49
DRG: 897 | End: 2024-04-30
Attending: EMERGENCY MEDICINE
Payer: COMMERCIAL

## 2024-04-30 ENCOUNTER — HOSPITAL ENCOUNTER (INPATIENT)
Facility: MEDICAL CENTER | Age: 49
DRG: 897 | End: 2024-04-30
Attending: EMERGENCY MEDICINE | Admitting: INTERNAL MEDICINE
Payer: COMMERCIAL

## 2024-04-30 VITALS
HEIGHT: 63 IN | DIASTOLIC BLOOD PRESSURE: 86 MMHG | HEART RATE: 85 BPM | SYSTOLIC BLOOD PRESSURE: 125 MMHG | TEMPERATURE: 97.7 F | BODY MASS INDEX: 22.81 KG/M2 | WEIGHT: 128.75 LBS | RESPIRATION RATE: 16 BRPM | OXYGEN SATURATION: 97 %

## 2024-04-30 DIAGNOSIS — K70.30 ALCOHOLIC CIRRHOSIS OF LIVER WITHOUT ASCITES (HCC): ICD-10-CM

## 2024-04-30 DIAGNOSIS — F10.20 ALCOHOLISM (HCC): ICD-10-CM

## 2024-04-30 DIAGNOSIS — F10.20 ALCOHOL USE DISORDER, SEVERE, DEPENDENCE (HCC): ICD-10-CM

## 2024-04-30 DIAGNOSIS — G62.9 NEUROPATHY: ICD-10-CM

## 2024-04-30 DIAGNOSIS — E83.42 HYPOMAGNESEMIA: ICD-10-CM

## 2024-04-30 DIAGNOSIS — R26.89 BALANCE PROBLEM: ICD-10-CM

## 2024-04-30 DIAGNOSIS — F10.939 ALCOHOL WITHDRAWAL SYNDROME WITH COMPLICATION (HCC): ICD-10-CM

## 2024-04-30 DIAGNOSIS — R73.9 HYPERGLYCEMIA: ICD-10-CM

## 2024-04-30 PROBLEM — E83.39 HYPOPHOSPHATEMIA: Status: ACTIVE | Noted: 2024-04-30

## 2024-04-30 PROBLEM — F10.121 ALCOHOL INTOXICATION DELIRIUM (HCC): Status: ACTIVE | Noted: 2024-04-30

## 2024-04-30 LAB
ALBUMIN SERPL BCP-MCNC: 4.6 G/DL (ref 3.2–4.9)
ALBUMIN/GLOB SERPL: 1.4 G/DL
ALP SERPL-CCNC: 173 U/L (ref 30–99)
ALT SERPL-CCNC: 28 U/L (ref 2–50)
ANION GAP SERPL CALC-SCNC: 22 MMOL/L (ref 7–16)
AST SERPL-CCNC: 60 U/L (ref 12–45)
BASOPHILS # BLD AUTO: 1.3 % (ref 0–1.8)
BASOPHILS # BLD: 0.07 K/UL (ref 0–0.12)
BILIRUB SERPL-MCNC: 1.5 MG/DL (ref 0.1–1.5)
BUN SERPL-MCNC: 6 MG/DL (ref 8–22)
CALCIUM ALBUM COR SERPL-MCNC: 9.3 MG/DL (ref 8.5–10.5)
CALCIUM SERPL-MCNC: 9.8 MG/DL (ref 8.5–10.5)
CHLORIDE SERPL-SCNC: 100 MMOL/L (ref 96–112)
CK SERPL-CCNC: 76 U/L (ref 0–154)
CO2 SERPL-SCNC: 20 MMOL/L (ref 20–33)
CREAT SERPL-MCNC: 0.45 MG/DL (ref 0.5–1.4)
EKG IMPRESSION: NORMAL
EOSINOPHIL # BLD AUTO: 0 K/UL (ref 0–0.51)
EOSINOPHIL NFR BLD: 0 % (ref 0–6.9)
ERYTHROCYTE [DISTWIDTH] IN BLOOD BY AUTOMATED COUNT: 50.7 FL (ref 35.9–50)
ETHANOL BLD-MCNC: <10.1 MG/DL
GFR SERPLBLD CREATININE-BSD FMLA CKD-EPI: 117 ML/MIN/1.73 M 2
GLOBULIN SER CALC-MCNC: 3.3 G/DL (ref 1.9–3.5)
GLUCOSE SERPL-MCNC: 204 MG/DL (ref 65–99)
HCT VFR BLD AUTO: 44.2 % (ref 37–47)
HGB BLD-MCNC: 15.4 G/DL (ref 12–16)
IMM GRANULOCYTES # BLD AUTO: 0.04 K/UL (ref 0–0.11)
IMM GRANULOCYTES NFR BLD AUTO: 0.8 % (ref 0–0.9)
LIPASE SERPL-CCNC: 35 U/L (ref 11–82)
LYMPHOCYTES # BLD AUTO: 0.48 K/UL (ref 1–4.8)
LYMPHOCYTES NFR BLD: 9.2 % (ref 22–41)
MAGNESIUM SERPL-MCNC: 1.1 MG/DL (ref 1.5–2.5)
MCH RBC QN AUTO: 37.4 PG (ref 27–33)
MCHC RBC AUTO-ENTMCNC: 34.8 G/DL (ref 32.2–35.5)
MCV RBC AUTO: 107.3 FL (ref 81.4–97.8)
MONOCYTES # BLD AUTO: 0.68 K/UL (ref 0–0.85)
MONOCYTES NFR BLD AUTO: 13 % (ref 0–13.4)
NEUTROPHILS # BLD AUTO: 3.97 K/UL (ref 1.82–7.42)
NEUTROPHILS NFR BLD: 75.7 % (ref 44–72)
NRBC # BLD AUTO: 0 K/UL
NRBC BLD-RTO: 0 /100 WBC (ref 0–0.2)
PHOSPHATE SERPL-MCNC: 2.4 MG/DL (ref 2.5–4.5)
PLATELET # BLD AUTO: 114 K/UL (ref 164–446)
PMV BLD AUTO: 9.9 FL (ref 9–12.9)
POTASSIUM SERPL-SCNC: 3.3 MMOL/L (ref 3.6–5.5)
PROT SERPL-MCNC: 7.9 G/DL (ref 6–8.2)
RBC # BLD AUTO: 4.12 M/UL (ref 4.2–5.4)
SODIUM SERPL-SCNC: 142 MMOL/L (ref 135–145)
T4 FREE SERPL-MCNC: 1.25 NG/DL (ref 0.93–1.7)
TSH SERPL DL<=0.005 MIU/L-ACNC: 8.81 UIU/ML (ref 0.38–5.33)
WBC # BLD AUTO: 5.2 K/UL (ref 4.8–10.8)

## 2024-04-30 PROCEDURE — 93005 ELECTROCARDIOGRAM TRACING: CPT

## 2024-04-30 PROCEDURE — A9270 NON-COVERED ITEM OR SERVICE: HCPCS | Performed by: INTERNAL MEDICINE

## 2024-04-30 PROCEDURE — 700111 HCHG RX REV CODE 636 W/ 250 OVERRIDE (IP): Mod: JZ | Performed by: INTERNAL MEDICINE

## 2024-04-30 PROCEDURE — 700105 HCHG RX REV CODE 258: Performed by: EMERGENCY MEDICINE

## 2024-04-30 PROCEDURE — 700101 HCHG RX REV CODE 250: Performed by: INTERNAL MEDICINE

## 2024-04-30 PROCEDURE — HZ2ZZZZ DETOXIFICATION SERVICES FOR SUBSTANCE ABUSE TREATMENT: ICD-10-PCS | Performed by: EMERGENCY MEDICINE

## 2024-04-30 PROCEDURE — 700111 HCHG RX REV CODE 636 W/ 250 OVERRIDE (IP): Performed by: EMERGENCY MEDICINE

## 2024-04-30 PROCEDURE — 700101 HCHG RX REV CODE 250: Performed by: EMERGENCY MEDICINE

## 2024-04-30 PROCEDURE — 93005 ELECTROCARDIOGRAM TRACING: CPT | Performed by: EMERGENCY MEDICINE

## 2024-04-30 PROCEDURE — 99223 1ST HOSP IP/OBS HIGH 75: CPT | Performed by: INTERNAL MEDICINE

## 2024-04-30 PROCEDURE — 700102 HCHG RX REV CODE 250 W/ 637 OVERRIDE(OP): Performed by: INTERNAL MEDICINE

## 2024-04-30 PROCEDURE — 770020 HCHG ROOM/CARE - TELE (206)

## 2024-04-30 RX ORDER — FOLIC ACID 1 MG/1
1 TABLET ORAL DAILY
Qty: 4 TABLET | Refills: 0 | Status: DISCONTINUED | OUTPATIENT
Start: 2024-05-01 | End: 2024-05-02 | Stop reason: HOSPADM

## 2024-04-30 RX ORDER — LORAZEPAM 2 MG/1
4 TABLET ORAL
Status: DISCONTINUED | OUTPATIENT
Start: 2024-04-30 | End: 2024-04-30

## 2024-04-30 RX ORDER — LORAZEPAM 2 MG/ML
0.5 INJECTION INTRAMUSCULAR EVERY 4 HOURS PRN
Status: DISCONTINUED | OUTPATIENT
Start: 2024-04-30 | End: 2024-04-30

## 2024-04-30 RX ORDER — GAUZE BANDAGE 2" X 2"
100 BANDAGE TOPICAL DAILY
Qty: 4 TABLET | Refills: 0 | Status: DISCONTINUED | OUTPATIENT
Start: 2024-05-01 | End: 2024-05-02 | Stop reason: HOSPADM

## 2024-04-30 RX ORDER — LORAZEPAM 2 MG/ML
2 INJECTION INTRAMUSCULAR ONCE
Status: COMPLETED | OUTPATIENT
Start: 2024-04-30 | End: 2024-04-30

## 2024-04-30 RX ORDER — LABETALOL HYDROCHLORIDE 5 MG/ML
10 INJECTION, SOLUTION INTRAVENOUS EVERY 4 HOURS PRN
Status: DISCONTINUED | OUTPATIENT
Start: 2024-04-30 | End: 2024-05-02 | Stop reason: HOSPADM

## 2024-04-30 RX ORDER — SODIUM CHLORIDE AND POTASSIUM CHLORIDE 150; 900 MG/100ML; MG/100ML
INJECTION, SOLUTION INTRAVENOUS CONTINUOUS
Status: DISCONTINUED | OUTPATIENT
Start: 2024-04-30 | End: 2024-05-02 | Stop reason: HOSPADM

## 2024-04-30 RX ORDER — HYDROXYZINE HYDROCHLORIDE 10 MG/1
10 TABLET, FILM COATED ORAL 3 TIMES DAILY PRN
Status: DISCONTINUED | OUTPATIENT
Start: 2024-04-30 | End: 2024-05-02 | Stop reason: HOSPADM

## 2024-04-30 RX ORDER — DIAZEPAM 5 MG/1
10 TABLET ORAL EVERY 6 HOURS
Status: COMPLETED | OUTPATIENT
Start: 2024-04-30 | End: 2024-05-01

## 2024-04-30 RX ORDER — LORAZEPAM 1 MG/1
1 TABLET ORAL EVERY 4 HOURS PRN
Status: DISCONTINUED | OUTPATIENT
Start: 2024-04-30 | End: 2024-04-30

## 2024-04-30 RX ORDER — LORAZEPAM 2 MG/1
2 TABLET ORAL
Status: DISCONTINUED | OUTPATIENT
Start: 2024-04-30 | End: 2024-04-30

## 2024-04-30 RX ORDER — MAGNESIUM SULFATE HEPTAHYDRATE 40 MG/ML
2 INJECTION, SOLUTION INTRAVENOUS ONCE
Status: COMPLETED | OUTPATIENT
Start: 2024-04-30 | End: 2024-04-30

## 2024-04-30 RX ORDER — LORAZEPAM 2 MG/ML
1.5 INJECTION INTRAMUSCULAR
Status: DISCONTINUED | OUTPATIENT
Start: 2024-04-30 | End: 2024-04-30

## 2024-04-30 RX ORDER — LORAZEPAM 1 MG/1
1 TABLET ORAL EVERY 4 HOURS PRN
Status: DISCONTINUED | OUTPATIENT
Start: 2024-04-30 | End: 2024-05-02 | Stop reason: HOSPADM

## 2024-04-30 RX ORDER — ESCITALOPRAM OXALATE 10 MG/1
10 TABLET ORAL DAILY
Status: DISCONTINUED | OUTPATIENT
Start: 2024-05-01 | End: 2024-05-02 | Stop reason: HOSPADM

## 2024-04-30 RX ORDER — POTASSIUM CHLORIDE 20 MEQ/1
20 TABLET, EXTENDED RELEASE ORAL 2 TIMES DAILY
Status: DISCONTINUED | OUTPATIENT
Start: 2024-04-30 | End: 2024-05-02 | Stop reason: HOSPADM

## 2024-04-30 RX ORDER — LORAZEPAM 2 MG/ML
0.5 INJECTION INTRAMUSCULAR EVERY 4 HOURS PRN
Status: DISCONTINUED | OUTPATIENT
Start: 2024-04-30 | End: 2024-05-02 | Stop reason: HOSPADM

## 2024-04-30 RX ORDER — LORAZEPAM 2 MG/ML
2 INJECTION INTRAMUSCULAR
Status: DISCONTINUED | OUTPATIENT
Start: 2024-04-30 | End: 2024-05-02 | Stop reason: HOSPADM

## 2024-04-30 RX ORDER — LORAZEPAM 2 MG/ML
1.5 INJECTION INTRAMUSCULAR
Status: DISCONTINUED | OUTPATIENT
Start: 2024-04-30 | End: 2024-05-02 | Stop reason: HOSPADM

## 2024-04-30 RX ORDER — LORAZEPAM 2 MG/ML
2 INJECTION INTRAMUSCULAR
Status: DISCONTINUED | OUTPATIENT
Start: 2024-04-30 | End: 2024-04-30

## 2024-04-30 RX ORDER — ONDANSETRON 4 MG/1
4 TABLET, ORALLY DISINTEGRATING ORAL EVERY 4 HOURS PRN
Status: DISCONTINUED | OUTPATIENT
Start: 2024-04-30 | End: 2024-05-02 | Stop reason: HOSPADM

## 2024-04-30 RX ORDER — PROMETHAZINE HYDROCHLORIDE 25 MG/1
12.5-25 TABLET ORAL EVERY 4 HOURS PRN
Status: DISCONTINUED | OUTPATIENT
Start: 2024-04-30 | End: 2024-05-02 | Stop reason: HOSPADM

## 2024-04-30 RX ORDER — GABAPENTIN 300 MG/1
300 CAPSULE ORAL NIGHTLY
Status: DISCONTINUED | OUTPATIENT
Start: 2024-04-30 | End: 2024-05-02 | Stop reason: HOSPADM

## 2024-04-30 RX ORDER — DIAZEPAM 5 MG/1
5 TABLET ORAL EVERY 6 HOURS
Status: DISCONTINUED | OUTPATIENT
Start: 2024-05-01 | End: 2024-05-02 | Stop reason: HOSPADM

## 2024-04-30 RX ORDER — LEVOTHYROXINE SODIUM 0.05 MG/1
50 TABLET ORAL
Status: DISCONTINUED | OUTPATIENT
Start: 2024-04-30 | End: 2024-05-02 | Stop reason: HOSPADM

## 2024-04-30 RX ORDER — PROCHLORPERAZINE EDISYLATE 5 MG/ML
5-10 INJECTION INTRAMUSCULAR; INTRAVENOUS EVERY 4 HOURS PRN
Status: DISCONTINUED | OUTPATIENT
Start: 2024-04-30 | End: 2024-05-02 | Stop reason: HOSPADM

## 2024-04-30 RX ORDER — LORAZEPAM 2 MG/1
2 TABLET ORAL
Status: DISCONTINUED | OUTPATIENT
Start: 2024-04-30 | End: 2024-05-02 | Stop reason: HOSPADM

## 2024-04-30 RX ORDER — LORAZEPAM 1 MG/1
0.5 TABLET ORAL EVERY 4 HOURS PRN
Status: DISCONTINUED | OUTPATIENT
Start: 2024-04-30 | End: 2024-04-30

## 2024-04-30 RX ORDER — ENOXAPARIN SODIUM 100 MG/ML
40 INJECTION SUBCUTANEOUS DAILY
Status: DISCONTINUED | OUTPATIENT
Start: 2024-04-30 | End: 2024-05-02 | Stop reason: HOSPADM

## 2024-04-30 RX ORDER — LORAZEPAM 2 MG/ML
1 INJECTION INTRAMUSCULAR
Status: DISCONTINUED | OUTPATIENT
Start: 2024-04-30 | End: 2024-04-30

## 2024-04-30 RX ORDER — PROMETHAZINE HYDROCHLORIDE 25 MG/1
12.5-25 SUPPOSITORY RECTAL EVERY 4 HOURS PRN
Status: DISCONTINUED | OUTPATIENT
Start: 2024-04-30 | End: 2024-05-02 | Stop reason: HOSPADM

## 2024-04-30 RX ORDER — ONDANSETRON 2 MG/ML
4 INJECTION INTRAMUSCULAR; INTRAVENOUS EVERY 4 HOURS PRN
Status: DISCONTINUED | OUTPATIENT
Start: 2024-04-30 | End: 2024-05-02 | Stop reason: HOSPADM

## 2024-04-30 RX ORDER — LANOLIN ALCOHOL/MO/W.PET/CERES
400 CREAM (GRAM) TOPICAL 2 TIMES DAILY
Status: DISCONTINUED | OUTPATIENT
Start: 2024-05-01 | End: 2024-05-02 | Stop reason: HOSPADM

## 2024-04-30 RX ORDER — LORAZEPAM 0.5 MG/1
0.5 TABLET ORAL EVERY 4 HOURS PRN
Status: DISCONTINUED | OUTPATIENT
Start: 2024-04-30 | End: 2024-05-02 | Stop reason: HOSPADM

## 2024-04-30 RX ORDER — LORAZEPAM 2 MG/1
4 TABLET ORAL
Status: DISCONTINUED | OUTPATIENT
Start: 2024-04-30 | End: 2024-05-02 | Stop reason: HOSPADM

## 2024-04-30 RX ORDER — MAGNESIUM SULFATE HEPTAHYDRATE 40 MG/ML
2 INJECTION, SOLUTION INTRAVENOUS ONCE
Qty: 50 ML | Refills: 0 | Status: COMPLETED | OUTPATIENT
Start: 2024-04-30 | End: 2024-04-30

## 2024-04-30 RX ORDER — LORAZEPAM 2 MG/ML
1 INJECTION INTRAMUSCULAR
Status: DISCONTINUED | OUTPATIENT
Start: 2024-04-30 | End: 2024-05-02 | Stop reason: HOSPADM

## 2024-04-30 RX ADMIN — LORAZEPAM 1 MG: 1 TABLET ORAL at 18:31

## 2024-04-30 RX ADMIN — GABAPENTIN 300 MG: 300 CAPSULE ORAL at 20:11

## 2024-04-30 RX ADMIN — LORAZEPAM 2 MG: 2 INJECTION INTRAMUSCULAR; INTRAVENOUS at 11:45

## 2024-04-30 RX ADMIN — MAGNESIUM SULFATE HEPTAHYDRATE 2 G: 2 INJECTION, SOLUTION INTRAVENOUS at 13:05

## 2024-04-30 RX ADMIN — MAGNESIUM SULFATE HEPTAHYDRATE 2 G: 2 INJECTION, SOLUTION INTRAVENOUS at 16:28

## 2024-04-30 RX ADMIN — DIAZEPAM 10 MG: 5 TABLET ORAL at 14:55

## 2024-04-30 RX ADMIN — RIFAXIMIN 550 MG: 550 TABLET ORAL at 17:21

## 2024-04-30 RX ADMIN — POTASSIUM CHLORIDE 20 MEQ: 1500 TABLET, EXTENDED RELEASE ORAL at 17:21

## 2024-04-30 RX ADMIN — DIAZEPAM 10 MG: 5 TABLET ORAL at 20:11

## 2024-04-30 RX ADMIN — POTASSIUM CHLORIDE AND SODIUM CHLORIDE: 900; 150 INJECTION, SOLUTION INTRAVENOUS at 14:55

## 2024-04-30 RX ADMIN — LEVOTHYROXINE SODIUM 50 MCG: 0.05 TABLET ORAL at 16:24

## 2024-04-30 RX ADMIN — MAGNESIUM SULFATE HEPTAHYDRATE: 500 INJECTION, SOLUTION INTRAMUSCULAR; INTRAVENOUS at 11:49

## 2024-04-30 RX ADMIN — LORAZEPAM 2 MG: 2 TABLET ORAL at 16:24

## 2024-04-30 RX ADMIN — LORAZEPAM 1 MG: 1 TABLET ORAL at 22:03

## 2024-04-30 RX ADMIN — ENOXAPARIN SODIUM 40 MG: 100 INJECTION SUBCUTANEOUS at 17:21

## 2024-04-30 ASSESSMENT — LIFESTYLE VARIABLES
HAVE YOU EVER FELT YOU SHOULD CUT DOWN ON YOUR DRINKING: YES
TOTAL SCORE: 11
VISUAL DISTURBANCES: NOT PRESENT
NAUSEA AND VOMITING: MILD NAUSEA WITH NO VOMITING
AUDITORY DISTURBANCES: NOT PRESENT
ALCOHOL_USE: YES
NAUSEA AND VOMITING: NO NAUSEA AND NO VOMITING
HEADACHE, FULLNESS IN HEAD: NOT PRESENT
HEADACHE, FULLNESS IN HEAD: NOT PRESENT
NAUSEA AND VOMITING: *
HAVE YOU EVER FELT YOU SHOULD CUT DOWN ON YOUR DRINKING: YES
HOW MANY TIMES IN THE PAST YEAR HAVE YOU HAD 5 OR MORE DRINKS IN A DAY: 2
PAROXYSMAL SWEATS: NO SWEAT VISIBLE
HEADACHE, FULLNESS IN HEAD: NOT PRESENT
TREMOR: MODERATE TREMOR WITH ARMS EXTENDED
AVERAGE NUMBER OF DAYS PER WEEK YOU HAVE A DRINK CONTAINING ALCOHOL: 7
ORIENTATION AND CLOUDING OF SENSORIUM: DATE DISORIENTATION BY NO MORE THAN TWO CALENDAR DAYS
VISUAL DISTURBANCES: NOT PRESENT
AGITATION: NORMAL ACTIVITY
VISUAL DISTURBANCES: NOT PRESENT
TREMOR: MODERATE TREMOR WITH ARMS EXTENDED
AGITATION: NORMAL ACTIVITY
TOTAL SCORE: 4
TREMOR: MODERATE TREMOR WITH ARMS EXTENDED
AUDITORY DISTURBANCES: NOT PRESENT
HEADACHE, FULLNESS IN HEAD: NOT PRESENT
PAROXYSMAL SWEATS: *
TOTAL SCORE: 4
PAROXYSMAL SWEATS: NO SWEAT VISIBLE
TACTILE DISTURBANCES: VERY MILD ITCHING, PINS AND NEEDLES SENSATION, BURNING OR NUMBNESS
ORIENTATION AND CLOUDING OF SENSORIUM: CANNOT DO SERIAL ADDITIONS OR IS UNCERTAIN ABOUT DATE
NAUSEA AND VOMITING: MILD NAUSEA WITH NO VOMITING
AUDITORY DISTURBANCES: NOT PRESENT
SUBSTANCE_ABUSE: 1
EVER FELT BAD OR GUILTY ABOUT YOUR DRINKING: YES
HAVE PEOPLE ANNOYED YOU BY CRITICIZING YOUR DRINKING: YES
TREMOR: *
ANXIETY: MODERATELY ANXIOUS OR GUARDED, SO ANXIETY IS INFERRED
ON A TYPICAL DAY WHEN YOU DRINK ALCOHOL HOW MANY DRINKS DO YOU HAVE: 3
TOTAL SCORE: 7
ORIENTATION AND CLOUDING OF SENSORIUM: CANNOT DO SERIAL ADDITIONS OR IS UNCERTAIN ABOUT DATE
TOTAL SCORE: MILD ITCHING, PINS AND NEEDLES SENSATION, BURNING OR NUMBNESS
AUDITORY DISTURBANCES: NOT PRESENT
ORIENTATION AND CLOUDING OF SENSORIUM: ORIENTED AND CAN DO SERIAL ADDITIONS
ANXIETY: *
PAROXYSMAL SWEATS: NO SWEAT VISIBLE
VISUAL DISTURBANCES: NOT PRESENT
DO YOU DRINK ALCOHOL: YES
CONSUMPTION TOTAL: POSITIVE
ANXIETY: *
TOTAL SCORE: 4
TREMOR: MODERATE TREMOR WITH ARMS EXTENDED
TOTAL SCORE: 9
AGITATION: NORMAL ACTIVITY
VISUAL DISTURBANCES: VERY MILD SENSITIVITY
DOES PATIENT WANT TO TALK TO SOMEONE ABOUT QUITTING: YES
AGITATION: NORMAL ACTIVITY
HEADACHE, FULLNESS IN HEAD: NOT PRESENT
TOTAL SCORE: 10
DOES PATIENT WANT TO STOP DRINKING: YES
NAUSEA AND VOMITING: NO NAUSEA AND NO VOMITING
PAROXYSMAL SWEATS: NO SWEAT VISIBLE
AGITATION: *
ORIENTATION AND CLOUDING OF SENSORIUM: ORIENTED AND CAN DO SERIAL ADDITIONS
AUDITORY DISTURBANCES: NOT PRESENT
TACTILE DISTURBANCES: VERY MILD ITCHING, PINS AND NEEDLES SENSATION, BURNING OR NUMBNESS
ANXIETY: *
ANXIETY: MILDLY ANXIOUS
EVER HAD A DRINK FIRST THING IN THE MORNING TO STEADY YOUR NERVES TO GET RID OF A HANGOVER: YES
TOTAL SCORE: 10

## 2024-04-30 ASSESSMENT — COGNITIVE AND FUNCTIONAL STATUS - GENERAL
DRESSING REGULAR UPPER BODY CLOTHING: A LOT
TURNING FROM BACK TO SIDE WHILE IN FLAT BAD: A LITTLE
DAILY ACTIVITIY SCORE: 17
WALKING IN HOSPITAL ROOM: A LITTLE
HELP NEEDED FOR BATHING: A LOT
MOVING TO AND FROM BED TO CHAIR: A LITTLE
CLIMB 3 TO 5 STEPS WITH RAILING: A LOT
MOBILITY SCORE: 17
TOILETING: A LOT
MOVING FROM LYING ON BACK TO SITTING ON SIDE OF FLAT BED: A LITTLE
SUGGESTED CMS G CODE MODIFIER MOBILITY: CK
STANDING UP FROM CHAIR USING ARMS: A LITTLE
DRESSING REGULAR LOWER BODY CLOTHING: A LITTLE
SUGGESTED CMS G CODE MODIFIER DAILY ACTIVITY: CK

## 2024-04-30 ASSESSMENT — ENCOUNTER SYMPTOMS
CHILLS: 0
VOMITING: 1
SHORTNESS OF BREATH: 0
SEIZURES: 1
DIZZINESS: 1
COUGH: 0
ABDOMINAL PAIN: 0
WEAKNESS: 1
FEVER: 0
SENSORY CHANGE: 1
NAUSEA: 1
PALPITATIONS: 0

## 2024-04-30 ASSESSMENT — FIBROSIS 4 INDEX
FIB4 SCORE: 4.97
FIB4 SCORE: 7.78

## 2024-04-30 ASSESSMENT — PAIN DESCRIPTION - PAIN TYPE: TYPE: ACUTE PAIN

## 2024-04-30 NOTE — ED NOTES
Med rec is complete per Patient at bedside, [facility], [facility], and [facility] for historical fills.  Per pt, she was on an antibiotic for a UTI, but unable to tell me last time she had it nor where it was filled.  [facility] have not filled antibiotic for Pt.   Allergies reviewed.    Has patient had any outside antibiotics in the last 30 days? Y unknown for UTI    Any Anticoagulants (rivaroxaban, apixaban, edoxaban, dabigatran, warfarin, enoxaparin) taken in the last 14 days? N         Pharmacy/Pharmacies Pt utilizes : [facility] [phone]

## 2024-04-30 NOTE — ED NOTES
Pt assisted to bedside commode while continuously monitored. Pt attempted to provide a urinalysis and was unsuccessful at this time. Pt oxygen desaturated during ambulation down to 83%. Upon sitting Pt oxygen saturation returned to normal limits. Pt on 2 lpm nasal cannula and continuous monitor.

## 2024-04-30 NOTE — ED NOTES
Break RN:    Tele at bedside to  pt.  Report already called by primary RN. Pt sent up on 2L NC with chart, medications, and belongings.

## 2024-04-30 NOTE — ED NOTES
Pt observed resting on gurney at this time, respirations even and unlabored.  No S/S of discomfort or distress at this time.  Bed in lowest position.

## 2024-04-30 NOTE — ED TRIAGE NOTES
".  Chief Complaint   Patient presents with    N/V    Weakness     Lower extremity weakness    Syncope     Possible. Pt states \" not sure if it was a seizure. \"    Pt BIB EMS from home.  EMS reports possible syncopal episode around midnight.  LOC?   No oral trauma or incontinence.  Pt reports having a similar episode \" last Tuesday, but couldn't get up.\"  Increased alcohol consumption over the last 4 days - last drink \" 2 glasses of wine around midnight. \"  Pt has HX of alcohol use and hepatitis.  BUE tremors noted.  Pt also reports \" could not feel my legs last night. \"  BLE CMS intact at this time. Last emesis PTA, no blood.  FSBS 229. Pt received Zofran 4mg IV and warmed NS 250ml PTA.     "

## 2024-04-30 NOTE — H&P
"Hospital Medicine History & Physical Note    Date of Service  4/30/2024    Primary Care Physician  Donita Asif M.D.    Consultants  none    Specialist Names: none    Code Status  Full Code    Chief Complaint  Chief Complaint   Patient presents with    N/V    Weakness     Lower extremity weakness    Syncope     Possible. Pt states \" not sure if it was a seizure. \"     Tremors       History of Presenting Illness  Minerva Tillman is a 49 y.o. female who presented 4/30/2024 with prolonged alcohol abuse and alcohol withdrawals in the past with underlying depression and anxiety who presents to the hospital above chief complaint.  She also has a history of mucoid diarrhea.  She states she relapsed with alcohol on March 4, 2024 for various social stressors.  She suffers from underlying depression and anxiety as well and sees an outpatient psychiatrist but there is been no recent change in her medications.  Since then she has been drinking a large bottle of wine every night and tried to taper herself at home but then had what sounds like a seizure or syncopal episode last night.  She came to the ER for further evaluation and treatment.  She has been found to be in early alcohol withdrawal.  Her last drink was at midnight last night.  She also complains of some possible fecal urinary incontinence and had some numbness from the knees down but now is only from the ankles down to her toes.  She also has not been eating much food recently.    In the ER she was given IV Ativan with initiation of CIWA protocol and a rally pack.  I spoke personally with Dr. Larson of the ER physician group in detail about the patient's case.    I discussed the plan of care with patient.    Review of Systems  Review of Systems   Constitutional:  Positive for malaise/fatigue. Negative for chills and fever.   Respiratory:  Negative for cough and shortness of breath.    Cardiovascular:  Negative for chest pain and palpitations. "   Gastrointestinal:  Positive for nausea and vomiting. Negative for abdominal pain.   Genitourinary:  Negative for dysuria and urgency.   Neurological:  Positive for dizziness, sensory change, seizures (?) and weakness.   Psychiatric/Behavioral:  Positive for substance abuse.        Past Medical History   has a past medical history of Anxiety, Depression, Gynecological disorder, High cholesterol, Hypertension, Snoring, and Urinary tract infection.    Surgical History   has a past surgical history that includes pr breast augmentation with implant (2006); mammoplasty augmentation; hysteroscopy novasure-2 (8/2/2017); and dilation and curettage (8/2/2017).     Family History  family history includes Asthma in her father; Cancer (age of onset: 60) in her maternal grandfather and maternal grandmother; Depression in her father; Hyperlipidemia in her father and mother; Hypertension in her brother, brother, father, and mother; Other in her paternal grandmother; Stroke in her maternal grandmother.   Family history reviewed with patient. There is no family history that is pertinent to the chief complaint.     Social History   reports that she has never smoked. She has never used smokeless tobacco. She reports current alcohol use of about 8.4 oz of alcohol per week. She reports that she does not use drugs.    Allergies  Allergies   Allergen Reactions    Strawberry Hives    Sulfa Drugs Hives     RSN=[id number]       Medications  Prior to Admission Medications   Prescriptions Last Dose Informant Patient Reported? Taking?   albuterol 108 (90 Base) MCG/ACT Aero Soln inhalation aerosol   No No   Sig: Inhale 2 Puffs every 4 hours.   busPIRone (BUSPAR) 10 MG Tab tablet   Yes No   Sig: Take 10 mg by mouth 2 times a day.   Patient not taking: Reported on 3/22/2024   escitalopram (LEXAPRO) 10 MG Tab  Patient Yes No   Sig: Take 10 mg by mouth every day.   folic acid (FOLVITE) 1 MG Tab   No No   Sig: Take 1 Tablet by mouth every day.    gabapentin (NEURONTIN) 300 MG Cap   No No   Sig: Take 1 Capsule by mouth every evening.   hydrOXYzine HCl (ATARAX) 10 MG Tab  Patient Yes No   Sig: Take 10 mg by mouth 1 time a day as needed for Anxiety.   levothyroxine (SYNTHROID) 50 MCG Tab   No No   Sig: Take 1 Tablet by mouth every morning on an empty stomach.   multivitamin Tab   No No   Sig: Take 1 Tablet by mouth every day.   nadolol (CORGARD) 20 MG Tab   No No   Sig: Take 1 Tablet by mouth every day.   riFAXIMin (XIFAXAN) 550 MG Tab tablet   No No   Sig: Take 1 Tablet by mouth 2 times a day.   spironolactone (ALDACTONE) 25 MG Tab   No No   Sig: Take 1 Tablet by mouth every day.   thiamine (THIAMINE) 100 MG tablet   No No   Sig: Take 1 Tablet by mouth every day.      Facility-Administered Medications: None       Physical Exam  Temp:  [36.7 °C (98 °F)] 36.7 °C (98 °F)  Pulse:  [95] 95  Resp:  [24] 24  BP: (131)/(82) 131/82  SpO2:  [96 %-97 %] 97 %  Blood Pressure: 131/82   Temperature: 36.7 °C (98 °F)   Pulse: 95   Respiration: (!) 24   Pulse Oximetry: 97 %       Physical Exam  Vitals and nursing note reviewed.   Constitutional:       General: She is not in acute distress.     Appearance: She is well-developed.      Comments: Resting comfortably in bed  Speaking intelligbly   HENT:      Head: Normocephalic and atraumatic.      Mouth/Throat:      Pharynx: No oropharyngeal exudate.   Eyes:      General: No scleral icterus.     Pupils: Pupils are equal, round, and reactive to light.   Neck:      Thyroid: No thyromegaly.   Cardiovascular:      Rate and Rhythm: Normal rate and regular rhythm.      Heart sounds: Normal heart sounds. No murmur heard.  Pulmonary:      Effort: Pulmonary effort is normal. No respiratory distress.      Breath sounds: Normal breath sounds. No wheezing or rales.   Abdominal:      General: Bowel sounds are normal. There is no distension.      Palpations: Abdomen is soft.      Tenderness: There is no abdominal tenderness.  "  Musculoskeletal:         General: No tenderness. Normal range of motion.      Cervical back: Normal range of motion and neck supple.      Right lower leg: No edema.      Left lower leg: No edema.   Skin:     General: Skin is warm and dry.      Findings: No rash.   Neurological:      Mental Status: She is alert and oriented to person, place, and time.      Cranial Nerves: No cranial nerve deficit.      Sensory: Sensory deficit (decreased sensation to soft touch B/L feet from ankles distally) present.         Laboratory:  Recent Labs     04/30/24  1120   WBC 5.2   RBC 4.12*   HEMOGLOBIN 15.4   HEMATOCRIT 44.2   .3*   MCH 37.4*   MCHC 34.8   RDW 50.7*   PLATELETCT 114*   MPV 9.9     Recent Labs     04/30/24  1120   SODIUM 142   POTASSIUM 3.3*   CHLORIDE 100   CO2 20   GLUCOSE 204*   BUN 6*   CREATININE 0.45*   CALCIUM 9.8     Recent Labs     04/30/24  1120   ALTSGPT 28   ASTSGOT 60*   ALKPHOSPHAT 173*   TBILIRUBIN 1.5   GLUCOSE 204*         No results for input(s): \"NTPROBNP\" in the last 72 hours.      No results for input(s): \"TROPONINT\" in the last 72 hours.    Imaging:  CT-HEAD W/O   Final Result      No acute intracranial abnormality.                      I personally reviewed the BNL, CBC, CMP, serum magnesium and phosphorus    Assessment/Plan:  Justification for Admission Status  I anticipate this patient will require at least two midnights for appropriate medical management, necessitating inpatient admission because acute alcohol withdrawal    Patient will need a Telemetry bed on MEDICAL service .  The need is secondary to above.    * Alcohol intoxication delirium (HCC)- (present on admission)  Assessment & Plan  Active WD in the ER, last drink around 12 am  Prior Wd's, but no icu admissions  MVi/thiamine/folate  Relapsed on 3/4  Consider psychology consult  [facility] protocol  Oral valium taper  Monitor on tele  IVF's  Consider naltrexone on discharge    Neuropathy- (present on admission)  Assessment & " Plan  Continue outpatient gabapentin  No clear red flag symptoms  If persists or worsens, low threshold to get MRI l spine    Hypophosphatemia- (present on admission)  Assessment & Plan  replacement    Alcoholic cirrhosis of liver without ascites (HCC)- (present on admission)  Assessment & Plan  Volume status is ok, no HE currently  Continue aldactone and rifaxamin    Major depressive disorder- (present on admission)  Assessment & Plan  Not well controlled  F/b osh psych  Continue lexapro  Consider naltrexone upon d/c    Hypomagnesemia- (present on admission)  Assessment & Plan  replacement    Hypokalemia- (present on admission)  Assessment & Plan  replacement    Transaminitis- (present on admission)  Assessment & Plan  2/2 etoh  No e/o long-term  follow    Alcohol use disorder, severe, dependence (HCC)- (present on admission)  Assessment & Plan  See above  Recurrent admissions    Generalized anxiety disorder- (present on admission)  Assessment & Plan  Not well controlled  See above    Syncope- (present on admission)  Assessment & Plan  Unclear if seizure versus orthostatic hypotension  Seizure precautions  Follow on tele  Check orthostatics later today    Hypothyroidism- (present on admission)  Assessment & Plan  Synthroid  Check tsh    Essential hypertension- (present on admission)  Assessment & Plan  Continue aldactone  IV BB PRN        VTE prophylaxis: enoxaparin ppx

## 2024-04-30 NOTE — ED PROVIDER NOTES
"  ER Provider Note    Scribed for Vignesh Larson M.D. by Caterina Gonzalez. 4/30/2024   11:32 AM    Primary Care Provider: Donita Asif M.D.    CHIEF COMPLAINT  Chief Complaint   Patient presents with    N/V    Weakness     Lower extremity weakness    Syncope     Possible. Pt states \" not sure if it was a seizure. \"     Tremors     EXTERNAL RECORDS REVIEWED  The patient has a history of alcohol abuse.    HPI/ROS  Minerva Tillman is a 49 y.o. female who presents to the ED for evaluation of alcohol withdrawal starting last night. The patient states she has been drinking more alcohol recently and attempted to stop. She suspects she had a seizure last night but denies any trauma. She reports associated shakiness, nausea and vomiting. The patient adds she has frequent UTIs. She denies any dysuria. The patient has a history of alcohol abuse and hepatitis.     PAST MEDICAL HISTORY  Past Medical History:   Diagnosis Date    Anxiety     Depression     Gynecological disorder     High cholesterol     Hypertension     Snoring     Urinary tract infection        SURGICAL HISTORY  Past Surgical History:   Procedure Laterality Date    HYSTEROSCOPY NOVASURE-2  8/2/2017    Procedure: HYSTEROSCOPY NOVASURE;  Surgeon: Hedy Hardy M.D.;  Location: SURGERY [facility];  Service:     DILATION AND CURETTAGE  8/2/2017    Procedure: DILATION AND CURETTAGE;  Surgeon: Hedy Hardy M.D.;  Location: SURGERY [facility];  Service:     SC BREAST AUGMENTATION WITH IMPLANT  2006    MAMMOPLASTY AUGMENTATION         FAMILY HISTORY  Family History   Problem Relation Age of Onset    Cancer Maternal Grandmother 60        breast and skin    Stroke Maternal Grandmother     Hypertension Mother     Hyperlipidemia Mother     Depression Father     Hypertension Father     Asthma Father     Hyperlipidemia Father     Hypertension Brother     Cancer Maternal Grandfather 60        esphogus    Other Paternal Grandmother         complications " "from surgery    Hypertension Brother        SOCIAL HISTORY   reports that she has never smoked. She has never used smokeless tobacco. She reports current alcohol use of about 8.4 oz of alcohol per week. She reports that she does not use drugs.    CURRENT MEDICATIONS  Previous Medications    ALBUTEROL 108 (90 BASE) MCG/ACT AERO SOLN INHALATION AEROSOL    Inhale 2 Puffs every 4 hours.    BUSPIRONE (BUSPAR) 10 MG TAB TABLET    Take 10 mg by mouth 2 times a day.    ESCITALOPRAM (LEXAPRO) 10 MG TAB    Take 10 mg by mouth every day.    FOLIC ACID (FOLVITE) 1 MG TAB    Take 1 Tablet by mouth every day.    GABAPENTIN (NEURONTIN) 300 MG CAP    Take 1 Capsule by mouth every evening.    HYDROXYZINE HCL (ATARAX) 10 MG TAB    Take 10 mg by mouth 1 time a day as needed for Anxiety.    LEVOTHYROXINE (SYNTHROID) 50 MCG TAB    Take 1 Tablet by mouth every morning on an empty stomach.    MULTIVITAMIN TAB    Take 1 Tablet by mouth every day.    NADOLOL (CORGARD) 20 MG TAB    Take 1 Tablet by mouth every day.    RIFAXIMIN (XIFAXAN) 550 MG TAB TABLET    Take 1 Tablet by mouth 2 times a day.    SPIRONOLACTONE (ALDACTONE) 25 MG TAB    Take 1 Tablet by mouth every day.    THIAMINE (THIAMINE) 100 MG TABLET    Take 1 Tablet by mouth every day.       ALLERGIES  Allergies   Allergen Reactions    Strawberry Hives    Sulfa Drugs Hives     [id number]        PHYSICAL EXAM  /82   Pulse 95   Temp 36.7 °C (98 °F) (Temporal)   Resp (!) 24   Ht 1.6 m (5' 3\")   Wt 56.7 kg (125 lb)   SpO2 96%   BMI 22.14 kg/m²    Nursing note and vitals reviewed.  Constitutional: Well-developed and well-nourished. Tremulous, Somewhat anxious.  Stigmata of alcoholism.  HENT: Head is normocephalic and atraumatic. Oropharynx is clear and moist without exudate or erythema.   Eyes: Pupils are equal, round, and reactive to light. Conjunctiva are normal.   Cardiovascular: Normal rate and regular rhythm. No murmur heard. Normal radial pulses.  Pulmonary/Chest: Breath " sounds normal. No wheezes or rales.   Abdominal: Soft and non-tender. No distention    Musculoskeletal: Extremities exhibit normal range of motion without edema or tenderness.   Neurological: Awake, alert and oriented to person, place, and time. No focal deficits noted.  Skin: Skin is warm and dry. No rash.   Psychiatric: Normal mood and affect. Appropriate for clinical situation    DIAGNOSTIC STUDIES    Labs:   Results for orders placed or performed during the hospital encounter of 04/30/24   CBC WITH DIFFERENTIAL   Result Value Ref Range    WBC 5.2 4.8 - 10.8 K/uL    RBC 4.12 (L) 4.20 - 5.40 M/uL    Hemoglobin 15.4 12.0 - 16.0 g/dL    Hematocrit 44.2 37.0 - 47.0 %    .3 (H) 81.4 - 97.8 fL    MCH 37.4 (H) 27.0 - 33.0 pg    MCHC 34.8 32.2 - 35.5 g/dL    RDW 50.7 (H) 35.9 - 50.0 fL    Platelet Count 114 (L) 164 - 446 K/uL    MPV 9.9 9.0 - 12.9 fL    Neutrophils-Polys 75.70 (H) 44.00 - 72.00 %    Lymphocytes 9.20 (L) 22.00 - 41.00 %    Monocytes 13.00 0.00 - 13.40 %    Eosinophils 0.00 0.00 - 6.90 %    Basophils 1.30 0.00 - 1.80 %    Immature Granulocytes 0.80 0.00 - 0.90 %    Nucleated RBC 0.00 0.00 - 0.20 /100 WBC    Neutrophils (Absolute) 3.97 1.82 - 7.42 K/uL    Lymphs (Absolute) 0.48 (L) 1.00 - 4.80 K/uL    Monos (Absolute) 0.68 0.00 - 0.85 K/uL    Eos (Absolute) 0.00 0.00 - 0.51 K/uL    Baso (Absolute) 0.07 0.00 - 0.12 K/uL    Immature Granulocytes (abs) 0.04 0.00 - 0.11 K/uL    NRBC (Absolute) 0.00 K/uL   COMP METABOLIC PANEL   Result Value Ref Range    Sodium 142 135 - 145 mmol/L    Potassium 3.3 (L) 3.6 - 5.5 mmol/L    Chloride 100 96 - 112 mmol/L    Co2 20 20 - 33 mmol/L    Anion Gap 22.0 (H) 7.0 - 16.0    Glucose 204 (H) 65 - 99 mg/dL    Bun 6 (L) 8 - 22 mg/dL    Creatinine 0.45 (L) 0.50 - 1.40 mg/dL    Calcium 9.8 8.5 - 10.5 mg/dL    Correct Calcium 9.3 8.5 - 10.5 mg/dL    AST(SGOT) 60 (H) 12 - 45 U/L    ALT(SGPT) 28 2 - 50 U/L    Alkaline Phosphatase 173 (H) 30 - 99 U/L    Total Bilirubin 1.5 0.1 -  1.5 mg/dL    Albumin 4.6 3.2 - 4.9 g/dL    Total Protein 7.9 6.0 - 8.2 g/dL    Globulin 3.3 1.9 - 3.5 g/dL    A-G Ratio 1.4 g/dL   DIAGNOSTIC ALCOHOL   Result Value Ref Range    Diagnostic Alcohol <10.1 <10.1 mg/dL   MAGNESIUM   Result Value Ref Range    Magnesium 1.1 (L) 1.5 - 2.5 mg/dL   PHOSPHORUS   Result Value Ref Range    Phosphorus 2.4 (L) 2.5 - 4.5 mg/dL   CREATINE KINASE   Result Value Ref Range    CPK Total 76 0 - 154 U/L   ESTIMATED GFR   Result Value Ref Range    GFR (CKD-EPI) 117 >60 mL/min/1.73 m 2   EKG   Result Value Ref Range    Report       [facility] Emergency Dept.    Test Date:  2024  Pt Name:    GIULIA HERNANDEZ                   Department: ER  MRN:        [id number]                      Room:       BL 15  Gender:     Female                       Technician: [id number]  :        [id number]                   Requested By:ER TRIAGE PROTOCOL  Order #:    [id number]                    Reading MD: VIKY FLORES MD    Measurements  Intervals                                Axis  Rate:       93                           P:          0  ME:         0                            QRS:        39  QRSD:       100                          T:          14  QT:         436  QTc:        543    Interpretive Statements  SINUS RHYTHM  Ventricular premature complex  Nonspecific T abnormalities, anterior leads  Prolonged QT interval  Baseline wander in lead(s) V6  Compared to ECG 2024 04:00:20  Ventricular premature complex(es) now present  T-wave abnormality still present  Electronically Signed On 2024 12:20:32  PDT by VIKY FLORES MD         EKG:   I have independently interpreted this EKG as detailed above.     Radiology:   This attending emergency physician has independently interpreted the diagnostic imaging associated with this visit and is awaiting the final reading from the radiologist.   Preliminary interpretation is a follows: CT head shows no intracranial  hemorrhage.    Radiologist interpretation:   CT-HEAD W/O   Final Result      No acute intracranial abnormality.                       INITIAL ASSESSMENT AND PLAN    11:32 AM - Patient was evaluated at bedside for alcohol withdrawal. Ordered for CT-Head without, CBC with diff, CMP, Diagnostic Alcohol, Magnesium, Phosphorus, Creatine Kinase and EKG to evaluate. The patient will be medicated with Ativan for her symptoms. Will follow CIWA protocol. The patient present's today in alcohol withdrawal. Patient verbalizes understanding and support with my plan of care.       COURSE AND MEDICAL DECISION MAKING  12:57 PM - Will treat with Magnesium due to low Magnesium.     Laboratory studies are consistent with alcoholic hepatitis, hypomagnesemia, hypophosphatemia.  Likely alcoholic ketoacidosis.  Patient will be admitted for further evaluation and treatment.  Placed on a CIWA protocol here in the emergency department.    CRITICAL CARE  The very real possibilty of a deterioration of this patient's condition required the highest level of my preparedness for sudden, emergent intervention.  I provided critical care services, which included medication orders, frequent reevaluations of the patient's condition and response to treatment, ordering and reviewing test results, and discussing the case with various consultants.  The critical care time associated with the care of the patient was 35 minutes. Review chart for interventions. This time is exclusive of any other billable procedures.     DISPOSITION AND DISCUSSIONS    I have discussed management of the patient with the following physicians and KRISTINE's: Dr. Lowery, hospitalist will admit    DISPOSITION:  Patient will be hospitalized by Dr. Lowery in guarded condition.    FINAL DIAGNOSIS  1. Alcohol withdrawal syndrome with complication (HCC)    2. Hypomagnesemia    3. Alcoholism (HCC)         Caterina TIPTON (Scribyasir), am scribing for, and in the presence of, Vignesh Larson,  M.D..    Electronically signed by: Caterina Gonzalez (Scribe), 4/30/2024    IVignesh M.D. personally performed the services described in this documentation, as scribed by Caterina Gonzalez in my presence, and it is both accurate and complete.      The note accurately reflects work and decisions made by me.  Vignesh Larson M.D.  4/30/2024  1:13 PM

## 2024-04-30 NOTE — ASSESSMENT & PLAN NOTE
2/2 etoh  No e/o long term  follow  
Active WD in the ER, last drink around 12 am  Prior Wd's, but no icu admissions  MVi/thiamine/folate  Relapsed on 3/4  Consider psychology consult  CIWA protocol  Oral valium taper  Monitor on tele  IVF's  Consider naltrexone on discharge  
Continue aldactone  IV BB PRN  
Continue outpatient gabapentin  No clear red flag symptoms  If persists or worsens, low threshold to get MRI l spine  
Not well controlled  F/b osh psych  Continue lexapro  Consider naltrexone upon d/c  
Not well controlled  See above  
See above  Recurrent admissions  
Synthroid  Check tsh  
Unclear if seizure versus orthostatic hypotension  Seizure precautions  Follow on tele  Check orthostatics later today  
Volume status is ok, no HE currently  Continue aldactone and rifaxamin  
replacement  
Negative

## 2024-05-01 PROBLEM — R26.89 BALANCE PROBLEM: Status: ACTIVE | Noted: 2024-05-01

## 2024-05-01 LAB
ALBUMIN SERPL BCP-MCNC: 3.6 G/DL (ref 3.2–4.9)
ALBUMIN/GLOB SERPL: 1.3 G/DL
ALP SERPL-CCNC: 129 U/L (ref 30–99)
ALT SERPL-CCNC: 19 U/L (ref 2–50)
AMORPH CRY #/AREA URNS HPF: PRESENT /HPF
ANION GAP SERPL CALC-SCNC: 10 MMOL/L (ref 7–16)
APPEARANCE UR: ABNORMAL
AST SERPL-CCNC: 46 U/L (ref 12–45)
BACTERIA #/AREA URNS HPF: NEGATIVE /HPF
BASOPHILS # BLD AUTO: 1 % (ref 0–1.8)
BASOPHILS # BLD: 0.06 K/UL (ref 0–0.12)
BILIRUB SERPL-MCNC: 1.4 MG/DL (ref 0.1–1.5)
BILIRUB UR QL STRIP.AUTO: NEGATIVE
BUN SERPL-MCNC: 5 MG/DL (ref 8–22)
CALCIUM ALBUM COR SERPL-MCNC: 9.1 MG/DL (ref 8.5–10.5)
CALCIUM SERPL-MCNC: 8.8 MG/DL (ref 8.5–10.5)
CHLORIDE SERPL-SCNC: 106 MMOL/L (ref 96–112)
CO2 SERPL-SCNC: 25 MMOL/L (ref 20–33)
COLOR UR: ABNORMAL
CREAT SERPL-MCNC: 0.32 MG/DL (ref 0.5–1.4)
EOSINOPHIL # BLD AUTO: 0.04 K/UL (ref 0–0.51)
EOSINOPHIL NFR BLD: 0.6 % (ref 0–6.9)
EPI CELLS #/AREA URNS HPF: ABNORMAL /HPF
ERYTHROCYTE [DISTWIDTH] IN BLOOD BY AUTOMATED COUNT: 53.2 FL (ref 35.9–50)
EXTRA TUBE BLU BLU: NORMAL
GFR SERPLBLD CREATININE-BSD FMLA CKD-EPI: 127 ML/MIN/1.73 M 2
GLOBULIN SER CALC-MCNC: 2.7 G/DL (ref 1.9–3.5)
GLUCOSE SERPL-MCNC: 85 MG/DL (ref 65–99)
GLUCOSE UR STRIP.AUTO-MCNC: 100 MG/DL
HCT VFR BLD AUTO: 38.6 % (ref 37–47)
HGB BLD-MCNC: 13.2 G/DL (ref 12–16)
HYALINE CASTS #/AREA URNS LPF: ABNORMAL /LPF
IMM GRANULOCYTES # BLD AUTO: 0.02 K/UL (ref 0–0.11)
IMM GRANULOCYTES NFR BLD AUTO: 0.3 % (ref 0–0.9)
KETONES UR STRIP.AUTO-MCNC: NEGATIVE MG/DL
LEUKOCYTE ESTERASE UR QL STRIP.AUTO: ABNORMAL
LYMPHOCYTES # BLD AUTO: 1.32 K/UL (ref 1–4.8)
LYMPHOCYTES NFR BLD: 21 % (ref 22–41)
MAGNESIUM SERPL-MCNC: 1.8 MG/DL (ref 1.5–2.5)
MCH RBC QN AUTO: 37.6 PG (ref 27–33)
MCHC RBC AUTO-ENTMCNC: 34.2 G/DL (ref 32.2–35.5)
MCV RBC AUTO: 110 FL (ref 81.4–97.8)
MICRO URNS: ABNORMAL
MONOCYTES # BLD AUTO: 1.08 K/UL (ref 0–0.85)
MONOCYTES NFR BLD AUTO: 17.1 % (ref 0–13.4)
NEUTROPHILS # BLD AUTO: 3.78 K/UL (ref 1.82–7.42)
NEUTROPHILS NFR BLD: 60 % (ref 44–72)
NITRITE UR QL STRIP.AUTO: NEGATIVE
NRBC # BLD AUTO: 0 K/UL
NRBC BLD-RTO: 0 /100 WBC (ref 0–0.2)
PH UR STRIP.AUTO: 7 [PH] (ref 5–8)
PHOSPHATE SERPL-MCNC: 3.3 MG/DL (ref 2.5–4.5)
PLATELET # BLD AUTO: 98 K/UL (ref 164–446)
PLATELETS.RETICULATED NFR BLD AUTO: 5.1 % (ref 0.6–13.1)
PMV BLD AUTO: 10.2 FL (ref 9–12.9)
POTASSIUM SERPL-SCNC: 3.7 MMOL/L (ref 3.6–5.5)
PROT SERPL-MCNC: 6.3 G/DL (ref 6–8.2)
PROT UR QL STRIP: NEGATIVE MG/DL
RBC # BLD AUTO: 3.51 M/UL (ref 4.2–5.4)
RBC # URNS HPF: ABNORMAL /HPF
RBC UR QL AUTO: NEGATIVE
SODIUM SERPL-SCNC: 141 MMOL/L (ref 135–145)
SP GR UR STRIP.AUTO: 1.02
UROBILINOGEN UR STRIP.AUTO-MCNC: 1 MG/DL
WBC # BLD AUTO: 6.3 K/UL (ref 4.8–10.8)
WBC #/AREA URNS HPF: ABNORMAL /HPF

## 2024-05-01 PROCEDURE — 99232 SBSQ HOSP IP/OBS MODERATE 35: CPT | Performed by: HOSPITALIST

## 2024-05-01 RX ADMIN — LORAZEPAM 2 MG: 2 TABLET ORAL at 16:53

## 2024-05-01 RX ADMIN — Medication 400 MG: at 06:10

## 2024-05-01 RX ADMIN — DIAZEPAM 10 MG: 5 TABLET ORAL at 01:58

## 2024-05-01 RX ADMIN — ENOXAPARIN SODIUM 40 MG: 100 INJECTION SUBCUTANEOUS at 16:55

## 2024-05-01 RX ADMIN — RIFAXIMIN 550 MG: 550 TABLET ORAL at 06:10

## 2024-05-01 RX ADMIN — POTASSIUM CHLORIDE AND SODIUM CHLORIDE: 900; 150 INJECTION, SOLUTION INTRAVENOUS at 16:53

## 2024-05-01 RX ADMIN — POTASSIUM CHLORIDE 20 MEQ: 1500 TABLET, EXTENDED RELEASE ORAL at 06:10

## 2024-05-01 RX ADMIN — ESCITALOPRAM OXALATE 10 MG: 10 TABLET ORAL at 06:09

## 2024-05-01 RX ADMIN — POTASSIUM CHLORIDE AND SODIUM CHLORIDE: 900; 150 INJECTION, SOLUTION INTRAVENOUS at 03:29

## 2024-05-01 RX ADMIN — Medication 100 MG: at 06:10

## 2024-05-01 RX ADMIN — LEVOTHYROXINE SODIUM 50 MCG: 0.05 TABLET ORAL at 06:09

## 2024-05-01 RX ADMIN — LORAZEPAM 1 MG: 1 TABLET ORAL at 01:57

## 2024-05-01 RX ADMIN — DIAZEPAM 5 MG: 5 TABLET ORAL at 13:26

## 2024-05-01 RX ADMIN — Medication 400 MG: at 16:54

## 2024-05-01 RX ADMIN — POTASSIUM CHLORIDE 20 MEQ: 1500 TABLET, EXTENDED RELEASE ORAL at 16:54

## 2024-05-01 RX ADMIN — THERA TABS 1 TABLET: TAB at 06:10

## 2024-05-01 RX ADMIN — DIAZEPAM 5 MG: 5 TABLET ORAL at 20:17

## 2024-05-01 RX ADMIN — GABAPENTIN 300 MG: 300 CAPSULE ORAL at 20:17

## 2024-05-01 RX ADMIN — DIAZEPAM 10 MG: 5 TABLET ORAL at 07:05

## 2024-05-01 RX ADMIN — LORAZEPAM 0.5 MG: 0.5 TABLET ORAL at 06:09

## 2024-05-01 RX ADMIN — RIFAXIMIN 550 MG: 550 TABLET ORAL at 16:54

## 2024-05-01 RX ADMIN — FOLIC ACID 1 MG: 1 TABLET ORAL at 06:10

## 2024-05-01 ASSESSMENT — LIFESTYLE VARIABLES
ANXIETY: *
VISUAL DISTURBANCES: NOT PRESENT
PAROXYSMAL SWEATS: *
AUDITORY DISTURBANCES: NOT PRESENT
PAROXYSMAL SWEATS: BARELY PERCEPTIBLE SWEATING. PALMS MOIST
TOTAL SCORE: 6
ANXIETY: NO ANXIETY (AT EASE)
AUDITORY DISTURBANCES: NOT PRESENT
HEADACHE, FULLNESS IN HEAD: NOT PRESENT
VISUAL DISTURBANCES: NOT PRESENT
NAUSEA AND VOMITING: NO NAUSEA AND NO VOMITING
ORIENTATION AND CLOUDING OF SENSORIUM: ORIENTED AND CAN DO SERIAL ADDITIONS
ANXIETY: NO ANXIETY (AT EASE)
AUDITORY DISTURBANCES: NOT PRESENT
NAUSEA AND VOMITING: NO NAUSEA AND NO VOMITING
AGITATION: NORMAL ACTIVITY
AGITATION: NORMAL ACTIVITY
TOTAL SCORE: 4
NAUSEA AND VOMITING: NO NAUSEA AND NO VOMITING
VISUAL DISTURBANCES: NOT PRESENT
ANXIETY: MILDLY ANXIOUS
ANXIETY: MODERATELY ANXIOUS OR GUARDED, SO ANXIETY IS INFERRED
AGITATION: SOMEWHAT MORE THAN NORMAL ACTIVITY
SUBSTANCE_ABUSE: 1
ORIENTATION AND CLOUDING OF SENSORIUM: ORIENTED AND CAN DO SERIAL ADDITIONS
PAROXYSMAL SWEATS: NO SWEAT VISIBLE
NAUSEA AND VOMITING: NO NAUSEA AND NO VOMITING
HEADACHE, FULLNESS IN HEAD: NOT PRESENT
TOTAL SCORE: 9
TREMOR: MODERATE TREMOR WITH ARMS EXTENDED
ORIENTATION AND CLOUDING OF SENSORIUM: ORIENTED AND CAN DO SERIAL ADDITIONS
HEADACHE, FULLNESS IN HEAD: NOT PRESENT
ORIENTATION AND CLOUDING OF SENSORIUM: ORIENTED AND CAN DO SERIAL ADDITIONS
VISUAL DISTURBANCES: NOT PRESENT
TREMOR: *
AGITATION: NORMAL ACTIVITY
VISUAL DISTURBANCES: NOT PRESENT
HEADACHE, FULLNESS IN HEAD: NOT PRESENT
ORIENTATION AND CLOUDING OF SENSORIUM: ORIENTED AND CAN DO SERIAL ADDITIONS
TOTAL SCORE: 1
PAROXYSMAL SWEATS: BARELY PERCEPTIBLE SWEATING. PALMS MOIST
AUDITORY DISTURBANCES: NOT PRESENT
VISUAL DISTURBANCES: NOT PRESENT
TREMOR: MODERATE TREMOR WITH ARMS EXTENDED
ORIENTATION AND CLOUDING OF SENSORIUM: ORIENTED AND CAN DO SERIAL ADDITIONS
TOTAL SCORE: 3
AUDITORY DISTURBANCES: NOT PRESENT
HEADACHE, FULLNESS IN HEAD: MILD
NAUSEA AND VOMITING: NO NAUSEA AND NO VOMITING
PAROXYSMAL SWEATS: NO SWEAT VISIBLE
ANXIETY: NO ANXIETY (AT EASE)
AGITATION: NORMAL ACTIVITY
AUDITORY DISTURBANCES: NOT PRESENT
TREMOR: *
HEADACHE, FULLNESS IN HEAD: NOT PRESENT
AGITATION: NORMAL ACTIVITY
NAUSEA AND VOMITING: MILD NAUSEA WITH NO VOMITING
TREMOR: TREMOR NOT VISIBLE BUT CAN BE FELT, FINGERTIP TO FINGERTIP
TREMOR: *
TOTAL SCORE: 12
PAROXYSMAL SWEATS: BARELY PERCEPTIBLE SWEATING. PALMS MOIST

## 2024-05-01 ASSESSMENT — ENCOUNTER SYMPTOMS
NAUSEA: 0
HEMOPTYSIS: 0
MYALGIAS: 0
ORTHOPNEA: 0
COUGH: 0
NECK PAIN: 0
SPUTUM PRODUCTION: 0
DIZZINESS: 0
FOCAL WEAKNESS: 0
VOMITING: 0
DIARRHEA: 0
HEARTBURN: 0
FEVER: 0
TREMORS: 0
HEADACHES: 0
CHILLS: 0

## 2024-05-01 ASSESSMENT — COGNITIVE AND FUNCTIONAL STATUS - GENERAL
SUGGESTED CMS G CODE MODIFIER MOBILITY: CJ
HELP NEEDED FOR BATHING: A LITTLE
WALKING IN HOSPITAL ROOM: A LITTLE
STANDING UP FROM CHAIR USING ARMS: A LITTLE
DAILY ACTIVITIY SCORE: 22
TOILETING: A LITTLE
MOBILITY SCORE: 20
SUGGESTED CMS G CODE MODIFIER DAILY ACTIVITY: CJ
MOVING TO AND FROM BED TO CHAIR: A LITTLE
CLIMB 3 TO 5 STEPS WITH RAILING: A LITTLE

## 2024-05-01 ASSESSMENT — ACTIVITIES OF DAILY LIVING (ADL): TOILETING: INDEPENDENT

## 2024-05-01 ASSESSMENT — GAIT ASSESSMENTS
DEVIATION: BRADYKINETIC;SHUFFLED GAIT
ASSISTIVE DEVICE: FRONT WHEEL WALKER
DISTANCE (FEET): 200
GAIT LEVEL OF ASSIST: SUPERVISED

## 2024-05-01 ASSESSMENT — FIBROSIS 4 INDEX
FIB4 SCORE: 5.38
FIB4 SCORE: 5.38

## 2024-05-01 ASSESSMENT — PAIN DESCRIPTION - PAIN TYPE: TYPE: ACUTE PAIN

## 2024-05-01 NOTE — PROGRESS NOTES
Monitor Summary     Rhythm: SR  Heart Rate: 77-84  Ectopy: R PVC  Measurement: .20/.10/.46

## 2024-05-01 NOTE — PROGRESS NOTES
Bedside report received from off going RN/tech: Kassie assumed care of patient.     Fall Risk Score: HIGH RISK  Fall risk interventions in place: Place yellow fall risk ID band on patient, Provide patient/family education based on risk assessment, Educate patient/family to call staff for assistance when getting out of bed, Place fall precaution signage outside patient door, Place patient in room close to nursing station, Utilize bed/chair fall alarm, and Bed alarm connected correctly  Bed type: Regular (Sam Score less than 17 interventions in place)  Patient on cardiac monitor: Yes  IVF/IV medications: NS w/ 20K  Oxygen: How many liters 2L  Bedside sitter: Not Applicable   Isolation: Not applicable

## 2024-05-01 NOTE — DIETARY
"Nutrition services: Day 1 of admit.  Minerva Tillman is a 49 y.o. female with admitting DX of alcohol intoxication delirium.   Consult received for unplanned wt loss of 2-13 lbs in 1 month with poor po (MST 2).     RD able to visit pt at bedside. Pt reports poor appetite x2 weeks prior to admission. During this time pt snacking instead of eating meals. Snacks include nutrition bars, dried fruit, chicken sandwiches, etc. Pt now eating % of meals since admission. Pt reports UBW of 138 lbs, believes wt loss has come from cutting back on alcohol consumption. Pt with a -9.42% wt loss in 2 months, wt loss is significant per ASPEN guidelines. Per NFPE pt is well nourished.     Assessment:  Height: 160 cm (5' 3\")  Weight: 57.1 kg (125 lb 14.1 oz)  Body mass index is 22.3 kg/m²., BMI classification: Normal  Diet/Intake: Regular    Wt Readings from Last 5 Encounters:   05/01/24 57.1 kg (125 lb 14.1 oz)   03/22/24 59.9 kg (132 lb)   03/06/24 62.6 kg (138 lb)   11/01/23 60.8 kg (134 lb)   10/28/23 61.2 kg (134 lb 14.7 oz)     Evaluation:   Pt presented 4/30/2024 with prolonged alcohol abuse and alcohol withdrawals in the past with underlying depression and anxiety who presents to the hospital above chief complaint.   Labs: Creatinine 0.32  Meds reviewed.   BM pta    Malnutrition Risk: Pt at risk with significant wt loss, no other criteria identified at this time. Wt loss likely related to decreased calorie consumption with decreased alcohol consumption.     Recommendations/Plan:  Encourage intake of >50%.   Document intake of all PO as % taken in ADL's to provide interdisciplinary communication across all shifts.   Monitor weight.  Nutrition rep will continue to see patient for ongoing meal and snack preferences.     RD to monitor per department policy.       "

## 2024-05-01 NOTE — THERAPY
"Physical Therapy   Initial Evaluation and Discharge     Patient Name: Minerva Tillman  Age:  49 y.o., Sex:  female  Medical Record #: [id number]  Today's Date: 5/1/2024     Precautions  Precautions: Fall Risk    Assessment  Patient is 49 y.o. female admitted after fall, syncope, tremors, LE weakness 2/2 ETOH withdraw. PMHx of alcohol use disorder, HTN, depression/anxiety, peripheral neuropathy (pt reporting this has since resolved), alcoholic cirrhosis. Pt required SPV/SBA with FWW in hallway, on stairs, and for all transfers. Recommend home with HH PT as pt alone during the day, history of multiple falls, recommending FWW which is a new device to the pt and would benefit from education on safety and use around her home.     Patient will not be actively followed for physical therapy services at this time, however may be seen if requested by physician for 1 more visit within 30 days to address any discharge or equipment needs.    Plan    Physical Therapy Initial Treatment Plan   Duration: Discharge Needs Only    DC Equipment Recommendations: Front-Wheel Walker (FWW order initiated)  Discharge Recommendations: Recommend home health for continued physical therapy services       Subjective    \"I feel so much better now!\"      Objective     05/01/24 1017   Vitals   O2 Delivery Device None - Room Air   Pain 0 - 10 Group   Therapist Pain Assessment Post Activity Pain Same as Prior to Activity;Nurse Notified;0   Prior Living Situation   Prior Services None   Housing / Facility 3 Story House  ([place], only stays on top floor. 12 steps to enter from either outside or garage with R rail)   Equipment Owned Single Point Cane   Lives with - Patient's Self Care Capacity Spouse   Comments Spouse works during day, able to assist when home   Prior Level of Functional Mobility   Bed Mobility Independent   Transfer Status Independent   Ambulation Independent   Ambulation Distance   (community)   Assistive Devices Used None   Stairs " Independent   History of Falls   History of Falls Yes   Date of Last Fall   (most recently related to admit. Reports 3 other falls prior to that in past 2 months)   Cognition    Cognition / Consciousness X   Speech/ Communication Delayed Responses   Level of Consciousness Alert   Comments Very pleasant and cooperative   Strength Upper Body   Upper Body Strength  WDL   Comments functional for mobility   Active ROM Lower Body    Active ROM Lower Body  WDL   Strength Lower Body   Comments BLE grossly 3+ to 4/5, tremulous; however, no buckling   Balance Assessment   Sitting Balance (Static) Fair +   Sitting Balance (Dynamic) Fair +   Standing Balance (Static) Fair   Standing Balance (Dynamic) Fair   Weight Shift Sitting Fair   Weight Shift Standing Fair   Comments w/ FWW   Bed Mobility    Sit to Supine Supervised   Scooting Supervised   Rolling Supervised   Comments received in chair   Gait Analysis   Gait Level Of Assist Supervised   Assistive Device Front Wheel Walker   Distance (Feet) 200   # of Times Distance was Traveled 1   Deviation Bradykinetic;Shuffled Gait  (midly ataxic due to ETOH w/d symptoms)   # of Stairs Climbed 12   Level of Assist with Stairs Standby Assist   Functional Mobility   Sit to Stand Supervised   Bed, Chair, Wheelchair Transfer Supervised   Toilet Transfers Supervised   Transfer Method Stand Step   Mobility w/ FWW in hallway, stairs, to bathroom, return supine   6 Clicks Assessment - How much HELP from from another person do you currently need... (If the patient hasn't done an activity recently, how much help from another person do you think he/she would need if he/she tried?)   Turning from your back to your side while in a flat bed without using bedrails? 4   Moving from lying on your back to sitting on the side of a flat bed without using bedrails? 4   Moving to and from a bed to a chair (including a wheelchair)? 3   Standing up from a chair using your arms (e.g., wheelchair, or bedside  chair)? 3   Walking in hospital room? 3   Climbing 3-5 steps with a railing? 3   6 clicks Mobility Score 20   Activity Tolerance   Sitting in Chair up pre session   Sitting Edge of Bed 1 min   Standing 10 min   Education Group   Education Provided Role of Physical Therapist;Gait Training;Stair Training;Use of Assistive Device   Role of Physical Therapist Patient Response Patient;Acceptance;Explanation;Verbal Demonstration   Gait Training Patient Response Patient;Acceptance;Explanation;Action Demonstration   Stair Training Patient Response Patient;Acceptance;Explanation;Demonstration;Verbal Demonstration;Action Demonstration   Use of Assistive Device Patient Response Patient;Acceptance;Explanation;Demonstration;Verbal Demonstration;Action Demonstration   Additional Comments receptive to self management and compensatory strategies with mobility   Physical Therapy Initial Treatment Plan    Duration Discharge Needs Only   Problem List    Problems Impaired Transfers;Impaired Ambulation;Functional Strength Deficit;Impaired Balance;Decreased Activity Tolerance;Safety Awareness Deficits / Cognition   Anticipated Discharge Equipment and Recommendations   DC Equipment Recommendations Front-Wheel Walker  (FWW order initiated)   Discharge Recommendations Recommend home health for continued physical therapy services   Interdisciplinary Plan of Care Collaboration   IDT Collaboration with  Nursing;Occupational Therapist   Patient Position at End of Therapy In Bed;Bed Alarm On;Call Light within Reach;Tray Table within Reach;Phone within Reach   Collaboration Comments RN updated   Session Information   Date / Session Number  5/1: eval only, d/c needs
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
## 2024-05-01 NOTE — PROGRESS NOTES
4 Eyes Skin Assessment Completed by TONYA Bills and AMADEO Magana.    Head WDL  Ears WDL  Nose WDL  Mouth WDL  Neck WDL  Breast/Chest WDL  Shoulder Blades WDL  Spine WDL  (R) Arm/Elbow/Hand WDL  (L) Arm/Elbow/Hand WDL  Abdomen WDL  Groin WDL  Scrotum/Coccyx/Buttocks Redness and Blanching  (R) Leg WDL  (L) Leg WDL  (R) Heel/Foot/Toe Redness and Blanching, abrasion  (L) Heel/Foot/Toe Redness and Blanching          Devices In Places Tele Box, Blood Pressure Cuff, Pulse Ox, and Nasal Cannula      Interventions In Place Q2 Turns    Possible Skin Injury No    Pictures Uploaded Into Epic Yes  Wound Consult Placed N/A  RN Wound Prevention Protocol Ordered No

## 2024-05-01 NOTE — PROGRESS NOTES
[facility] Medicine Daily Progress Note    Date of Service  5/1/2024    Chief Complaint  Minerva Tillman is a 49 y.o. female admitted 4/30/2024 with tremors    Hospital Course  Minerva Tillman is a 49 y.o. female who presented 4/30/2024 with prolonged alcohol abuse and alcohol withdrawals in the past with underlying depression and anxiety who presents to the hospital above chief complaint.  She also has a history of mucoid diarrhea.  She states she relapsed with alcohol on March 4, 2024 for various social stressors.  She suffers from underlying depression and anxiety as well and sees an outpatient psychiatrist but there is been no recent change in her medications.  Since then she has been drinking a large bottle of wine every night and tried to taper herself at home but then had what sounds like a seizure or syncopal episode last night.  She came to the ER for further evaluation and treatment.  She has been found to be in early alcohol withdrawal.  Her last drink was at midnight last night.  She also complains of some possible fecal urinary incontinence and had some numbness from the knees down but now is only from the ankles down to her toes.  She also has not been eating much food recently.     In the ER she was given IV Ativan with initiation of CIWA protocol and a rally pack.  I spoke personally with Dr. Larson of the ER physician group in detail about the patient's case.    Interval Problem Update  Patient receiving IV fluids    Patient feels a little bit better but she still is tremulous    CIWA numbers in between 7 and 10    Patient encouraged to eat    Patient agreeable to stay overnight for continuing supportive care    I have discussed this patient's plan of care and discharge plan at IDT rounds today with Case Management, Nursing, Nursing leadership, and other members of the IDT team.    Consultants/Specialty      Code Status  Full Code    Disposition  The patient is not medically cleared for  discharge to home or a post-acute facility.  Anticipate discharge to: home with close outpatient follow-up    I have placed the appropriate orders for post-discharge needs.    Review of Systems  Review of Systems   Constitutional:  Positive for malaise/fatigue. Negative for chills and fever.   Respiratory:  Negative for cough, hemoptysis and sputum production.    Cardiovascular:  Negative for chest pain and orthopnea.   Gastrointestinal:  Negative for diarrhea, heartburn, nausea and vomiting.   Genitourinary:  Negative for dysuria, frequency and urgency.   Musculoskeletal:  Negative for joint pain, myalgias and neck pain.   Neurological:  Negative for dizziness, tremors, focal weakness and headaches.   Psychiatric/Behavioral:  Positive for substance abuse.         Physical Exam  Temp:  [36 °C (96.8 °F)-37.1 °C (98.8 °F)] 37.1 °C (98.8 °F)  Pulse:  [20-97] 89  Resp:  [14-20] 14  BP: (113-136)/(68-94) 131/94  SpO2:  [95 %-99 %] 95 %    Physical Exam  Constitutional:       General: She is not in acute distress.     Appearance: She is diaphoretic. She is not ill-appearing or toxic-appearing.   HENT:      Head: Normocephalic.   Eyes:      Pupils: Pupils are equal, round, and reactive to light.   Cardiovascular:      Pulses: Normal pulses.   Pulmonary:      Effort: No respiratory distress.      Breath sounds: No stridor. No wheezing or rhonchi.   Abdominal:      General: There is no distension.      Palpations: There is no mass.      Tenderness: There is no abdominal tenderness.      Hernia: No hernia is present.   Musculoskeletal:         General: No swelling. Normal range of motion.      Cervical back: Normal range of motion.      Right lower leg: No edema.   Skin:     Capillary Refill: Capillary refill takes 2 to 3 seconds.      Coloration: Skin is not jaundiced or pale.      Findings: No bruising or lesion.   Neurological:      General: No focal deficit present.      Cranial Nerves: No cranial nerve deficit.       Motor: No weakness.      Comments: Fine trem tremors of hand   Psychiatric:         Mood and Affect: Mood normal.         Behavior: Behavior normal.         Fluids    Intake/Output Summary (Last 24 hours) at 5/1/2024 1238  Last data filed at 5/1/2024 0800  Gross per 24 hour   Intake 960 ml   Output 320 ml   Net 640 ml       Laboratory  Recent Labs     04/30/24  1120 05/01/24  0320   WBC 5.2 6.3   RBC 4.12* 3.51*   HEMOGLOBIN 15.4 13.2   HEMATOCRIT 44.2 38.6   .3* 110.0*   MCH 37.4* 37.6*   MCHC 34.8 34.2   RDW 50.7* 53.2*   PLATELETCT 114* 98*   MPV 9.9 10.2     Recent Labs     04/30/24  1120 05/01/24  0320   SODIUM 142 141   POTASSIUM 3.3* 3.7   CHLORIDE 100 106   CO2 20 25   GLUCOSE 204* 85   BUN 6* 5*   CREATININE 0.45* 0.32*   CALCIUM 9.8 8.8                   Imaging  CT-HEAD W/O   Final Result      No acute intracranial abnormality.                       Assessment/Plan  * Alcohol intoxication delirium (HCC)- (present on admission)  Assessment & Plan  Active WD in the ER, last drink around 12 am  Prior Wd's, but no icu admissions  MVi/thiamine/folate  Relapsed on 3/4    CIWA protocol  Oral valium taper  Monitor on tele  IVF's      Neuropathy- (present on admission)  Assessment & Plan  Continue outpatient gabapentin  No clear red flag symptoms      Hypophosphatemia- (present on admission)  Assessment & Plan  replacement    Alcoholic cirrhosis of liver without ascites (HCC)- (present on admission)  Assessment & Plan  Volume status is ok, no HE currently  Continue aldactone and rifaxamin    Major depressive disorder- (present on admission)  Assessment & Plan  Not well controlled  F/b osh psych  Continue lexapro      Hypomagnesemia- (present on admission)  Assessment & Plan  replacement    Hypokalemia- (present on admission)  Assessment & Plan  replacement    Transaminitis- (present on admission)  Assessment & Plan  2/2 etoh  No e/o jail  follow    Alcohol use disorder, severe, dependence (HCC)- (present on  admission)  Assessment & Plan  See above  Recurrent admissions    Generalized anxiety disorder- (present on admission)  Assessment & Plan  Not well controlled  See above    Syncope- (present on admission)  Assessment & Plan  Likely vasovagal syncope  Seizure precautions  Follow on tele      Hypothyroidism- (present on admission)  Assessment & Plan  Synthroid  Tsh 8.8( improved from alst month)    Essential hypertension- (present on admission)  Assessment & Plan  Continue aldactone  IV BB PRN         VTE prophylaxis:   SCDs/TEDs      I have performed a physical exam and reviewed and updated ROS and Plan today (5/1/2024). In review of yesterday's note (4/30/2024), there are no changes except as documented above.    Greater than 37  minutes spent prepping to see patient (e.g. review of tests) obtaining and/or reviewing separately obtained history. Performing a medically appropriate examination and/ evaluation.  Counseling and educating the patient/family/caregiver.  Ordering medications, tests, or procedures.  Referring and communicating with other health care professionals.  Documenting clinical information in EPIC.  Independently interpreting results and communicating results to patient/family/caregiver.  Care coordination.

## 2024-05-01 NOTE — THERAPY
Occupational Therapy   Initial Evaluation     Patient Name: Minerva Tillman  Age:  49 y.o., Sex:  female  Medical Record #: [id number]  Today's Date: 5/1/2024     Precautions: Fall Risk    Assessment    Patient is 49 y.o. female admitted with etoh withdrawal, pmhx includes hepatitis and etoh abuse. Pt presents to OT eval able to demonstrate self-care ADLs and household mobility with FWW at SPV level. Pt reports feeling much better, is motivated to DC home with supportive spouse and pursue continued abstinence from alcohol.     Plan    Occupational Therapy Initial Treatment Plan   Duration: Discharge Needs Only       Discharge Recommendations: Anticipate that the patient will have no further occupational therapy needs after discharge from the hospital     Objective       05/01/24 0955   Prior Living Situation   Prior Services None   Housing / Facility 3 Story House   Bathroom Set up Walk In Shower;Shower Chair   Equipment Owned Single Point Cane   Lives with - Patient's Self Care Capacity Spouse   Comments supportive spouse works during the day, pt home alone   Prior Level of ADL Function   Self Feeding Independent   Grooming / Hygiene Independent   Bathing Independent   Dressing Independent   Toileting Independent   Prior Level of IADL Function   Medication Management Independent   Laundry Independent   Kitchen Mobility Independent   Finances Independent   Home Management Independent   History of Falls   History of Falls Yes   Date of Last Fall   (falls related to intoxication)   Precautions   Precautions Fall Risk   Vitals   Room Air Oximetry 95   O2 Delivery Device None - Room Air   Pain 0 - 10 Group   Therapist Pain Assessment Post Activity Pain Same as Prior to Activity;Nurse Notified;0   Cognition    Cognition / Consciousness X   Speech/ Communication Delayed Responses   Level of Consciousness Alert   Comments pleasant, motivated   Active ROM Upper Body   Active ROM Upper Body  WDL   Strength Upper Body    Upper Body Strength  WDL   Balance Assessment   Sitting Balance (Static) Fair +   Sitting Balance (Dynamic) Fair +   Standing Balance (Static) Fair   Standing Balance (Dynamic) Fair   Weight Shift Sitting Fair   Weight Shift Standing Fair   Comments FWW   Bed Mobility    Supine to Sit Supervised   Scooting Supervised   Comments received in bed   ADL Assessment   Eating Independent   Grooming Supervision;Standing   Upper Body Dressing Supervision   Lower Body Dressing Supervision   Toileting Supervision   How much help from another person does the patient currently need...   Putting on and taking off regular lower body clothing? 4   Bathing (including washing, rinsing, and drying)? 3   Toileting, which includes using a toilet, bedpan, or urinal? 3   Putting on and taking off regular upper body clothing? 4   Taking care of personal grooming such as brushing teeth? 4   Eating meals? 4   6 Clicks Daily Activity Score 22   Functional Mobility   Sit to Stand Supervised   Bed, Chair, Wheelchair Transfer Supervised   Toilet Transfers Supervised   Transfer Method Stand Step   Mobility FWW   Activity Tolerance   Comments functional for self-care ADLs   Education Group   Education Provided Role of Occupational Therapist;Activities of Daily Living;Home Safety;Transfers   Role of Occupational Therapist Patient Response Patient;Acceptance;Explanation;Verbal Demonstration   Home Safety Patient Response Patient;Acceptance;Explanation;Verbal Demonstration   Transfers Patient Response Patient;Acceptance;Explanation;Verbal Demonstration   ADL Patient Response Patient;Acceptance;Explanation;Verbal Demonstration   Occupational Therapy Initial Treatment Plan    Duration Discharge Needs Only   Anticipated Discharge Equipment and Recommendations   Discharge Recommendations Anticipate that the patient will have no further occupational therapy needs after discharge from the hospital

## 2024-05-01 NOTE — CARE PLAN
The patient is Stable - Low risk of patient condition declining or worsening    Shift Goals  Clinical Goals: CIWA, VS stable  Patient Goals: Rest  Family Goals: MISTY    Progress made toward(s) clinical / shift goals:    Problem: Knowledge Deficit - Standard  Goal: Patient and family/care givers will demonstrate understanding of plan of care, disease process/condition, diagnostic tests and medications  Description: Target End Date:  1-3 days or as soon as patient condition allows    Document in Patient Education    1.  Patient and family/caregiver oriented to unit, equipment, visitation policy and means for communicating concern  2.  Complete/review Learning Assessment  3.  Assess knowledge level of disease process/condition, treatment plan, diagnostic tests and medications  4.  Explain disease process/condition, treatment plan, diagnostic tests and medications  Outcome: Progressing  Note: Pt updated on POC, all questions answered at this time.     Problem: Optimal Care for Alcohol Withdrawal  Goal: Optimal Care for the alcohol withdrawal patient  Description: Target End Date:  1 to 3 days    1.  Alcohol history screening done on admission  2.  CIWA-AR score assessment (includes assessment of nausea/vomiting, tremor, sweats, anxiety, agitation, tactile, visual and auditory disturbances, headache and orientation/sensorium).  Document on CIWA flowsheet.  3.  Follow CIWA-AR score protocol  4.  Frequent reorientation  5.  Monitor for fluid and electrolyte imbalance.  6.  Assess for respiratory depression due to sedation (pulse ox)  7.  Consider thiamine, multivitamins, folic acid and magnesium sulfate per provider order  8.  Collaborate with Social Workers/Case Management  9.  Collaborate with mental health  Outcome: Progressing  Note: CIWA protocol in place. Pt is on q4 CIWAs. Pt receiving q 6hr Valium. Appropriate seizure precautions in place.      Problem: Seizure Precautions  Goal: Implementation of seizure  precautions  Description: Target End Date:  Prior to discharge or change in level of care    1.  Padded side rails up at all times  2.  Suction equipment and oxygen delivery system at bedside  3.  Continuous pulse oximeter in use  4.  Implement fall precautions, bed alarm on, bed in lowest position  5.  IV access (per order)  6.  Provide low stimulus environment, avoid exposure to triggers  7.  Instruct patient to use call light/seizure button if having warning signs of impending seizure  Outcome: Progressing  Note: Appropriate seizure precautions in place: bed is in lowest, locked position, bed rails padded, and suction at bedside.     Problem: Fall Risk  Goal: Patient will remain free from falls  Description: Target End Date:  Prior to discharge or change in level of care    Document interventions on the [facility] Fall Risk Assessment    1.  Assess for fall risk factors  2.  Implement fall precautions  Outcome: Progressing  Note: Pt is a high fall risk. Bed is in lowest, locked position. Call light and belongings within reach. Bed alarm on. Pt calls appropriately.

## 2024-05-01 NOTE — PROGRESS NOTES
Bedside report received from off going RN/tech: Negar, assumed care of patient.     Fall Risk Score: HIGH RISK  Fall risk interventions in place: Provide patient/family education based on risk assessment, Educate patient/family to call staff for assistance when getting out of bed, Place fall precaution signage outside patient door, Place patient in room close to nursing station, Utilize bed/chair fall alarm, and Bed alarm connected correctly  Bed type: Regular (Sam Score less than 17 interventions in place)  Patient on cardiac monitor: Yes  IVF/IV medications: Infusion per MAR (List Med(s)) 0.9% NaCl w/ Kcl 20 mEq @ 83 mL/hr  Oxygen: How many liters 2L  Bedside sitter: Not Applicable   Isolation: Not applicable

## 2024-05-01 NOTE — DISCHARGE PLANNING
Received Choice Form @: 5614  Agency/Facility Name: [facility]  Referral Sent Per Choice Form @: 8216

## 2024-05-01 NOTE — HOSPITAL COURSE
Minerva Tillman is a 49 y.o. female who presented 4/30/2024 with prolonged alcohol abuse and alcohol withdrawals in the past with underlying depression and anxiety who presents to the hospital above chief complaint.  She also has a history of mucoid diarrhea.  She states she relapsed with alcohol on March 4, 2024 for various social stressors.  She suffers from underlying depression and anxiety as well and sees an outpatient psychiatrist but there is been no recent change in her medications.  Since then she has been drinking a large bottle of wine every night and tried to taper herself at home but then had what sounds like a seizure or syncopal episode last night.  She came to the ER for further evaluation and treatment.  She has been found to be in early alcohol withdrawal.  Her last drink was at midnight last night.  She also complains of some possible fecal urinary incontinence and had some numbness from the knees down but now is only from the ankles down to her toes.  She also has not been eating much food recently.     In the ER she was given IV Ativan with initiation of CIWA protocol and a rally pack.  I spoke personally with Dr. Larson of the ER physician group in detail about the patient's case.

## 2024-05-02 ENCOUNTER — PHARMACY VISIT (OUTPATIENT)
Dept: PHARMACY | Facility: MEDICAL CENTER | Age: 49
End: 2024-05-02
Payer: COMMERCIAL

## 2024-05-02 VITALS
HEART RATE: 97 BPM | RESPIRATION RATE: 17 BRPM | WEIGHT: 129.85 LBS | BODY MASS INDEX: 23.01 KG/M2 | DIASTOLIC BLOOD PRESSURE: 97 MMHG | SYSTOLIC BLOOD PRESSURE: 140 MMHG | TEMPERATURE: 98.1 F | OXYGEN SATURATION: 92 % | HEIGHT: 63 IN

## 2024-05-02 PROBLEM — E83.42 HYPOMAGNESEMIA: Status: RESOLVED | Noted: 2023-05-31 | Resolved: 2024-05-02

## 2024-05-02 PROBLEM — R55 SYNCOPE: Status: RESOLVED | Noted: 2021-05-25 | Resolved: 2024-05-02

## 2024-05-02 PROBLEM — E83.39 HYPOPHOSPHATEMIA: Status: RESOLVED | Noted: 2024-04-30 | Resolved: 2024-05-02

## 2024-05-02 PROBLEM — E87.6 HYPOKALEMIA: Status: RESOLVED | Noted: 2023-05-31 | Resolved: 2024-05-02

## 2024-05-02 PROBLEM — F32.9 MAJOR DEPRESSIVE DISORDER: Status: RESOLVED | Noted: 2024-03-06 | Resolved: 2024-05-02

## 2024-05-02 PROBLEM — R74.01 TRANSAMINITIS: Status: RESOLVED | Noted: 2023-05-30 | Resolved: 2024-05-02

## 2024-05-02 LAB
ALBUMIN SERPL BCP-MCNC: 3.8 G/DL (ref 3.2–4.9)
ALBUMIN/GLOB SERPL: 1.3 G/DL
ALP SERPL-CCNC: 139 U/L (ref 30–99)
ALT SERPL-CCNC: 41 U/L (ref 2–50)
ANION GAP SERPL CALC-SCNC: 13 MMOL/L (ref 7–16)
AST SERPL-CCNC: 90 U/L (ref 12–45)
BILIRUB SERPL-MCNC: 1.6 MG/DL (ref 0.1–1.5)
BUN SERPL-MCNC: 7 MG/DL (ref 8–22)
CALCIUM ALBUM COR SERPL-MCNC: 9.3 MG/DL (ref 8.5–10.5)
CALCIUM SERPL-MCNC: 9.1 MG/DL (ref 8.5–10.5)
CHLORIDE SERPL-SCNC: 103 MMOL/L (ref 96–112)
CO2 SERPL-SCNC: 22 MMOL/L (ref 20–33)
CREAT SERPL-MCNC: 0.36 MG/DL (ref 0.5–1.4)
ERYTHROCYTE [DISTWIDTH] IN BLOOD BY AUTOMATED COUNT: 51.9 FL (ref 35.9–50)
GFR SERPLBLD CREATININE-BSD FMLA CKD-EPI: 123 ML/MIN/1.73 M 2
GLOBULIN SER CALC-MCNC: 2.9 G/DL (ref 1.9–3.5)
GLUCOSE SERPL-MCNC: 85 MG/DL (ref 65–99)
HCT VFR BLD AUTO: 39.8 % (ref 37–47)
HGB BLD-MCNC: 13.5 G/DL (ref 12–16)
MAGNESIUM SERPL-MCNC: 1.5 MG/DL (ref 1.5–2.5)
MCH RBC QN AUTO: 37 PG (ref 27–33)
MCHC RBC AUTO-ENTMCNC: 33.9 G/DL (ref 32.2–35.5)
MCV RBC AUTO: 109 FL (ref 81.4–97.8)
PHOSPHATE SERPL-MCNC: 3.9 MG/DL (ref 2.5–4.5)
PLATELET # BLD AUTO: 114 K/UL (ref 164–446)
PMV BLD AUTO: 10 FL (ref 9–12.9)
POTASSIUM SERPL-SCNC: 4.2 MMOL/L (ref 3.6–5.5)
PROT SERPL-MCNC: 6.7 G/DL (ref 6–8.2)
RBC # BLD AUTO: 3.65 M/UL (ref 4.2–5.4)
SODIUM SERPL-SCNC: 138 MMOL/L (ref 135–145)
WBC # BLD AUTO: 6.4 K/UL (ref 4.8–10.8)

## 2024-05-02 PROCEDURE — RXMED WILLOW AMBULATORY MEDICATION CHARGE: Performed by: HOSPITALIST

## 2024-05-02 PROCEDURE — 99239 HOSP IP/OBS DSCHRG MGMT >30: CPT | Performed by: HOSPITALIST

## 2024-05-02 RX ORDER — ALPRAZOLAM 0.25 MG/1
0.25 TABLET ORAL 2 TIMES DAILY PRN
Qty: 20 TABLET | Refills: 0 | Status: SHIPPED | OUTPATIENT
Start: 2024-05-02 | End: 2024-05-12

## 2024-05-02 RX ADMIN — FOLIC ACID 1 MG: 1 TABLET ORAL at 05:33

## 2024-05-02 RX ADMIN — LEVOTHYROXINE SODIUM 50 MCG: 0.05 TABLET ORAL at 05:33

## 2024-05-02 RX ADMIN — THERA TABS 1 TABLET: TAB at 05:33

## 2024-05-02 RX ADMIN — ESCITALOPRAM OXALATE 10 MG: 10 TABLET ORAL at 05:33

## 2024-05-02 RX ADMIN — Medication 400 MG: at 05:33

## 2024-05-02 RX ADMIN — POTASSIUM CHLORIDE 20 MEQ: 1500 TABLET, EXTENDED RELEASE ORAL at 05:33

## 2024-05-02 RX ADMIN — DIAZEPAM 5 MG: 5 TABLET ORAL at 07:09

## 2024-05-02 RX ADMIN — DIAZEPAM 5 MG: 5 TABLET ORAL at 01:42

## 2024-05-02 RX ADMIN — Medication 100 MG: at 05:33

## 2024-05-02 RX ADMIN — RIFAXIMIN 550 MG: 550 TABLET ORAL at 05:33

## 2024-05-02 RX ADMIN — POTASSIUM CHLORIDE AND SODIUM CHLORIDE: 900; 150 INJECTION, SOLUTION INTRAVENOUS at 05:37

## 2024-05-02 ASSESSMENT — LIFESTYLE VARIABLES
ORIENTATION AND CLOUDING OF SENSORIUM: ORIENTED AND CAN DO SERIAL ADDITIONS
TOTAL SCORE: 4
HEADACHE, FULLNESS IN HEAD: NOT PRESENT
HEADACHE, FULLNESS IN HEAD: NOT PRESENT
AGITATION: NORMAL ACTIVITY
ANXIETY: NO ANXIETY (AT EASE)
AUDITORY DISTURBANCES: NOT PRESENT
ANXIETY: NO ANXIETY (AT EASE)
NAUSEA AND VOMITING: NO NAUSEA AND NO VOMITING
PAROXYSMAL SWEATS: *
AUDITORY DISTURBANCES: NOT PRESENT
VISUAL DISTURBANCES: NOT PRESENT
NAUSEA AND VOMITING: NO NAUSEA AND NO VOMITING
TREMOR: *
ORIENTATION AND CLOUDING OF SENSORIUM: ORIENTED AND CAN DO SERIAL ADDITIONS
TREMOR: TREMOR NOT VISIBLE BUT CAN BE FELT, FINGERTIP TO FINGERTIP
VISUAL DISTURBANCES: NOT PRESENT
TOTAL SCORE: 2
AGITATION: NORMAL ACTIVITY
PAROXYSMAL SWEATS: BARELY PERCEPTIBLE SWEATING. PALMS MOIST

## 2024-05-02 ASSESSMENT — FIBROSIS 4 INDEX: FIB4 SCORE: 6.16

## 2024-05-02 NOTE — DISCHARGE PLANNING
-[phone]  DPA left message for Orly at [facility], asking for a callback regarding HH referral sent earlier today.

## 2024-05-02 NOTE — PROGRESS NOTES
Monitor Summary  Rhythm: Normal Sinus Rhythm- ST  Rate: 70-100s  Ectopy: PVCs  .18 / .09 / .46

## 2024-05-02 NOTE — CARE PLAN
The patient is Stable - Low risk of patient condition declining or worsening    Shift Goals  Clinical Goals: CIWA, hemodynamic stability  Patient Goals: Rest  Family Goals: MISTY    Progress made toward(s) clinical / shift goals:    Problem: Knowledge Deficit - Standard  Goal: Patient and family/care givers will demonstrate understanding of plan of care, disease process/condition, diagnostic tests and medications  Description: Target End Date:  1-3 days or as soon as patient condition allows    Document in Patient Education    1.  Patient and family/caregiver oriented to unit, equipment, visitation policy and means for communicating concern  2.  Complete/review Learning Assessment  3.  Assess knowledge level of disease process/condition, treatment plan, diagnostic tests and medications  4.  Explain disease process/condition, treatment plan, diagnostic tests and medications  Outcome: Progressing  Note: Pt updated on POC, all questions answered at this time.     Problem: Seizure Precautions  Goal: Implementation of seizure precautions  Description: Target End Date:  Prior to discharge or change in level of care    1.  Padded side rails up at all times  2.  Suction equipment and oxygen delivery system at bedside  3.  Continuous pulse oximeter in use  4.  Implement fall precautions, bed alarm on, bed in lowest position  5.  IV access (per order)  6.  Provide low stimulus environment, avoid exposure to triggers  7.  Instruct patient to use call light/seizure button if having warning signs of impending seizure  Outcome: Progressing  Note: Appropriate seizure precautions in place: suction at bedside, bed in lowest, locked position, and bed rails padded.     Problem: Fall Risk  Goal: Patient will remain free from falls  Description: Target End Date:  Prior to discharge or change in level of care    Document interventions on the [facility] Fall Risk Assessment    1.  Assess for fall risk factors  2.  Implement fall  precautions  Outcome: Progressing  Note: Pt is a high fall risk. Bed is in lowest, locked position. Call light and belongings within reach. Bed alarm is on. Pt calls appropriately.      Problem: Optimal Care for Alcohol Withdrawal  Goal: Optimal Care for the alcohol withdrawal patient  Description: Target End Date:  1 to 3 days    1.  Alcohol history screening done on admission  2.  CIWA-AR score assessment (includes assessment of nausea/vomiting, tremor, sweats, anxiety, agitation, tactile, visual and auditory disturbances, headache and orientation/sensorium).  Document on CIWA flowsheet.  3.  Follow CIWA-AR score protocol  4.  Frequent reorientation  5.  Monitor for fluid and electrolyte imbalance.  6.  Assess for respiratory depression due to sedation (pulse ox)  7.  Consider thiamine, multivitamins, folic acid and magnesium sulfate per provider order  8.  Collaborate with Social Workers/Case Management  9.  Collaborate with mental health  Note: CIWA protocol in place. Pt currently on q4 hr CIWAs. Appropriate seizure precautions in place.

## 2024-05-02 NOTE — DISCHARGE PLANNING
Case Management Discharge Planning    Admission Date: 4/30/2024  GMLOS: 3.4  ALOS: 2    6-Clicks ADL Score: 22  6-Clicks Mobility Score: 20      Anticipated Discharge Dispo: Discharge Disposition: D/T to home under HHA care in anticipation of covered skilled care (06)  Discharge Address: [address] [place], [place] [address] [address]  Discharge Contact Phone Number: [phone]    DME Needed: Yes    DME Ordered: Yes    Action(s) Taken: LMSW reviewed HH referral and choice one of [facility] has declined. LMSW sent to second choice of HH to [facility]. Per IDT rounds pt is discharging today with HH pending. LMSW provided pt with update on pending HH and provided pt with pending HH letter.

## 2024-05-03 NOTE — DISCHARGE PLANNING
-[phone]  MARLEE attempted to call Orly with [facility] regarding HH referral, no answer. MARLEE called the main number and was informed that agency is likely still waiting on insurance auth, as referral is still pending.

## 2024-05-04 NOTE — DISCHARGE SUMMARY
"Discharge Summary    CHIEF COMPLAINT ON ADMISSION  Chief Complaint   Patient presents with    N/V    Weakness     Lower extremity weakness    Syncope     Possible. Pt states \" not sure if it was a seizure. \"     Tremors       Reason for Admission  ems     Admission Date  4/30/2024    CODE STATUS  Prior    HPI & HOSPITAL COURSE    Minerva Tillman is a 49 y.o. female who presented 4/30/2024 with prolonged alcohol abuse and alcohol withdrawals in the past with underlying depression and anxiety who presents to the hospital above chief complaint.  She also has a history of mucoid diarrhea.  She states she relapsed with alcohol on March 4, 2024 for various social stressors.  She suffers from underlying depression and anxiety as well and sees an outpatient psychiatrist but there is been no recent change in her medications.  Since then she has been drinking a large bottle of wine every night and tried to taper herself at home but then had what sounds like a seizure or syncopal episode last night.  She came to the ER for further evaluation and treatment.  She has been found to be in early alcohol withdrawal.  Her last drink was at midnight last night.  She also complains of some possible fecal urinary incontinence and had some numbness from the knees down but now is only from the ankles down to her toes.  She also has not been eating much food recently.     In the ER she was given IV Ativan with initiation of CIWA protocol and a rally pack.  I spoke personally with Dr. Larson of the ER physician group in detail about the patient's case.      He was admitted and we replaced patient's electrolyte magnesium potassium phosphorus.  Given supportive care with IV fluid hydration.  She had Ativan as needed for childhood anxiety.  Patient was seen by PT and they recommended home health.  Patient improved on a daily basis patient advised to stay away from alcohol patient therefore discharged home on the below " medications      Therefore, she is discharged in good and stable condition to home with close outpatient follow-up.    The patient met 2-midnight criteria for an inpatient stay at the time of discharge.    Discharge Date  5/2/2024    FOLLOW UP ITEMS POST DISCHARGE      DISCHARGE DIAGNOSES  Principal Problem:    Alcohol intoxication delirium (HCC) (POA: Yes)  Active Problems:    Essential hypertension (POA: Yes)    Hypothyroidism (POA: Yes)    Generalized anxiety disorder (POA: Yes)    Alcohol use disorder, severe, dependence (HCC) (POA: Yes)    Alcoholic cirrhosis of liver without ascites (HCC) (POA: Yes)    Neuropathy (POA: Yes)    Balance problem (POA: Yes)  Resolved Problems:    Syncope (POA: Yes)    Transaminitis (POA: Yes)    Hypokalemia (POA: Yes)    Hypomagnesemia (POA: Yes)    Major depressive disorder (POA: Yes)    Hypophosphatemia (POA: Yes)      FOLLOW UP  Future Appointments   Date Time Provider Department Center   5/20/2024  2:20 PM Donita Asif M.D. CAUG KINA Asif M.D.  [address]  [address]  [facility] [address]  [phone]            MEDICATIONS ON DISCHARGE     Medication List        START taking these medications        Instructions   ALPRAZolam 0.25 MG Tabs  Commonly known as: Xanax   Take 1 Tablet by mouth 2 times a day as needed for Anxiety for up to 10 days.  Dose: 0.25 mg            CONTINUE taking these medications        Instructions   albuterol 108 (90 Base) MCG/ACT Aers inhalation aerosol   Inhale 2 Puffs every 4 hours.  Dose: 2 Puff     escitalopram 10 MG Tabs  Commonly known as: Lexapro   Take 10 mg by mouth every day.  Dose: 10 mg     folic acid 1 MG Tabs  Commonly known as: Folvite   Take 1 Tablet by mouth every day.  Dose: 1 mg     gabapentin 300 MG Caps  Commonly known as: Neurontin   Take 1 Capsule by mouth every evening.  Dose: 300 mg     hydrOXYzine HCl 10 MG Tabs  Commonly known as: Atarax   Take 10 mg by mouth 3 times a day as needed for Anxiety.  Dose: 10  mg     levothyroxine 50 MCG Tabs  Commonly known as: Synthroid   Take 1 Tablet by mouth every morning on an empty stomach.  Dose: 50 mcg     multivitamin Tabs   Take 1 Tablet by mouth every day.  Dose: 1 Tablet     NON SPECIFIED   Antibiotic for UTI     spironolactone 25 MG Tabs  Commonly known as: Aldactone   Take 1 Tablet by mouth every day.  Dose: 25 mg     thiamine 100 MG tablet  Commonly known as: Thiamine   Take 1 Tablet by mouth every day.  Dose: 100 mg     Xifaxan 550 MG Tabs tablet  Generic drug: riFAXIMin   Take 1 Tablet by mouth 2 times a day.  Dose: 550 mg              Allergies  Allergies   Allergen Reactions    Strawberry Hives    Sulfa Drugs Hives     IPC=0724       DIET  No orders of the defined types were placed in this encounter.      ACTIVITY  As tolerated.  Weight bearing as tolerated    CONSULTATIONS      PROCEDURES      LABORATORY  Lab Results   Component Value Date    SODIUM 138 05/02/2024    POTASSIUM 4.2 05/02/2024    CHLORIDE 103 05/02/2024    CO2 22 05/02/2024    GLUCOSE 85 05/02/2024    BUN 7 (L) 05/02/2024    CREATININE 0.36 (L) 05/02/2024    CREATININE 0.83 02/01/2013        Lab Results   Component Value Date    WBC 6.4 05/02/2024    WBC 5.2 02/01/2013    HEMOGLOBIN 13.5 05/02/2024    HEMATOCRIT 39.8 05/02/2024    PLATELETCT 114 (L) 05/02/2024        Total time of the discharge process exceeds 40 minutes.

## 2024-05-07 NOTE — DISCHARGE PLANNING
[phone]  DPA informed by [facility] that insurance auth is still pending. DPA to follow up tomorrow.

## 2024-05-15 ENCOUNTER — PHARMACY VISIT (OUTPATIENT)
Dept: PHARMACY | Facility: MEDICAL CENTER | Age: 49
End: 2024-05-15
Payer: COMMERCIAL

## 2024-05-15 PROCEDURE — RXMED WILLOW AMBULATORY MEDICATION CHARGE: Performed by: INTERNAL MEDICINE

## 2024-05-21 NOTE — DISCHARGE PLANNING
[phone]  MARLEE unable to reach Orly with [facility] . MARLEE called the main number for [facility] and was informed that pt was not accepted by agency.

## 2024-05-24 ENCOUNTER — HOSPITAL ENCOUNTER (OUTPATIENT)
Facility: MEDICAL CENTER | Age: 49
End: 2024-05-24
Attending: NURSE PRACTITIONER
Payer: COMMERCIAL

## 2024-05-24 ENCOUNTER — OFFICE VISIT (OUTPATIENT)
Dept: URGENT CARE | Facility: CLINIC | Age: 49
End: 2024-05-24
Payer: COMMERCIAL

## 2024-05-24 VITALS
SYSTOLIC BLOOD PRESSURE: 100 MMHG | DIASTOLIC BLOOD PRESSURE: 70 MMHG | OXYGEN SATURATION: 95 % | BODY MASS INDEX: 23.92 KG/M2 | HEIGHT: 63 IN | WEIGHT: 135 LBS | HEART RATE: 87 BPM | TEMPERATURE: 99.6 F | RESPIRATION RATE: 16 BRPM

## 2024-05-24 DIAGNOSIS — R29.898 WEAKNESS OF BOTH LOWER EXTREMITIES: ICD-10-CM

## 2024-05-24 DIAGNOSIS — R53.81 PHYSICAL DECONDITIONING: ICD-10-CM

## 2024-05-24 DIAGNOSIS — K70.10 ALCOHOLIC HEPATITIS WITHOUT ASCITES: ICD-10-CM

## 2024-05-24 LAB
ALBUMIN SERPL BCP-MCNC: 4.1 G/DL (ref 3.2–4.9)
ALBUMIN/GLOB SERPL: 1.2 G/DL
ALP SERPL-CCNC: 145 U/L (ref 30–99)
ALT SERPL-CCNC: 24 U/L (ref 2–50)
ANION GAP SERPL CALC-SCNC: 17 MMOL/L (ref 7–16)
AST SERPL-CCNC: 49 U/L (ref 12–45)
BASOPHILS # BLD AUTO: 0.8 % (ref 0–1.8)
BASOPHILS # BLD: 0.1 K/UL (ref 0–0.12)
BILIRUB SERPL-MCNC: 0.5 MG/DL (ref 0.1–1.5)
BUN SERPL-MCNC: 6 MG/DL (ref 8–22)
CALCIUM ALBUM COR SERPL-MCNC: 9.1 MG/DL (ref 8.5–10.5)
CALCIUM SERPL-MCNC: 9.2 MG/DL (ref 8.5–10.5)
CHLORIDE SERPL-SCNC: 106 MMOL/L (ref 96–112)
CO2 SERPL-SCNC: 24 MMOL/L (ref 20–33)
CREAT SERPL-MCNC: 0.35 MG/DL (ref 0.5–1.4)
EOSINOPHIL # BLD AUTO: 0.04 K/UL (ref 0–0.51)
EOSINOPHIL NFR BLD: 0.3 % (ref 0–6.9)
ERYTHROCYTE [DISTWIDTH] IN BLOOD BY AUTOMATED COUNT: 54.1 FL (ref 35.9–50)
GFR SERPLBLD CREATININE-BSD FMLA CKD-EPI: 124 ML/MIN/1.73 M 2
GLOBULIN SER CALC-MCNC: 3.5 G/DL (ref 1.9–3.5)
GLUCOSE SERPL-MCNC: 104 MG/DL (ref 65–99)
HCT VFR BLD AUTO: 45.9 % (ref 37–47)
HGB BLD-MCNC: 15.3 G/DL (ref 12–16)
IMM GRANULOCYTES # BLD AUTO: 0.04 K/UL (ref 0–0.11)
IMM GRANULOCYTES NFR BLD AUTO: 0.3 % (ref 0–0.9)
LYMPHOCYTES # BLD AUTO: 3.27 K/UL (ref 1–4.8)
LYMPHOCYTES NFR BLD: 27 % (ref 22–41)
MAGNESIUM SERPL-MCNC: 1.4 MG/DL (ref 1.5–2.5)
MCH RBC QN AUTO: 36.1 PG (ref 27–33)
MCHC RBC AUTO-ENTMCNC: 33.3 G/DL (ref 32.2–35.5)
MCV RBC AUTO: 108.3 FL (ref 81.4–97.8)
MONOCYTES # BLD AUTO: 1.07 K/UL (ref 0–0.85)
MONOCYTES NFR BLD AUTO: 8.9 % (ref 0–13.4)
NEUTROPHILS # BLD AUTO: 7.57 K/UL (ref 1.82–7.42)
NEUTROPHILS NFR BLD: 62.7 % (ref 44–72)
NRBC # BLD AUTO: 0 K/UL
NRBC BLD-RTO: 0 /100 WBC (ref 0–0.2)
PLATELET # BLD AUTO: 194 K/UL (ref 164–446)
PMV BLD AUTO: 9.4 FL (ref 9–12.9)
POTASSIUM SERPL-SCNC: 4.2 MMOL/L (ref 3.6–5.5)
PROT SERPL-MCNC: 7.6 G/DL (ref 6–8.2)
RBC # BLD AUTO: 4.24 M/UL (ref 4.2–5.4)
SODIUM SERPL-SCNC: 147 MMOL/L (ref 135–145)
WBC # BLD AUTO: 12.1 K/UL (ref 4.8–10.8)

## 2024-05-24 PROCEDURE — 3078F DIAST BP <80 MM HG: CPT | Performed by: NURSE PRACTITIONER

## 2024-05-24 PROCEDURE — 99214 OFFICE O/P EST MOD 30 MIN: CPT | Performed by: NURSE PRACTITIONER

## 2024-05-24 PROCEDURE — 3074F SYST BP LT 130 MM HG: CPT | Performed by: NURSE PRACTITIONER

## 2024-05-24 RX ORDER — NADOLOL 20 MG/1
TABLET ORAL
COMMUNITY
Start: 2024-05-10

## 2024-05-24 RX ORDER — LANOLIN ALCOHOL/MO/W.PET/CERES
100 CREAM (GRAM) TOPICAL DAILY
Qty: 30 TABLET | Refills: 1 | Status: SHIPPED | OUTPATIENT
Start: 2024-05-24

## 2024-05-24 ASSESSMENT — FIBROSIS 4 INDEX: FIB4 SCORE: 6.16

## 2024-05-26 ENCOUNTER — APPOINTMENT (OUTPATIENT)
Dept: RADIOLOGY | Facility: MEDICAL CENTER | Age: 49
End: 2024-05-26
Attending: EMERGENCY MEDICINE
Payer: COMMERCIAL

## 2024-05-26 ENCOUNTER — HOSPITAL ENCOUNTER (EMERGENCY)
Facility: MEDICAL CENTER | Age: 49
End: 2024-05-26
Attending: EMERGENCY MEDICINE
Payer: COMMERCIAL

## 2024-05-26 VITALS
WEIGHT: 135 LBS | BODY MASS INDEX: 23.92 KG/M2 | RESPIRATION RATE: 17 BRPM | SYSTOLIC BLOOD PRESSURE: 121 MMHG | HEIGHT: 63 IN | HEART RATE: 92 BPM | OXYGEN SATURATION: 95 % | DIASTOLIC BLOOD PRESSURE: 71 MMHG | TEMPERATURE: 98 F

## 2024-05-26 DIAGNOSIS — E86.0 DEHYDRATION: ICD-10-CM

## 2024-05-26 DIAGNOSIS — R53.1 GENERAL WEAKNESS: ICD-10-CM

## 2024-05-26 DIAGNOSIS — F10.920 ALCOHOLIC INTOXICATION WITHOUT COMPLICATION (HCC): ICD-10-CM

## 2024-05-26 LAB
ALBUMIN SERPL BCP-MCNC: 4.1 G/DL (ref 3.2–4.9)
ALBUMIN/GLOB SERPL: 1.1 G/DL
ALP SERPL-CCNC: 157 U/L (ref 30–99)
ALT SERPL-CCNC: 29 U/L (ref 2–50)
ANION GAP SERPL CALC-SCNC: 18 MMOL/L (ref 7–16)
AST SERPL-CCNC: 55 U/L (ref 12–45)
BASOPHILS # BLD AUTO: 1 % (ref 0–1.8)
BASOPHILS # BLD: 0.09 K/UL (ref 0–0.12)
BILIRUB SERPL-MCNC: 0.5 MG/DL (ref 0.1–1.5)
BUN SERPL-MCNC: 6 MG/DL (ref 8–22)
CALCIUM ALBUM COR SERPL-MCNC: 9.3 MG/DL (ref 8.5–10.5)
CALCIUM SERPL-MCNC: 9.4 MG/DL (ref 8.5–10.5)
CHLORIDE SERPL-SCNC: 106 MMOL/L (ref 96–112)
CO2 SERPL-SCNC: 24 MMOL/L (ref 20–33)
CREAT SERPL-MCNC: 0.45 MG/DL (ref 0.5–1.4)
EKG IMPRESSION: NORMAL
EOSINOPHIL # BLD AUTO: 0.04 K/UL (ref 0–0.51)
EOSINOPHIL NFR BLD: 0.4 % (ref 0–6.9)
ERYTHROCYTE [DISTWIDTH] IN BLOOD BY AUTOMATED COUNT: 52.4 FL (ref 35.9–50)
ETHANOL BLD-MCNC: 385.1 MG/DL
GFR SERPLBLD CREATININE-BSD FMLA CKD-EPI: 117 ML/MIN/1.73 M 2
GLOBULIN SER CALC-MCNC: 3.7 G/DL (ref 1.9–3.5)
GLUCOSE SERPL-MCNC: 94 MG/DL (ref 65–99)
HCT VFR BLD AUTO: 46 % (ref 37–47)
HGB BLD-MCNC: 15.6 G/DL (ref 12–16)
IMM GRANULOCYTES # BLD AUTO: 0.03 K/UL (ref 0–0.11)
IMM GRANULOCYTES NFR BLD AUTO: 0.3 % (ref 0–0.9)
LYMPHOCYTES # BLD AUTO: 3.17 K/UL (ref 1–4.8)
LYMPHOCYTES NFR BLD: 33.9 % (ref 22–41)
MAGNESIUM SERPL-MCNC: 1.7 MG/DL (ref 1.5–2.5)
MCH RBC QN AUTO: 35.9 PG (ref 27–33)
MCHC RBC AUTO-ENTMCNC: 33.9 G/DL (ref 32.2–35.5)
MCV RBC AUTO: 106 FL (ref 81.4–97.8)
MONOCYTES # BLD AUTO: 0.82 K/UL (ref 0–0.85)
MONOCYTES NFR BLD AUTO: 8.8 % (ref 0–13.4)
NEUTROPHILS # BLD AUTO: 5.19 K/UL (ref 1.82–7.42)
NEUTROPHILS NFR BLD: 55.6 % (ref 44–72)
NRBC # BLD AUTO: 0 K/UL
NRBC BLD-RTO: 0 /100 WBC (ref 0–0.2)
PHOSPHATE SERPL-MCNC: 4.1 MG/DL (ref 2.5–4.5)
PLATELET # BLD AUTO: 157 K/UL (ref 164–446)
PMV BLD AUTO: 9 FL (ref 9–12.9)
POTASSIUM SERPL-SCNC: 3.9 MMOL/L (ref 3.6–5.5)
PROT SERPL-MCNC: 7.8 G/DL (ref 6–8.2)
RBC # BLD AUTO: 4.34 M/UL (ref 4.2–5.4)
SODIUM SERPL-SCNC: 148 MMOL/L (ref 135–145)
WBC # BLD AUTO: 9.3 K/UL (ref 4.8–10.8)

## 2024-05-26 RX ORDER — MIDAZOLAM HYDROCHLORIDE 1 MG/ML
2 INJECTION INTRAMUSCULAR; INTRAVENOUS ONCE
Status: COMPLETED | OUTPATIENT
Start: 2024-05-26 | End: 2024-05-26

## 2024-05-26 RX ADMIN — MIDAZOLAM HYDROCHLORIDE 2 MG: 2 INJECTION, SOLUTION INTRAMUSCULAR; INTRAVENOUS at 07:42

## 2024-05-26 RX ADMIN — THIAMINE HYDROCHLORIDE 1000 ML: 100 INJECTION, SOLUTION INTRAMUSCULAR; INTRAVENOUS at 06:45

## 2024-05-26 ASSESSMENT — FIBROSIS 4 INDEX: FIB4 SCORE: 2.58

## 2024-05-26 NOTE — ED PROVIDER NOTES
CHIEF COMPLAINT  Chief Complaint   Patient presents with    GLF     With history of multiple falls  (+) Head strikes (-) LOC (-) thinners ? (+) ETOH     Nausea    Weakness       LIMITATION TO HISTORY   Select: none    HPI    Minerva Tillman is a 49 y.o. female with a history of alcohol abuse who presents to the Emergency Department for evaluation of multiple falls. The patient states she has felt off balance with bilateral leg weakness since March 2024. Per EMS, the patient has hit her head multiple times. She denies any loss of consciousness. She reports associated bowel and bladder incontinence, nausea and weakness.     OUTSIDE HISTORIAN(S):  Select:     EXTERNAL RECORDS REVIEWED  Select: The patient has a history of alcoholism and has been seen and admitted here previously for alcohol related complaints.    PAST MEDICAL HISTORY  Past Medical History:   Diagnosis Date    Anxiety     Depression     Gynecological disorder     High cholesterol     Hypertension     Snoring     Urinary tract infection      .    SURGICAL HISTORY  Past Surgical History:   Procedure Laterality Date    HYSTEROSCOPY NOVASURE-2  8/2/2017    Procedure: HYSTEROSCOPY NOVASURE;  Surgeon: Hedy Hardy M.D.;  Location: SURGERY [facility];  Service:     DILATION AND CURETTAGE  8/2/2017    Procedure: DILATION AND CURETTAGE;  Surgeon: Hedy Hardy M.D.;  Location: SURGERY [facility];  Service:     OH BREAST AUGMENTATION WITH IMPLANT  2006    MAMMOPLASTY AUGMENTATION           FAMILY HISTORY  Family History   Problem Relation Age of Onset    Cancer Maternal Grandmother 60        breast and skin    Stroke Maternal Grandmother     Hypertension Mother     Hyperlipidemia Mother     Depression Father     Hypertension Father     Asthma Father     Hyperlipidemia Father     Hypertension Brother     Cancer Maternal Grandfather 60        esphogus    Other Paternal Grandmother         complications from surgery    Hypertension  "Brother           SOCIAL HISTORY  Social History     Tobacco Use    Smoking status: Never    Smokeless tobacco: Never   Vaping Use    Vaping status: Never Used   Substance Use Topics    Alcohol use: Yes     Alcohol/week: 8.4 oz     Types: 14 Glasses of wine per week     Comment: 3-4 glasses per day    Drug use: No         CURRENT MEDICATIONS  Current Outpatient Medications on File Prior to Encounter   Medication Sig Dispense Refill    nadolol (CORGARD) 20 MG Tab  (Patient not taking: Reported on 5/24/2024)      thiamine (THIAMINE) 100 MG tablet Take 1 Tablet by mouth every day. 30 Tablet 1    NON SPECIFIED Antibiotic for UTI      albuterol 108 (90 Base) MCG/ACT Aero Soln inhalation aerosol Inhale 2 Puffs every 4 hours. (Patient not taking: Reported on 5/24/2024) 8.5 g 0    gabapentin (NEURONTIN) 300 MG Cap Take 1 Capsule by mouth every evening. 90 Capsule 0    levothyroxine (SYNTHROID) 50 MCG Tab Take 1 Tablet by mouth every morning on an empty stomach. 30 Tablet 0    riFAXIMin (XIFAXAN) 550 MG Tab tablet Take 1 Tablet by mouth 2 times a day. (Patient not taking: Reported on 5/24/2024) 42 Tablet 0    spironolactone (ALDACTONE) 25 MG Tab Take 1 Tablet by mouth every day. 30 Tablet 3    multivitamin Tab Take 1 Tablet by mouth every day.      folic acid (FOLVITE) 1 MG Tab Take 1 Tablet by mouth every day. (Patient not taking: Reported on 5/24/2024)      hydrOXYzine HCl (ATARAX) 10 MG Tab Take 10 mg by mouth 3 times a day as needed for Anxiety.      escitalopram (LEXAPRO) 10 MG Tab Take 10 mg by mouth every day.           ALLERGIES  Allergies   Allergen Reactions    Strawberry Hives    Sulfa Drugs Hives     [id number]       PHYSICAL EXAM  VITAL SIGNS:BP (!) 132/91   Pulse 97   Resp 18   Ht 1.6 m (5' 3\")   Wt 61.2 kg (135 lb)   SpO2 (!) 86%   BMI 23.91 kg/m²     Constitutional: Well-developed no acute distress   HENT: Normocephalic, Atraumatic, Bilateral external ears normal.  Eyes:  conjunctiva are normal.   Neck: " Supple.  Nontender midline  Cardiovascular: Regular rate and rhythm without murmurs gallops or rubs.   Thorax & Lungs: No respiratory distress. Breathing comfortably. Lungs are clear to auscultation bilaterally, there are no wheezes no rales. Chest wall is nontender.  Abdomen: Soft, non distended, non tender   Skin: Warm, Dry, No erythema,   Back: No tenderness, No CVA tenderness.  Musculoskeletal: No clubbing cyanosis or edema good range of motion   Neurologic: Alert & oriented x 3, Globally weak 4.5 strength all distress, wide base ataxic gait, cerebellar function appears intact otherwise to finger nose touch, Mild slurring to speech  Psychiatric: Affect normal, Judgment normal, Mood normal.     DIAGNOSTIC STUDIES / PROCEDURES        LABS  Results for orders placed or performed during the hospital encounter of 05/26/24   CBC with Differential   Result Value Ref Range    WBC 9.3 4.8 - 10.8 K/uL    RBC 4.34 4.20 - 5.40 M/uL    Hemoglobin 15.6 12.0 - 16.0 g/dL    Hematocrit 46.0 37.0 - 47.0 %    .0 (H) 81.4 - 97.8 fL    MCH 35.9 (H) 27.0 - 33.0 pg    MCHC 33.9 32.2 - 35.5 g/dL    RDW 52.4 (H) 35.9 - 50.0 fL    Platelet Count 157 (L) 164 - 446 K/uL    MPV 9.0 9.0 - 12.9 fL    Neutrophils-Polys 55.60 44.00 - 72.00 %    Lymphocytes 33.90 22.00 - 41.00 %    Monocytes 8.80 0.00 - 13.40 %    Eosinophils 0.40 0.00 - 6.90 %    Basophils 1.00 0.00 - 1.80 %    Immature Granulocytes 0.30 0.00 - 0.90 %    Nucleated RBC 0.00 0.00 - 0.20 /100 WBC    Neutrophils (Absolute) 5.19 1.82 - 7.42 K/uL    Lymphs (Absolute) 3.17 1.00 - 4.80 K/uL    Monos (Absolute) 0.82 0.00 - 0.85 K/uL    Eos (Absolute) 0.04 0.00 - 0.51 K/uL    Baso (Absolute) 0.09 0.00 - 0.12 K/uL    Immature Granulocytes (abs) 0.03 0.00 - 0.11 K/uL    NRBC (Absolute) 0.00 K/uL   Complete Metabolic Panel (CMP)   Result Value Ref Range    Sodium 148 (H) 135 - 145 mmol/L    Potassium 3.9 3.6 - 5.5 mmol/L    Chloride 106 96 - 112 mmol/L    Co2 24 20 - 33 mmol/L     Anion Gap 18.0 (H) 7.0 - 16.0    Glucose 94 65 - 99 mg/dL    Bun 6 (L) 8 - 22 mg/dL    Creatinine 0.45 (L) 0.50 - 1.40 mg/dL    Calcium 9.4 8.5 - 10.5 mg/dL    Correct Calcium 9.3 8.5 - 10.5 mg/dL    AST(SGOT) 55 (H) 12 - 45 U/L    ALT(SGPT) 29 2 - 50 U/L    Alkaline Phosphatase 157 (H) 30 - 99 U/L    Total Bilirubin 0.5 0.1 - 1.5 mg/dL    Albumin 4.1 3.2 - 4.9 g/dL    Total Protein 7.8 6.0 - 8.2 g/dL    Globulin 3.7 (H) 1.9 - 3.5 g/dL    A-G Ratio 1.1 g/dL   MAGNESIUM   Result Value Ref Range    Magnesium 1.7 1.5 - 2.5 mg/dL   DIAGNOSTIC ALCOHOL   Result Value Ref Range    Diagnostic Alcohol 385.1 (H) <10.1 mg/dL   PHOSPHORUS   Result Value Ref Range    Phosphorus 4.1 2.5 - 4.5 mg/dL   ESTIMATED GFR   Result Value Ref Range    GFR (CKD-EPI) 117 >60 mL/min/1.73 m 2   EKG   Result Value Ref Range    Report       [facility] Emergency Dept.    Test Date:  2024  Pt Name:    GIULIA HERNANDEZ                   Department: ER  MRN:        [id number]                      Room:       [facility]  Gender:     Female                       Technician: [id number]  :        [id number]                   Requested By:HUSEYIN GILBERT  Order #:    [id number]                    Reading MD: HUSEYIN GILBERT MD    Measurements  Intervals                                Axis  Rate:       87                           P:          50  ID:         165                          QRS:        32  QRSD:       88                           T:          7  QT:         407  QTc:        490    Interpretive Statements  Sinus rhythm  Borderline ST elevation, anterolateral leads  Borderline prolonged QT interval  Artifact in lead(s) II,III,aVR,aVL,aVF,V1,V2,V3,V4,V5,V6  Compared to ECG 2024 11:18:14  ST (T wave) deviation now present  Ventricular premature complex(es) no longer present  T-wave abnormality no longer pr esent  Electronically Signed On 2024 09:54:01 PDT by HUSEYIN GILBERT MD           EKG:   I have independently  interpreted this EKG as detailed above.      RADIOLOGY  I have independently interpreted the diagnostic imaging associated with this visit and am waiting the final reading from the radiologist.   My preliminary interpretation is as follows: Chest x-ray shows no acute infiltrates.      Radiologist interpretation:   MR-LUMBAR SPINE-W/O   Final Result      1.  Minimal degenerative changes without spinal or foraminal stenosis.   2.  No acute abnormality.      MR-BRAIN-W/O   Final Result      1.  Mild cerebral atrophy beyond expected for the patient's stated age of 49 years. Possibly EtOH related as per given clinical history.   2.  No acute infarction, hemorrhage, or mass lesion. Concerning the history of recent falls, no evidence of acute or subacute acute subdural hematoma.      DX-CHEST-PORTABLE (1 VIEW)   Final Result      No acute process.           COURSE & MEDICAL DECISION MAKING    ED COURSE:    ED Observation Status? No, The patient does not qualify for observation status     INTERVENTIONS BY ME:  Medications   Rally Bag IV (D5LR 1000 mL + Thiamine 100 mg + Folic Acid 1 mg + Magnesium Sulfate 1 g) infusion (0 mL Intravenous Stopped 5/26/24 1012)   midazolam (Versed) injection 2 mg (2 mg Intravenous Given 5/26/24 0742)       6:05 AM - Patient seen and examined at bedside for bilateral leg weakness. Discussed plan of care, including MRI and labs. Patient agrees to the plan of care. Ordered for MR-Brani without, Dx-chest, Magnesium, Phosphorus, Diagnostic Alcohol, CBC with diff, CMP and EKG to evaluate her symptoms.     7:33 AM - The patient will be treated with Versed 2 mg.     8:24 AM - Ordered for MR-Lumbar Spine without.    9:31 AM - The patient would like to leave. MRI has not resulted yet. I discussed with her all other results as above. She states she has resources and support at home for alcohol recovery.     10:03 AM - I called the patient's  who will come pick her up.     INITIAL ASSESSMENT,  COURSE AND PLAN  Care Narrative: Patient presents emerged part for evaluation.  Clinically the patient does have ataxic gait and neurologic findings as above.  Her diagnostic alcohol is 385 which is significantly elevated.  Laboratory studies are also more consistent with chronic alcoholism.  She does have an MCV of 106 normal H&H normal white blood cell count but slightly thrombocytopenic with a platelet count of 157.  Sodium is elevated at 148 with a low BUN and creatinine which is more consistent with hemoconcentration.  LFTs are slightly elevated.  MRI was done of the brain with the cerebral atrophy that slightly worse than expected for age which is most likely alcohol related.  Otherwise no other abnormalities L-spine was ordered as an outpatient but we continued because of her generalized weakness which has the above findings.  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  I did have a long discussion with the  about this.  I have explained to the patient that this is certainly a severe problem and then I would recommend a 90-day inpatient stay.  Patient did not seem that she wanted to do this.  At this point there is no need for admission to the hospital the patient will be discharged in stable condition.  I did give the patient a rally bag vitamin infusion    HYDRATION: Based on the patient's presentation of dehydration,  the patient was given IV fluids. IV Hydration was used because oral hydration is unable to be done due to the patient's symptoms. Upon recheck following hydration, the patient was improved.     ADDITIONAL PROBLEM LIST  Alcoholism    DISPOSITION AND DISCUSSIONS    Escalation of care considered, and ultimately not performed:acute inpatient care management, however at this time, the patient is most appropriate for outpatient management    Decision tools and prescription  drugs considered including, but not limited to: Medication modification: I do not see any indication for medication modification.    DISPOSITION:  Patient will be discharged home in stable condition.    FOLLOW UP:  Donita Asif M.D.  [address] [facility] [place]  [address]  [place] [address]  [phone]    Schedule an appointment as soon as possible for a visit   Return if any symptoms worsen    FINAL DIAGNOSIS  1. Alcoholic intoxication without complication (HCC)    2. General weakness    3. Dehydration         ICaterina (Scribe), am scribing for, and in the presence of, Martin Carter M.D..    Electronically signed by: Caterina Gonzalez (Olimpia), 5/26/2024    Martin TIPTON M.D. personally performed the services described in this documentation, as scribed by Caterina Gonzalez in my presence, and it is both accurate and complete.     Electronically signed by: Martin Carter M.D.,2:11 PM 05/26/24

## 2024-05-26 NOTE — ED NOTES
Bedside report received from off going RN/tech: Mariola, assumed care of patient.  POC discussed with patient. Call light within reach, all needs addressed at this time.       Fall risk interventions in place: Move the patient closer to the nurse's station, Patient's personal possessions are with in their safe reach, Place socks on patient, Place fall risk sign on patient's door, Keep floor surfaces clean and dry, Accompanied to restroom, and Bed Alarm in use (all applicable per [facility] Fall risk assessment)   Continuous monitoring: Cardiac Leads, Pulse Ox, or Blood Pressure  IVF/IV medications: Infusion per MAR (List Med(s)) Rally bag  Oxygen: How many liters 2L  Bedside sitter: Not Applicable   Isolation: Not Applicable

## 2024-05-26 NOTE — ED NOTES
Pt back from MRI, reconnected to monitoring equipment and rally bag. Call light available and in reach. Pt aware of POC. No additional needs reported at this time.

## 2024-05-26 NOTE — ED NOTES
Pt discharged to home. Pt was given follow up instructions. All lines removed prior to discharge. Pt verbalized understanding of all instructions for discharge and is ambulatory out of ED with steady gait w/ . AOx4

## 2024-05-26 NOTE — ED TRIAGE NOTES
"49 y.o.  Female     Chief Complaint   Patient presents with    GLF     With history of multiple falls  (+) Head strikes (-) LOC (-) thinners ? (+) ETOH     Nausea    Weakness       Pt BIB EMS from home with above complaint.  Per family pt with BLE weakness since March 2024 and symptoms getting worse. With history of Alcohol Abuse, frequent UTI and TBI. Pt on home oxygen started a week ago after hospitalization d/t ?  Pleural Effusion.    Pt denies CP, SOB, trauma.  Pt report last drink was yesterday night.    Denies SI/HI.      Per EMS report PTA GCS 14.  AAO x 3.    Given Zofran IV enroute. PIV established g 20 Rt hand.     Pt back to room [address]. Pt placed into gown and placed on the monitor. Urine sample obtained and sent to lab.  Chart up for ERP to see.     BP (!) 132/91   Pulse 97   Resp 18   Ht 1.6 m (5' 3\")   Wt 61.2 kg (135 lb)   SpO2 (!) 86%   BMI 23.91 kg/m²     "

## 2024-05-29 ASSESSMENT — ENCOUNTER SYMPTOMS
SEIZURES: 0
SPEECH CHANGE: 0
LOSS OF CONSCIOUSNESS: 0
PSYCHIATRIC NEGATIVE: 1
CHILLS: 0
EYES NEGATIVE: 1
FLANK PAIN: 0
TINGLING: 0
HEADACHES: 0
CARDIOVASCULAR NEGATIVE: 1
TREMORS: 0
SENSORY CHANGE: 0
CONSTITUTIONAL NEGATIVE: 1
GASTROINTESTINAL NEGATIVE: 1
DIZZINESS: 0
WEAKNESS: 1
FEVER: 0
FOCAL WEAKNESS: 0
RESPIRATORY NEGATIVE: 1

## 2024-05-29 NOTE — PROGRESS NOTES
Subjective:     HPI:   History of Present Illness  The patient presents today for evaluation of bilateral lower extremity weakness and multiple recent falls. She is accompanied by her .    The patient reports a fall last night, which has since rendered her unable to independently rise. She speculates that her frequent falls could be attributed to tripping over her dogs, although she is uncertain of the specific mechanism of action. She acknowledges a history of alcohol abuse, and a recent admission to [facility] on 04/30/2024 for the same issue. During her hospital stay, she had low magnesium levels, which were subsequently replenished intravenously. She hypothesizes that her weakness is not related to her alcohol consumption, but resumed drinking alcohol following her recent admission, albeit in moderation. In the past, she has been referred to neurology, but has been unable to attend due to scheduling conflict reasons. She acknowledges that people are often not understanding her weakness due to her alcohol consumption and requests imaging and work-up today. Despite her reluctance to visit the ER, she understands that a visit may be necessary. She denies hitting her head or losing consciousness during these falls, but reports extreme weakness in her legs. She denies any numbness or tingling in her lower extremities. Following her last hospitalization, home physical therapy was recommended, but she did not qualify due to higher functional capacity. She is willing to pursue physical therapy outside of home for strengthening and reconditioning.    Review of Systems   Constitutional: Negative.  Negative for chills and fever.   HENT: Negative.     Eyes: Negative.    Respiratory: Negative.     Cardiovascular: Negative.    Gastrointestinal: Negative.    Genitourinary:  Negative for dysuria, flank pain, frequency, hematuria and urgency.   Musculoskeletal:         Bilateral lower extremity weakness   Skin:  "Negative.    Neurological:  Positive for weakness. Negative for dizziness, tingling, tremors, sensory change, speech change, focal weakness, seizures, loss of consciousness and headaches.   Endo/Heme/Allergies: Negative.    Psychiatric/Behavioral: Negative.     All other systems reviewed and are negative.      Objective:     Exam:  /70 (BP Location: Left arm, Patient Position: Sitting, BP Cuff Size: Adult)   Pulse 87   Temp 37.6 °C (99.6 °F) (Temporal)   Resp 16   Ht 1.6 m (5' 3\")   Wt 61.2 kg (135 lb)   SpO2 95%   BMI 23.91 kg/m²  Body mass index is 23.91 kg/m².    Physical Exam  Vitals and nursing note reviewed.   Constitutional:       General: She is not in acute distress.     Appearance: Normal appearance. She is not ill-appearing.   HENT:      Head: Normocephalic and atraumatic.      Nose: Nose normal.      Mouth/Throat:      Mouth: Mucous membranes are moist.      Pharynx: Oropharynx is clear.   Eyes:      Extraocular Movements: Extraocular movements intact.      Conjunctiva/sclera: Conjunctivae normal.      Pupils: Pupils are equal, round, and reactive to light.   Cardiovascular:      Rate and Rhythm: Normal rate and regular rhythm.      Pulses: Normal pulses.      Heart sounds: Normal heart sounds.   Pulmonary:      Effort: Pulmonary effort is normal.      Breath sounds: Normal breath sounds.   Musculoskeletal:         General: Normal range of motion.      Cervical back: Normal range of motion and neck supple.   Skin:     General: Skin is warm and dry.      Capillary Refill: Capillary refill takes less than 2 seconds.   Neurological:      General: No focal deficit present.      Mental Status: She is alert and oriented to person, place, and time. Mental status is at baseline.      GCS: GCS eye subscore is 4. GCS verbal subscore is 5. GCS motor subscore is 6.      Cranial Nerves: Cranial nerves 2-12 are intact.      Sensory: Sensation is intact.      Motor: Weakness present.      Coordination: " Coordination is intact.      Gait: Gait abnormal.   Psychiatric:         Attention and Perception: Attention normal.         Mood and Affect: Mood normal. Affect is labile.         Speech: Speech is tangential.         Behavior: Behavior is cooperative.         Cognition and Memory: Cognition is impaired.         Physical Exam      Results      Assessment & Plan:     1. Weakness of both lower extremities  Referral to Neurology    Referral to Physical Therapy    CBC WITH DIFFERENTIAL    Comp Metabolic Panel    MAGNESIUM    MR-BRAIN-WITH & W/O    MR-LUMBAR SPINE-W/O      2. Physical deconditioning  Referral to Physical Therapy    CBC WITH DIFFERENTIAL    Comp Metabolic Panel    MAGNESIUM      3. Alcoholic hepatitis without ascites  thiamine (THIAMINE) 100 MG tablet          Assessment & Plan  1. Bilateral lower extremity weakness and multiple recent falls.  A comprehensive discussion was held with the patient and her spouse regarding the potential risks associated with low magnesium or electrolytes. Consequently, a lumbar MRI will be ordered, as there is no record of a previous one in her medical records, to evaluate for any potential nerve impingement that could be causing the weakness. Additionally, an MRI of her brain will be ordered to identify any potential pathology that could be contributing to her falls and weakness. The patient and her spouse, who is a local physician, agreed to monitor her condition and transport her to the ER should her symptoms worsen. A referral to neurology has been initiated to initiate a neurologic evaluation for the patient. A referral for physical therapy for reconditioning has been issued, which she will attend if approved. Upon receiving the lab results, I will contact them to inform them of any critical issues that need to be addressed in the ER. The patient and her spouse expressed understanding and concurred with the plan of care.    Follow-up  The patient is scheduled for a  follow-up appointment with her primary care physician in 1 to 2 weeks for further evaluation.          I spent a total of 35 minutes with record review, exam, communication with the patient, communication with other providers, and documentation of this encounter.    Please note that this dictation was created using voice recognition software. I have made every reasonable attempt to correct obvious errors, but I expect that there are errors of grammar and possibly content that I did not discover before finalizing the note.

## 2024-06-04 ENCOUNTER — TELEPHONE (OUTPATIENT)
Dept: HEALTH INFORMATION MANAGEMENT | Facility: OTHER | Age: 49
End: 2024-06-04
Payer: COMMERCIAL

## 2024-06-10 ENCOUNTER — HOSPITAL ENCOUNTER (OUTPATIENT)
Dept: LAB | Facility: MEDICAL CENTER | Age: 49
End: 2024-06-10
Attending: STUDENT IN AN ORGANIZED HEALTH CARE EDUCATION/TRAINING PROGRAM
Payer: COMMERCIAL

## 2024-06-10 DIAGNOSIS — K72.90 DECOMPENSATED HEPATIC CIRRHOSIS (HCC): ICD-10-CM

## 2024-06-10 DIAGNOSIS — R71.8 ELEVATED MCV: ICD-10-CM

## 2024-06-10 DIAGNOSIS — F10.20 ALCOHOL USE DISORDER, SEVERE, DEPENDENCE (HCC): ICD-10-CM

## 2024-06-10 DIAGNOSIS — E03.9 ACQUIRED HYPOTHYROIDISM: ICD-10-CM

## 2024-06-10 DIAGNOSIS — K74.60 DECOMPENSATED HEPATIC CIRRHOSIS (HCC): ICD-10-CM

## 2024-06-10 LAB
ALBUMIN SERPL BCP-MCNC: 4 G/DL (ref 3.2–4.9)
ALBUMIN/GLOB SERPL: 1.2 G/DL
ALP SERPL-CCNC: 144 U/L (ref 30–99)
ALT SERPL-CCNC: 19 U/L (ref 2–50)
ANION GAP SERPL CALC-SCNC: 21 MMOL/L (ref 7–16)
AST SERPL-CCNC: 39 U/L (ref 12–45)
BILIRUB SERPL-MCNC: 0.5 MG/DL (ref 0.1–1.5)
BUN SERPL-MCNC: 6 MG/DL (ref 8–22)
CALCIUM ALBUM COR SERPL-MCNC: 9.9 MG/DL (ref 8.5–10.5)
CALCIUM SERPL-MCNC: 9.9 MG/DL (ref 8.5–10.5)
CHLORIDE SERPL-SCNC: 104 MMOL/L (ref 96–112)
CO2 SERPL-SCNC: 17 MMOL/L (ref 20–33)
CREAT SERPL-MCNC: 0.51 MG/DL (ref 0.5–1.4)
ERYTHROCYTE [DISTWIDTH] IN BLOOD BY AUTOMATED COUNT: 47.6 FL (ref 35.9–50)
FOLATE SERPL-MCNC: >40 NG/ML
GFR SERPLBLD CREATININE-BSD FMLA CKD-EPI: 113 ML/MIN/1.73 M 2
GLOBULIN SER CALC-MCNC: 3.3 G/DL (ref 1.9–3.5)
GLUCOSE SERPL-MCNC: 71 MG/DL (ref 65–99)
HCT VFR BLD AUTO: 43.6 % (ref 37–47)
HGB BLD-MCNC: 14.9 G/DL (ref 12–16)
MCH RBC QN AUTO: 35.7 PG (ref 27–33)
MCHC RBC AUTO-ENTMCNC: 34.2 G/DL (ref 32.2–35.5)
MCV RBC AUTO: 104.6 FL (ref 81.4–97.8)
PLATELET # BLD AUTO: 370 K/UL (ref 164–446)
PMV BLD AUTO: 9.7 FL (ref 9–12.9)
POTASSIUM SERPL-SCNC: 4.2 MMOL/L (ref 3.6–5.5)
PROT SERPL-MCNC: 7.3 G/DL (ref 6–8.2)
RBC # BLD AUTO: 4.17 M/UL (ref 4.2–5.4)
SODIUM SERPL-SCNC: 142 MMOL/L (ref 135–145)
TSH SERPL DL<=0.005 MIU/L-ACNC: 3.1 UIU/ML (ref 0.38–5.33)
VIT B12 SERPL-MCNC: 864 PG/ML (ref 211–911)
WBC # BLD AUTO: 8.7 K/UL (ref 4.8–10.8)

## 2024-06-10 PROCEDURE — 85027 COMPLETE CBC AUTOMATED: CPT

## 2024-06-10 PROCEDURE — 36415 COLL VENOUS BLD VENIPUNCTURE: CPT

## 2024-06-10 PROCEDURE — 80053 COMPREHEN METABOLIC PANEL: CPT

## 2024-06-10 PROCEDURE — 82607 VITAMIN B-12: CPT

## 2024-06-10 PROCEDURE — 82746 ASSAY OF FOLIC ACID SERUM: CPT

## 2024-06-10 PROCEDURE — 84425 ASSAY OF VITAMIN B-1: CPT

## 2024-06-10 PROCEDURE — 84443 ASSAY THYROID STIM HORMONE: CPT

## 2024-06-14 ENCOUNTER — HOSPITAL ENCOUNTER (OUTPATIENT)
Dept: RADIOLOGY | Facility: MEDICAL CENTER | Age: 49
End: 2024-06-14
Attending: STUDENT IN AN ORGANIZED HEALTH CARE EDUCATION/TRAINING PROGRAM
Payer: COMMERCIAL

## 2024-06-14 DIAGNOSIS — Z12.31 VISIT FOR SCREENING MAMMOGRAM: ICD-10-CM

## 2024-06-14 LAB — VIT B1 BLD-MCNC: 229 NMOL/L (ref 70–180)

## 2024-06-14 PROCEDURE — 77063 BREAST TOMOSYNTHESIS BI: CPT

## 2024-06-19 ENCOUNTER — RESEARCH ENCOUNTER (OUTPATIENT)
Dept: RESEARCH | Facility: MEDICAL CENTER | Age: 49
End: 2024-06-19

## 2024-06-19 ENCOUNTER — TELEPHONE (OUTPATIENT)
Dept: MEDICAL GROUP | Facility: MEDICAL CENTER | Age: 49
End: 2024-06-19

## 2024-06-19 ENCOUNTER — APPOINTMENT (OUTPATIENT)
Dept: MEDICAL GROUP | Facility: MEDICAL CENTER | Age: 49
End: 2024-06-19
Payer: COMMERCIAL

## 2024-06-19 VITALS
SYSTOLIC BLOOD PRESSURE: 120 MMHG | HEIGHT: 63 IN | WEIGHT: 134.8 LBS | HEART RATE: 74 BPM | BODY MASS INDEX: 23.88 KG/M2 | OXYGEN SATURATION: 94 % | DIASTOLIC BLOOD PRESSURE: 76 MMHG | TEMPERATURE: 97.3 F

## 2024-06-19 DIAGNOSIS — R19.7 DIARRHEA, UNSPECIFIED TYPE: ICD-10-CM

## 2024-06-19 DIAGNOSIS — Z00.6 RESEARCH STUDY PATIENT: ICD-10-CM

## 2024-06-19 DIAGNOSIS — K76.6 PORTAL HYPERTENSION WITH ESOPHAGEAL VARICES (HCC): Primary | ICD-10-CM

## 2024-06-19 DIAGNOSIS — K70.30 ALCOHOLIC CIRRHOSIS OF LIVER WITHOUT ASCITES (HCC): ICD-10-CM

## 2024-06-19 DIAGNOSIS — I85.00 PORTAL HYPERTENSION WITH ESOPHAGEAL VARICES (HCC): Primary | ICD-10-CM

## 2024-06-19 DIAGNOSIS — K70.10 ALCOHOLIC HEPATITIS WITHOUT ASCITES: ICD-10-CM

## 2024-06-19 DIAGNOSIS — M40.204 KYPHOSIS OF THORACIC REGION, UNSPECIFIED KYPHOSIS TYPE: ICD-10-CM

## 2024-06-19 DIAGNOSIS — F41.1 GENERALIZED ANXIETY DISORDER: ICD-10-CM

## 2024-06-19 DIAGNOSIS — Z80.3 FAMILY HISTORY OF BREAST CANCER: ICD-10-CM

## 2024-06-19 PROCEDURE — 99214 OFFICE O/P EST MOD 30 MIN: CPT | Performed by: STUDENT IN AN ORGANIZED HEALTH CARE EDUCATION/TRAINING PROGRAM

## 2024-06-19 PROCEDURE — 3078F DIAST BP <80 MM HG: CPT | Performed by: STUDENT IN AN ORGANIZED HEALTH CARE EDUCATION/TRAINING PROGRAM

## 2024-06-19 PROCEDURE — 3074F SYST BP LT 130 MM HG: CPT | Performed by: STUDENT IN AN ORGANIZED HEALTH CARE EDUCATION/TRAINING PROGRAM

## 2024-06-19 RX ORDER — GABAPENTIN 100 MG/1
CAPSULE ORAL
COMMUNITY
Start: 2024-05-21 | End: 2024-06-19

## 2024-06-19 RX ORDER — DIAZEPAM 5 MG/1
TABLET ORAL
COMMUNITY
Start: 2024-05-28 | End: 2024-06-20

## 2024-06-19 RX ORDER — GABAPENTIN 600 MG/1
TABLET ORAL
COMMUNITY
Start: 2024-05-28

## 2024-06-19 RX ORDER — HYDROXYZINE HYDROCHLORIDE 25 MG/1
TABLET, FILM COATED ORAL
COMMUNITY
Start: 2024-05-15 | End: 2024-06-19

## 2024-06-19 ASSESSMENT — FIBROSIS 4 INDEX: FIB4 SCORE: 1.21

## 2024-06-19 NOTE — TELEPHONE ENCOUNTER
Patient just saw Dr. Asif today. Patient mentioned that she would like to request an XRAY for her spine. I advised the patient, that I will be directing her message to her provider, and will go from there.. Thank you..

## 2024-06-19 NOTE — LETTER
[facility]  Donita Asif M.D.  [address]  [facility] [address]  Fax: [phone]   Authorization for Release/Disclosure of   Protected Health Information   Name: GIULIA HERNANDEZ : 1975 SSN: [id number]   Address: [address] [place] [id number] Phone:    [phone] (home)    I authorize the entity listed below to release/disclose the PHI below to:   [facility]/Donita Asif M.D. and Donita Asif M.D.   Provider or Entity Name:  Dr. Hedy Hardy   Address   [facility], [facility], [facility]   Phone:      Fax:     Reason for request: continuity of care   Information to be released:    [  ] LAST COLONOSCOPY,  including any PATH REPORT and follow-up  [  ] LAST FIT/COLOGUARD RESULT [  ] LAST DEXA  [  ] LAST MAMMOGRAM  [  ] LAST PAP  [  ] LAST LABS [  ] RETINA EXAM REPORT  [  ] IMMUNIZATION RECORDS  [  ] Release all info      [  ] Check here and initial the line next to each item to release ALL health information INCLUDING  _____ Care and treatment for drug and / or alcohol abuse  _____ HIV testing, infection status, or AIDS  _____ Genetic Testing    DATES OF SERVICE OR TIME PERIOD TO BE DISCLOSED: _____________  I understand and acknowledge that:  * This Authorization may be revoked at any time by you in writing, except if your health information has already been used or disclosed.  * Your health information that will be used or disclosed as a result of you signing this authorization could be re-disclosed by the recipient. If this occurs, your re-disclosed health information may no longer be protected by State or Federal laws.  * You may refuse to sign this Authorization. Your refusal will not affect your ability to obtain treatment.  * This Authorization becomes effective upon signing and will  on (date) __________.      If no date is indicated, this Authorization will  one (1) year from the signature date.    Name: Giulia Hernandez  Signature: Date:   2024     PLEASE FAX  REQUESTED RECORDS BACK TO: [phone]

## 2024-06-19 NOTE — PROGRESS NOTES
Subjective:     CC:   Chief Complaint   Patient presents with    Medication Management     Return in about 2 months for medications    Lab Results    Back Problem     Wants to talk about hump on back    Results     And wants to discuses mammogram results         HPI:   Minerva presents today with    Verbal consent was acquired   History of Present Illness  The patient presents for evaluation of multiple medical concerns.    The patient reports persistent diarrhea, characterized by an absence of normal bowel movements since 02/2024. She has been advised to consult with Dr. Conley, a hepatologist, for further evaluation. Her medication regimen remains unchanged, with the exception of escitalopram and hydroxyzine, which were prescribed by her psychiatrist over a year ago. She has since ceased seeing her psychiatrist and is seeking a refill. Her mood is stable, although she experienced a two-day hospitalization due to alcohol-induced setbacks. She successfully abstained from alcohol in 03/2023 and 04/2023, followed by a 30-day setback in 03/2023. She has since joined the [facility] program, which includes weekly coaching and a nurse for queries. She utilizes a breathalyzer twice daily, in the morning and at night. Despite being completely alcohol-free, she reports feeling unwell, sleeping well, and has a strong support network from her  and adult children. She has resumed her thiamine and folic acid supplements. She attempts to increase her water intake. She was previously prescribed rifaximin, but has exhausted this medication. Imodium provides some relief, but she experiences watery diarrhea 5 to 10 times daily. She is not currently taking lactulose. Spironolactone was discontinued last week by Dr. Keny Bearden in charge of the [facility] [employer] program. Dr. Bearden prescribed baclofen 10 mg as needed, in place of Xanax for severe anxiety. She has not experienced severe anxiety since cessation of alcohol.    Supplemental  "Information  She has dense breasts. She has a family history of breast cancer in her maternal grandmother and an aunt of her maternal grandmother. She is due for a Pap smear.      Results  Laboratory Studies  MCV is still high, but overall hemoglobin numbers are good. Sodium is getting better. Liver numbers compared to what it was are getting better, AST is back to normal. Alkaline phosphatase is taking a little bit more time, but it is going in the right direction. Bicarbs and anion gap is still positive. Bilirubin levels are normal. B1 was slightly high.     Problem   Portal Hypertension With Esophageal Varices (Hcc)   Alcoholic Cirrhosis of Liver Without Ascites (Hcc)    Diagnosed In 03/2024     CT abdomen showed  Cirrhosis with portal hypertension  Associated ascites and splenomegaly  Hepatic steatosis, extremely severe  With elevated transaminitis with bilirubinemia  Initially elevated ammonia as well.  Rifaximin and Spironolactone was added  Not started on propranolol or nadolol for prophylaxis for portal hypertension because her blood pressure was soft while in hospital.  [facility] as recommended colonoscopy for chronic diarrhea.     Generalized Anxiety Disorder    Chronic , stable   Plan:  Continue Lexapro 10 mg daily   As needed hydroxyzine 10 mg TID   Continue counseling /therapy          Health Maintenance: Completed    ROS:  ROS    Review of systems unremarkable except for concerns noted by patient or items listed.    Please see HPI for additional ROS.      Objective:     Exam:  /76 (BP Location: Left arm, Patient Position: Sitting, BP Cuff Size: Adult)   Pulse 74   Temp 36.3 °C (97.3 °F) (Temporal)   Ht 1.6 m (5' 3\")   Wt 61.1 kg (134 lb 12.8 oz)   SpO2 94%   BMI 23.88 kg/m²  Body mass index is 23.88 kg/m².    Physical Exam  Constitutional:       Appearance: Normal appearance.   HENT:      Head: Normocephalic.   Eyes:      General: No scleral icterus.  Cardiovascular:      " Rate and Rhythm: Normal rate and regular rhythm.      Pulses: Normal pulses.      Heart sounds: Normal heart sounds.   Pulmonary:      Effort: Pulmonary effort is normal.      Breath sounds: Normal breath sounds.   Musculoskeletal:         General: Deformity present.      Right lower leg: No edema.      Left lower leg: No edema.   Skin:     General: Skin is warm.   Neurological:      Mental Status: She is alert and oriented to person, place, and time.   Psychiatric:         Mood and Affect: Mood normal.         Behavior: Behavior normal.             Labs: Reviewed    Assessment & Plan:     49 y.o. female with the following -     1. Portal hypertension with esophageal varices (HCC)  Chronic, stable  Patient with history of alcohol use disorder with diagnosis of cirrhosis in March 2024.  Patient was started on rifaximin, spironolactone and nadolol.  She has discontinued both rifaximin and spironolactone as she does not have any ascites and she has quite alcohol.  Patient asking if she has to continue nadolol or not.  Plan  Education and counseling provided given her history of portal hypertension and CT scan I would recommend to continue nadolol 20 mg daily prophylactically.  Recommended to follow-up with gastroenterology   Continue alcohol abstinence    2. Generalized anxiety disorder  Chronic, Stable  Plan  Continue 10 mg escitalopram and hydroxyzine 10 mg 3 times daily as needed . The patient is advised to persist with the current regimen.    3. Alcoholic hepatitis without ascites  4. Chronic , diarrhea   Rifaximin is an antibiotic used to treat irritable bowel syndrome and also avoid hepatic encephalopathy   Patient has stopped alcohol consumption.  Low risk for encephalopathy or elevated ammonia levels but given her ongoing chronic diarrhea I have recommended patient to restart rifaximin.  Verbalized understanding and agrees with the plan  - riFAXIMin (XIFAXAN) 550 MG Tab tablet; Take 1 Tablet by mouth 2 times a  day.  Dispense: 42 Tablet; Refill: 0    5. Family history of breast cancer  Patient reports breast cancer history in grandparents.  Interested in research for BRCA gene testing  - Referral to Genetic Research Studies    6. Kyphosis of thoracic region, unspecified kyphosis type  Chronic  Patient has a hump in her upper thoracic region.  She wants this to be addressed.  I have ordered an x-ray.  Will send her for physical therapy and then consider referral to a spine specialist  - Referral to Physical Therapy  - DX-THORACIC SPINE-2 VIEWS; Future    The patient's MCV remains elevated, likely due to alcohol consumption. Hemoglobin levels are satisfactory. Sodium levels are improving. Liver function tests, including AST and alkaline phosphatase, are improving. Bicarbs and anion gap are still positive, which can be attributed to dehydration, alcohol, or poor hydration. Bilirubin levels are normal. B1 and B2 levels are satisfactory. Mammogram results were normal, but dense breasts were noted. Genetic testing will be ordered. The patient is advised to maintain a daily fluid intake of 50 to 64 ounces. Restarting rifaximin is recommended. Imodium can be taken as needed.  Baclofen can be used as needed for pain or discomfort. Annual mammograms recommended. A referral for a colonoscopy will be made. The patient is advised to schedule an appointment for a Pap smear.    Return in about 3 months (around 9/19/2024) for preventive wellness, Pap.    Please note that this dictation was created using voice recognition software. I have made every reasonable attempt to correct obvious errors, but I expect that there are errors of grammar and possibly content that I did not discover before finalizing the note.

## 2024-06-19 NOTE — RESEARCH NOTE
Patient has been referred by Donita Asif M.D. Sent initial referral follow-up message with instructions to locate and sign consent form(s). The following consent form(s) have been pushed to the patient's MyChart: [facility]

## 2024-06-20 NOTE — RESEARCH NOTE
Confirmed with the participant which designated provider (Donita Asif M.D.) they would like study results shared with. Patient will have an opportunity to share the results with any providers of their choosing in the future by accessing their results from [facility].

## 2024-06-24 ENCOUNTER — PATIENT MESSAGE (OUTPATIENT)
Dept: MEDICAL GROUP | Facility: MEDICAL CENTER | Age: 49
End: 2024-06-24
Payer: COMMERCIAL

## 2024-06-24 DIAGNOSIS — D17.79 INTRADURAL LIPOMA OF SPINE: ICD-10-CM

## 2024-06-27 ENCOUNTER — HOSPITAL ENCOUNTER (OUTPATIENT)
Dept: LAB | Facility: MEDICAL CENTER | Age: 49
End: 2024-06-27
Attending: STUDENT IN AN ORGANIZED HEALTH CARE EDUCATION/TRAINING PROGRAM

## 2024-06-27 ENCOUNTER — HOSPITAL ENCOUNTER (OUTPATIENT)
Dept: RADIOLOGY | Facility: MEDICAL CENTER | Age: 49
End: 2024-06-27
Attending: STUDENT IN AN ORGANIZED HEALTH CARE EDUCATION/TRAINING PROGRAM

## 2024-06-27 ENCOUNTER — APPOINTMENT (OUTPATIENT)
Dept: RADIOLOGY | Facility: MEDICAL CENTER | Age: 49
End: 2024-06-27
Attending: STUDENT IN AN ORGANIZED HEALTH CARE EDUCATION/TRAINING PROGRAM
Payer: COMMERCIAL

## 2024-06-27 DIAGNOSIS — Z00.6 RESEARCH STUDY PATIENT: ICD-10-CM

## 2024-06-27 DIAGNOSIS — Z12.11 COLON CANCER SCREENING: ICD-10-CM

## 2024-06-27 DIAGNOSIS — M40.204 KYPHOSIS OF THORACIC REGION, UNSPECIFIED KYPHOSIS TYPE: ICD-10-CM

## 2024-07-16 RX ORDER — HYDROXYZINE HYDROCHLORIDE 10 MG/1
10 TABLET, FILM COATED ORAL 3 TIMES DAILY PRN
Qty: 30 TABLET | Refills: 0 | Status: SHIPPED | OUTPATIENT
Start: 2024-07-16

## 2024-07-17 LAB
APOB+LDLR+PCSK9 GENE MUT ANL BLD/T: NOT DETECTED
BRCA1+BRCA2 DEL+DUP + FULL MUT ANL BLD/T: NOT DETECTED
MLH1+MSH2+MSH6+PMS2 GN DEL+DUP+FUL M: NOT DETECTED

## 2024-07-24 ENCOUNTER — HOSPITAL ENCOUNTER (EMERGENCY)
Facility: MEDICAL CENTER | Age: 49
End: 2024-07-24
Attending: EMERGENCY MEDICINE
Payer: COMMERCIAL

## 2024-07-24 VITALS
RESPIRATION RATE: 16 BRPM | OXYGEN SATURATION: 95 % | DIASTOLIC BLOOD PRESSURE: 81 MMHG | SYSTOLIC BLOOD PRESSURE: 121 MMHG | TEMPERATURE: 98 F | BODY MASS INDEX: 23.92 KG/M2 | WEIGHT: 135 LBS | HEIGHT: 63 IN | HEART RATE: 71 BPM
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
## 2024-07-24 DIAGNOSIS — F10.920 ALCOHOLIC INTOXICATION WITHOUT COMPLICATION (HCC): ICD-10-CM

## 2024-07-24 LAB
AMPHET UR QL SCN: NEGATIVE
BARBITURATES UR QL SCN: NEGATIVE
BENZODIAZ UR QL SCN: POSITIVE
BZE UR QL SCN: NEGATIVE
CANNABINOIDS UR QL SCN: NEGATIVE
FENTANYL UR QL: NEGATIVE
FLUAV RNA SPEC QL NAA+PROBE: NEGATIVE
FLUBV RNA SPEC QL NAA+PROBE: NEGATIVE
METHADONE UR QL SCN: NEGATIVE
OPIATES UR QL SCN: NEGATIVE
OXYCODONE UR QL SCN: NEGATIVE
PCP UR QL SCN: NEGATIVE
POC BREATHALIZER: 0.26 PERCENT (ref 0–0.01)
PROPOXYPH UR QL SCN: NEGATIVE
RSV RNA SPEC QL NAA+PROBE: NEGATIVE
SARS-COV-2 RNA RESP QL NAA+PROBE: NOTDETECTED

## 2024-07-24 PROCEDURE — 80307 DRUG TEST PRSMV CHEM ANLYZR: CPT

## 2024-07-24 PROCEDURE — 0241U HCHG SARS-COV-2 COVID-19 NFCT DS RESP RNA 4 TRGT ED POC: CPT

## 2024-07-24 PROCEDURE — 700105 HCHG RX REV CODE 258: Performed by: EMERGENCY MEDICINE

## 2024-07-24 PROCEDURE — 700111 HCHG RX REV CODE 636 W/ 250 OVERRIDE (IP): Mod: JZ | Performed by: EMERGENCY MEDICINE

## 2024-07-24 PROCEDURE — 90791 PSYCH DIAGNOSTIC EVALUATION: CPT

## 2024-07-24 PROCEDURE — 302970 POC BREATHALIZER

## 2024-07-24 PROCEDURE — 99285 EMERGENCY DEPT VISIT HI MDM: CPT

## 2024-07-24 PROCEDURE — 96372 THER/PROPH/DIAG INJ SC/IM: CPT

## 2024-07-24 PROCEDURE — 302970 POC BREATHALIZER: Performed by: EMERGENCY MEDICINE

## 2024-07-24 RX ORDER — SODIUM CHLORIDE 9 MG/ML
1000 INJECTION, SOLUTION INTRAVENOUS ONCE
Status: COMPLETED | OUTPATIENT
Start: 2024-07-24 | End: 2024-07-24

## 2024-07-24 RX ORDER — HALOPERIDOL 5 MG/ML
5 INJECTION INTRAMUSCULAR ONCE
Status: COMPLETED | OUTPATIENT
Start: 2024-07-24 | End: 2024-07-24

## 2024-07-24 RX ADMIN — HALOPERIDOL LACTATE 5 MG: 5 INJECTION, SOLUTION INTRAMUSCULAR at 10:55

## 2024-07-24 RX ADMIN — SODIUM CHLORIDE 1000 ML: 9 INJECTION, SOLUTION INTRAVENOUS at 10:07

## 2024-07-24 ASSESSMENT — FIBROSIS 4 INDEX: FIB4 SCORE: 1.21

## 2024-08-01 ENCOUNTER — TELEPHONE (OUTPATIENT)
Dept: MEDICAL GROUP | Facility: MEDICAL CENTER | Age: 49
End: 2024-08-01
Payer: COMMERCIAL

## 2024-08-01 DIAGNOSIS — Z12.83 SCREENING FOR SKIN CANCER: ICD-10-CM

## 2024-08-01 NOTE — TELEPHONE ENCOUNTER
Caller Name: Minerva Tillman    Call Back Number: [phone] (home)       How would the patient prefer to be contacted with a response: Phone call OK to leave a detailed message    Patient is requesting referral to dermatology appt needed? I did not see any record of patient discussing any skin issues with you.

## 2024-08-04 ENCOUNTER — HOSPITAL ENCOUNTER (OUTPATIENT)
Facility: MEDICAL CENTER | Age: 49
End: 2024-08-04
Attending: INTERNAL MEDICINE
Payer: COMMERCIAL

## 2024-08-04 PROCEDURE — 82653 EL-1 FECAL QUANTITATIVE: CPT

## 2024-08-04 PROCEDURE — 82705 FATS/LIPIDS FECES QUAL: CPT

## 2024-08-04 PROCEDURE — 83993 ASSAY FOR CALPROTECTIN FECAL: CPT

## 2024-08-05 ENCOUNTER — HOSPITAL ENCOUNTER (OUTPATIENT)
Facility: MEDICAL CENTER | Age: 49
End: 2024-08-05
Attending: INTERNAL MEDICINE
Payer: COMMERCIAL

## 2024-08-05 PROCEDURE — 87177 OVA AND PARASITES SMEARS: CPT

## 2024-08-05 PROCEDURE — 87209 SMEAR COMPLEX STAIN: CPT

## 2024-08-05 PROCEDURE — 87045 FECES CULTURE AEROBIC BACT: CPT

## 2024-08-05 PROCEDURE — 87493 C DIFF AMPLIFIED PROBE: CPT

## 2024-08-06 LAB
C DIFF DNA SPEC QL NAA+PROBE: NEGATIVE
C DIFF TOX GENS STL QL NAA+PROBE: NEGATIVE

## 2024-08-07 LAB
E COLI SXT1+2 STL IA: NORMAL
SIGNIFICANT IND 70042: NORMAL
SITE SITE: NORMAL
SOURCE SOURCE: NORMAL

## 2024-08-08 ENCOUNTER — HOSPITAL ENCOUNTER (OUTPATIENT)
Dept: LAB | Facility: MEDICAL CENTER | Age: 49
End: 2024-08-08
Attending: ANESTHESIOLOGY
Payer: COMMERCIAL

## 2024-08-08 LAB
ALBUMIN SERPL BCP-MCNC: 4.2 G/DL (ref 3.2–4.9)
ALBUMIN/GLOB SERPL: 1.2 G/DL
ALP SERPL-CCNC: 147 U/L (ref 30–99)
ALT SERPL-CCNC: 16 U/L (ref 2–50)
ANION GAP SERPL CALC-SCNC: 15 MMOL/L (ref 7–16)
APTT PPP: 34.2 SEC (ref 24.7–36)
AST SERPL-CCNC: 27 U/L (ref 12–45)
BILIRUB SERPL-MCNC: 0.6 MG/DL (ref 0.1–1.5)
BUN SERPL-MCNC: 11 MG/DL (ref 8–22)
CALCIUM ALBUM COR SERPL-MCNC: 9.8 MG/DL (ref 8.5–10.5)
CALCIUM SERPL-MCNC: 10 MG/DL (ref 8.5–10.5)
CHLORIDE SERPL-SCNC: 103 MMOL/L (ref 96–112)
CO2 SERPL-SCNC: 21 MMOL/L (ref 20–33)
CREAT SERPL-MCNC: 0.37 MG/DL (ref 0.5–1.4)
GFR SERPLBLD CREATININE-BSD FMLA CKD-EPI: 122 ML/MIN/1.73 M 2
GLOBULIN SER CALC-MCNC: 3.5 G/DL (ref 1.9–3.5)
GLUCOSE SERPL-MCNC: 99 MG/DL (ref 65–99)
INR PPP: 1.18 (ref 0.87–1.13)
POTASSIUM SERPL-SCNC: 3.9 MMOL/L (ref 3.6–5.5)
PROT SERPL-MCNC: 7.7 G/DL (ref 6–8.2)
PROTHROMBIN TIME: 15.1 SEC (ref 12–14.6)
SODIUM SERPL-SCNC: 139 MMOL/L (ref 135–145)

## 2024-08-08 PROCEDURE — 36415 COLL VENOUS BLD VENIPUNCTURE: CPT

## 2024-08-08 PROCEDURE — 80053 COMPREHEN METABOLIC PANEL: CPT

## 2024-08-08 PROCEDURE — 85610 PROTHROMBIN TIME: CPT

## 2024-08-08 PROCEDURE — 85730 THROMBOPLASTIN TIME PARTIAL: CPT

## 2024-08-09 LAB
BACTERIA STL CULT: NORMAL
E COLI SXT1+2 STL IA: NORMAL
FAT STL QL: NORMAL
NEUTRAL FAT STL QL: NORMAL
SIGNIFICANT IND 70042: NORMAL
SITE SITE: NORMAL
SOURCE SOURCE: NORMAL

## 2024-08-10 LAB
CALPROTECTIN STL-MCNT: 7 UG/G
ELASTASE PANC STL-MCNT: >800 UG/G

## 2024-08-11 LAB — OVA AND PARASITE, FECAL INTERPRETATION Q0595: NEGATIVE

## 2024-08-14 ENCOUNTER — APPOINTMENT (OUTPATIENT)
Dept: BEHAVIORAL HEALTH | Facility: CLINIC | Age: 49
End: 2024-08-14
Payer: COMMERCIAL

## 2024-08-16 ENCOUNTER — TELEMEDICINE (OUTPATIENT)
Dept: BEHAVIORAL HEALTH | Facility: CLINIC | Age: 49
End: 2024-08-16
Payer: COMMERCIAL

## 2024-08-16 DIAGNOSIS — R52 PAIN: ICD-10-CM

## 2024-08-16 DIAGNOSIS — F41.1 GENERALIZED ANXIETY DISORDER: ICD-10-CM

## 2024-08-16 DIAGNOSIS — F32.A DEPRESSION, UNSPECIFIED DEPRESSION TYPE: ICD-10-CM

## 2024-08-16 DIAGNOSIS — F10.20 ALCOHOL USE DISORDER, SEVERE, DEPENDENCE (HCC): ICD-10-CM

## 2024-08-16 DIAGNOSIS — F40.10 SOCIAL ANXIETY DISORDER: ICD-10-CM

## 2024-08-16 PROBLEM — F10.929 ACUTE ALCOHOL INTOXICATION (HCC): Status: RESOLVED | Noted: 2021-05-25 | Resolved: 2024-08-16

## 2024-08-16 PROBLEM — F10.121 ALCOHOL INTOXICATION DELIRIUM (HCC): Status: RESOLVED | Noted: 2024-04-30 | Resolved: 2024-08-16

## 2024-08-16 PROCEDURE — 99999 PR NO CHARGE: CPT

## 2024-08-16 RX ORDER — GABAPENTIN 300 MG/1
300 CAPSULE ORAL 2 TIMES DAILY
Qty: 60 CAPSULE | Refills: 0 | Status: SHIPPED
Start: 2024-08-16 | End: 2024-08-21

## 2024-08-16 RX ORDER — HYDROXYZINE HYDROCHLORIDE 10 MG/1
10 TABLET, FILM COATED ORAL 3 TIMES DAILY PRN
Qty: 90 TABLET | Refills: 0 | Status: SHIPPED | OUTPATIENT
Start: 2024-08-16 | End: 2024-09-15

## 2024-08-16 RX ORDER — ESCITALOPRAM OXALATE 20 MG/1
20 TABLET ORAL DAILY
Qty: 30 TABLET | Refills: 0 | Status: SHIPPED | OUTPATIENT
Start: 2024-08-16 | End: 2024-09-15

## 2024-08-16 ASSESSMENT — ENCOUNTER SYMPTOMS
CARDIOVASCULAR NEGATIVE: 1
NAUSEA: 0
GASTROINTESTINAL NEGATIVE: 1
VOMITING: 0
NEUROLOGICAL NEGATIVE: 1
TINGLING: 0
HEADACHES: 0
RESPIRATORY NEGATIVE: 1
DIARRHEA: 0
WEAKNESS: 0
CONSTIPATION: 0
CONSTITUTIONAL NEGATIVE: 1
BACK PAIN: 1
SHORTNESS OF BREATH: 0

## 2024-08-16 ASSESSMENT — PATIENT HEALTH QUESTIONNAIRE - PHQ9
SUM OF ALL RESPONSES TO PHQ QUESTIONS 1-9: 11
5. POOR APPETITE OR OVEREATING: 0 - NOT AT ALL
CLINICAL INTERPRETATION OF PHQ2 SCORE: 4

## 2024-08-16 ASSESSMENT — ANXIETY QUESTIONNAIRES
3. WORRYING TOO MUCH ABOUT DIFFERENT THINGS: SEVERAL DAYS
IF YOU CHECKED OFF ANY PROBLEMS ON THIS QUESTIONNAIRE, HOW DIFFICULT HAVE THESE PROBLEMS MADE IT FOR YOU TO DO YOUR WORK, TAKE CARE OF THINGS AT HOME, OR GET ALONG WITH OTHER PEOPLE: NOT DIFFICULT AT ALL
1. FEELING NERVOUS, ANXIOUS, OR ON EDGE: SEVERAL DAYS
7. FEELING AFRAID AS IF SOMETHING AWFUL MIGHT HAPPEN: SEVERAL DAYS
5. BEING SO RESTLESS THAT IT IS HARD TO SIT STILL: NOT AT ALL
2. NOT BEING ABLE TO STOP OR CONTROL WORRYING: SEVERAL DAYS
4. TROUBLE RELAXING: SEVERAL DAYS
GAD7 TOTAL SCORE: 6
6. BECOMING EASILY ANNOYED OR IRRITABLE: SEVERAL DAYS

## 2024-08-16 NOTE — ASSESSMENT & PLAN NOTE
Problem type: Chronic Illness, Stable    Assessment: 49 year old female with hx of generalized anxiety but denies any current functional impairment from it. Ongoing anxiety in social settings which was exacerbated by COVID19 pandemic in 2020 and likely increased alcohol use as well. Will titrate Lexapro today as outlined below. Also agree with continuing Gabapentin for AUD given it may improve comorbid anxiety. At next visit in 1 week will discuss current coping strategies as well as recommendation to avoid benzodiazepines (per PDMP recent prescription in May for Valium 5mg from outside provider).    Plan  Medication:   -Increase Lexapro to 20mg PO daily  -Continue Hydroxyzine 10mg TID PRN    Therapy:   -Continue psychotherapy through [facility]    Other Orders: none

## 2024-08-16 NOTE — ASSESSMENT & PLAN NOTE
Problem type: Chronic Illness, Stable    Assessment: 49 year old female with hx of depression, anxiety, trauma, and AUD who presents for initial evaluation. Patient currently enrolled/participating in substance use focused IOP at [facility] (12h/wk). Patient adherent to Lexapro 10mg, Hydroxyzine 10mg TID PRN, and Gabapentin 300mg 1-2 times daily and denies any side effects. Depressed mood and assoc sxs remain uncontrolled, but no acute safety concerns currently. Unclear at this time whether primary depressive d/o or due to PTSD or alcohol induced mood disorder. Plan to titrate Lexapro to 20mg to better target mood and anxiety and follow up in 1 week.    Plan  Medication:   -Increase Lexapro to 20mg PO daily    Therapy:   -Continue psychotherapy through IOP    Other Orders: none
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
## 2024-08-16 NOTE — ASSESSMENT & PLAN NOTE
Problem type: Chronic Illness, Stable    Assessment: 49 year old female with hx of AUD and several medical complications associated with her alcohol use who presents for initial evaluation. Patient currently enrolled/participating in IOP at [facility] (12h/wk) to address her alcohol use. Patient adherent to Lexapro 10mg, Hydroxyzine 10mg TID PRN, and Gabapentin 300mg 1-2 times daily and denies any side effects. Given patient's comorbidities these are likely the best combination of medications. Will work to titrate Gabapentin to recommended dose of 300mg TID for AUD.    Plan  Medication:   -Increase Gabapentin to 300mg BID    Therapy:   -Continue psychotherapy through [facility]    Other Orders: none

## 2024-08-20 ENCOUNTER — APPOINTMENT (OUTPATIENT)
Dept: RADIOLOGY | Facility: MEDICAL CENTER | Age: 49
End: 2024-08-20
Attending: STUDENT IN AN ORGANIZED HEALTH CARE EDUCATION/TRAINING PROGRAM
Payer: COMMERCIAL

## 2024-08-21 ENCOUNTER — TELEMEDICINE (OUTPATIENT)
Dept: BEHAVIORAL HEALTH | Facility: CLINIC | Age: 49
End: 2024-08-21
Payer: COMMERCIAL

## 2024-08-21 DIAGNOSIS — F40.10 SOCIAL ANXIETY DISORDER: ICD-10-CM

## 2024-08-21 DIAGNOSIS — F32.A DEPRESSION, UNSPECIFIED DEPRESSION TYPE: ICD-10-CM

## 2024-08-21 DIAGNOSIS — F41.1 GENERALIZED ANXIETY DISORDER: ICD-10-CM

## 2024-08-21 DIAGNOSIS — R52 PAIN: ICD-10-CM

## 2024-08-21 DIAGNOSIS — F10.20 ALCOHOL USE DISORDER, SEVERE, DEPENDENCE (HCC): ICD-10-CM

## 2024-08-21 PROCEDURE — 99214 OFFICE O/P EST MOD 30 MIN: CPT | Mod: GC,GT

## 2024-08-21 RX ORDER — GABAPENTIN 300 MG/1
300 CAPSULE ORAL 3 TIMES DAILY
Qty: 90 CAPSULE | Refills: 0 | Status: SHIPPED | OUTPATIENT
Start: 2024-08-21 | End: 2024-09-20

## 2024-08-21 ASSESSMENT — ENCOUNTER SYMPTOMS
HEADACHES: 0
NAUSEA: 0
TINGLING: 0
CONSTITUTIONAL NEGATIVE: 1
NEUROLOGICAL NEGATIVE: 1
CONSTIPATION: 0
VOMITING: 0
GASTROINTESTINAL NEGATIVE: 1
SHORTNESS OF BREATH: 0
DIARRHEA: 0
RESPIRATORY NEGATIVE: 1
CARDIOVASCULAR NEGATIVE: 1
WEAKNESS: 0

## 2024-08-22 NOTE — ASSESSMENT & PLAN NOTE
Problem type: Chronic Illness, Stable    Assessment: 49 year old female with hx of depression, anxiety, trauma, and AUD who presents for follow up. Patient currently enrolled/participating in substance use focused IOP at [facility] (12h/wk). Patient adherent to Lexapro 20mg, Hydroxyzine 10mg TID PRN, and Gabapentin 300mg 2 times daily and denies any side effects. Depressed mood and assoc sxs reportedly improved markedly since last week which may be further evidence of alcohol induced mood disorder. Plan to continue Lexapro 20mg for ongoing anxiety concerns and follow up in 3-4 weeks.    Plan  Medication:   -Continue Lexapro to 20mg PO daily    Therapy:   -Continue psychotherapy through IOP    Other Orders: none

## 2024-08-22 NOTE — ASSESSMENT & PLAN NOTE
Problem type: Chronic Illness, Stable    Assessment: 49 year old female with hx of generalized anxiety but denies any current functional impairment from it. Ongoing anxiety in social settings which was exacerbated by COVID19 pandemic in 2020 and likely increased alcohol use as well. Will continue Lexapro today as outlined below. Plan to titrate Gabapentin for AUD given it may improve comorbid anxiety. At next visit in 3-4 weeks we will discuss current coping strategies as well as recommendation to avoid benzodiazepines (per PDMP recent prescription in May for Valium 5mg from outside provider).    Plan  Medication:   -Continue Lexapro to 20mg PO daily  -Continue Hydroxyzine 10mg TID PRN  -Increase Gabapentin to 300mg TID    Therapy:   -Continue psychotherapy through [facility]    Other Orders: none

## 2024-08-22 NOTE — ASSESSMENT & PLAN NOTE
Problem type: Chronic Illness, Stable    Assessment: 49 year old female with hx of AUD and several medical complications associated with her alcohol use who presents for follow up. Patient currently enrolled/participating in IOP at [facility] (12h/wk) to address her alcohol use and has continued sobriety. Patient adherent to her prescribed medications and denies any side effects. Given patient's comorbidities these are likely the best combination of medications. Will work to titrate Gabapentin to recommended dose of 300mg TID for AUD.    Plan  Medication:   -Increase Gabapentin to 300mg TID    Therapy:   -Continue psychotherapy through [facility]    Other Orders: none
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
## 2024-09-19 ENCOUNTER — APPOINTMENT (OUTPATIENT)
Dept: PHYSICAL THERAPY | Facility: REHABILITATION | Age: 49
End: 2024-09-19
Attending: STUDENT IN AN ORGANIZED HEALTH CARE EDUCATION/TRAINING PROGRAM
Payer: COMMERCIAL

## 2024-09-23 ENCOUNTER — APPOINTMENT (OUTPATIENT)
Dept: PHYSICAL THERAPY | Facility: REHABILITATION | Age: 49
End: 2024-09-23
Attending: STUDENT IN AN ORGANIZED HEALTH CARE EDUCATION/TRAINING PROGRAM
Payer: COMMERCIAL

## 2024-09-25 ENCOUNTER — TELEMEDICINE (OUTPATIENT)
Dept: BEHAVIORAL HEALTH | Facility: CLINIC | Age: 49
End: 2024-09-25
Payer: COMMERCIAL

## 2024-09-25 DIAGNOSIS — F41.1 GENERALIZED ANXIETY DISORDER: ICD-10-CM

## 2024-09-25 DIAGNOSIS — F32.89 OTHER DEPRESSION: ICD-10-CM

## 2024-09-25 DIAGNOSIS — F40.10 SOCIAL ANXIETY DISORDER: ICD-10-CM

## 2024-09-25 DIAGNOSIS — F10.20 ALCOHOL USE DISORDER, SEVERE, DEPENDENCE (HCC): ICD-10-CM

## 2024-09-25 PROCEDURE — 99213 OFFICE O/P EST LOW 20 MIN: CPT | Mod: 95,GC

## 2024-09-25 RX ORDER — ESCITALOPRAM OXALATE 20 MG/1
20 TABLET ORAL DAILY
Qty: 90 TABLET | Refills: 1 | Status: SHIPPED
Start: 2024-09-25 | End: 2024-09-25

## 2024-09-25 RX ORDER — HYDROXYZINE HYDROCHLORIDE 10 MG/1
10 TABLET, FILM COATED ORAL 3 TIMES DAILY PRN
Qty: 90 TABLET | Refills: 2 | Status: SHIPPED | OUTPATIENT
Start: 2024-09-25 | End: 2024-12-24

## 2024-09-25 RX ORDER — ESCITALOPRAM OXALATE 20 MG/1
20 TABLET ORAL DAILY
Qty: 90 TABLET | Refills: 1 | Status: SHIPPED | OUTPATIENT
Start: 2024-09-25 | End: 2025-03-24

## 2024-09-25 RX ORDER — GABAPENTIN 300 MG/1
300 CAPSULE ORAL 3 TIMES DAILY
Qty: 90 CAPSULE | Refills: 2 | Status: SHIPPED | OUTPATIENT
Start: 2024-09-25 | End: 2024-12-24

## 2024-09-25 RX ORDER — HYDROXYZINE HYDROCHLORIDE 10 MG/1
10 TABLET, FILM COATED ORAL 3 TIMES DAILY PRN
Qty: 90 TABLET | Refills: 2 | Status: SHIPPED
Start: 2024-09-25 | End: 2024-09-25

## 2024-09-25 ASSESSMENT — ENCOUNTER SYMPTOMS
CONSTIPATION: 0
TINGLING: 0
VOMITING: 0
SHORTNESS OF BREATH: 0
RESPIRATORY NEGATIVE: 1
NEUROLOGICAL NEGATIVE: 1
CARDIOVASCULAR NEGATIVE: 1
WEAKNESS: 0
DIARRHEA: 0
HEADACHES: 0
NAUSEA: 0
GASTROINTESTINAL NEGATIVE: 1
CONSTITUTIONAL NEGATIVE: 1
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
## 2024-09-26 ENCOUNTER — APPOINTMENT (OUTPATIENT)
Dept: PHYSICAL THERAPY | Facility: REHABILITATION | Age: 49
End: 2024-09-26
Attending: STUDENT IN AN ORGANIZED HEALTH CARE EDUCATION/TRAINING PROGRAM
Payer: COMMERCIAL

## 2024-09-26 NOTE — ASSESSMENT & PLAN NOTE
Problem type: Chronic Illness, Stable    Assessment: 49 year old female with hx of depression, anxiety, trauma, and AUD who presents for follow up. Patient recently completed substance use focused IOP at [facility] (12h/wk). Patient adherent to Lexapro 20mg, Hydroxyzine 10mg TID PRN, and Gabapentin 300mg 3 times daily and denies any side effects. Depressed mood and assoc sxs reportedly improved markedly during sobriety which may be further evidence of alcohol induced mood disorder. Plan to continue Lexapro 20mg for ongoing anxiety concerns and follow up in next week in psychotherapy.    Plan  Medication:   -Continue Lexapro to 20mg PO daily    Therapy:   -Continue psychotherapy through IOP    Other Orders: none

## 2024-09-26 NOTE — ASSESSMENT & PLAN NOTE
Problem type: Chronic Illness, Stable    Assessment: 49 year old female with hx of AUD and several medical complications associated with her alcohol use who presents for follow up. Patient recently completed IOP at [facility] (12h/wk) to address her alcohol use which resulted in residential/inpatient treatment recommendation due to relapse 2 weeks ago. Patient adherent to her prescribed medications and denies any side effects. Given patient's comorbidities these are likely the best combination of medications. Will work to improve coping skills and continue to address substance use in psychotherapy next week; patient is contemplative at this time.    Plan  Medication:   -Continue Gabapentin 300mg TID    Therapy:   -Continue psychotherapy through IOP    Other Orders: none

## 2024-09-26 NOTE — ASSESSMENT & PLAN NOTE
Problem type: Chronic Illness, Stable    Assessment: 49 year old female with hx of generalized anxiety but denies any current functional impairment from it. Ongoing anxiety in social settings which was exacerbated by COVID19 pandemic in 2020 and likely increased alcohol use as well. Will continue Lexapro today as outlined below. Reports overall improvements since titration of Gabapentin but still taking hydroxyzine for anticipatory anxiety with significant avoidant behavior. At next visit in 1 week we will discuss current coping strategies and develop psychotherapy goals.    Plan  Medication:   -Continue Lexapro to 20mg PO daily  -Continue Hydroxyzine 10mg TID PRN  -Continue Gabapentin 300mg TID    Therapy:   -Continue follow up through [facility]    Other Orders: none

## 2024-09-30 ENCOUNTER — APPOINTMENT (OUTPATIENT)
Dept: PHYSICAL THERAPY | Facility: REHABILITATION | Age: 49
End: 2024-09-30
Attending: STUDENT IN AN ORGANIZED HEALTH CARE EDUCATION/TRAINING PROGRAM
Payer: COMMERCIAL

## 2024-10-01 ENCOUNTER — APPOINTMENT (OUTPATIENT)
Dept: BEHAVIORAL HEALTH | Facility: PSYCHIATRIC FACILITY | Age: 49
End: 2024-10-01
Payer: COMMERCIAL

## 2024-10-01 DIAGNOSIS — Z91.49: ICD-10-CM

## 2024-10-01 DIAGNOSIS — T14.90XA TRAUMA IN CHILDHOOD: ICD-10-CM

## 2024-10-01 DIAGNOSIS — Z65.8 INTERPERSONAL PROBLEM: ICD-10-CM

## 2024-10-01 PROCEDURE — 90837 PSYTX W PT 60 MINUTES: CPT | Mod: GC

## 2024-10-03 ENCOUNTER — APPOINTMENT (OUTPATIENT)
Dept: PHYSICAL THERAPY | Facility: REHABILITATION | Age: 49
End: 2024-10-03
Attending: STUDENT IN AN ORGANIZED HEALTH CARE EDUCATION/TRAINING PROGRAM
Payer: COMMERCIAL

## 2024-10-07 ENCOUNTER — APPOINTMENT (OUTPATIENT)
Dept: PHYSICAL THERAPY | Facility: REHABILITATION | Age: 49
End: 2024-10-07
Attending: STUDENT IN AN ORGANIZED HEALTH CARE EDUCATION/TRAINING PROGRAM
Payer: COMMERCIAL

## 2024-10-08 ENCOUNTER — OFFICE VISIT (OUTPATIENT)
Dept: BEHAVIORAL HEALTH | Facility: PSYCHIATRIC FACILITY | Age: 49
End: 2024-10-08
Payer: COMMERCIAL

## 2024-10-08 DIAGNOSIS — Z91.49: ICD-10-CM

## 2024-10-08 DIAGNOSIS — T14.90XA TRAUMA IN CHILDHOOD: ICD-10-CM

## 2024-10-08 PROCEDURE — 90834 PSYTX W PT 45 MINUTES: CPT

## 2024-10-10 ENCOUNTER — APPOINTMENT (OUTPATIENT)
Dept: PHYSICAL THERAPY | Facility: REHABILITATION | Age: 49
End: 2024-10-10
Attending: STUDENT IN AN ORGANIZED HEALTH CARE EDUCATION/TRAINING PROGRAM
Payer: COMMERCIAL

## 2024-10-14 ENCOUNTER — APPOINTMENT (OUTPATIENT)
Dept: PHYSICAL THERAPY | Facility: REHABILITATION | Age: 49
End: 2024-10-14
Attending: STUDENT IN AN ORGANIZED HEALTH CARE EDUCATION/TRAINING PROGRAM
Payer: COMMERCIAL

## 2024-10-15 ENCOUNTER — APPOINTMENT (OUTPATIENT)
Dept: BEHAVIORAL HEALTH | Facility: PSYCHIATRIC FACILITY | Age: 49
End: 2024-10-15
Payer: COMMERCIAL

## 2024-10-17 ENCOUNTER — APPOINTMENT (OUTPATIENT)
Dept: PHYSICAL THERAPY | Facility: REHABILITATION | Age: 49
End: 2024-10-17
Attending: STUDENT IN AN ORGANIZED HEALTH CARE EDUCATION/TRAINING PROGRAM
Payer: COMMERCIAL

## 2024-10-17 PROCEDURE — RXMED WILLOW AMBULATORY MEDICATION CHARGE: Performed by: NURSE PRACTITIONER

## 2024-10-21 ENCOUNTER — APPOINTMENT (OUTPATIENT)
Dept: PHYSICAL THERAPY | Facility: REHABILITATION | Age: 49
End: 2024-10-21
Attending: STUDENT IN AN ORGANIZED HEALTH CARE EDUCATION/TRAINING PROGRAM
Payer: COMMERCIAL

## 2024-10-22 ENCOUNTER — APPOINTMENT (OUTPATIENT)
Dept: BEHAVIORAL HEALTH | Facility: PSYCHIATRIC FACILITY | Age: 49
End: 2024-10-22
Payer: COMMERCIAL

## 2024-10-22 ENCOUNTER — APPOINTMENT (OUTPATIENT)
Dept: RADIOLOGY | Facility: MEDICAL CENTER | Age: 49
DRG: 896 | End: 2024-10-22
Attending: EMERGENCY MEDICINE
Payer: COMMERCIAL

## 2024-10-22 ENCOUNTER — HOSPITAL ENCOUNTER (INPATIENT)
Facility: MEDICAL CENTER | Age: 49
LOS: 3 days | DRG: 896 | End: 2024-10-25
Attending: EMERGENCY MEDICINE
Payer: COMMERCIAL

## 2024-10-22 DIAGNOSIS — J18.9 PNEUMONIA DUE TO INFECTIOUS ORGANISM, UNSPECIFIED LATERALITY, UNSPECIFIED PART OF LUNG: ICD-10-CM

## 2024-10-22 DIAGNOSIS — J96.11 CHRONIC HYPOXEMIC RESPIRATORY FAILURE (HCC): ICD-10-CM

## 2024-10-22 DIAGNOSIS — I51.7 CARDIOMEGALY: ICD-10-CM

## 2024-10-22 DIAGNOSIS — F10.939 ALCOHOL WITHDRAWAL SEIZURE WITH COMPLICATION (HCC): ICD-10-CM

## 2024-10-22 DIAGNOSIS — R51.9 NONINTRACTABLE HEADACHE, UNSPECIFIED CHRONICITY PATTERN, UNSPECIFIED HEADACHE TYPE: ICD-10-CM

## 2024-10-22 DIAGNOSIS — R56.9 ALCOHOL WITHDRAWAL SEIZURE WITH COMPLICATION (HCC): ICD-10-CM

## 2024-10-22 DIAGNOSIS — F10.939 ALCOHOL WITHDRAWAL SYNDROME WITH COMPLICATION (HCC): Primary | ICD-10-CM

## 2024-10-22 DIAGNOSIS — I10 PRIMARY HYPERTENSION: ICD-10-CM

## 2024-10-22 DIAGNOSIS — R51.9 ACUTE NONINTRACTABLE HEADACHE, UNSPECIFIED HEADACHE TYPE: ICD-10-CM

## 2024-10-22 PROBLEM — F10.930 ALCOHOL WITHDRAWAL SEIZURE, UNCOMPLICATED (HCC): Status: ACTIVE | Noted: 2024-10-22

## 2024-10-22 LAB
ALBUMIN SERPL BCP-MCNC: 4.6 G/DL (ref 3.2–4.9)
ALBUMIN/GLOB SERPL: 1.4 G/DL
ALP SERPL-CCNC: 184 U/L (ref 30–99)
ALT SERPL-CCNC: 61 U/L (ref 2–50)
AMPHET UR QL SCN: NEGATIVE
ANION GAP SERPL CALC-SCNC: 17 MMOL/L (ref 7–16)
APPEARANCE UR: CLEAR
AST SERPL-CCNC: 82 U/L (ref 12–45)
BACTERIA #/AREA URNS HPF: ABNORMAL /HPF
BARBITURATES UR QL SCN: POSITIVE
BASOPHILS # BLD AUTO: 1.3 % (ref 0–1.8)
BASOPHILS # BLD: 0.09 K/UL (ref 0–0.12)
BENZODIAZ UR QL SCN: POSITIVE
BILIRUB SERPL-MCNC: 1.7 MG/DL (ref 0.1–1.5)
BILIRUB UR QL STRIP.AUTO: NEGATIVE
BUN SERPL-MCNC: 15 MG/DL (ref 8–22)
BZE UR QL SCN: NEGATIVE
CALCIUM ALBUM COR SERPL-MCNC: 9.2 MG/DL (ref 8.5–10.5)
CALCIUM SERPL-MCNC: 9.7 MG/DL (ref 8.5–10.5)
CANNABINOIDS UR QL SCN: NEGATIVE
CHLORIDE SERPL-SCNC: 99 MMOL/L (ref 96–112)
CO2 SERPL-SCNC: 20 MMOL/L (ref 20–33)
COLOR UR: YELLOW
CREAT SERPL-MCNC: 0.91 MG/DL (ref 0.5–1.4)
EOSINOPHIL # BLD AUTO: 0.01 K/UL (ref 0–0.51)
EOSINOPHIL NFR BLD: 0.1 % (ref 0–6.9)
EPI CELLS #/AREA URNS HPF: ABNORMAL /HPF
ERYTHROCYTE [DISTWIDTH] IN BLOOD BY AUTOMATED COUNT: 48.4 FL (ref 35.9–50)
ETHANOL BLD-MCNC: <10.1 MG/DL
FENTANYL UR QL: NEGATIVE
GFR SERPLBLD CREATININE-BSD FMLA CKD-EPI: 77 ML/MIN/1.73 M 2
GLOBULIN SER CALC-MCNC: 3.4 G/DL (ref 1.9–3.5)
GLUCOSE SERPL-MCNC: 124 MG/DL (ref 65–99)
GLUCOSE UR STRIP.AUTO-MCNC: NEGATIVE MG/DL
HCT VFR BLD AUTO: 50.3 % (ref 37–47)
HGB BLD-MCNC: 17.3 G/DL (ref 12–16)
HYALINE CASTS #/AREA URNS LPF: ABNORMAL /LPF
IMM GRANULOCYTES # BLD AUTO: 0.06 K/UL (ref 0–0.11)
IMM GRANULOCYTES NFR BLD AUTO: 0.8 % (ref 0–0.9)
KETONES UR STRIP.AUTO-MCNC: NEGATIVE MG/DL
LACTATE SERPL-SCNC: 0.8 MMOL/L (ref 0.5–2)
LEUKOCYTE ESTERASE UR QL STRIP.AUTO: ABNORMAL
LIPASE SERPL-CCNC: 18 U/L (ref 11–82)
LYMPHOCYTES # BLD AUTO: 0.88 K/UL (ref 1–4.8)
LYMPHOCYTES NFR BLD: 12.4 % (ref 22–41)
MCH RBC QN AUTO: 35.8 PG (ref 27–33)
MCHC RBC AUTO-ENTMCNC: 34.4 G/DL (ref 32.2–35.5)
MCV RBC AUTO: 104.1 FL (ref 81.4–97.8)
METHADONE UR QL SCN: NEGATIVE
MICRO URNS: ABNORMAL
MONOCYTES # BLD AUTO: 1.33 K/UL (ref 0–0.85)
MONOCYTES NFR BLD AUTO: 18.7 % (ref 0–13.4)
NEUTROPHILS # BLD AUTO: 4.75 K/UL (ref 1.82–7.42)
NEUTROPHILS NFR BLD: 66.7 % (ref 44–72)
NITRITE UR QL STRIP.AUTO: POSITIVE
NRBC # BLD AUTO: 0.02 K/UL
NRBC BLD-RTO: 0.3 /100 WBC (ref 0–0.2)
OPIATES UR QL SCN: NEGATIVE
OXYCODONE UR QL SCN: NEGATIVE
PCP UR QL SCN: NEGATIVE
PH UR STRIP.AUTO: 7 [PH] (ref 5–8)
PLATELET # BLD AUTO: 105 K/UL (ref 164–446)
PLATELETS.RETICULATED NFR BLD AUTO: 4.7 % (ref 0.6–13.1)
PMV BLD AUTO: 9.9 FL (ref 9–12.9)
POTASSIUM SERPL-SCNC: 3.7 MMOL/L (ref 3.6–5.5)
PROPOXYPH UR QL SCN: NEGATIVE
PROT SERPL-MCNC: 8 G/DL (ref 6–8.2)
PROT UR QL STRIP: NEGATIVE MG/DL
RBC # BLD AUTO: 4.83 M/UL (ref 4.2–5.4)
RBC # URNS HPF: ABNORMAL /HPF
RBC UR QL AUTO: ABNORMAL
SODIUM SERPL-SCNC: 136 MMOL/L (ref 135–145)
SP GR UR STRIP.AUTO: 1.02
UROBILINOGEN UR STRIP.AUTO-MCNC: 0.2 MG/DL
WBC # BLD AUTO: 7.1 K/UL (ref 4.8–10.8)
WBC #/AREA URNS HPF: ABNORMAL /HPF

## 2024-10-22 PROCEDURE — 700102 HCHG RX REV CODE 250 W/ 637 OVERRIDE(OP)

## 2024-10-22 PROCEDURE — 83605 ASSAY OF LACTIC ACID: CPT

## 2024-10-22 PROCEDURE — 700105 HCHG RX REV CODE 258

## 2024-10-22 PROCEDURE — A9270 NON-COVERED ITEM OR SERVICE: HCPCS

## 2024-10-22 PROCEDURE — 71045 X-RAY EXAM CHEST 1 VIEW: CPT

## 2024-10-22 PROCEDURE — 85055 RETICULATED PLATELET ASSAY: CPT

## 2024-10-22 PROCEDURE — 87040 BLOOD CULTURE FOR BACTERIA: CPT

## 2024-10-22 PROCEDURE — 700102 HCHG RX REV CODE 250 W/ 637 OVERRIDE(OP): Performed by: EMERGENCY MEDICINE

## 2024-10-22 PROCEDURE — 99291 CRITICAL CARE FIRST HOUR: CPT

## 2024-10-22 PROCEDURE — 700105 HCHG RX REV CODE 258: Performed by: EMERGENCY MEDICINE

## 2024-10-22 PROCEDURE — 85025 COMPLETE CBC W/AUTO DIFF WBC: CPT

## 2024-10-22 PROCEDURE — 99285 EMERGENCY DEPT VISIT HI MDM: CPT

## 2024-10-22 PROCEDURE — 700111 HCHG RX REV CODE 636 W/ 250 OVERRIDE (IP)

## 2024-10-22 PROCEDURE — A9270 NON-COVERED ITEM OR SERVICE: HCPCS | Performed by: EMERGENCY MEDICINE

## 2024-10-22 PROCEDURE — 87086 URINE CULTURE/COLONY COUNT: CPT

## 2024-10-22 PROCEDURE — 80053 COMPREHEN METABOLIC PANEL: CPT

## 2024-10-22 PROCEDURE — 770000 HCHG ROOM/CARE - INTERMEDIATE ICU *

## 2024-10-22 PROCEDURE — 96375 TX/PRO/DX INJ NEW DRUG ADDON: CPT

## 2024-10-22 PROCEDURE — 70450 CT HEAD/BRAIN W/O DYE: CPT

## 2024-10-22 PROCEDURE — 36415 COLL VENOUS BLD VENIPUNCTURE: CPT

## 2024-10-22 PROCEDURE — 80307 DRUG TEST PRSMV CHEM ANLYZR: CPT

## 2024-10-22 PROCEDURE — 83690 ASSAY OF LIPASE: CPT

## 2024-10-22 PROCEDURE — 82077 ASSAY SPEC XCP UR&BREATH IA: CPT

## 2024-10-22 PROCEDURE — 81001 URINALYSIS AUTO W/SCOPE: CPT

## 2024-10-22 PROCEDURE — 700111 HCHG RX REV CODE 636 W/ 250 OVERRIDE (IP): Performed by: EMERGENCY MEDICINE

## 2024-10-22 PROCEDURE — 96365 THER/PROPH/DIAG IV INF INIT: CPT

## 2024-10-22 PROCEDURE — 700101 HCHG RX REV CODE 250: Performed by: INTERNAL MEDICINE

## 2024-10-22 RX ORDER — LORAZEPAM 2 MG/ML
2 INJECTION INTRAMUSCULAR
Status: DISCONTINUED | OUTPATIENT
Start: 2024-10-22 | End: 2024-10-25 | Stop reason: HOSPADM

## 2024-10-22 RX ORDER — ENOXAPARIN SODIUM 100 MG/ML
40 INJECTION SUBCUTANEOUS DAILY
Status: DISCONTINUED | OUTPATIENT
Start: 2024-10-22 | End: 2024-10-25 | Stop reason: HOSPADM

## 2024-10-22 RX ORDER — LORAZEPAM 2 MG/ML
1.5 INJECTION INTRAMUSCULAR
Status: DISCONTINUED | OUTPATIENT
Start: 2024-10-22 | End: 2024-10-25 | Stop reason: HOSPADM

## 2024-10-22 RX ORDER — SODIUM CHLORIDE 9 MG/ML
1000 INJECTION, SOLUTION INTRAVENOUS ONCE
Status: COMPLETED | OUTPATIENT
Start: 2024-10-22 | End: 2024-10-22

## 2024-10-22 RX ORDER — FOLIC ACID 1 MG/1
1 TABLET ORAL ONCE
Status: COMPLETED | OUTPATIENT
Start: 2024-10-22 | End: 2024-10-22

## 2024-10-22 RX ORDER — HYDROXYZINE HYDROCHLORIDE 10 MG/1
10 TABLET, FILM COATED ORAL 3 TIMES DAILY PRN
Status: DISCONTINUED | OUTPATIENT
Start: 2024-10-22 | End: 2024-10-25 | Stop reason: HOSPADM

## 2024-10-22 RX ORDER — ESCITALOPRAM OXALATE 10 MG/1
20 TABLET ORAL DAILY
Status: DISCONTINUED | OUTPATIENT
Start: 2024-10-22 | End: 2024-10-25 | Stop reason: HOSPADM

## 2024-10-22 RX ORDER — LABETALOL 100 MG/1
200 TABLET, FILM COATED ORAL EVERY 6 HOURS PRN
Status: DISCONTINUED | OUTPATIENT
Start: 2024-10-22 | End: 2024-10-25 | Stop reason: HOSPADM

## 2024-10-22 RX ORDER — AMOXICILLIN 250 MG
2 CAPSULE ORAL EVERY EVENING
Status: DISCONTINUED | OUTPATIENT
Start: 2024-10-22 | End: 2024-10-25 | Stop reason: HOSPADM

## 2024-10-22 RX ORDER — AZITHROMYCIN 250 MG/1
500 TABLET, FILM COATED ORAL ONCE
Status: COMPLETED | OUTPATIENT
Start: 2024-10-22 | End: 2024-10-22

## 2024-10-22 RX ORDER — LORAZEPAM 2 MG/ML
0.5 INJECTION INTRAMUSCULAR EVERY 4 HOURS PRN
Status: DISCONTINUED | OUTPATIENT
Start: 2024-10-22 | End: 2024-10-25 | Stop reason: HOSPADM

## 2024-10-22 RX ORDER — POLYETHYLENE GLYCOL 3350 17 G/17G
1 POWDER, FOR SOLUTION ORAL
Status: DISCONTINUED | OUTPATIENT
Start: 2024-10-22 | End: 2024-10-25 | Stop reason: HOSPADM

## 2024-10-22 RX ORDER — LORAZEPAM 1 MG/1
1 TABLET ORAL EVERY 4 HOURS PRN
Status: DISCONTINUED | OUTPATIENT
Start: 2024-10-22 | End: 2024-10-25 | Stop reason: HOSPADM

## 2024-10-22 RX ORDER — LORAZEPAM 2 MG/1
4 TABLET ORAL
Status: DISCONTINUED | OUTPATIENT
Start: 2024-10-22 | End: 2024-10-25 | Stop reason: HOSPADM

## 2024-10-22 RX ORDER — GAUZE BANDAGE 2" X 2"
100 BANDAGE TOPICAL DAILY
Status: COMPLETED | OUTPATIENT
Start: 2024-10-22 | End: 2024-10-25

## 2024-10-22 RX ORDER — ACETAMINOPHEN 325 MG/1
650 TABLET ORAL EVERY 6 HOURS PRN
Status: DISCONTINUED | OUTPATIENT
Start: 2024-10-22 | End: 2024-10-24

## 2024-10-22 RX ORDER — LORAZEPAM 2 MG/1
2 TABLET ORAL
Status: DISCONTINUED | OUTPATIENT
Start: 2024-10-22 | End: 2024-10-25 | Stop reason: HOSPADM

## 2024-10-22 RX ORDER — LEVOTHYROXINE SODIUM 50 UG/1
50 TABLET ORAL
Status: DISCONTINUED | OUTPATIENT
Start: 2024-10-22 | End: 2024-10-25 | Stop reason: HOSPADM

## 2024-10-22 RX ORDER — LORAZEPAM 0.5 MG/1
0.5 TABLET ORAL EVERY 4 HOURS PRN
Status: DISCONTINUED | OUTPATIENT
Start: 2024-10-22 | End: 2024-10-25 | Stop reason: HOSPADM

## 2024-10-22 RX ORDER — CHLORDIAZEPOXIDE HYDROCHLORIDE 25 MG/1
25 CAPSULE, GELATIN COATED ORAL EVERY 6 HOURS
Status: DISCONTINUED | OUTPATIENT
Start: 2024-10-23 | End: 2024-10-24

## 2024-10-22 RX ORDER — CHLORDIAZEPOXIDE HYDROCHLORIDE 25 MG/1
50 CAPSULE, GELATIN COATED ORAL EVERY 6 HOURS
Status: COMPLETED | OUTPATIENT
Start: 2024-10-22 | End: 2024-10-22

## 2024-10-22 RX ORDER — LORAZEPAM 2 MG/ML
1 INJECTION INTRAMUSCULAR
Status: DISCONTINUED | OUTPATIENT
Start: 2024-10-22 | End: 2024-10-25 | Stop reason: HOSPADM

## 2024-10-22 RX ORDER — GAUZE BANDAGE 2" X 2"
100 BANDAGE TOPICAL ONCE
Status: COMPLETED | OUTPATIENT
Start: 2024-10-22 | End: 2024-10-22

## 2024-10-22 RX ORDER — LABETALOL HYDROCHLORIDE 5 MG/ML
10 INJECTION, SOLUTION INTRAVENOUS
Status: DISCONTINUED | OUTPATIENT
Start: 2024-10-22 | End: 2024-10-25 | Stop reason: HOSPADM

## 2024-10-22 RX ORDER — DEXMEDETOMIDINE HYDROCHLORIDE 4 UG/ML
.1-1.5 INJECTION, SOLUTION INTRAVENOUS CONTINUOUS
Status: DISCONTINUED | OUTPATIENT
Start: 2024-10-22 | End: 2024-10-23

## 2024-10-22 RX ORDER — PHENOBARBITAL SODIUM 130 MG/ML
130 INJECTION, SOLUTION INTRAMUSCULAR; INTRAVENOUS ONCE
Status: COMPLETED | OUTPATIENT
Start: 2024-10-22 | End: 2024-10-22

## 2024-10-22 RX ORDER — LORAZEPAM 2 MG/ML
2 INJECTION INTRAMUSCULAR ONCE
Status: COMPLETED | OUTPATIENT
Start: 2024-10-22 | End: 2024-10-22

## 2024-10-22 RX ORDER — PHENOBARBITAL SODIUM 130 MG/ML
130 INJECTION, SOLUTION INTRAMUSCULAR; INTRAVENOUS ONCE
Status: DISCONTINUED | OUTPATIENT
Start: 2024-10-22 | End: 2024-10-22

## 2024-10-22 RX ORDER — FOLIC ACID 1 MG/1
1 TABLET ORAL DAILY
Status: COMPLETED | OUTPATIENT
Start: 2024-10-22 | End: 2024-10-25

## 2024-10-22 RX ORDER — LANOLIN ALCOHOL/MO/W.PET/CERES
400 CREAM (GRAM) TOPICAL ONCE
Status: COMPLETED | OUTPATIENT
Start: 2024-10-22 | End: 2024-10-22

## 2024-10-22 RX ORDER — GABAPENTIN 300 MG/1
300 CAPSULE ORAL 3 TIMES DAILY
Status: DISCONTINUED | OUTPATIENT
Start: 2024-10-22 | End: 2024-10-25 | Stop reason: HOSPADM

## 2024-10-22 RX ADMIN — GABAPENTIN 300 MG: 300 CAPSULE ORAL at 07:46

## 2024-10-22 RX ADMIN — FOLIC ACID 1 MG: 1 TABLET ORAL at 04:05

## 2024-10-22 RX ADMIN — THERA TABS 1 TABLET: TAB at 04:06

## 2024-10-22 RX ADMIN — LORAZEPAM 2 MG: 2 INJECTION INTRAMUSCULAR; INTRAVENOUS at 04:10

## 2024-10-22 RX ADMIN — AMPICILLIN AND SULBACTAM 3 G: 1; 2 INJECTION, POWDER, FOR SOLUTION INTRAMUSCULAR; INTRAVENOUS at 12:43

## 2024-10-22 RX ADMIN — CHLORDIAZEPOXIDE HYDROCHLORIDE 50 MG: 25 CAPSULE ORAL at 06:05

## 2024-10-22 RX ADMIN — Medication 100 MG: at 07:45

## 2024-10-22 RX ADMIN — LORAZEPAM 1 MG: 2 INJECTION INTRAMUSCULAR; INTRAVENOUS at 23:54

## 2024-10-22 RX ADMIN — CHLORDIAZEPOXIDE HYDROCHLORIDE 50 MG: 25 CAPSULE ORAL at 23:49

## 2024-10-22 RX ADMIN — DEXMEDETOMIDINE HYDROCHLORIDE 0.2 MCG/KG/HR: 4 INJECTION, SOLUTION INTRAVENOUS at 09:06

## 2024-10-22 RX ADMIN — THERA TABS 1 TABLET: TAB at 07:46

## 2024-10-22 RX ADMIN — AMPICILLIN AND SULBACTAM 3 G: 1; 2 INJECTION, POWDER, FOR SOLUTION INTRAMUSCULAR; INTRAVENOUS at 05:00

## 2024-10-22 RX ADMIN — GABAPENTIN 300 MG: 300 CAPSULE ORAL at 12:43

## 2024-10-22 RX ADMIN — LORAZEPAM 0.5 MG: 0.5 TABLET ORAL at 17:45

## 2024-10-22 RX ADMIN — FOLIC ACID 1 MG: 1 TABLET ORAL at 07:46

## 2024-10-22 RX ADMIN — PHENOBARBITAL SODIUM 130 MG: 130 INJECTION INTRAMUSCULAR; INTRAVENOUS at 04:09

## 2024-10-22 RX ADMIN — GABAPENTIN 300 MG: 300 CAPSULE ORAL at 17:45

## 2024-10-22 RX ADMIN — Medication 100 MG: at 04:06

## 2024-10-22 RX ADMIN — AZITHROMYCIN DIHYDRATE 500 MG: 250 TABLET ORAL at 05:00

## 2024-10-22 RX ADMIN — ENOXAPARIN SODIUM 40 MG: 100 INJECTION SUBCUTANEOUS at 17:44

## 2024-10-22 RX ADMIN — AMPICILLIN AND SULBACTAM 3 G: 1; 2 INJECTION, POWDER, FOR SOLUTION INTRAMUSCULAR; INTRAVENOUS at 17:44

## 2024-10-22 RX ADMIN — SENNOSIDES AND DOCUSATE SODIUM 2 TABLET: 50; 8.6 TABLET ORAL at 17:45

## 2024-10-22 RX ADMIN — SODIUM CHLORIDE 1000 ML: 9 INJECTION, SOLUTION INTRAVENOUS at 04:07

## 2024-10-22 RX ADMIN — LEVOTHYROXINE SODIUM 50 MCG: 0.05 TABLET ORAL at 07:46

## 2024-10-22 RX ADMIN — AMPICILLIN AND SULBACTAM 3 G: 1; 2 INJECTION, POWDER, FOR SOLUTION INTRAMUSCULAR; INTRAVENOUS at 23:48

## 2024-10-22 RX ADMIN — ACETAMINOPHEN 650 MG: 325 TABLET ORAL at 08:31

## 2024-10-22 RX ADMIN — ACETAMINOPHEN 650 MG: 325 TABLET ORAL at 19:52

## 2024-10-22 RX ADMIN — CHLORDIAZEPOXIDE HYDROCHLORIDE 50 MG: 25 CAPSULE ORAL at 17:45

## 2024-10-22 RX ADMIN — LORAZEPAM 1 MG: 1 TABLET ORAL at 10:09

## 2024-10-22 RX ADMIN — ESCITALOPRAM OXALATE 20 MG: 10 TABLET ORAL at 07:46

## 2024-10-22 RX ADMIN — Medication 400 MG: at 05:00

## 2024-10-22 RX ADMIN — CHLORDIAZEPOXIDE HYDROCHLORIDE 50 MG: 25 CAPSULE ORAL at 12:43

## 2024-10-22 ASSESSMENT — LIFESTYLE VARIABLES
TOTAL SCORE: 5
VISUAL DISTURBANCES: MODERATELY SEVERE HALLUCINATIONS
HAVE YOU EVER FELT YOU SHOULD CUT DOWN ON YOUR DRINKING: YES
AUDITORY DISTURBANCES: MILD HARSHNESS OR ABILITY TO FRIGHTEN
ANXIETY: MODERATELY ANXIOUS OR GUARDED, SO ANXIETY IS INFERRED
ORIENTATION AND CLOUDING OF SENSORIUM: ORIENTED AND CAN DO SERIAL ADDITIONS
AGITATION: NORMAL ACTIVITY
TOTAL SCORE: 4
TOTAL SCORE: 12
TREMOR: *
HEADACHE, FULLNESS IN HEAD: NOT PRESENT
AGITATION: MODERATELY FIDGETY AND RESTLESS
PAROXYSMAL SWEATS: NO SWEAT VISIBLE
CONSUMPTION TOTAL: POSITIVE
AUDITORY DISTURBANCES: NOT PRESENT
HEADACHE, FULLNESS IN HEAD: NOT PRESENT
TREMOR: *
PAROXYSMAL SWEATS: NO SWEAT VISIBLE
ANXIETY: MILDLY ANXIOUS
HEADACHE, FULLNESS IN HEAD: NOT PRESENT
NAUSEA AND VOMITING: NO NAUSEA AND NO VOMITING
TOTAL SCORE: 6
ANXIETY: MILDLY ANXIOUS
VISUAL DISTURBANCES: NOT PRESENT
AUDITORY DISTURBANCES: NOT PRESENT
NAUSEA AND VOMITING: NO NAUSEA AND NO VOMITING
AGITATION: NORMAL ACTIVITY
NAUSEA AND VOMITING: NO NAUSEA AND NO VOMITING
AUDITORY DISTURBANCES: VERY MILD HARSHNESS OR ABILITY TO FRIGHTEN
ANXIETY: *
ORIENTATION AND CLOUDING OF SENSORIUM: ORIENTED AND CAN DO SERIAL ADDITIONS
TOTAL SCORE: 4
AGITATION: NORMAL ACTIVITY
AUDITORY DISTURBANCES: VERY MILD HARSHNESS OR ABILITY TO FRIGHTEN
PAROXYSMAL SWEATS: NO SWEAT VISIBLE
ORIENTATION AND CLOUDING OF SENSORIUM: ORIENTED AND CAN DO SERIAL ADDITIONS
ORIENTATION AND CLOUDING OF SENSORIUM: ORIENTED AND CAN DO SERIAL ADDITIONS
ANXIETY: NO ANXIETY (AT EASE)
TREMOR: *
ON A TYPICAL DAY WHEN YOU DRINK ALCOHOL HOW MANY DRINKS DO YOU HAVE: 20
PAROXYSMAL SWEATS: NO SWEAT VISIBLE
VISUAL DISTURBANCES: NOT PRESENT
TOTAL SCORE: 4
ANXIETY: MILDLY ANXIOUS
TOTAL SCORE: 5
HOW MANY TIMES IN THE PAST YEAR HAVE YOU HAD 5 OR MORE DRINKS IN A DAY: 20
VISUAL DISTURBANCES: VERY MILD SENSITIVITY
HEADACHE, FULLNESS IN HEAD: MILD
NAUSEA AND VOMITING: NO NAUSEA AND NO VOMITING
TOTAL SCORE: 5
AVERAGE NUMBER OF DAYS PER WEEK YOU HAVE A DRINK CONTAINING ALCOHOL: 7
AGITATION: NORMAL ACTIVITY
VISUAL DISTURBANCES: VERY MILD SENSITIVITY
TREMOR: *
HEADACHE, FULLNESS IN HEAD: NOT PRESENT
ORIENTATION AND CLOUDING OF SENSORIUM: ORIENTED AND CAN DO SERIAL ADDITIONS
TOTAL SCORE: VERY MILD ITCHING, PINS AND NEEDLES SENSATION, BURNING OR NUMBNESS
NAUSEA AND VOMITING: NO NAUSEA AND NO VOMITING
PAROXYSMAL SWEATS: NO SWEAT VISIBLE
ORIENTATION AND CLOUDING OF SENSORIUM: ORIENTED AND CAN DO SERIAL ADDITIONS
TREMOR: *
AGITATION: NORMAL ACTIVITY
TREMOR: *
AUDITORY DISTURBANCES: VERY MILD HARSHNESS OR ABILITY TO FRIGHTEN
EVER HAD A DRINK FIRST THING IN THE MORNING TO STEADY YOUR NERVES TO GET RID OF A HANGOVER: YES
HEADACHE, FULLNESS IN HEAD: NOT PRESENT
VISUAL DISTURBANCES: MILD SENSITIVITY
TOTAL SCORE: 4
EVER FELT BAD OR GUILTY ABOUT YOUR DRINKING: YES
DO YOU DRINK ALCOHOL: YES
NAUSEA AND VOMITING: NO NAUSEA AND NO VOMITING
AUDITORY DISTURBANCES: NOT PRESENT
AGITATION: NORMAL ACTIVITY
ORIENTATION AND CLOUDING OF SENSORIUM: ORIENTED AND CAN DO SERIAL ADDITIONS
HAVE PEOPLE ANNOYED YOU BY CRITICIZING YOUR DRINKING: YES
TACTILE DISTURBANCES: VERY MILD ITCHING, PINS AND NEEDLES SENSATION, BURNING OR NUMBNESS
HEADACHE, FULLNESS IN HEAD: NOT PRESENT
PAROXYSMAL SWEATS: NO SWEAT VISIBLE
NAUSEA AND VOMITING: NO NAUSEA AND NO VOMITING
ANXIETY: MILDLY ANXIOUS
PAROXYSMAL SWEATS: NO SWEAT VISIBLE
TREMOR: MODERATE TREMOR WITH ARMS EXTENDED
TOTAL SCORE: 10
VISUAL DISTURBANCES: NOT PRESENT

## 2024-10-22 ASSESSMENT — COGNITIVE AND FUNCTIONAL STATUS - GENERAL
DRESSING REGULAR LOWER BODY CLOTHING: A LITTLE
SUGGESTED CMS G CODE MODIFIER DAILY ACTIVITY: CK
EATING MEALS: A LITTLE
MOBILITY SCORE: 18
CLIMB 3 TO 5 STEPS WITH RAILING: A LITTLE
TURNING FROM BACK TO SIDE WHILE IN FLAT BAD: A LITTLE
PERSONAL GROOMING: A LITTLE
STANDING UP FROM CHAIR USING ARMS: A LITTLE
SUGGESTED CMS G CODE MODIFIER MOBILITY: CK
TOILETING: A LITTLE
HELP NEEDED FOR BATHING: A LITTLE
DAILY ACTIVITIY SCORE: 18
DRESSING REGULAR UPPER BODY CLOTHING: A LITTLE
MOVING FROM LYING ON BACK TO SITTING ON SIDE OF FLAT BED: A LITTLE
MOVING TO AND FROM BED TO CHAIR: A LITTLE
WALKING IN HOSPITAL ROOM: A LITTLE

## 2024-10-22 ASSESSMENT — ENCOUNTER SYMPTOMS
FEVER: 0
WHEEZING: 0
SHORTNESS OF BREATH: 0
SORE THROAT: 0
COUGH: 1
ABDOMINAL PAIN: 0
HEADACHES: 0
MYALGIAS: 0
CONSTIPATION: 0
CHILLS: 0
SPUTUM PRODUCTION: 1
DIZZINESS: 0
VOMITING: 0
PALPITATIONS: 0
DIARRHEA: 0
LOSS OF CONSCIOUSNESS: 1
TREMORS: 1
NAUSEA: 0
DOUBLE VISION: 0

## 2024-10-22 ASSESSMENT — PAIN DESCRIPTION - PAIN TYPE
TYPE: ACUTE PAIN

## 2024-10-22 ASSESSMENT — FIBROSIS 4 INDEX
FIB4 SCORE: 0.91
FIB4 SCORE: 5

## 2024-10-23 PROBLEM — R51.9 HEADACHE: Status: ACTIVE | Noted: 2024-10-23

## 2024-10-23 LAB
ALBUMIN SERPL BCP-MCNC: 4.3 G/DL (ref 3.2–4.9)
ALBUMIN/GLOB SERPL: 1.5 G/DL
ALP SERPL-CCNC: 171 U/L (ref 30–99)
ALT SERPL-CCNC: 52 U/L (ref 2–50)
ANION GAP SERPL CALC-SCNC: 15 MMOL/L (ref 7–16)
AST SERPL-CCNC: 74 U/L (ref 12–45)
BASOPHILS # BLD AUTO: 1.2 % (ref 0–1.8)
BASOPHILS # BLD: 0.08 K/UL (ref 0–0.12)
BILIRUB SERPL-MCNC: 1.7 MG/DL (ref 0.1–1.5)
BUN SERPL-MCNC: 10 MG/DL (ref 8–22)
CALCIUM ALBUM COR SERPL-MCNC: 9.3 MG/DL (ref 8.5–10.5)
CALCIUM SERPL-MCNC: 9.5 MG/DL (ref 8.5–10.5)
CHLORIDE SERPL-SCNC: 100 MMOL/L (ref 96–112)
CO2 SERPL-SCNC: 21 MMOL/L (ref 20–33)
CREAT SERPL-MCNC: 0.39 MG/DL (ref 0.5–1.4)
EOSINOPHIL # BLD AUTO: 0.05 K/UL (ref 0–0.51)
EOSINOPHIL NFR BLD: 0.7 % (ref 0–6.9)
ERYTHROCYTE [DISTWIDTH] IN BLOOD BY AUTOMATED COUNT: 45.1 FL (ref 35.9–50)
GFR SERPLBLD CREATININE-BSD FMLA CKD-EPI: 121 ML/MIN/1.73 M 2
GLOBULIN SER CALC-MCNC: 2.9 G/DL (ref 1.9–3.5)
GLUCOSE SERPL-MCNC: 79 MG/DL (ref 65–99)
HCT VFR BLD AUTO: 44.6 % (ref 37–47)
HGB BLD-MCNC: 15.2 G/DL (ref 12–16)
IMM GRANULOCYTES # BLD AUTO: 0.03 K/UL (ref 0–0.11)
IMM GRANULOCYTES NFR BLD AUTO: 0.4 % (ref 0–0.9)
LYMPHOCYTES # BLD AUTO: 1 K/UL (ref 1–4.8)
LYMPHOCYTES NFR BLD: 14.6 % (ref 22–41)
MAGNESIUM SERPL-MCNC: 1.5 MG/DL (ref 1.5–2.5)
MCH RBC QN AUTO: 35.1 PG (ref 27–33)
MCHC RBC AUTO-ENTMCNC: 34.1 G/DL (ref 32.2–35.5)
MCV RBC AUTO: 103 FL (ref 81.4–97.8)
MONOCYTES # BLD AUTO: 1.33 K/UL (ref 0–0.85)
MONOCYTES NFR BLD AUTO: 19.4 % (ref 0–13.4)
NEUTROPHILS # BLD AUTO: 4.38 K/UL (ref 1.82–7.42)
NEUTROPHILS NFR BLD: 63.7 % (ref 44–72)
NRBC # BLD AUTO: 0 K/UL
NRBC BLD-RTO: 0 /100 WBC (ref 0–0.2)
PHOSPHATE SERPL-MCNC: 3.8 MG/DL (ref 2.5–4.5)
PLATELET # BLD AUTO: 81 K/UL (ref 164–446)
PLATELETS.RETICULATED NFR BLD AUTO: 5.5 % (ref 0.6–13.1)
PMV BLD AUTO: 10 FL (ref 9–12.9)
POTASSIUM SERPL-SCNC: 4.2 MMOL/L (ref 3.6–5.5)
PROT SERPL-MCNC: 7.2 G/DL (ref 6–8.2)
RBC # BLD AUTO: 4.33 M/UL (ref 4.2–5.4)
SODIUM SERPL-SCNC: 136 MMOL/L (ref 135–145)
WBC # BLD AUTO: 6.9 K/UL (ref 4.8–10.8)

## 2024-10-23 PROCEDURE — 83735 ASSAY OF MAGNESIUM: CPT

## 2024-10-23 PROCEDURE — 84100 ASSAY OF PHOSPHORUS: CPT

## 2024-10-23 PROCEDURE — 99233 SBSQ HOSP IP/OBS HIGH 50: CPT | Performed by: INTERNAL MEDICINE

## 2024-10-23 PROCEDURE — 770020 HCHG ROOM/CARE - TELE (206)

## 2024-10-23 PROCEDURE — 700102 HCHG RX REV CODE 250 W/ 637 OVERRIDE(OP)

## 2024-10-23 PROCEDURE — 700111 HCHG RX REV CODE 636 W/ 250 OVERRIDE (IP): Mod: JZ

## 2024-10-23 PROCEDURE — 700102 HCHG RX REV CODE 250 W/ 637 OVERRIDE(OP): Performed by: INTERNAL MEDICINE

## 2024-10-23 PROCEDURE — 85055 RETICULATED PLATELET ASSAY: CPT

## 2024-10-23 PROCEDURE — 700111 HCHG RX REV CODE 636 W/ 250 OVERRIDE (IP): Performed by: INTERNAL MEDICINE

## 2024-10-23 PROCEDURE — A9270 NON-COVERED ITEM OR SERVICE: HCPCS | Performed by: INTERNAL MEDICINE

## 2024-10-23 PROCEDURE — A9270 NON-COVERED ITEM OR SERVICE: HCPCS

## 2024-10-23 PROCEDURE — 85025 COMPLETE CBC W/AUTO DIFF WBC: CPT

## 2024-10-23 PROCEDURE — 700105 HCHG RX REV CODE 258

## 2024-10-23 PROCEDURE — 80053 COMPREHEN METABOLIC PANEL: CPT

## 2024-10-23 RX ORDER — BUTALBITAL, ACETAMINOPHEN AND CAFFEINE 50; 325; 40 MG/1; MG/1; MG/1
1 TABLET ORAL EVERY 6 HOURS PRN
Status: DISCONTINUED | OUTPATIENT
Start: 2024-10-23 | End: 2024-10-23

## 2024-10-23 RX ORDER — MAGNESIUM SULFATE HEPTAHYDRATE 40 MG/ML
2 INJECTION, SOLUTION INTRAVENOUS ONCE
Status: COMPLETED | OUTPATIENT
Start: 2024-10-23 | End: 2024-10-23

## 2024-10-23 RX ORDER — OXYCODONE HYDROCHLORIDE 5 MG/1
5 TABLET ORAL ONCE
Status: COMPLETED | OUTPATIENT
Start: 2024-10-23 | End: 2024-10-23

## 2024-10-23 RX ADMIN — HYDROXYZINE HYDROCHLORIDE 10 MG: 10 TABLET ORAL at 17:03

## 2024-10-23 RX ADMIN — LABETALOL HYDROCHLORIDE 10 MG: 5 INJECTION, SOLUTION INTRAVENOUS at 05:25

## 2024-10-23 RX ADMIN — LORAZEPAM 0.5 MG: 0.5 TABLET ORAL at 07:45

## 2024-10-23 RX ADMIN — ACETAMINOPHEN, ASPIRIN, CAFFEINE 1 TABLET: 250; 65; 250 TABLET, FILM COATED ORAL at 20:18

## 2024-10-23 RX ADMIN — GABAPENTIN 300 MG: 300 CAPSULE ORAL at 12:41

## 2024-10-23 RX ADMIN — GABAPENTIN 300 MG: 300 CAPSULE ORAL at 16:18

## 2024-10-23 RX ADMIN — BUTALBITAL, ACETAMINOPHEN AND CAFFEINE 1 TABLET: 325; 50; 40 TABLET ORAL at 12:41

## 2024-10-23 RX ADMIN — ACETAMINOPHEN 650 MG: 325 TABLET ORAL at 02:11

## 2024-10-23 RX ADMIN — CHLORDIAZEPOXIDE HYDROCHLORIDE 25 MG: 25 CAPSULE ORAL at 05:25

## 2024-10-23 RX ADMIN — GABAPENTIN 300 MG: 300 CAPSULE ORAL at 05:25

## 2024-10-23 RX ADMIN — OXYCODONE 5 MG: 5 TABLET ORAL at 17:06

## 2024-10-23 RX ADMIN — LORAZEPAM 0.5 MG: 0.5 TABLET ORAL at 16:18

## 2024-10-23 RX ADMIN — FOLIC ACID 1 MG: 1 TABLET ORAL at 05:24

## 2024-10-23 RX ADMIN — CHLORDIAZEPOXIDE HYDROCHLORIDE 25 MG: 25 CAPSULE ORAL at 12:41

## 2024-10-23 RX ADMIN — AMPICILLIN AND SULBACTAM 3 G: 1; 2 INJECTION, POWDER, FOR SOLUTION INTRAMUSCULAR; INTRAVENOUS at 05:29

## 2024-10-23 RX ADMIN — Medication 100 MG: at 05:25

## 2024-10-23 RX ADMIN — ENOXAPARIN SODIUM 40 MG: 100 INJECTION SUBCUTANEOUS at 16:19

## 2024-10-23 RX ADMIN — MAGNESIUM SULFATE HEPTAHYDRATE 2 G: 2 INJECTION, SOLUTION INTRAVENOUS at 10:09

## 2024-10-23 RX ADMIN — AMPICILLIN AND SULBACTAM 3 G: 1; 2 INJECTION, POWDER, FOR SOLUTION INTRAMUSCULAR; INTRAVENOUS at 16:25

## 2024-10-23 RX ADMIN — THERA TABS 1 TABLET: TAB at 05:25

## 2024-10-23 RX ADMIN — LORAZEPAM 1 MG: 1 TABLET ORAL at 20:17

## 2024-10-23 RX ADMIN — LEVOTHYROXINE SODIUM 50 MCG: 0.05 TABLET ORAL at 05:25

## 2024-10-23 RX ADMIN — ESCITALOPRAM OXALATE 20 MG: 10 TABLET ORAL at 05:25

## 2024-10-23 RX ADMIN — ACETAMINOPHEN 650 MG: 325 TABLET ORAL at 07:44

## 2024-10-23 RX ADMIN — LORAZEPAM 1 MG: 1 TABLET ORAL at 05:32

## 2024-10-23 RX ADMIN — AMPICILLIN AND SULBACTAM 3 G: 1; 2 INJECTION, POWDER, FOR SOLUTION INTRAMUSCULAR; INTRAVENOUS at 12:42

## 2024-10-23 RX ADMIN — SENNOSIDES AND DOCUSATE SODIUM 2 TABLET: 50; 8.6 TABLET ORAL at 16:18

## 2024-10-23 RX ADMIN — CHLORDIAZEPOXIDE HYDROCHLORIDE 25 MG: 25 CAPSULE ORAL at 16:18

## 2024-10-23 ASSESSMENT — PAIN DESCRIPTION - PAIN TYPE
TYPE: ACUTE PAIN

## 2024-10-23 ASSESSMENT — FIBROSIS 4 INDEX: FIB4 SCORE: 5

## 2024-10-23 ASSESSMENT — LIFESTYLE VARIABLES
ORIENTATION AND CLOUDING OF SENSORIUM: ORIENTED AND CAN DO SERIAL ADDITIONS
AVERAGE NUMBER OF DAYS PER WEEK YOU HAVE A DRINK CONTAINING ALCOHOL: 7
TOTAL SCORE: 4
HEADACHE, FULLNESS IN HEAD: VERY SEVERE
ANXIETY: NO ANXIETY (AT EASE)
VISUAL DISTURBANCES: NOT PRESENT
ON A TYPICAL DAY WHEN YOU DRINK ALCOHOL HOW MANY DRINKS DO YOU HAVE: 20
NAUSEA AND VOMITING: NO NAUSEA AND NO VOMITING
TOTAL SCORE: 10
AUDITORY DISTURBANCES: NOT PRESENT
HAVE YOU EVER FELT YOU SHOULD CUT DOWN ON YOUR DRINKING: YES
AUDITORY DISTURBANCES: NOT PRESENT
NAUSEA AND VOMITING: NO NAUSEA AND NO VOMITING
TREMOR: *
HAVE PEOPLE ANNOYED YOU BY CRITICIZING YOUR DRINKING: YES
HOW MANY TIMES IN THE PAST YEAR HAVE YOU HAD 5 OR MORE DRINKS IN A DAY: 20
ALCOHOL_USE: YES
ORIENTATION AND CLOUDING OF SENSORIUM: ORIENTED AND CAN DO SERIAL ADDITIONS
NAUSEA AND VOMITING: NO NAUSEA AND NO VOMITING
AGITATION: NORMAL ACTIVITY
TOTAL SCORE: 4
AGITATION: SOMEWHAT MORE THAN NORMAL ACTIVITY
PAROXYSMAL SWEATS: NO SWEAT VISIBLE
TOTAL SCORE: 7
TREMOR: *
EVER FELT BAD OR GUILTY ABOUT YOUR DRINKING: YES
ANXIETY: NO ANXIETY (AT EASE)
SUBSTANCE_ABUSE: 1
EVER HAD A DRINK FIRST THING IN THE MORNING TO STEADY YOUR NERVES TO GET RID OF A HANGOVER: YES
HEADACHE, FULLNESS IN HEAD: MILD
AUDITORY DISTURBANCES: NOT PRESENT
TREMOR: *
PAROXYSMAL SWEATS: NO SWEAT VISIBLE
ORIENTATION AND CLOUDING OF SENSORIUM: ORIENTED AND CAN DO SERIAL ADDITIONS
VISUAL DISTURBANCES: NOT PRESENT
PAROXYSMAL SWEATS: NO SWEAT VISIBLE
VISUAL DISTURBANCES: NOT PRESENT
NAUSEA AND VOMITING: NO NAUSEA AND NO VOMITING
AUDITORY DISTURBANCES: NOT PRESENT
HEADACHE, FULLNESS IN HEAD: MILD
TOTAL SCORE: 4
ANXIETY: NO ANXIETY (AT EASE)
HEADACHE, FULLNESS IN HEAD: MODERATELY SEVERE
TOTAL SCORE: 6
ANXIETY: MILDLY ANXIOUS
DOES PATIENT WANT TO STOP DRINKING: YES
TOTAL SCORE: 4
CONSUMPTION TOTAL: POSITIVE
DOES PATIENT WANT TO TALK TO SOMEONE ABOUT QUITTING: YES
AGITATION: NORMAL ACTIVITY
ORIENTATION AND CLOUDING OF SENSORIUM: ORIENTED AND CAN DO SERIAL ADDITIONS
PAROXYSMAL SWEATS: NO SWEAT VISIBLE
AGITATION: NORMAL ACTIVITY
VISUAL DISTURBANCES: NOT PRESENT
TREMOR: *

## 2024-10-23 ASSESSMENT — SOCIAL DETERMINANTS OF HEALTH (SDOH)
WITHIN THE PAST 12 MONTHS, THE FOOD YOU BOUGHT JUST DIDN'T LAST AND YOU DIDN'T HAVE MONEY TO GET MORE: NEVER TRUE
WITHIN THE PAST 12 MONTHS, YOU WORRIED THAT YOUR FOOD WOULD RUN OUT BEFORE YOU GOT THE MONEY TO BUY MORE: NEVER TRUE
IN THE PAST 12 MONTHS, HAS THE ELECTRIC, GAS, OIL, OR WATER COMPANY THREATENED TO SHUT OFF SERVICE IN YOUR HOME?: NO

## 2024-10-23 ASSESSMENT — COGNITIVE AND FUNCTIONAL STATUS - GENERAL
CLIMB 3 TO 5 STEPS WITH RAILING: A LITTLE
MOVING FROM LYING ON BACK TO SITTING ON SIDE OF FLAT BED: A LITTLE
DAILY ACTIVITIY SCORE: 20
DRESSING REGULAR LOWER BODY CLOTHING: A LITTLE
SUGGESTED CMS G CODE MODIFIER DAILY ACTIVITY: CJ
SUGGESTED CMS G CODE MODIFIER MOBILITY: CK
DRESSING REGULAR UPPER BODY CLOTHING: A LITTLE
WALKING IN HOSPITAL ROOM: A LITTLE
STANDING UP FROM CHAIR USING ARMS: A LITTLE
MOVING TO AND FROM BED TO CHAIR: A LITTLE
TOILETING: A LITTLE
MOBILITY SCORE: 19
HELP NEEDED FOR BATHING: A LITTLE

## 2024-10-23 ASSESSMENT — ENCOUNTER SYMPTOMS
MYALGIAS: 0
ABDOMINAL PAIN: 0
VOMITING: 0
NAUSEA: 0
BLURRED VISION: 0
TREMORS: 0
SPUTUM PRODUCTION: 0
DOUBLE VISION: 0
SPEECH CHANGE: 0
COUGH: 0
TINGLING: 0
HALLUCINATIONS: 0
DIZZINESS: 0
SENSORY CHANGE: 0
EYE PAIN: 0
SHORTNESS OF BREATH: 0
HEADACHES: 1
NECK PAIN: 0
FOCAL WEAKNESS: 0
PHOTOPHOBIA: 0
CHILLS: 0
CONSTIPATION: 0
ORTHOPNEA: 0
PALPITATIONS: 0
DIARRHEA: 0
FEVER: 0
WEIGHT LOSS: 0
BACK PAIN: 0

## 2024-10-24 PROBLEM — I10 PRIMARY HYPERTENSION: Status: ACTIVE | Noted: 2024-10-24

## 2024-10-24 LAB
ALBUMIN SERPL BCP-MCNC: 4 G/DL (ref 3.2–4.9)
ALBUMIN/GLOB SERPL: 1.3 G/DL
ALP SERPL-CCNC: 157 U/L (ref 30–99)
ALT SERPL-CCNC: 37 U/L (ref 2–50)
ANION GAP SERPL CALC-SCNC: 12 MMOL/L (ref 7–16)
AST SERPL-CCNC: 47 U/L (ref 12–45)
BACTERIA UR CULT: NORMAL
BILIRUB SERPL-MCNC: 1.3 MG/DL (ref 0.1–1.5)
BUN SERPL-MCNC: 10 MG/DL (ref 8–22)
CALCIUM ALBUM COR SERPL-MCNC: 9.4 MG/DL (ref 8.5–10.5)
CALCIUM SERPL-MCNC: 9.4 MG/DL (ref 8.5–10.5)
CHLORIDE SERPL-SCNC: 97 MMOL/L (ref 96–112)
CO2 SERPL-SCNC: 26 MMOL/L (ref 20–33)
CREAT SERPL-MCNC: 0.59 MG/DL (ref 0.5–1.4)
ERYTHROCYTE [DISTWIDTH] IN BLOOD BY AUTOMATED COUNT: 45.1 FL (ref 35.9–50)
GFR SERPLBLD CREATININE-BSD FMLA CKD-EPI: 109 ML/MIN/1.73 M 2
GLOBULIN SER CALC-MCNC: 3.2 G/DL (ref 1.9–3.5)
GLUCOSE SERPL-MCNC: 106 MG/DL (ref 65–99)
HCT VFR BLD AUTO: 46.1 % (ref 37–47)
HGB BLD-MCNC: 16 G/DL (ref 12–16)
MAGNESIUM SERPL-MCNC: 1.7 MG/DL (ref 1.5–2.5)
MCH RBC QN AUTO: 35.6 PG (ref 27–33)
MCHC RBC AUTO-ENTMCNC: 34.7 G/DL (ref 32.2–35.5)
MCV RBC AUTO: 102.4 FL (ref 81.4–97.8)
PHOSPHATE SERPL-MCNC: 2.9 MG/DL (ref 2.5–4.5)
PLATELET # BLD AUTO: 106 K/UL (ref 164–446)
PLATELETS.RETICULATED NFR BLD AUTO: 4.8 % (ref 0.6–13.1)
PMV BLD AUTO: 10.4 FL (ref 9–12.9)
POTASSIUM SERPL-SCNC: 3.7 MMOL/L (ref 3.6–5.5)
PROCALCITONIN SERPL-MCNC: 0.12 NG/ML
PROT SERPL-MCNC: 7.2 G/DL (ref 6–8.2)
RBC # BLD AUTO: 4.5 M/UL (ref 4.2–5.4)
SIGNIFICANT IND 70042: NORMAL
SITE SITE: NORMAL
SODIUM SERPL-SCNC: 135 MMOL/L (ref 135–145)
SOURCE SOURCE: NORMAL
WBC # BLD AUTO: 6.2 K/UL (ref 4.8–10.8)

## 2024-10-24 PROCEDURE — A9270 NON-COVERED ITEM OR SERVICE: HCPCS | Performed by: INTERNAL MEDICINE

## 2024-10-24 PROCEDURE — 700111 HCHG RX REV CODE 636 W/ 250 OVERRIDE (IP): Mod: JZ

## 2024-10-24 PROCEDURE — A9270 NON-COVERED ITEM OR SERVICE: HCPCS | Performed by: STUDENT IN AN ORGANIZED HEALTH CARE EDUCATION/TRAINING PROGRAM

## 2024-10-24 PROCEDURE — 85027 COMPLETE CBC AUTOMATED: CPT

## 2024-10-24 PROCEDURE — 97161 PT EVAL LOW COMPLEX 20 MIN: CPT

## 2024-10-24 PROCEDURE — 83735 ASSAY OF MAGNESIUM: CPT

## 2024-10-24 PROCEDURE — A9270 NON-COVERED ITEM OR SERVICE: HCPCS

## 2024-10-24 PROCEDURE — 84100 ASSAY OF PHOSPHORUS: CPT

## 2024-10-24 PROCEDURE — 36415 COLL VENOUS BLD VENIPUNCTURE: CPT

## 2024-10-24 PROCEDURE — 80053 COMPREHEN METABOLIC PANEL: CPT

## 2024-10-24 PROCEDURE — 700102 HCHG RX REV CODE 250 W/ 637 OVERRIDE(OP): Performed by: INTERNAL MEDICINE

## 2024-10-24 PROCEDURE — 700102 HCHG RX REV CODE 250 W/ 637 OVERRIDE(OP)

## 2024-10-24 PROCEDURE — 700102 HCHG RX REV CODE 250 W/ 637 OVERRIDE(OP): Performed by: STUDENT IN AN ORGANIZED HEALTH CARE EDUCATION/TRAINING PROGRAM

## 2024-10-24 PROCEDURE — 85055 RETICULATED PLATELET ASSAY: CPT

## 2024-10-24 PROCEDURE — 99232 SBSQ HOSP IP/OBS MODERATE 35: CPT | Performed by: STUDENT IN AN ORGANIZED HEALTH CARE EDUCATION/TRAINING PROGRAM

## 2024-10-24 PROCEDURE — 770020 HCHG ROOM/CARE - TELE (206)

## 2024-10-24 PROCEDURE — 84145 PROCALCITONIN (PCT): CPT

## 2024-10-24 PROCEDURE — 700105 HCHG RX REV CODE 258

## 2024-10-24 RX ORDER — LOSARTAN POTASSIUM 25 MG/1
25 TABLET ORAL
Status: DISCONTINUED | OUTPATIENT
Start: 2024-10-24 | End: 2024-10-25 | Stop reason: HOSPADM

## 2024-10-24 RX ORDER — OXYCODONE HYDROCHLORIDE 10 MG/1
10 TABLET ORAL EVERY 4 HOURS PRN
Status: DISCONTINUED | OUTPATIENT
Start: 2024-10-24 | End: 2024-10-24

## 2024-10-24 RX ORDER — BUTALBITAL, ACETAMINOPHEN AND CAFFEINE 50; 325; 40 MG/1; MG/1; MG/1
1 TABLET ORAL EVERY 6 HOURS PRN
Status: DISCONTINUED | OUTPATIENT
Start: 2024-10-24 | End: 2024-10-25 | Stop reason: HOSPADM

## 2024-10-24 RX ORDER — OXYCODONE HYDROCHLORIDE 5 MG/1
5 TABLET ORAL EVERY 4 HOURS PRN
Status: DISCONTINUED | OUTPATIENT
Start: 2024-10-24 | End: 2024-10-25 | Stop reason: HOSPADM

## 2024-10-24 RX ADMIN — ACETAMINOPHEN, ASPIRIN, CAFFEINE 1 TABLET: 250; 65; 250 TABLET, FILM COATED ORAL at 10:11

## 2024-10-24 RX ADMIN — Medication 100 MG: at 04:25

## 2024-10-24 RX ADMIN — LEVOTHYROXINE SODIUM 50 MCG: 0.05 TABLET ORAL at 04:25

## 2024-10-24 RX ADMIN — AMPICILLIN AND SULBACTAM 3 G: 1; 2 INJECTION, POWDER, FOR SOLUTION INTRAMUSCULAR; INTRAVENOUS at 00:47

## 2024-10-24 RX ADMIN — GABAPENTIN 300 MG: 300 CAPSULE ORAL at 16:21

## 2024-10-24 RX ADMIN — ESCITALOPRAM OXALATE 20 MG: 10 TABLET ORAL at 04:25

## 2024-10-24 RX ADMIN — LOSARTAN POTASSIUM 25 MG: 25 TABLET, FILM COATED ORAL at 22:49

## 2024-10-24 RX ADMIN — ENOXAPARIN SODIUM 40 MG: 100 INJECTION SUBCUTANEOUS at 16:21

## 2024-10-24 RX ADMIN — CHLORDIAZEPOXIDE HYDROCHLORIDE 25 MG: 25 CAPSULE ORAL at 00:48

## 2024-10-24 RX ADMIN — LORAZEPAM 0.5 MG: 0.5 TABLET ORAL at 04:25

## 2024-10-24 RX ADMIN — BUTALBITAL, ACETAMINOPHEN AND CAFFEINE 1 TABLET: 325; 50; 40 TABLET ORAL at 22:49

## 2024-10-24 RX ADMIN — LORAZEPAM 0.5 MG: 0.5 TABLET ORAL at 21:04

## 2024-10-24 RX ADMIN — GABAPENTIN 300 MG: 300 CAPSULE ORAL at 12:49

## 2024-10-24 RX ADMIN — CHLORDIAZEPOXIDE HYDROCHLORIDE 25 MG: 25 CAPSULE ORAL at 04:26

## 2024-10-24 RX ADMIN — LORAZEPAM 0.5 MG: 0.5 TABLET ORAL at 16:21

## 2024-10-24 RX ADMIN — FOLIC ACID 1 MG: 1 TABLET ORAL at 04:25

## 2024-10-24 RX ADMIN — GABAPENTIN 300 MG: 300 CAPSULE ORAL at 04:25

## 2024-10-24 RX ADMIN — LORAZEPAM 0.5 MG: 0.5 TABLET ORAL at 08:23

## 2024-10-24 RX ADMIN — AMPICILLIN AND SULBACTAM 3 G: 1; 2 INJECTION, POWDER, FOR SOLUTION INTRAMUSCULAR; INTRAVENOUS at 04:33

## 2024-10-24 RX ADMIN — ACETAMINOPHEN, ASPIRIN, CAFFEINE 1 TABLET: 250; 65; 250 TABLET, FILM COATED ORAL at 04:25

## 2024-10-24 RX ADMIN — BUTALBITAL, ACETAMINOPHEN AND CAFFEINE 1 TABLET: 325; 50; 40 TABLET ORAL at 16:11

## 2024-10-24 RX ADMIN — THERA TABS 1 TABLET: TAB at 04:25

## 2024-10-24 ASSESSMENT — LIFESTYLE VARIABLES
AGITATION: NORMAL ACTIVITY
HEADACHE, FULLNESS IN HEAD: MODERATE
ANXIETY: *
NAUSEA AND VOMITING: NO NAUSEA AND NO VOMITING
AUDITORY DISTURBANCES: NOT PRESENT
ANXIETY: MILDLY ANXIOUS
VISUAL DISTURBANCES: NOT PRESENT
TOTAL SCORE: 7
ORIENTATION AND CLOUDING OF SENSORIUM: ORIENTED AND CAN DO SERIAL ADDITIONS
TOTAL SCORE: 7
ANXIETY: MILDLY ANXIOUS
AUDITORY DISTURBANCES: NOT PRESENT
AGITATION: NORMAL ACTIVITY
AUDITORY DISTURBANCES: NOT PRESENT
NAUSEA AND VOMITING: NO NAUSEA AND NO VOMITING
TREMOR: *
PAROXYSMAL SWEATS: NO SWEAT VISIBLE
VISUAL DISTURBANCES: NOT PRESENT
ANXIETY: MILDLY ANXIOUS
PAROXYSMAL SWEATS: NO SWEAT VISIBLE
ANXIETY: *
TOTAL SCORE: 7
NAUSEA AND VOMITING: MILD NAUSEA WITH NO VOMITING
PAROXYSMAL SWEATS: NO SWEAT VISIBLE
HEADACHE, FULLNESS IN HEAD: MILD
TREMOR: *
PAROXYSMAL SWEATS: NO SWEAT VISIBLE
TOTAL SCORE: 7
TREMOR: TREMOR NOT VISIBLE BUT CAN BE FELT, FINGERTIP TO FINGERTIP
VISUAL DISTURBANCES: NOT PRESENT
AGITATION: NORMAL ACTIVITY
NAUSEA AND VOMITING: *
AUDITORY DISTURBANCES: NOT PRESENT
TOTAL SCORE: 7
HEADACHE, FULLNESS IN HEAD: MODERATELY SEVERE
ANXIETY: MILDLY ANXIOUS
ORIENTATION AND CLOUDING OF SENSORIUM: ORIENTED AND CAN DO SERIAL ADDITIONS
AUDITORY DISTURBANCES: NOT PRESENT
VISUAL DISTURBANCES: NOT PRESENT
NAUSEA AND VOMITING: NO NAUSEA AND NO VOMITING
PAROXYSMAL SWEATS: NO SWEAT VISIBLE
TREMOR: *
TREMOR: *
HEADACHE, FULLNESS IN HEAD: MILD
PAROXYSMAL SWEATS: NO SWEAT VISIBLE
AGITATION: NORMAL ACTIVITY
NAUSEA AND VOMITING: NO NAUSEA AND NO VOMITING
TREMOR: *
HEADACHE, FULLNESS IN HEAD: MODERATE
ORIENTATION AND CLOUDING OF SENSORIUM: ORIENTED AND CAN DO SERIAL ADDITIONS
HEADACHE, FULLNESS IN HEAD: MODERATE
TOTAL SCORE: 6
ORIENTATION AND CLOUDING OF SENSORIUM: ORIENTED AND CAN DO SERIAL ADDITIONS
AUDITORY DISTURBANCES: NOT PRESENT

## 2024-10-24 ASSESSMENT — PAIN DESCRIPTION - PAIN TYPE
TYPE: ACUTE PAIN
TYPE: ACUTE PAIN

## 2024-10-24 ASSESSMENT — ENCOUNTER SYMPTOMS
HEADACHES: 1
PHOTOPHOBIA: 1

## 2024-10-24 ASSESSMENT — COGNITIVE AND FUNCTIONAL STATUS - GENERAL
MOVING TO AND FROM BED TO CHAIR: A LITTLE
MOVING FROM LYING ON BACK TO SITTING ON SIDE OF FLAT BED: A LITTLE
STANDING UP FROM CHAIR USING ARMS: A LITTLE
WALKING IN HOSPITAL ROOM: A LITTLE
CLIMB 3 TO 5 STEPS WITH RAILING: A LITTLE
SUGGESTED CMS G CODE MODIFIER MOBILITY: CK
MOBILITY SCORE: 18
TURNING FROM BACK TO SIDE WHILE IN FLAT BAD: A LITTLE

## 2024-10-24 ASSESSMENT — FIBROSIS 4 INDEX: FIB4 SCORE: 4.84

## 2024-10-24 ASSESSMENT — GAIT ASSESSMENTS
DISTANCE (FEET): 400
GAIT LEVEL OF ASSIST: STANDBY ASSIST
ASSISTIVE DEVICE: HAND HELD ASSIST

## 2024-10-25 ENCOUNTER — PHARMACY VISIT (OUTPATIENT)
Dept: PHARMACY | Facility: MEDICAL CENTER | Age: 49
End: 2024-10-25
Payer: COMMERCIAL

## 2024-10-25 VITALS
SYSTOLIC BLOOD PRESSURE: 136 MMHG | TEMPERATURE: 99.3 F | HEART RATE: 102 BPM | BODY MASS INDEX: 24.77 KG/M2 | HEIGHT: 64 IN | WEIGHT: 145.06 LBS | OXYGEN SATURATION: 93 % | RESPIRATION RATE: 16 BRPM | DIASTOLIC BLOOD PRESSURE: 103 MMHG

## 2024-10-25 LAB
ALBUMIN SERPL BCP-MCNC: 4 G/DL (ref 3.2–4.9)
ALBUMIN/GLOB SERPL: 1.2 G/DL
ALP SERPL-CCNC: 164 U/L (ref 30–99)
ALT SERPL-CCNC: 31 U/L (ref 2–50)
ANION GAP SERPL CALC-SCNC: 14 MMOL/L (ref 7–16)
AST SERPL-CCNC: 33 U/L (ref 12–45)
BILIRUB SERPL-MCNC: 1.1 MG/DL (ref 0.1–1.5)
BUN SERPL-MCNC: 11 MG/DL (ref 8–22)
CALCIUM ALBUM COR SERPL-MCNC: 9.5 MG/DL (ref 8.5–10.5)
CALCIUM SERPL-MCNC: 9.5 MG/DL (ref 8.5–10.5)
CHLORIDE SERPL-SCNC: 98 MMOL/L (ref 96–112)
CO2 SERPL-SCNC: 25 MMOL/L (ref 20–33)
CREAT SERPL-MCNC: 0.56 MG/DL (ref 0.5–1.4)
EKG IMPRESSION: NORMAL
ERYTHROCYTE [DISTWIDTH] IN BLOOD BY AUTOMATED COUNT: 45.1 FL (ref 35.9–50)
GFR SERPLBLD CREATININE-BSD FMLA CKD-EPI: 111 ML/MIN/1.73 M 2
GLOBULIN SER CALC-MCNC: 3.3 G/DL (ref 1.9–3.5)
GLUCOSE SERPL-MCNC: 107 MG/DL (ref 65–99)
HCT VFR BLD AUTO: 48.9 % (ref 37–47)
HGB BLD-MCNC: 16.8 G/DL (ref 12–16)
MAGNESIUM SERPL-MCNC: 1.4 MG/DL (ref 1.5–2.5)
MCH RBC QN AUTO: 34.9 PG (ref 27–33)
MCHC RBC AUTO-ENTMCNC: 34.4 G/DL (ref 32.2–35.5)
MCV RBC AUTO: 101.7 FL (ref 81.4–97.8)
PHOSPHATE SERPL-MCNC: 3.2 MG/DL (ref 2.5–4.5)
PLATELET # BLD AUTO: 145 K/UL (ref 164–446)
PMV BLD AUTO: 9.9 FL (ref 9–12.9)
POTASSIUM SERPL-SCNC: 3.9 MMOL/L (ref 3.6–5.5)
PROT SERPL-MCNC: 7.3 G/DL (ref 6–8.2)
RBC # BLD AUTO: 4.81 M/UL (ref 4.2–5.4)
SODIUM SERPL-SCNC: 137 MMOL/L (ref 135–145)
WBC # BLD AUTO: 7.3 K/UL (ref 4.8–10.8)

## 2024-10-25 PROCEDURE — 85027 COMPLETE CBC AUTOMATED: CPT

## 2024-10-25 PROCEDURE — 700102 HCHG RX REV CODE 250 W/ 637 OVERRIDE(OP)

## 2024-10-25 PROCEDURE — RXMED WILLOW AMBULATORY MEDICATION CHARGE: Performed by: STUDENT IN AN ORGANIZED HEALTH CARE EDUCATION/TRAINING PROGRAM

## 2024-10-25 PROCEDURE — 84100 ASSAY OF PHOSPHORUS: CPT

## 2024-10-25 PROCEDURE — 93010 ELECTROCARDIOGRAM REPORT: CPT | Performed by: INTERNAL MEDICINE

## 2024-10-25 PROCEDURE — 99239 HOSP IP/OBS DSCHRG MGMT >30: CPT | Performed by: STUDENT IN AN ORGANIZED HEALTH CARE EDUCATION/TRAINING PROGRAM

## 2024-10-25 PROCEDURE — 93005 ELECTROCARDIOGRAM TRACING: CPT | Performed by: STUDENT IN AN ORGANIZED HEALTH CARE EDUCATION/TRAINING PROGRAM

## 2024-10-25 PROCEDURE — A9270 NON-COVERED ITEM OR SERVICE: HCPCS | Performed by: STUDENT IN AN ORGANIZED HEALTH CARE EDUCATION/TRAINING PROGRAM

## 2024-10-25 PROCEDURE — 80053 COMPREHEN METABOLIC PANEL: CPT

## 2024-10-25 PROCEDURE — 83735 ASSAY OF MAGNESIUM: CPT

## 2024-10-25 PROCEDURE — A9270 NON-COVERED ITEM OR SERVICE: HCPCS

## 2024-10-25 PROCEDURE — 36415 COLL VENOUS BLD VENIPUNCTURE: CPT

## 2024-10-25 PROCEDURE — 700111 HCHG RX REV CODE 636 W/ 250 OVERRIDE (IP): Mod: JZ

## 2024-10-25 PROCEDURE — 700102 HCHG RX REV CODE 250 W/ 637 OVERRIDE(OP): Performed by: STUDENT IN AN ORGANIZED HEALTH CARE EDUCATION/TRAINING PROGRAM

## 2024-10-25 RX ORDER — BUTALBITAL, ACETAMINOPHEN AND CAFFEINE 50; 325; 40 MG/1; MG/1; MG/1
1 TABLET ORAL EVERY 6 HOURS PRN
Qty: 8 TABLET | Refills: 0 | Status: SHIPPED | OUTPATIENT
Start: 2024-10-25 | End: 2024-10-27

## 2024-10-25 RX ORDER — LOSARTAN POTASSIUM 25 MG/1
25 TABLET ORAL DAILY
Qty: 30 TABLET | Refills: 0 | Status: SHIPPED | OUTPATIENT
Start: 2024-10-26 | End: 2024-11-25

## 2024-10-25 RX ORDER — MAGNESIUM SULFATE HEPTAHYDRATE 40 MG/ML
4 INJECTION, SOLUTION INTRAVENOUS ONCE
Status: COMPLETED | OUTPATIENT
Start: 2024-10-25 | End: 2024-10-25

## 2024-10-25 RX ORDER — METOCLOPRAMIDE HYDROCHLORIDE 5 MG/ML
10 INJECTION INTRAMUSCULAR; INTRAVENOUS ONCE
Status: COMPLETED | OUTPATIENT
Start: 2024-10-25 | End: 2024-10-25

## 2024-10-25 RX ORDER — NAPHAZOLINE HCL 0.012 %
1 DROPS OPHTHALMIC (EYE) EVERY 6 HOURS PRN
Qty: 8 TABLET | Refills: 0 | Status: SHIPPED | OUTPATIENT
Start: 2024-10-25 | End: 2024-10-27

## 2024-10-25 RX ADMIN — BUTALBITAL, ACETAMINOPHEN AND CAFFEINE 1 TABLET: 325; 50; 40 TABLET ORAL at 05:49

## 2024-10-25 RX ADMIN — THERA TABS 1 TABLET: TAB at 05:49

## 2024-10-25 RX ADMIN — ESCITALOPRAM OXALATE 20 MG: 10 TABLET ORAL at 05:49

## 2024-10-25 RX ADMIN — METOCLOPRAMIDE 10 MG: 5 INJECTION, SOLUTION INTRAMUSCULAR; INTRAVENOUS at 05:48

## 2024-10-25 RX ADMIN — HYDROXYZINE HYDROCHLORIDE 10 MG: 10 TABLET ORAL at 01:58

## 2024-10-25 RX ADMIN — LORAZEPAM 0.5 MG: 0.5 TABLET ORAL at 11:38

## 2024-10-25 RX ADMIN — OXYCODONE 5 MG: 5 TABLET ORAL at 03:49

## 2024-10-25 RX ADMIN — GABAPENTIN 300 MG: 300 CAPSULE ORAL at 11:37

## 2024-10-25 RX ADMIN — FOLIC ACID 1 MG: 1 TABLET ORAL at 05:49

## 2024-10-25 RX ADMIN — LEVOTHYROXINE SODIUM 50 MCG: 0.05 TABLET ORAL at 05:49

## 2024-10-25 RX ADMIN — GABAPENTIN 300 MG: 300 CAPSULE ORAL at 05:49

## 2024-10-25 RX ADMIN — Medication 100 MG: at 05:50

## 2024-10-25 RX ADMIN — LORAZEPAM 0.5 MG: 0.5 TABLET ORAL at 03:48

## 2024-10-25 RX ADMIN — OXYCODONE 5 MG: 5 TABLET ORAL at 09:50

## 2024-10-25 RX ADMIN — BUTALBITAL, ACETAMINOPHEN AND CAFFEINE 1 TABLET: 325; 50; 40 TABLET ORAL at 11:37

## 2024-10-25 RX ADMIN — MAGNESIUM SULFATE HEPTAHYDRATE 4 G: 4 INJECTION, SOLUTION INTRAVENOUS at 05:59

## 2024-10-25 ASSESSMENT — LIFESTYLE VARIABLES
TREMOR: *
VISUAL DISTURBANCES: NOT PRESENT
PAROXYSMAL SWEATS: NO SWEAT VISIBLE
AUDITORY DISTURBANCES: NOT PRESENT
VISUAL DISTURBANCES: NOT PRESENT
HEADACHE, FULLNESS IN HEAD: MODERATE
ANXIETY: NO ANXIETY (AT EASE)
AUDITORY DISTURBANCES: NOT PRESENT
TREMOR: *
NAUSEA AND VOMITING: NO NAUSEA AND NO VOMITING
TOTAL SCORE: 5
ORIENTATION AND CLOUDING OF SENSORIUM: ORIENTED AND CAN DO SERIAL ADDITIONS
NAUSEA AND VOMITING: NO NAUSEA AND NO VOMITING
ANXIETY: MILDLY ANXIOUS
VISUAL DISTURBANCES: NOT PRESENT
PAROXYSMAL SWEATS: NO SWEAT VISIBLE
PAROXYSMAL SWEATS: NO SWEAT VISIBLE
VISUAL DISTURBANCES: NOT PRESENT
AUDITORY DISTURBANCES: NOT PRESENT
PAROXYSMAL SWEATS: NO SWEAT VISIBLE
ANXIETY: NO ANXIETY (AT EASE)
AGITATION: NORMAL ACTIVITY
TOTAL SCORE: 7
HEADACHE, FULLNESS IN HEAD: MILD
ORIENTATION AND CLOUDING OF SENSORIUM: ORIENTED AND CAN DO SERIAL ADDITIONS
TREMOR: MODERATE TREMOR WITH ARMS EXTENDED
AGITATION: NORMAL ACTIVITY
NAUSEA AND VOMITING: MILD NAUSEA WITH NO VOMITING
HEADACHE, FULLNESS IN HEAD: NOT PRESENT
HEADACHE, FULLNESS IN HEAD: MODERATE
TOTAL SCORE: 4
ANXIETY: MILDLY ANXIOUS
TREMOR: *
AUDITORY DISTURBANCES: NOT PRESENT
ORIENTATION AND CLOUDING OF SENSORIUM: ORIENTED AND CAN DO SERIAL ADDITIONS
TOTAL SCORE: 7
AGITATION: NORMAL ACTIVITY
ORIENTATION AND CLOUDING OF SENSORIUM: ORIENTED AND CAN DO SERIAL ADDITIONS
AGITATION: NORMAL ACTIVITY
NAUSEA AND VOMITING: MILD NAUSEA WITH NO VOMITING

## 2024-10-25 ASSESSMENT — PAIN DESCRIPTION - PAIN TYPE
TYPE: ACUTE PAIN

## 2024-10-25 ASSESSMENT — FIBROSIS 4 INDEX: FIB4 SCORE: 2.04

## 2024-10-25 ASSESSMENT — PAIN SCALES - WONG BAKER: WONGBAKER_NUMERICALRESPONSE: HURTS A LITTLE MORE

## 2024-10-29 ENCOUNTER — OFFICE VISIT (OUTPATIENT)
Dept: BEHAVIORAL HEALTH | Facility: PSYCHIATRIC FACILITY | Age: 49
End: 2024-10-29
Payer: COMMERCIAL

## 2024-10-29 DIAGNOSIS — Z91.49: ICD-10-CM

## 2024-10-29 DIAGNOSIS — T14.90XA TRAUMA IN CHILDHOOD: ICD-10-CM

## 2024-11-05 ENCOUNTER — OFFICE VISIT (OUTPATIENT)
Dept: BEHAVIORAL HEALTH | Facility: PSYCHIATRIC FACILITY | Age: 49
End: 2024-11-05
Payer: COMMERCIAL

## 2024-11-05 DIAGNOSIS — Z91.49: ICD-10-CM

## 2024-11-05 DIAGNOSIS — T14.90XA TRAUMA IN CHILDHOOD: ICD-10-CM

## 2024-11-05 PROCEDURE — 90834 PSYTX W PT 45 MINUTES: CPT
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
## 2024-11-05 NOTE — PROGRESS NOTES
[facility]  Psychotherapy Summary Note    Full therapy note has been documented and is under restricted viewing.  Please see below for summary of today's session.     Date of Service: 11/05/2024  Appointment type: in-office appointment.  Attending:  Hossein Liang M.D.    Type of session:Individual psychotherapy  Session start time: 3:08  Session stop time: 3:53  Length of time spent face to face with patient: 45 minutes  Persons in attendance: Patient    SI reported: no  HI reported: no    SUMMARY  Minerva Tillman is a 49 y.o. old female who presents today for regularly scheduled psychotherapy for No chief complaint on file.      Symptoms currently being addressed in therapy: anxiety, substance use, and interpersonal difficulty    Therapeutic Intervention(s): Develop/modify treatment plan, Establish rapport, Goal-setting, Stressors assessed, and Therapeutic relationship    CURRENT RISK ASSESSMENT       Suicide: Low       Homicide: Low       Self-Harm: Low       Relapse: Low       Crisis Safety Plan Reviewed Not Indicated    DIAGNOSES/PLAN  Problem List Items Addressed This Visit    None  Visit Diagnoses       Trauma in childhood        History of psychological stress                    Treatment Goal(s)/Objective(s) addressed: Tx plan:  Further explore past history  Develop treatment goals  Utilize learned skills to manage mood and emotional suffering more effectively  Learn to successfully challenge & change distortions in thinking  Learn to ameliorate effects of anxiety on life and functioning  Increase behaviors of self-compassion and self-care       Progress toward Treatment Goals: Mild improvement     Plan:  - Continue individual therapy    Sidney Potter M.D.

## 2024-11-08 ENCOUNTER — OFFICE VISIT (OUTPATIENT)
Dept: MEDICAL GROUP | Facility: MEDICAL CENTER | Age: 49
End: 2024-11-08
Payer: COMMERCIAL

## 2024-11-08 VITALS
HEART RATE: 84 BPM | BODY MASS INDEX: 25.7 KG/M2 | DIASTOLIC BLOOD PRESSURE: 64 MMHG | TEMPERATURE: 96.9 F | HEIGHT: 63 IN | OXYGEN SATURATION: 96 % | SYSTOLIC BLOOD PRESSURE: 124 MMHG

## 2024-11-08 DIAGNOSIS — K72.90 DECOMPENSATED HEPATIC CIRRHOSIS (HCC): ICD-10-CM

## 2024-11-08 DIAGNOSIS — F10.20 ALCOHOL USE DISORDER, SEVERE, DEPENDENCE (HCC): ICD-10-CM

## 2024-11-08 DIAGNOSIS — R56.9 ALCOHOL WITHDRAWAL SEIZURE, UNCOMPLICATED (HCC): ICD-10-CM

## 2024-11-08 DIAGNOSIS — G47.34 NOCTURNAL HYPOXIA: ICD-10-CM

## 2024-11-08 DIAGNOSIS — Z12.83 SKIN CANCER SCREENING: ICD-10-CM

## 2024-11-08 DIAGNOSIS — F10.930 ALCOHOL WITHDRAWAL SEIZURE, UNCOMPLICATED (HCC): ICD-10-CM

## 2024-11-08 DIAGNOSIS — I10 ESSENTIAL HYPERTENSION: ICD-10-CM

## 2024-11-08 DIAGNOSIS — K74.60 DECOMPENSATED HEPATIC CIRRHOSIS (HCC): ICD-10-CM

## 2024-11-08 DIAGNOSIS — Z09 HOSPITAL DISCHARGE FOLLOW-UP: Primary | ICD-10-CM

## 2024-11-08 PROCEDURE — 99214 OFFICE O/P EST MOD 30 MIN: CPT | Performed by: STUDENT IN AN ORGANIZED HEALTH CARE EDUCATION/TRAINING PROGRAM

## 2024-11-08 PROCEDURE — 3074F SYST BP LT 130 MM HG: CPT | Performed by: STUDENT IN AN ORGANIZED HEALTH CARE EDUCATION/TRAINING PROGRAM

## 2024-11-08 PROCEDURE — 3078F DIAST BP <80 MM HG: CPT | Performed by: STUDENT IN AN ORGANIZED HEALTH CARE EDUCATION/TRAINING PROGRAM

## 2024-11-08 RX ORDER — LANOLIN ALCOHOL/MO/W.PET/CERES
CREAM (GRAM) TOPICAL
COMMUNITY
Start: 2024-10-20

## 2024-11-08 NOTE — PROGRESS NOTES
Subjective:     Minerva Tillman is a 49 y.o. female who presents for Hospital Follow-up.    Hospital visit summary:  Shani is a 49-year-old female patient with past medical history of FREDA depression, alcohol use who was recently hospitalized for alcohol withdrawal seizures.  She initially presented to the hospital with concern for tremors.  Patient was drinking alcohol and stopped at around 15 October and started developing tremors later had a witnessed seizure episode with loss of consciousness per her spouse.  Patient was admitted in the hospital.  Treated with phenobarbital and Precedex in ICU and later on telemetry floor she was placed on alcohol withdrawal CIWA protocol with scheduled Librium.  During this hospital admission patient was also diagnosed with possible aspiration pneumonia and treated with Unasyn for 3 days.  Patient has decompensated hepatic cirrhosis with elevated LFTs and bilirubin and is following up with outpatient GI.  Gastroenterology was not consulted while inpatient  During the hospital stay patient had elevated blood pressure as well most likely due to alcohol withdrawal.  Patient was started on losartan 25 mg daily for blood pressure .  chronic hypoxic respiratory failure-  Patient is to following up with pulmonologist      Verbal consent was acquired by the patient to use [employer] ambient listening note generation during this visit Yes   History of Present Illness  The patient is here for a post-hospital follow-up.    She reports feeling well today. She has been sober for 3 weeks and is committed to maintaining this. She attends AA meetings 3 to 4 times a week and has no desire to drink.     She had abstained from alcohol for 2 months before relapsing. After completing a 90-day intensive outpatient treatment program, she consumed a bottle of wine over two days, which led to severe illness and hallucinations. This episode resulted in a seizure and subsequent hospitalization. She  believes she may have had a similar episode in May 2024.    A scan revealed some scarring on her liver, and she has a follow-up appointment with her gastroenterologist in February 2025. She was unable to attend a scheduled colonoscopy due to transportation issues and is working to reschedule it. She recently started taking a supplement called Dose, which contains turmeric, ginger, milk, and dandelion, to improve her liver function. She was previously prescribed folic acid and thiamine during a hospital stay for liver issues and alcohol withdrawal.    During her hospitalization in March 2024, her oxygen levels were low, particularly in the evenings. She uses an oxygen machine at home when her levels drop below 92. She has asthma but has not seen a pulmonologist in the past.    She was not prescribed any medications by Dr. Mckay and is unsure if she was taking nadolol at the time of her last visit in June 2024.    FAMILY HISTORY  Her father has restrictive airway disease and asthma.    Results  Imaging  CT abdomen shows cirrhosis with portal hypertension, severe hepatic steatosis, enlarged spleen, and some fluid in the belly.     Current medicines (including reconciliation performed today)  Current Outpatient Medications   Medication Sig Dispense Refill    thiamine (THIAMINE) 100 MG tablet       Oral Medication Containers (EZY DOSE VITAMIN ORGANIZER) Misc       losartan (COZAAR) 25 MG Tab Take 1 Tablet by mouth every day for 30 days. 30 Tablet 0    hydrOXYzine HCl (ATARAX) 25 MG Tab Take 1 tablet as needed Orally every 6 hours for up to 90 days 180 Tablet 9    gabapentin (NEURONTIN) 300 MG Cap Take 1 Capsule by mouth 3 times a day for 90 days. 90 Capsule 2    escitalopram (LEXAPRO) 20 MG tablet Take 1 Tablet by mouth every day for 180 days. 90 Tablet 1    levothyroxine (SYNTHROID) 50 MCG Tab Take 1 Tablet by mouth every morning on an empty stomach. 30 Tablet 0    multivitamin Tab Take 1 Tablet by mouth every day.    "    No current facility-administered medications for this visit.       Allergies:   Strawberry and Sulfa drugs    Social History     Tobacco Use    Smoking status: Never    Smokeless tobacco: Never   Vaping Use    Vaping status: Never Used   Substance Use Topics    Alcohol use: Yes     Alcohol/week: 8.4 oz     Types: 14 Glasses of wine per week     Comment: 3-4 glasses per day    Drug use: No       ROS:  Review of systems (+10 systems) unremarkable except for concerns noted by patient or items listed.    Please see HPI for additional ROS.      Objective:     Vitals:    11/08/24 1450   BP: 124/64   BP Location: Left arm   Patient Position: Sitting   BP Cuff Size: Adult   Pulse: 84   Temp: 36.1 °C (96.9 °F)   TempSrc: Temporal   SpO2: 96%   Height: 1.6 m (5' 3\")     Body mass index is 25.7 kg/m².    Physical Exam:  Physical Exam  Constitutional:       Appearance: Normal appearance.   HENT:      Head: Normocephalic.   Eyes:      General: No scleral icterus.  Cardiovascular:      Rate and Rhythm: Normal rate and regular rhythm.      Pulses: Normal pulses.      Heart sounds: Normal heart sounds.   Pulmonary:      Effort: Pulmonary effort is normal.      Breath sounds: Normal breath sounds.   Musculoskeletal:      Right lower leg: No edema.      Left lower leg: No edema.   Skin:     General: Skin is warm.   Neurological:      Mental Status: She is alert and oriented to person, place, and time.   Psychiatric:         Mood and Affect: Mood normal.         Behavior: Behavior normal.           Assessment and Plan:   1. Hospital discharge follow-up  - Chart and discharge summary were reviewed.   - Hospitalization and results reviewed with patient.   - Medications reviewed including instructions regarding high risk medications, dosing and side effects.  - Recommended Services: recommended sooner follow up with Gastroenterology     2. Alcohol Use Disorder.  The patient has relapsed multiple times but is currently 3 weeks sober " and attending AA meetings 3-4 times a week. She has completed a 90-day intensive outpatient treatment program. She is advised to continue abstaining from alcohol and attending AA meetings. She is committed to maintaining sobriety.    3. Alcohol Withdrawal Seizure.  She experienced a seizure due to alcohol withdrawal and was hospitalized. This is her second suspected withdrawal seizure, with the first occurring in May. She is advised to avoid alcohol to prevent further seizures.  Follow-up with GI for cirrhosis.  Discussed trying naltrexone.  Patient reports that currently she is confident that she can avoid alcohol given she is consistently attending AA meetings and following up with outpatient behavioral health  Thiamine and multivitamin daily   Strict ER precaution     4 Cirrhosis with Portal Hypertension.  Chronic, stable  The CT abdomen performed in March 2024 revealed cirrhosis with portal hypertension, severe hepatic steatosis, an enlarged spleen, and ascites. She is advised to abstain from alcohol completely. She is instructed to follow up with her gastroenterologist in February and to inquire about potential medications for her cirrhosis and portal hypertension, including nadolol and rifaximin. She is advised to avoid herbal supplements until her consultation with the gastroenterologist.  She is educated about the risks of hepatic encephalopathy due to her liver's inability to detoxify ammonia. She is advised to monitor for symptoms such as confusion, sleepiness, and lethargy and to seek immediate medical attention if these symptoms occur.    5. Pulmonary Concerns.  She experiences low oxygen levels in the evening and uses an oxygen machine when her levels drop below 92. A referral to a pulmonologist has been made for further evaluation.    6.  Essential hypertension  Chronic, stable   Plan:  Continue current medications  - losartan 25 mg daily    Instructions provided   - check home BP regularly at least  1-2 times a week and keep log  - return to clinic in 3 months (or sooner if home BP is persistently elevated above goal).   - alcohol cessation   - regular exercises   - avoid illicit drugs and tobacco  - DASH diet , low salt diet   - weight loss.     Health Maintenance.  She needs to reschedule her colonoscopy, which was previously canceled due to transportation issues. She is advised to prioritize this appointment and work with her  to ensure transportation is available.      Follow-up:Return in about 3 months (around 2/8/2025).    Face-to-face transitional care management services with [facility] (today's visit is within 14 days post discharge & LACE+ score of 28-58) medical decision complexity were provided.     I spent a total of 36 minutes with record review, exam, communication with the patient, communication with other providers, and documentation of this encounter  I spent at least 50% of the time for counseling and education.

## 2024-11-12 ENCOUNTER — OFFICE VISIT (OUTPATIENT)
Dept: BEHAVIORAL HEALTH | Facility: PSYCHIATRIC FACILITY | Age: 49
End: 2024-11-12
Payer: COMMERCIAL

## 2024-11-12 DIAGNOSIS — Z91.49: ICD-10-CM

## 2024-11-12 DIAGNOSIS — T14.90XA TRAUMA IN CHILDHOOD: ICD-10-CM

## 2024-11-12 NOTE — PROGRESS NOTES
[facility]  Psychotherapy Summary Note    Full therapy note has been documented and is under restricted viewing.  Please see below for summary of today's session.     Date of Service: 11/12/2024  Appointment type: in-office appointment.  Attending:  Bairon Han M.D.    Type of session:Individual psychotherapy  Session start time: 2:55  Session stop time: 3:55  Length of time spent face to face with patient: 60 minutes  Persons in attendance: Patient    SI reported: no  HI reported: no    SUMMARY  Minerva Tillman is a 49 y.o. old female who presents today for regularly scheduled psychotherapy for Other (psychotherapy)      Symptoms currently being addressed in therapy: anxiety, substance use, and interpersonal difficulty    Therapeutic Intervention(s): Develop/modify treatment plan, Establish rapport, Goal-setting, Stressors assessed, and Therapeutic relationship    CURRENT RISK ASSESSMENT       Suicide: Low       Homicide: Low       Self-Harm: Low       Relapse: Low       Crisis Safety Plan Reviewed Not Indicated    DIAGNOSES/PLAN  Problem List Items Addressed This Visit    None  Visit Diagnoses       Trauma in childhood        History of psychological stress                    Treatment Goal(s)/Objective(s) addressed: Tx plan:  Further explore past history  Develop treatment goals  Utilize learned skills to manage mood and emotional suffering more effectively  Learn to successfully challenge & change distortions in thinking  Learn to ameliorate effects of anxiety on life and functioning  Increase behaviors of self-compassion and self-care       Progress toward Treatment Goals: Mild improvement     Plan:  - Continue individual therapy    Sidney Potter M.D.
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
## 2024-11-19 ENCOUNTER — OFFICE VISIT (OUTPATIENT)
Dept: BEHAVIORAL HEALTH | Facility: PSYCHIATRIC FACILITY | Age: 49
End: 2024-11-19
Payer: COMMERCIAL

## 2024-11-19 DIAGNOSIS — T14.90XA TRAUMA IN CHILDHOOD: ICD-10-CM

## 2024-11-19 DIAGNOSIS — Z91.49: ICD-10-CM
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
## 2024-11-19 NOTE — PROGRESS NOTES
[facility]  Psychotherapy Summary Note    Full therapy note has been documented and is under restricted viewing.  Please see below for summary of today's session.     Date of Service: 11/19/2024  Appointment type: in-office appointment.  Attending:  Hossein Liang M.D.    Type of session:Individual psychotherapy  Session start time: 3:00  Session stop time: 3:50  Length of time spent face to face with patient: 50 minutes  Persons in attendance: Patient    SI reported: no  HI reported: no    SUMMARY  Minerva Tillman is a 49 y.o. old female who presents today for regularly scheduled psychotherapy for No chief complaint on file.      Symptoms currently being addressed in therapy: anxiety, substance use, and interpersonal difficulty    Therapeutic Intervention(s): Develop/modify treatment plan, Establish rapport, Goal-setting, Stressors assessed, and Therapeutic relationship    CURRENT RISK ASSESSMENT       Suicide: Low       Homicide: Low       Self-Harm: Low       Relapse: Low       Crisis Safety Plan Reviewed Not Indicated    DIAGNOSES/PLAN  Problem List Items Addressed This Visit    None            Treatment Goal(s)/Objective(s) addressed: Tx plan:  Further explore past history  Develop treatment goals  Utilize learned skills to manage mood and emotional suffering more effectively  Learn to successfully challenge & change distortions in thinking  Learn to ameliorate effects of anxiety on life and functioning  Increase behaviors of self-compassion and self-care       Progress toward Treatment Goals: Mild improvement     Plan:  - Continue individual therapy    Sidney Potter M.D.

## 2024-11-26 ENCOUNTER — TELEPHONE (OUTPATIENT)
Dept: BEHAVIORAL HEALTH | Facility: PSYCHIATRIC FACILITY | Age: 49
End: 2024-11-26

## 2024-11-26 ENCOUNTER — APPOINTMENT (OUTPATIENT)
Dept: BEHAVIORAL HEALTH | Facility: PSYCHIATRIC FACILITY | Age: 49
End: 2024-11-26
Payer: COMMERCIAL

## 2024-12-03 ENCOUNTER — OFFICE VISIT (OUTPATIENT)
Dept: BEHAVIORAL HEALTH | Facility: PSYCHIATRIC FACILITY | Age: 49
End: 2024-12-03
Payer: COMMERCIAL

## 2024-12-03 DIAGNOSIS — Z91.49: ICD-10-CM

## 2024-12-03 DIAGNOSIS — T14.90XA TRAUMA IN CHILDHOOD: ICD-10-CM

## 2024-12-03 PROCEDURE — 90834 PSYTX W PT 45 MINUTES: CPT
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
## 2024-12-03 NOTE — PROGRESS NOTES
[facility]  Psychotherapy Summary Note    Full therapy note has been documented and is under restricted viewing.  Please see below for summary of today's session.     Date of Service: 12/03/2024  Appointment type: in-office appointment.  Attending:  Bairon Han M.D.    Type of session:Individual psychotherapy  Session start time: 3:10  Session stop time: 4:00  Length of time spent face to face with patient: 50 minutes  Persons in attendance: Patient    SI reported: no  HI reported: no    SUMMARY  Minerva Tillman is a 49 y.o. old female who presents today for regularly scheduled psychotherapy for Other (psychotherapy)      Symptoms currently being addressed in therapy: anxiety, substance use, and interpersonal difficulty    Therapeutic Intervention(s): Develop/modify treatment plan, Establish rapport, Goal-setting, Stressors assessed, and Therapeutic relationship    CURRENT RISK ASSESSMENT       Suicide: Low       Homicide: Low       Self-Harm: Low       Relapse: Low       Crisis Safety Plan Reviewed Not Indicated    DIAGNOSES/PLAN  Problem List Items Addressed This Visit    None  Visit Diagnoses       Trauma in childhood        History of psychological stress                      Treatment Goal(s)/Objective(s) addressed: Tx plan:  Further explore past history  Develop treatment goals  Utilize learned skills to manage mood and emotional suffering more effectively  Learn to successfully challenge & change distortions in thinking  Learn to ameliorate effects of anxiety on life and functioning  Increase behaviors of self-compassion and self-care       Progress toward Treatment Goals: Mild improvement     Plan:  - Continue individual therapy    Sidney Potter M.D.

## 2024-12-06 ENCOUNTER — TELEPHONE (OUTPATIENT)
Dept: BEHAVIORAL HEALTH | Facility: PSYCHIATRIC FACILITY | Age: 49
End: 2024-12-06
Payer: COMMERCIAL

## 2024-12-07 ENCOUNTER — APPOINTMENT (OUTPATIENT)
Dept: RADIOLOGY | Facility: MEDICAL CENTER | Age: 49
DRG: 492 | End: 2024-12-07
Attending: EMERGENCY MEDICINE
Payer: COMMERCIAL

## 2024-12-07 ENCOUNTER — HOSPITAL ENCOUNTER (INPATIENT)
Facility: MEDICAL CENTER | Age: 49
LOS: 4 days | DRG: 492 | End: 2024-12-11
Attending: EMERGENCY MEDICINE | Admitting: STUDENT IN AN ORGANIZED HEALTH CARE EDUCATION/TRAINING PROGRAM
Payer: COMMERCIAL

## 2024-12-07 ENCOUNTER — ANESTHESIA (OUTPATIENT)
Dept: SURGERY | Facility: MEDICAL CENTER | Age: 49
DRG: 492 | End: 2024-12-07
Payer: COMMERCIAL

## 2024-12-07 ENCOUNTER — APPOINTMENT (OUTPATIENT)
Dept: RADIOLOGY | Facility: MEDICAL CENTER | Age: 49
DRG: 492 | End: 2024-12-07
Attending: ORTHOPAEDIC SURGERY
Payer: COMMERCIAL

## 2024-12-07 ENCOUNTER — ANESTHESIA EVENT (OUTPATIENT)
Dept: SURGERY | Facility: MEDICAL CENTER | Age: 49
DRG: 492 | End: 2024-12-07
Payer: COMMERCIAL

## 2024-12-07 DIAGNOSIS — F10.20 ALCOHOLISM (HCC): ICD-10-CM

## 2024-12-07 DIAGNOSIS — S82.851A TRIMALLEOLAR FRACTURE OF ANKLE, CLOSED, RIGHT, INITIAL ENCOUNTER: ICD-10-CM

## 2024-12-07 DIAGNOSIS — S82.851A CLOSED DISPLACED TRIMALLEOLAR FRACTURE OF RIGHT ANKLE, INITIAL ENCOUNTER: ICD-10-CM

## 2024-12-07 DIAGNOSIS — F10.920 ALCOHOLIC INTOXICATION WITHOUT COMPLICATION (HCC): ICD-10-CM
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
## 2024-12-07 LAB
ALBUMIN SERPL BCP-MCNC: 4.3 G/DL (ref 3.2–4.9)
ALBUMIN/GLOB SERPL: 1.3 G/DL
ALP SERPL-CCNC: 147 U/L (ref 30–99)
ALT SERPL-CCNC: 34 U/L (ref 2–50)
ANION GAP SERPL CALC-SCNC: 16 MMOL/L (ref 7–16)
AST SERPL-CCNC: 51 U/L (ref 12–45)
BASOPHILS # BLD AUTO: 1.4 % (ref 0–1.8)
BASOPHILS # BLD: 0.13 K/UL (ref 0–0.12)
BILIRUB SERPL-MCNC: 0.5 MG/DL (ref 0.1–1.5)
BUN SERPL-MCNC: 9 MG/DL (ref 8–22)
CALCIUM ALBUM COR SERPL-MCNC: 8.5 MG/DL (ref 8.5–10.5)
CALCIUM SERPL-MCNC: 8.7 MG/DL (ref 8.5–10.5)
CHLORIDE SERPL-SCNC: 104 MMOL/L (ref 96–112)
CO2 SERPL-SCNC: 21 MMOL/L (ref 20–33)
CREAT SERPL-MCNC: 0.62 MG/DL (ref 0.5–1.4)
EOSINOPHIL # BLD AUTO: 0.03 K/UL (ref 0–0.51)
EOSINOPHIL NFR BLD: 0.3 % (ref 0–6.9)
ERYTHROCYTE [DISTWIDTH] IN BLOOD BY AUTOMATED COUNT: 45 FL (ref 35.9–50)
ETHANOL BLD-MCNC: 360 MG/DL
GFR SERPLBLD CREATININE-BSD FMLA CKD-EPI: 108 ML/MIN/1.73 M 2
GLOBULIN SER CALC-MCNC: 3.2 G/DL (ref 1.9–3.5)
GLUCOSE SERPL-MCNC: 104 MG/DL (ref 65–99)
HCT VFR BLD AUTO: 41.4 % (ref 37–47)
HGB BLD-MCNC: 14.4 G/DL (ref 12–16)
IMM GRANULOCYTES # BLD AUTO: 0.04 K/UL (ref 0–0.11)
IMM GRANULOCYTES NFR BLD AUTO: 0.4 % (ref 0–0.9)
LYMPHOCYTES # BLD AUTO: 3.11 K/UL (ref 1–4.8)
LYMPHOCYTES NFR BLD: 32.6 % (ref 22–41)
MAGNESIUM SERPL-MCNC: 1.6 MG/DL (ref 1.5–2.5)
MCH RBC QN AUTO: 35 PG (ref 27–33)
MCHC RBC AUTO-ENTMCNC: 34.8 G/DL (ref 32.2–35.5)
MCV RBC AUTO: 100.5 FL (ref 81.4–97.8)
MONOCYTES # BLD AUTO: 0.73 K/UL (ref 0–0.85)
MONOCYTES NFR BLD AUTO: 7.7 % (ref 0–13.4)
NEUTROPHILS # BLD AUTO: 5.49 K/UL (ref 1.82–7.42)
NEUTROPHILS NFR BLD: 57.6 % (ref 44–72)
NRBC # BLD AUTO: 0 K/UL
NRBC BLD-RTO: 0 /100 WBC (ref 0–0.2)
PLATELET # BLD AUTO: 183 K/UL (ref 164–446)
PMV BLD AUTO: 8.7 FL (ref 9–12.9)
POTASSIUM SERPL-SCNC: 4 MMOL/L (ref 3.6–5.5)
PROT SERPL-MCNC: 7.5 G/DL (ref 6–8.2)
RBC # BLD AUTO: 4.12 M/UL (ref 4.2–5.4)
SODIUM SERPL-SCNC: 141 MMOL/L (ref 135–145)
WBC # BLD AUTO: 9.5 K/UL (ref 4.8–10.8)

## 2024-12-07 PROCEDURE — 73600 X-RAY EXAM OF ANKLE: CPT | Mod: RT

## 2024-12-07 PROCEDURE — 99222 1ST HOSP IP/OBS MODERATE 55: CPT | Mod: 57 | Performed by: ORTHOPAEDIC SURGERY

## 2024-12-07 PROCEDURE — 700111 HCHG RX REV CODE 636 W/ 250 OVERRIDE (IP): Performed by: EMERGENCY MEDICINE

## 2024-12-07 PROCEDURE — 73700 CT LOWER EXTREMITY W/O DYE: CPT | Mod: RT

## 2024-12-07 PROCEDURE — 302875 HCHG BANDAGE ACE 4 OR 6""

## 2024-12-07 PROCEDURE — 99291 CRITICAL CARE FIRST HOUR: CPT

## 2024-12-07 PROCEDURE — 700101 HCHG RX REV CODE 250: Performed by: STUDENT IN AN ORGANIZED HEALTH CARE EDUCATION/TRAINING PROGRAM

## 2024-12-07 PROCEDURE — HZ2ZZZZ DETOXIFICATION SERVICES FOR SUBSTANCE ABUSE TREATMENT: ICD-10-PCS | Performed by: STUDENT IN AN ORGANIZED HEALTH CARE EDUCATION/TRAINING PROGRAM

## 2024-12-07 PROCEDURE — 0QSJ04Z REPOSITION RIGHT FIBULA WITH INTERNAL FIXATION DEVICE, OPEN APPROACH: ICD-10-PCS | Performed by: ORTHOPAEDIC SURGERY

## 2024-12-07 PROCEDURE — 160048 HCHG OR STATISTICAL LEVEL 1-5: Performed by: ORTHOPAEDIC SURGERY

## 2024-12-07 PROCEDURE — 99223 1ST HOSP IP/OBS HIGH 75: CPT | Performed by: STUDENT IN AN ORGANIZED HEALTH CARE EDUCATION/TRAINING PROGRAM

## 2024-12-07 PROCEDURE — C1713 ANCHOR/SCREW BN/BN,TIS/BN: HCPCS | Performed by: ORTHOPAEDIC SURGERY

## 2024-12-07 PROCEDURE — 29515 APPLICATION SHORT LEG SPLINT: CPT

## 2024-12-07 PROCEDURE — 36415 COLL VENOUS BLD VENIPUNCTURE: CPT

## 2024-12-07 PROCEDURE — 160036 HCHG PACU - EA ADDL 30 MINS PHASE I: Performed by: ORTHOPAEDIC SURGERY

## 2024-12-07 PROCEDURE — A9270 NON-COVERED ITEM OR SERVICE: HCPCS | Performed by: STUDENT IN AN ORGANIZED HEALTH CARE EDUCATION/TRAINING PROGRAM

## 2024-12-07 PROCEDURE — 0QSG0ZZ REPOSITION RIGHT TIBIA, OPEN APPROACH: ICD-10-PCS | Performed by: ORTHOPAEDIC SURGERY

## 2024-12-07 PROCEDURE — 80053 COMPREHEN METABOLIC PANEL: CPT

## 2024-12-07 PROCEDURE — 700111 HCHG RX REV CODE 636 W/ 250 OVERRIDE (IP): Mod: JZ | Performed by: STUDENT IN AN ORGANIZED HEALTH CARE EDUCATION/TRAINING PROGRAM

## 2024-12-07 PROCEDURE — 700102 HCHG RX REV CODE 250 W/ 637 OVERRIDE(OP): Performed by: STUDENT IN AN ORGANIZED HEALTH CARE EDUCATION/TRAINING PROGRAM

## 2024-12-07 PROCEDURE — 700111 HCHG RX REV CODE 636 W/ 250 OVERRIDE (IP): Performed by: STUDENT IN AN ORGANIZED HEALTH CARE EDUCATION/TRAINING PROGRAM

## 2024-12-07 PROCEDURE — 27822 TREATMENT OF ANKLE FRACTURE: CPT | Mod: RT | Performed by: ORTHOPAEDIC SURGERY

## 2024-12-07 PROCEDURE — 160029 HCHG SURGERY MINUTES - 1ST 30 MINS LEVEL 4: Performed by: ORTHOPAEDIC SURGERY

## 2024-12-07 PROCEDURE — 160009 HCHG ANES TIME/MIN: Performed by: ORTHOPAEDIC SURGERY

## 2024-12-07 PROCEDURE — 85025 COMPLETE CBC W/AUTO DIFF WBC: CPT

## 2024-12-07 PROCEDURE — 83735 ASSAY OF MAGNESIUM: CPT

## 2024-12-07 PROCEDURE — 96374 THER/PROPH/DIAG INJ IV PUSH: CPT

## 2024-12-07 PROCEDURE — 3E0T3BZ INTRODUCTION OF ANESTHETIC AGENT INTO PERIPHERAL NERVES AND PLEXI, PERCUTANEOUS APPROACH: ICD-10-PCS | Performed by: STUDENT IN AN ORGANIZED HEALTH CARE EDUCATION/TRAINING PROGRAM

## 2024-12-07 PROCEDURE — 70450 CT HEAD/BRAIN W/O DYE: CPT

## 2024-12-07 PROCEDURE — 160035 HCHG PACU - 1ST 60 MINS PHASE I: Performed by: ORTHOPAEDIC SURGERY

## 2024-12-07 PROCEDURE — 770020 HCHG ROOM/CARE - TELE (206)

## 2024-12-07 PROCEDURE — 160002 HCHG RECOVERY MINUTES (STAT): Performed by: ORTHOPAEDIC SURGERY

## 2024-12-07 PROCEDURE — 94799 UNLISTED PULMONARY SVC/PX: CPT

## 2024-12-07 PROCEDURE — 700105 HCHG RX REV CODE 258: Performed by: STUDENT IN AN ORGANIZED HEALTH CARE EDUCATION/TRAINING PROGRAM

## 2024-12-07 PROCEDURE — 27818 TREATMENT OF ANKLE FRACTURE: CPT

## 2024-12-07 PROCEDURE — 82077 ASSAY SPEC XCP UR&BREATH IA: CPT

## 2024-12-07 PROCEDURE — 27829 TREAT LOWER LEG JOINT: CPT | Mod: RT | Performed by: ORTHOPAEDIC SURGERY

## 2024-12-07 PROCEDURE — 160041 HCHG SURGERY MINUTES - EA ADDL 1 MIN LEVEL 4: Performed by: ORTHOPAEDIC SURGERY

## 2024-12-07 DEVICE — SCREW 3.5 MM NON-LOCKING TI X 10MM LONG (6TX8+2TX5=58): Type: IMPLANTABLE DEVICE | Site: ANKLE | Status: FUNCTIONAL

## 2024-12-07 DEVICE — SCREW 2.5 MM LOCKING TI X 14MM LONG (6TX8=48): Type: IMPLANTABLE DEVICE | Site: ANKLE | Status: FUNCTIONAL

## 2024-12-07 DEVICE — PLATE DISTAL FIBULA 5H (2TX2+2TX1=6): Type: IMPLANTABLE DEVICE | Site: ANKLE | Status: FUNCTIONAL

## 2024-12-07 DEVICE — SCREW 2.5 MM NON-LOCKING TI X 12MM LONG (6TX8=48): Type: IMPLANTABLE DEVICE | Site: ANKLE | Status: FUNCTIONAL

## 2024-12-07 DEVICE — SCREW CANN 4.0X40 SHORT OIC (3TX3+2TX2=13): Type: IMPLANTABLE DEVICE | Site: ANKLE | Status: FUNCTIONAL

## 2024-12-07 DEVICE — KIT SYNDESMOTIC FIXATION STERILE PACK (1EA): Type: IMPLANTABLE DEVICE | Site: ANKLE | Status: FUNCTIONAL

## 2024-12-07 DEVICE — SCREW 3.5 MM NON-LOCKING TI X 12MM LONG (6TX8+2TX5=58): Type: IMPLANTABLE DEVICE | Site: ANKLE | Status: FUNCTIONAL

## 2024-12-07 DEVICE — SCREW 2.5 MM NON-LOCKING TI X 14MM LONG (6TX8=48): Type: IMPLANTABLE DEVICE | Site: ANKLE | Status: FUNCTIONAL

## 2024-12-07 RX ORDER — SODIUM CHLORIDE, SODIUM LACTATE, POTASSIUM CHLORIDE, CALCIUM CHLORIDE 600; 310; 30; 20 MG/100ML; MG/100ML; MG/100ML; MG/100ML
INJECTION, SOLUTION INTRAVENOUS
Status: DISCONTINUED | OUTPATIENT
Start: 2024-12-07 | End: 2024-12-07 | Stop reason: SURG

## 2024-12-07 RX ORDER — GABAPENTIN 300 MG/1
300 CAPSULE ORAL 3 TIMES DAILY
Status: DISCONTINUED | OUTPATIENT
Start: 2024-12-07 | End: 2024-12-11 | Stop reason: HOSPADM

## 2024-12-07 RX ORDER — HALOPERIDOL 5 MG/ML
1 INJECTION INTRAMUSCULAR
Status: DISCONTINUED | OUTPATIENT
Start: 2024-12-07 | End: 2024-12-07 | Stop reason: HOSPADM

## 2024-12-07 RX ORDER — HYDROMORPHONE HYDROCHLORIDE 1 MG/ML
0.1 INJECTION, SOLUTION INTRAMUSCULAR; INTRAVENOUS; SUBCUTANEOUS
Status: DISCONTINUED | OUTPATIENT
Start: 2024-12-07 | End: 2024-12-07 | Stop reason: HOSPADM

## 2024-12-07 RX ORDER — SUCCINYLCHOLINE CHLORIDE 20 MG/ML
INJECTION INTRAMUSCULAR; INTRAVENOUS PRN
Status: DISCONTINUED | OUTPATIENT
Start: 2024-12-07 | End: 2024-12-07 | Stop reason: SURG

## 2024-12-07 RX ORDER — MEPERIDINE HYDROCHLORIDE 25 MG/ML
6.25 INJECTION INTRAMUSCULAR; INTRAVENOUS; SUBCUTANEOUS
Status: DISCONTINUED | OUTPATIENT
Start: 2024-12-07 | End: 2024-12-07 | Stop reason: HOSPADM

## 2024-12-07 RX ORDER — OXYCODONE HCL 5 MG/5 ML
10 SOLUTION, ORAL ORAL
Status: DISCONTINUED | OUTPATIENT
Start: 2024-12-07 | End: 2024-12-07 | Stop reason: HOSPADM

## 2024-12-07 RX ORDER — DIPHENHYDRAMINE HYDROCHLORIDE 50 MG/ML
INJECTION INTRAMUSCULAR; INTRAVENOUS PRN
Status: DISCONTINUED | OUTPATIENT
Start: 2024-12-07 | End: 2024-12-07 | Stop reason: SURG

## 2024-12-07 RX ORDER — ONDANSETRON 2 MG/ML
4 INJECTION INTRAMUSCULAR; INTRAVENOUS EVERY 4 HOURS PRN
Status: DISCONTINUED | OUTPATIENT
Start: 2024-12-07 | End: 2024-12-11 | Stop reason: HOSPADM

## 2024-12-07 RX ORDER — PROPOFOL 10 MG/ML
60 INJECTION, EMULSION INTRAVENOUS ONCE
Status: DISPENSED | OUTPATIENT
Start: 2024-12-07 | End: 2024-12-08

## 2024-12-07 RX ORDER — ONDANSETRON 2 MG/ML
INJECTION INTRAMUSCULAR; INTRAVENOUS PRN
Status: DISCONTINUED | OUTPATIENT
Start: 2024-12-07 | End: 2024-12-07 | Stop reason: SURG

## 2024-12-07 RX ORDER — ESCITALOPRAM OXALATE 10 MG/1
20 TABLET ORAL DAILY
Status: DISCONTINUED | OUTPATIENT
Start: 2024-12-07 | End: 2024-12-11 | Stop reason: HOSPADM

## 2024-12-07 RX ORDER — LEVOTHYROXINE SODIUM 50 UG/1
50 TABLET ORAL
Status: DISCONTINUED | OUTPATIENT
Start: 2024-12-07 | End: 2024-12-11 | Stop reason: HOSPADM

## 2024-12-07 RX ORDER — OXYCODONE HCL 5 MG/5 ML
5 SOLUTION, ORAL ORAL
Status: DISCONTINUED | OUTPATIENT
Start: 2024-12-07 | End: 2024-12-07 | Stop reason: HOSPADM

## 2024-12-07 RX ORDER — HYDROXYZINE HYDROCHLORIDE 25 MG/1
25 TABLET, FILM COATED ORAL EVERY 6 HOURS PRN
Status: DISCONTINUED | OUTPATIENT
Start: 2024-12-07 | End: 2024-12-11 | Stop reason: HOSPADM

## 2024-12-07 RX ORDER — PROPOFOL 10 MG/ML
INJECTION, EMULSION INTRAVENOUS
Status: COMPLETED | OUTPATIENT
Start: 2024-12-07 | End: 2024-12-07

## 2024-12-07 RX ORDER — LORAZEPAM 2 MG/ML
2 INJECTION INTRAMUSCULAR
Status: DISCONTINUED | OUTPATIENT
Start: 2024-12-07 | End: 2024-12-11

## 2024-12-07 RX ORDER — AMOXICILLIN 250 MG
2 CAPSULE ORAL EVERY EVENING
Status: DISCONTINUED | OUTPATIENT
Start: 2024-12-07 | End: 2024-12-11 | Stop reason: HOSPADM

## 2024-12-07 RX ORDER — OXYCODONE HYDROCHLORIDE 10 MG/1
10 TABLET ORAL
Status: DISCONTINUED | OUTPATIENT
Start: 2024-12-07 | End: 2024-12-11 | Stop reason: HOSPADM

## 2024-12-07 RX ORDER — GAUZE BANDAGE 2" X 2"
100 BANDAGE TOPICAL DAILY
Status: DISPENSED | OUTPATIENT
Start: 2024-12-07 | End: 2024-12-11

## 2024-12-07 RX ORDER — LABETALOL HYDROCHLORIDE 5 MG/ML
5 INJECTION, SOLUTION INTRAVENOUS
Status: DISCONTINUED | OUTPATIENT
Start: 2024-12-07 | End: 2024-12-07 | Stop reason: HOSPADM

## 2024-12-07 RX ORDER — PROMETHAZINE HYDROCHLORIDE 25 MG/1
12.5-25 SUPPOSITORY RECTAL EVERY 4 HOURS PRN
Status: DISCONTINUED | OUTPATIENT
Start: 2024-12-07 | End: 2024-12-11 | Stop reason: HOSPADM

## 2024-12-07 RX ORDER — HYDROMORPHONE HYDROCHLORIDE 1 MG/ML
0.2 INJECTION, SOLUTION INTRAMUSCULAR; INTRAVENOUS; SUBCUTANEOUS
Status: DISCONTINUED | OUTPATIENT
Start: 2024-12-07 | End: 2024-12-07 | Stop reason: HOSPADM

## 2024-12-07 RX ORDER — HYDRALAZINE HYDROCHLORIDE 20 MG/ML
5 INJECTION INTRAMUSCULAR; INTRAVENOUS
Status: DISCONTINUED | OUTPATIENT
Start: 2024-12-07 | End: 2024-12-07 | Stop reason: HOSPADM

## 2024-12-07 RX ORDER — HYDROMORPHONE HYDROCHLORIDE 2 MG/ML
INJECTION, SOLUTION INTRAMUSCULAR; INTRAVENOUS; SUBCUTANEOUS PRN
Status: DISCONTINUED | OUTPATIENT
Start: 2024-12-07 | End: 2024-12-07 | Stop reason: SURG

## 2024-12-07 RX ORDER — PROMETHAZINE HYDROCHLORIDE 25 MG/1
12.5-25 TABLET ORAL EVERY 4 HOURS PRN
Status: DISCONTINUED | OUTPATIENT
Start: 2024-12-07 | End: 2024-12-11 | Stop reason: HOSPADM

## 2024-12-07 RX ORDER — ALBUTEROL SULFATE 5 MG/ML
2.5 SOLUTION RESPIRATORY (INHALATION)
Status: DISCONTINUED | OUTPATIENT
Start: 2024-12-07 | End: 2024-12-07 | Stop reason: HOSPADM

## 2024-12-07 RX ORDER — ONDANSETRON 2 MG/ML
4 INJECTION INTRAMUSCULAR; INTRAVENOUS ONCE
Status: DISPENSED | OUTPATIENT
Start: 2024-12-07 | End: 2024-12-08

## 2024-12-07 RX ORDER — KETOROLAC TROMETHAMINE 15 MG/ML
INJECTION, SOLUTION INTRAMUSCULAR; INTRAVENOUS PRN
Status: DISCONTINUED | OUTPATIENT
Start: 2024-12-07 | End: 2024-12-07 | Stop reason: SURG

## 2024-12-07 RX ORDER — CEFAZOLIN SODIUM 1 G/3ML
INJECTION, POWDER, FOR SOLUTION INTRAMUSCULAR; INTRAVENOUS PRN
Status: DISCONTINUED | OUTPATIENT
Start: 2024-12-07 | End: 2024-12-07 | Stop reason: SURG

## 2024-12-07 RX ORDER — OXYCODONE HYDROCHLORIDE 5 MG/1
5 TABLET ORAL
Status: DISCONTINUED | OUTPATIENT
Start: 2024-12-07 | End: 2024-12-11 | Stop reason: HOSPADM

## 2024-12-07 RX ORDER — PHENYLEPHRINE HCL IN 0.9% NACL 1 MG/10 ML
SYRINGE (ML) INTRAVENOUS PRN
Status: DISCONTINUED | OUTPATIENT
Start: 2024-12-07 | End: 2024-12-07 | Stop reason: SURG

## 2024-12-07 RX ORDER — LORAZEPAM 2 MG/ML
1.5 INJECTION INTRAMUSCULAR
Status: DISCONTINUED | OUTPATIENT
Start: 2024-12-07 | End: 2024-12-11

## 2024-12-07 RX ORDER — HYDROMORPHONE HYDROCHLORIDE 1 MG/ML
0.4 INJECTION, SOLUTION INTRAMUSCULAR; INTRAVENOUS; SUBCUTANEOUS
Status: DISCONTINUED | OUTPATIENT
Start: 2024-12-07 | End: 2024-12-07 | Stop reason: HOSPADM

## 2024-12-07 RX ORDER — LORAZEPAM 2 MG/ML
1-2 INJECTION INTRAMUSCULAR
Status: DISCONTINUED | OUTPATIENT
Start: 2024-12-07 | End: 2024-12-07 | Stop reason: HOSPADM

## 2024-12-07 RX ORDER — POLYETHYLENE GLYCOL 3350 17 G/17G
1 POWDER, FOR SOLUTION ORAL
Status: DISCONTINUED | OUTPATIENT
Start: 2024-12-07 | End: 2024-12-11 | Stop reason: HOSPADM

## 2024-12-07 RX ORDER — LORAZEPAM 0.5 MG/1
0.5 TABLET ORAL EVERY 4 HOURS PRN
Status: DISCONTINUED | OUTPATIENT
Start: 2024-12-07 | End: 2024-12-11

## 2024-12-07 RX ORDER — ONDANSETRON 4 MG/1
4 TABLET, ORALLY DISINTEGRATING ORAL EVERY 4 HOURS PRN
Status: DISCONTINUED | OUTPATIENT
Start: 2024-12-07 | End: 2024-12-11 | Stop reason: HOSPADM

## 2024-12-07 RX ORDER — LORAZEPAM 2 MG/1
2 TABLET ORAL
Status: DISCONTINUED | OUTPATIENT
Start: 2024-12-07 | End: 2024-12-11 | Stop reason: HOSPADM

## 2024-12-07 RX ORDER — LORAZEPAM 1 MG/1
1 TABLET ORAL EVERY 4 HOURS PRN
Status: DISCONTINUED | OUTPATIENT
Start: 2024-12-07 | End: 2024-12-11

## 2024-12-07 RX ORDER — SODIUM CHLORIDE, SODIUM LACTATE, POTASSIUM CHLORIDE, CALCIUM CHLORIDE 600; 310; 30; 20 MG/100ML; MG/100ML; MG/100ML; MG/100ML
INJECTION, SOLUTION INTRAVENOUS CONTINUOUS
Status: DISCONTINUED | OUTPATIENT
Start: 2024-12-07 | End: 2024-12-07 | Stop reason: HOSPADM

## 2024-12-07 RX ORDER — GAUZE BANDAGE 2" X 2"
100 BANDAGE TOPICAL DAILY
Status: DISCONTINUED | OUTPATIENT
Start: 2024-12-07 | End: 2024-12-07

## 2024-12-07 RX ORDER — PROCHLORPERAZINE EDISYLATE 5 MG/ML
5-10 INJECTION INTRAMUSCULAR; INTRAVENOUS EVERY 4 HOURS PRN
Status: DISCONTINUED | OUTPATIENT
Start: 2024-12-07 | End: 2024-12-11 | Stop reason: HOSPADM

## 2024-12-07 RX ORDER — ACETAMINOPHEN 325 MG/1
650 TABLET ORAL EVERY 6 HOURS PRN
Status: DISCONTINUED | OUTPATIENT
Start: 2024-12-07 | End: 2024-12-11 | Stop reason: HOSPADM

## 2024-12-07 RX ORDER — ROCURONIUM BROMIDE 10 MG/ML
INJECTION, SOLUTION INTRAVENOUS PRN
Status: DISCONTINUED | OUTPATIENT
Start: 2024-12-07 | End: 2024-12-07 | Stop reason: SURG

## 2024-12-07 RX ORDER — LIDOCAINE HYDROCHLORIDE 20 MG/ML
INJECTION, SOLUTION EPIDURAL; INFILTRATION; INTRACAUDAL; PERINEURAL PRN
Status: DISCONTINUED | OUTPATIENT
Start: 2024-12-07 | End: 2024-12-07 | Stop reason: SURG

## 2024-12-07 RX ORDER — ONDANSETRON 2 MG/ML
4 INJECTION INTRAMUSCULAR; INTRAVENOUS
Status: DISCONTINUED | OUTPATIENT
Start: 2024-12-07 | End: 2024-12-07 | Stop reason: HOSPADM

## 2024-12-07 RX ORDER — HYDROMORPHONE HYDROCHLORIDE 1 MG/ML
0.5 INJECTION, SOLUTION INTRAMUSCULAR; INTRAVENOUS; SUBCUTANEOUS
Status: DISCONTINUED | OUTPATIENT
Start: 2024-12-07 | End: 2024-12-07

## 2024-12-07 RX ORDER — LORAZEPAM 2 MG/1
4 TABLET ORAL
Status: DISCONTINUED | OUTPATIENT
Start: 2024-12-07 | End: 2024-12-11

## 2024-12-07 RX ORDER — LORAZEPAM 2 MG/ML
1 INJECTION INTRAMUSCULAR
Status: DISCONTINUED | OUTPATIENT
Start: 2024-12-07 | End: 2024-12-11

## 2024-12-07 RX ORDER — LORAZEPAM 2 MG/ML
0.5 INJECTION INTRAMUSCULAR EVERY 4 HOURS PRN
Status: DISCONTINUED | OUTPATIENT
Start: 2024-12-07 | End: 2024-12-11

## 2024-12-07 RX ORDER — MAGNESIUM SULFATE HEPTAHYDRATE 40 MG/ML
2 INJECTION, SOLUTION INTRAVENOUS ONCE
Status: COMPLETED | OUTPATIENT
Start: 2024-12-07 | End: 2024-12-07

## 2024-12-07 RX ORDER — LANOLIN ALCOHOL/MO/W.PET/CERES
400 CREAM (GRAM) TOPICAL 2 TIMES DAILY
Status: DISCONTINUED | OUTPATIENT
Start: 2024-12-08 | End: 2024-12-11

## 2024-12-07 RX ORDER — LABETALOL HYDROCHLORIDE 5 MG/ML
10 INJECTION, SOLUTION INTRAVENOUS EVERY 4 HOURS PRN
Status: DISCONTINUED | OUTPATIENT
Start: 2024-12-07 | End: 2024-12-11 | Stop reason: HOSPADM

## 2024-12-07 RX ORDER — METOPROLOL TARTRATE 1 MG/ML
1 INJECTION, SOLUTION INTRAVENOUS
Status: DISCONTINUED | OUTPATIENT
Start: 2024-12-07 | End: 2024-12-07 | Stop reason: HOSPADM

## 2024-12-07 RX ORDER — HYDROMORPHONE HYDROCHLORIDE 1 MG/ML
0.5 INJECTION, SOLUTION INTRAMUSCULAR; INTRAVENOUS; SUBCUTANEOUS
Status: DISCONTINUED | OUTPATIENT
Start: 2024-12-07 | End: 2024-12-11 | Stop reason: HOSPADM

## 2024-12-07 RX ORDER — ENOXAPARIN SODIUM 100 MG/ML
40 INJECTION SUBCUTANEOUS DAILY
Status: DISCONTINUED | OUTPATIENT
Start: 2024-12-08 | End: 2024-12-11 | Stop reason: HOSPADM

## 2024-12-07 RX ORDER — EPHEDRINE SULFATE 50 MG/ML
5 INJECTION, SOLUTION INTRAVENOUS
Status: DISCONTINUED | OUTPATIENT
Start: 2024-12-07 | End: 2024-12-07 | Stop reason: HOSPADM

## 2024-12-07 RX ORDER — DIPHENHYDRAMINE HYDROCHLORIDE 50 MG/ML
12.5 INJECTION INTRAMUSCULAR; INTRAVENOUS
Status: DISCONTINUED | OUTPATIENT
Start: 2024-12-07 | End: 2024-12-07 | Stop reason: HOSPADM

## 2024-12-07 RX ORDER — DEXAMETHASONE SODIUM PHOSPHATE 4 MG/ML
INJECTION, SOLUTION INTRA-ARTICULAR; INTRALESIONAL; INTRAMUSCULAR; INTRAVENOUS; SOFT TISSUE PRN
Status: DISCONTINUED | OUTPATIENT
Start: 2024-12-07 | End: 2024-12-07 | Stop reason: SURG

## 2024-12-07 RX ORDER — FOLIC ACID 1 MG/1
1 TABLET ORAL DAILY
Status: DISPENSED | OUTPATIENT
Start: 2024-12-07 | End: 2024-12-11

## 2024-12-07 RX ORDER — LORAZEPAM 2 MG/1
2 TABLET ORAL
Status: DISCONTINUED | OUTPATIENT
Start: 2024-12-07 | End: 2024-12-11

## 2024-12-07 RX ORDER — METOCLOPRAMIDE HYDROCHLORIDE 5 MG/ML
INJECTION INTRAMUSCULAR; INTRAVENOUS PRN
Status: DISCONTINUED | OUTPATIENT
Start: 2024-12-07 | End: 2024-12-07 | Stop reason: SURG

## 2024-12-07 RX ADMIN — ROCURONIUM BROMIDE 5 MG: 50 INJECTION, SOLUTION INTRAVENOUS at 12:31

## 2024-12-07 RX ADMIN — DIPHENHYDRAMINE HYDROCHLORIDE 12.5 MG: 50 INJECTION, SOLUTION INTRAMUSCULAR; INTRAVENOUS at 13:04

## 2024-12-07 RX ADMIN — PROPOFOL 40 MG: 10 INJECTION, EMULSION INTRAVENOUS at 10:38

## 2024-12-07 RX ADMIN — ONDANSETRON 4 MG: 2 INJECTION INTRAMUSCULAR; INTRAVENOUS at 13:06

## 2024-12-07 RX ADMIN — KETOROLAC TROMETHAMINE 15 MG: 15 INJECTION, SOLUTION INTRAMUSCULAR; INTRAVENOUS at 13:06

## 2024-12-07 RX ADMIN — LORAZEPAM 1 MG: 2 INJECTION INTRAMUSCULAR; INTRAVENOUS at 21:44

## 2024-12-07 RX ADMIN — MAGNESIUM SULFATE HEPTAHYDRATE 2 G: 2 INJECTION, SOLUTION INTRAVENOUS at 17:49

## 2024-12-07 RX ADMIN — FENTANYL CITRATE 50 MCG: 50 INJECTION, SOLUTION INTRAMUSCULAR; INTRAVENOUS at 12:24

## 2024-12-07 RX ADMIN — LIDOCAINE HYDROCHLORIDE 60 MG: 20 INJECTION, SOLUTION EPIDURAL; INFILTRATION; INTRACAUDAL; PERINEURAL at 12:31

## 2024-12-07 RX ADMIN — SODIUM CHLORIDE, POTASSIUM CHLORIDE, SODIUM LACTATE AND CALCIUM CHLORIDE: 600; 310; 30; 20 INJECTION, SOLUTION INTRAVENOUS at 11:38

## 2024-12-07 RX ADMIN — PROPOFOL 150 MG: 10 INJECTION, EMULSION INTRAVENOUS at 12:31

## 2024-12-07 RX ADMIN — OXYCODONE 5 MG: 5 TABLET ORAL at 16:41

## 2024-12-07 RX ADMIN — ACETAMINOPHEN 650 MG: 325 TABLET, FILM COATED ORAL at 20:30

## 2024-12-07 RX ADMIN — Medication 50 MCG: at 12:52

## 2024-12-07 RX ADMIN — LORAZEPAM 1 MG: 2 INJECTION INTRAMUSCULAR; INTRAVENOUS at 23:44

## 2024-12-07 RX ADMIN — ONDANSETRON 4 MG: 2 INJECTION INTRAMUSCULAR; INTRAVENOUS at 19:36

## 2024-12-07 RX ADMIN — HYDROMORPHONE HYDROCHLORIDE 0.5 MG: 2 INJECTION INTRAMUSCULAR; INTRAVENOUS; SUBCUTANEOUS at 12:41

## 2024-12-07 RX ADMIN — HYDROXYZINE HYDROCHLORIDE 25 MG: 25 TABLET, FILM COATED ORAL at 19:48

## 2024-12-07 RX ADMIN — GABAPENTIN 300 MG: 300 CAPSULE ORAL at 21:46

## 2024-12-07 RX ADMIN — METOCLOPRAMIDE HYDROCHLORIDE 10 MG: 5 INJECTION INTRAMUSCULAR; INTRAVENOUS at 13:04

## 2024-12-07 RX ADMIN — HYDROMORPHONE HYDROCHLORIDE 0.5 MG: 2 INJECTION INTRAMUSCULAR; INTRAVENOUS; SUBCUTANEOUS at 12:44

## 2024-12-07 RX ADMIN — PROPOFOL 50 MG: 10 INJECTION, EMULSION INTRAVENOUS at 12:38

## 2024-12-07 RX ADMIN — CEFAZOLIN 2 G: 1 INJECTION, POWDER, FOR SOLUTION INTRAMUSCULAR; INTRAVENOUS at 12:36

## 2024-12-07 RX ADMIN — LORAZEPAM 1 MG: 1 TABLET ORAL at 17:44

## 2024-12-07 RX ADMIN — SUCCINYLCHOLINE CHLORIDE 100 MG: 20 INJECTION, SOLUTION INTRAMUSCULAR; INTRAVENOUS at 12:31

## 2024-12-07 RX ADMIN — LORAZEPAM 2 MG: 2 TABLET ORAL at 20:27

## 2024-12-07 RX ADMIN — DEXAMETHASONE SODIUM PHOSPHATE 6 MG: 4 INJECTION INTRA-ARTICULAR; INTRALESIONAL; INTRAMUSCULAR; INTRAVENOUS; SOFT TISSUE at 12:36

## 2024-12-07 RX ADMIN — SENNOSIDES AND DOCUSATE SODIUM 2 TABLET: 50; 8.6 TABLET ORAL at 17:44

## 2024-12-07 ASSESSMENT — LIFESTYLE VARIABLES
AGITATION: NORMAL ACTIVITY
DOES PATIENT WANT TO TALK TO SOMEONE ABOUT QUITTING: YES
VISUAL DISTURBANCES: NOT PRESENT
TOTAL SCORE: 4
ANXIETY: NO ANXIETY (AT EASE)
TOTAL SCORE: MILD ITCHING, PINS AND NEEDLES SENSATION, BURNING OR NUMBNESS
ANXIETY: MILDLY ANXIOUS
PAROXYSMAL SWEATS: NO SWEAT VISIBLE
AGITATION: NORMAL ACTIVITY
HEADACHE, FULLNESS IN HEAD: NOT PRESENT
TREMOR: *
SUBSTANCE_ABUSE: 1
NAUSEA AND VOMITING: NO NAUSEA AND NO VOMITING
TREMOR: MODERATE TREMOR WITH ARMS EXTENDED
TOTAL SCORE: MILD ITCHING, PINS AND NEEDLES SENSATION, BURNING OR NUMBNESS
ALCOHOL_USE: YES
TOTAL SCORE: 11
TREMOR: *
TOTAL SCORE: 14
PAROXYSMAL SWEATS: NO SWEAT VISIBLE
AUDITORY DISTURBANCES: NOT PRESENT
AUDITORY DISTURBANCES: VERY MILD HARSHNESS OR ABILITY TO FRIGHTEN
ON A TYPICAL DAY WHEN YOU DRINK ALCOHOL HOW MANY DRINKS DO YOU HAVE: 10
NAUSEA AND VOMITING: NO NAUSEA AND NO VOMITING
PAROXYSMAL SWEATS: *
AGITATION: NORMAL ACTIVITY
ANXIETY: MODERATELY ANXIOUS OR GUARDED, SO ANXIETY IS INFERRED
HAVE YOU EVER FELT YOU SHOULD CUT DOWN ON YOUR DRINKING: YES
HEADACHE, FULLNESS IN HEAD: NOT PRESENT
CONSUMPTION TOTAL: POSITIVE
TOTAL SCORE: 8
TREMOR: MODERATE TREMOR WITH ARMS EXTENDED
PAROXYSMAL SWEATS: NO SWEAT VISIBLE
NAUSEA AND VOMITING: NO NAUSEA AND NO VOMITING
AUDITORY DISTURBANCES: VERY MILD HARSHNESS OR ABILITY TO FRIGHTEN
VISUAL DISTURBANCES: NOT PRESENT
AGITATION: NORMAL ACTIVITY
TOTAL SCORE: 3
VISUAL DISTURBANCES: VERY MILD SENSITIVITY
ORIENTATION AND CLOUDING OF SENSORIUM: ORIENTED AND CAN DO SERIAL ADDITIONS
ANXIETY: *
TOTAL SCORE: 11
ORIENTATION AND CLOUDING OF SENSORIUM: ORIENTED AND CAN DO SERIAL ADDITIONS
VISUAL DISTURBANCES: MILD SENSITIVITY
AGITATION: NORMAL ACTIVITY
HAVE PEOPLE ANNOYED YOU BY CRITICIZING YOUR DRINKING: YES
HEADACHE, FULLNESS IN HEAD: NOT PRESENT
HOW MANY TIMES IN THE PAST YEAR HAVE YOU HAD 5 OR MORE DRINKS IN A DAY: 10
DOES PATIENT WANT TO STOP DRINKING: NO
ORIENTATION AND CLOUDING OF SENSORIUM: ORIENTED AND CAN DO SERIAL ADDITIONS
ANXIETY: MODERATELY ANXIOUS OR GUARDED, SO ANXIETY IS INFERRED
AUDITORY DISTURBANCES: VERY MILD HARSHNESS OR ABILITY TO FRIGHTEN
EVER HAD A DRINK FIRST THING IN THE MORNING TO STEADY YOUR NERVES TO GET RID OF A HANGOVER: YES
HEADACHE, FULLNESS IN HEAD: VERY MILD
AVERAGE NUMBER OF DAYS PER WEEK YOU HAVE A DRINK CONTAINING ALCOHOL: 3
NAUSEA AND VOMITING: NO NAUSEA AND NO VOMITING
ORIENTATION AND CLOUDING OF SENSORIUM: ORIENTED AND CAN DO SERIAL ADDITIONS
TREMOR: *
TOTAL SCORE: MILD ITCHING, PINS AND NEEDLES SENSATION, BURNING OR NUMBNESS
VISUAL DISTURBANCES: NOT PRESENT
ORIENTATION AND CLOUDING OF SENSORIUM: ORIENTED AND CAN DO SERIAL ADDITIONS
NAUSEA AND VOMITING: MILD NAUSEA WITH NO VOMITING
TOTAL SCORE: 4
HEADACHE, FULLNESS IN HEAD: VERY MILD
TOTAL SCORE: 4
AUDITORY DISTURBANCES: MILD HARSHNESS OR ABILITY TO FRIGHTEN
EVER FELT BAD OR GUILTY ABOUT YOUR DRINKING: YES
PAROXYSMAL SWEATS: NO SWEAT VISIBLE

## 2024-12-07 ASSESSMENT — PAIN DESCRIPTION - PAIN TYPE
TYPE: SURGICAL PAIN
TYPE: SURGICAL PAIN
TYPE: ACUTE PAIN
TYPE: SURGICAL PAIN
TYPE: ACUTE PAIN
TYPE: SURGICAL PAIN
TYPE: SURGICAL PAIN

## 2024-12-07 ASSESSMENT — FIBROSIS 4 INDEX
FIB4 SCORE: 2.39
FIB4 SCORE: 2.04

## 2024-12-07 ASSESSMENT — COGNITIVE AND FUNCTIONAL STATUS - GENERAL
SUGGESTED CMS G CODE MODIFIER MOBILITY: CK
TURNING FROM BACK TO SIDE WHILE IN FLAT BAD: A LITTLE
DRESSING REGULAR LOWER BODY CLOTHING: A LOT
WALKING IN HOSPITAL ROOM: A LOT
MOVING FROM LYING ON BACK TO SITTING ON SIDE OF FLAT BED: A LITTLE
STANDING UP FROM CHAIR USING ARMS: A LOT
HELP NEEDED FOR BATHING: A LOT
TOILETING: A LOT
MOVING TO AND FROM BED TO CHAIR: A LOT
PERSONAL GROOMING: A LOT
EATING MEALS: A LOT
SUGGESTED CMS G CODE MODIFIER DAILY ACTIVITY: CL
MOBILITY SCORE: 15
CLIMB 3 TO 5 STEPS WITH RAILING: A LITTLE
DAILY ACTIVITIY SCORE: 12
DRESSING REGULAR UPPER BODY CLOTHING: A LOT

## 2024-12-07 ASSESSMENT — ENCOUNTER SYMPTOMS
ABDOMINAL PAIN: 0
FALLS: 1
FEVER: 0
NERVOUS/ANXIOUS: 1
COUGH: 0
VOMITING: 0
CHILLS: 0
PALPITATIONS: 0
MYALGIAS: 0
NAUSEA: 0
INSOMNIA: 1
DEPRESSION: 1
DIZZINESS: 0
HEADACHES: 0
SHORTNESS OF BREATH: 0

## 2024-12-07 ASSESSMENT — PATIENT HEALTH QUESTIONNAIRE - PHQ9
2. FEELING DOWN, DEPRESSED, IRRITABLE, OR HOPELESS: NOT AT ALL
SUM OF ALL RESPONSES TO PHQ9 QUESTIONS 1 AND 2: 0
1. LITTLE INTEREST OR PLEASURE IN DOING THINGS: NOT AT ALL

## 2024-12-07 ASSESSMENT — SOCIAL DETERMINANTS OF HEALTH (SDOH)

## 2024-12-07 NOTE — ANESTHESIA TIME REPORT
Anesthesia Start and Stop Event Times       Date Time Event    12/7/2024 1217 Ready for Procedure     1218 Anesthesia Start     1328 Anesthesia Stop          Responsible Staff  12/07/24      Name Role Begin End    John Reynoso D.O. Anesth 1218 1328          Overtime Reason:  no overtime (within assigned shift)    Comments:

## 2024-12-07 NOTE — ANESTHESIA PREPROCEDURE EVALUATION
Case: [id number] Date/Time: 12/07/24 1200    Procedure: ORIF, ANKLE (Right)    Location: [place] [address] / SURGERY [facility]    Surgeons: LYDIA Arita H&P:  PAST MEDICAL HISTORY:   49 y.o. female who presents for Procedure(s) (LRB):  ORIF, ANKLE (Right).  She has current and past medical problems significant for:    - Acutely intoxicated (DARLIN 360)    Past Medical History:   Diagnosis Date    Acute alcohol intoxication (HCC) 05/25/2021    Alcohol intoxication delirium (HCC) 04/30/2024    Anxiety     Depression     Gynecological disorder     High cholesterol     Hypertension     Snoring     Urinary tract infection        SMOKING/ALCOHOL/RECREATIONAL DRUG USE:  Social History     Tobacco Use    Smoking status: Never    Smokeless tobacco: Never   Vaping Use    Vaping status: Never Used   Substance Use Topics    Alcohol use: Yes     Alcohol/week: 8.4 oz     Types: 14 Glasses of wine per week     Comment: 1 glass every 3 days    Drug use: No     Social History     Substance and Sexual Activity   Drug Use No       PAST SURGICAL HISTORY:  Past Surgical History:   Procedure Laterality Date    LIPOMA EXCISION  08/12/2024    HYSTEROSCOPY NOVASURE-2  08/02/2017    Procedure: HYSTEROSCOPY NOVASURE;  Surgeon: Hedy Hardy M.D.;  Location: SURGERY [facility];  Service:     DILATION AND CURETTAGE  08/02/2017    Procedure: DILATION AND CURETTAGE;  Surgeon: Hedy Hardy M.D.;  Location: [facility];  Service:     NH BREAST AUGMENTATION WITH IMPLANT  01/01/2006    MAMMOPLASTY AUGMENTATION         ALLERGIES:   Allergies   Allergen Reactions    [place] Hives     Patient states not allergic anymore, just a one time thing    S[facility] Hives     [id number]       MEDICATIONS:  No current facility-administered medications on file prior to encounter.     Current Outpatient Medications on File Prior to Encounter   Medication Sig Dispense Refill    thiamine (THIAMINE) 100 MG tablet        Oral Medication Containers (EZY DOSE VITAMIN ORGANIZER) Misc       hydrOXYzine HCl (ATARAX) 25 MG Tab Take 1 tablet as needed Orally every 6 hours for up to 90 days 180 Tablet 9    gabapentin (NEURONTIN) 300 MG Cap Take 1 Capsule by mouth 3 times a day for 90 days. 90 Capsule 2    escitalopram (LEXAPRO) 20 MG tablet Take 1 Tablet by mouth every day for 180 days. 90 Tablet 1    levothyroxine (SYNTHROID) 50 MCG Tab Take 1 Tablet by mouth every morning on an empty stomach. 30 Tablet 0    multivitamin Tab Take 1 Tablet by mouth every day.         LABS:  Lab Results   Component Value Date/Time    HEMOGLOBIN 14.4 12/07/2024 1022    HEMATOCRIT 41.4 12/07/2024 1022    WBC 9.5 12/07/2024 1022     Lab Results   Component Value Date/Time    SODIUM 141 12/07/2024 1022    POTASSIUM 4.0 12/07/2024 1022    CHLORIDE 104 12/07/2024 1022    CO2 21 12/07/2024 1022    GLUCOSE 104 (H) 12/07/2024 1022    BUN 9 12/07/2024 1022    CALCIUM 8.7 12/07/2024 1022         PREVIOUS ANESTHETICS: See EMR  __________________________________________    Relevant Problems   NEURO   (positive) Alcohol withdrawal seizure, uncomplicated (HCC)   (positive) Migraine with aura and without status migrainosus, not intractable      CARDIAC   (positive) Essential hypertension   (positive) Migraine with aura and without status migrainosus, not intractable   (positive) Portal hypertension with esophageal varices (HCC)   (positive) Primary hypertension         (positive) Decompensated hepatic cirrhosis, hypoxia, chronic diarrhea, alcoholism (HCC)   (positive) Fatty liver, alcoholic      ENDO   (positive) Hypothyroidism       CT Head:  IMPRESSION:     1.  There is a probably new hypodensity in the central superior dash which could be related to new probably subacute/chronic small infarct or other insult..  2.  No acute intracranial hemorrhage.    Physical Exam    Airway   Mallampati: II  TM distance: >3 FB  Neck ROM: full       Cardiovascular - normal  exam  Rhythm: regular  Rate: normal  (-) murmur     Dental - normal exam           Pulmonary - normal exam  Breath sounds clear to auscultation     Abdominal    Neurological - normal exam                   Anesthesia Plan    ASA 3   ASA physical status 3 criteria: alcohol and/or substance dependence or abuse    Plan - general and peripheral nerve block     Peripheral nerve block will be post-op pain control  Airway plan will be ETT    (Spoke with hospitalist Dr. Asad Mccann regarding head CT. No plans for further workup or intervention. Will proceed with case. )      Induction: intravenous    Postoperative Plan: Postoperative administration of opioids is intended.    Pertinent diagnostic labs and testing reviewed    Informed Consent:    Anesthetic plan and risks discussed with patient.    Use of blood products discussed with: patient whom consented to blood products.

## 2024-12-07 NOTE — ANESTHESIA PROCEDURE NOTES
Airway    Date/Time: 12/7/2024 12:31 PM    Performed by: John Reynoso D.O.  Authorized by: John Reynoso D.O.    Location:  OR  Urgency:  Elective  Difficult Airway: No    Indications for Airway Management:  Anesthesia      Spontaneous Ventilation: absent    Sedation Level:  Deep  Preoxygenated: Yes    Patient Position:  Sniffing  MILS Maintained Throughout: No    Mask Difficulty Assessment:  0 - not attempted  Final Airway Type:  Endotracheal airway  Final Endotracheal Airway:  ETT  Cuffed: Yes    Technique Used for Successful ETT Placement:  Direct laryngoscopy    Insertion Site:  Oral  Blade Type:  Jamel  Laryngoscope Blade/Videolaryngoscope Blade Size:  3  ETT Size (mm):  6.5  Measured from:  Teeth  ETT to Teeth (cm):  20  Placement Verified by: auscultation and capnometry    Cormack-Lehane Classification:  Grade IIa - partial view of glottis  Number of Attempts at Approach:  1  Ventilation Between Attempts:  None  Number of Other Approaches Attempted:  0

## 2024-12-07 NOTE — ED PROVIDER NOTES
"ED Provider Note    CHIEF COMPLAINT  Chief Complaint   Patient presents with    T-5000 GLF     BIB [facility] from home after patient had mechanical GLF where she slipped and hit her head, -LOC, -thinners, deformity to right ankle. EMS placed IV, gave 75 mcg fentanyl and 4mg zofran.        EXTERNAL RECORDS REVIEWED  Reviewed previous mental health encounters PCP visits    HPI/ROS  LIMITATION TO HISTORY   Patient is intoxicated on arrival  OUTSIDE HISTORIAN(S):   provided additional history EMS provided additional history    Minerva Tillman is a 49 y.o. female who presents for evaluation of an apparent slip and fall with acute injury and deformity noted to the right ankle.  The patient apparently has a known history of struggling with alcoholism.  She does admit to having \"1 drink \"this morning and a glass of wine.  She last ate last night.  Apparently her  heard a thud and saw his wife had a fall in the bathroom.  She had an obvious deformed right ankle.  EMS was activated.  Paramedics establish an IV and administered IV fentanyl and Zofran prior to arrival.  She thinks she may have hit her head but does not endorse severe headache.  No reported pain or trauma to the upper or lower extremities    PAST MEDICAL HISTORY   has a past medical history of Acute alcohol intoxication (HCC) (05/25/2021), Alcohol intoxication delirium (HCC) (04/30/2024), Anxiety, Depression, Gynecological disorder, High cholesterol, Hypertension, Snoring, and Urinary tract infection.    SURGICAL HISTORY   has a past surgical history that includes breast augmentation with implant (01/01/2006); mammoplasty augmentation; hysteroscopy novasure-2 (08/02/2017); dilation and curettage (08/02/2017); and lipoma excision (08/12/2024).    FAMILY HISTORY  Family History   Problem Relation Age of Onset    Hypertension Mother     Hyperlipidemia Mother     Depression Father     Hypertension Father     Asthma Father     Hyperlipidemia Father     " "Alcohol abuse Father     Hypertension Brother     Depression Brother     Alcohol abuse Brother     Hypertension Brother     Depression Brother     Alcohol abuse Brother     Suicide Attempts Paternal Uncle          by suicide    Cancer Maternal Grandfather 60        esphogus    Cancer Maternal Grandmother 60        breast and skin    Stroke Maternal Grandmother     Other Paternal Grandmother         complications from surgery       SOCIAL HISTORY  Social History     Tobacco Use    Smoking status: Never    Smokeless tobacco: Never   Vaping Use    Vaping status: Never Used   Substance and Sexual Activity    Alcohol use: Yes     Alcohol/week: 8.4 oz     Types: 14 Glasses of wine per week     Comment: 1 glass every 3 days    Drug use: No    Sexual activity: Yes     Partners: Male     Comment: ,        CURRENT MEDICATIONS  Home Medications       Reviewed by Maggie Cevallos R.N. (Registered Nurse) on 24 at 0949  Med List Status: Partial     Medication Last Dose Status   escitalopram (LEXAPRO) 20 MG tablet  Active   gabapentin (NEURONTIN) 300 MG Cap  Active   hydrOXYzine HCl (ATARAX) 25 MG Tab  Active   levothyroxine (SYNTHROID) 50 MCG Tab  Active   multivitamin Tab  Active   Oral Medication Containers (EZY DOSE VITAMIN ORGANIZER) Misc  Active   thiamine (THIAMINE) 100 MG tablet  Active                  Audit from Redirected Encounters    **Home medications have not yet been reviewed for this encounter**         ALLERGIES  Allergies   Allergen Reactions    [place] Hives     Patient states not allergic anymore, just a one time thing    Sulfa Drugs Hives     AFC=[id number]       PHYSICAL EXAM  VITAL SIGNS: /73   Pulse (!) 108   Temp 36.2 °C (97.1 °F) (Temporal)   Resp 20   Ht 1.575 m (5' 2\")   Wt 68 kg (150 lb)   SpO2 96%   BMI 27.44 kg/m²    Pulse ox interpretation: I interpret this pulse ox as normal.  Constitutional: Alert and oriented x 3, moderate to severe distress  HEENT: Atraumatic " normocephalic, pupils are equal round reactive to light extraocular movements are intact. The nares is clear, external ears are normal, mouth shows moist mucous membranes normal dentition for age  Neck: Supple, no JVD no tracheal deviation  Cardiovascular: Regular rate and rhythm no murmur rub or gallop 2+ pulses peripherally x4  Thorax & Lungs: No respiratory distress, no wheezes rales or rhonchi, No chest tenderness.   GI: Soft nontender nondistended positive bowel sounds, no peritoneal signs  Skin: Warm dry no acute rash or lesion  Musculoskeletal: Right lower extremity exam is notable for a grossly deformed ankle and foot consistent with a fracture dislocation there is no evidence of open fracture.  Foot is somewhat mottled but dorsalis pedal pulses palpable no bony tenderness noted in the proximal leg knee or hip.    Neurologic: Cranial nerves III through XII are grossly intact no sensory deficit no cerebellar dysfunction   Psychiatric: Appropriate affect for situation at this time          EKG/LABS  Results for orders placed or performed during the hospital encounter of 12/07/24   DIAGNOSTIC ALCOHOL    Collection Time: 12/07/24 10:22 AM   Result Value Ref Range    Diagnostic Alcohol 360.0 (H) <10.1 mg/dL   CBC WITH DIFFERENTIAL    Collection Time: 12/07/24 10:22 AM   Result Value Ref Range    WBC 9.5 4.8 - 10.8 K/uL    RBC 4.12 (L) 4.20 - 5.40 M/uL    Hemoglobin 14.4 12.0 - 16.0 g/dL    Hematocrit 41.4 37.0 - 47.0 %    .5 (H) 81.4 - 97.8 fL    MCH 35.0 (H) 27.0 - 33.0 pg    MCHC 34.8 32.2 - 35.5 g/dL    RDW 45.0 35.9 - 50.0 fL    Platelet Count 183 164 - 446 K/uL    MPV 8.7 (L) 9.0 - 12.9 fL    Neutrophils-Polys 57.60 44.00 - 72.00 %    Lymphocytes 32.60 22.00 - 41.00 %    Monocytes 7.70 0.00 - 13.40 %    Eosinophils 0.30 0.00 - 6.90 %    Basophils 1.40 0.00 - 1.80 %    Immature Granulocytes 0.40 0.00 - 0.90 %    Nucleated RBC 0.00 0.00 - 0.20 /100 WBC    Neutrophils (Absolute) 5.49 1.82 - 7.42 K/uL     Lymphs (Absolute) 3.11 1.00 - 4.80 K/uL    Monos (Absolute) 0.73 0.00 - 0.85 K/uL    Eos (Absolute) 0.03 0.00 - 0.51 K/uL    Baso (Absolute) 0.13 (H) 0.00 - 0.12 K/uL    Immature Granulocytes (abs) 0.04 0.00 - 0.11 K/uL    NRBC (Absolute) 0.00 K/uL   Comp Metabolic Panel    Collection Time: 12/07/24 10:22 AM   Result Value Ref Range    Sodium 141 135 - 145 mmol/L    Potassium 4.0 3.6 - 5.5 mmol/L    Chloride 104 96 - 112 mmol/L    Co2 21 20 - 33 mmol/L    Anion Gap 16.0 7.0 - 16.0    Glucose 104 (H) 65 - 99 mg/dL    Bun 9 8 - 22 mg/dL    Creatinine 0.62 0.50 - 1.40 mg/dL    Calcium 8.7 8.5 - 10.5 mg/dL    Correct Calcium 8.5 8.5 - 10.5 mg/dL    AST(SGOT) 51 (H) 12 - 45 U/L    ALT(SGPT) 34 2 - 50 U/L    Alkaline Phosphatase 147 (H) 30 - 99 U/L    Total Bilirubin 0.5 0.1 - 1.5 mg/dL    Albumin 4.3 3.2 - 4.9 g/dL    Total Protein 7.5 6.0 - 8.2 g/dL    Globulin 3.2 1.9 - 3.5 g/dL    A-G Ratio 1.3 g/dL   ESTIMATED GFR    Collection Time: 12/07/24 10:22 AM   Result Value Ref Range    GFR (CKD-EPI) 108 >60 mL/min/1.73 m 2       I have independently interpreted this EKG    Physician procedure: Closed reduction of severely fractured and displaced right ankle fracture dislocation.  Verbal and written consent was obtained.  I reviewed the risks and benefits with the patient's .  Patient was placed on continuous pulse oximetry cardiac monitoring and end-tidal CO2 monitoring.  Due to her alcohol intoxication we only administered 40 mg of IV propofol.  Patient was sedated but conscious and breathing on her own.  I performed a longitudinal traction and relocated the ankle in the first attempt.  A well-padded 3-way ankle splint was placed by myself in conjunction with the orthopedic PA and the ER technician.  Patient did not any complications of sedation such as hypoxia hypotension or any other issues.      RADIOLOGY/PROCEDURES   I have independently interpreted the diagnostic imaging associated with this visit and am  waiting the final reading from the radiologist.   My preliminary interpretation is as follows: No intracranial hemorrhage, fracture dislocation of the right ankle    Radiologist interpretation:  CT-HEAD W/O   Final Result      1.  There is a probably new hypodensity in the central superior dash which could be related to new probably subacute/chronic small infarct or other insult..   2.  No acute intracranial hemorrhage.               DX-ANKLE 2- VIEWS RIGHT   Final Result         1. Right ankle fracture dislocation.                     CT-ANKLE W/O PLUS RECONS RIGHT    (Results Pending)   DX-PORTABLE FLUOROSCOPY < 1 HOUR Reason For Exam: T-5000 GLF (BIB [facility] from home after patient had mechanical GLF where she slipped and hit her head, -LOC, -thinners, deformity to right ankle. EMS placed IV, gave 75 mcg fentanyl and 4mg zofran. )    (Results Pending)   DX-ANKLE 2- VIEWS RIGHT    (Results Pending)       COURSE & MEDICAL DECISION MAKING    ASSESSMENT, COURSE AND PLAN  Care Narrative:     This is a very pleasant and cooperative 49-year-old female who presents here by ambulance after a ground-level fall.  She initially reported only having 1 alcoholic drink this morning but with her blood alcohol being 360 I suspect she is battling severe alcoholism.  CT scan of the head did not demonstrate any acute intracranial process and she underwent closed reduction with conscious sedation of her right ankle fracture dislocation.  Laboratory studies demonstrate significantly elevated blood alcohol.  I discussed the plan of care with her  who is a surgeon on staff here.  He indicated that she will likely go through severe withdrawal and given her slightly abnormal head CT with possible stroke and high risk of going through severe delirium tremens and withdrawal as well as issues for pain control and rehab she will be admitted to the hospitalist service for further treatment and evaluation.  Emergent consultation with  orthopedics was obtained with Dr. Clinton.  After close reduction he will take the patient to the OR for ORIF          ADDITIONAL PROBLEMS MANAGED      DISPOSITION AND DISCUSSIONS  I have discussed management of the patient with the following physicians and KRISTINE's: Discussed plan of care with hospitalist and orthopedic surgeon    Discussion of management with other QHP or appropriate source(s): Discussed plan of care with ER pharmacist and respiratory therapist for sedation    Escalation of care considered, and ultimately not performed: None    Barriers to care at this time, including but not limited to: None.     Decision tools and prescription drugs considered including, but not limited to: None.    FINAL DIAGNOSIS  1. Alcoholism (HCC)    2. Closed displaced trimalleolar fracture of right ankle, initial encounter    3. Alcoholic intoxication without complication ([facility])    4.  ERP conscious sedation for closed reduction of #2     Electronically signed by: Elier Hinojosa M.D., 12/7/2024 9:55 AM

## 2024-12-07 NOTE — OR NURSING
Surgical and anesthesia consents completed via telephone with Dr Jeff Tillman and  2 RN witnesses, due to patient's BA level.
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
## 2024-12-07 NOTE — ED NOTES
Pt to CT then transport is taking pt to Pre-op from CT, report has been called to pre-op who is aware of diagnostic ETOH, VSS on 2L NC, GCS 15, NAD

## 2024-12-07 NOTE — PROGRESS NOTES
Assumed care of patient. Patient A n O x4. Patient on 2L NC. Patient placed on monitor. Monitors notified. VSS. Patients right ankle ace wrap clean dry and intake. Discussed POC with patient. Fall precautions in place. Bed alarm on. Call light in reach.

## 2024-12-07 NOTE — ED TRIAGE NOTES
Chief Complaint   Patient presents with    T-5000 GLF     BIB [facility] from home after patient had mechanical GLF where she slipped and hit her head, -LOC, -thinners, deformity to right ankle. EMS placed IV, gave 75 mcg fentanyl and 4mg zofran.

## 2024-12-07 NOTE — H&P
Hospital Medicine History & Physical Note    Date of Service  12/7/2024    Primary Care Physician  Donita Asif M.D.    Consultants  orthopedics    Specialist Names: Dr. Clinton    Code Status  Full Code    Chief Complaint  Chief Complaint   Patient presents with    T-5000 GLF     BIB [facility] from home after patient had mechanical GLF where she slipped and hit her head, -LOC, -thinners, deformity to right ankle. EMS placed IV, gave 75 mcg fentanyl and 4mg zofran.        History of Presenting Illness  Minerva Tillman is a 49 y.o. female who presented 12/7/2024 with right ankle pain following ground-level fall.  Is a pleasant woman with a history of depression, hypertension, hypercholesterolemia, and alcohol dependence in intermittent remission, who started drinking after 45 days of sobriety around Thanksgiving.  She reports that she started to experience stress because her kids were not present for the Thanksgiving event.  She was previously in treatment as well as being active with alcohol synonymous.  This morning, she had a mechanical ground-level fall after tripping over her small dog landing onto the floor.  She had extreme pain in the right ankle.  Noted some deformity and was unable to ambulate.  Her  called EMS and was brought to the emergency room.  Patient reports that she has been taking gabapentin for the last 3 months.  She reports having 2 alcohol withdrawal seizures previously.  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    In the emergency room, patient was noted to have a displaced right trimalleolar fracture.  The patient reported that she hit her head so head CT was performed, which showed probably new hypodensity in the central superior dash, which could be related to new probably subacute/chronic small infarct or other insult.  No acute intracranial hemorrhages.  Neurologically she appeared to be intact and moving all extremities with no focal deficits.  Blood  alcohol level was elevated at 360.  She was noted to be having tremors going into alcohol withdrawal per ER physician.    I updated the patient's daughter lives over the phone with the patient at bedside.  I discussed the case with Dr. Reynoso of anesthesia.  Patient does not have any focal deficits consistent with head CT findings.  Okay to proceed with surgery per my evaluation.  I will order MRI of the brain without contrast for further evaluation.    I discussed the plan of care with patient, family, and ER physician Dr. Hinojosa .  Discussed with Dr. Clinton orthopedic surgery, planning for surgery today.    I reviewed patient's discharge summary from her hospitalization from 10/22/2024 to 10/25/2024.  Patient was brought in for alcohol withdrawal seizure requiring IV Versed prior to presentation to the ER.  She was given phenobarbital and a hypersympathetic state.  Admitted to telemetry floor.  She was started on CIWA protocol with Librium.  Significant transaminitis.  Experience elevated blood pressures.  Discharged to home.    Review of Systems  Review of Systems   Constitutional:  Negative for chills and fever.   Respiratory:  Negative for cough and shortness of breath.    Cardiovascular:  Negative for chest pain and palpitations.   Gastrointestinal:  Negative for abdominal pain, nausea and vomiting.   Musculoskeletal:  Positive for falls and joint pain. Negative for myalgias.   Neurological:  Negative for dizziness and headaches.   Psychiatric/Behavioral:  Positive for depression and substance abuse. Negative for suicidal ideas. The patient is nervous/anxious and has insomnia.        Past Medical History   has a past medical history of Acute alcohol intoxication (HCC) (05/25/2021), Alcohol intoxication delirium (HCC) (04/30/2024), Anxiety, Depression, Gynecological disorder, High cholesterol, Hypertension, Snoring, and Urinary tract infection.    Surgical History   has a past surgical history that  includes pr breast augmentation with implant (01/01/2006); mammoplasty augmentation; hysteroscopy novasure-2 (08/02/2017); dilation and curettage (08/02/2017); and lipoma excision (08/12/2024).     Family History  family history includes Alcohol abuse in her brother, brother, and father; Asthma in her father; Cancer (age of onset: 60) in her maternal grandfather and maternal grandmother; Depression in her brother, brother, and father; Hyperlipidemia in her father and mother; Hypertension in her brother, brother, father, and mother; Other in her paternal grandmother; Stroke in her maternal grandmother; Suicide Attempts in her paternal uncle.   Family history reviewed with patient. There is no family history that is pertinent to the chief complaint.     Social History   reports that she has never smoked. She has never used smokeless tobacco. She reports current alcohol use of about 8.4 oz of alcohol per week. She reports that she does not use drugs.    Allergies  Allergies   Allergen Reactions    Freeman Hives     Patient states not allergic anymore, just a one time thing    Sulfa Drugs Hives     [id number]       Medications  Prior to Admission Medications   Prescriptions Last Dose Informant Patient Reported? Taking?   Oral Medication Containers (EZY DOSE VITAMIN ORGANIZER) Misc   Yes No   escitalopram (LEXAPRO) 20 MG tablet   No No   Sig: Take 1 Tablet by mouth every day for 180 days.   gabapentin (NEURONTIN) 300 MG Cap   No No   Sig: Take 1 Capsule by mouth 3 times a day for 90 days.   hydrOXYzine HCl (ATARAX) 25 MG Tab   No No   Sig: Take 1 tablet as needed Orally every 6 hours for up to 90 days   levothyroxine (SYNTHROID) 50 MCG Tab  Patient No No   Sig: Take 1 Tablet by mouth every morning on an empty stomach.   multivitamin Tab  Patient No No   Sig: Take 1 Tablet by mouth every day.   thiamine (THIAMINE) 100 MG tablet   Yes No      Facility-Administered Medications: None       Physical Exam  Temp:  [36.2 °C  (97.1 °F)-36.6 °C (97.8 °F)] 36.2 °C (97.1 °F)  Pulse:  [] 108  Resp:  [16-20] 20  BP: (107-125)/(68-79) 117/73  SpO2:  [90 %-96 %] 96 %  Blood Pressure: 113/70   Temperature: 36.6 °C (97.8 °F)   Pulse: (!) 106   Respiration: 16   Pulse Oximetry: 95 %       Physical Exam  Vitals and nursing note reviewed.   Constitutional:       General: She is not in acute distress.     Appearance: She is well-developed. She is obese. She is ill-appearing and toxic-appearing. She is not diaphoretic.   HENT:      Head: Normocephalic and atraumatic.      Right Ear: External ear normal.      Left Ear: External ear normal.      Nose: Nose normal.      Mouth/Throat:      Pharynx: Oropharynx is clear. No oropharyngeal exudate or posterior oropharyngeal erythema.   Eyes:      General: No scleral icterus.        Right eye: No discharge.         Left eye: No discharge.      Conjunctiva/sclera: Conjunctivae normal.      Pupils: Pupils are equal, round, and reactive to light.   Neck:      Thyroid: No thyromegaly.      Vascular: No JVD.      Trachea: No tracheal deviation.   Cardiovascular:      Rate and Rhythm: Normal rate and regular rhythm.      Pulses: Normal pulses.      Heart sounds: Normal heart sounds. No murmur heard.     No friction rub. No gallop.   Pulmonary:      Effort: Pulmonary effort is normal. No respiratory distress.      Breath sounds: Normal breath sounds. No stridor. No wheezing or rales.   Abdominal:      General: Bowel sounds are normal. There is no distension.      Palpations: Abdomen is soft. There is no mass.      Tenderness: There is no abdominal tenderness. There is no guarding or rebound.   Musculoskeletal:         General: Swelling, tenderness, deformity and signs of injury present.      Cervical back: Neck supple.      Right lower leg: No edema.      Left lower leg: No edema.      Comments: Right lower extremity in brace   Lymphadenopathy:      Cervical: No cervical adenopathy.   Skin:     General: Skin is  "warm and dry.      Coloration: Skin is not pale.      Findings: No erythema or rash.   Neurological:      Mental Status: She is alert and oriented to person, place, and time.      Sensory: No sensory deficit.      Motor: No weakness or abnormal muscle tone.   Psychiatric:         Behavior: Behavior normal.         Thought Content: Thought content normal.         Judgment: Judgment normal.         Laboratory:  Recent Labs     12/07/24  1022   WBC 9.5   RBC 4.12*   HEMOGLOBIN 14.4   HEMATOCRIT 41.4   .5*   MCH 35.0*   MCHC 34.8   RDW 45.0   PLATELETCT 183   MPV 8.7*     Recent Labs     12/07/24  1022   SODIUM 141   POTASSIUM 4.0   CHLORIDE 104   CO2 21   GLUCOSE 104*   BUN 9   CREATININE 0.62   CALCIUM 8.7     Recent Labs     12/07/24  1022   ALTSGPT 34   ASTSGOT 51*   ALKPHOSPHAT 147*   TBILIRUBIN 0.5   GLUCOSE 104*         No results for input(s): \"NTPROBNP\" in the last 72 hours.      No results for input(s): \"TROPONINT\" in the last 72 hours.    Imaging:  CT-ANKLE W/O PLUS RECONS RIGHT   Final Result         1. Comminuted fractures of the distal tibia and fibula as detailed above.   2. The navicular fracture noted previously has healed.      CT-HEAD W/O   Final Result      1.  There is a probably new hypodensity in the central superior dash which could be related to new probably subacute/chronic small infarct or other insult..   2.  No acute intracranial hemorrhage.               DX-ANKLE 2- VIEWS RIGHT   Final Result         1. Right ankle fracture dislocation.                     DX-PORTABLE FLUOROSCOPY < 1 HOUR Reason For Exam: T-5000 GLF (BIB [facility] from home after patient had mechanical GLF where she slipped and hit her head, -LOC, -thinners, deformity to right ankle. EMS placed IV, gave 75 mcg fentanyl and 4mg zofran. )    (Results Pending)   DX-ANKLE 2- VIEWS RIGHT    (Results Pending)     X-ray of the right ankle per my read shows a displaced trimalleolar fracture.      Head CT per my read shows " hypodensity in the mid dash     Assessment/Plan:  Justification for Admission Status  I anticipate this patient will require at least two midnights for appropriate medical management, necessitating inpatient admission because presents with right trimalleolar fracture in setting of alcohol intoxication.  High risk of alcohol withdrawal and withdrawal seizures as she has a history of this.    Patient will need a Telemetry bed on MEDICAL service .  The need is secondary to alcohol intoxication and right trimalleolar fracture requiring surgical intervention.    * Trimalleolar fracture of ankle, closed, right, initial encounter- (present on admission)  Assessment & Plan  As per x-ray.    Discussed with Dr. Clinton orthopedic surgery, planning for surgery today.    Admitted to telemetry floor postoperatively  Pain control with Tylenol, oxycodone, and IV Dilaudid as needed for breakthrough pain  Continuous pulse oximetry monitoring to monitor for hypoxia and respiratory depression while receiving IV narcotic medications.      Alcohol withdrawal syndrome without complication (HCC)- (present on admission)  Assessment & Plan  Patient has a history of severe alcohol withdrawal syndrome as well as seizures.    Admit to telemetry floor  Continuous cardiac monitoring to monitor for arrhythmias  Start multivitamin, thiamine, and folic acid supplementation  Aspiration, fall and seizure precautions  Continue gabapentin 300 mg 3 times a day    Alcohol use disorder, severe, dependence (HCC)- (present on admission)  Assessment & Plan  Encouraged returning back to Alcoholic Anonymous engaging with her sponsor.  Patient is interested in maintaining sobriety.  She did remain sober for 45 days prior to Thanksgiving.    Ground-level fall- (present on admission)  Assessment & Plan  Patient had mechanical ground-level fall from tripping over her dog.  I ordered PT and OT evaluation following her surgery.    Mild episode of recurrent major  depressive disorder (HCC)- (present on admission)  Assessment & Plan  As per history.  Continue home escitalopram    Hypothyroidism- (present on admission)  Assessment & Plan  As per history.  Previous TSH of 3.103 months ago.  Repeat TSH and free T4    Continue home levothyroxine 50 mcg daily    Fatty liver, alcoholic- (present on admission)  Assessment & Plan  As per history.  Alcohol cessation counseled    Essential hypertension- (present on admission)  Assessment & Plan  As per history.  Blood pressure is currently stable.  Monitor for elevation during alcohol withdrawal.    Hypercholesterolemia- (present on admission)  Assessment & Plan  As per history.  Not currently on statin.        VTE prophylaxis: SCDs/TEDs and pharmacologic prophylaxis contraindicated due to planned surgery

## 2024-12-07 NOTE — ED NOTES
Conscious sedation completed for R ankle reduction, splint placed, pt is back to GCS 15, pt and family aware POC to await imaging results and admit with surgery today or tomorrow

## 2024-12-07 NOTE — OP REPORT
DATE OF OPERATION: 12/7/2024     PREOPERATIVE DIAGNOSIS:  1. Right trimalleolar ankle fracture       2. Right ankle syndesmosis injury    POSTOPERATIVE DIAGNOSIS: Same    PROCEDURE PERFORMED:  1. Open treatment of right trimalleolar ankle fracture without internal fixation of posterior lip                                                    2. Open repair right syndesmosis with internal fixation    SURGEON: Sami Clinton M.D.     ASSISTANT: None    ANESTHESIA: General    ESTIMATED BLOOD LOSS: 10 mL    INDICATIONS: The patient is a 49 y.o. female with a right ankle fracture resulting from a fall.  The patient denies antecedent pain, and was found to have a normal neurovascular exam and skin envelope.  Radiographs reviewed by myself demonstrated the ankle fracture.  Given these findings, surgical treatment of the ankle fracture was indicated.  I discussed the risks and benefits of the procedure, including the risks of infection, wound healing complication, neurovascular injury, compartment syndrome, pain, malunion, non-union, malrotation, and the medical risks of anesthesia including DVT, PE, MI, stroke, and death.  Benefits include early mobilization, improved chance of union, and reduction in the medical risks of ankle fractures.  Alternatives to surgery were also discussed, including non-operative management.  The patient signed the informed consent and the operative extremity was marked.        PROCEDURE:  The patient underwent anesthesia, and was positioned supine on a radiolucent table and all bony prominences were well padded.  Preoperative antibiotics were administered. Sequential compression devices were employed. The correct operative site was prepped and draped into a sterile field. A procedural pause was conducted to verify correct patient, correct extremity, presence of the surgeons initials on the operative extremity.     A well padded tourniquet was inflated to 250mmHg and a lateral incision was  made over the fibula with care taken to avoid all neurovascular structures. Soft tissue stripping was avoided to preserve blood flow to bone and maximize healing potential. The lateral malleolus fracture was reduced with reduction clamps and OIC plate was applied with a combination of locking and non-locking fixation. Good reduction was achieved.      Attention was then turned to the medial malleolus. A J type incision was made with care taken to avoid all neurovascular structures. Soft tissue stripping was avoided to preserve blood flow to bone and maximize healing potential. The medial malleolus was reduced under direct vision and flouroscopic guidance. It was then held with two 4.0 OIC partially threaded cannulated screws.     The syndesmosis was stressed under flouroscopic guidance and found to open so was clamped and held with an OIC Flex Fix device. The posterior fragment reduced well and involved less than 30% of the joint so no additional fixation was required. All screws were checked and found to be of appropriate length and out of the joint.  Wounds were irrigated with normal saline, and closed in layers, with  2-0 Vicryl in the subcutaneous tissue, and nylon in the skin.  Sterile dressings were applied. A well padded posterior splint was applied.     The patient tolerated the procedure well. There were no apparent complications. All sponge, needle, and instrument counts were correct on two separate occasions. They were awakened, extubated, and transferred to the recovery room in satisfactory condition.         Post-Operative Plan:     1.  The patient should remain toe touch weightbearing on their operative extremity.  Gait aids (crutch or crutches, cane, walker) may be used as needed, and may be discontinued when no longer required.  2.  IV antibiotics - may be continued for 24 hours, but are not required.  3.  DVT prophylaxis - SCD's and Lovenox 40 mg SQ daily while inpatient.  The patient may transition  to Aspirin 325 mg PO BID as an outpatient  4.  Discharge planning   ____________________________________   Sami Clinton M.D.   DD: 12/7/2024  1:18 PM

## 2024-12-07 NOTE — RESPIRATORY CARE
Conscious Sedation Respiratory Update       O2 (LPM): 15 (12/07/24 1045)       Events/Summary/Plan: present for conscious sedation. ETCO2 7-24 t/o procedure. no complications (12/07/24 1045)

## 2024-12-07 NOTE — PROGRESS NOTES
4 Eyes Skin Assessment Completed by TONYA Cardona and TONYA Huang.    Head WDL  Ears WDL  Nose WDL  Mouth WDL  Neck WDL  Breast/Chest WDL  Shoulder Blades WDL  Spine WDL  (R) Arm/Elbow/Hand WDL  (L) Arm/Elbow/Hand WDL  Abdomen WDL  Groin WDL  Scrotum/Coccyx/Buttocks WDL  (R) Leg Incision cast with ace wrap in place post surgery   (L) Leg WDL  (R) Heel/Foot/Toe WDL  (L) Heel/Foot/Toe WDL          Devices In Places Tele Box, Blood Pressure Cuff, and Pulse Ox      Interventions In Place Gray Ear Foams and Heel Mepilex    Possible Skin Injury No    Pictures Uploaded Into Epic N/A  Wound Consult Placed N/A  RN Wound Prevention Protocol Ordered Yes

## 2024-12-08 ENCOUNTER — APPOINTMENT (OUTPATIENT)
Dept: RADIOLOGY | Facility: MEDICAL CENTER | Age: 49
DRG: 492 | End: 2024-12-08
Attending: STUDENT IN AN ORGANIZED HEALTH CARE EDUCATION/TRAINING PROGRAM
Payer: COMMERCIAL

## 2024-12-08 LAB
ALBUMIN SERPL BCP-MCNC: 4.1 G/DL (ref 3.2–4.9)
ALBUMIN/GLOB SERPL: 1.3 G/DL
ALP SERPL-CCNC: 137 U/L (ref 30–99)
ALT SERPL-CCNC: 30 U/L (ref 2–50)
ANION GAP SERPL CALC-SCNC: 15 MMOL/L (ref 7–16)
AST SERPL-CCNC: 44 U/L (ref 12–45)
BASOPHILS # BLD AUTO: 0.2 % (ref 0–1.8)
BASOPHILS # BLD: 0.01 K/UL (ref 0–0.12)
BILIRUB SERPL-MCNC: 1 MG/DL (ref 0.1–1.5)
BUN SERPL-MCNC: 9 MG/DL (ref 8–22)
CALCIUM ALBUM COR SERPL-MCNC: 8.6 MG/DL (ref 8.5–10.5)
CALCIUM SERPL-MCNC: 8.7 MG/DL (ref 8.5–10.5)
CHLORIDE SERPL-SCNC: 104 MMOL/L (ref 96–112)
CO2 SERPL-SCNC: 20 MMOL/L (ref 20–33)
CREAT SERPL-MCNC: 0.56 MG/DL (ref 0.5–1.4)
EOSINOPHIL # BLD AUTO: 0 K/UL (ref 0–0.51)
EOSINOPHIL NFR BLD: 0 % (ref 0–6.9)
ERYTHROCYTE [DISTWIDTH] IN BLOOD BY AUTOMATED COUNT: 43.6 FL (ref 35.9–50)
GFR SERPLBLD CREATININE-BSD FMLA CKD-EPI: 111 ML/MIN/1.73 M 2
GLOBULIN SER CALC-MCNC: 3.2 G/DL (ref 1.9–3.5)
GLUCOSE SERPL-MCNC: 137 MG/DL (ref 65–99)
HCT VFR BLD AUTO: 38.8 % (ref 37–47)
HGB BLD-MCNC: 13.4 G/DL (ref 12–16)
IMM GRANULOCYTES # BLD AUTO: 0.02 K/UL (ref 0–0.11)
IMM GRANULOCYTES NFR BLD AUTO: 0.5 % (ref 0–0.9)
LYMPHOCYTES # BLD AUTO: 0.52 K/UL (ref 1–4.8)
LYMPHOCYTES NFR BLD: 12.4 % (ref 22–41)
MAGNESIUM SERPL-MCNC: 2.2 MG/DL (ref 1.5–2.5)
MCH RBC QN AUTO: 34.4 PG (ref 27–33)
MCHC RBC AUTO-ENTMCNC: 34.5 G/DL (ref 32.2–35.5)
MCV RBC AUTO: 99.7 FL (ref 81.4–97.8)
MONOCYTES # BLD AUTO: 0.07 K/UL (ref 0–0.85)
MONOCYTES NFR BLD AUTO: 1.7 % (ref 0–13.4)
NEUTROPHILS # BLD AUTO: 3.57 K/UL (ref 1.82–7.42)
NEUTROPHILS NFR BLD: 85.2 % (ref 44–72)
NRBC # BLD AUTO: 0.02 K/UL
NRBC BLD-RTO: 0.5 /100 WBC (ref 0–0.2)
PHOSPHATE SERPL-MCNC: 3.1 MG/DL (ref 2.5–4.5)
PLATELET # BLD AUTO: 123 K/UL (ref 164–446)
PMV BLD AUTO: 9.1 FL (ref 9–12.9)
POTASSIUM SERPL-SCNC: 4.3 MMOL/L (ref 3.6–5.5)
PROT SERPL-MCNC: 7.3 G/DL (ref 6–8.2)
RBC # BLD AUTO: 3.89 M/UL (ref 4.2–5.4)
SODIUM SERPL-SCNC: 139 MMOL/L (ref 135–145)
WBC # BLD AUTO: 4.2 K/UL (ref 4.8–10.8)

## 2024-12-08 PROCEDURE — 770020 HCHG ROOM/CARE - TELE (206)

## 2024-12-08 PROCEDURE — 80053 COMPREHEN METABOLIC PANEL: CPT

## 2024-12-08 PROCEDURE — 36415 COLL VENOUS BLD VENIPUNCTURE: CPT

## 2024-12-08 PROCEDURE — 700111 HCHG RX REV CODE 636 W/ 250 OVERRIDE (IP): Performed by: STUDENT IN AN ORGANIZED HEALTH CARE EDUCATION/TRAINING PROGRAM

## 2024-12-08 PROCEDURE — 700102 HCHG RX REV CODE 250 W/ 637 OVERRIDE(OP): Performed by: STUDENT IN AN ORGANIZED HEALTH CARE EDUCATION/TRAINING PROGRAM

## 2024-12-08 PROCEDURE — 83735 ASSAY OF MAGNESIUM: CPT

## 2024-12-08 PROCEDURE — 85025 COMPLETE CBC W/AUTO DIFF WBC: CPT

## 2024-12-08 PROCEDURE — 99233 SBSQ HOSP IP/OBS HIGH 50: CPT | Performed by: HOSPITALIST

## 2024-12-08 PROCEDURE — 99222 1ST HOSP IP/OBS MODERATE 55: CPT

## 2024-12-08 PROCEDURE — 70551 MRI BRAIN STEM W/O DYE: CPT

## 2024-12-08 PROCEDURE — 84100 ASSAY OF PHOSPHORUS: CPT

## 2024-12-08 PROCEDURE — A9270 NON-COVERED ITEM OR SERVICE: HCPCS | Performed by: STUDENT IN AN ORGANIZED HEALTH CARE EDUCATION/TRAINING PROGRAM

## 2024-12-08 PROCEDURE — 51798 US URINE CAPACITY MEASURE: CPT

## 2024-12-08 RX ADMIN — OXYCODONE 5 MG: 5 TABLET ORAL at 02:06

## 2024-12-08 RX ADMIN — SENNOSIDES AND DOCUSATE SODIUM 2 TABLET: 50; 8.6 TABLET ORAL at 17:12

## 2024-12-08 RX ADMIN — OXYCODONE 5 MG: 5 TABLET ORAL at 15:17

## 2024-12-08 RX ADMIN — LORAZEPAM 1 MG: 1 TABLET ORAL at 04:08

## 2024-12-08 RX ADMIN — OXYCODONE 5 MG: 5 TABLET ORAL at 20:45

## 2024-12-08 RX ADMIN — LORAZEPAM 1 MG: 1 TABLET ORAL at 23:45

## 2024-12-08 RX ADMIN — GABAPENTIN 300 MG: 300 CAPSULE ORAL at 20:45

## 2024-12-08 RX ADMIN — LORAZEPAM 2 MG: 2 TABLET ORAL at 02:06

## 2024-12-08 RX ADMIN — ENOXAPARIN SODIUM 40 MG: 100 INJECTION SUBCUTANEOUS at 17:12

## 2024-12-08 RX ADMIN — GABAPENTIN 300 MG: 300 CAPSULE ORAL at 12:47

## 2024-12-08 RX ADMIN — LABETALOL HYDROCHLORIDE 10 MG: 5 INJECTION, SOLUTION INTRAVENOUS at 04:01

## 2024-12-08 RX ADMIN — GABAPENTIN 300 MG: 300 CAPSULE ORAL at 05:16

## 2024-12-08 RX ADMIN — HYDROXYZINE HYDROCHLORIDE 25 MG: 25 TABLET, FILM COATED ORAL at 10:39

## 2024-12-08 RX ADMIN — LORAZEPAM 2 MG: 2 TABLET ORAL at 17:21

## 2024-12-08 RX ADMIN — Medication 400 MG: at 17:12

## 2024-12-08 RX ADMIN — LORAZEPAM 1 MG: 1 TABLET ORAL at 15:16

## 2024-12-08 RX ADMIN — LORAZEPAM 1 MG: 1 TABLET ORAL at 19:39

## 2024-12-08 RX ADMIN — Medication 100 MG: at 05:16

## 2024-12-08 RX ADMIN — LEVOTHYROXINE SODIUM 50 MCG: 0.05 TABLET ORAL at 05:16

## 2024-12-08 RX ADMIN — FOLIC ACID 1 MG: 1 TABLET ORAL at 05:16

## 2024-12-08 RX ADMIN — Medication 400 MG: at 05:16

## 2024-12-08 RX ADMIN — OXYCODONE 5 MG: 5 TABLET ORAL at 23:46

## 2024-12-08 RX ADMIN — ONDANSETRON 4 MG: 2 INJECTION INTRAMUSCULAR; INTRAVENOUS at 02:06

## 2024-12-08 RX ADMIN — THERA TABS 1 TABLET: TAB at 05:16

## 2024-12-08 RX ADMIN — ESCITALOPRAM OXALATE 20 MG: 10 TABLET ORAL at 05:16

## 2024-12-08 ASSESSMENT — LIFESTYLE VARIABLES
ORIENTATION AND CLOUDING OF SENSORIUM: ORIENTED AND CAN DO SERIAL ADDITIONS
AGITATION: NORMAL ACTIVITY
TREMOR: *
AGITATION: NORMAL ACTIVITY
NAUSEA AND VOMITING: NO NAUSEA AND NO VOMITING
TOTAL SCORE: VERY MILD ITCHING, PINS AND NEEDLES SENSATION, BURNING OR NUMBNESS
VISUAL DISTURBANCES: NOT PRESENT
AUDITORY DISTURBANCES: NOT PRESENT
AUDITORY DISTURBANCES: NOT PRESENT
TOTAL SCORE: 9
TOTAL SCORE: VERY MILD ITCHING, PINS AND NEEDLES SENSATION, BURNING OR NUMBNESS
TREMOR: MODERATE TREMOR WITH ARMS EXTENDED
VISUAL DISTURBANCES: NOT PRESENT
VISUAL DISTURBANCES: NOT PRESENT
TOTAL SCORE: 8
PAROXYSMAL SWEATS: BARELY PERCEPTIBLE SWEATING. PALMS MOIST
AGITATION: NORMAL ACTIVITY
NAUSEA AND VOMITING: NO NAUSEA AND NO VOMITING
TOTAL SCORE: 8
HEADACHE, FULLNESS IN HEAD: NOT PRESENT
ANXIETY: MODERATELY ANXIOUS OR GUARDED, SO ANXIETY IS INFERRED
ORIENTATION AND CLOUDING OF SENSORIUM: ORIENTED AND CAN DO SERIAL ADDITIONS
SUBSTANCE_ABUSE: 1
HEADACHE, FULLNESS IN HEAD: NOT PRESENT
NAUSEA AND VOMITING: NO NAUSEA AND NO VOMITING
VISUAL DISTURBANCES: VERY MILD SENSITIVITY
PAROXYSMAL SWEATS: NO SWEAT VISIBLE
TREMOR: MODERATE TREMOR WITH ARMS EXTENDED
VISUAL DISTURBANCES: NOT PRESENT
ANXIETY: *
ORIENTATION AND CLOUDING OF SENSORIUM: ORIENTED AND CAN DO SERIAL ADDITIONS
NAUSEA AND VOMITING: NO NAUSEA AND NO VOMITING
AUDITORY DISTURBANCES: VERY MILD HARSHNESS OR ABILITY TO FRIGHTEN
ORIENTATION AND CLOUDING OF SENSORIUM: ORIENTED AND CAN DO SERIAL ADDITIONS
AUDITORY DISTURBANCES: NOT PRESENT
TOTAL SCORE: 12
AGITATION: NORMAL ACTIVITY
VISUAL DISTURBANCES: NOT PRESENT
PAROXYSMAL SWEATS: BARELY PERCEPTIBLE SWEATING. PALMS MOIST
HEADACHE, FULLNESS IN HEAD: NOT PRESENT
NAUSEA AND VOMITING: MILD NAUSEA WITH NO VOMITING
PAROXYSMAL SWEATS: NO SWEAT VISIBLE
NAUSEA AND VOMITING: NO NAUSEA AND NO VOMITING
TOTAL SCORE: 4
ANXIETY: MILDLY ANXIOUS
ANXIETY: NO ANXIETY (AT EASE)
TOTAL SCORE: 4
ORIENTATION AND CLOUDING OF SENSORIUM: ORIENTED AND CAN DO SERIAL ADDITIONS
ANXIETY: *
AGITATION: NORMAL ACTIVITY
ORIENTATION AND CLOUDING OF SENSORIUM: ORIENTED AND CAN DO SERIAL ADDITIONS
PAROXYSMAL SWEATS: NO SWEAT VISIBLE
TREMOR: *
AUDITORY DISTURBANCES: NOT PRESENT
ORIENTATION AND CLOUDING OF SENSORIUM: ORIENTED AND CAN DO SERIAL ADDITIONS
TOTAL SCORE: MILD ITCHING, PINS AND NEEDLES SENSATION, BURNING OR NUMBNESS
HEADACHE, FULLNESS IN HEAD: NOT PRESENT
PAROXYSMAL SWEATS: NO SWEAT VISIBLE
TOTAL SCORE: VERY MILD ITCHING, PINS AND NEEDLES SENSATION, BURNING OR NUMBNESS
AUDITORY DISTURBANCES: VERY MILD HARSHNESS OR ABILITY TO FRIGHTEN
TOTAL SCORE: 13
TREMOR: MODERATE TREMOR WITH ARMS EXTENDED
TOTAL SCORE: MILD ITCHING, PINS AND NEEDLES SENSATION, BURNING OR NUMBNESS
PAROXYSMAL SWEATS: NO SWEAT VISIBLE
TREMOR: MODERATE TREMOR WITH ARMS EXTENDED
NAUSEA AND VOMITING: NO NAUSEA AND NO VOMITING
PAROXYSMAL SWEATS: BARELY PERCEPTIBLE SWEATING. PALMS MOIST
NAUSEA AND VOMITING: MILD NAUSEA WITH NO VOMITING
VISUAL DISTURBANCES: VERY MILD SENSITIVITY
AGITATION: NORMAL ACTIVITY
ORIENTATION AND CLOUDING OF SENSORIUM: ORIENTED AND CAN DO SERIAL ADDITIONS
HEADACHE, FULLNESS IN HEAD: NOT PRESENT
AGITATION: NORMAL ACTIVITY
AUDITORY DISTURBANCES: MILD HARSHNESS OR ABILITY TO FRIGHTEN
VISUAL DISTURBANCES: NOT PRESENT
ANXIETY: MODERATELY ANXIOUS OR GUARDED, SO ANXIETY IS INFERRED
HEADACHE, FULLNESS IN HEAD: NOT PRESENT
AUDITORY DISTURBANCES: NOT PRESENT
ANXIETY: MODERATELY ANXIOUS OR GUARDED, SO ANXIETY IS INFERRED
HEADACHE, FULLNESS IN HEAD: NOT PRESENT
HEADACHE, FULLNESS IN HEAD: NOT PRESENT
TREMOR: *
TOTAL SCORE: 10
AGITATION: NORMAL ACTIVITY
TREMOR: SEVERE TREMOR, EVEN WITH ARMS NOT EXTENDED
ANXIETY: NO ANXIETY (AT EASE)

## 2024-12-08 ASSESSMENT — PAIN DESCRIPTION - PAIN TYPE
TYPE: SURGICAL PAIN
TYPE: ACUTE PAIN
TYPE: SURGICAL PAIN
TYPE: ACUTE PAIN

## 2024-12-08 ASSESSMENT — FIBROSIS 4 INDEX
FIB4 SCORE: 3.27
FIB4 SCORE: 3.27

## 2024-12-08 ASSESSMENT — ENCOUNTER SYMPTOMS
WEIGHT LOSS: 0
SORE THROAT: 0
TREMORS: 1
BLURRED VISION: 0
DIAPHORESIS: 0
FALLS: 1
DIZZINESS: 0
HEADACHES: 0
MYALGIAS: 0
PALPITATIONS: 0
COUGH: 0
SHORTNESS OF BREATH: 0
RESPIRATORY NEGATIVE: 1
NAUSEA: 0
WEAKNESS: 0
GASTROINTESTINAL NEGATIVE: 1
FOCAL WEAKNESS: 0
CHILLS: 0
FEVER: 0
DEPRESSION: 0
VOMITING: 0
EYES NEGATIVE: 1
BRUISES/BLEEDS EASILY: 0
CARDIOVASCULAR NEGATIVE: 1
ABDOMINAL PAIN: 0

## 2024-12-08 NOTE — PROGRESS NOTES
Bedside report received from off going RN/tech: Dulce, assumed care of patient.     Fall Risk Score: MODERATE RISK  Fall risk interventions in place: Provide patient/family education based on risk assessment, Educate patient/family to call staff for assistance when getting out of bed, Place fall precaution signage outside patient door, Utilize bed/chair fall alarm, and Bed alarm connected correctly  Bed type: Regular (Sam Score less than 17 interventions in place)  Patient on cardiac monitor: Yes  IVF/IV medications: Infusion per MAR (List Med(s)) Mag at 25mL/hr  Oxygen: How many liters 2L, Traced the line to wall oxygen, and No oxygen tank in room  Bedside sitter: Not Applicable   Isolation: Not applicable

## 2024-12-08 NOTE — THERAPY
12/08/24 1145   Time In/Time Out   Therapy Start Time 1140   Therapy End Time 1145   Total Therapy Time 5   Initial Contact Note    Initial Contact Note Order Received and Verified, Physical Therapy Evaluation in Progress with Full Report to Follow.   Precautions   Precautions Fall Risk   Comments TTWB RLE   Interdisciplinary Plan of Care Collaboration   IDT Collaboration with  Nursing   Patient Position at End of Therapy In Bed;Bed Alarm On   Collaboration Comments pt request to Hld eval today due to active DT's and feeling too shakey. nsg notified. Will reattept tomorrow.   Session Information   Date / Session Number  12/8 attempted active withdrawal (EVAL)

## 2024-12-08 NOTE — PROGRESS NOTES
Monitor Summary  Rhythm: Sinus rhythm, sinus tach  Rate:  Ectopy:PAC, PVC, Bigem  .15/.1/.39

## 2024-12-08 NOTE — PROGRESS NOTES
[facility] Medicine Daily Progress Note    Date of Service  12/8/2024    Chief Complaint  Minerva Tillman is a 49 y.o. female admitted 12/7/2024 with ankle pain after a fall     Hospital Course    Minerva Tillman is a 49 y.o. female who presented 12/7/2024 with right ankle pain following ground-level fall.  Is a pleasant woman with a history of depression, hypertension, hypercholesterolemia, and alcohol dependence in intermittent remission, who started drinking after 45 days of sobriety around Thanksgiving.  She reports that she started to experience stress because her kids were not present for the Thanksgiving event.  She was previously in treatment as well as being active with alcohol synonymous.  This morning, she had a mechanical ground-level fall after tripping over her small dog landing onto the floor.  She had extreme pain in the right ankle.  Noted some deformity and was unable to ambulate.  Her  called EMS and was brought to the emergency room.  Patient reports that she has been taking gabapentin for the last 3 months.  She reports having 2 alcohol withdrawal seizures previously.  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     In the emergency room, patient was noted to have a displaced right trimalleolar fracture.  The patient reported that she hit her head so head CT was performed, which showed probably new hypodensity in the central superior dash, which could be related to new probably subacute/chronic small infarct or other insult.  No acute intracranial hemorrhages.  Neurologically she appeared to be intact and moving all extremities with no focal deficits.  Blood alcohol level was elevated at 360.  She was noted to be having tremors going into alcohol withdrawal per ER physician.    Interval Problem Update  Axox3, with moderate tremor, she reports only minimal ankle pain, no numbness or tingling. She denies hallucinations, most recent CIWA was 8, she tells me she has  had etoh w/d seizures in the past, very unsteady on her feet. No dizziness, no chest pain, no sob, no h/a or visual changes. MRI brain pending. Exam and vitals stable, plts decreasing some, following closely she is at high risk for deterioration    I have discussed this patient's plan of care and discharge plan at IDT rounds today with Case Management, Nursing, Nursing leadership, and other members of the IDT team.    Consultants/Specialty  [facility]    Code Status  Full Code    Disposition  The patient is not medically cleared for discharge to home or a post-acute facility.      I have placed the appropriate orders for post-discharge needs.    Review of Systems  Review of Systems   Constitutional:  Positive for malaise/fatigue. Negative for chills, diaphoresis, fever and weight loss.   HENT: Negative.  Negative for sore throat.    Eyes: Negative.  Negative for blurred vision.   Respiratory: Negative.  Negative for cough and shortness of breath.    Cardiovascular: Negative.  Negative for chest pain, palpitations and leg swelling.   Gastrointestinal: Negative.  Negative for abdominal pain, nausea and vomiting.   Genitourinary: Negative.  Negative for dysuria.   Musculoskeletal:  Positive for falls and joint pain. Negative for myalgias.   Skin: Negative.  Negative for itching and rash.   Neurological:  Positive for tremors. Negative for dizziness, focal weakness, weakness and headaches.   Endo/Heme/Allergies: Negative.  Does not bruise/bleed easily.   Psychiatric/Behavioral:  Positive for substance abuse. Negative for depression and suicidal ideas.    All other systems reviewed and are negative.       Physical Exam  Temp:  [36.3 °C (97.3 °F)-36.8 °C (98.2 °F)] 36.6 °C (97.9 °F)  Pulse:  [] 94  Resp:  [11-17] 16  BP: (118-157)/() 155/92  SpO2:  [92 %-97 %] 96 %    Physical Exam  Vitals and nursing note reviewed. Exam conducted with a chaperone present.   Constitutional:       General: She is not in acute  distress.     Appearance: Normal appearance. She is not diaphoretic.   HENT:      Head: Normocephalic.      Nose: Nose normal.      Mouth/Throat:      Mouth: Mucous membranes are moist.   Eyes:      Pupils: Pupils are equal, round, and reactive to light.   Cardiovascular:      Rate and Rhythm: Normal rate and regular rhythm.      Pulses: Normal pulses.      Heart sounds: Normal heart sounds.   Pulmonary:      Effort: Pulmonary effort is normal.      Breath sounds: Normal breath sounds.   Abdominal:      General: Abdomen is flat. Bowel sounds are normal.      Palpations: Abdomen is soft.   Musculoskeletal:         General: No swelling or deformity. Normal range of motion.      Comments: R ankle cdi   Skin:     General: Skin is warm and dry.      Capillary Refill: Capillary refill takes less than 2 seconds.   Neurological:      General: No focal deficit present.      Mental Status: She is alert and oriented to person, place, and time.      Cranial Nerves: No cranial nerve deficit.      Comments: Moderate tremor   Psychiatric:         Mood and Affect: Mood normal.         Behavior: Behavior normal.         Fluids    Intake/Output Summary (Last 24 hours) at 12/8/2024 1437  Last data filed at 12/8/2024 1149  Gross per 24 hour   Intake 2360 ml   Output 800 ml   Net 1560 ml        Laboratory  Recent Labs     12/07/24  1022 12/08/24  0042   WBC 9.5 4.2*   RBC 4.12* 3.89*   HEMOGLOBIN 14.4 13.4   HEMATOCRIT 41.4 38.8   .5* 99.7*   MCH 35.0* 34.4*   MCHC 34.8 34.5   RDW 45.0 43.6   PLATELETCT 183 123*   MPV 8.7* 9.1     Recent Labs     12/07/24  1022 12/08/24  0042   SODIUM 141 139   POTASSIUM 4.0 4.3   CHLORIDE 104 104   CO2 21 20   GLUCOSE 104* 137*   BUN 9 9   CREATININE 0.62 0.56   CALCIUM 8.7 8.7                   Imaging  MR-BRAIN-W/O   Final Result      1.  Mild cerebral atrophy beyond expected for the patient's stated age of 49 years. This most likely relates to the alcohol history.   2.  Minimal supratentorial  white matter disease with single punctate focus of FLAIR hyperintensity in the left peritrigonal deep white matter. Nonspecific finding most consistent with microvascular ischemic change versus demyelination or gliosis. Not    uncommon for the patient's age.   3.  9 mm focus of T2 hyperintensity in the central dash with well demarcated T1 hypointensity. 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 There is no ADC map hypointensity to suggest restricted diffusion and this is not thought to represent an acute brainstem infarct.   4.  No evidence of acute hemorrhage or mass lesion.      CT-ANKLE W/O PLUS RECONS RIGHT   Final Result         1. Comminuted fractures of the distal tibia and fibula as detailed above.   2. The navicular fracture noted previously has healed.      CT-HEAD W/O   Final Result      1.  There is a probably new hypodensity in the central superior dash which could be related to new probably subacute/chronic small infarct or other insult..   2.  No acute intracranial hemorrhage.               DX-ANKLE 2- VIEWS RIGHT   Final Result         1. Right ankle fracture dislocation.                     DX-ANKLE 2- VIEWS RIGHT    (Results Pending)   DX-PORTABLE FLUORO > 1 HOUR    (Results Pending)        Assessment/Plan  * Trimalleolar fracture of ankle, closed, right, initial encounter- (present on admission)  Assessment & Plan  After mechanical fall     S/p surgical repair with  Dr. Clinton orthopedic surgery 12/7  Supportive care  Pain control with Tylenol, oxycodone, and IV Dilaudid as needed for breakthrough pain  Continuous pulse oximetry monitoring to monitor for hypoxia and respiratory depression while receiving IV narcotic medications.      Ground-level fall- (present on admission)  Assessment & Plan  Patient had mechanical ground-level fall from tripping over her  dog.   PT and OT  Supportive care    Alcohol withdrawal syndrome without complication (HCC)- (present on admission)  Assessment & Plan  Patient has a history of severe alcohol withdrawal syndrome as well as seizures.  Ongoing seizures, at risk for deterioration, following closely   Continuous cardiac monitoring to monitor for arrhythmias  Continue multivitamin, thiamine, and folic acid supplementation  Aspiration, fall and seizure precautions  Continue gabapentin 300 mg 3 times a day    Balance problem- (present on admission)  Assessment & Plan  Non specific abnormality on CT head noted, etoh use likely contributing to falls  Following  MRI brain pending       Acute hypoxic respiratory failure (HCC)- (present on admission)  Assessment & Plan  Suspect due atelectasis, noted on exam  Following  IS encouraged    Alcoholic hepatitis with ascites- (present on admission)  Assessment & Plan  Following  No ascites currently     Alcohol use disorder, severe, dependence (HCC)- (present on admission)  Assessment & Plan  Encouraged returning back to [employer] engaging with her sponsor.  Patient is interested in maintaining sobriety.  She did remain sober for 45 days prior to Thanksgiving.    Mild episode of recurrent major depressive disorder (HCC)- (present on admission)  Assessment & Plan  Denies SI  Supportive care  Continue home escitalopram    Hypothyroidism- (present on admission)  Assessment & Plan    Continue home levothyroxine 50 mcg daily    Fatty liver, alcoholic- (present on admission)  Assessment & Plan  As per history.  Alcohol cessation counseled    Essential hypertension- (present on admission)  Assessment & Plan  As per history.  Blood pressure is currently stable.  Monitor for elevation during alcohol withdrawal.    Hypercholesterolemia- (present on admission)  Assessment & Plan  As per history.  Not currently on statin.         VTE prophylaxis: lovenox    I have performed a physical exam and  reviewed and updated ROS and Plan today (12/8/2024). In review of yesterday's note (12/7/2024), there are no changes except as documented above.

## 2024-12-08 NOTE — CARE PLAN
The patient is Watcher - Medium risk of patient condition declining or worsening    Shift Goals  Clinical Goals: ciwa, pain control  Patient Goals: withdrawal safely  Family Goals: eddie    Progress made toward(s) clinical / shift goals:    Problem: Optimal Care for Alcohol Withdrawal  Goal: Optimal Care for the alcohol withdrawal patient  Outcome: Progressing  Note: CIWA assessments in place, medicating using CIWA scale and MAR as ordered, monitoring labs     Problem: Seizure Precautions  Goal: Implementation of seizure precautions  Outcome: Progressing  Note: Seizure precautions in place     Problem: Pain - Standard  Goal: Alleviation of pain or a reduction in pain to the patient’s comfort goal  12/8/2024 0224 by Elvis Ascencio R.N.  Outcome: Progressing  Note: Assess and monitor for pain. Provide pharmacological and non-pharmacological interventions as appropriate. Re-evaluate and continue to monitor.     12/8/2024 0222 by Elvis Ascencio, R.N.  Note: Assess and monitor for pain. Provide pharmacological and non-pharmacological interventions as appropriate. Re-evaluate and continue to monitor.

## 2024-12-08 NOTE — CARE PLAN
Problem: Knowledge Deficit - Standard  Goal: Patient and family/care givers will demonstrate understanding of plan of care, disease process/condition, diagnostic tests and medications  Outcome: Progressing     Problem: Optimal Care for Alcohol Withdrawal  Goal: Optimal Care for the alcohol withdrawal patient  Outcome: Progressing     Problem: Fall Risk  Goal: Patient will remain free from falls  Reactivated     Problem: Pain - Standard  Goal: Alleviation of pain or a reduction in pain to the patient’s comfort goal  Outcome: Progressing   The patient is Stable - Low risk of patient condition declining or worsening    Shift Goals  Clinical Goals: Remain free of falls, no skin breakdown  Patient Goals: Rest  Family Goals: MISTY    Progress made toward(s) clinical / shift goals:    Pt educated on plan of care, pt verbalizes understanding, reinforcement needed. Pt educated on pain/anxiety scales, alleviating factors, pain/anxiety medications, and side effects, and need for pain/anxiety reassessment, pt pain/anxiety controlled at this time, reinforcement needed. Pt educated on the need to reposition frequently and remain dry to avoid skin breakdown, reinforcement needed, no further skin breakdown during shift. Fall risk education provided to pt, pt educated about the need to call for assistance while attempting to ambulate, reinforcement needed, pt remains free of falls this shift

## 2024-12-08 NOTE — PROGRESS NOTES
"    Orthopedic PA Progress Note    Interval changes:  Patient doing well postop. Resting comfortably in bed. Woke up for exam, but went right back to sleep. No guests at bedside.  PT/OT today  R ankle splint and dressings are CDI  TTWB RLE  No pending ortho procedures  Ok to initiate DVT chemoprophylaxis  Follow up with the [facility] 2 weeks postop.  Cleared for DC from orthopedic standpoint pending therapy recs and medical optimization    ROS - Patient denies any new issues.  Denies any numbness or tingling. Pain controlled.    BP (!) 136/99   Pulse 75   Temp 36.3 °C (97.3 °F) (Temporal)   Resp 16   Ht 1.575 m (5' 2\")   Wt 72.9 kg (160 lb 11.5 oz)   SpO2 94%     Patient seen and examined  No acute distress  Breathing non labored  RRR  RLE: Splint and dressings CDI. Flexes and extends all toes. SILT distally. Cap refill <2s    Recent Labs     12/07/24  1022 12/08/24  0042   WBC 9.5 4.2*   RBC 4.12* 3.89*   HEMOGLOBIN 14.4 13.4   HEMATOCRIT 41.4 38.8   .5* 99.7*   MCH 35.0* 34.4*   MCHC 34.8 34.5   RDW 45.0 43.6   PLATELETCT 183 123*   MPV 8.7* 9.1       Active Hospital Problems    Diagnosis     Trimalleolar fracture of ankle, closed, right, initial encounter [S82.851A]     Ground-level fall [W18.30XA]     Alcohol withdrawal syndrome without complication (HCC) [F10.930]     Alcohol use disorder, severe, dependence (HCC) [F10.20]     Fatty liver, alcoholic [K70.0]      Chronic alcohol use   Previously used to drink alcohol - wine( 7-10 drinks).   Sine 2020 - excessive alcohol , drinking 4-5 glasses wine a day.   Severe hepatic steatosis found on imaging   Patient has now stopped alcohol completely since 1 month.       Hypothyroidism [E03.9]     Essential hypertension [I10]     Hypercholesterolemia [E78.00]     Mild episode of recurrent major depressive disorder (HCC) [F33.0]        CT-ANKLE W/O PLUS RECONS RIGHT   Final Result         1. Comminuted fractures of the distal tibia and fibula as " detailed above.   2. The navicular fracture noted previously has healed.      CT-HEAD W/O   Final Result      1.  There is a probably new hypodensity in the central superior dash which could be related to new probably subacute/chronic small infarct or other insult..   2.  No acute intracranial hemorrhage.               DX-ANKLE 2- VIEWS RIGHT   Final Result         1. Right ankle fracture dislocation.                     DX-ANKLE 2- VIEWS RIGHT    (Results Pending)   MR-BRAIN-W/O    (Results Pending)   DX-PORTABLE FLUORO > 1 HOUR    (Results Pending)       Assessment/Plan:  Patient doing well postop. Resting comfortably in bed. Woke up for exam, but went right back to sleep. No guests at bedside.  PT/OT today  R ankle splint and dressings are CDI  TTWB RLE  No pending ortho procedures  Ok to initiate DVT chemoprophylaxis  Follow up with the [facility] trauma [facility] 2 weeks postop.  Cleared for DC from orthopedic standpoint pending therapy recs and medical optimization    POD#1 S/p  1. Open treatment of right trimalleolar ankle fracture without internal fixation of posterior lip  2. Open repair right syndesmosis with internal fixation  Wt bearing status - TTWB RLE  Future Procedures - None planned   Wound care/Drains - Dressings to be left in place until follow up appt  Sutures/Staples out- 14-21 days post operatively. Removal will completed by ortho [facility] unless transferred.  Inpatient DVT prophylaxis: Lovenox initiated 12/8  DVT Prophylaxis outpatient: ASA 81 mg PO BID x4 weeks  PT/OT-initiated  Antibiotics:  Perioperative completed  DVT Prophylaxis- TEDS/SCDs/Foot pumps  Case Coordination for Discharge Planning - Disposition per therapy recs.

## 2024-12-08 NOTE — ANESTHESIA POSTPROCEDURE EVALUATION
Patient: Minerva Tillman    Procedure Summary       Date: 12/07/24 Room / Location: [place] [address] / SURGERY [facility]    Anesthesia Start: 1218 Anesthesia Stop: 1328    Procedure: ORIF, ANKLE (Right: Ankle) Diagnosis: (Right trimalleolar ankle fracture)    Surgeons: Sami Clinton M.D. Responsible Provider: John Reynoso D.O.    Anesthesia Type: general, peripheral nerve block ASA Status: 3            Final Anesthesia Type: general, peripheral nerve block  Last vitals  BP   Blood Pressure: (!) 139/96    Temp   36.4 °C (97.5 °F)    Pulse   80   Resp   12    SpO2   92 %      Anesthesia Post Evaluation    Patient location during evaluation: PACU  Patient participation: complete - patient participated  Level of consciousness: awake and alert    Airway patency: patent  Anesthetic complications: no  Cardiovascular status: hemodynamically stable  Respiratory status: acceptable  Hydration status: euvolemic    PONV: none          No notable events documented.     Nurse Pain Score: 4 (NPRS)

## 2024-12-08 NOTE — PROGRESS NOTES
Report received from night RN at 0700. PT seen at bedside and pt care assumed. Pt is A&Ox4, on RA, denies pain. PIV flushed and intact. PT is SR on telemetry. PT is standby assist.     Plan of care reviewed with pt, call light, phone, and personal belongings within reach. Bed alarm on, and bed in low locked position. All pt's needs met at this time.    Bedside report received from off going RN/tech: TONYA Lujan, assumed care of patient.     Fall Risk Score: MODERATE RISK  Fall risk interventions in place: Place yellow fall risk ID band on patient, Provide patient/family education based on risk assessment, Educate patient/family to call staff for assistance when getting out of bed, Place fall precaution signage outside patient door, Utilize bed/chair fall alarm, and Bed alarm connected correctly  Bed type: Regular (Sam Score less than 17 interventions in place)  Patient on cardiac monitor: Yes  IVF/IV medications: Not Applicable   Oxygen: How many liters 2L, Traced the line to wall oxygen, and No oxygen tank in room  Bedside sitter: Not Applicable   Isolation: Not applicable

## 2024-12-09 PROBLEM — D69.6 THROMBOCYTOPENIA (HCC): Status: ACTIVE | Noted: 2024-12-09

## 2024-12-09 LAB
ALBUMIN SERPL BCP-MCNC: 3.8 G/DL (ref 3.2–4.9)
ALBUMIN/GLOB SERPL: 1.3 G/DL
ALP SERPL-CCNC: 122 U/L (ref 30–99)
ALT SERPL-CCNC: 21 U/L (ref 2–50)
ANION GAP SERPL CALC-SCNC: 10 MMOL/L (ref 7–16)
AST SERPL-CCNC: 30 U/L (ref 12–45)
BASOPHILS # BLD AUTO: 0.1 % (ref 0–1.8)
BASOPHILS # BLD: 0.01 K/UL (ref 0–0.12)
BILIRUB SERPL-MCNC: 0.9 MG/DL (ref 0.1–1.5)
BUN SERPL-MCNC: 11 MG/DL (ref 8–22)
CALCIUM ALBUM COR SERPL-MCNC: 9.1 MG/DL (ref 8.5–10.5)
CALCIUM SERPL-MCNC: 8.9 MG/DL (ref 8.5–10.5)
CHLORIDE SERPL-SCNC: 101 MMOL/L (ref 96–112)
CHOLEST SERPL-MCNC: 323 MG/DL (ref 100–199)
CO2 SERPL-SCNC: 26 MMOL/L (ref 20–33)
CREAT SERPL-MCNC: 0.71 MG/DL (ref 0.5–1.4)
EOSINOPHIL # BLD AUTO: 0 K/UL (ref 0–0.51)
EOSINOPHIL NFR BLD: 0 % (ref 0–6.9)
ERYTHROCYTE [DISTWIDTH] IN BLOOD BY AUTOMATED COUNT: 42.7 FL (ref 35.9–50)
GFR SERPLBLD CREATININE-BSD FMLA CKD-EPI: 103 ML/MIN/1.73 M 2
GLOBULIN SER CALC-MCNC: 3 G/DL (ref 1.9–3.5)
GLUCOSE SERPL-MCNC: 107 MG/DL (ref 65–99)
HCT VFR BLD AUTO: 35.8 % (ref 37–47)
HDLC SERPL-MCNC: 84 MG/DL
HGB BLD-MCNC: 12.5 G/DL (ref 12–16)
IMM GRANULOCYTES # BLD AUTO: 0.03 K/UL (ref 0–0.11)
IMM GRANULOCYTES NFR BLD AUTO: 0.4 % (ref 0–0.9)
LDLC SERPL CALC-MCNC: 220 MG/DL
LYMPHOCYTES # BLD AUTO: 1.25 K/UL (ref 1–4.8)
LYMPHOCYTES NFR BLD: 17.7 % (ref 22–41)
MAGNESIUM SERPL-MCNC: 1.8 MG/DL (ref 1.5–2.5)
MCH RBC QN AUTO: 34.6 PG (ref 27–33)
MCHC RBC AUTO-ENTMCNC: 34.9 G/DL (ref 32.2–35.5)
MCV RBC AUTO: 99.2 FL (ref 81.4–97.8)
MONOCYTES # BLD AUTO: 0.8 K/UL (ref 0–0.85)
MONOCYTES NFR BLD AUTO: 11.3 % (ref 0–13.4)
NEUTROPHILS # BLD AUTO: 4.99 K/UL (ref 1.82–7.42)
NEUTROPHILS NFR BLD: 70.5 % (ref 44–72)
NRBC # BLD AUTO: 0.03 K/UL
NRBC BLD-RTO: 0.4 /100 WBC (ref 0–0.2)
PLATELET # BLD AUTO: 115 K/UL (ref 164–446)
PMV BLD AUTO: 9.5 FL (ref 9–12.9)
POTASSIUM SERPL-SCNC: 4 MMOL/L (ref 3.6–5.5)
PROT SERPL-MCNC: 6.8 G/DL (ref 6–8.2)
RBC # BLD AUTO: 3.61 M/UL (ref 4.2–5.4)
SODIUM SERPL-SCNC: 137 MMOL/L (ref 135–145)
TRIGL SERPL-MCNC: 93 MG/DL (ref 0–149)
WBC # BLD AUTO: 7.1 K/UL (ref 4.8–10.8)

## 2024-12-09 PROCEDURE — 36415 COLL VENOUS BLD VENIPUNCTURE: CPT

## 2024-12-09 PROCEDURE — 85025 COMPLETE CBC W/AUTO DIFF WBC: CPT

## 2024-12-09 PROCEDURE — 700111 HCHG RX REV CODE 636 W/ 250 OVERRIDE (IP): Performed by: STUDENT IN AN ORGANIZED HEALTH CARE EDUCATION/TRAINING PROGRAM

## 2024-12-09 PROCEDURE — A9270 NON-COVERED ITEM OR SERVICE: HCPCS | Performed by: STUDENT IN AN ORGANIZED HEALTH CARE EDUCATION/TRAINING PROGRAM

## 2024-12-09 PROCEDURE — 99232 SBSQ HOSP IP/OBS MODERATE 35: CPT | Performed by: HOSPITALIST

## 2024-12-09 PROCEDURE — 80061 LIPID PANEL: CPT

## 2024-12-09 PROCEDURE — 770020 HCHG ROOM/CARE - TELE (206)

## 2024-12-09 PROCEDURE — 80053 COMPREHEN METABOLIC PANEL: CPT

## 2024-12-09 PROCEDURE — 700102 HCHG RX REV CODE 250 W/ 637 OVERRIDE(OP): Performed by: STUDENT IN AN ORGANIZED HEALTH CARE EDUCATION/TRAINING PROGRAM

## 2024-12-09 PROCEDURE — 51798 US URINE CAPACITY MEASURE: CPT

## 2024-12-09 PROCEDURE — 83735 ASSAY OF MAGNESIUM: CPT

## 2024-12-09 RX ADMIN — OXYCODONE HYDROCHLORIDE 10 MG: 10 TABLET ORAL at 16:53

## 2024-12-09 RX ADMIN — OXYCODONE HYDROCHLORIDE 10 MG: 10 TABLET ORAL at 06:47

## 2024-12-09 RX ADMIN — LORAZEPAM 0.5 MG: 0.5 TABLET ORAL at 03:53

## 2024-12-09 RX ADMIN — GABAPENTIN 300 MG: 300 CAPSULE ORAL at 20:28

## 2024-12-09 RX ADMIN — ENOXAPARIN SODIUM 40 MG: 100 INJECTION SUBCUTANEOUS at 16:53

## 2024-12-09 RX ADMIN — LEVOTHYROXINE SODIUM 50 MCG: 0.05 TABLET ORAL at 04:18

## 2024-12-09 RX ADMIN — HYDROMORPHONE HYDROCHLORIDE 0.5 MG: 1 INJECTION, SOLUTION INTRAMUSCULAR; INTRAVENOUS; SUBCUTANEOUS at 09:27

## 2024-12-09 RX ADMIN — LORAZEPAM 1 MG: 1 TABLET ORAL at 10:15

## 2024-12-09 RX ADMIN — GABAPENTIN 300 MG: 300 CAPSULE ORAL at 04:18

## 2024-12-09 RX ADMIN — LORAZEPAM 0.5 MG: 0.5 TABLET ORAL at 20:28

## 2024-12-09 RX ADMIN — ACETAMINOPHEN 650 MG: 325 TABLET, FILM COATED ORAL at 20:28

## 2024-12-09 RX ADMIN — HYDROMORPHONE HYDROCHLORIDE 0.5 MG: 1 INJECTION, SOLUTION INTRAMUSCULAR; INTRAVENOUS; SUBCUTANEOUS at 13:09

## 2024-12-09 RX ADMIN — ESCITALOPRAM OXALATE 20 MG: 10 TABLET ORAL at 04:17

## 2024-12-09 RX ADMIN — GABAPENTIN 300 MG: 300 CAPSULE ORAL at 13:09

## 2024-12-09 RX ADMIN — ONDANSETRON 4 MG: 2 INJECTION INTRAMUSCULAR; INTRAVENOUS at 00:58

## 2024-12-09 RX ADMIN — LORAZEPAM 1 MG: 1 TABLET ORAL at 23:44

## 2024-12-09 RX ADMIN — Medication 400 MG: at 04:18

## 2024-12-09 RX ADMIN — THERA TABS 1 TABLET: TAB at 04:17

## 2024-12-09 RX ADMIN — FOLIC ACID 1 MG: 1 TABLET ORAL at 04:18

## 2024-12-09 RX ADMIN — ONDANSETRON 4 MG: 4 TABLET, ORALLY DISINTEGRATING ORAL at 10:44

## 2024-12-09 RX ADMIN — LORAZEPAM 3 MG: 1 TABLET ORAL at 19:20

## 2024-12-09 RX ADMIN — SENNOSIDES AND DOCUSATE SODIUM 2 TABLET: 50; 8.6 TABLET ORAL at 16:53

## 2024-12-09 RX ADMIN — LORAZEPAM 1 MG: 2 INJECTION INTRAMUSCULAR; INTRAVENOUS at 08:14

## 2024-12-09 RX ADMIN — Medication 100 MG: at 04:18

## 2024-12-09 RX ADMIN — OXYCODONE HYDROCHLORIDE 10 MG: 10 TABLET ORAL at 10:44

## 2024-12-09 RX ADMIN — Medication 400 MG: at 16:53

## 2024-12-09 ASSESSMENT — LIFESTYLE VARIABLES
ANXIETY: MODERATELY ANXIOUS OR GUARDED, SO ANXIETY IS INFERRED
ORIENTATION AND CLOUDING OF SENSORIUM: ORIENTED AND CAN DO SERIAL ADDITIONS
ORIENTATION AND CLOUDING OF SENSORIUM: ORIENTED AND CAN DO SERIAL ADDITIONS
TREMOR: NO TREMOR
HEADACHE, FULLNESS IN HEAD: NOT PRESENT
TREMOR: MODERATE TREMOR WITH ARMS EXTENDED
VISUAL DISTURBANCES: MODERATELY SEVERE HALLUCINATIONS
ANXIETY: MILDLY ANXIOUS
TOTAL SCORE: 16
PAROXYSMAL SWEATS: NO SWEAT VISIBLE
ORIENTATION AND CLOUDING OF SENSORIUM: ORIENTED AND CAN DO SERIAL ADDITIONS
PAROXYSMAL SWEATS: BARELY PERCEPTIBLE SWEATING. PALMS MOIST
TREMOR: TREMOR NOT VISIBLE BUT CAN BE FELT, FINGERTIP TO FINGERTIP
AUDITORY DISTURBANCES: NOT PRESENT
NAUSEA AND VOMITING: *
VISUAL DISTURBANCES: NOT PRESENT
AUDITORY DISTURBANCES: NOT PRESENT
AGITATION: NORMAL ACTIVITY
HEADACHE, FULLNESS IN HEAD: NOT PRESENT
SUBSTANCE_ABUSE: 1
ORIENTATION AND CLOUDING OF SENSORIUM: ORIENTED AND CAN DO SERIAL ADDITIONS
ORIENTATION AND CLOUDING OF SENSORIUM: ORIENTED AND CAN DO SERIAL ADDITIONS
VISUAL DISTURBANCES: MODERATELY SEVERE HALLUCINATIONS
AGITATION: SOMEWHAT MORE THAN NORMAL ACTIVITY
NAUSEA AND VOMITING: NO NAUSEA AND NO VOMITING
AGITATION: NORMAL ACTIVITY
HEADACHE, FULLNESS IN HEAD: NOT PRESENT
NAUSEA AND VOMITING: MILD NAUSEA WITH NO VOMITING
ORIENTATION AND CLOUDING OF SENSORIUM: ORIENTED AND CAN DO SERIAL ADDITIONS
PAROXYSMAL SWEATS: NO SWEAT VISIBLE
PAROXYSMAL SWEATS: NO SWEAT VISIBLE
AGITATION: NORMAL ACTIVITY
ORIENTATION AND CLOUDING OF SENSORIUM: ORIENTED AND CAN DO SERIAL ADDITIONS
AGITATION: SOMEWHAT MORE THAN NORMAL ACTIVITY
ORIENTATION AND CLOUDING OF SENSORIUM: ORIENTED AND CAN DO SERIAL ADDITIONS
HEADACHE, FULLNESS IN HEAD: NOT PRESENT
TOTAL SCORE: 7
ANXIETY: EQUIVALENT TO ACUTE PANIC STATES AS OCCUR IN SEVERE DELIRIUM OR ACUTE SCHIZOPHRENIC REACTIONS
ANXIETY: NO ANXIETY (AT EASE)
TOTAL SCORE: 17
NAUSEA AND VOMITING: NO NAUSEA AND NO VOMITING
PAROXYSMAL SWEATS: BARELY PERCEPTIBLE SWEATING. PALMS MOIST
HEADACHE, FULLNESS IN HEAD: NOT PRESENT
TOTAL SCORE: 1
AUDITORY DISTURBANCES: NOT PRESENT
ORIENTATION AND CLOUDING OF SENSORIUM: ORIENTED AND CAN DO SERIAL ADDITIONS
TREMOR: TREMOR NOT VISIBLE BUT CAN BE FELT, FINGERTIP TO FINGERTIP
VISUAL DISTURBANCES: VERY MILD SENSITIVITY
AUDITORY DISTURBANCES: MODERATE HARSHNESS OR ABILITY TO FRIGHTEN
AUDITORY DISTURBANCES: NOT PRESENT
AGITATION: SOMEWHAT MORE THAN NORMAL ACTIVITY
VISUAL DISTURBANCES: NOT PRESENT
NAUSEA AND VOMITING: NO NAUSEA AND NO VOMITING
AGITATION: MODERATELY FIDGETY AND RESTLESS
TREMOR: *
VISUAL DISTURBANCES: NOT PRESENT
ANXIETY: MILDLY ANXIOUS
AUDITORY DISTURBANCES: MILD HARSHNESS OR ABILITY TO FRIGHTEN
PAROXYSMAL SWEATS: *
TOTAL SCORE: 8
NAUSEA AND VOMITING: NO NAUSEA AND NO VOMITING
AUDITORY DISTURBANCES: NOT PRESENT
PAROXYSMAL SWEATS: BARELY PERCEPTIBLE SWEATING. PALMS MOIST
TOTAL SCORE: 9
VISUAL DISTURBANCES: NOT PRESENT
PAROXYSMAL SWEATS: BARELY PERCEPTIBLE SWEATING. PALMS MOIST
ANXIETY: *
NAUSEA AND VOMITING: NO NAUSEA AND NO VOMITING
VISUAL DISTURBANCES: NOT PRESENT
AGITATION: NORMAL ACTIVITY
TREMOR: TREMOR NOT VISIBLE BUT CAN BE FELT, FINGERTIP TO FINGERTIP
ANXIETY: MODERATELY ANXIOUS OR GUARDED, SO ANXIETY IS INFERRED
HEADACHE, FULLNESS IN HEAD: NOT PRESENT
NAUSEA AND VOMITING: NO NAUSEA AND NO VOMITING
AUDITORY DISTURBANCES: MODERATELY SEVERE HALLUCINATIONS
AGITATION: NORMAL ACTIVITY
TOTAL SCORE: 5
TREMOR: *
TREMOR: TREMOR NOT VISIBLE BUT CAN BE FELT, FINGERTIP TO FINGERTIP
VISUAL DISTURBANCES: NOT PRESENT
HEADACHE, FULLNESS IN HEAD: NOT PRESENT
TOTAL SCORE: 12
NAUSEA AND VOMITING: NO NAUSEA AND NO VOMITING
TREMOR: MODERATE TREMOR WITH ARMS EXTENDED
AUDITORY DISTURBANCES: MILD HARSHNESS OR ABILITY TO FRIGHTEN
HEADACHE, FULLNESS IN HEAD: NOT PRESENT
PAROXYSMAL SWEATS: BARELY PERCEPTIBLE SWEATING. PALMS MOIST
ANXIETY: NO ANXIETY (AT EASE)
ANXIETY: MODERATELY ANXIOUS OR GUARDED, SO ANXIETY IS INFERRED
HEADACHE, FULLNESS IN HEAD: NOT PRESENT
TOTAL SCORE: 2

## 2024-12-09 ASSESSMENT — ENCOUNTER SYMPTOMS
DIAPHORESIS: 0
ABDOMINAL PAIN: 0
FALLS: 1
COUGH: 0
FOCAL WEAKNESS: 0
BRUISES/BLEEDS EASILY: 0
TREMORS: 1
NAUSEA: 0
WEAKNESS: 0
SORE THROAT: 0
CARDIOVASCULAR NEGATIVE: 1
PALPITATIONS: 0
FEVER: 0
MYALGIAS: 0
DIZZINESS: 0
DEPRESSION: 0
CHILLS: 0
GASTROINTESTINAL NEGATIVE: 1
VOMITING: 0
EYES NEGATIVE: 1
RESPIRATORY NEGATIVE: 1
SHORTNESS OF BREATH: 0
BLURRED VISION: 0
HEADACHES: 0
WEIGHT LOSS: 0

## 2024-12-09 ASSESSMENT — PAIN DESCRIPTION - PAIN TYPE
TYPE: ACUTE PAIN
TYPE: SURGICAL PAIN
TYPE: SURGICAL PAIN

## 2024-12-09 ASSESSMENT — FIBROSIS 4 INDEX: FIB4 SCORE: 2.85

## 2024-12-09 NOTE — PROGRESS NOTES
Monitor Summary  Rhythm: sinus rhythm, sinus tach  Rate:  Ectopy:PVC, Triplet  .18/.08/.44

## 2024-12-09 NOTE — PROGRESS NOTES
Bedside report received from off going RN/tech: Elier, assumed care of patient.     Fall Risk Score: MODERATE RISK  Fall risk interventions in place: Provide patient/family education based on risk assessment, Educate patient/family to call staff for assistance when getting out of bed, Place fall precaution signage outside patient door, Utilize bed/chair fall alarm, and Bed alarm connected correctly  Bed type: Regular (Sam Score less than 17 interventions in place)  Patient on cardiac monitor: Yes  IVF/IV medications: Not Applicable   Oxygen: How many liters 2L, Traced the line to wall oxygen, and No oxygen tank in room  Bedside sitter: Not Applicable   Isolation: Not applicable

## 2024-12-09 NOTE — DISCHARGE PLANNING
PM&R referral from Dr. Mendez. Ankle fracture s/p repair. TTWB. Last CIWA 8. Therapy evaluations are pending. Per chart review 3 story home with potential spouse support. Gloria martinez. Physiatry consult pended at this time.

## 2024-12-09 NOTE — CARE PLAN
The patient is Stable - Low risk of patient condition declining or worsening    Shift Goals  Clinical Goals: CIWA pain control  Patient Goals: Rest, Psych  Family Goals: eddie    Progress made toward(s) clinical / shift goals:    Problem: Knowledge Deficit - Standard  Goal: Patient and family/care givers will demonstrate understanding of plan of care, disease process/condition, diagnostic tests and medications  Outcome: Progressing     Problem: Optimal Care for Alcohol Withdrawal  Goal: Optimal Care for the alcohol withdrawal patient  Outcome: Progressing     Problem: Seizure Precautions  Goal: Implementation of seizure precautions  Outcome: Progressing       Patient is not progressing towards the following goals:

## 2024-12-09 NOTE — CONSULTS
Neurology Initial Consult H&P  Neurohospitalist Service, [facility]    Referring Physician: Helen Mendez M.D.    Chief Complaint   Patient presents with    T-5000 GLF     BIB [facility] from home after patient had mechanical GLF where she slipped and hit her head, -LOC, -thinners, deformity to right ankle. EMS placed IV, gave 75 mcg fentanyl and 4mg zofran.        HPI: Minerva Tillman is a 49 y.o. female  presenting with a ground level fall for whom neurology has been consulted for abnormal findings on MRI brain. Patient has a past medical history of chronic alcoholism, anxiety, depression, hypertension, and hyperlipidemia. Patient initially was brought into the ED for a ground level fall injuring her right ankle and foot. She reports that she tripped on her dog gate and fell to the floor, denies hitting her head or any other injury aside from her right ankle. Patient had an MRI brain with an abnormal finding in her dash, therefore neurology was consulted for further recommendations.     Review of systems: In addition to what is detailed in the HPI above, all other systems reviewed and are negative.    Past Medical History:    has a past medical history of Acute alcohol intoxication (HCC) (05/25/2021), Alcohol intoxication delirium (HCC) (04/30/2024), Anxiety, Depression, Gynecological disorder, High cholesterol, Hypertension, Snoring, and Urinary tract infection.    FHx:  family history includes Alcohol abuse in her brother, brother, and father; Asthma in her father; Cancer (age of onset: 60) in her maternal grandfather and maternal grandmother; Depression in her brother, brother, and father; Hyperlipidemia in her father and mother; Hypertension in her brother, brother, father, and mother; Other in her paternal grandmother; Stroke in her maternal grandmother; Suicide Attempts in her paternal uncle.    SHx:   reports that she has never smoked. She has never used smokeless tobacco. She  reports current alcohol use of about 8.4 oz of alcohol per week. She reports that she does not use drugs.    Allergies:  Allergies   Allergen Reactions    [place] Hives     Patient states not allergic anymore, just a one time thing    [facility] Hives     [id number]       Medications:    Current Facility-Administered Medications:     escitalopram (Lexapro) tablet 20 mg, 20 mg, Oral, DAILY, Asad Mccann M.D., 20 mg at 12/08/24 0516    gabapentin (Neurontin) capsule 300 mg, 300 mg, Oral, TID, Asad Mccann M.D., 300 mg at 12/08/24 1247    hydrOXYzine HCl (Atarax) tablet 25 mg, 25 mg, Oral, Q6HRS PRN, Asad Mccann M.D., 25 mg at 12/08/24 1039    levothyroxine (Synthroid) tablet 50 mcg, 50 mcg, Oral, AM ES, Asad Mccann M.D., 50 mcg at 12/08/24 0516    LORazepam (Ativan) tablet 0.5 mg, 0.5 mg, Oral, Q4HRS PRN, Asad Mccann M.D.    LORazepam (Ativan) tablet 1 mg, 1 mg, Oral, Q4HRS PRN, 1 mg at 12/08/24 1516 **OR** LORazepam (Ativan) injection 0.5 mg, 0.5 mg, Intravenous, Q4HRS PRN, Asad Mccann M.D.    LORazepam (Ativan) tablet 2 mg, 2 mg, Oral, Q2HRS PRN, 2 mg at 12/08/24 0206 **OR** LORazepam (Ativan) injection 1 mg, 1 mg, Intravenous, Q2HRS PRN, Asad Mccann M.D., 1 mg at 12/07/24 2344    LORazepam (Ativan) tablet 3 mg, 3 mg, Oral, Q HOUR PRN **OR** LORazepam (Ativan) injection 1.5 mg, 1.5 mg, Intravenous, Q HOUR PRN, Asad Mccann M.D.    LORazepam (Ativan) tablet 4 mg, 4 mg, Oral, Q15 MIN PRN **OR** LORazepam (Ativan) injection 2 mg, 2 mg, Intravenous, Q15 MIN PRN, Asad J Park, M.D.    thiamine (Vitamin B-1) tablet 100 mg, 100 mg, Oral, DAILY, 100 mg at 12/08/24 0516 **AND** multivitamin tablet 1 Tablet, 1 Tablet, Oral, DAILY, 1 Tablet at 12/08/24 0516 **AND** folic acid (Folvite) tablet 1 mg, 1 mg, Oral, DAILY, Asad Mccann M.D., 1 mg at 12/08/24 0516    [COMPLETED] magnesium sulfate IVPB premix 2 g, 2 g, Intravenous, Once, Stopped at 12/07/24 1949 **AND** magnesium oxide tablet 400 mg, 400 mg, Oral, BID, Asad QUINTERO  "LYDIA Mccann, 400 mg at 12/08/24 0516    acetaminophen (Tylenol) tablet 650 mg, 650 mg, Oral, Q6HRS PRN, Asad Mccann M.D., 650 mg at 12/07/24 2030    senna-docusate (Pericolace Or Senokot S) 8.6-50 MG per tablet 2 Tablet, 2 Tablet, Oral, Q EVENING, 2 Tablet at 12/07/24 1744 **AND** polyethylene glycol/lytes (Miralax) Packet 1 Packet, 1 Packet, Oral, QDAY PRN, Asad Mccann M.D.    enoxaparin (Lovenox) inj 40 mg, 40 mg, Subcutaneous, DAILY AT 1800, Asad Mccann M.D.    ondansetron (Zofran) syringe/vial injection 4 mg, 4 mg, Intravenous, Q4HRS PRN, Asad Mccann M.D., 4 mg at 12/08/24 0206    ondansetron (Zofran ODT) dispertab 4 mg, 4 mg, Oral, Q4HRS PRN, Asad Mccann M.D.    promethazine (Phenergan) tablet 12.5-25 mg, 12.5-25 mg, Oral, Q4HRS PRN, Asad Mccann M.D.    promethazine (Phenergan) suppository 12.5-25 mg, 12.5-25 mg, Rectal, Q4HRS PRN, Asad Mccann M.D.    prochlorperazine (Compazine) injection 5-10 mg, 5-10 mg, Intravenous, Q4HRS PRN, Asad Mccann M.D.    labetalol (Normodyne/Trandate) injection 10 mg, 10 mg, Intravenous, Q4HRS PRN, Asad Mccann M.D., 10 mg at 12/08/24 0401    Pharmacy Consult Request ...Pain Management Review 1 Each, 1 Each, Other, PHARMACY TO DOSE, Asad Mccann M.D.    oxyCODONE immediate-release (Roxicodone) tablet 5 mg, 5 mg, Oral, Q3HRS PRN, 5 mg at 12/08/24 1517 **OR** oxyCODONE immediate release (Roxicodone) tablet 10 mg, 10 mg, Oral, Q3HRS PRN **OR** HYDROmorphone (Dilaudid) injection 0.5 mg, 0.5 mg, Intravenous, Q3HRS PRN, Asad Mccann M.D.    LORazepam (Ativan) tablet 2 mg, 2 mg, Oral, Once PRN, Asad Mccann M.D.    Physical Examination:   BP 94/78   Pulse 68   Temp 36.7 °C (98.1 °F) (Temporal)   Resp 16   Ht 1.575 m (5' 2\")   Wt 72.9 kg (160 lb 11.5 oz)   SpO2 97%   BMI 29.40 kg/m²       General: Patient is awake and in no acute distress  Neck: There is normal range of motion  CV: Regular rate   Extremities:  Warm, dry, and intact, without peripheral lower extremity " edema    NEUROLOGICAL EXAM:     Mental status: Awake, alert and fully oriented, follows commands  Speech and language: Speech is clear and fluent. The patient is able to name and repeat.  Cranial nerve exam:    CN II-III: PERRLA   CN II: Visual Fields Intact   CN III, IV, VI: EOMI w/o Nystagmus   CN V: Facial sensation intact, full strength with mastication   CN VII: Symmetrical facial movement   CN VIII: Hearing grossly normal   CN IX, X: Palate elevates at midline   CN XI: Symmetric shoulder shrug   CN XII: Tongue midline    Motor exam: There is sustained antigravity with no downward drift in bilateral arms and left leg limited exam in right leg due to pain and injury.  Mild right hand tremor, patient repots to much improvement to the tremor since being admitted.        Upper Extremity  Myotome R L   Shoulder flexion C5 5/5 5/5   Elbow flexion C5 5/5 5/5   Wrist extension C6 5/5 5/5   Elbow extension C7 5/5 5/5   Finger flexion C8 5/5 5/5   Finger abduction T1 5/5 5/5     Lower Extremity Myotome R L   Hip flexion L2 3/5 5/5   Knee extension L3 3/5 5/5   Ankle dorsiflexion L4 0/5 5/5   Ankle plantarflexion S1 0/5 5/5          Reflexes:   Brachioradialus  R 2/ L 2   Biceps  R 2/ L 2   Triceps  R 2/ L 2       Sensory exam:  Reacts to tactile in all 4 extremities, there is no neglect to double stim  Coordination: No ataxia on bilateral FTN testing    Objective Data:    Labs:  Lab Results   Component Value Date/Time    PROTHROMBTM 15.1 (H) 08/08/2024 02:32 PM    INR 1.18 (H) 08/08/2024 02:32 PM      Lab Results   Component Value Date/Time    WBC 4.2 (L) 12/08/2024 12:42 AM    WBC 5.2 02/01/2013 08:46 AM    RBC 3.89 (L) 12/08/2024 12:42 AM    RBC 4.30 02/01/2013 08:46 AM    HEMOGLOBIN 13.4 12/08/2024 12:42 AM    HEMATOCRIT 38.8 12/08/2024 12:42 AM    MCV 99.7 (H) 12/08/2024 12:42 AM    MCV 99 (H) 02/01/2013 08:46 AM    MCH 34.4 (H) 12/08/2024 12:42 AM    MCH 34.4 (H) 02/01/2013 08:46 AM    MCHC 34.5 12/08/2024 12:42 AM     MPV 9.1 12/08/2024 12:42 AM    NEUTSPOLYS 85.20 (H) 12/08/2024 12:42 AM    LYMPHOCYTES 12.40 (L) 12/08/2024 12:42 AM    MONOCYTES 1.70 12/08/2024 12:42 AM    EOSINOPHILS 0.00 12/08/2024 12:42 AM    BASOPHILS 0.20 12/08/2024 12:42 AM      Lab Results   Component Value Date/Time    SODIUM 139 12/08/2024 12:42 AM    POTASSIUM 4.3 12/08/2024 12:42 AM    CHLORIDE 104 12/08/2024 12:42 AM    CO2 20 12/08/2024 12:42 AM    GLUCOSE 137 (H) 12/08/2024 12:42 AM    BUN 9 12/08/2024 12:42 AM    CREATININE 0.56 12/08/2024 12:42 AM    CREATININE 0.83 02/01/2013 08:46 AM    BUNCREATRAT 18 06/02/2015 08:49 AM    BUNCREATRAT 14 02/01/2013 08:46 AM      Lab Results   Component Value Date/Time    CHOLSTRLTOT 195 08/12/2022 07:22 AM     (H) 08/12/2022 07:22 AM    HDL 37 (A) 08/12/2022 07:22 AM    TRIGLYCERIDE 209 (H) 08/12/2022 07:22 AM       Lab Results   Component Value Date/Time    ALKPHOSPHAT 137 (H) 12/08/2024 12:42 AM    ASTSGOT 44 12/08/2024 12:42 AM    ALTSGPT 30 12/08/2024 12:42 AM    TBILIRUBIN 1.0 12/08/2024 12:42 AM        Imaging/Testing:    I interpreted and/or reviewed the patient's neuroimaging    MR-BRAIN-W/O   Final Result      1.  Mild cerebral atrophy beyond expected for the patient's stated age of 49 years. This most likely relates to the alcohol history.   2.  Minimal supratentorial white matter disease with single punctate focus of FLAIR hyperintensity in the left peritrigonal deep white matter. Nonspecific finding most consistent with microvascular ischemic change versus demyelination or gliosis. Not    uncommon for the patient's age.   3.  9 mm focus of T2 hyperintensity in the central dash with well demarcated T1 hypointensity. 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 There is no ADC map hypointensity to suggest restricted diffusion and this is not thought to  represent an acute brainstem infarct.   4.  No evidence of acute hemorrhage or mass lesion.      CT-ANKLE W/O PLUS RECONS RIGHT   Final Result         1. Comminuted fractures of the distal tibia and fibula as detailed above.   2. The navicular fracture noted previously has healed.      CT-HEAD W/O   Final Result      1.  There is a probably new hypodensity in the central superior dash which could be related to new probably subacute/chronic small infarct or other insult..   2.  No acute intracranial hemorrhage.               DX-ANKLE 2- VIEWS RIGHT   Final Result         1. Right ankle fracture dislocation.                     DX-ANKLE 2- VIEWS RIGHT    (Results Pending)   DX-PORTABLE FLUORO > 1 HOUR    (Results Pending)      Latest Reference Range & Units 12/07/24 10:22   Diagnostic Alcohol <10.1 mg/dL 360.0 (H)   (H): Data is abnormally high    Impression and Recommendations: Minerva Tillman is a 49 y.o. female  presenting for whom neurology has been consulted for an abnormal finding on MRI brain. Patient was admitted for a ground level fall with a right ankle/foot injury. An MRI  brain was obtained and shows moderate cerebral atrophy beyond the expected patients age. Does note a 9 mm focus of T2 hyperintensity in the central dash with well demarcated T1 hypointensity. Likely, a chronic finding in the setting of chronic alcohol use which can explain both the signal change noted in the dash as well as the moderate global cerebral volume loss. No difficulty in swallowing, no noted muscle weakness aside from slight global weakness due to recent ativan administration. Limited exam to right lower extremity due to pain and injury. No bulbar symptoms noted on exam. Patient is alert and oriented x 4, she engages in good conversation regarding the history of her present illness. During discussion it appears patient is under increased psychosocial stressors and may benefit from psychology consultation in the inpatient  setting if not done so already. She does follow with a therapist in the outpatient setting which she reports helps her with abstaining from alcohol. She does report that every few months she relapses and drinks alcohol, noted alcohol level on admission significantly increased. However, she does produce good understanding on the need to abstain from ETOH in regards to her overall health and possible further progression of her diffuse cerebral atrophy. Discussed findings above with primary hospitalist.     -Would benefit from inpatient psychology consultation if not done so already   -May also benefit from social work for additional resources for outpatient assistance regarding ETOH use and psychosocial therapy   -Can follow with outpatient neurology regarding findings on MRI brain   -Neurology will sign off, please contact with any questions or concerns       ANABELLE Bradford  Acute Neurology      The evaluation of the patient, and recommended management, was discussed with Dr. Mata Neurohospitalist       Please note that this dictation was created using voice recognition software.  I have made every reasonable attempt to correct obvious errors, but I expect that there are errors of grammar and possibly content that I did not discover before finalizing the note.

## 2024-12-09 NOTE — PROGRESS NOTES
Bedside report received from off going RN/tech: Elvis, assumed care of patient. POC updated. Pt states pain is a 5/10, and has been medicated per mar. Pt denies an other needs at this time.     Fall Risk Score: MODERATE RISK  Fall risk interventions in place: Place yellow fall risk ID band on patient, Provide patient/family education based on risk assessment, Educate patient/family to call staff for assistance when getting out of bed, Place fall precaution signage outside patient door, Place patient in room close to nursing station, Utilize bed/chair fall alarm, Notify charge of high risk for huddle, and Bed alarm connected correctly  Bed type: Regular (Sam Score less than 17 interventions in place)  Patient on cardiac monitor: Yes  IVF/IV medications: Not Applicable   Oxygen: How many liters 0.5L  Bedside sitter: Not Applicable   Isolation: Not applicable

## 2024-12-09 NOTE — PROGRESS NOTES
[facility] Daily Progress Note    Date of Service  12/9/2024    Chief Complaint  Minerva Tillman is a 49 y.o. female admitted 12/7/2024 with ankle pain after a fall     Hospital Course    Minerva Tillman is a 49 y.o. female who presented 12/7/2024 with right ankle pain following ground-level fall.  Is a pleasant woman with a history of depression, hypertension, hypercholesterolemia, and alcohol dependence in intermittent remission, who started drinking after 45 days of sobriety around Thanksgiving.  She reports that she started to experience stress because her kids were not present for the Thanksgiving event.  She was previously in treatment as well as being active with alcohol synonymous.  This morning, she had a mechanical ground-level fall after tripping over her small dog landing onto the floor.  She had extreme pain in the right ankle.  Noted some deformity and was unable to ambulate.  Her  called EMS and was brought to the emergency room.  Patient reports that she has been taking gabapentin for the last 3 months.  She reports having 2 alcohol withdrawal seizures previously.  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     In the emergency room, patient was noted to have a displaced right trimalleolar fracture.  The patient reported that she hit her head so head CT was performed, which showed probably new hypodensity in the central superior dash, which could be related to new probably subacute/chronic small infarct or other insult.  No acute intracranial hemorrhages.  Neurologically she appeared to be intact and moving all extremities with no focal deficits.  Blood alcohol level was elevated at 360.  She was noted to be having tremors going into alcohol withdrawal per ER physician.    Interval Problem Update  Axox3, ongoing w/d symptoms but less severe today, current ciwa 5, continue supportive care, awaiting PT and OT eval. Mild r ankle pain currently 3/10. She states  she has concerns about going home due to stairs and mobility issues and denies other concerns, states her family is very supportive and she feels bad for drinking again because it affects them. No chest pain. ROS otherwise negative, exam and vitals stable.     I have discussed this patient's plan of care and discharge plan at IDT rounds today with Case Management, Nursing, Nursing leadership, and other members of the IDT team.    Consultants/Specialty  Ortho [facility]    Code Status  Full Code    Disposition  The patient is not medically cleared for discharge to home or a post-acute facility.      I have placed the appropriate orders for post-discharge needs.    Review of Systems  Review of Systems   Constitutional:  Positive for malaise/fatigue. Negative for chills, diaphoresis, fever and weight loss.   HENT: Negative.  Negative for sore throat.    Eyes: Negative.  Negative for blurred vision.   Respiratory: Negative.  Negative for cough and shortness of breath.    Cardiovascular: Negative.  Negative for chest pain, palpitations and leg swelling.   Gastrointestinal: Negative.  Negative for abdominal pain, nausea and vomiting.   Genitourinary: Negative.  Negative for dysuria.   Musculoskeletal:  Positive for falls and joint pain. Negative for myalgias.   Skin: Negative.  Negative for itching and rash.   Neurological:  Positive for tremors. Negative for dizziness, focal weakness, weakness and headaches.   Endo/Heme/Allergies: Negative.  Does not bruise/bleed easily.   Psychiatric/Behavioral:  Positive for substance abuse. Negative for depression and suicidal ideas.    All other systems reviewed and are negative.       Physical Exam  Temp:  [36.5 °C (97.7 °F)-36.8 °C (98.2 °F)] 36.5 °C (97.7 °F)  Pulse:  [77-83] 80  Resp:  [16-18] 16  BP: (131-143)/() 143/100  SpO2:  [91 %-96 %] 95 %    Physical Exam  Vitals and nursing note reviewed. Exam conducted with a chaperone present.   Constitutional:       General: She is not  in acute distress.     Appearance: Normal appearance. She is not diaphoretic.   HENT:      Head: Normocephalic.      Nose: Nose normal.      Mouth/Throat:      Mouth: Mucous membranes are moist.   Eyes:      Pupils: Pupils are equal, round, and reactive to light.   Cardiovascular:      Rate and Rhythm: Normal rate and regular rhythm.      Pulses: Normal pulses.      Heart sounds: Normal heart sounds.   Pulmonary:      Effort: Pulmonary effort is normal.      Breath sounds: Normal breath sounds.   Abdominal:      General: Abdomen is flat. Bowel sounds are normal.      Palpations: Abdomen is soft.   Musculoskeletal:         General: No swelling or deformity. Normal range of motion.      Comments: R ankle cdi   Skin:     General: Skin is warm and dry.      Capillary Refill: Capillary refill takes less than 2 seconds.   Neurological:      General: No focal deficit present.      Mental Status: She is alert and oriented to person, place, and time.      Cranial Nerves: No cranial nerve deficit.      Comments: Minimal tremor   Psychiatric:         Mood and Affect: Mood normal.         Behavior: Behavior normal.         Fluids    Intake/Output Summary (Last 24 hours) at 12/9/2024 1805  Last data filed at 12/9/2024 1651  Gross per 24 hour   Intake 200 ml   Output 400 ml   Net -200 ml        Laboratory  Recent Labs     12/07/24  1022 12/08/24  0042 12/09/24  0051   WBC 9.5 4.2* 7.1   RBC 4.12* 3.89* 3.61*   HEMOGLOBIN 14.4 13.4 12.5   HEMATOCRIT 41.4 38.8 35.8*   .5* 99.7* 99.2*   MCH 35.0* 34.4* 34.6*   MCHC 34.8 34.5 34.9   RDW 45.0 43.6 42.7   PLATELETCT 183 123* 115*   MPV 8.7* 9.1 9.5     Recent Labs     12/07/24  1022 12/08/24  0042 12/09/24  0051   SODIUM 141 139 137   POTASSIUM 4.0 4.3 4.0   CHLORIDE 104 104 101   CO2 21 20 26   GLUCOSE 104* 137* 107*   BUN 9 9 11   CREATININE 0.62 0.56 0.71   CALCIUM 8.7 8.7 8.9             Recent Labs     12/09/24  0051   TRIGLYCERIDE 93   HDL 84   *        Imaging  MR-BRAIN-W/O   Final Result      1.  Mild cerebral atrophy beyond expected for the patient's stated age of 49 years. This most likely relates to the alcohol history.   2.  Minimal supratentorial white matter disease with single punctate focus of FLAIR hyperintensity in the left peritrigonal deep white matter. Nonspecific finding most consistent with microvascular ischemic change versus demyelination or gliosis. Not    uncommon for the patient's age.   3.  9 mm focus of T2 hyperintensity in the central dash with well demarcated T1 hypointensity. 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 There is no ADC map hypointensity to suggest restricted diffusion and this is not thought to represent an acute brainstem infarct.   4.  No evidence of acute hemorrhage or mass lesion.      DX-ANKLE 2- VIEWS RIGHT   Final Result      Digitized intraoperative radiograph is submitted for review. This examination is not for diagnostic purpose but for guidance during a surgical procedure. Please see the patient's chart for full procedural details.         INTERPRETING LOCATION: [address], [place], [address]      DX-PORTABLE FLUORO > 1 HOUR   Final Result      Portable fluoroscopy utilized for 12 seconds.         INTERPRETING LOCATION: [address], [place], [address]      CT-ANKLE W/O PLUS RECONS RIGHT   Final Result         1. Comminuted fractures of the distal tibia and fibula as detailed above.   2. The navicular fracture noted previously has healed.      CT-HEAD W/O   Final Result      1.  There is a probably new hypodensity in the central superior dash which could be related to new probably subacute/chronic small infarct or other insult..   2.  No acute intracranial hemorrhage.               DX-ANKLE 2- VIEWS RIGHT   Final Result         1. Right ankle fracture dislocation.                           Assessment/Plan  * Trimalleolar fracture of ankle, closed, right, initial encounter- (present on admission)  Assessment & Plan  After mechanical fall     S/p surgical repair with  Dr. Clinton orthopedic surgery 12/7  Supportive care  Pain control with Tylenol, oxycodone, and IV Dilaudid as needed for breakthrough pain  Continuous pulse oximetry monitoring to monitor for hypoxia and respiratory depression while receiving IV narcotic medications.      Thrombocytopenia (HCC)  Assessment & Plan  Slowly decreasing  Mild  Following  Cbc in am     Ground-level fall- (present on admission)  Assessment & Plan  Patient had mechanical ground-level fall from tripping over her dog.   PT and OT  Supportive care    Alcohol withdrawal syndrome without complication (HCC)- (present on admission)  Assessment & Plan  Patient has a history of severe alcohol withdrawal syndrome as well as seizures- improving today - continue supportive care  Ongoing seizures, at risk for deterioration, following closely   Continuous cardiac monitoring to monitor for arrhythmias  Continue multivitamin, thiamine, and folic acid supplementation  Aspiration, fall and seizure precautions  Continue gabapentin 300 mg 3 times a day    Balance problem- (present on admission)  Assessment & Plan    Following  MRI brain showed etoh related atrophy and non specific central pontine myelin changes  Seen by neurology      Acute hypoxic respiratory failure (HCC)- (present on admission)  Assessment & Plan  Suspect due atelectasis, noted on exam  Resolved  following  IS encouraged    Alcoholic hepatitis with ascites- (present on admission)  Assessment & Plan  Following  No ascites currently     Alcohol use disorder, severe, dependence (HCC)- (present on admission)  Assessment & Plan  Encouraged returning back to Alcoholic Anonymous engaging with her sponsor.  Patient is interested in maintaining sobriety.  She did remain sober for 45 days prior to  [place].  States she is following closely with outpatient psychiatry    Mild episode of recurrent major depressive disorder (HCC)- (present on admission)  Assessment & Plan  Denies SI  Supportive care  Continue home escitalopram    Hypothyroidism- (present on admission)  Assessment & Plan    Continue home levothyroxine 50 mcg daily    Fatty liver, alcoholic- (present on admission)  Assessment & Plan  As per history.  Alcohol cessation counseled    Essential hypertension- (present on admission)  Assessment & Plan  Diastolic pressure trending up with w/d  Following  Prn     Hypercholesterolemia- (present on admission)  Assessment & Plan   Not currently on statin.         VTE prophylaxis: lovenox    I have performed a physical exam and reviewed and updated ROS and Plan today (12/9/2024). In review of yesterday's note (12/8/2024), there are no changes except as documented above.

## 2024-12-09 NOTE — THERAPY
12/09/24 [facility]   Interdisciplinary Plan of Care Collaboration   Collaboration Comments OT consult received. Per RN, pt's CIWA is high and pt has a lot of pain. Will hold and follow for appropriate timing of eval.

## 2024-12-09 NOTE — THERAPY
12/09/24 1134   Interdisciplinary Plan of Care Collaboration   Collaboration Comments Per nsg, hold PT eval, pt actively withdrawing.

## 2024-12-09 NOTE — DISCHARGE PLANNING
Care Transition Team Assessment  LMSW met with pt at bedside to conduct assessment and verify demographics. Pt is AOX4. Pt was admitted Trimalleolar fracture of ankle, closed, right, initial encounter.    Pt's PCP is Marika Maciel.     Pt verified address on file. Pt lives with her  Jeff [phone].    Pt stated that prior to admission my was independent prior to admission with ADLS and IADLS. Pt stated that she uses O2 at [place] of 2L. O2 company with preferred.     Pt has Hx of SA.    Pt has [place] for insurance.     Upon DC pt will have transportation home.     Information Source  Orientation Level: Oriented X4  Information Given By: Patient  Who is responsible for making decisions for patient? : Patient    Readmission Evaluation  Is this a readmission?: No    Elopement Risk  Legal Hold: No  Ambulatory or Self Mobile in Wheelchair: No-Not an Elopement Risk  Disoriented: No  Psychiatric Symptoms: None  History of Wandering: No  Elopement this Admit: No  Vocalizing Wanting to Leave: No  Displays Behaviors, Body Language Wanting to Leave: No-Not at Risk for Elopement  Elopement Risk: Not at Risk for Elopement    Interdisciplinary Discharge Planning  Primary Care Physician: MARIKA MACIEL M.D.  Lives with - Patient's Self Care Capacity: Spouse  Patient or legal guardian wants to designate a caregiver: No  Support Systems: Spouse / Significant Other  Housing / Facility: 3 Story House    Discharge Preparedness  What is your plan after discharge?: Uncertain - pending medical team collaboration  What are your discharge supports?: Spouse  Prior Functional Level: Ambulatory, Independent with Activities of Daily Living  Difficulity with ADLs: None  Difficulity with IADLs: None    Functional Assesment  Prior Functional Level: Ambulatory, Independent with Activities of Daily Living    Finances  Financial Barriers to Discharge: No  Prescription Coverage: Yes    Vision / Hearing Impairment  Vision Impairment : Yes  Right Eye  Vision: Impaired, Wears Glasses  Left Eye Vision: Impaired, Wears Glasses  Hearing Impairment : No    Advance Directive  Advance Directive?: None    Domestic Abuse  Have you ever been the victim of abuse or violence?: No  Possible Abuse/Neglect Reported to:: Not Applicable    Psychological Assessment  History of Substance Abuse: Alcohol  History of Psychiatric Problems: No  Non-compliant with Treatment: No  Newly Diagnosed Illness: No    Discharge Risks or Barriers  Discharge risks or barriers?: Substance abuse  Patient risk factors: Substance abuse    Anticipated Discharge Information  Discharge Disposition: Discharged to home/self care (01)  Discharge Address: [address] [place] [id number]  Discharge Contact Phone Number: [phone]

## 2024-12-09 NOTE — PROGRESS NOTES
"    Orthopedic PA Progress Note    Interval changes:  Patient still on CIWA protocol. POC discussed with patient, but no family at bedside.   PT/OT when able  R ankle splint and dressings are CDI  TTWB RLE  Follow up with the [facility] trauma [facility] 2 weeks postop.  Cleared for DC from orthopedic standpoint pending therapy recs and medical optimization    ROS - Patient denies any new issues.  Denies any numbness or tingling. Pain controlled.    /87   Pulse 80   Temp 36.5 °C (97.7 °F) (Temporal)   Resp 16   Ht 1.575 m (5' 2\")   Wt 75 kg (165 lb 5.5 oz)   SpO2 92%     Patient seen and examined  No acute distress  Breathing non labored  RRR  RLE: Splint and dressings CDI. Flexes and extends all toes. SILT distally. Cap refill <2s    Recent Labs     12/07/24  1022 12/08/24  0042 12/09/24  0051   WBC 9.5 4.2* 7.1   RBC 4.12* 3.89* 3.61*   HEMOGLOBIN 14.4 13.4 12.5   HEMATOCRIT 41.4 38.8 35.8*   .5* 99.7* 99.2*   MCH 35.0* 34.4* 34.6*   MCHC 34.8 34.5 34.9   RDW 45.0 43.6 42.7   PLATELETCT 183 123* 115*   MPV 8.7* 9.1 9.5       Active Hospital Problems    Diagnosis     Trimalleolar fracture of ankle, closed, right, initial encounter [S82.851A]     Ground-level fall [W18.30XA]     Alcohol withdrawal syndrome without complication (HCC) [F10.930]     Balance problem [R26.89]     Acute hypoxic respiratory failure (HCC) [J96.01]     Alcoholic hepatitis with ascites [K70.11]      >>OVERVIEW FOR ALCOHOLIC CIRRHOSIS OF LIVER WITHOUT ASCITES (HCC) WRITTEN ON 6/20/2024  2:05 PM BY MARIKA MACIEL M.D.    Diagnosed In 03/2024     CT abdomen showed  Cirrhosis with portal hypertension  Associated ascites and splenomegaly  Hepatic steatosis, extremely severe  With elevated transaminitis with bilirubinemia  Initially elevated ammonia as well.  Rifaximin and Spironolactone was added  Not started on propranolol or nadolol for prophylaxis for portal hypertension because her blood pressure was soft while in hospital.  Dr. Daniels" [facility] as recommended colonoscopy for chronic diarrhea.      Alcohol use disorder, severe, dependence (HCC) [F10.20]     Fatty liver, alcoholic [K70.0]      Chronic alcohol use   Previously used to drink alcohol - wine( 7-10 drinks).   Sine 2020 - excessive alcohol , drinking 4-5 glasses wine a day.   Severe hepatic steatosis found on imaging   Patient has now stopped alcohol completely since 1 month.       Hypothyroidism [E03.9]     Essential hypertension [I10]     Hypercholesterolemia [E78.00]     Mild episode of recurrent major depressive disorder (HCC) [F33.0]        MR-BRAIN-W/O   Final Result      1.  Mild cerebral atrophy beyond expected for the patient's stated age of 49 years. This most likely relates to the alcohol history.   2.  Minimal supratentorial white matter disease with single punctate focus of FLAIR hyperintensity in the left peritrigonal deep white matter. Nonspecific finding most consistent with microvascular ischemic change versus demyelination or gliosis. Not    uncommon for the patient's age.   3.  9 mm focus of T2 hyperintensity in the central dash with well demarcated T1 hypointensity. 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 There is no ADC map hypointensity to suggest restricted diffusion and this is not thought to represent an acute brainstem infarct.   4.  No evidence of acute hemorrhage or mass lesion.      DX-ANKLE 2- VIEWS RIGHT   Final Result      Digitized intraoperative radiograph is submitted for review. This examination is not for diagnostic purpose but for guidance during a surgical procedure. Please see the patient's chart for full procedural details.         INTERPRETING LOCATION: [address] [place] [address], [address]      DX-PORTABLE FLUORO > 1 HOUR   Final Result      Portable fluoroscopy utilized for 12 seconds.         INTERPRETING  LOCATION: [address] [place] [address] [place], [address]      CT-ANKLE W/O PLUS RECONS RIGHT   Final Result         1. Comminuted fractures of the distal tibia and fibula as detailed above.   2. The navicular fracture noted previously has healed.      CT-HEAD W/O   Final Result      1.  There is a probably new hypodensity in the central superior dash which could be related to new probably subacute/chronic small infarct or other insult..   2.  No acute intracranial hemorrhage.               DX-ANKLE 2- VIEWS RIGHT   Final Result         1. Right ankle fracture dislocation.                         Assessment/Plan:  Patient still on CIWA protocol. POC discussed with patient, but no family at bedside.   PT/OT when able  R ankle splint and dressings are CDI  TTWB RLE  Follow up with the [facility] trauma [facility] 2 weeks postop.  Cleared for DC from orthopedic standpoint pending therapy recs and medical optimization    POD#2 S/p  1. Open treatment of right trimalleolar ankle fracture without internal fixation of posterior lip  2. Open repair right syndesmosis with internal fixation  Wt bearing status - TTWB RLE  Future Procedures - None planned   Wound care/Drains - Dressings to be left in place until follow up appt  Sutures/Staples out- 14-21 days post operatively. Removal will completed by ortho [facility]'s unless transferred.  Inpatient DVT prophylaxis: Lovenox initiated 12/8  DVT Prophylaxis outpatient: ASA 81 mg PO BID x4 weeks  PT/OT-initiated  Antibiotics:  Perioperative completed  DVT Prophylaxis- TEDS/SCDs/Foot pumps  Case Coordination for Discharge Planning - Disposition per therapy recs.

## 2024-12-09 NOTE — CARE PLAN
The patient is Watcher - Medium risk of patient condition declining or worsening    Shift Goals  Clinical Goals: CIWA, pain control  Patient Goals: rest, pain control  Family Goals: eddie    Progress made toward(s) clinical / shift goals:    Problem: Knowledge Deficit - Standard  Goal: Patient and family/care givers will demonstrate understanding of plan of care, disease process/condition, diagnostic tests and medications  Outcome: Progressing  Note: Discuss and review POC with patient/family. Re-educate as needed.       Problem: Optimal Care for Alcohol Withdrawal  Goal: Optimal Care for the alcohol withdrawal patient  Outcome: Progressing  Note: Performing CIWA assessments when indicated and administering Lorazepam and dosing per MAR and CIWA score     Problem: Seizure Precautions  Goal: Implementation of seizure precautions  Outcome: Progressing  Note: Seizure precautions in place     Problem: Pain - Standard  Goal: Alleviation of pain or a reduction in pain to the patient’s comfort goal  Outcome: Progressing  Note: Assess and monitor for pain. Provide pharmacological and non-pharmacological interventions as appropriate. Re-evaluate and continue to monitor.

## 2024-12-10 LAB
ANION GAP SERPL CALC-SCNC: 9 MMOL/L (ref 7–16)
BASOPHILS # BLD AUTO: 0.5 % (ref 0–1.8)
BASOPHILS # BLD: 0.04 K/UL (ref 0–0.12)
BUN SERPL-MCNC: 14 MG/DL (ref 8–22)
CALCIUM SERPL-MCNC: 8.8 MG/DL (ref 8.5–10.5)
CHLORIDE SERPL-SCNC: 99 MMOL/L (ref 96–112)
CO2 SERPL-SCNC: 28 MMOL/L (ref 20–33)
CREAT SERPL-MCNC: 0.59 MG/DL (ref 0.5–1.4)
EOSINOPHIL # BLD AUTO: 0.01 K/UL (ref 0–0.51)
EOSINOPHIL NFR BLD: 0.1 % (ref 0–6.9)
ERYTHROCYTE [DISTWIDTH] IN BLOOD BY AUTOMATED COUNT: 42.7 FL (ref 35.9–50)
GFR SERPLBLD CREATININE-BSD FMLA CKD-EPI: 109 ML/MIN/1.73 M 2
GLUCOSE SERPL-MCNC: 89 MG/DL (ref 65–99)
HCT VFR BLD AUTO: 36.3 % (ref 37–47)
HGB BLD-MCNC: 12 G/DL (ref 12–16)
IMM GRANULOCYTES # BLD AUTO: 0.05 K/UL (ref 0–0.11)
IMM GRANULOCYTES NFR BLD AUTO: 0.6 % (ref 0–0.9)
LYMPHOCYTES # BLD AUTO: 2.51 K/UL (ref 1–4.8)
LYMPHOCYTES NFR BLD: 32.1 % (ref 22–41)
MCH RBC QN AUTO: 33.3 PG (ref 27–33)
MCHC RBC AUTO-ENTMCNC: 33.1 G/DL (ref 32.2–35.5)
MCV RBC AUTO: 100.8 FL (ref 81.4–97.8)
MONOCYTES # BLD AUTO: 0.88 K/UL (ref 0–0.85)
MONOCYTES NFR BLD AUTO: 11.3 % (ref 0–13.4)
NEUTROPHILS # BLD AUTO: 4.33 K/UL (ref 1.82–7.42)
NEUTROPHILS NFR BLD: 55.4 % (ref 44–72)
NRBC # BLD AUTO: 0.04 K/UL
NRBC BLD-RTO: 0.5 /100 WBC (ref 0–0.2)
PLATELET # BLD AUTO: 102 K/UL (ref 164–446)
PMV BLD AUTO: 9.7 FL (ref 9–12.9)
POTASSIUM SERPL-SCNC: 3.7 MMOL/L (ref 3.6–5.5)
RBC # BLD AUTO: 3.6 M/UL (ref 4.2–5.4)
SODIUM SERPL-SCNC: 136 MMOL/L (ref 135–145)
WBC # BLD AUTO: 7.8 K/UL (ref 4.8–10.8)

## 2024-12-10 PROCEDURE — 80048 BASIC METABOLIC PNL TOTAL CA: CPT

## 2024-12-10 PROCEDURE — 770020 HCHG ROOM/CARE - TELE (206)

## 2024-12-10 PROCEDURE — 97162 PT EVAL MOD COMPLEX 30 MIN: CPT

## 2024-12-10 PROCEDURE — 36415 COLL VENOUS BLD VENIPUNCTURE: CPT

## 2024-12-10 PROCEDURE — 97535 SELF CARE MNGMENT TRAINING: CPT

## 2024-12-10 PROCEDURE — 99232 SBSQ HOSP IP/OBS MODERATE 35: CPT | Performed by: INTERNAL MEDICINE

## 2024-12-10 PROCEDURE — 700111 HCHG RX REV CODE 636 W/ 250 OVERRIDE (IP): Mod: JZ | Performed by: STUDENT IN AN ORGANIZED HEALTH CARE EDUCATION/TRAINING PROGRAM

## 2024-12-10 PROCEDURE — A9270 NON-COVERED ITEM OR SERVICE: HCPCS | Performed by: STUDENT IN AN ORGANIZED HEALTH CARE EDUCATION/TRAINING PROGRAM

## 2024-12-10 PROCEDURE — 85025 COMPLETE CBC W/AUTO DIFF WBC: CPT

## 2024-12-10 PROCEDURE — 97166 OT EVAL MOD COMPLEX 45 MIN: CPT

## 2024-12-10 PROCEDURE — 700102 HCHG RX REV CODE 250 W/ 637 OVERRIDE(OP): Performed by: STUDENT IN AN ORGANIZED HEALTH CARE EDUCATION/TRAINING PROGRAM

## 2024-12-10 RX ADMIN — GABAPENTIN 300 MG: 300 CAPSULE ORAL at 20:37

## 2024-12-10 RX ADMIN — OXYCODONE 5 MG: 5 TABLET ORAL at 07:37

## 2024-12-10 RX ADMIN — Medication 400 MG: at 05:23

## 2024-12-10 RX ADMIN — OXYCODONE 5 MG: 5 TABLET ORAL at 03:50

## 2024-12-10 RX ADMIN — GABAPENTIN 300 MG: 300 CAPSULE ORAL at 05:23

## 2024-12-10 RX ADMIN — ESCITALOPRAM OXALATE 20 MG: 10 TABLET ORAL at 05:22

## 2024-12-10 RX ADMIN — Medication 100 MG: at 05:23

## 2024-12-10 RX ADMIN — ENOXAPARIN SODIUM 40 MG: 100 INJECTION SUBCUTANEOUS at 16:27

## 2024-12-10 RX ADMIN — GABAPENTIN 300 MG: 300 CAPSULE ORAL at 12:31

## 2024-12-10 RX ADMIN — LEVOTHYROXINE SODIUM 50 MCG: 0.05 TABLET ORAL at 05:23

## 2024-12-10 RX ADMIN — Medication 400 MG: at 16:26

## 2024-12-10 RX ADMIN — THERA TABS 1 TABLET: TAB at 05:23

## 2024-12-10 RX ADMIN — LORAZEPAM 2 MG: 2 TABLET ORAL at 19:18

## 2024-12-10 RX ADMIN — ACETAMINOPHEN 650 MG: 325 TABLET, FILM COATED ORAL at 19:17

## 2024-12-10 RX ADMIN — FOLIC ACID 1 MG: 1 TABLET ORAL at 05:23

## 2024-12-10 RX ADMIN — POLYETHYLENE GLYCOL 3350 1 PACKET: 17 POWDER, FOR SOLUTION ORAL at 05:22

## 2024-12-10 ASSESSMENT — LIFESTYLE VARIABLES
ANXIETY: *
VISUAL DISTURBANCES: NOT PRESENT
NAUSEA AND VOMITING: INTERMITTENT NAUSEA WITH DRY HEAVES
TREMOR: TREMOR NOT VISIBLE BUT CAN BE FELT, FINGERTIP TO FINGERTIP
TOTAL SCORE: 3
TREMOR: TREMOR NOT VISIBLE BUT CAN BE FELT, FINGERTIP TO FINGERTIP
HEADACHE, FULLNESS IN HEAD: MODERATE
HEADACHE, FULLNESS IN HEAD: NOT PRESENT
NAUSEA AND VOMITING: NO NAUSEA AND NO VOMITING
VISUAL DISTURBANCES: NOT PRESENT
AGITATION: NORMAL ACTIVITY
TOTAL SCORE: 2
PAROXYSMAL SWEATS: NO SWEAT VISIBLE
ORIENTATION AND CLOUDING OF SENSORIUM: ORIENTED AND CAN DO SERIAL ADDITIONS
HEADACHE, FULLNESS IN HEAD: NOT PRESENT
ORIENTATION AND CLOUDING OF SENSORIUM: ORIENTED AND CAN DO SERIAL ADDITIONS
PAROXYSMAL SWEATS: BARELY PERCEPTIBLE SWEATING. PALMS MOIST
AGITATION: NORMAL ACTIVITY
NAUSEA AND VOMITING: NO NAUSEA AND NO VOMITING
ANXIETY: NO ANXIETY (AT EASE)
AGITATION: NORMAL ACTIVITY
HEADACHE, FULLNESS IN HEAD: NOT PRESENT
ANXIETY: MILDLY ANXIOUS
PAROXYSMAL SWEATS: NO SWEAT VISIBLE
ANXIETY: MODERATELY ANXIOUS OR GUARDED, SO ANXIETY IS INFERRED
TOTAL SCORE: 4
ORIENTATION AND CLOUDING OF SENSORIUM: ORIENTED AND CAN DO SERIAL ADDITIONS
AUDITORY DISTURBANCES: NOT PRESENT
PAROXYSMAL SWEATS: NO SWEAT VISIBLE
NAUSEA AND VOMITING: NO NAUSEA AND NO VOMITING
NAUSEA AND VOMITING: NO NAUSEA AND NO VOMITING
ANXIETY: NO ANXIETY (AT EASE)
TOTAL SCORE: 13
AGITATION: NORMAL ACTIVITY
PAROXYSMAL SWEATS: NO SWEAT VISIBLE
HEADACHE, FULLNESS IN HEAD: NOT PRESENT
TREMOR: NO TREMOR
VISUAL DISTURBANCES: NOT PRESENT
ORIENTATION AND CLOUDING OF SENSORIUM: ORIENTED AND CAN DO SERIAL ADDITIONS
VISUAL DISTURBANCES: NOT PRESENT
NAUSEA AND VOMITING: NO NAUSEA AND NO VOMITING
AUDITORY DISTURBANCES: NOT PRESENT
TOTAL SCORE: 2
TOTAL SCORE: 2
ORIENTATION AND CLOUDING OF SENSORIUM: ORIENTED AND CAN DO SERIAL ADDITIONS
TREMOR: *
PAROXYSMAL SWEATS: NO SWEAT VISIBLE
TREMOR: *
ANXIETY: MODERATELY ANXIOUS OR GUARDED, SO ANXIETY IS INFERRED
TREMOR: TREMOR NOT VISIBLE BUT CAN BE FELT, FINGERTIP TO FINGERTIP
AGITATION: NORMAL ACTIVITY
AUDITORY DISTURBANCES: NOT PRESENT
HEADACHE, FULLNESS IN HEAD: NOT PRESENT
AUDITORY DISTURBANCES: NOT PRESENT
AUDITORY DISTURBANCES: NOT PRESENT
ORIENTATION AND CLOUDING OF SENSORIUM: ORIENTED AND CAN DO SERIAL ADDITIONS
AGITATION: NORMAL ACTIVITY
AUDITORY DISTURBANCES: NOT PRESENT

## 2024-12-10 ASSESSMENT — COGNITIVE AND FUNCTIONAL STATUS - GENERAL
MOBILITY SCORE: 14
SUGGESTED CMS G CODE MODIFIER MOBILITY: CL
TOILETING: A LOT
DAILY ACTIVITIY SCORE: 16
HELP NEEDED FOR BATHING: A LOT
WALKING IN HOSPITAL ROOM: TOTAL
MOVING TO AND FROM BED TO CHAIR: A LOT
DRESSING REGULAR LOWER BODY CLOTHING: A LOT
STANDING UP FROM CHAIR USING ARMS: A LOT
CLIMB 3 TO 5 STEPS WITH RAILING: TOTAL
PERSONAL GROOMING: A LITTLE
SUGGESTED CMS G CODE MODIFIER DAILY ACTIVITY: CK
DRESSING REGULAR UPPER BODY CLOTHING: A LITTLE

## 2024-12-10 ASSESSMENT — PAIN DESCRIPTION - PAIN TYPE
TYPE: ACUTE PAIN
TYPE: ACUTE PAIN
TYPE: ACUTE PAIN;SURGICAL PAIN

## 2024-12-10 ASSESSMENT — FIBROSIS 4 INDEX: FIB4 SCORE: 3.21

## 2024-12-10 ASSESSMENT — ENCOUNTER SYMPTOMS
NAUSEA: 0
NERVOUS/ANXIOUS: 1
VOMITING: 0
CONSTIPATION: 1
TREMORS: 1
WEAKNESS: 1
ABDOMINAL PAIN: 0
MYALGIAS: 1
SHORTNESS OF BREATH: 0

## 2024-12-10 ASSESSMENT — ACTIVITIES OF DAILY LIVING (ADL): TOILETING: INDEPENDENT

## 2024-12-10 ASSESSMENT — GAIT ASSESSMENTS: GAIT LEVEL OF ASSIST: UNABLE TO PARTICIPATE

## 2024-12-10 NOTE — CARE PLAN
The patient is Stable - Low risk of patient condition declining or worsening    Shift Goals  Clinical Goals: CIWA, pain control  Patient Goals: rest, psych  Family Goals: eddie    Progress made toward(s) clinical / shift goals:    Problem: Knowledge Deficit - Standard  Goal: Patient and family/care givers will demonstrate understanding of plan of care, disease process/condition, diagnostic tests and medications  Outcome: Progressing     Problem: Optimal Care for Alcohol Withdrawal  Goal: Optimal Care for the alcohol withdrawal patient  Outcome: Progressing. Scoring on ciwa. Will ctm      Problem: Seizure Precautions  Goal: Implementation of seizure precautions  Outcome: Progressing. Side rails padded. Suction set up at bedside      Problem: Lifestyle Changes  Goal: Patient's ability to identify lifestyle changes and available resources to help reduce recurrence of condition will improve  Outcome: Progressing     Problem: Psychosocial  Goal: Patient's level of anxiety will decrease  Outcome: Progressing     Problem: Risk for Aspiration  Goal: Patient's risk for aspiration will be absent or decrease  Outcome: Progressing     Problem: Fall Risk  Goal: Patient will remain free from falls  Outcome: Progressing. BA on, call light within reach      Problem: Pain - Standard  Goal: Alleviation of pain or a reduction in pain to the patient’s comfort goal  Outcome: Progressing. Pain meds per mar        Patient is not progressing towards the following goals:

## 2024-12-10 NOTE — CARE PLAN
The patient is Watcher - Medium risk of patient condition declining or worsening    Shift Goals  Clinical Goals: CIWA, pain control, PT/OT  Patient Goals: rest  Family Goals: eddie    Progress made toward(s) clinical / shift goals:      Problem: Optimal Care for Alcohol Withdrawal  Goal: Optimal Care for the alcohol withdrawal patient  Outcome: Progressing     Problem: Seizure Precautions  Goal: Implementation of seizure precautions  Outcome: Progressing       Patient is not progressing towards the following goals:

## 2024-12-10 NOTE — PROGRESS NOTES
Bedside report received from off going RN: Ila, assumed care of patient.      Fall Risk Score: HIGH RISK  Fall risk interventions in place: Place yellow fall risk ID band on patient, Provide patient/family education based on risk assessment, Educate patient/family to call staff for assistance when getting out of bed, Place fall precaution signage outside patient door, Utilize bed/chair fall alarm, Notify charge of high risk for huddle, and Bed alarm connected correctly  Bed type: Regular (Sam Score less than 17 interventions in place)  Patient on cardiac monitor: Yes  IVF/IV medications: Infusion per MAR (n/a  Oxygen: 1L [place]   Bedside sitter: Not Applicable   Isolation: Not applicable

## 2024-12-10 NOTE — PROGRESS NOTES
Bedside report received from off going RN/tech: Jil, assumed care of patient.     Fall Risk Score: MODERATE RISK  Fall risk interventions in place: Provide patient/family education based on risk assessment, Educate patient/family to call staff for assistance when getting out of bed, Place fall precaution signage outside patient door, Utilize bed/chair fall alarm, and Bed alarm connected correctly  Bed type: Regular (Sam Score less than 17 interventions in place)  Patient on cardiac monitor: Yes  IVF/IV medications: Not Applicable   Oxygen: How many liters 1L  Bedside sitter: Not Applicable   Isolation: Not applicable

## 2024-12-10 NOTE — PROGRESS NOTES
"    Orthopedic PA Progress Note    Interval changes:  Patient doing better. POC discussed with patient.   PT/OT today  R ankle splint and dressings are CDI  TTWB RLE  Follow up with the [facility] trauma [facility] 2 weeks postop.  Cleared for DC from orthopedic standpoint to post-acute facility     ROS - Patient denies any new issues.  Denies any numbness or tingling. Pain controlled.    BP (!) 122/91   Pulse 95   Temp 36.6 °C (97.9 °F) (Temporal)   Resp 16   Ht 1.575 m (5' 2\")   Wt 72.9 kg (160 lb 11.5 oz)   SpO2 94%     Patient seen and examined  No acute distress  Breathing non labored  RRR  RLE: Splint and dressings CDI. Flexes and extends all toes. SILT distally. Cap refill <2s    Recent Labs     12/08/24  0042 12/09/24  0051 12/10/24  0108   WBC 4.2* 7.1 7.8   RBC 3.89* 3.61* 3.60*   HEMOGLOBIN 13.4 12.5 12.0   HEMATOCRIT 38.8 35.8* 36.3*   MCV 99.7* 99.2* 100.8*   MCH 34.4* 34.6* 33.3*   MCHC 34.5 34.9 33.1   RDW 43.6 42.7 42.7   PLATELETCT 123* 115* 102*   MPV 9.1 9.5 9.7       Active Hospital Problems    Diagnosis     Thrombocytopenia (HCC) [D69.6]     Trimalleolar fracture of ankle, closed, right, initial encounter [S82.851A]     Ground-level fall [W18.30XA]     Alcohol withdrawal syndrome without complication (HCC) [F10.930]     Balance problem [R26.89]     Acute hypoxic respiratory failure (HCC) [J96.01]     Alcoholic hepatitis with ascites [K70.11]      >>OVERVIEW FOR ALCOHOLIC CIRRHOSIS OF LIVER WITHOUT ASCITES (HCC) WRITTEN ON 6/20/2024  2:05 PM BY MARIKA MACIEL M.D.    Diagnosed In 03/2024     CT abdomen showed  Cirrhosis with portal hypertension  Associated ascites and splenomegaly  Hepatic steatosis, extremely severe  With elevated transaminitis with bilirubinemia  Initially elevated ammonia as well.  Rifaximin and Spironolactone was added  Not started on propranolol or nadolol for prophylaxis for portal hypertension because her blood pressure was soft while in hospital.  [facility] " as recommended colonoscopy for chronic diarrhea.      Alcohol use disorder, severe, dependence (HCC) [F10.20]     Fatty liver, alcoholic [K70.0]      Chronic alcohol use   Previously used to drink alcohol - wine( 7-10 drinks).   Sine 2020 - excessive alcohol , drinking 4-5 glasses wine a day.   Severe hepatic steatosis found on imaging   Patient has now stopped alcohol completely since 1 month.       Hypothyroidism [E03.9]     Essential hypertension [I10]     Hypercholesterolemia [E78.00]     Mild episode of recurrent major depressive disorder (HCC) [F33.0]        MR-BRAIN-W/O   Final Result      1.  Mild cerebral atrophy beyond expected for the patient's stated age of 49 years. This most likely relates to the alcohol history.   2.  Minimal supratentorial white matter disease with single punctate focus of FLAIR hyperintensity in the left peritrigonal deep white matter. Nonspecific finding most consistent with microvascular ischemic change versus demyelination or gliosis. Not    uncommon for the patient's age.   3.  9 mm focus of T2 hyperintensity in the central dash with well demarcated T1 hypointensity. 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 There is no ADC map hypointensity to suggest restricted diffusion and this is not thought to represent an acute brainstem infarct.   4.  No evidence of acute hemorrhage or mass lesion.      DX-ANKLE 2- VIEWS RIGHT   Final Result      Digitized intraoperative radiograph is submitted for review. This examination is not for diagnostic purpose but for guidance during a surgical procedure. Please see the patient's chart for full procedural details.         [facility] [address] [facility] [address]      DX-PORTABLE FLUORO > 1 HOUR   Final Result      Portable fluoroscopy utilized for 12 seconds.         INTERPRETING LOCATION: [address] [facility] [place] [place]  [place], [address]      CT-ANKLE W/O PLUS RECONS RIGHT   Final Result         1. Comminuted fractures of the distal tibia and fibula as detailed above.   2. The navicular fracture noted previously has healed.      CT-HEAD W/O   Final Result      1.  There is a probably new hypodensity in the central superior dash which could be related to new probably subacute/chronic small infarct or other insult..   2.  No acute intracranial hemorrhage.               DX-ANKLE 2- VIEWS RIGHT   Final Result         1. Right ankle fracture dislocation.                         Assessment/Plan:  Patient doing better. POC discussed with patient.   PT/OT today  R ankle splint and dressings are CDI  TTWB RLE  Follow up with the [facility] trauma [facility] 2 weeks postop.  Cleared for DC from orthopedic standpoint to post-acute facility     POD#3 S/p  1. Open treatment of right trimalleolar ankle fracture without internal fixation of posterior lip  2. Open repair right syndesmosis with internal fixation  Wt bearing status - TTWB RLE  Future Procedures - None planned   Wound care/Drains - Dressings to be left in place until follow up appt  Sutures/Staples out- 14-21 days post operatively. Removal will completed by ortho [facility]'s unless transferred.  Inpatient DVT prophylaxis: Lovenox initiated 12/8  DVT Prophylaxis outpatient: ASA 81 mg PO BID x4 weeks  PT/OT-initiated  Antibiotics:  Perioperative completed  DVT Prophylaxis- TEDS/SCDs/Foot pumps  Case Coordination for Discharge Planning - Disposition per therapy recs.

## 2024-12-10 NOTE — THERAPY
Occupational Therapy   Initial Evaluation     Patient Name: Minerva Tillman  Age:  49 y.o., Sex:  female  Medical Record #: [id number]  Today's Date: 12/10/2024     Precautions  Precautions: (P) Fall Risk, Toe Touch Weight Bearing Right Lower Extremity  Comments: TTWB RLE    Assessment  Patient is a 49 y.o. female with a diagnosis of trimalleolar ankle fracture is now POD #3 open treatment without internal fixation of posterior lip and right syndesmosis with internal fixation. Hospital course complicated by alcohol withdrawal. Additional factors influencing patient status / progress: PMHx includes depression, hypertension, hypercholesterolemia, and alcohol dependence in intermittent remission.      During OT eval, pt presented with impaired strength, activity tolerance, balance and mobility. Pt was receptive to all education provided on anticipated DME needs and transfer sequence/mechanics. Pt was able to lateral scoot to a recliner with a flipback arm rest and very slight uphill going back to bed with Aviva. Pt demo's good LE ROM for distal LBD but would likely need assist to manage over hips, or perform at the bed level. Pt has a flight of stairs to enter her home but reports she will have good support from family. Recommend post-acute placement at this time.     Plan    Occupational Therapy Initial Treatment Plan   Treatment Interventions: (P) Self Care / Activities of Daily Living, Adaptive Equipment, Neuro Re-Education / Balance, Therapeutic Exercises, Therapeutic Activity, Family / Caregiver Training  Treatment Frequency: (P) 4 Times per Week  Duration: (P) Until Therapy Goals Met    DC Equipment Recommendations: (P) Unable to determine at this time  Discharge Recommendations: (P) Recommend post-acute placement for additional occupational therapy services prior to discharge home     Subjective    Pt agreeable to OT eval.      Objective     12/10/24 1121   Initial Contact Note    Initial Contact Note Order  Received and Verified, Occupational Therapy Evaluation in Progress with Full Report to Follow.   Prior Living Situation   Prior Services Home-Independent   Housing / Facility 3 Story House   Steps Into Home 14   Bathroom Set up Walk In Shower;Shower Chair   Equipment Owned Single Point Cane;Tub / Shower Seat; Wheelchair   Lives with - Patient's Self Care Capacity Spouse   Comments Pt reports her spouse works full time but her mom will be staying and she has friends who can also provide assistance. Has a full flight of stairs to enter her main living floor where she can stay with a bedroom and bathroom. Reports her spouse just bought her a wheelchair.    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    Comments Reports she was unable to use her UEs to effectively offload her RLE when trying the FWW with PT earlier   Sensation Upper Body   Upper Extremity Sensation  Not Tested    Upper Body Muscle Tone   Upper Body Muscle Tone  WDL   Neurological Concerns   Neurological Concerns No   Coordination Upper Body   Coordination WDL   Balance Assessment   Sitting Balance (Static) Fair +   Sitting Balance (Dynamic) Fair   Weight Shift Sitting Fair   Bed Mobility    Supine to Sit Supervised   Sit to Supine Supervised   Scooting Supervised   Comments HOB elevated   ADL Assessment   Lower Body Dressing Moderate Assist  (able to reach her distal BLEs, would likely need assist to manage over hips; wearing underwear on arrival)   Toileting Maximal Assist  (purewick; encouraged pt to decrease use of purewick and use BSC with staff assistance)   How much help from another person does the patient currently need...   Putting on and taking off regular lower body clothing? 2   Bathing (including washing, rinsing, and drying)? 2   Toileting, which includes using a toilet, bedpan, or urinal? 2   Putting on and taking off regular upper body clothing? 3   Taking care of personal grooming such as brushing teeth? 3   Eating meals? 4   6 Clicks Daily Activity Score 16   Functional Mobility   Bed, Chair, Wheelchair Transfer Minimal Assist   Transfer Method Sit Pivot   Mobility EOB<>recliner with flipback armrest, cues for transfer mechanics and sequencing for lateral scoot   Activity Tolerance   Sitting in Chair 10 min in recliner, requested back to bed due to fatigue   Sitting Edge of Bed 5 min   Patient / Family Goals   Patient / Family Goal #1 To go home ASAP   Short Term Goals   Short Term Goal # 1 Pt will perform LBD including underwear/pants with SBA   Short Term Goal # 2 Pt will transfer to BSC with SBA   Short Term Goal # 3 Pt will perform toileting with SBA   Education Group   Education Provided Transfers;Adaptive Equipment   Role of Occupational Therapist Patient Response Patient;Acceptance;Explanation;Verbal Demonstration   Transfers Patient Response Patient;Acceptance;Explanation;Demonstration;Verbal  Demonstration;Action Demonstration;Reinforcement Needed  (lateral scoot/head&hips relationship with transfers)   Adaptive Equipment Patient Response Patient;Acceptance;Explanation;Handout;Verbal Demonstration;Reinforcement Needed  (education on anticipated DME needs, handout provided; recommendation for a separate wheelchair cushion for skin integrity)   Occupational Therapy Initial Treatment Plan    Treatment Interventions Self Care / Activities of Daily Living;Adaptive Equipment;Neuro Re-Education / Balance;Therapeutic Exercises;Therapeutic Activity;Family / Caregiver Training   Treatment Frequency 4 Times per Week   Duration Until Therapy Goals Met   Problem List   Problem List Decreased Active Daily Living Skills;Decreased Homemaking Skills;Decreased Upper Extremity Strength Right;Decreased Upper Extremity Strength Left;Decreased Functional Mobility;Decreased Activity Tolerance;Impaired Postural Control / Balance   Anticipated Discharge Equipment and Recommendations   DC Equipment Recommendations Unable to determine at this time   Discharge Recommendations Recommend post-acute placement for additional occupational therapy services prior to discharge home   Interdisciplinary Plan of Care Collaboration   IDT Collaboration with  Nursing   Patient Position at End of Therapy In Bed;Bed Alarm On;Call Light within Reach;Tray Table within Reach;Phone within Reach   Collaboration Comments RN aware   Session Information   Date / Session Number  12/10 #1 (1/4, 12/16)

## 2024-12-10 NOTE — THERAPY
Physical Therapy   Initial Evaluation     Patient Name: Minerva Tillman  Age:  49 y.o., Sex:  female  Medical Record #: [id number]  Today's Date: 12/10/2024     Precautions  Precautions: Fall Risk  Comments: TTWB RLE    Assessment  Patient is 49 y.o. female w/ hx of depression, HTN, ETOH.  Actively withdrawing on admit.  She is s/p GLF sustaining a right trimaleolar fx, s/p ORIF and TTWB.  She lives in a 3 story house w/ her  where she was indep w/ mobility.  She has multiple steps to enter her home.      Today, she is able to sit eob w/o assist and w/o use of bed features.  She is able to maintain her balance w/o assist.  Worked on sit to stand transfers multiple times, w/ each needing mod assist.  Worked on weight shifting while standing while maintaining TTWB on the right.  She is able to maintain her precautions, but unable to shift weight in order to TF to the chair.  Attempted a squat pivot TF to the chair, however pt too fatigued to perform despite assist.      PT will follow and address impairments noted below.  Plan    Physical Therapy Initial Treatment Plan   Treatment Plan : Gait Training, Therapeutic Activities  Treatment Frequency: 4 Times per Week  Duration: Until Therapy Goals Met    DC Equipment Recommendations: Unable to determine at this time  Discharge Recommendations: Recommend post-acute placement for additional physical therapy services prior to discharge home         Objective       12/10/24 0840   Prior Living Situation   Housing / Facility [address]   Steps Into Home 14   Steps In Home 0   Rail Right Rail (Steps into Home)   Equipment Owned Single Point Cane   Lives with - Patient's Self Care Capacity Spouse   Comments spouse works days   Prior Level of Functional Mobility   Bed Mobility Independent   Transfer Status Independent   Ambulation Independent   Assistive Devices Used None   Stairs Required Assist   Strength Lower Body   Comments grossly wnl, right ankle/foot NT    Balance Assessment   Sitting Balance (Static) Fair   Sitting Balance (Dynamic) Fair   Standing Balance (Static) Fair   Standing Balance (Dynamic) Fair -   Weight Shift Sitting Good   Weight Shift Standing Poor   Comments w/ fww   Bed Mobility    Supine to Sit Supervised   Sit to Supine Supervised   Gait Analysis   Gait Level Of Assist Unable to Participate   Functional Mobility   Sit to Stand Moderate Assist   Bed, Chair, Wheelchair Transfer Unable to Participate   Comments Attempted squat pivot to chair/unable.   Short Term Goals    Short Term Goal # 1 Pt to move sit to/from stand w/ spv in 6 visits to improve fxl indep   Short Term Goal # 2 Pt to TF bed to/from chair or w/c w/ spv in 6 visits   Short Term Goal # 3 Pt to ambulate 10 ft w/ fww and spv in 6 visits to improve fxl indep   Short Term Goal # 4 Pt to maintain WB precautions w/o cues w/ all mobility in 6 visits   Physical Therapy Initial Treatment Plan    Treatment Plan  Gait Training;Therapeutic Activities   Treatment Frequency 4 Times per Week   Duration Until Therapy Goals Met   Problem List    Problems Impaired Transfers;Impaired Ambulation;Impaired Balance;Decreased Activity Tolerance   Anticipated Discharge Equipment and Recommendations   DC Equipment Recommendations Unable to determine at this time   Discharge Recommendations Recommend post-acute placement for additional physical therapy services prior to discharge home

## 2024-12-10 NOTE — DISCHARGE PLANNING
Physiatry consult remains pending  Would appreciate therapy notes as appropriate.  TCC remains following

## 2024-12-10 NOTE — PROGRESS NOTES
Hospital Medicine Daily Progress Note    Date of Service  12/10/2024    Chief Complaint  Minerva Tillman is a 49 y.o. female admitted 12/7/2024 with ankle fx    Hospital Course  48 yo woman with HTN, HLD who had started drinking alcohol after period of remission and had a trip and fall with right ankle fracture.  She underwent open treatment of right trimalleolar ankle fracture with right syndesmosis with internal fixation  with Dr. Clinton 12/7.  She is to be toe-touch weightbearing RLE.  Patient had a CT head for head injury and showed new hypodensity in central superior dash.  MRI brain showed 9 mm focus in central dash concerning for sequela of central pontine melanosis.  Neurology was consulted and this may be related to chronic alcohol use.  She underwent alcohol withdrawal.    Interval Problem Update  CIWA 4-16.  She is feeling better, less shaky  She was able to work a little bit with therapy today.  She wants to consider all her rehab options including acute rehab  She has not had a BM.  Her pain is fairly controlled    I have discussed this patient's plan of care and discharge plan at IDT rounds today with Case Management, Nursing, Nursing leadership, and other members of the IDT team.    Consultants/Specialty  neurology and orthopedics    Code Status  Full Code    Disposition  The patient is not medically cleared for discharge to home or a post-acute facility.  Anticipate discharge to: an inpatient rehabilitation hospital    I have placed the appropriate orders for post-discharge needs.    Review of Systems  Review of Systems   Constitutional:  Positive for malaise/fatigue.   Respiratory:  Negative for shortness of breath.    Cardiovascular:  Negative for chest pain.   Gastrointestinal:  Positive for constipation. Negative for abdominal pain, nausea and vomiting.   Musculoskeletal:  Positive for joint pain and myalgias.   Neurological:  Positive for tremors and weakness.   Psychiatric/Behavioral:   The patient is nervous/anxious.         Physical Exam  Temp:  [36.5 °C (97.7 °F)-37 °C (98.6 °F)] 36.6 °C (97.9 °F)  Pulse:  [] 95  Resp:  [16-18] 16  BP: (122-150)/() 122/91  SpO2:  [94 %-97 %] 94 %    Physical Exam  Vitals and nursing note reviewed.   Constitutional:       General: She is not in acute distress.     Appearance: She is not toxic-appearing.   HENT:      Head: Normocephalic.      Mouth/Throat:      Mouth: Mucous membranes are moist.   Eyes:      General:         Right eye: No discharge.         Left eye: No discharge.   Cardiovascular:      Rate and Rhythm: Normal rate and regular rhythm.   Pulmonary:      Effort: Pulmonary effort is normal. No respiratory distress.      Breath sounds: No wheezing or rales.   Abdominal:      General: There is no distension.      Palpations: Abdomen is soft.      Tenderness: There is no abdominal tenderness.   Musculoskeletal:         General: No swelling.      Cervical back: Neck supple.      Comments: Right ankle splint   Skin:     General: Skin is warm and dry.   Neurological:      Mental Status: She is alert and oriented to person, place, and time.         Fluids    Intake/Output Summary (Last 24 hours) at 12/10/2024 1426  Last data filed at 12/10/2024 0900  Gross per 24 hour   Intake 440 ml   Output 500 ml   Net -60 ml        Laboratory  Recent Labs     12/08/24  0042 12/09/24  0051 12/10/24  0108   WBC 4.2* 7.1 7.8   RBC 3.89* 3.61* 3.60*   HEMOGLOBIN 13.4 12.5 12.0   HEMATOCRIT 38.8 35.8* 36.3*   MCV 99.7* 99.2* 100.8*   MCH 34.4* 34.6* 33.3*   MCHC 34.5 34.9 33.1   RDW 43.6 42.7 42.7   PLATELETCT 123* 115* 102*   MPV 9.1 9.5 9.7     Recent Labs     12/08/24  0042 12/09/24  0051 12/10/24  0108   SODIUM 139 137 136   POTASSIUM 4.3 4.0 3.7   CHLORIDE 104 101 99   CO2 20 26 28   GLUCOSE 137* 107* 89   BUN 9 11 14   CREATININE 0.56 0.71 0.59   CALCIUM 8.7 8.9 8.8             Recent Labs     12/09/24  0051   TRIGLYCERIDE 93   HDL 84   *        Imaging  MR-BRAIN-W/O   Final Result      1.  Mild cerebral atrophy beyond expected for the patient's stated age of 49 years. This most likely relates to the alcohol history.   2.  Minimal supratentorial white matter disease with single punctate focus of FLAIR hyperintensity in the left peritrigonal deep white matter. Nonspecific finding most consistent with microvascular ischemic change versus demyelination or gliosis. Not    uncommon for the patient's age.   3.  9 mm focus of T2 hyperintensity in the central dash with well demarcated T1 hypointensity. 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 There is no ADC map hypointensity to suggest restricted diffusion and this is not thought to represent an acute brainstem infarct.   4.  No evidence of acute hemorrhage or mass lesion.      DX-ANKLE 2- VIEWS RIGHT   Final Result      Digitized intraoperative radiograph is submitted for review. This examination is not for diagnostic purpose but for guidance during a surgical procedure. Please see the patient's chart for full procedural details.         INTERPRETING LOCATION: [address], [place], [address]      DX-PORTABLE FLUORO > 1 HOUR   Final Result      Portable fluoroscopy utilized for 12 seconds.         INTERPRETING LOCATION: [address], [place], [address]      CT-ANKLE W/O PLUS RECONS RIGHT   Final Result         1. Comminuted fractures of the distal tibia and fibula as detailed above.   2. The navicular fracture noted previously has healed.      CT-HEAD W/O   Final Result      1.  There is a probably new hypodensity in the central superior dash which could be related to new probably subacute/chronic small infarct or other insult..   2.  No acute intracranial hemorrhage.               DX-ANKLE 2- VIEWS RIGHT   Final Result         1. Right ankle fracture dislocation.                           Assessment/Plan  * Trimalleolar fracture of ankle, closed, right, initial encounter- (present on admission)  Assessment & Plan  After mechanical fall     S/p surgical repair with  Dr. Clinton orthopedic surgery 12/7  Supportive care  Pain control with Tylenol, oxycodone, and IV Dilaudid as needed for breakthrough pain.  Bowel regimen ordered  PT OT, physiatry consult      Thrombocytopenia (HCC)  Assessment & Plan  Slowly decreasing  Mild  Continue to trend    Ground-level fall- (present on admission)  Assessment & Plan  Patient had mechanical ground-level fall from tripping over her dog.   PT and OT  Supportive care    Alcohol withdrawal syndrome without complication (HCC)- (present on admission)  Assessment & Plan  Patient has a history of severe alcohol withdrawal syndrome as well as seizures- improving today - continue supportive care  Ongoing seizures, at risk for deterioration, following closely   Continuous cardiac monitoring to monitor for arrhythmias  Continue multivitamin, thiamine, and folic acid supplementation  Aspiration, fall and seizure precautions  Continue gabapentin 300 mg 3 times a day  Her withdrawal is improving    Balance problem- (present on admission)  Assessment & Plan    Following  MRI brain showed etoh related atrophy and non specific central pontine myelin changes  Seen by neurology      Acute hypoxic respiratory failure (HCC)- (present on admission)  Assessment & Plan  Suspect due atelectasis  Encourage mobilization and I-S  Wean oxygen as tolerated    Alcoholic hepatitis with ascites- (present on admission)  Assessment & Plan  LFTs have improved  No ascites currently     Alcohol use disorder, severe, dependence (HCC)- (present on admission)  Assessment & Plan  Encouraging returning back to Alcoholic Anonymous engaging with her sponsor.  Patient is interested in maintaining sobriety.  She did remain sober for 45 days prior to Thanksgiving.  States she is following closely  with outpatient psychiatry    Mild episode of recurrent major depressive disorder (HCC)- (present on admission)  Assessment & Plan  Denies SI  Supportive care  Continue home escitalopram    Hypothyroidism- (present on admission)  Assessment & Plan    Continue home levothyroxine 50 mcg daily    Fatty liver, alcoholic- (present on admission)  Assessment & Plan  As per history.  Alcohol cessation counseled    Essential hypertension- (present on admission)  Assessment & Plan  Not on home medications   diastolic pressure trending up with w/d  Following  Prn     Hypercholesterolemia- (present on admission)  Assessment & Plan   Not currently on statin.         VTE prophylaxis:    enoxaparin ppx      I have performed a physical exam and reviewed and updated ROS and Plan today (12/10/2024). In review of yesterday's note (12/9/2024), there are no changes except as documented above.

## 2024-12-10 NOTE — ASSESSMENT & PLAN NOTE
Continue home levothyroxine 50 mcg daily  
  Following  MRI brain showed etoh related atrophy and non specific central pontine myelin changes  Seen by neurology    
 Not currently on statin.  
After mechanical fall     S/p surgical repair with  Dr. Clinton orthopedic surgery 12/7  Supportive care  Pain control with Tylenol, oxycodone, and IV Dilaudid as needed for breakthrough pain.  Bowel regimen ordered  PT OT, physiatry consult    
As per history.  Alcohol cessation counseled  
Denies SI  Supportive care  Continue home escitalopram  
Encouraging returning back to Alcoholic Anonymous engaging with her sponsor.  Patient is interested in maintaining sobriety.  She did remain sober for 45 days prior to Thanksgiving.  States she is following closely with outpatient psychiatry  
LFTs have improved  No ascites currently   
Not on home medications   diastolic pressure trending up with w/d  Following  Prn   
Patient had mechanical ground-level fall from tripping over her dog.   PT and OT  Supportive care  
Patient has a history of severe alcohol withdrawal syndrome as well as seizures- improving today - continue supportive care  Ongoing seizures, at risk for deterioration, following closely   Continuous cardiac monitoring to monitor for arrhythmias  Continue multivitamin, thiamine, and folic acid supplementation  Aspiration, fall and seizure precautions  Continue gabapentin 300 mg 3 times a day  Her withdrawal is improving  
Slowly decreasing  Mild  Continue to trend  
Suspect due atelectasis  Encourage mobilization and I-S  Wean oxygen as tolerated  
No

## 2024-12-10 NOTE — CONSULTS
Brief Behavioral Health Care Note    Behavioral Health consultation order received for psychotherapy. Patient seen at bedside, alert and oriented, pleasant and cooperative. Patient reports she has an outpatient psychiatrist she meets with weekly and believes this treatment is sufficient at this time. Patient appreciated the offer but declined services from Behavioral Health at this time.    Signing off  Tanesha Calvo, Ph.D., [facility]

## 2024-12-10 NOTE — DISCHARGE PLANNING
Physiatry consult denied at this time.  Please reach back out if medical management is requestedInpatient Rehabilitation facility care is only considered by Medicare to be reasonable and necessary under 1862(a)(1)(A) of the Social Security Act if the patient meets all of the requirements outlined in 42 CFR § 412.622(a). CMS requires determinations of whether IRF stays are reasonable and necessary to be based on an assessment of each beneficiary's individual care needs.    The patient is not a candidate for Acute Rehabilitation Hospitalization because they lack the modifiable need for 2/3 therapy disciplines (PT, OT, SLP)  and lack Medical Complexity requiring close supervision and intervention by a physician    TCC no longer following

## 2024-12-11 ENCOUNTER — PHARMACY VISIT (OUTPATIENT)
Dept: PHARMACY | Facility: MEDICAL CENTER | Age: 49
End: 2024-12-11
Payer: COMMERCIAL

## 2024-12-11 VITALS
WEIGHT: 156.53 LBS | DIASTOLIC BLOOD PRESSURE: 97 MMHG | RESPIRATION RATE: 16 BRPM | HEART RATE: 88 BPM | OXYGEN SATURATION: 92 % | SYSTOLIC BLOOD PRESSURE: 139 MMHG | TEMPERATURE: 98.2 F | BODY MASS INDEX: 28.8 KG/M2 | HEIGHT: 62 IN

## 2024-12-11 LAB
ALBUMIN SERPL BCP-MCNC: 4 G/DL (ref 3.2–4.9)
ALBUMIN/GLOB SERPL: 1.4 G/DL
ALP SERPL-CCNC: 137 U/L (ref 30–99)
ALT SERPL-CCNC: 25 U/L (ref 2–50)
ANION GAP SERPL CALC-SCNC: 9 MMOL/L (ref 7–16)
AST SERPL-CCNC: 42 U/L (ref 12–45)
BASOPHILS # BLD AUTO: 0.6 % (ref 0–1.8)
BASOPHILS # BLD: 0.05 K/UL (ref 0–0.12)
BILIRUB SERPL-MCNC: 1.2 MG/DL (ref 0.1–1.5)
BUN SERPL-MCNC: 15 MG/DL (ref 8–22)
CALCIUM ALBUM COR SERPL-MCNC: 9.1 MG/DL (ref 8.5–10.5)
CALCIUM SERPL-MCNC: 9.1 MG/DL (ref 8.5–10.5)
CHLORIDE SERPL-SCNC: 96 MMOL/L (ref 96–112)
CO2 SERPL-SCNC: 28 MMOL/L (ref 20–33)
CREAT SERPL-MCNC: 0.45 MG/DL (ref 0.5–1.4)
EOSINOPHIL # BLD AUTO: 0.06 K/UL (ref 0–0.51)
EOSINOPHIL NFR BLD: 0.8 % (ref 0–6.9)
ERYTHROCYTE [DISTWIDTH] IN BLOOD BY AUTOMATED COUNT: 42.1 FL (ref 35.9–50)
GFR SERPLBLD CREATININE-BSD FMLA CKD-EPI: 117 ML/MIN/1.73 M 2
GLOBULIN SER CALC-MCNC: 2.8 G/DL (ref 1.9–3.5)
GLUCOSE SERPL-MCNC: 95 MG/DL (ref 65–99)
HCT VFR BLD AUTO: 35.4 % (ref 37–47)
HGB BLD-MCNC: 12.3 G/DL (ref 12–16)
IMM GRANULOCYTES # BLD AUTO: 0.08 K/UL (ref 0–0.11)
IMM GRANULOCYTES NFR BLD AUTO: 1 % (ref 0–0.9)
LYMPHOCYTES # BLD AUTO: 1.93 K/UL (ref 1–4.8)
LYMPHOCYTES NFR BLD: 24.6 % (ref 22–41)
MCH RBC QN AUTO: 34.6 PG (ref 27–33)
MCHC RBC AUTO-ENTMCNC: 34.7 G/DL (ref 32.2–35.5)
MCV RBC AUTO: 99.7 FL (ref 81.4–97.8)
MONOCYTES # BLD AUTO: 0.88 K/UL (ref 0–0.85)
MONOCYTES NFR BLD AUTO: 11.2 % (ref 0–13.4)
NEUTROPHILS # BLD AUTO: 4.84 K/UL (ref 1.82–7.42)
NEUTROPHILS NFR BLD: 61.8 % (ref 44–72)
NRBC # BLD AUTO: 0.02 K/UL
NRBC BLD-RTO: 0.3 /100 WBC (ref 0–0.2)
PLATELET # BLD AUTO: 115 K/UL (ref 164–446)
PMV BLD AUTO: 9.4 FL (ref 9–12.9)
POTASSIUM SERPL-SCNC: 3.9 MMOL/L (ref 3.6–5.5)
PROT SERPL-MCNC: 6.8 G/DL (ref 6–8.2)
RBC # BLD AUTO: 3.55 M/UL (ref 4.2–5.4)
SODIUM SERPL-SCNC: 133 MMOL/L (ref 135–145)
WBC # BLD AUTO: 7.8 K/UL (ref 4.8–10.8)

## 2024-12-11 PROCEDURE — 99223 1ST HOSP IP/OBS HIGH 75: CPT | Performed by: PHYSICAL MEDICINE & REHABILITATION

## 2024-12-11 PROCEDURE — 700102 HCHG RX REV CODE 250 W/ 637 OVERRIDE(OP): Performed by: STUDENT IN AN ORGANIZED HEALTH CARE EDUCATION/TRAINING PROGRAM

## 2024-12-11 PROCEDURE — 97535 SELF CARE MNGMENT TRAINING: CPT

## 2024-12-11 PROCEDURE — 85025 COMPLETE CBC W/AUTO DIFF WBC: CPT

## 2024-12-11 PROCEDURE — 99239 HOSP IP/OBS DSCHRG MGMT >30: CPT | Performed by: INTERNAL MEDICINE

## 2024-12-11 PROCEDURE — A9270 NON-COVERED ITEM OR SERVICE: HCPCS | Performed by: STUDENT IN AN ORGANIZED HEALTH CARE EDUCATION/TRAINING PROGRAM

## 2024-12-11 PROCEDURE — 80053 COMPREHEN METABOLIC PANEL: CPT

## 2024-12-11 PROCEDURE — 36415 COLL VENOUS BLD VENIPUNCTURE: CPT

## 2024-12-11 PROCEDURE — 97530 THERAPEUTIC ACTIVITIES: CPT

## 2024-12-11 PROCEDURE — RXMED WILLOW AMBULATORY MEDICATION CHARGE: Performed by: INTERNAL MEDICINE

## 2024-12-11 PROCEDURE — 700111 HCHG RX REV CODE 636 W/ 250 OVERRIDE (IP): Performed by: STUDENT IN AN ORGANIZED HEALTH CARE EDUCATION/TRAINING PROGRAM

## 2024-12-11 RX ORDER — ASPIRIN 81 MG/1
81 TABLET, CHEWABLE ORAL 2 TIMES DAILY
Qty: 56 TABLET | Refills: 0 | Status: SHIPPED | OUTPATIENT
Start: 2024-12-11 | End: 2025-01-08

## 2024-12-11 RX ORDER — OXYCODONE HYDROCHLORIDE 5 MG/1
5 TABLET ORAL EVERY 8 HOURS PRN
Qty: 10 TABLET | Refills: 0 | Status: SHIPPED | OUTPATIENT
Start: 2024-12-11 | End: 2024-12-16

## 2024-12-11 RX ADMIN — Medication 400 MG: at 04:29

## 2024-12-11 RX ADMIN — ESCITALOPRAM OXALATE 20 MG: 10 TABLET ORAL at 04:29

## 2024-12-11 RX ADMIN — OXYCODONE 5 MG: 5 TABLET ORAL at 07:41

## 2024-12-11 RX ADMIN — GABAPENTIN 300 MG: 300 CAPSULE ORAL at 04:29

## 2024-12-11 RX ADMIN — GABAPENTIN 300 MG: 300 CAPSULE ORAL at 12:27

## 2024-12-11 RX ADMIN — LEVOTHYROXINE SODIUM 50 MCG: 0.05 TABLET ORAL at 04:29

## 2024-12-11 RX ADMIN — ONDANSETRON 4 MG: 4 TABLET, ORALLY DISINTEGRATING ORAL at 13:42

## 2024-12-11 RX ADMIN — OXYCODONE 5 MG: 5 TABLET ORAL at 12:29

## 2024-12-11 ASSESSMENT — COGNITIVE AND FUNCTIONAL STATUS - GENERAL
DRESSING REGULAR UPPER BODY CLOTHING: A LITTLE
MOVING TO AND FROM BED TO CHAIR: A LITTLE
SUGGESTED CMS G CODE MODIFIER MOBILITY: CK
DRESSING REGULAR LOWER BODY CLOTHING: A LITTLE
TOILETING: A LOT
WALKING IN HOSPITAL ROOM: A LOT
MOVING FROM LYING ON BACK TO SITTING ON SIDE OF FLAT BED: A LITTLE
HELP NEEDED FOR BATHING: A LITTLE
STANDING UP FROM CHAIR USING ARMS: A LITTLE
DAILY ACTIVITIY SCORE: 18
PERSONAL GROOMING: A LITTLE
SUGGESTED CMS G CODE MODIFIER DAILY ACTIVITY: CK
TURNING FROM BACK TO SIDE WHILE IN FLAT BAD: A LITTLE
CLIMB 3 TO 5 STEPS WITH RAILING: A LOT
MOBILITY SCORE: 16

## 2024-12-11 ASSESSMENT — LIFESTYLE VARIABLES
AGITATION: NORMAL ACTIVITY
TOTAL SCORE: 2
NAUSEA AND VOMITING: NO NAUSEA AND NO VOMITING
NAUSEA AND VOMITING: NO NAUSEA AND NO VOMITING
HEADACHE, FULLNESS IN HEAD: NOT PRESENT
ANXIETY: NO ANXIETY (AT EASE)
AGITATION: NORMAL ACTIVITY
ORIENTATION AND CLOUDING OF SENSORIUM: ORIENTED AND CAN DO SERIAL ADDITIONS
TREMOR: TREMOR NOT VISIBLE BUT CAN BE FELT, FINGERTIP TO FINGERTIP
AUDITORY DISTURBANCES: NOT PRESENT
PAROXYSMAL SWEATS: BARELY PERCEPTIBLE SWEATING. PALMS MOIST
TOTAL SCORE: 2
ANXIETY: NO ANXIETY (AT EASE)
HEADACHE, FULLNESS IN HEAD: NOT PRESENT
PAROXYSMAL SWEATS: BARELY PERCEPTIBLE SWEATING. PALMS MOIST
ORIENTATION AND CLOUDING OF SENSORIUM: ORIENTED AND CAN DO SERIAL ADDITIONS
VISUAL DISTURBANCES: NOT PRESENT
TREMOR: TREMOR NOT VISIBLE BUT CAN BE FELT, FINGERTIP TO FINGERTIP
VISUAL DISTURBANCES: NOT PRESENT
AUDITORY DISTURBANCES: NOT PRESENT

## 2024-12-11 ASSESSMENT — PAIN DESCRIPTION - PAIN TYPE
TYPE: ACUTE PAIN
TYPE: ACUTE PAIN

## 2024-12-11 ASSESSMENT — GAIT ASSESSMENTS
ASSISTIVE DEVICE: FRONT WHEEL WALKER
DISTANCE (FEET): 10
DEVIATION: BRADYKINETIC
GAIT LEVEL OF ASSIST: MINIMAL ASSIST

## 2024-12-11 ASSESSMENT — FIBROSIS 4 INDEX: FIB4 SCORE: 3.65

## 2024-12-11 NOTE — CONSULTS
Physical Medicine and Rehabilitation Consultation          Date of initial consultation: 12/11/2024  Consulting provider: Helen Mendez M.D.   Reason for consultation: assess for acute inpatient rehab appropriateness  LOS: 4 Day(s)    Chief complaint: Right ankle fracture    HPI: The patient is a 49 y.o. right hand dominant female with a past medical history of depression, hypertension, hypercholesteremia, alcohol dependence;  who presented on 12/7/2024  9:41 AM with right ankle deformity after mechanical ground-level fall with head strike but no loss of consciousness.  Patient tripped over her small dog.  Patient was evaluated by Dr. Sami Clinton MD of orthopedic surgery and found to have a right trimalleolar ankle fracture and right ankle syndesmosis injury.  Patient was taken to the OR on 12/7 for ORIF.  Postoperatively patient is TTWB RLE.  Current labs are significant for a thrombocytopenia with platelets 115.    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  Patient endorses right ankle pain.  She denies dysarthria, dysphagia, weakness that could be related to findings of central pontine myelinolysis seen on MRI brain.  Records reviewed, no hyponatremic events detected.  This appears to be an incidental finding with no functional significance.  Patient reports good support from friends and family.  She reports her spouse is the surgeon who has a very busy schedule.    ROS  Pertinent positives are mentioned in the HPI, all others reviewed and are negative.    Social Hx:  3 SH  14 GIO  With: Spouse who works full-time days    THERAPY:  Restrictions: TTWB RLE  PT: Functional mobility   12/10: Unable to participate in gait, mod assist sit to stand    OT:  ADLs  12/10: Max assist toileting, mod assist lower body dressing    SLP:   None    IMAGING:  CT right ankle 12/7/2024  1. Comminuted fractures of the distal tibia and fibula as detailed above.  2. The navicular fracture noted previously has healed.    MR brain 12/8/2024  1.  Mild cerebral atrophy beyond expected for the patient's stated age of 49 years. This most likely relates to the alcohol history.  2.  Minimal supratentorial white matter disease with single punctate focus of FLAIR hyperintensity in the left peritrigonal deep white matter. Nonspecific finding most consistent with microvascular ischemic change versus demyelination or gliosis. Not   uncommon for the patient's age.  3.  9 mm focus of T2 hyperintensity in the central dash with well demarcated T1 hypointensity. 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 There is no ADC map hypointensity to suggest restricted diffusion and this is not thought to represent an acute brainstem infarct.  4.  No evidence of acute hemorrhage or mass lesion.    PROCEDURES:  Sami Clinton MD 12/7/24  1. Open treatment of right trimalleolar ankle fracture without internal fixation of posterior lip  2. Open repair right syndesmosis with internal fixation    PMH:  Past Medical History:   Diagnosis Date    Acute alcohol intoxication (HCC) 05/25/2021    Alcohol intoxication delirium (HCC) 04/30/2024    Anxiety     Depression     Gynecological disorder     High cholesterol     Hypertension     Snoring     Urinary tract infection        PSH:  Past Surgical History:   Procedure Laterality Date    ORIF, ANKLE Right 12/7/2024    Procedure: ORIF, ANKLE;  Surgeon: Sami Clinton M.D.;  Location: SURGERY [facility];  Service: Orthopedics    LIPOMA EXCISION  08/12/2024    HYSTEROSCOPY NOVASURE-2  08/02/2017    Procedure: HYSTEROSCOPY NOVASURE;   Surgeon: Hedy Hardy M.D.;  Location: SURGERY [facility];  Service:     DILATION AND CURETTAGE  2017    Procedure: DILATION AND CURETTAGE;  Surgeon: Hedy Hardy M.D.;  Location: SURGERY [facility];  Service:     IA BREAST AUGMENTATION WITH IMPLANT  2006    MAMMOPLASTY AUGMENTATION         FHX:  Family History   Problem Relation Age of Onset    Hypertension Mother     Hyperlipidemia Mother     Depression Father     Hypertension Father     Asthma Father     Hyperlipidemia Father     Alcohol abuse Father     Hypertension Brother     Depression Brother     Alcohol abuse Brother     Hypertension Brother     Depression Brother     Alcohol abuse Brother     Suicide Attempts Paternal Uncle          by suicide    Cancer Maternal Grandfather 60        esphogus    Cancer Maternal Grandmother 60        breast and skin    Stroke Maternal Grandmother     Other Paternal Grandmother         complications from surgery       Medications:  Current Facility-Administered Medications   Medication Dose    escitalopram (Lexapro) tablet 20 mg  20 mg    gabapentin (Neurontin) capsule 300 mg  300 mg    hydrOXYzine HCl (Atarax) tablet 25 mg  25 mg    levothyroxine (Synthroid) tablet 50 mcg  50 mcg    acetaminophen (Tylenol) tablet 650 mg  650 mg    senna-docusate (Pericolace Or Senokot S) 8.6-50 MG per tablet 2 Tablet  2 Tablet    And    polyethylene glycol/lytes (Miralax) Packet 1 Packet  1 Packet    enoxaparin (Lovenox) inj 40 mg  40 mg    ondansetron (Zofran) syringe/vial injection 4 mg  4 mg    ondansetron (Zofran ODT) dispertab 4 mg  4 mg    promethazine (Phenergan) tablet 12.5-25 mg  12.5-25 mg    promethazine (Phenergan) suppository 12.5-25 mg  12.5-25 mg    prochlorperazine (Compazine) injection 5-10 mg  5-10 mg    labetalol (Normodyne/Trandate) injection 10 mg  10 mg    Pharmacy Consult Request ...Pain Management Review 1 Each  1 Each    oxyCODONE immediate-release (Roxicodone) tablet 5 mg  5  "mg    Or    oxyCODONE immediate release (Roxicodone) tablet 10 mg  10 mg    Or    HYDROmorphone (Dilaudid) injection 0.5 mg  0.5 mg    LORazepam (Ativan) tablet 2 mg  2 mg       Allergies:  Allergies   Allergen Reactions    [place] Hives     Patient states not allergic anymore, just a one time thing    Sulfa Drugs Hives     QTW=[id number]         Physical Exam:  Vitals: /88   Pulse 86   Temp 36.5 °C (97.7 °F) (Temporal)   Resp 16   Ht 1.575 m (5' 2\")   Wt 71 kg (156 lb 8.4 oz)   SpO2 95%   Gen: NAD  Head: NC/AT  Eyes/ Nose/ Mouth: PERRLA, moist mucous membranes  Cardio: RRR, good distal perfusion, warm extremities  Pulm: normal respiratory effort, no cyanosis   Abd: Soft NTND, negative borborygmi   Ext: No peripheral edema. No calf tenderness. No clubbing.    Mental status:  A&Ox4 (person, place, date, situation) answers questions appropriately follows commands  Speech: fluent, no aphasia or dysarthria    Motor:      Upper Extremity  Myotome R L   Shoulder flexion C5 5 5   Elbow flexion C5 5 5   Wrist extension C6 5 5   Elbow extension C7 5 5   Finger flexion C8 5 5   Finger abduction T1 5 5     Lower Extremity Myotome R L   Hip flexion L2 5 5   Knee extension L3 5 5   Ankle dorsiflexion L4 boot 5   Toe extension L5 boot 5   Ankle plantarflexion S1 wiggle 5       Labs: Reviewed and significant for   Recent Labs     12/09/24  0051 12/10/24  0108 12/11/24  0023   RBC 3.61* 3.60* 3.55*   HEMOGLOBIN 12.5 12.0 12.3   HEMATOCRIT 35.8* 36.3* 35.4*   PLATELETCT 115* 102* 115*     Recent Labs     12/09/24  0051 12/10/24  0108 12/11/24  0023   SODIUM 137 136 133*   POTASSIUM 4.0 3.7 3.9   CHLORIDE 101 99 96   CO2 26 28 28   GLUCOSE 107* 89 95   BUN 11 14 15   CREATININE 0.71 0.59 0.45*   CALCIUM 8.9 8.8 9.1     Recent Results (from the past 24 hours)   CBC WITH DIFFERENTIAL    Collection Time: 12/11/24 12:23 AM   Result Value Ref Range    WBC 7.8 4.8 - 10.8 K/uL    RBC 3.55 (L) 4.20 - 5.40 M/uL    Hemoglobin 12.3 " 12.0 - 16.0 g/dL    Hematocrit 35.4 (L) 37.0 - 47.0 %    MCV 99.7 (H) 81.4 - 97.8 fL    MCH 34.6 (H) 27.0 - 33.0 pg    MCHC 34.7 32.2 - 35.5 g/dL    RDW 42.1 35.9 - 50.0 fL    Platelet Count 115 (L) 164 - 446 K/uL    MPV 9.4 9.0 - 12.9 fL    Neutrophils-Polys 61.80 44.00 - 72.00 %    Lymphocytes 24.60 22.00 - 41.00 %    Monocytes 11.20 0.00 - 13.40 %    Eosinophils 0.80 0.00 - 6.90 %    Basophils 0.60 0.00 - 1.80 %    Immature Granulocytes 1.00 (H) 0.00 - 0.90 %    Nucleated RBC 0.30 (H) 0.00 - 0.20 /100 WBC    Neutrophils (Absolute) 4.84 1.82 - 7.42 K/uL    Lymphs (Absolute) 1.93 1.00 - 4.80 K/uL    Monos (Absolute) 0.88 (H) 0.00 - 0.85 K/uL    Eos (Absolute) 0.06 0.00 - 0.51 K/uL    Baso (Absolute) 0.05 0.00 - 0.12 K/uL    Immature Granulocytes (abs) 0.08 0.00 - 0.11 K/uL    NRBC (Absolute) 0.02 K/uL   Comp Metabolic Panel    Collection Time: 12/11/24 12:23 AM   Result Value Ref Range    Sodium 133 (L) 135 - 145 mmol/L    Potassium 3.9 3.6 - 5.5 mmol/L    Chloride 96 96 - 112 mmol/L    Co2 28 20 - 33 mmol/L    Anion Gap 9.0 7.0 - 16.0    Glucose 95 65 - 99 mg/dL    Bun 15 8 - 22 mg/dL    Creatinine 0.45 (L) 0.50 - 1.40 mg/dL    Calcium 9.1 8.5 - 10.5 mg/dL    Correct Calcium 9.1 8.5 - 10.5 mg/dL    AST(SGOT) 42 12 - 45 U/L    ALT(SGPT) 25 2 - 50 U/L    Alkaline Phosphatase 137 (H) 30 - 99 U/L    Total Bilirubin 1.2 0.1 - 1.5 mg/dL    Albumin 4.0 3.2 - 4.9 g/dL    Total Protein 6.8 6.0 - 8.2 g/dL    Globulin 2.8 1.9 - 3.5 g/dL    A-G Ratio 1.4 g/dL   ESTIMATED GFR    Collection Time: 12/11/24 12:23 AM   Result Value Ref Range    GFR (CKD-EPI) 117 >60 mL/min/1.73 m 2         ASSESSMENT:  Patient is a 49 y.o. female admitted with right ankle fracture after a level fall now s/p ORIF repair    Rehabilitation: Impaired ADLs and mobility  Barriers to transfer include: Insurance authorization, TCCs to verify disposition, medical clearance and bed availability. All cases are subject to administrative review.      Disposition recommendations:  -Recommend discharge home when medically cleared.  Patient is expected to progress quickly with PT OT, and has lots of support on discharge.  -Recommend home health PT OT  -Patient's spouse has bought a wheelchair, she will need a front wheel walker, and can eventually be trained on crutches.  -PMR will sign off, please reconsult or reach out via Voalte if further evaluation or medical management is requested    Medical Complexity:    Right ankle fracture  -Secondary to mechanical ground-level, tripping on dogs, with alcohol involved  -Found to have right trimalleolar ankle fracture and right ankle syndesmosis injury  -Status post ORIF repair by Dr. Sami Clinton MD on 12/7/2024  -TTWB RLE  -Follow-up with Dr. 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     Patient was seen for >80 minutes on unit/floor of which > 50% of time was spent on counseling and coordination of care regarding the above, including prognosis, risk reduction, benefits of treatment, and options for next stage of care.    Jeremy Moore, DO   Physical Medicine and Rehabilitation     Please note that this dictation was created using voice recognition software. I have made every  reasonable attempt to correct obvious errors, but there may be errors of grammar and possibly content that I did not discover before finalizing the note.

## 2024-12-11 NOTE — FACE TO FACE
Face to Face Note  -  Durable Medical Equipment    Gino Abebe M.D. - NPI: [id number]  I certify that this patient is under my care and that they had a durable medical equipment(DME)face to face encounter by myself that meets the physician DME face-to-face encounter requirements with this patient on:    Date of encounter:   Patient:                    MRN:                       YOB: 2025  Minerva Quintana  [id number]  1975     The encounter with the patient was in whole, or in part, for the following medical condition, which is the primary reason for durable medical equipment:  Post-Op Surgery    I certify that, based on my findings, the following durable medical equipment is medically necessary:    Walkers.    My Clinical findings support the need for the above equipment due to:  Abnormal Gait

## 2024-12-11 NOTE — PROGRESS NOTES
Bedside report received from off going RN/tech: Jil, assumed care of patient.     Fall Risk Score: HIGH RISK  Fall risk interventions in place: Place yellow fall risk ID band on patient, Provide patient/family education based on risk assessment, Educate patient/family to call staff for assistance when getting out of bed, Place fall precaution signage outside patient door, Utilize bed/chair fall alarm, and Bed alarm connected correctly  Bed type: Regular (Sam Score less than 17 interventions in place)  Patient on cardiac monitor: Yes  IVF/IV medications: Not Applicable   Oxygen: Room Air  Bedside sitter: Not Applicable   Isolation: Not applicable

## 2024-12-11 NOTE — DISCHARGE INSTRUCTIONS
Discharge Instructions per Gino Abebe M.D.    For your ankle fracture, Dr. Clinton would like you to take aspirin 81mg twice a day for 4 weeks to help prevent blood clots and please follow up with him in his clinic in 2 weeks. You are to be toe-touch weight bearing only until you follow up with him.

## 2024-12-11 NOTE — DISCHARGE PLANNING
Case Management Discharge Planning    Admission Date: 12/7/2024  GMLOS: 6.6  ALOS: 4    6-Clicks ADL Score: 16  6-Clicks Mobility Score: 14  PT and/or OT Eval ordered: Yes  Post-acute Referrals Ordered: Yes  Post-acute Choice Obtained: Yes  Has referral(s) been sent to post-acute provider:  Yes    Anticipated Discharge Dispo: Discharge Disposition: D/T to home under HHA care in anticipation of covered skilled care (06)  Discharge Address: [address] [place] [address]  [place] [address]  Discharge Contact Phone Number: [phone]    DME Needed: Yes    DME Ordered: Yes    Action(s) Taken: Choice obtained, Referral(s) sent, and Acceptance Received    RNCM notified that PMR has signed off and are recommending patient discharge home with home health services.     RNCM met with patient at bedside to discuss; she is amendable with Home Health services. Obtained HH choice for 1- [facility]; 2- [facility].     RNCM sent referral to [facility] and they declined due to pt's insurance. HH referral sent to [facility] and they have accepted patient on service.     RNCM met with patient at bedside to notify that [facility] has accepted her on service.     Patient will be picked up by her  and transported home.     Escalations Completed: None    Medically Clear: Yes    Next Steps: F/U with medical team regarding D/C needs/ barriers.     Barriers to Discharge: Transportation    Is the patient up for discharge tomorrow: No

## 2024-12-11 NOTE — CARE PLAN
The patient is Stable - Low risk of patient condition declining or worsening    Shift Goals  Clinical Goals: CIWA, pain control, PT/OT  Patient Goals: rest  Family Goals: eddie    Progress made toward(s) clinical / shift goals:    Problem: Knowledge Deficit - Standard  Goal: Patient and family/care givers will demonstrate understanding of plan of care, disease process/condition, diagnostic tests and medications  Outcome: Progressing     Problem: Optimal Care for Alcohol Withdrawal  Goal: Optimal Care for the alcohol withdrawal patient  Outcome: Progressing. CIWA      Problem: Seizure Precautions  Goal: Implementation of seizure precautions  Outcome: Progressing. Side rails padded, suction set up at bedside      Problem: Psychosocial  Goal: Patient's level of anxiety will decrease  Outcome: Progressing     Problem: Fall Risk  Goal: Patient will remain free from falls  Outcome: Progressing. BA on. Call light within reach. Education provided      Problem: Pain - Standard  Goal: Alleviation of pain or a reduction in pain to the patient’s comfort goal  Outcome: Progressing. Pain meds per MAR        Patient is not progressing towards the following goals:

## 2024-12-11 NOTE — PROGRESS NOTES
Bedside report received from off going RN: Serene, assumed care of patient.      Fall Risk Score: HIGH RISK  Fall risk interventions in place: Place yellow fall risk ID band on patient, Provide patient/family education based on risk assessment, Educate patient/family to call staff for assistance when getting out of bed, Place fall precaution signage outside patient door, Utilize bed/chair fall alarm, Notify charge of high risk for huddle, and Bed alarm connected correctly  Bed type: Regular (Sam Score less than 17 interventions in place)  Patient on cardiac monitor: Yes  IVF/IV medications: Infusion per MAR (n/a)  Oxygen: 1L NC   Bedside sitter: Not Applicable   Isolation: Not applicable

## 2024-12-12 NOTE — PROGRESS NOTES
Discharge instructions provided to patient and relative. Discussed follow up appointments, diet, medications and activity. Patient states understanding. IV removed. Copy of discharge provided to the patient. Signed copy of discharge is in patients chart. All personal belongings in patients possession. All questions have been answered. Patient is being wheeled off the floor.

## 2024-12-12 NOTE — THERAPY
Physical Therapy   Daily Treatment     Patient Name: Minerva Tillman  Age:  49 y.o., Sex:  female  Medical Record #: [id number]  Today's Date: 12/11/2024     Precautions  Precautions: Fall Risk;Toe Touch Weight Bearing Right Lower Extremity    Assessment  Pt seen for PT tx session with mobility detailed down below. Pt continues to demonstrate good bed mobility, however she fatigues quickly with any standing activity and becomes mildly tremulous. Pt had one LOB when transferring, needing therapist assistance to prevent a fall. Pt reported her plan to go home and hold onto her  for support when going up the stairs. Pt attempted to show this therapist how and nearly had another LOB due to poor UE strength and endurance. Pt then attempted to go up 2 stairs with 2p HHA, again fatiguing quickly and needing to be assisted back into the chair with significant support. Pt educated on purpose of IPR, however she confirmed that she still wants to go home with support from her . Pt educated on safer methods of going up stairs (scooting up) if she is set on going home, however pt encouraged to have 2 people assist her at home. Further discussed that this method is difficult as she has to get off the floor with risk to herself and her /family. Pt receptive to all education, stating she will be ok once she is on the main floor of her house. Continue to recommend IPR level rehab, however if pt is going home recommend HHPT.     Plan    Treatment Plan Status: Continue Current Treatment Plan  Type of Treatment: Gait Training, Therapeutic Activities  Treatment Frequency: 4 Times per Week  Treatment Duration: Until Therapy Goals Met    DC Equipment Recommendations: Unable to determine at this time  Discharge Recommendations: Recommend post-acute placement for additional physical therapy services prior to discharge home (IPR. Recommend HHPT if pt is going home)        Vitals   O2 (LPM) 0   O2 Delivery Device None -  Room Air   Pain 0 - 10 Group   Therapist Pain Assessment Post Activity Pain Same as Prior to Activity   Cognition    Cognition / Consciousness WDL   Balance   Sitting Balance (Static) Fair +   Sitting Balance (Dynamic) Fair   Standing Balance (Static) Fair -   Standing Balance (Dynamic) Poor +   Weight Shift Sitting Fair   Skilled Intervention Verbal Cuing;Tactile Cuing;Postural Facilitation   Comments FWW   Bed Mobility    Supine to Sit Supervised   Sit to Supine Supervised   Scooting Supervised   Skilled Intervention Verbal Cuing   Comments HOB flat   Gait Analysis   Gait Level Of Assist Minimal Assist   Assistive Device Front Wheel Walker   Distance (Feet) 10   # of Times Distance was Traveled 2   Deviation Bradykinetic  (hopping)   # of Stairs Climbed 2   Level of Assist with Stairs Moderate Assist   Weight Bearing Status TTWB RLE   Skilled Intervention Verbal Cuing;Tactile Cuing;Sequencing;Postural Facilitation   Comments pt shaky with short distance ambulation, reporting UE fatigue. attempted 2 steps with 2 person assist, pt fatiguing even quicker and needing max a to facilitate pt to sit down in the chair   Functional Mobility   Sit to Stand Contact Guard Assist   Bed, Chair, Wheelchair Transfer Minimal Assist   Transfer Method Stand Pivot   Mobility STS x4 total, SPT x2   Skilled Intervention Verbal Cuing   Comments cues for hand placement and sequencing. pt with a LOB transferring back to bed, requiring assitance to lower her into the bed   6 Clicks Assessment - How much HELP from from another person do you currently need... (If the patient hasn't done an activity recently, how much help from another person do you think he/she would need if he/she tried?)   Turning from your back to your side while in a flat bed without using bedrails? 3   Moving from lying on your back to sitting on the side of a flat bed without using bedrails? 3   Moving to and from a bed to a chair (including a wheelchair)? 3   Standing  up from a chair using your arms (e.g., wheelchair, or bedside chair)? 3   Walking in hospital room? 2   Climbing 3-5 steps with a railing? 2   6 clicks Mobility Score 16   Short Term Goals    Short Term Goal # 1 Pt to move sit to/from stand w/ spv in 6 visits to improve fxl indep   Goal Outcome # 1 goal not met   Short Term Goal # 2 Pt to TF bed to/from chair or w/c w/ spv in 6 visits   Goal Outcome # 2 Goal not met   Short Term Goal # 3 Pt to ambulate 10 ft w/ fww and spv in 6 visits to improve fxl indep   Goal Outcome # 3 Goal not met   Short Term Goal # 4 Pt to maintain WB precautions w/o cues w/ all mobility in 6 visits   Goal Outcome # 4 Goal not met   Short Term Goal # 5 pt will negotiate a FOS with cga in 6 tx for home access.   Goal Outcome # 5   (added 12/11)   Physical Therapy Treatment Plan   Physical Therapy Treatment Plan Continue Current Treatment Plan   Anticipated Discharge Equipment and Recommendations   DC Equipment Recommendations Unable to determine at this time   Discharge Recommendations Recommend post-acute placement for additional physical therapy services prior to discharge home  (IPR)   Interdisciplinary Plan of Care Collaboration   IDT Collaboration with  Nursing   Patient Position at End of Therapy In Bed;Bed Alarm On;Call Light within Reach;Tray Table within Reach;Phone within Reach   Collaboration Comments RN updated   Session Information   Date / Session Number  12/11- 2 (2/4, 12/16)

## 2024-12-12 NOTE — THERAPY
Occupational Therapy  Daily Treatment     Patient Name: Minerva Tillman  Age:  49 y.o., Sex:  female  Medical Record #: [id number]  Today's Date: 12/11/2024     Precautions  Precautions: Fall Risk, Toe Touch Weight Bearing Right Lower Extremity  Comments: TTWB RLE    Assessment    Pt seen for OT treatment. Pt donned underwear with SBA in supine and performed functional mobility while maintaining her precautions a short distance in room. Pt reported strong social support at home. Pt's spouse is a surgeon, mother is a retired RN, and another family member is also an RN. Pt reported that her family is working to get recommended DME and has purchased a wheelchair. Pt does have a full flight of stairs to enter her home. Pt current functional performance limited by generalized weakness, impaired balance, impaired activity tolerance, and pain. Pt will continue to benefit from skilled OT while admitted to acute care.     Plan    Treatment Plan Status: Continue Current Treatment Plan  Type of Treatment: Self Care / Activities of Daily Living, Adaptive Equipment, Neuro Re-Education / Balance, Therapeutic Exercises, Therapeutic Activity, Family / Caregiver Training  Treatment Frequency: 4 Times per Week  Treatment Duration: Until Therapy Goals Met    DC Equipment Recommendations: Tub / Shower Seat, Bed Side Commode  Discharge Recommendations: Other - (Pt may be able to be functional with strong family support for ADLs if family comfortable/able, however she has to be able to make it up a full flight of stairs to enter her home. Defer recommendation to PT.)     Objective     12/11/24 1026   Vitals   Vitals Comments Vitals in supine: 87, /96, after transfer to chair 161/111 with , assisted back to supine 101, 153/101; RN aware   Pain   Pain Scales 0 to 10 Scale    Pain 0 - 10 Group   Therapist Pain Assessment Nurse Notified;During Activity  (Not rated, agreeable to session)   Non Verbal Descriptors   Non Verbal  Scale  Calm   Cognition    Cognition / Consciousness WDL   Level of Consciousness Alert   Comments Pleasant and cooperative, able to describe precautions to therapist   Other Treatments   Other Treatments Provided Discussed DME recommendations and home safety. Pt reported that her mother will be staying with her and she is a retired RN. Pt reported an additional family member who will be assisting is also an RN. Pt's spouse is a surgeon. Pt reported support from another friend as needed as well. Pt reported someone will be with her at all times upon d/c.   Balance   Sitting Balance (Static) Fair +   Sitting Balance (Dynamic) Fair   Standing Balance (Static) Fair -   Standing Balance (Dynamic) Fair -   Weight Shift Sitting Fair   Weight Shift Standing Absent  (TTWB)   Skilled Intervention Verbal Cuing;Tactile Cuing;Sequencing;Facilitation   Comments w/ FWW   Bed Mobility    Supine to Sit Standby Assist   Sit to Supine Standby Assist   Scooting Standby Assist   Rolling Standby Assist   Skilled Intervention Verbal Cuing;Tactile Cuing;Sequencing;Postural Facilitation;Facilitation   Comments HOB flat, no use of rails   Activities of Daily Living   Lower Body Dressing Standby Assist  (don underwear in supine)   Skilled Intervention Verbal Cuing;Tactile Cuing;Sequencing;Facilitation;Compensatory Strategies   Functional Mobility   Sit to Stand Contact Guard Assist   Bed, Chair, Wheelchair Transfer Minimal Assist   Mobility EOB>chair>bed   Skilled Intervention Verbal Cuing;Tactile Cuing;Sequencing;Facilitation   Comments w/ FWW   Visual Perception   Visual Perception  Not Tested   Activity Tolerance   Sitting in Chair <5 min   Sitting Edge of Bed >5 min   Standing <5 min   Comments limited by weakness, hypotension   Patient / Family Goals   Patient / Family Goal #1 To go home ASAP   Goal #1 Outcome Progressing as expected   Short Term Goals   Short Term Goal # 1 Pt will perform LBD including underwear/pants with SBA   Goal  Outcome # 1 Progressing as expected   Short Term Goal # 2 Pt will transfer to [facility] with SBA   Goal Outcome # 2 Progressing as expected   Short Term Goal # 3 Pt will perform toileting with SBA   Goal Outcome # 3 Goal not met   Education Group   Education Provided Role of Occupational Therapist;Activities of Daily Living;Adaptive Equipment;Transfers;Weight Bearing Precautions   Role of Occupational Therapist Patient Response Patient;Acceptance;Explanation;Verbal Demonstration   Transfers Patient Response Patient;Acceptance;Explanation;Demonstration;Action Demonstration;Verbal Demonstration   ADL Patient Response Patient;Acceptance;Explanation;Demonstration;Verbal Demonstration;Action Demonstration   Adaptive Equipment Patient Response Patient;Acceptance;Explanation;Handout;Verbal Demonstration  (DME handout provided and reviewed)   Weight Bearing Precautions Patient Response Patient;Acceptance;Explanation;Demonstration;Verbal Demonstration;Action Demonstration

## 2024-12-12 NOTE — DISCHARGE SUMMARY
Discharge Summary    CHIEF COMPLAINT ON ADMISSION  Chief Complaint   Patient presents with    T-5000 GLF     BIB [facility] from home after patient had mechanical GLF where she slipped and hit her head, -LOC, -thinners, deformity to right ankle. EMS placed IV, gave 75 mcg fentanyl and 4mg zofran.        Reason for Admission  ems     Admission Date  12/7/2024    CODE STATUS  Full Code    HPI & HOSPITAL COURSE  48 yo woman with HTN, HLD who had started drinking alcohol after period of remission and had a trip and fall with right ankle fracture.  She underwent open treatment of right trimalleolar ankle fracture with right syndesmosis with internal fixation  with Dr. Clinton 12/7.  She is to be toe-touch weightbearing RLE.    Patient had a CT head for head injury during fall and showed new hypodensity in central superior dash.  MRI brain showed 9 mm focus in central dash concerning for sequela of central pontine myelinosis.  She has no known hyponatremia issues in the past. Neurology was consulted and this may be related to chronic alcohol use.    She underwent alcohol withdrawal. Alcohol cessation discussed and patient is strongly motivate to quit and has good support along with being followed by psychiatry.  PTOT and physiatrist recommended home health. Patient understands to follow up with Dr. Clinton. I will prescribe her a short course of oxycodone as needed for post-op pain, she says her  will help in monitoring her use of oxycodone.    Therefore, she is discharged in good and stable condition to home with organized home healthcare and close outpatient follow-up.    The patient met 2-midnight criteria for an inpatient stay at the time of discharge.    Discharge Date  12/11/24    FOLLOW UP ITEMS POST DISCHARGE  F/u ortho 2 weeks  Ongoing alcohol cessation    DISCHARGE DIAGNOSES  Principal Problem:    Trimalleolar fracture of ankle, closed, right, initial encounter (POA: Yes)  Active Problems:     Hypercholesterolemia (POA: Yes)    Essential hypertension (POA: Yes)    Fatty liver, alcoholic (POA: Yes)      Overview: Chronic alcohol use       Previously used to drink alcohol - wine( 7-10 drinks).       Sine 2020 - excessive alcohol , drinking 4-5 glasses wine a day.       Severe hepatic steatosis found on imaging       Patient has now stopped alcohol completely since 1 month.     Hypothyroidism (POA: Yes)    Mild episode of recurrent major depressive disorder (HCC) (POA: Yes)    Alcohol use disorder, severe, dependence (HCC) (POA: Yes)    Alcoholic hepatitis with ascites (POA: Yes)      Overview: >>OVERVIEW FOR ALCOHOLIC CIRRHOSIS OF LIVER WITHOUT ASCITES (HCC) WRITTEN       ON 6/20/2024  2:05 PM BY MARIKA MACIEL M.D.            Diagnosed In 03/2024             CT abdomen showed  Cirrhosis with portal hypertension      Associated ascites and splenomegaly      Hepatic steatosis, extremely severe      With elevated transaminitis with bilirubinemia      Initially elevated ammonia as well.      Rifaximin and Spironolactone was added      Not started on propranolol or nadolol for prophylaxis for portal       hypertension because her blood pressure was soft while in hospital.      [facility] as recommended colonoscopy for chronic       diarrhea.    Acute hypoxic respiratory failure (HCC) (POA: Yes)    Balance problem (POA: Yes)    Alcohol withdrawal syndrome without complication (HCC) (POA: Yes)    Ground-level fall (POA: Yes)    Thrombocytopenia (HCC) (POA: Unknown)  Resolved Problems:    * No resolved hospital problems. *      FOLLOW UP  Future Appointments   Date Time Provider Department Center   12/17/2024  3:00 PM Sidney Potter M.D. [facility]   12/24/2024  3:00 PM Sidney Potter M.D. [facility]   1/7/2025  3:00 PM Sidney Potter M.D. [facility]   1/14/2025  3:00 PM Sidney Potter M.D. [facility]   1/21/2025  3:00 PM Sidney Potter M.D. [facility] Clinton M.D.  [address] [place] [address]  [phone]    Schedule an appointment as soon as possible for a visit in 2 week(s)        MEDICATIONS ON DISCHARGE     Medication List        START taking these medications        Instructions   Aspirin Low Dose 81 MG Chew chewable tablet  Generic drug: aspirin   Chew 1 Tablet by mouth 2 times a day for 28 days.  Dose: 81 mg     oxyCODONE immediate-release 5 MG Tabs  Commonly known as: Roxicodone   Take 1 Tablet by mouth every 8 hours as needed for Severe Pain for up to 5 days.  Dose: 5 mg            CONTINUE taking these medications        Instructions   escitalopram 20 MG tablet  Commonly known as: Lexapro   Take 1 Tablet by mouth every day for 180 days.  Dose: 20 mg     Ezy Dose Vitamin Organizer Misc      gabapentin 300 MG Caps  Commonly known as: Neurontin   Take 1 Capsule by mouth 3 times a day for 90 days.  Dose: 300 mg     hydrOXYzine HCl 25 MG Tabs  Commonly known as: Atarax   Take 1 tablet as needed Orally every 6 hours for up to 90 days     levothyroxine 50 MCG Tabs  Commonly known as: Synthroid   Take 1 Tablet by mouth every morning on an empty stomach.  Dose: 50 mcg     multivitamin Tabs   Take 1 Tablet by mouth every day.  Dose: 1 Tablet     thiamine 100 MG tablet  Commonly known as: Thiamine               Allergies  Allergies   Allergen Reactions    Majestic Hives     Patient states not allergic anymore, just a one time thing    Sulfa Drugs Hives     OIX=[id number]       DIET  Orders Placed This Encounter   Procedures    Diet Order Diet: Regular     Standing Status:   Standing     Number of Occurrences:   1     Order Specific Question:   Diet:     Answer:   Regular [1]       ACTIVITY  toe-touch weightbearing RLE    CONSULTATIONS  ortho    PROCEDURES    DATE OF OPERATION: 12/7/2024     PREOPERATIVE DIAGNOSIS:         1. Right trimalleolar ankle fracture                                                              2. Right ankle  syndesmosis injury     POSTOPERATIVE DIAGNOSIS: Same     PROCEDURE PERFORMED:  1. Open treatment of right trimalleolar ankle fracture without internal fixation of posterior lip                                                    2. Open repair right syndesmosis with internal fixation     SURGEON: Sami Clinton M.D.       MR-BRAIN-W/O   Final Result      1.  Mild cerebral atrophy beyond expected for the patient's stated age of 49 years. This most likely relates to the alcohol history.   2.  Minimal supratentorial white matter disease with single punctate focus of FLAIR hyperintensity in the left peritrigonal deep white matter. Nonspecific finding most consistent with microvascular ischemic change versus demyelination or gliosis. Not    uncommon for the patient's age.   3.  9 mm focus of T2 hyperintensity in the central dash with well demarcated T1 hypointensity. 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 There is no ADC map hypointensity to suggest restricted diffusion and this is not thought to represent an acute brainstem infarct.   4.  No evidence of acute hemorrhage or mass lesion.      DX-ANKLE 2- VIEWS RIGHT   Final Result      Digitized intraoperative radiograph is submitted for review. This examination is not for diagnostic purpose but for guidance during a surgical procedure. Please see the patient's chart for full procedural details.         INTERPRETING LOCATION: [address], [place], [address]      DX-PORTABLE FLUORO > 1 HOUR   Final Result      Portable fluoroscopy utilized for 12 seconds.         INTERPRETING LOCATION: [address], [place], [address]      CT-ANKLE W/O PLUS RECONS RIGHT   Final Result         1. Comminuted fractures of the distal tibia and fibula as detailed above.   2. The navicular fracture noted previously has healed.      CT-HEAD W/O   Final Result       1.  There is a probably new hypodensity in the central superior dash which could be related to new probably subacute/chronic small infarct or other insult..   2.  No acute intracranial hemorrhage.               DX-ANKLE 2- VIEWS RIGHT   Final Result         1. Right ankle fracture dislocation.                           LABORATORY  Lab Results   Component Value Date    SODIUM 133 (L) 12/11/2024    POTASSIUM 3.9 12/11/2024    CHLORIDE 96 12/11/2024    CO2 28 12/11/2024    GLUCOSE 95 12/11/2024    BUN 15 12/11/2024    CREATININE 0.45 (L) 12/11/2024    CREATININE 0.83 02/01/2013        Lab Results   Component Value Date    WBC 7.8 12/11/2024    WBC 5.2 02/01/2013    HEMOGLOBIN 12.3 12/11/2024    HEMATOCRIT 35.4 (L) 12/11/2024    PLATELETCT 115 (L) 12/11/2024        Total time of the discharge process exceeds 32 minutes.

## 2024-12-13 ENCOUNTER — TELEPHONE (OUTPATIENT)
Dept: MEDICAL GROUP | Facility: MEDICAL CENTER | Age: 49
End: 2024-12-13
Payer: COMMERCIAL

## 2024-12-14 NOTE — TELEPHONE ENCOUNTER
Caller Name: Minerva Tillman   Call Back Number: [phone] (home)      How would the patient prefer to be contacted with a response: Phone call OK to leave a detailed message    Rita with [facility] phone number  [phone] left message regarding patient  Broken Rt extremity and would like verbal consent to continue treatment and would like home health ad in insurance cover it

## 2024-12-17 ENCOUNTER — TELEMEDICINE (OUTPATIENT)
Dept: BEHAVIORAL HEALTH | Facility: PSYCHIATRIC FACILITY | Age: 49
End: 2024-12-17
Payer: COMMERCIAL

## 2024-12-17 DIAGNOSIS — Z91.49: ICD-10-CM

## 2024-12-17 DIAGNOSIS — T14.90XA TRAUMA IN CHILDHOOD: ICD-10-CM

## 2024-12-17 PROCEDURE — 90834 PSYTX W PT 45 MINUTES: CPT
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
## 2024-12-17 NOTE — PROGRESS NOTES
[facility]  Psychotherapy Summary Note    Full therapy note has been documented and is under restricted viewing.  Please see below for summary of today's session.     Date of Service: 12/17/2024  Appointment type: in-office appointment.  Attending:  Hossein Liang M.D.    Type of session:Individual psychotherapy  Session start time: 3:10  Session stop time: 3:50  Length of time spent face to face with patient: 40 minutes  Persons in attendance: Patient    SI reported: no  HI reported: no    SUMMARY  Minerva Tillman is a 49 y.o. old female who presents today for regularly scheduled psychotherapy for No chief complaint on file.      Symptoms currently being addressed in therapy: anxiety, substance use, and interpersonal difficulty    Therapeutic Intervention(s): Develop/modify treatment plan, Establish rapport, Goal-setting, Stressors assessed, and Therapeutic relationship    CURRENT RISK ASSESSMENT       Suicide: Low       Homicide: Low       Self-Harm: Low       Relapse: Low       Crisis Safety Plan Reviewed Not Indicated    DIAGNOSES/PLAN  Problem List Items Addressed This Visit    None  Visit Diagnoses       Trauma in childhood        History of psychological stress                      Treatment Goal(s)/Objective(s) addressed: Tx plan:  Further explore past history  Develop treatment goals  Utilize learned skills to manage mood and emotional suffering more effectively  Learn to successfully challenge & change distortions in thinking  Learn to ameliorate effects of anxiety on life and functioning  Increase behaviors of self-compassion and self-care       Progress toward Treatment Goals: Mild improvement     Plan:  - Continue individual therapy    Sidney Potter M.D.

## 2024-12-17 NOTE — TELEPHONE ENCOUNTER
Per Dr. Yefri Asif M.D.  You14 minutes ago (9:13 AM)     AR    Ok for verbal consent     I left a message with Rita informing of Dr. Asif's message.

## 2024-12-23 DIAGNOSIS — F40.10 SOCIAL ANXIETY DISORDER: ICD-10-CM

## 2024-12-23 DIAGNOSIS — F10.20 ALCOHOL USE DISORDER, SEVERE, DEPENDENCE (HCC): ICD-10-CM

## 2024-12-23 DIAGNOSIS — F41.1 GENERALIZED ANXIETY DISORDER: ICD-10-CM

## 2024-12-23 RX ORDER — HYDROXYZINE HYDROCHLORIDE 25 MG/1
25 TABLET, FILM COATED ORAL 3 TIMES DAILY PRN
Qty: 90 TABLET | Refills: 2 | Status: SHIPPED | OUTPATIENT
Start: 2024-12-23 | End: 2025-03-23

## 2024-12-23 RX ORDER — GABAPENTIN 300 MG/1
300 CAPSULE ORAL 3 TIMES DAILY
Qty: 90 CAPSULE | Refills: 2 | Status: SHIPPED | OUTPATIENT
Start: 2024-12-23 | End: 2025-03-23

## 2024-12-23 RX ORDER — ESCITALOPRAM OXALATE 20 MG/1
20 TABLET ORAL DAILY
Qty: 90 TABLET | Refills: 1 | Status: SHIPPED | OUTPATIENT
Start: 2024-12-23 | End: 2025-06-21

## 2024-12-23 NOTE — TELEPHONE ENCOUNTER
Received request via: Pharmacy    Was the patient seen in the last year in this department? Yes    Does the patient have an active prescription (recently filled or refills available) for medication(s) requested? No    Pharmacy Name: [facility]     Does the patient have [facility] and need 100-day supply? (This applies to ALL medications) Patient does not have SCP

## 2025-01-07 ENCOUNTER — TELEMEDICINE (OUTPATIENT)
Dept: BEHAVIORAL HEALTH | Facility: PSYCHIATRIC FACILITY | Age: 50
End: 2025-01-07
Payer: COMMERCIAL

## 2025-01-07 DIAGNOSIS — Z91.49: ICD-10-CM

## 2025-01-07 DIAGNOSIS — T14.90XA TRAUMA IN CHILDHOOD: ICD-10-CM

## 2025-01-07 PROCEDURE — 90834 PSYTX W PT 45 MINUTES: CPT | Mod: GT

## 2025-01-07 NOTE — PROGRESS NOTES
[facility]  Psychotherapy Summary Note    Full therapy note has been documented and is under restricted viewing.  Please see below for summary of today's session.     Date of Service: 01/07/2025  Appointment type: virtual/telepsychiatry appointment: This evaluation was conducted via Zoom using secure and encrypted videoconferencing technology. The patient was in their home in the [facility].   The patient's identity was confirmed and verbal consent was obtained for this virtual visit.  Attending:  Hossein Liang M.D.    Type of session:Individual psychotherapy  Session start time: 3:04  Session stop time: 3:53  Length of time spent face to face with patient: 49 minutes  Persons in attendance: Patient    SI reported: no  HI reported: no    SUMMARY  Minerva Tillman is a 49 y.o. old female who presents today for regularly scheduled psychotherapy for Other (psychotherapy)      Symptoms currently being addressed in therapy: anxiety, substance use, and interpersonal difficulty    Therapeutic Intervention(s): Develop/modify treatment plan, Establish rapport, Goal-setting, Stressors assessed, and Therapeutic relationship    CURRENT RISK ASSESSMENT       Suicide: Low       Homicide: Low       Self-Harm: Low       Relapse: Low       Crisis Safety Plan Reviewed Not Indicated    DIAGNOSES/PLAN  Problem List Items Addressed This Visit    None  Visit Diagnoses       Trauma in childhood        History of psychological stress                      Treatment Goal(s)/Objective(s) addressed: Tx plan:  Further explore past history  Develop treatment goals  Utilize learned skills to manage mood and emotional suffering more effectively  Learn to successfully challenge & change distortions in thinking  Learn to ameliorate effects of anxiety on life and functioning  Increase behaviors of self-compassion and self-care       Progress toward Treatment Goals: Mild improvement     Plan:  - Continue individual therapy    Sidney  BRANDI Potter M.D.
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
## 2025-01-14 ENCOUNTER — TELEMEDICINE (OUTPATIENT)
Dept: BEHAVIORAL HEALTH | Facility: PSYCHIATRIC FACILITY | Age: 50
End: 2025-01-14
Payer: COMMERCIAL

## 2025-01-14 DIAGNOSIS — T14.90XA TRAUMA IN CHILDHOOD: ICD-10-CM

## 2025-01-14 DIAGNOSIS — Z91.49: ICD-10-CM

## 2025-01-14 PROCEDURE — 90834 PSYTX W PT 45 MINUTES: CPT

## 2025-01-14 NOTE — PROGRESS NOTES
[facility]  Psychotherapy Summary Note    Full therapy note has been documented and is under restricted viewing.  Please see below for summary of today's session.     Date of Service: 01/14/2025  Appointment type: virtual/telepsychiatry appointment: This evaluation was conducted via Zoom using secure and encrypted videoconferencing technology. The patient was in their home in the [facility].   The patient's identity was confirmed and verbal consent was obtained for this virtual visit.  Attending:  Hossein Liang M.D.    Type of session:Individual psychotherapy  Session start time: 3:15  Session stop time: 4:00  Length of time spent face to face with patient: 45 minutes  Persons in attendance: Patient    SI reported: no  HI reported: no    SUMMARY  Minerva Tillman is a 49 y.o. old female who presents today for regularly scheduled psychotherapy for Other (psychotherapy)      Symptoms currently being addressed in therapy: anxiety, substance use, and interpersonal difficulty    Therapeutic Intervention(s): Develop/modify treatment plan, Establish rapport, Goal-setting, Stressors assessed, and Therapeutic relationship    CURRENT RISK ASSESSMENT       Suicide: Low       Homicide: Low       Self-Harm: Low       Relapse: Low       Crisis Safety Plan Reviewed Not Indicated    DIAGNOSES/PLAN  Problem List Items Addressed This Visit    None  Visit Diagnoses       Trauma in childhood        History of psychological stress                        Treatment Goal(s)/Objective(s) addressed: Tx plan:  Further explore past history  Develop treatment goals  Utilize learned skills to manage mood and emotional suffering more effectively  Learn to successfully challenge & change distortions in thinking  Learn to ameliorate effects of anxiety on life and functioning  Increase behaviors of self-compassion and self-care       Progress toward Treatment Goals: Mild improvement     Plan:  - Continue individual  therapy    Sidney Potter M.D.
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
## 2025-01-17 ENCOUNTER — TELEPHONE (OUTPATIENT)
Dept: BEHAVIORAL HEALTH | Facility: CLINIC | Age: 50
End: 2025-01-17
Payer: COMMERCIAL

## 2025-01-17 NOTE — TELEPHONE ENCOUNTER
Called patient to confirm psychotherapy appointment timing change to 4:00pm every Tuesday. No answer; left VM with callback.

## 2025-01-21 ENCOUNTER — TELEPHONE (OUTPATIENT)
Dept: BEHAVIORAL HEALTH | Facility: PSYCHIATRIC FACILITY | Age: 50
End: 2025-01-21
Payer: COMMERCIAL

## 2025-01-28 ENCOUNTER — APPOINTMENT (OUTPATIENT)
Dept: BEHAVIORAL HEALTH | Facility: PSYCHIATRIC FACILITY | Age: 50
End: 2025-01-28
Payer: COMMERCIAL

## 2025-02-04 ENCOUNTER — TELEPHONE (OUTPATIENT)
Dept: BEHAVIORAL HEALTH | Facility: PSYCHIATRIC FACILITY | Age: 50
End: 2025-02-04
Payer: COMMERCIAL

## 2025-02-04 NOTE — TELEPHONE ENCOUNTER
Spoke with patient to follow up after missed appointments. States she does not have time for a full call due to lack of privacy at home with several cable/construction workers around. States that she missed last week due to having pneumonia but is nearly recovered from it. Reports that she is available to continue phone call later today at 4pm and is planning to return for in-person psychotherapy next week on 2/11/25.

## 2025-02-05 NOTE — PROGRESS NOTES
Bedside report received from off going RN/tech: Amanuel, assumed care of patient.     Fall Risk Score: HIGH RISK  Fall risk interventions in place: Provide patient/family education based on risk assessment, Educate patient/family to call staff for assistance when getting out of bed, Place fall precaution signage outside patient door, Place patient in room close to nursing station, Utilize bed/chair fall alarm, and Bed alarm connected correctly  Bed type: Regular (Sam Score less than 17 interventions in place)  Patient on cardiac monitor: Yes  IVF/IV medications: Infusion per MAR (List Med(s)) 0.9% NaCl w/ Kcl 20 mEq @ 83 mL/hr  Oxygen: How many liters 2L  Bedside sitter: Not Applicable   Isolation: Not applicable    I attempted to called patient- no answered and unable to leave voice message in regards of message below.        RE: surgery 2/13  Received: Today  Gaye Martinez sent to Jessy Gilbert RN  Okay thanks, I will call patient to f/u regarding Cardiology appt.  Thanks again          Previous Messages       ----- Message -----  From: Jessy Gilbert RN  Sent: 2/5/2025   9:14 AM CST  To: [facility] Sched Pool - Braza, E; *  Subject: surgery 2/13                                    I see pt is back on schedule for 2/13.  She was cancelled in December as she needed to follow up with cardiology.  I don't see she has done that yet.    Also - generally pt with dialysis we do not put on first thing in the morning as they need a potassium drawn before surgery.  Our lab does not open until 8am so the earliest we put them on is 0920.  If she wanted to be earlier, she would have to get it drawn late afternoon the day before surgery.   If she was planning to have surgery then go to dialysis after - I don't know if that would be advisable given she is having sedation.  It would need anesthesia review.    Jessy RUIZ RN  Pre admissions  [facility]  [phone]

## 2025-02-11 ENCOUNTER — APPOINTMENT (OUTPATIENT)
Dept: BEHAVIORAL HEALTH | Facility: PSYCHIATRIC FACILITY | Age: 50
End: 2025-02-11
Payer: COMMERCIAL

## 2025-02-11 VITALS
SYSTOLIC BLOOD PRESSURE: 132 MMHG | HEIGHT: 62 IN | HEART RATE: 82 BPM | OXYGEN SATURATION: 88 % | DIASTOLIC BLOOD PRESSURE: 92 MMHG | WEIGHT: 149 LBS | BODY MASS INDEX: 27.42 KG/M2

## 2025-02-11 DIAGNOSIS — T14.90XA TRAUMA IN CHILDHOOD: ICD-10-CM

## 2025-02-11 DIAGNOSIS — F41.1 GENERALIZED ANXIETY DISORDER: ICD-10-CM

## 2025-02-11 DIAGNOSIS — F10.20 ALCOHOL USE DISORDER, SEVERE, DEPENDENCE (HCC): ICD-10-CM

## 2025-02-11 DIAGNOSIS — Z91.49: ICD-10-CM

## 2025-02-11 ASSESSMENT — ENCOUNTER SYMPTOMS
CONSTIPATION: 0
TINGLING: 0
NAUSEA: 0
HEADACHES: 0
VOMITING: 0
CARDIOVASCULAR NEGATIVE: 1
NEUROLOGICAL NEGATIVE: 1
WEAKNESS: 0
GASTROINTESTINAL NEGATIVE: 1
DIARRHEA: 0
SHORTNESS OF BREATH: 1

## 2025-02-11 ASSESSMENT — PATIENT HEALTH QUESTIONNAIRE - PHQ9: CLINICAL INTERPRETATION OF PHQ2 SCORE: 0

## 2025-02-11 ASSESSMENT — FIBROSIS 4 INDEX: FIB4 SCORE: 3.73
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
## 2025-02-12 NOTE — PROGRESS NOTES
[facility]  Psychotherapy Summary Note    Full therapy note has been documented and is under restricted viewing.  Please see below for summary of today's session.     Date of Service: 02/11/2025  Appointment type: in-office appointment.  Attending:  Hossein Liang M.D.    Type of session:Individual psychotherapy  Session start time: 4:07  Session stop time: 4:45  Length of time spent face to face with patient: 38 minutes  Persons in attendance: Patient    SI reported: no  HI reported: no    SUMMARY  Minerva Tillman is a 49 y.o. old female who presents today for regularly scheduled psychotherapy for Other (psychotherapy)      Symptoms currently being addressed in therapy: anxiety, substance use, and interpersonal difficulty    Therapeutic Intervention(s): Develop/modify treatment plan, Establish rapport, Goal-setting, Stressors assessed, and Therapeutic relationship    CURRENT RISK ASSESSMENT       Suicide: Low       Homicide: Low       Self-Harm: Low       Relapse: Low       Crisis Safety Plan Reviewed Not Indicated    DIAGNOSES/PLAN  Problem List Items Addressed This Visit    None  Visit Diagnoses       Trauma in childhood        History of psychological stress                        Treatment Goal(s)/Objective(s) addressed: Tx plan:  Further explore past history  Develop treatment goals  Utilize learned skills to manage mood and emotional suffering more effectively  Learn to successfully challenge & change distortions in thinking  Learn to ameliorate effects of anxiety on life and functioning  Increase behaviors of self-compassion and self-care       Progress toward Treatment Goals: Mild decline     Plan:  - Continue individual therapy    Sidney Potter M.D.

## 2025-02-13 PROBLEM — F10.20 ALCOHOL USE DISORDER, SEVERE, DEPENDENCE (HCC): Chronic | Status: ACTIVE | Noted: 2021-08-06

## 2025-02-13 NOTE — ASSESSMENT & PLAN NOTE
Problem type: Chronic Illness with exacerbation, progression, or side effects of treatment    Assessment: 50 year old female with hx of FREDA and comorbid AUD and several medical complications associated with her alcohol use who presents for follow up. Patient completed IOP at [facility] (12h/wk) to address her alcohol use Summer 2024. Recent relapse and patient is again in contemplative stage. Patient adherent to her prescribed medications and denies any side effects. Given patient's comorbidities these are likely the best combination of medications. Will work to improve coping skills and continue to address substance use in psychotherapy next week. Follow up for medication management again in 4-8 weeks.    Plan  Medication:   -Continue Gabapentin 300mg TID  -Continue Lexapro 20mg daily for FREDA  -Continue hydroxyzine 10-25mg TID PRN for FREDA    Therapy:   -Continue psychotherapy through [facility] resident clinic    Other Orders: none

## 2025-02-18 ENCOUNTER — APPOINTMENT (OUTPATIENT)
Dept: BEHAVIORAL HEALTH | Facility: PSYCHIATRIC FACILITY | Age: 50
End: 2025-02-18
Payer: COMMERCIAL

## 2025-02-19 ENCOUNTER — TELEPHONE (OUTPATIENT)
Dept: BEHAVIORAL HEALTH | Facility: PSYCHIATRIC FACILITY | Age: 50
End: 2025-02-19
Payer: COMMERCIAL

## 2025-02-19 NOTE — TELEPHONE ENCOUNTER
Spoke with patient yesterday via telephone around 4:30pm. Patient reports that she was unable to attend her therapy appointment due to experiencing symptoms of URI and was unable to maintain her O2 sat without her home oxygen tank. States that she can attend next week and will plan to bring her  to the following appointment on 3/4/25.

## 2025-02-21 DIAGNOSIS — F10.20 ALCOHOL USE DISORDER, SEVERE, DEPENDENCE (HCC): ICD-10-CM

## 2025-02-21 DIAGNOSIS — F41.1 GENERALIZED ANXIETY DISORDER: ICD-10-CM

## 2025-02-21 RX ORDER — GABAPENTIN 300 MG/1
300 CAPSULE ORAL 3 TIMES DAILY
Qty: 90 CAPSULE | Refills: 2 | Status: SHIPPED | OUTPATIENT
Start: 2025-02-21 | End: 2025-05-22

## 2025-02-25 ENCOUNTER — APPOINTMENT (OUTPATIENT)
Dept: BEHAVIORAL HEALTH | Facility: PSYCHIATRIC FACILITY | Age: 50
End: 2025-02-25
Payer: COMMERCIAL

## 2025-02-25 DIAGNOSIS — F10.20 ALCOHOL USE DISORDER, SEVERE, DEPENDENCE (HCC): ICD-10-CM

## 2025-02-25 DIAGNOSIS — Z91.49: ICD-10-CM

## 2025-02-25 DIAGNOSIS — T14.90XA TRAUMA IN CHILDHOOD: ICD-10-CM
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
## 2025-02-26 NOTE — PROGRESS NOTES
[facility]  Psychotherapy Summary Note    Full therapy note has been documented and is under restricted viewing.  Please see below for summary of today's session.     Date of Service: 02/25/2025  Appointment type: in-office appointment.  Attending:  Bairon Han M.D.    Type of session:Individual psychotherapy  Session start time: 4:20  Session stop time: 5:20  Length of time spent face to face with patient: 60 minutes  Persons in attendance: Patient    SI reported: no  HI reported: no    SUMMARY  Minerva Tillman is a 49 y.o. old female who presents today for regularly scheduled psychotherapy for Other (psychotherapy)      Symptoms currently being addressed in therapy: anxiety, substance use, and interpersonal difficulty    Therapeutic Intervention(s): Develop/modify treatment plan, Establish rapport, Goal-setting, Stressors assessed, and Therapeutic relationship    CURRENT RISK ASSESSMENT       Suicide: Low       Homicide: Low       Self-Harm: Low       Relapse: Low       Crisis Safety Plan Reviewed Not Indicated    DIAGNOSES/PLAN  Problem List Items Addressed This Visit       Alcohol use disorder, severe, dependence (HCC) (Chronic)     Other Visit Diagnoses         Trauma in childhood          History of psychological stress                        Treatment Goal(s)/Objective(s) addressed: Tx plan:  Further explore past history  Develop treatment goals  Utilize learned skills to manage mood and emotional suffering more effectively  Learn to successfully challenge & change distortions in thinking  Learn to ameliorate effects of anxiety on life and functioning  Increase behaviors of self-compassion and self-care       Progress toward Treatment Goals: Active relapse     Plan:  - Continue individual therapy    Sidney Potter M.D.

## 2025-02-28 ENCOUNTER — TELEPHONE (OUTPATIENT)
Dept: HEALTH INFORMATION MANAGEMENT | Facility: OTHER | Age: 50
End: 2025-02-28
Payer: COMMERCIAL

## 2025-02-28 ENCOUNTER — TELEPHONE (OUTPATIENT)
Dept: BEHAVIORAL HEALTH | Facility: CLINIC | Age: 50
End: 2025-02-28
Payer: COMMERCIAL
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
## 2025-02-28 NOTE — TELEPHONE ENCOUNTER
Attempted to call  Jeff Tlilman by patient request; no answer, left VM with callback.    Called and spoke with patient via telephone to check-in after recent therapy session. Patient expressed disappointment related to her  being gone for long stretches at a time. Phone call abruptly terminated due to unexpected guests/[employer] employees. Advised to call back later if any additional questions or concerns arise.

## 2025-03-04 ENCOUNTER — TELEPHONE (OUTPATIENT)
Dept: BEHAVIORAL HEALTH | Facility: PSYCHIATRIC FACILITY | Age: 50
End: 2025-03-04
Payer: COMMERCIAL

## 2025-03-11 ENCOUNTER — APPOINTMENT (OUTPATIENT)
Dept: BEHAVIORAL HEALTH | Facility: PSYCHIATRIC FACILITY | Age: 50
End: 2025-03-11
Payer: COMMERCIAL

## 2025-03-11 DIAGNOSIS — F10.20 ALCOHOL USE DISORDER, SEVERE, DEPENDENCE (HCC): ICD-10-CM

## 2025-03-11 PROCEDURE — 99999 PR NO CHARGE: CPT | Mod: GT
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
## 2025-03-12 NOTE — PROGRESS NOTES
[facility]  Psychotherapy Summary Note    Full therapy note has been documented and is under restricted viewing.  Please see below for summary of today's session.     Date of Service: 03/11/2025  Appointment type: virtual/telepsychiatry appointment: This evaluation was conducted via Zoom using secure and encrypted videoconferencing technology. The patient was in their home in the [facility].   The patient's identity was confirmed and verbal consent was obtained for this virtual visit.  Attending:  Hossein Liang M.D.    Type of session:Individual psychotherapy  Session start time: 4:15  Session stop time: 4:50  Length of time spent face to face with patient: 35 minutes  Persons in attendance: Patient and Spouse/Partner Jeff Tillman    SI reported: no  HI reported: no    SUMMARY  Minerva Tillman is a 49 y.o. old female who presents today for regularly scheduled psychotherapy for Other (psychotherapy)      Symptoms currently being addressed in therapy: anxiety, substance use, and interpersonal difficulty    Therapeutic Intervention(s): Develop/modify treatment plan, Establish rapport, Goal-setting, Stressors assessed, and Therapeutic relationship    CURRENT RISK ASSESSMENT       Suicide: Low       Homicide: Low       Self-Harm: Low       Relapse: Not applicable (current active use)       Crisis Safety Plan Reviewed Not Indicated    DIAGNOSES/PLAN  Problem List Items Addressed This Visit    None                Treatment Goal(s)/Objective(s) addressed: Tx plan:  Further explore past history  Develop treatment goals  Utilize learned skills to manage mood and emotional suffering more effectively  Learn to successfully challenge & change distortions in thinking  Learn to ameliorate effects of anxiety on life and functioning  Increase behaviors of self-compassion and self-care       Progress toward Treatment Goals: Active relapse     Plan:  - Continue individual therapy    Sidney Potter M.D.

## 2025-03-18 ENCOUNTER — APPOINTMENT (OUTPATIENT)
Dept: BEHAVIORAL HEALTH | Facility: PSYCHIATRIC FACILITY | Age: 50
End: 2025-03-18
Payer: COMMERCIAL

## 2025-03-18 DIAGNOSIS — F10.20 ALCOHOL USE DISORDER, SEVERE, DEPENDENCE (HCC): ICD-10-CM

## 2025-03-18 PROCEDURE — 99999 PR NO CHARGE: CPT
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
## 2025-03-19 NOTE — PROGRESS NOTES
[facility]  Psychotherapy Summary Note    Full therapy note has been documented and is under restricted viewing.  Please see below for summary of today's session.     Date of Service: 03/18/2025  Appointment type: virtual/telepsychiatry appointment: This evaluation was conducted via Zoom using secure and encrypted videoconferencing technology. The patient was in their home in the [facility].   The patient's identity was confirmed and verbal consent was obtained for this virtual visit.  Attending:  Hossein Liang M.D.    Type of session:Individual psychotherapy  Session start time: 4:05  Session stop time: 4:55  Length of time spent face to face with patient: 50 minutes  Persons in attendance: Spouse/Partner Jeff Tillman ONLY (patient was not present)    SI reported: no  HI reported: no    SUMMARY  Minerva Tillman is a 49 y.o. old female who presents today for regularly scheduled psychotherapy for Other (Psychotherapy with family)      Symptoms currently being addressed in therapy: anxiety, substance use, and interpersonal difficulty    Therapeutic Intervention(s): Develop/modify treatment plan, Establish rapport, Goal-setting, Stressors assessed, and Therapeutic relationship    CURRENT RISK ASSESSMENT       Suicide: Low       Homicide: Low       Self-Harm: Low       Relapse: Not applicable (current active use)       Crisis Safety Plan Reviewed Not Indicated    DIAGNOSES/PLAN  Problem List Items Addressed This Visit       Alcohol use disorder, severe, dependence (HCC) (Chronic)                 Treatment Goal(s)/Objective(s) addressed: Tx plan:  Further explore past history  Develop treatment goals  Utilize learned skills to manage mood and emotional suffering more effectively  Learn to successfully challenge & change distortions in thinking  Learn to ameliorate effects of anxiety on life and functioning  Increase behaviors of self-compassion and self-care       Progress toward Treatment Goals: Active  relapse     Plan:  - Continue individual therapy    Sidney Potter M.D.

## 2025-03-25 ENCOUNTER — APPOINTMENT (OUTPATIENT)
Dept: BEHAVIORAL HEALTH | Facility: PSYCHIATRIC FACILITY | Age: 50
End: 2025-03-25
Payer: COMMERCIAL

## 2025-03-25 DIAGNOSIS — F10.20 ALCOHOL USE DISORDER, SEVERE, DEPENDENCE (HCC): ICD-10-CM
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
## 2025-03-25 NOTE — TELEPHONE ENCOUNTER
Attempted to call patient and  to confirm therapy appointment today. No answer, left 2 voicemails with callback

## 2025-04-01 ENCOUNTER — APPOINTMENT (OUTPATIENT)
Dept: BEHAVIORAL HEALTH | Facility: PSYCHIATRIC FACILITY | Age: 50
End: 2025-04-01
Payer: COMMERCIAL

## 2025-04-01 DIAGNOSIS — F10.20 ALCOHOL USE DISORDER, SEVERE, DEPENDENCE (HCC): ICD-10-CM

## 2025-04-01 PROCEDURE — 99999 PR NO CHARGE: CPT | Mod: GT
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
## 2025-04-02 NOTE — PROGRESS NOTES
[facility]  Psychotherapy Summary Note    Full therapy note has been documented and is under restricted viewing.  Please see below for summary of today's session.     Date of Service: 04/01/2025  Appointment type: virtual/telepsychiatry appointment: This evaluation was conducted via Zoom using secure and encrypted videoconferencing technology. The patient was in their home in the [facility].   The patient's identity was confirmed and verbal consent was obtained for this virtual visit.  Attending:  Hossein Liang M.D.    Type of session:Individual psychotherapy  Session start time: 4:02  Session stop time: 4:45  Length of time spent face to face with patient: 43 minutes; visit was terminated prematurely by patient ending video call  Persons in attendance: Patient and spouse Jeff Tillman    SI reported: no  HI reported: no    SUMMARY  Minerva Tillman is a 49 y.o. old female who presents today for regularly scheduled psychotherapy for Other (psychotherapy)      Symptoms currently being addressed in therapy: anxiety, substance use, and interpersonal difficulty    Therapeutic Intervention(s): Substance use counseling, Therapeutic relationship, CPT, MET, and couples counseling     CURRENT RISK ASSESSMENT       Suicide: Low       Homicide: Low       Self-Harm: Low       Relapse: Not applicable (current active use)       Crisis Safety Plan Reviewed Not Indicated    DIAGNOSES/PLAN  Problem List Items Addressed This Visit       Alcohol use disorder, severe, dependence (HCC) (Chronic)                 Treatment Goal(s)/Objective(s) addressed: Tx plan:  Further explore past history  Develop treatment goals  Utilize learned skills to manage mood and emotional suffering more effectively  Learn to successfully challenge & change distortions in thinking  Learn to ameliorate effects of anxiety on life and functioning  Increase behaviors of self-compassion and self-care       Progress toward Treatment Goals: Active  relapse     Plan:  - Continue individual therapy    Sidney Potter M.D.

## 2025-04-08 ENCOUNTER — APPOINTMENT (OUTPATIENT)
Dept: BEHAVIORAL HEALTH | Facility: PSYCHIATRIC FACILITY | Age: 50
End: 2025-04-08
Payer: COMMERCIAL

## 2025-04-09 ENCOUNTER — TELEPHONE (OUTPATIENT)
Dept: BEHAVIORAL HEALTH | Facility: CLINIC | Age: 50
End: 2025-04-09
Payer: COMMERCIAL
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
## 2025-04-15 ENCOUNTER — APPOINTMENT (OUTPATIENT)
Dept: BEHAVIORAL HEALTH | Facility: PSYCHIATRIC FACILITY | Age: 50
End: 2025-04-15
Payer: COMMERCIAL

## 2025-04-15 DIAGNOSIS — F10.20 ALCOHOL USE DISORDER, SEVERE, DEPENDENCE (HCC): ICD-10-CM

## 2025-04-15 PROCEDURE — 99999 PR NO CHARGE: CPT | Mod: GT

## 2025-04-15 NOTE — PROGRESS NOTES
[facility]  Psychotherapy Summary Note    Full therapy note has been documented and is under restricted viewing.  Please see below for summary of today's session.     Date of Service: 04/15/2025  Appointment type: in-office appointment.  Attending:  Hossein Liang M.D.    Type of session:Individual psychotherapy  Session start time: 4:15  Session stop time: 5:13  Length of time spent face to face with patient: 58 minutes  Persons in attendance: Patient    SI reported: no  HI reported: no    SUMMARY  Minerva Tillman is a 49 y.o. old female who presents today for regularly scheduled psychotherapy for Other (Psychotherapy (CPT))      Symptoms currently being addressed in therapy: anxiety, substance use, and interpersonal difficulty    Therapeutic Intervention(s): Develop/modify treatment plan, Establish rapport, Goal-setting, Stressors assessed, and Therapeutic relationship    CURRENT RISK ASSESSMENT       Suicide: Low       Homicide: Low       Self-Harm: Low       Relapse: Not applicable (current active use)       Crisis Safety Plan Reviewed Not Indicated    DIAGNOSES/PLAN  Problem List Items Addressed This Visit       Alcohol use disorder, severe, dependence (HCC) (Chronic)                 Treatment Goal(s)/Objective(s) addressed: Tx plan:  Further explore past history  Develop treatment goals  Utilize learned skills to manage mood and emotional suffering more effectively  Learn to successfully challenge & change distortions in thinking  Learn to ameliorate effects of anxiety on life and functioning  Increase behaviors of self-compassion and self-care       Progress toward Treatment Goals: Active relapse     Plan:  - Continue individual therapy    Sidney Potter M.D.
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
## 2025-04-22 ENCOUNTER — APPOINTMENT (OUTPATIENT)
Dept: BEHAVIORAL HEALTH | Facility: PSYCHIATRIC FACILITY | Age: 50
End: 2025-04-22
Payer: COMMERCIAL

## 2025-04-22 DIAGNOSIS — F10.20 ALCOHOL USE DISORDER, SEVERE, DEPENDENCE (HCC): ICD-10-CM

## 2025-04-22 PROCEDURE — 99999 PR NO CHARGE: CPT
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
## 2025-04-22 NOTE — PROGRESS NOTES
[facility]  Psychotherapy Summary Note    Full therapy note has been documented and is under restricted viewing.  Please see below for summary of today's session.     Date of Service: 04/22/2025  Appointment type: in-office appointment.  Attending:  Hossein Liang M.D.    Type of session:Individual psychotherapy  Session start time: 3:55  Session stop time: 4:50  Length of time spent face to face with patient: 55 minutes  Persons in attendance: Patient    SI reported: no  HI reported: no    SUMMARY  Minerva Tillman is a 49 y.o. old female who presents today for regularly scheduled psychotherapy for Other (Psychotherapy; CPT)      Symptoms currently being addressed in therapy: anxiety, substance use, and interpersonal difficulty    Therapeutic Intervention(s): Develop/modify treatment plan, Establish rapport, Goal-setting, Stressors assessed, and Therapeutic relationship    CURRENT RISK ASSESSMENT       Suicide: Low       Homicide: Low       Self-Harm: Low       Relapse: Not applicable (current active use)       Crisis Safety Plan Reviewed Not Indicated    DIAGNOSES/PLAN  Problem List Items Addressed This Visit       Alcohol use disorder, severe, dependence (HCC) (Chronic)                   Treatment Goal(s)/Objective(s) addressed: Tx plan:  Further explore past history  Develop treatment goals  Utilize learned skills to manage mood and emotional suffering more effectively  Learn to successfully challenge & change distortions in thinking  Learn to ameliorate effects of anxiety on life and functioning  Increase behaviors of self-compassion and self-care       Progress toward Treatment Goals: Active relapse     Plan:  - Continue individual therapy    Sidney Potter M.D.

## 2025-04-29 ENCOUNTER — TELEPHONE (OUTPATIENT)
Dept: BEHAVIORAL HEALTH | Facility: PSYCHIATRIC FACILITY | Age: 50
End: 2025-04-29
Payer: COMMERCIAL

## 2025-05-06 ENCOUNTER — APPOINTMENT (OUTPATIENT)
Dept: BEHAVIORAL HEALTH | Facility: PSYCHIATRIC FACILITY | Age: 50
End: 2025-05-06
Payer: COMMERCIAL

## 2025-05-13 ENCOUNTER — APPOINTMENT (OUTPATIENT)
Dept: BEHAVIORAL HEALTH | Facility: PSYCHIATRIC FACILITY | Age: 50
End: 2025-05-13
Payer: COMMERCIAL

## 2025-05-20 ENCOUNTER — TELEPHONE (OUTPATIENT)
Dept: BEHAVIORAL HEALTH | Facility: PSYCHIATRIC FACILITY | Age: 50
End: 2025-05-20
Payer: COMMERCIAL

## 2025-05-20 DIAGNOSIS — F10.20 ALCOHOL USE DISORDER, SEVERE, DEPENDENCE (HCC): Primary | ICD-10-CM

## 2025-05-20 NOTE — TELEPHONE ENCOUNTER
Spoke with patient via telephone from approximately 4:05 to 4:55.  Discussed the following topics:  -self-care  -relationship with parents  -getting out of the house/lack of activity  -safety; patient denies any SI or preparatory behavior  -future treatment planning  (Obtained collateral from spouse)

## 2025-05-27 ENCOUNTER — APPOINTMENT (OUTPATIENT)
Dept: BEHAVIORAL HEALTH | Facility: PSYCHIATRIC FACILITY | Age: 50
End: 2025-05-27
Payer: COMMERCIAL

## 2025-05-27 DIAGNOSIS — F10.20 ALCOHOL USE DISORDER, SEVERE, DEPENDENCE (HCC): Primary | ICD-10-CM

## 2025-05-27 PROCEDURE — 99999 PR NO CHARGE: CPT

## 2025-05-28 NOTE — PROGRESS NOTES
Unable to connect for virtual visit; telephone call completed. Please see Telephone note on 5/20/25 for details.

## 2025-06-03 ENCOUNTER — APPOINTMENT (OUTPATIENT)
Dept: BEHAVIORAL HEALTH | Facility: PSYCHIATRIC FACILITY | Age: 50
End: 2025-06-03
Payer: COMMERCIAL

## 2025-06-03 ENCOUNTER — TELEPHONE (OUTPATIENT)
Dept: BEHAVIORAL HEALTH | Facility: CLINIC | Age: 50
End: 2025-06-03
Payer: COMMERCIAL

## 2025-06-10 ENCOUNTER — APPOINTMENT (OUTPATIENT)
Dept: BEHAVIORAL HEALTH | Facility: PSYCHIATRIC FACILITY | Age: 50
End: 2025-06-10
Payer: COMMERCIAL

## 2025-06-10 DIAGNOSIS — F10.20 ALCOHOL USE DISORDER, SEVERE, DEPENDENCE (HCC): Primary | ICD-10-CM

## 2025-06-10 PROCEDURE — 99999 PR NO CHARGE: CPT | Mod: GT

## 2025-06-10 NOTE — PROGRESS NOTES
[facility]  Psychotherapy Summary Note    Full therapy note has been documented and is under restricted viewing.  Please see below for summary of today's session.     Date of Service: 06/10/2025  Appointment type: virtual/telepsychiatry appointment: This evaluation was conducted via Zoom using secure and encrypted videoconferencing technology. The patient was in their home in the [facility].   The patient's identity was confirmed and verbal consent was obtained for this virtual visit.  Attending:  Hossein Liang M.D.    Type of session:Individual psychotherapy  Session start time: 3:15  Session stop time: 4:10  Length of time spent face to face with patient: 55 minutes  Persons in attendance: Patient    SI reported: no  HI reported: no    SUMMARY  Minerva Tillman is a 50 y.o. old female who presents today for regularly scheduled psychotherapy for Other (psychotherapy)      Symptoms currently being addressed in therapy: anxiety, substance use, and interpersonal difficulty    Therapeutic Intervention(s): Develop/modify treatment plan, Establish rapport, Goal-setting, Stressors assessed, and Therapeutic relationship    CURRENT RISK ASSESSMENT       Suicide: Low       Homicide: Low       Self-Harm: Low       Relapse: Not applicable (current active use)       Crisis Safety Plan Reviewed Not Indicated    DIAGNOSES/PLAN  Problem List Items Addressed This Visit       Alcohol use disorder, severe, dependence (HCC) - Primary (Chronic)                   Treatment Goal(s)/Objective(s) addressed: Tx plan:  Further explore past history  Develop treatment goals  Utilize learned skills to manage mood and emotional suffering more effectively  Learn to successfully challenge & change distortions in thinking  Learn to ameliorate effects of anxiety on life and functioning  Increase behaviors of self-compassion and self-care       Progress toward Treatment Goals: Active relapse     Plan:  - Continue individual  therapy    Sideny Potter M.D.
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
## 2025-06-17 ENCOUNTER — APPOINTMENT (OUTPATIENT)
Dept: BEHAVIORAL HEALTH | Facility: PSYCHIATRIC FACILITY | Age: 50
End: 2025-06-17
Payer: COMMERCIAL

## 2025-06-17 VITALS
HEIGHT: 62 IN | WEIGHT: 141.2 LBS | HEART RATE: 111 BPM | OXYGEN SATURATION: 85 % | DIASTOLIC BLOOD PRESSURE: 79 MMHG | BODY MASS INDEX: 25.98 KG/M2 | SYSTOLIC BLOOD PRESSURE: 118 MMHG

## 2025-06-17 DIAGNOSIS — F41.1 GENERALIZED ANXIETY DISORDER: ICD-10-CM

## 2025-06-17 DIAGNOSIS — Z62.819 TRAUMA IN CHILDHOOD: ICD-10-CM

## 2025-06-17 DIAGNOSIS — F10.20 ALCOHOL USE DISORDER, SEVERE, DEPENDENCE (HCC): Primary | ICD-10-CM

## 2025-06-17 DIAGNOSIS — F40.10 SOCIAL ANXIETY DISORDER: ICD-10-CM

## 2025-06-17 ASSESSMENT — ENCOUNTER SYMPTOMS
HEADACHES: 0
SHORTNESS OF BREATH: 0
TINGLING: 0
CONSTITUTIONAL NEGATIVE: 1
CARDIOVASCULAR NEGATIVE: 1
NEUROLOGICAL NEGATIVE: 1
WEAKNESS: 0
DIARRHEA: 0
GASTROINTESTINAL NEGATIVE: 1
VOMITING: 0
RESPIRATORY NEGATIVE: 1
CONSTIPATION: 0
NAUSEA: 0

## 2025-06-17 ASSESSMENT — FIBROSIS 4 INDEX: FIB4 SCORE: 3.73
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
## 2025-06-18 RX ORDER — HYDROXYZINE PAMOATE 25 MG/1
25 CAPSULE ORAL 3 TIMES DAILY PRN
Qty: 90 CAPSULE | Refills: 5 | Status: ON HOLD | OUTPATIENT
Start: 2025-06-18 | End: 2025-12-15

## 2025-06-18 RX ORDER — GABAPENTIN 300 MG/1
300 CAPSULE ORAL 2 TIMES DAILY
Qty: 180 CAPSULE | Refills: 1 | Status: ON HOLD | OUTPATIENT
Start: 2025-06-18 | End: 2025-12-15

## 2025-06-18 RX ORDER — ESCITALOPRAM OXALATE 20 MG/1
20 TABLET ORAL DAILY
Qty: 90 TABLET | Refills: 1 | Status: ON HOLD | OUTPATIENT
Start: 2025-06-18 | End: 2025-12-15

## 2025-06-18 NOTE — ASSESSMENT & PLAN NOTE
Problem type: Chronic Illness with exacerbation, progression, or side effects of treatment    Assessment: 50 year old female with hx of FREDA and comorbid AUD and several medical complications associated with her alcohol use who presents for follow up. Patient completed IOP at [facility] (12h/wk) to address her alcohol use Summer 2024. Recent relapse and patient is again in contemplative stage. Patient adherent to her prescribed medications and denies any side effects. Given patient's comorbidities these are likely the best combination of medications. Will work to improve coping skills and continue to address substance use in psychotherapy next week. Follow up for medication management again in 4 weeks to establish with new resident.    Plan  Medication:   - Continue/change Gabapentin 300mg from TID to BID  - Continue Lexapro 20mg daily for FREDA  - Continue hydroxyzine 10-25mg TID PRN for FREDA    Therapy:   - Transition/terminate psychotherapy through [facility] resident clinic; patient unable to attend weekly in-person visits    Other Orders: none

## 2025-06-18 NOTE — PROGRESS NOTES
[facility]  Psychotherapy Summary Note    Full therapy note has been documented and is under restricted viewing.  Please see below for summary of today's session.     Date of Service: 06/17/2025  Appointment type: in-office appointment.  Attending:  Hossein Liang M.D.    Type of session:Individual psychotherapy  Session start time: 3:05  Session stop time: 4:20  Length of time spent face to face with patient: 75 minutes  Persons in attendance: Patient    SI reported: no  HI reported: no    SUMMARY  Minerva Tillman is a 50 y.o. old female who presents today for regularly scheduled psychotherapy for Medication Management (FREDA and AUD, active) and Other (psychotherapy)      Symptoms currently being addressed in therapy: anxiety, substance use, and interpersonal difficulty    Therapeutic Intervention(s): Develop/modify treatment plan, Establish rapport, Goal-setting, Stressors assessed, and Therapeutic relationship    CURRENT RISK ASSESSMENT       Suicide: Low       Homicide: Low       Self-Harm: Low       Relapse: Not applicable (current active use)       Crisis Safety Plan Reviewed Not Indicated    DIAGNOSES/PLAN  Problem List Items Addressed This Visit       Alcohol use disorder, severe, dependence (HCC) - Primary (Chronic)    Relevant Medications    gabapentin (NEURONTIN) 300 MG Cap    Social anxiety disorder    Relevant Medications    escitalopram (LEXAPRO) 20 MG tablet    hydrOXYzine pamoate (VISTARIL) 25 MG Cap    Generalized anxiety disorder    Relevant Medications    escitalopram (LEXAPRO) 20 MG tablet    hydrOXYzine pamoate (VISTARIL) 25 MG Cap     Other Visit Diagnoses         Trauma in childhood                            Treatment Goal(s)/Objective(s) addressed: Tx plan:  Further explore past history  Develop treatment goals  Utilize learned skills to manage mood and emotional suffering more effectively  Learn to successfully challenge & change distortions in thinking  Learn to ameliorate  effects of anxiety on life and functioning  Increase behaviors of self-compassion and self-care       Progress toward Treatment Goals: Active relapse     Plan:  - Transition from individual therapy back to medication management; recommend higher level of care    Sidney Potter M.D.

## 2025-06-18 NOTE — ASSESSMENT & PLAN NOTE
Problem type: Chronic Illness with exacerbation, progression, or side effects of treatment    Assessment: 50 year old female with hx of generalized anxiety but denies any current functional impairment from it. Ongoing anxiety in social settings which was exacerbated by COVID19 pandemic in 2020 and likely increased alcohol use as well. Will continue Lexapro today as outlined below. Reports overall improvements since titration of Gabapentin but still taking hydroxyzine for anticipatory anxiety with significant avoidant behavior.     Plan  Medication:   -Continue Lexapro to 20mg PO daily  -Continue Hydroxyzine 10mg TID PRN  -Continue Gabapentin 300mg TID    Therapy:   -Continue follow up through [facility]    Other Orders: none
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
## 2025-06-19 ENCOUNTER — APPOINTMENT (OUTPATIENT)
Dept: RADIOLOGY | Facility: MEDICAL CENTER | Age: 50
DRG: 896 | End: 2025-06-19
Attending: STUDENT IN AN ORGANIZED HEALTH CARE EDUCATION/TRAINING PROGRAM
Payer: COMMERCIAL

## 2025-06-19 ENCOUNTER — HOSPITAL ENCOUNTER (INPATIENT)
Facility: MEDICAL CENTER | Age: 50
End: 2025-06-19
Attending: STUDENT IN AN ORGANIZED HEALTH CARE EDUCATION/TRAINING PROGRAM | Admitting: HOSPITALIST
Payer: COMMERCIAL

## 2025-06-19 DIAGNOSIS — J96.01 ACUTE HYPOXIC RESPIRATORY FAILURE (HCC): ICD-10-CM

## 2025-06-19 DIAGNOSIS — I85.10 ESOPHAGEAL VARICES IN ALCOHOLIC CIRRHOSIS (HCC): ICD-10-CM

## 2025-06-19 DIAGNOSIS — E50.9 VITAMIN A DEFICIENCY: ICD-10-CM

## 2025-06-19 DIAGNOSIS — F41.1 GENERALIZED ANXIETY DISORDER: ICD-10-CM

## 2025-06-19 DIAGNOSIS — K64.9 HEMORRHOIDS, UNSPECIFIED HEMORRHOID TYPE: ICD-10-CM

## 2025-06-19 DIAGNOSIS — K70.31 ALCOHOLIC CIRRHOSIS OF LIVER WITH ASCITES (HCC): ICD-10-CM

## 2025-06-19 DIAGNOSIS — F40.10 SOCIAL ANXIETY DISORDER: ICD-10-CM

## 2025-06-19 DIAGNOSIS — F10.20 ALCOHOL USE DISORDER, SEVERE, DEPENDENCE (HCC): ICD-10-CM

## 2025-06-19 DIAGNOSIS — E03.9 HYPOTHYROIDISM, UNSPECIFIED TYPE: ICD-10-CM

## 2025-06-19 DIAGNOSIS — E87.1 HYPONATREMIA: ICD-10-CM

## 2025-06-19 DIAGNOSIS — K70.11 ALCOHOLIC HEPATITIS WITH ASCITES: Primary | ICD-10-CM

## 2025-06-19 DIAGNOSIS — F10.19 DISORDER DUE TO ALCOHOL ABUSE (HCC): ICD-10-CM

## 2025-06-19 DIAGNOSIS — K70.30 ESOPHAGEAL VARICES IN ALCOHOLIC CIRRHOSIS (HCC): ICD-10-CM

## 2025-06-19 DIAGNOSIS — I81 PORTAL VEIN THROMBOSIS: ICD-10-CM

## 2025-06-19 PROBLEM — F10.132 ALCOHOL ABUSE WITH WITHDRAWAL AND PERCEPTUAL DISTURBANCE (HCC): Status: ACTIVE | Noted: 2025-06-19

## 2025-06-19 PROBLEM — R18.8 ASCITES: Status: ACTIVE | Noted: 2023-10-28

## 2025-06-19 PROBLEM — K70.10 ALCOHOLIC HEPATITIS: Status: ACTIVE | Noted: 2025-06-19

## 2025-06-19 LAB
ALBUMIN SERPL BCP-MCNC: 3.4 G/DL (ref 3.2–4.9)
ALBUMIN/GLOB SERPL: 1.2 G/DL
ALP SERPL-CCNC: 354 U/L (ref 30–99)
ALT SERPL-CCNC: 131 U/L (ref 2–50)
AMMONIA PLAS-SCNC: 27 UMOL/L (ref 11–45)
ANION GAP SERPL CALC-SCNC: 19 MMOL/L (ref 7–16)
ANISOCYTOSIS BLD QL SMEAR: ABNORMAL
APPEARANCE UR: ABNORMAL
APTT PPP: 41.1 SEC (ref 24.7–36)
AST SERPL-CCNC: 334 U/L (ref 12–45)
BACTERIA #/AREA URNS HPF: ABNORMAL /HPF
BASOPHILS # BLD AUTO: 4.3 % (ref 0–1.8)
BASOPHILS # BLD: 0.64 K/UL (ref 0–0.12)
BILIRUB SERPL-MCNC: 11.6 MG/DL (ref 0.1–1.5)
BILIRUB UR QL STRIP.AUTO: ABNORMAL
BUN SERPL-MCNC: 6 MG/DL (ref 8–22)
CALCIUM ALBUM COR SERPL-MCNC: 9.1 MG/DL (ref 8.5–10.5)
CALCIUM SERPL-MCNC: 8.6 MG/DL (ref 8.5–10.5)
CASTS URNS QL MICRO: ABNORMAL /LPF (ref 0–2)
CHLORIDE SERPL-SCNC: 87 MMOL/L (ref 96–112)
CO2 SERPL-SCNC: 24 MMOL/L (ref 20–33)
COLOR UR: ABNORMAL
COMMENT NL1176: NORMAL
CREAT SERPL-MCNC: 0.73 MG/DL (ref 0.5–1.4)
EKG IMPRESSION: NORMAL
EOSINOPHIL # BLD AUTO: 0 K/UL (ref 0–0.51)
EOSINOPHIL NFR BLD: 0 % (ref 0–6.9)
EPITHELIAL CELLS 1715: ABNORMAL /HPF (ref 0–5)
ERYTHROCYTE [DISTWIDTH] IN BLOOD BY AUTOMATED COUNT: 71.5 FL (ref 35.9–50)
GFR SERPLBLD CREATININE-BSD FMLA CKD-EPI: 99 ML/MIN/1.73 M 2
GLOBULIN SER CALC-MCNC: 2.9 G/DL (ref 1.9–3.5)
GLUCOSE SERPL-MCNC: 102 MG/DL (ref 65–99)
GLUCOSE UR STRIP.AUTO-MCNC: NEGATIVE MG/DL
HCG SERPL QL: NEGATIVE
HCT VFR BLD AUTO: 43.3 % (ref 37–47)
HGB BLD-MCNC: 14.7 G/DL (ref 12–16)
HYALINE CAST   1831: PRESENT /LPF
INR PPP: 1.87 (ref 0.87–1.13)
KETONES UR STRIP.AUTO-MCNC: ABNORMAL MG/DL
LEUKOCYTE ESTERASE UR QL STRIP.AUTO: ABNORMAL
LIPASE SERPL-CCNC: 17 U/L (ref 11–82)
LYMPHOCYTES # BLD AUTO: 3.19 K/UL (ref 1–4.8)
LYMPHOCYTES NFR BLD: 21.4 % (ref 22–41)
MACROCYTES BLD QL SMEAR: ABNORMAL
MANUAL DIFF BLD: NORMAL
MCH RBC QN AUTO: 35.5 PG (ref 27–33)
MCHC RBC AUTO-ENTMCNC: 33.9 G/DL (ref 32.2–35.5)
MCV RBC AUTO: 104.6 FL (ref 81.4–97.8)
METAMYELOCYTES NFR BLD MANUAL: 0.9 %
MICRO URNS: ABNORMAL
MONOCYTES # BLD AUTO: 1.9 K/UL (ref 0–0.85)
MONOCYTES NFR BLD AUTO: 12.8 % (ref 0–13.4)
MORPHOLOGY BLD-IMP: NORMAL
NEUTROPHILS # BLD AUTO: 9.03 K/UL (ref 1.82–7.42)
NEUTROPHILS NFR BLD: 59.8 % (ref 44–72)
NEUTS BAND NFR BLD MANUAL: 0.8 % (ref 0–10)
NITRITE UR QL STRIP.AUTO: NEGATIVE
NRBC # BLD AUTO: 0.03 K/UL
NRBC BLD-RTO: 0.2 /100 WBC (ref 0–0.2)
NT-PROBNP SERPL IA-MCNC: 44 PG/ML (ref 0–125)
PH UR STRIP.AUTO: 5.5 [PH] (ref 5–8)
PLATELET # BLD AUTO: 228 K/UL (ref 164–446)
PLATELET BLD QL SMEAR: NORMAL
PMV BLD AUTO: 10.3 FL (ref 9–12.9)
POLYCHROMASIA BLD QL SMEAR: NORMAL
POTASSIUM SERPL-SCNC: 4 MMOL/L (ref 3.6–5.5)
PROT SERPL-MCNC: 6.3 G/DL (ref 6–8.2)
PROT UR QL STRIP: NEGATIVE MG/DL
PROTHROMBIN TIME: 21.6 SEC (ref 12–14.6)
RBC # BLD AUTO: 4.14 M/UL (ref 4.2–5.4)
RBC # URNS HPF: ABNORMAL /HPF (ref 0–2)
RBC BLD AUTO: PRESENT
RBC UR QL AUTO: NEGATIVE
SODIUM SERPL-SCNC: 130 MMOL/L (ref 135–145)
SP GR UR STRIP.AUTO: 1.01
TROPONIN T SERPL-MCNC: 9 NG/L (ref 6–19)
UROBILINOGEN UR STRIP.AUTO-MCNC: 1 EU/DL
WBC # BLD AUTO: 14.9 K/UL (ref 4.8–10.8)
WBC #/AREA URNS HPF: ABNORMAL /HPF

## 2025-06-19 PROCEDURE — 700111 HCHG RX REV CODE 636 W/ 250 OVERRIDE (IP): Mod: JZ | Performed by: STUDENT IN AN ORGANIZED HEALTH CARE EDUCATION/TRAINING PROGRAM

## 2025-06-19 PROCEDURE — 71045 X-RAY EXAM CHEST 1 VIEW: CPT

## 2025-06-19 PROCEDURE — 700102 HCHG RX REV CODE 250 W/ 637 OVERRIDE(OP): Performed by: HOSPITALIST

## 2025-06-19 PROCEDURE — 85027 COMPLETE CBC AUTOMATED: CPT

## 2025-06-19 PROCEDURE — 84703 CHORIONIC GONADOTROPIN ASSAY: CPT

## 2025-06-19 PROCEDURE — 85610 PROTHROMBIN TIME: CPT

## 2025-06-19 PROCEDURE — 93005 ELECTROCARDIOGRAM TRACING: CPT | Mod: TC

## 2025-06-19 PROCEDURE — 81001 URINALYSIS AUTO W/SCOPE: CPT

## 2025-06-19 PROCEDURE — A9270 NON-COVERED ITEM OR SERVICE: HCPCS | Performed by: HOSPITALIST

## 2025-06-19 PROCEDURE — 83690 ASSAY OF LIPASE: CPT

## 2025-06-19 PROCEDURE — 85007 BL SMEAR W/DIFF WBC COUNT: CPT

## 2025-06-19 PROCEDURE — 83880 ASSAY OF NATRIURETIC PEPTIDE: CPT

## 2025-06-19 PROCEDURE — 36415 COLL VENOUS BLD VENIPUNCTURE: CPT

## 2025-06-19 PROCEDURE — 85730 THROMBOPLASTIN TIME PARTIAL: CPT

## 2025-06-19 PROCEDURE — 700105 HCHG RX REV CODE 258: Performed by: HOSPITALIST

## 2025-06-19 PROCEDURE — 84484 ASSAY OF TROPONIN QUANT: CPT

## 2025-06-19 PROCEDURE — 80053 COMPREHEN METABOLIC PANEL: CPT

## 2025-06-19 PROCEDURE — 93005 ELECTROCARDIOGRAM TRACING: CPT | Mod: TC | Performed by: STUDENT IN AN ORGANIZED HEALTH CARE EDUCATION/TRAINING PROGRAM

## 2025-06-19 PROCEDURE — 99285 EMERGENCY DEPT VISIT HI MDM: CPT

## 2025-06-19 PROCEDURE — 99223 1ST HOSP IP/OBS HIGH 75: CPT | Performed by: HOSPITALIST

## 2025-06-19 PROCEDURE — 82140 ASSAY OF AMMONIA: CPT

## 2025-06-19 PROCEDURE — 770001 HCHG ROOM/CARE - MED/SURG/GYN PRIV*

## 2025-06-19 PROCEDURE — 96374 THER/PROPH/DIAG INJ IV PUSH: CPT

## 2025-06-19 RX ORDER — LABETALOL HYDROCHLORIDE 5 MG/ML
10 INJECTION, SOLUTION INTRAVENOUS EVERY 4 HOURS PRN
Status: DISCONTINUED | OUTPATIENT
Start: 2025-06-19 | End: 2025-07-04 | Stop reason: HOSPADM

## 2025-06-19 RX ORDER — CHLORDIAZEPOXIDE HYDROCHLORIDE 25 MG/1
50 CAPSULE, GELATIN COATED ORAL EVERY 6 HOURS
Status: DISCONTINUED | OUTPATIENT
Start: 2025-06-19 | End: 2025-06-19

## 2025-06-19 RX ORDER — GABAPENTIN 300 MG/1
300 CAPSULE ORAL 2 TIMES DAILY
Status: DISCONTINUED | OUTPATIENT
Start: 2025-06-19 | End: 2025-07-04 | Stop reason: HOSPADM

## 2025-06-19 RX ORDER — SODIUM CHLORIDE 9 MG/ML
INJECTION, SOLUTION INTRAVENOUS CONTINUOUS
Status: DISCONTINUED | OUTPATIENT
Start: 2025-06-19 | End: 2025-06-20

## 2025-06-19 RX ORDER — PREDNISOLONE SODIUM PHOSPHATE 15 MG/5ML
40 SOLUTION ORAL DAILY
Status: DISCONTINUED | OUTPATIENT
Start: 2025-06-19 | End: 2025-06-22

## 2025-06-19 RX ORDER — CHLORDIAZEPOXIDE HYDROCHLORIDE 25 MG/1
25 CAPSULE, GELATIN COATED ORAL EVERY 6 HOURS
Status: DISCONTINUED | OUTPATIENT
Start: 2025-06-20 | End: 2025-06-19

## 2025-06-19 RX ORDER — HYDROXYZINE HYDROCHLORIDE 50 MG/1
25 TABLET, FILM COATED ORAL
Status: DISCONTINUED | OUTPATIENT
Start: 2025-06-19 | End: 2025-06-28

## 2025-06-19 RX ORDER — GAUZE BANDAGE 2" X 2"
100 BANDAGE TOPICAL DAILY
Status: COMPLETED | OUTPATIENT
Start: 2025-06-20 | End: 2025-06-23

## 2025-06-19 RX ORDER — FOLIC ACID 1 MG/1
1 TABLET ORAL DAILY
Status: COMPLETED | OUTPATIENT
Start: 2025-06-20 | End: 2025-06-23

## 2025-06-19 RX ORDER — ONDANSETRON 4 MG/1
4 TABLET, ORALLY DISINTEGRATING ORAL EVERY 4 HOURS PRN
Status: DISCONTINUED | OUTPATIENT
Start: 2025-06-19 | End: 2025-07-04 | Stop reason: HOSPADM

## 2025-06-19 RX ORDER — LORAZEPAM 0.5 MG/1
0.5 TABLET ORAL EVERY 4 HOURS PRN
Status: DISCONTINUED | OUTPATIENT
Start: 2025-06-19 | End: 2025-06-24

## 2025-06-19 RX ORDER — LEVOTHYROXINE SODIUM 50 UG/1
50 TABLET ORAL
Status: DISCONTINUED | OUTPATIENT
Start: 2025-06-20 | End: 2025-07-04 | Stop reason: HOSPADM

## 2025-06-19 RX ORDER — ALBUTEROL SULFATE 90 UG/1
1-2 INHALANT RESPIRATORY (INHALATION) EVERY 6 HOURS PRN
Status: ON HOLD | COMMUNITY
Start: 2025-06-14 | End: 2025-07-03

## 2025-06-19 RX ORDER — ESCITALOPRAM OXALATE 10 MG/1
20 TABLET ORAL DAILY
Status: DISCONTINUED | OUTPATIENT
Start: 2025-06-20 | End: 2025-07-04 | Stop reason: HOSPADM

## 2025-06-19 RX ORDER — HYDROXYZINE HYDROCHLORIDE 25 MG/1
25 TABLET, FILM COATED ORAL
Status: ON HOLD | COMMUNITY
Start: 2025-06-11 | End: 2025-07-03

## 2025-06-19 RX ORDER — LORAZEPAM 1 MG/1
1 TABLET ORAL EVERY 4 HOURS PRN
Status: DISCONTINUED | OUTPATIENT
Start: 2025-06-19 | End: 2025-06-24

## 2025-06-19 RX ORDER — LORAZEPAM 2 MG/1
2 TABLET ORAL
Status: DISCONTINUED | OUTPATIENT
Start: 2025-06-19 | End: 2025-06-24

## 2025-06-19 RX ORDER — DIAZEPAM 10 MG/2ML
10 INJECTION, SOLUTION INTRAMUSCULAR; INTRAVENOUS
Status: DISCONTINUED | OUTPATIENT
Start: 2025-06-19 | End: 2025-06-24

## 2025-06-19 RX ORDER — ONDANSETRON 2 MG/ML
4 INJECTION INTRAMUSCULAR; INTRAVENOUS EVERY 4 HOURS PRN
Status: DISCONTINUED | OUTPATIENT
Start: 2025-06-19 | End: 2025-07-04 | Stop reason: HOSPADM

## 2025-06-19 RX ORDER — PROMETHAZINE HYDROCHLORIDE 25 MG/1
12.5-25 SUPPOSITORY RECTAL EVERY 4 HOURS PRN
Status: DISCONTINUED | OUTPATIENT
Start: 2025-06-19 | End: 2025-06-20

## 2025-06-19 RX ORDER — PROMETHAZINE HYDROCHLORIDE 25 MG/1
12.5-25 TABLET ORAL EVERY 4 HOURS PRN
Status: DISCONTINUED | OUTPATIENT
Start: 2025-06-19 | End: 2025-06-20

## 2025-06-19 RX ORDER — PROCHLORPERAZINE EDISYLATE 5 MG/ML
5-10 INJECTION INTRAMUSCULAR; INTRAVENOUS EVERY 4 HOURS PRN
Status: DISCONTINUED | OUTPATIENT
Start: 2025-06-19 | End: 2025-06-20

## 2025-06-19 RX ORDER — CEFTRIAXONE 2 G/1
2000 INJECTION, POWDER, FOR SOLUTION INTRAMUSCULAR; INTRAVENOUS ONCE
Status: COMPLETED | OUTPATIENT
Start: 2025-06-19 | End: 2025-06-19

## 2025-06-19 RX ORDER — LORAZEPAM 2 MG/1
4 TABLET ORAL
Status: DISCONTINUED | OUTPATIENT
Start: 2025-06-19 | End: 2025-06-24

## 2025-06-19 RX ORDER — ONDANSETRON 4 MG/1
4 TABLET, ORALLY DISINTEGRATING ORAL EVERY 6 HOURS PRN
Status: ON HOLD | COMMUNITY
Start: 2025-04-23 | End: 2025-07-03

## 2025-06-19 RX ORDER — ALBUTEROL SULFATE 90 UG/1
1-2 INHALANT RESPIRATORY (INHALATION) EVERY 6 HOURS PRN
Status: DISCONTINUED | OUTPATIENT
Start: 2025-06-19 | End: 2025-07-04 | Stop reason: HOSPADM

## 2025-06-19 RX ADMIN — CEFTRIAXONE SODIUM 2000 MG: 2 INJECTION, POWDER, FOR SOLUTION INTRAMUSCULAR; INTRAVENOUS at 19:32

## 2025-06-19 RX ADMIN — PREDNISOLONE SODIUM PHOSPHATE 39.9 MG: 15 SOLUTION ORAL at 21:51

## 2025-06-19 RX ADMIN — SODIUM CHLORIDE: 9 INJECTION, SOLUTION INTRAVENOUS at 19:52

## 2025-06-19 RX ADMIN — GABAPENTIN 300 MG: 300 CAPSULE ORAL at 19:54

## 2025-06-19 ASSESSMENT — COGNITIVE AND FUNCTIONAL STATUS - GENERAL
MOBILITY SCORE: 23
WALKING IN HOSPITAL ROOM: A LITTLE
SUGGESTED CMS G CODE MODIFIER MOBILITY: CI
SUGGESTED CMS G CODE MODIFIER DAILY ACTIVITY: CH
DAILY ACTIVITIY SCORE: 24

## 2025-06-19 ASSESSMENT — LIFESTYLE VARIABLES
TOTAL SCORE: 3
TREMOR: NO TREMOR
CONSUMPTION TOTAL: POSITIVE
ANXIETY: NO ANXIETY (AT EASE)
TOTAL SCORE: 3
EVER HAD A DRINK FIRST THING IN THE MORNING TO STEADY YOUR NERVES TO GET RID OF A HANGOVER: YES
AUDITORY DISTURBANCES: NOT PRESENT
TREMOR: NO TREMOR
HEADACHE, FULLNESS IN HEAD: NOT PRESENT
ALCOHOL_USE: YES
DOES PATIENT WANT TO STOP DRINKING: YES
SUBSTANCE_ABUSE: 0
DOES PATIENT WANT TO TALK TO SOMEONE ABOUT QUITTING: YES
AVERAGE NUMBER OF DAYS PER WEEK YOU HAVE A DRINK CONTAINING ALCOHOL: 5
HAVE YOU EVER FELT YOU SHOULD CUT DOWN ON YOUR DRINKING: YES
HEADACHE, FULLNESS IN HEAD: NOT PRESENT
ORIENTATION AND CLOUDING OF SENSORIUM: ORIENTED AND CAN DO SERIAL ADDITIONS
VISUAL DISTURBANCES: NOT PRESENT
AGITATION: NORMAL ACTIVITY
AVERAGE NUMBER OF DAYS PER WEEK YOU HAVE A DRINK CONTAINING ALCOHOL: 5
TOTAL SCORE: 0
HOW MANY TIMES IN THE PAST YEAR HAVE YOU HAD 5 OR MORE DRINKS IN A DAY: 150
TOTAL SCORE: 3
ON A TYPICAL DAY WHEN YOU DRINK ALCOHOL HOW MANY DRINKS DO YOU HAVE: 5
PAROXYSMAL SWEATS: NO SWEAT VISIBLE
NAUSEA AND VOMITING: NO NAUSEA AND NO VOMITING
HAVE YOU EVER FELT YOU SHOULD CUT DOWN ON YOUR DRINKING: YES
ALCOHOL_USE: YES
HAVE PEOPLE ANNOYED YOU BY CRITICIZING YOUR DRINKING: NO
ORIENTATION AND CLOUDING OF SENSORIUM: ORIENTED AND CAN DO SERIAL ADDITIONS
HAVE PEOPLE ANNOYED YOU BY CRITICIZING YOUR DRINKING: NO
TOTAL SCORE: 3
PAROXYSMAL SWEATS: NO SWEAT VISIBLE
EVER HAD A DRINK FIRST THING IN THE MORNING TO STEADY YOUR NERVES TO GET RID OF A HANGOVER: YES
TOTAL SCORE: 0
AGITATION: NORMAL ACTIVITY
TOTAL SCORE: 3
DOES PATIENT WANT TO STOP DRINKING: YES
VISUAL DISTURBANCES: NOT PRESENT
EVER FELT BAD OR GUILTY ABOUT YOUR DRINKING: YES
CONSUMPTION TOTAL: INCOMPLETE
EVER FELT BAD OR GUILTY ABOUT YOUR DRINKING: YES
ANXIETY: NO ANXIETY (AT EASE)
TOTAL SCORE: 3
AUDITORY DISTURBANCES: NOT PRESENT
NAUSEA AND VOMITING: NO NAUSEA AND NO VOMITING
ON A TYPICAL DAY WHEN YOU DRINK ALCOHOL HOW MANY DRINKS DO YOU HAVE: 5
DOES PATIENT WANT TO TALK TO SOMEONE ABOUT QUITTING: YES

## 2025-06-19 ASSESSMENT — ENCOUNTER SYMPTOMS
POLYDIPSIA: 0
CLAUDICATION: 0
PND: 0
HEMOPTYSIS: 0
NAUSEA: 1
ABDOMINAL PAIN: 1
FEVER: 0
STRIDOR: 0
CONSTIPATION: 0
FLANK PAIN: 0
NECK PAIN: 0
DIZZINESS: 0
SINUS PAIN: 0
HEADACHES: 0
BLURRED VISION: 0
FALLS: 0
WHEEZING: 0
TREMORS: 0
TINGLING: 0
SPUTUM PRODUCTION: 0
SHORTNESS OF BREATH: 0
MYALGIAS: 0
VOMITING: 1
CHILLS: 0
ORTHOPNEA: 0
DOUBLE VISION: 0
DIARRHEA: 0
WEAKNESS: 0
HALLUCINATIONS: 0
COUGH: 0
PHOTOPHOBIA: 0
EYE PAIN: 0
PALPITATIONS: 0
BRUISES/BLEEDS EASILY: 0
HEARTBURN: 0
DEPRESSION: 0
SORE THROAT: 0
BLOOD IN STOOL: 0
DIAPHORESIS: 0
BACK PAIN: 0

## 2025-06-19 ASSESSMENT — SOCIAL DETERMINANTS OF HEALTH (SDOH)
WITHIN THE LAST YEAR, HAVE TO BEEN RAPED OR FORCED TO HAVE ANY KIND OF SEXUAL ACTIVITY BY YOUR PARTNER OR EX-PARTNER?: NO
WITHIN THE LAST YEAR, HAVE YOU BEEN HUMILIATED OR EMOTIONALLY ABUSED IN OTHER WAYS BY YOUR PARTNER OR EX-PARTNER?: NO
IN THE PAST 12 MONTHS, HAS THE ELECTRIC, GAS, OIL, OR WATER COMPANY THREATENED TO SHUT OFF SERVICE IN YOUR HOME?: NO
WITHIN THE LAST YEAR, HAVE YOU BEEN AFRAID OF YOUR PARTNER OR EX-PARTNER?: NO
WITHIN THE PAST 12 MONTHS, YOU WORRIED THAT YOUR FOOD WOULD RUN OUT BEFORE YOU GOT THE MONEY TO BUY MORE: NEVER TRUE
WITHIN THE LAST YEAR, HAVE YOU BEEN KICKED, HIT, SLAPPED, OR OTHERWISE PHYSICALLY HURT BY YOUR PARTNER OR EX-PARTNER?: NO
WITHIN THE PAST 12 MONTHS, THE FOOD YOU BOUGHT JUST DIDN'T LAST AND YOU DIDN'T HAVE MONEY TO GET MORE: NEVER TRUE

## 2025-06-19 ASSESSMENT — FIBROSIS 4 INDEX
FIB4 SCORE: 3.73
FIB4 SCORE: 6.53

## 2025-06-20 ENCOUNTER — APPOINTMENT (OUTPATIENT)
Dept: RADIOLOGY | Facility: MEDICAL CENTER | Age: 50
DRG: 896 | End: 2025-06-20
Attending: HOSPITALIST
Payer: COMMERCIAL

## 2025-06-20 ENCOUNTER — APPOINTMENT (OUTPATIENT)
Dept: RADIOLOGY | Facility: MEDICAL CENTER | Age: 50
End: 2025-06-20
Attending: HOSPITALIST
Payer: COMMERCIAL

## 2025-06-20 PROBLEM — I81 PORTAL VEIN THROMBOSIS: Status: ACTIVE | Noted: 2025-06-20

## 2025-06-20 PROBLEM — E87.1 HYPONATREMIA: Status: ACTIVE | Noted: 2025-06-20

## 2025-06-20 LAB
ALBUMIN SERPL BCP-MCNC: 2.9 G/DL (ref 3.2–4.9)
ALBUMIN/GLOB SERPL: 1.2 G/DL
ALP SERPL-CCNC: 278 U/L (ref 30–99)
ALT SERPL-CCNC: 110 U/L (ref 2–50)
ANION GAP SERPL CALC-SCNC: 17 MMOL/L (ref 7–16)
AST SERPL-CCNC: 282 U/L (ref 12–45)
BASOPHILS # BLD AUTO: 1.6 % (ref 0–1.8)
BASOPHILS # BLD: 0.17 K/UL (ref 0–0.12)
BILIRUB SERPL-MCNC: 9.4 MG/DL (ref 0.1–1.5)
BUN SERPL-MCNC: 6 MG/DL (ref 8–22)
CALCIUM ALBUM COR SERPL-MCNC: 8.8 MG/DL (ref 8.5–10.5)
CALCIUM SERPL-MCNC: 7.9 MG/DL (ref 8.5–10.5)
CHLORIDE SERPL-SCNC: 91 MMOL/L (ref 96–112)
CO2 SERPL-SCNC: 21 MMOL/L (ref 20–33)
CREAT SERPL-MCNC: 0.59 MG/DL (ref 0.5–1.4)
EOSINOPHIL # BLD AUTO: 0.02 K/UL (ref 0–0.51)
EOSINOPHIL NFR BLD: 0.2 % (ref 0–6.9)
ERYTHROCYTE [DISTWIDTH] IN BLOOD BY AUTOMATED COUNT: 70.4 FL (ref 35.9–50)
GFR SERPLBLD CREATININE-BSD FMLA CKD-EPI: 109 ML/MIN/1.73 M 2
GLOBULIN SER CALC-MCNC: 2.5 G/DL (ref 1.9–3.5)
GLUCOSE SERPL-MCNC: 119 MG/DL (ref 65–99)
HCT VFR BLD AUTO: 39.1 % (ref 37–47)
HGB BLD-MCNC: 13.6 G/DL (ref 12–16)
IMM GRANULOCYTES # BLD AUTO: 0.08 K/UL (ref 0–0.11)
IMM GRANULOCYTES NFR BLD AUTO: 0.8 % (ref 0–0.9)
LYMPHOCYTES # BLD AUTO: 1.24 K/UL (ref 1–4.8)
LYMPHOCYTES NFR BLD: 12 % (ref 22–41)
MCH RBC QN AUTO: 36.4 PG (ref 27–33)
MCHC RBC AUTO-ENTMCNC: 34.8 G/DL (ref 32.2–35.5)
MCV RBC AUTO: 104.5 FL (ref 81.4–97.8)
MONOCYTES # BLD AUTO: 1.02 K/UL (ref 0–0.85)
MONOCYTES NFR BLD AUTO: 9.9 % (ref 0–13.4)
NEUTROPHILS # BLD AUTO: 7.8 K/UL (ref 1.82–7.42)
NEUTROPHILS NFR BLD: 75.5 % (ref 44–72)
NRBC # BLD AUTO: 0.02 K/UL
NRBC BLD-RTO: 0.2 /100 WBC (ref 0–0.2)
PLATELET # BLD AUTO: 191 K/UL (ref 164–446)
PMV BLD AUTO: 10.3 FL (ref 9–12.9)
POTASSIUM SERPL-SCNC: 3.5 MMOL/L (ref 3.6–5.5)
PROT SERPL-MCNC: 5.4 G/DL (ref 6–8.2)
RBC # BLD AUTO: 3.74 M/UL (ref 4.2–5.4)
SODIUM SERPL-SCNC: 129 MMOL/L (ref 135–145)
WBC # BLD AUTO: 10.3 K/UL (ref 4.8–10.8)

## 2025-06-20 PROCEDURE — A9270 NON-COVERED ITEM OR SERVICE: HCPCS | Performed by: NURSE PRACTITIONER

## 2025-06-20 PROCEDURE — 700105 HCHG RX REV CODE 258: Performed by: HOSPITALIST

## 2025-06-20 PROCEDURE — 80053 COMPREHEN METABOLIC PANEL: CPT

## 2025-06-20 PROCEDURE — 700111 HCHG RX REV CODE 636 W/ 250 OVERRIDE (IP): Performed by: HOSPITALIST

## 2025-06-20 PROCEDURE — A9270 NON-COVERED ITEM OR SERVICE: HCPCS | Performed by: HOSPITALIST

## 2025-06-20 PROCEDURE — 700102 HCHG RX REV CODE 250 W/ 637 OVERRIDE(OP): Mod: JZ | Performed by: HOSPITALIST

## 2025-06-20 PROCEDURE — 99233 SBSQ HOSP IP/OBS HIGH 50: CPT | Performed by: HOSPITALIST

## 2025-06-20 PROCEDURE — 770001 HCHG ROOM/CARE - MED/SURG/GYN PRIV*

## 2025-06-20 PROCEDURE — 76700 US EXAM ABDOM COMPLETE: CPT

## 2025-06-20 PROCEDURE — 700102 HCHG RX REV CODE 250 W/ 637 OVERRIDE(OP): Performed by: HOSPITALIST

## 2025-06-20 PROCEDURE — 85025 COMPLETE CBC W/AUTO DIFF WBC: CPT

## 2025-06-20 PROCEDURE — 99222 1ST HOSP IP/OBS MODERATE 55: CPT | Performed by: INTERNAL MEDICINE

## 2025-06-20 PROCEDURE — 36415 COLL VENOUS BLD VENIPUNCTURE: CPT

## 2025-06-20 PROCEDURE — 700102 HCHG RX REV CODE 250 W/ 637 OVERRIDE(OP): Performed by: NURSE PRACTITIONER

## 2025-06-20 PROCEDURE — A9270 NON-COVERED ITEM OR SERVICE: HCPCS | Mod: JZ | Performed by: HOSPITALIST

## 2025-06-20 RX ORDER — FUROSEMIDE 20 MG/1
20 TABLET ORAL
Status: DISCONTINUED | OUTPATIENT
Start: 2025-06-20 | End: 2025-06-20

## 2025-06-20 RX ORDER — CARVEDILOL 3.12 MG/1
3.12 TABLET ORAL 2 TIMES DAILY WITH MEALS
Status: DISCONTINUED | OUTPATIENT
Start: 2025-06-20 | End: 2025-07-04 | Stop reason: HOSPADM

## 2025-06-20 RX ORDER — FUROSEMIDE 40 MG/1
40 TABLET ORAL
Status: DISCONTINUED | OUTPATIENT
Start: 2025-06-20 | End: 2025-07-04 | Stop reason: HOSPADM

## 2025-06-20 RX ORDER — POTASSIUM CHLORIDE 1500 MG/1
20 TABLET, EXTENDED RELEASE ORAL ONCE
Status: COMPLETED | OUTPATIENT
Start: 2025-06-20 | End: 2025-06-20

## 2025-06-20 RX ORDER — SPIRONOLACTONE 50 MG/1
50 TABLET, FILM COATED ORAL
Status: DISCONTINUED | OUTPATIENT
Start: 2025-06-20 | End: 2025-06-21

## 2025-06-20 RX ADMIN — LEVOTHYROXINE SODIUM 50 MCG: 0.05 TABLET ORAL at 05:05

## 2025-06-20 RX ADMIN — ONDANSETRON 4 MG: 4 TABLET, ORALLY DISINTEGRATING ORAL at 07:45

## 2025-06-20 RX ADMIN — FOLIC ACID 1 MG: 1 TABLET ORAL at 05:05

## 2025-06-20 RX ADMIN — CEFTRIAXONE SODIUM 2000 MG: 10 INJECTION, POWDER, FOR SOLUTION INTRAVENOUS at 18:09

## 2025-06-20 RX ADMIN — ALBUTEROL SULFATE 2 PUFF: 90 AEROSOL, METERED RESPIRATORY (INHALATION) at 12:48

## 2025-06-20 RX ADMIN — Medication 100 MG: at 05:05

## 2025-06-20 RX ADMIN — LORAZEPAM 0.5 MG: 0.5 TABLET ORAL at 23:53

## 2025-06-20 RX ADMIN — LORAZEPAM 0.5 MG: 0.5 TABLET ORAL at 19:29

## 2025-06-20 RX ADMIN — ESCITALOPRAM OXALATE 20 MG: 10 TABLET ORAL at 05:05

## 2025-06-20 RX ADMIN — THERA TABS 1 TABLET: TAB at 05:05

## 2025-06-20 RX ADMIN — GABAPENTIN 300 MG: 300 CAPSULE ORAL at 05:05

## 2025-06-20 RX ADMIN — Medication 5 MG: at 21:33

## 2025-06-20 RX ADMIN — HYDROXYZINE HYDROCHLORIDE 25 MG: 50 TABLET ORAL at 04:56

## 2025-06-20 RX ADMIN — ALBUTEROL SULFATE 2 PUFF: 90 AEROSOL, METERED RESPIRATORY (INHALATION) at 18:08

## 2025-06-20 RX ADMIN — LORAZEPAM 1 MG: 1 TABLET ORAL at 07:47

## 2025-06-20 RX ADMIN — GABAPENTIN 300 MG: 300 CAPSULE ORAL at 18:08

## 2025-06-20 RX ADMIN — POTASSIUM CHLORIDE 20 MEQ: 1500 TABLET, EXTENDED RELEASE ORAL at 18:11

## 2025-06-20 ASSESSMENT — ENCOUNTER SYMPTOMS
WEIGHT LOSS: 0
VOMITING: 0
PALPITATIONS: 0
SHORTNESS OF BREATH: 0
RESPIRATORY NEGATIVE: 1
BRUISES/BLEEDS EASILY: 0
NEUROLOGICAL NEGATIVE: 1
BLURRED VISION: 0
MYALGIAS: 0
NAUSEA: 1
ABDOMINAL PAIN: 0
FOCAL WEAKNESS: 0
CHILLS: 0
MUSCULOSKELETAL NEGATIVE: 1
DIZZINESS: 0
FEVER: 0
HEADACHES: 0
DEPRESSION: 0
EYES NEGATIVE: 1
WEAKNESS: 0
DIAPHORESIS: 0
COUGH: 0
CARDIOVASCULAR NEGATIVE: 1
SORE THROAT: 0

## 2025-06-20 ASSESSMENT — COPD QUESTIONNAIRES
HAVE YOU SMOKED AT LEAST 100 CIGARETTES IN YOUR ENTIRE LIFE: NO/DON'T KNOW
COPD SCREENING SCORE: 3
DURING THE PAST 4 WEEKS HOW MUCH DID YOU FEEL SHORT OF BREATH: SOME OF THE TIME
DO YOU EVER COUGH UP ANY MUCUS OR PHLEGM?: NO/ONLY WITH OCCASIONAL COLDS OR INFECTIONS

## 2025-06-20 ASSESSMENT — LIFESTYLE VARIABLES
PAROXYSMAL SWEATS: NO SWEAT VISIBLE
TREMOR: *
NAUSEA AND VOMITING: NO NAUSEA AND NO VOMITING
HEADACHE, FULLNESS IN HEAD: NOT PRESENT
TREMOR: *
AUDIT-C TOTAL SCORE: 10
NAUSEA AND VOMITING: *
AGITATION: NORMAL ACTIVITY
ANXIETY: NO ANXIETY (AT EASE)
ANXIETY: MILDLY ANXIOUS
PAROXYSMAL SWEATS: NO SWEAT VISIBLE
HEADACHE, FULLNESS IN HEAD: NOT PRESENT
TREMOR: MODERATE TREMOR WITH ARMS EXTENDED
HOW MANY STANDARD DRINKS CONTAINING ALCOHOL DO YOU HAVE ON A TYPICAL DAY: 5 OR 6
TREMOR: NO TREMOR
NAUSEA AND VOMITING: NO NAUSEA AND NO VOMITING
TREMOR: MODERATE TREMOR WITH ARMS EXTENDED
NAUSEA AND VOMITING: NO NAUSEA AND NO VOMITING
SUBSTANCE_ABUSE: 1
TOTAL SCORE: 5
TREMOR: *
ORIENTATION AND CLOUDING OF SENSORIUM: ORIENTED AND CAN DO SERIAL ADDITIONS
TOTAL SCORE: 0
NAUSEA AND VOMITING: NO NAUSEA AND NO VOMITING
SKIP TO QUESTIONS 9-10: 0
AGITATION: NORMAL ACTIVITY
HEADACHE, FULLNESS IN HEAD: MILD
ANXIETY: NO ANXIETY (AT EASE)
AUDITORY DISTURBANCES: NOT PRESENT
ANXIETY: NO ANXIETY (AT EASE)
ORIENTATION AND CLOUDING OF SENSORIUM: ORIENTED AND CAN DO SERIAL ADDITIONS
ANXIETY: NO ANXIETY (AT EASE)
PAROXYSMAL SWEATS: NO SWEAT VISIBLE
AGITATION: NORMAL ACTIVITY
HEADACHE, FULLNESS IN HEAD: NOT PRESENT
AUDITORY DISTURBANCES: NOT PRESENT
HEADACHE, FULLNESS IN HEAD: NOT PRESENT
PAROXYSMAL SWEATS: NO SWEAT VISIBLE
HOW OFTEN DO YOU HAVE SIX OR MORE DRINKS ON ONE OCCASION: DAILY OR ALMOST DAILY
AUDITORY DISTURBANCES: NOT PRESENT
AUDITORY DISTURBANCES: NOT PRESENT
NAUSEA AND VOMITING: MILD NAUSEA WITH NO VOMITING
PAROXYSMAL SWEATS: NO SWEAT VISIBLE
HOW OFTEN DO YOU HAVE A DRINK CONTAINING ALCOHOL: 4 OR MORE TIMES A WEEK
AUDITORY DISTURBANCES: NOT PRESENT
AUDITORY DISTURBANCES: NOT PRESENT
VISUAL DISTURBANCES: NOT PRESENT
TOTAL SCORE: 3
PAROXYSMAL SWEATS: NO SWEAT VISIBLE
TOTAL SCORE: 5
AGITATION: NORMAL ACTIVITY
ORIENTATION AND CLOUDING OF SENSORIUM: ORIENTED AND CAN DO SERIAL ADDITIONS
TOTAL SCORE: 3
ORIENTATION AND CLOUDING OF SENSORIUM: ORIENTED AND CAN DO SERIAL ADDITIONS
TOTAL SCORE: 8
VISUAL DISTURBANCES: NOT PRESENT
ORIENTATION AND CLOUDING OF SENSORIUM: ORIENTED AND CAN DO SERIAL ADDITIONS
VISUAL DISTURBANCES: NOT PRESENT
AGITATION: NORMAL ACTIVITY
VISUAL DISTURBANCES: NOT PRESENT
ORIENTATION AND CLOUDING OF SENSORIUM: ORIENTED AND CAN DO SERIAL ADDITIONS
VISUAL DISTURBANCES: NOT PRESENT
VISUAL DISTURBANCES: NOT PRESENT
HEADACHE, FULLNESS IN HEAD: NOT PRESENT
ANXIETY: NO ANXIETY (AT EASE)
AGITATION: NORMAL ACTIVITY

## 2025-06-20 ASSESSMENT — PAIN DESCRIPTION - PAIN TYPE: TYPE: ACUTE PAIN;CHRONIC PAIN

## 2025-06-20 NOTE — CARE PLAN
The patient is Watcher - Medium risk of patient condition declining or worsening    Shift Goals  Clinical Goals: monitor CIWA, fluids    Progress made toward(s) clinical / shift goals:    Problem: Communication  Goal: The ability to communicate needs accurately and effectively will improve  Outcome: Progressing     Problem: Safety  Goal: Will remain free from injury  Outcome: Progressing     Problem: Psychosocial Needs:  Goal: Level of anxiety will decrease  Outcome: Progressing       Patient is not progressing towards the following goals:N/A

## 2025-06-20 NOTE — H&P
Hospital Medicine History & Physical Note    Date of Service  6/19/2025    Primary Care Physician  Donita Asif M.D.    Consultants      Specialist Names:     Code Status  Full Code    Chief Complaint  Chief Complaint   Patient presents with    Detox     Requests help with alcohol withdrawal, last drink at noon    Abdominal Swelling     SOB, Jaundice, HX Ascites.        History of Presenting Illness  Minerva Tillman is a 50 y.o. female who presented 6/19/2025 with past medical history of liver cirrhosis, alcohol abuse, depression who presents to the hospital for jaundice the past 1 week.  It is associated with abdominal distention, periumbilical abdominal pain, nausea, vomiting and hematuria.  She does complain of shortness of breath but denies any orthopnea, fevers or cough.  Patient states that she drinks wine on a daily basis.  She last had a drink this morning.  She currently does not complain of hallucinations or tremors.    Chest x-ray interpreted by me found a possible left-sided infiltrate  EKG interpreted by me found normal sinus rhythm without ST segment changes    I discussed the plan of care with patient.    Review of Systems  Review of Systems   Constitutional:  Negative for chills, diaphoresis, fever and malaise/fatigue.   HENT:  Negative for congestion, ear discharge, ear pain, hearing loss, nosebleeds, sinus pain, sore throat and tinnitus.    Eyes:  Negative for blurred vision, double vision, photophobia and pain.   Respiratory:  Negative for cough, hemoptysis, sputum production, shortness of breath, wheezing and stridor.    Cardiovascular:  Negative for chest pain, palpitations, orthopnea, claudication, leg swelling and PND.   Gastrointestinal:  Positive for abdominal pain, nausea and vomiting. Negative for blood in stool, constipation, diarrhea, heartburn and melena.   Genitourinary:  Negative for dysuria, flank pain, frequency, hematuria and urgency.   Musculoskeletal:  Negative for back  pain, falls, joint pain, myalgias and neck pain.   Skin:  Negative for itching and rash.   Neurological:  Negative for dizziness, tingling, tremors, weakness and headaches.   Endo/Heme/Allergies:  Negative for environmental allergies and polydipsia. Does not bruise/bleed easily.   Psychiatric/Behavioral:  Negative for depression, hallucinations, substance abuse and suicidal ideas.        Past Medical History   has a past medical history of Acute alcohol intoxication (HCC) (2021), Alcohol intoxication delirium (HCC) (2024), Anxiety, Depression, Gynecological disorder, High cholesterol, Hypertension, Snoring, and Urinary tract infection.    Surgical History   has a past surgical history that includes pr breast augmentation with implant (2006); mammoplasty augmentation; hysteroscopy with endometrial radiofrequency ablation (2017); dilation and curettage (2017); lipoma excision (2024); and orif, ankle (Right, 2024).     Family History  Family History   Problem Relation Age of Onset    Hypertension Mother     Hyperlipidemia Mother     Depression Father     Hypertension Father     Asthma Father     Hyperlipidemia Father     Alcohol abuse Father     Hypertension Brother     Depression Brother     Alcohol abuse Brother     Hypertension Brother     Depression Brother     Alcohol abuse Brother     Suicide Attempts Paternal Uncle          by suicide    Cancer Maternal Grandfather 60        esphogus    Cancer Maternal Grandmother 60        breast and skin    Stroke Maternal Grandmother     Other Paternal Grandmother         complications from surgery        Family history reviewed with patient. There is no family history that is pertinent to the chief complaint.     Social History   reports that she has never smoked. She has never used smokeless tobacco. She reports current alcohol use of about 8.4 oz of alcohol per week. She reports that she does not use  drugs.    Allergies  Allergies[1]    Medications  Prior to Admission Medications   Prescriptions Last Dose Informant Patient Reported? Taking?   Acetaminophen (TYLENOL PO) 6/18/2025 Bedtime Patient Yes Yes   Sig: Take 1 Tablet by mouth 1 time a day as needed (pain).   albuterol 108 (90 Base) MCG/ACT Aero Soln inhalation aerosol 6/18/2025 Evening Patient Yes Yes   Sig: Inhale 1-2 Puffs every 6 hours as needed for Shortness of Breath.   escitalopram (LEXAPRO) 20 MG tablet 6/19/2025 Morning Patient No Yes   Sig: Take 1 Tablet by mouth every day for 180 days.   gabapentin (NEURONTIN) 300 MG Cap 6/19/2025 Morning Patient No Yes   Sig: Take 1 Capsule by mouth 2 times a day for 180 days.   hydrOXYzine HCl (ATARAX) 25 MG Tab 6/19/2025 Morning Patient Yes Yes   Sig: Take 25 mg by mouth 1 time a day as needed for Anxiety.   hydrOXYzine pamoate (VISTARIL) 25 MG Cap New Rx Patient No No   Sig: Take 1 Capsule by mouth 3 times a day as needed for Anxiety for up to 180 days.   Patient not taking: Reported on 6/19/2025   levothyroxine (SYNTHROID) 50 MCG Tab 6/19/2025 Morning Patient No Yes   Sig: Take 1 Tablet by mouth every morning on an empty stomach.   multivitamin Tab 6/19/2025 Morning Patient No Yes   Sig: Take 1 Tablet by mouth every day.   ondansetron (ZOFRAN ODT) 4 MG TABLET DISPERSIBLE 6/17/2025 Patient Yes Yes   Sig: Take 4 mg by mouth every 6 hours as needed for Nausea/Vomiting.   thiamine (THIAMINE) 100 MG tablet 6/19/2025 Morning Patient Yes Yes   Sig: Take 100 mg by mouth every day.      Facility-Administered Medications: None       Physical Exam  Temp:  [36.4 °C (97.6 °F)] 36.4 °C (97.6 °F)  Pulse:  [81-96] 81  Resp:  [16-24] 16  BP: (104-111)/(55-78) 111/57  SpO2:  [85 %-95 %] 95 %  Blood Pressure: 111/57   Temperature: 36.4 °C (97.6 °F)   Pulse: 81   Respiration: 16   Pulse Oximetry: 95 %       Physical Exam  Vitals and nursing note reviewed.   Constitutional:       General: She is not in acute distress.      Appearance: Normal appearance. She is not ill-appearing, toxic-appearing or diaphoretic.   HENT:      Head: Normocephalic and atraumatic.      Nose: No congestion or rhinorrhea.      Mouth/Throat:      Pharynx: No oropharyngeal exudate or posterior oropharyngeal erythema.   Eyes:      General: Scleral icterus present.   Neck:      Vascular: No carotid bruit or JVD.   Cardiovascular:      Rate and Rhythm: Normal rate and regular rhythm.      Pulses: Normal pulses.      Heart sounds: Normal heart sounds. No murmur heard.     No friction rub. No gallop.   Pulmonary:      Effort: Pulmonary effort is normal. No respiratory distress.      Breath sounds: No stridor. No wheezing, rhonchi or rales.   Abdominal:      General: Abdomen is flat. There is no distension.      Palpations: There is no mass.      Tenderness: There is no abdominal tenderness. There is no left CVA tenderness, guarding or rebound.      Hernia: No hernia is present.   Musculoskeletal:         General: No swelling. Normal range of motion.      Cervical back: No rigidity. No muscular tenderness.      Right lower leg: No edema.      Left lower leg: No edema.   Lymphadenopathy:      Cervical: No cervical adenopathy.   Skin:     General: Skin is warm and dry.      Capillary Refill: Capillary refill takes more than 3 seconds.      Coloration: Skin is not jaundiced or pale.      Findings: No bruising or erythema.   Neurological:      Mental Status: She is alert.         Laboratory:  Recent Labs     06/19/25 1835   WBC 14.9*   RBC 4.14*   HEMOGLOBIN 14.7   HEMATOCRIT 43.3   .6*   MCH 35.5*   MCHC 33.9   RDW 71.5*   PLATELETCT 228   MPV 10.3     Recent Labs     06/19/25 1835   SODIUM 130*   POTASSIUM 4.0   CHLORIDE 87*   CO2 24   GLUCOSE 102*   BUN 6*   CREATININE 0.73   CALCIUM 8.6     Recent Labs     06/19/25 1835   ALTSGPT 131*   ASTSGOT 334*   ALKPHOSPHAT 354*   TBILIRUBIN 11.6*   LIPASE 17   GLUCOSE 102*     Recent Labs     06/19/25  1835   APTT  41.1*   INR 1.87*     Recent Labs     06/19/25  1835   NTPROBNP 44         Recent Labs     06/19/25  1835   TROPONINT 9       Imaging:  DX-CHEST-PORTABLE (1 VIEW)   Final Result      Mild left lung infiltrate.      US-RUQ    (Results Pending)   US-RENAL    (Results Pending)       X-Ray:  I have personally reviewed the images and compared with prior images.  EKG:  I have personally reviewed the images and compared with prior images.    Assessment/Plan:  Justification for Admission Status  I anticipate this patient will require at least two midnights for appropriate medical management, necessitating inpatient admission because of alcoholic hepatitis    Patient will need a Med/Surg bed on MEDICAL service .  The need is secondary to alcoholic hepatitis.    * Alcohol abuse with withdrawal and perceptual disturbance (HCC)- (present on admission)  Assessment & Plan  [facility] protocol  multivitamin, thiamine and folate  Replete electrolytes  Patient has been counseled on alcohol cessation and will be provided with information for outpatient detox facilities     Alcoholic hepatitis  Assessment & Plan  Maddery dissemination score 46  start prednislone 40 mg  Assess for response after 7 days with jeramie score     Alcoholic cirrhosis (HCC)  Assessment & Plan  Monitor volume status  Not on diuretics at home  MELD 27    Acute hypoxic respiratory failure (HCC)- (present on admission)  Assessment & Plan  On 3 L of O2 above      High anion gap metabolic acidosis- (present on admission)  Assessment & Plan  Continue to trend secondary to liver dysfunction    Ascites- (present on admission)  Assessment & Plan  Consider a paracentesis    Acute cystitis- (present on admission)  Assessment & Plan  Renal ultrasound has been ordered for hematuria  UA + for bacteria, LE and nitrite  F/u urine cultures  Started pt on ceftriaxone          VTE prophylaxis: SCDs/TEDs       [1]   Allergies  Allergen Reactions    [place] Hives     Patient states not  allergic anymore, just a one time thing    Sulfa Drugs Hives     KCF=[id number]

## 2025-06-20 NOTE — PROGRESS NOTES
Hospital Medicine Daily Progress Note    Date of Service  6/20/2025    Chief Complaint  Minerva Tillman is a 50 y.o. female admitted 6/19/2025 with jaundice    Hospital Course  Minerva Tillman is a 50 y.o. female who presented 6/19/2025 with past medical history of liver cirrhosis, alcohol abuse, depression who presents to the hospital for jaundice the past 1 week.  It is associated with abdominal distention, periumbilical abdominal pain, nausea, vomiting and hematuria.  She does complain of shortness of breath but denies any orthopnea, fevers or cough.  Patient states that she drinks wine on a daily basis.  She last had a drink this morning.     Interval Problem Update  Axox3, poor recall, easily distracted, she states she is feeling much better today, current ciwa 8. She denies abdominal pain, no n/v currently, no hematuria noted this am. Denies sob. States she would like to go to an inpatient etoh rehab facility upon discharge from here. No cp, ROS otherwise negative. RUQ U/S pending, renal US pending.  New onset jaundice, discussed with GI who will eval     I have discussed this patient's plan of care and discharge plan at IDT rounds today with Case Management, Nursing, Nursing leadership, and other members of the IDT team.    Consultants/Specialty  GI    Code Status  Full Code    Disposition  The patient is not medically cleared for discharge to home or a post-acute facility.      I have placed the appropriate orders for post-discharge needs.    Review of Systems  Review of Systems   Constitutional:  Positive for malaise/fatigue. Negative for chills, diaphoresis, fever and weight loss.   HENT: Negative.  Negative for sore throat.    Eyes: Negative.  Negative for blurred vision.   Respiratory: Negative.  Negative for cough and shortness of breath.    Cardiovascular: Negative.  Negative for chest pain, palpitations and leg swelling.   Gastrointestinal:  Positive for nausea. Negative for abdominal pain  and vomiting.   Genitourinary:  Positive for hematuria (resolving). Negative for dysuria.   Musculoskeletal: Negative.  Negative for myalgias.   Skin: Negative.  Negative for itching and rash.   Neurological: Negative.  Negative for dizziness, focal weakness, weakness and headaches.   Endo/Heme/Allergies: Negative.  Does not bruise/bleed easily.   Psychiatric/Behavioral:  Positive for substance abuse. Negative for depression and suicidal ideas.    All other systems reviewed and are negative.       Physical Exam  Temp:  [35.9 °C (96.7 °F)-36.4 °C (97.6 °F)] 35.9 °C (96.7 °F)  Pulse:  [68-96] 68  Resp:  [16-24] 19  BP: (101-111)/(55-78) 101/67  SpO2:  [85 %-95 %] 90 %    Physical Exam  Vitals and nursing note reviewed. Exam conducted with a chaperone present.   Constitutional:       General: She is not in acute distress.     Appearance: Normal appearance. She is not diaphoretic.   HENT:      Head: Normocephalic.      Nose: Nose normal.      Mouth/Throat:      Mouth: Mucous membranes are moist.   Eyes:      Pupils: Pupils are equal, round, and reactive to light.   Cardiovascular:      Rate and Rhythm: Normal rate and regular rhythm.      Pulses: Normal pulses.      Heart sounds: Normal heart sounds.   Pulmonary:      Effort: Pulmonary effort is normal.      Breath sounds: Normal breath sounds.   Abdominal:      General: Abdomen is flat. Bowel sounds are normal. There is distension (mild).      Palpations: Abdomen is soft.      Tenderness: There is no abdominal tenderness.   Musculoskeletal:         General: No swelling or deformity. Normal range of motion.   Skin:     General: Skin is warm and dry.      Capillary Refill: Capillary refill takes less than 2 seconds.      Coloration: Skin is jaundiced.      Findings: No bruising or lesion.   Neurological:      General: No focal deficit present.      Mental Status: She is alert and oriented to person, place, and time.      Cranial Nerves: No cranial nerve deficit.    Psychiatric:         Mood and Affect: Mood normal.         Behavior: Behavior normal.         Fluids    Intake/Output Summary (Last 24 hours) at 6/20/2025 1328  Last data filed at 6/19/2025 2207  Gross per 24 hour   Intake 400 ml   Output --   Net 400 ml        Laboratory  Recent Labs     06/19/25 1835 06/20/25  0045   WBC 14.9* 10.3   RBC 4.14* 3.74*   HEMOGLOBIN 14.7 13.6   HEMATOCRIT 43.3 39.1   .6* 104.5*   MCH 35.5* 36.4*   MCHC 33.9 34.8   RDW 71.5* 70.4*   PLATELETCT 228 191   MPV 10.3 10.3     Recent Labs     06/19/25 1835 06/20/25  0045   SODIUM 130* 129*   POTASSIUM 4.0 3.5*   CHLORIDE 87* 91*   CO2 24 21   GLUCOSE 102* 119*   BUN 6* 6*   CREATININE 0.73 0.59   CALCIUM 8.6 7.9*     Recent Labs     06/19/25 1835   APTT 41.1*   INR 1.87*               Imaging  US-ABDOMEN COMPLETE SURVEY   Final Result      1.  Cirrhotic appearance of the liver with a lobulated cyst.   2.  Sludge is present within the gallbladder. There is no definite stone.   3.  Ascites.   4.  There appears to be occlusion of the portal vein and at least partial occlusion of the inferior vena cava.   5.  Evaluation is limited with poor visualization of the midline structures.         DX-CHEST-PORTABLE (1 VIEW)   Final Result      Mild left lung infiltrate.           Assessment/Plan  * Alcohol abuse with withdrawal and perceptual disturbance (HCC)- (present on admission)  Assessment & Plan  CIWA protocol  multivitamin, thiamine and folate  replacing electrolytes  See discussion above  CIWA score increasing  - continue to follow closely  No hallucinations or seizure today - precautions    Portal vein thrombosis  Assessment & Plan  Hematuria resolving, pending renal u/s - if resolved and h/h stable will consider anticoagulation     Hyponatremia  Assessment & Plan  Consistent with cirrhosis  following    Alcoholic hepatitis  Assessment & Plan  Maddery dissemination score 46  start prednislone 40 mg  Assess for response after 7 days  with jeramie score     Alcoholic cirrhosis (HCC)  Assessment & Plan  Volume overloaded, will start diuretics and titrate as needed  MELD 27    Acute hypoxic respiratory failure (HCC)- (present on admission)  Assessment & Plan  On 3 L of O2 above  Volume overloaded on exam - likely contributing - diuretics following  Query mild community acquired pna, non specific changes on x ray - currently denies sob  No cough  Wean as tolerated  Following  RT      High anion gap metabolic acidosis- (present on admission)  Assessment & Plan  Continue to trend secondary to liver dysfunction    Ascites- (present on admission)  Assessment & Plan  Due to cirrhosis  Will start diuretics - titrate up aldactone to 100mg as tolerated, lasix 40 mg  Paracentesis pending  following    Hypokalemia- (present on admission)  Assessment & Plan  Mild  Replacing with low dose as also starting lasix and aldactone  Low mag in past - will recheck  following    Acute cystitis- (present on admission)  Assessment & Plan  Renal ultrasound has been ordered for hematuria  UA + for bacteria, LE and nitrite  On iv ceftriaxone  Cultures pending  following           VTE prophylaxis: SCD    I have performed a physical exam and reviewed and updated ROS and Plan today (6/20/2025). In review of yesterday's note (6/19/2025), there are no changes except as documented above.

## 2025-06-20 NOTE — ED NOTES
Medication history reviewed with patient & spouse at bedside.   Med rec is complete  Allergies reviewed.     Patient has not had any outpatient anti-MICROBIAL in the last 30 days.   Anticoagulants: No    Dispense history available in EPIC: Yes    Loran Lubin

## 2025-06-20 NOTE — ASSESSMENT & PLAN NOTE
Fluctuating, had titrated down to 1L after paracentesis but increased to 3 L overnight - exam stable- wean as tolerated  Continue diuretics as tolerated   Query mild community acquired pna, non specific changes on x ray -  No cough, on empiric augmentin   Wean as tolerated  Following  RT

## 2025-06-20 NOTE — PROGRESS NOTES
4 Eyes Skin Assessment Completed by TONYA Lr and TONYA Anderson.     Head WDL  Ears WDL  Nose WDL  Mouth WDL  Neck WDL  Breast/Chest WDL  Shoulder Blades WDL  Spine WDL  (R) Arm/Elbow/Hand elbow redness  (L) Arm/Elbow/Hand elbow redness  Abdomen- firm ascites  Groin WDL  Scrotum/Coccyx/Buttocks Blanching coccyx  (R) Leg WDL  (L) Leg WDL  (R) Heel/Foot/Toe WDL  (L) Heel/Foot/Toe WDL     Devices In Places Blood Pressure Cuff and Pulse Ox, IV     Interventions In Place N/A     Possible Skin Injury No     Pictures Uploaded Into Epic N/A  Wound Consult Placed N/A  RN Wound Prevention Protocol Ordered No

## 2025-06-20 NOTE — ASSESSMENT & PLAN NOTE
Decompensated  Volume overloaded, improving with diuretics - tapering as tolerated  MELD 3.0  repeat 26.   Hepatitis panel-repeat negative.  Prednisolone started 6/22 for worsening LFTs.  Lactulose qhs, NH3 elevated 66 at one point, now excessive BMs and normal mentation.  Improving t. Bilirubin, plan for EGD and colonoscopy to evaluate esophageal varices seen on MRCP.   MRCP no evidence of carcinoma, no liver lesions.  6/30 EGD with esophageal varices 3 columns and portal hypertension.  Started on coreg 3.125mg po bid   Also with rectal varices on colonoscopy, started anusol HC bid x7 days, repeat colonoscopy in 7 days.
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
## 2025-06-20 NOTE — ASSESSMENT & PLAN NOTE
6/24 continued CIWA protocol as patient no longer in withdrawal.  multivitamin, thiamine and folate  replacing electrolytes  See discussion above  No hallucinations or seizure today - precautions

## 2025-06-20 NOTE — DISCHARGE PLANNING
Community Health Worker Initial First Visit Intake    Social determinates of health intake complete.   Identified barriers to: Alcohol dependence  Contact information provided to Minerva Tillman : Yes  Has PCP appointment scheduled for : Pending discharge disposition.   Patient referred to in patient addiction center.   Accepted  Meds-To-Beds.    Inpatient assessment completed.    CHW Alejandra met with patient bedside per referral request from Nikki SOTO and introduced Community Care Management and completed SDOH. Pt lives with spouse, unemployed , active , current with PCP, Dr Donita Caro. Pt denies any barriers with housing, transportation, food and medications. Pt is actively treating with Behavioral Health. Pt requests assistance with alcohol dependency.in patient treatment. Pt has attempted out pt care with AA and states that pt likes the program but pt is need of a deeper level of help to be successful with sobriety.    Discussed in length [facility] [phone] located in [place] and having pt do a direct admit from hospital to facility to avoid relapse. Pt is very agreeable and has asked that this CHW call Spouse, Dr Tillman and provide/ discuss this information.   Called [facility] and spoke with M[employer] [phone] and discussed pt and provided all information needed and contact numbers. Discussed direct admit after discharge. Provided Insurance information to be proactive and [employer] will run benefits. Teddy can do a phone bedside assessment.   Called Dr Tillman [phone] and left a lengthy voicemail providing all above information discussed and contact information for Teddy at [facility] and CHW.   Received a call back from Dr Tillman and discussed all the above information. Dr Tillman will contact [employer] to check on admission process , Insurance benefits, cost etc.    Pending discharge disposition.     2:50 pm  Received a call from [employer] requesting medical  records, face sheet etc per Dr Tillman's request- fax to [phone] or email to [email or web address] to ensure that pt is medically appropriate for inpatient treatment.  Called Nikki SOTO and requested records to be faxed. Nikki will fax by end of day.

## 2025-06-20 NOTE — ED PROVIDER NOTES
ED Provider Note    CHIEF COMPLAINT  Chief Complaint   Patient presents with    Detox     Requests help with alcohol withdrawal, last drink at noon    Abdominal Swelling     SOB, Jaundice, HX Ascites.        EXTERNAL RECORDS REVIEWED  Inpatient Notes patient was admitted to the hospital in January 2024 after a fall with a right ankle fracture that was thought to be complicated by central pontine myelinolysis and hyponatremia secondary to chronic alcohol use disorder    HPI/ROS  LIMITATION TO HISTORY   Select: : None  OUTSIDE HISTORIAN(S):  Significant other spouse at bedside providing additional history including the patient has had severe DTs with withdrawal in the past    Minerva Tillman is a 50 y.o. female who presents to the emergency department for evaluation of progressively worsening abdominal swelling, yellowing of the skin and eyes and request for alcohol detox.  The patient reports she has an alcohol use disorder and notes that all of the above symptoms have been worsening over the last couple of weeks.  Her last drink was this afternoon and she notes that she withdrawals quite severely when she stops drinking.  She denies any significant pain in her abdomen currently but states that she has pain with attempted movement bending or twisting.  She notes that she is very nauseous with 3 episodes of vomiting approximately daily though denies hematemesis, black or bloody stools.  She has however had some blood in her urine recently.  She denies fevers, chills, chest pain however has had shortness of breath steadily worsening and she associates it with her abdominal swelling.    PAST MEDICAL HISTORY   has a past medical history of Acute alcohol intoxication (HCC) (05/25/2021), Alcohol intoxication delirium (HCC) (04/30/2024), Anxiety, Depression, Gynecological disorder, High cholesterol, Hypertension, Snoring, and Urinary tract infection.    SURGICAL HISTORY   has a past surgical history that includes  breast augmentation with implant (2006); mammoplasty augmentation; hysteroscopy with endometrial radiofrequency ablation (2017); dilation and curettage (2017); lipoma excision (2024); and orif, ankle (Right, 2024).    FAMILY HISTORY  Family History   Problem Relation Age of Onset    Hypertension Mother     Hyperlipidemia Mother     Depression Father     Hypertension Father     Asthma Father     Hyperlipidemia Father     Alcohol abuse Father     Hypertension Brother     Depression Brother     Alcohol abuse Brother     Hypertension Brother     Depression Brother     Alcohol abuse Brother     Suicide Attempts Paternal Uncle          by suicide    Cancer Maternal Grandfather 60        esphogus    Cancer Maternal Grandmother 60        breast and skin    Stroke Maternal Grandmother     Other Paternal Grandmother         complications from surgery       SOCIAL HISTORY  Social History     Tobacco Use    Smoking status: Never    Smokeless tobacco: Never   Vaping Use    Vaping status: Never Used   Substance and Sexual Activity    Alcohol use: Yes     Alcohol/week: 8.4 oz     Types: 14 Glasses of wine per week     Comment: Bottle of chardonnay a day.    Drug use: No    Sexual activity: Yes     Partners: Male     Comment: ,        CURRENT MEDICATIONS  Home Medications       Reviewed by Lorna Lubin (Pharmacy Tech) on 25 at 1943  Med List Status: Complete     Medication Last Dose Status   Acetaminophen (TYLENOL PO) 2025 Active   albuterol 108 (90 Base) MCG/ACT Aero Soln inhalation aerosol 2025 Active   escitalopram (LEXAPRO) 20 MG tablet 2025 Active   gabapentin (NEURONTIN) 300 MG Cap 2025 Active   hydrOXYzine HCl (ATARAX) 25 MG Tab 2025 Active   hydrOXYzine pamoate (VISTARIL) 25 MG Cap New Rx Active   levothyroxine (SYNTHROID) 50 MCG Tab 2025 Active   multivitamin Tab 2025 Active   ondansetron (ZOFRAN ODT) 4 MG TABLET DISPERSIBLE 2025  "Active   thiamine (THIAMINE) 100 MG tablet 6/19/2025 Active                  Audit from Redirected Encounters    **Home medications have not yet been reviewed for this encounter**         ALLERGIES  Allergies[1]    PHYSICAL EXAM  VITAL SIGNS: /76   Pulse 84   Temp 36.2 °C (97.2 °F) (Temporal)   Resp 17   Ht 1.575 m (5' 2\")   Wt 64.6 kg (142 lb 6.7 oz)   SpO2 93%   BMI 26.05 kg/m²    Constitutional: No acute distress  HEENT: Atraumatic, normocephalic, pupils are equal round reactive to light, scleral icterus present, nose normal, mouth shows dry mucous membranes  Cardiovascular: Regular rate and rhythm, no appreciable murmur.  Thorax & Lungs: No respiratory distress,  crackles in the bases bilaterally  GI: Soft, distended.  Nontender.  Skin: Warm, dry, jaundice  Musculoskeletal: Moving all extremities, no acute deformity, no edema, no tenderness  Neurologic: A&Ox3, at baseline mentation, clonus with few flaps present.  Moving extremities.  No tremor.  Psychiatric: Appropriate affect for situation at this time          EKG/LABS  Labs Reviewed   CBC WITH DIFFERENTIAL - Abnormal; Notable for the following components:       Result Value    WBC 14.9 (*)     RBC 4.14 (*)     .6 (*)     MCH 35.5 (*)     RDW 71.5 (*)     Lymphocytes 21.40 (*)     Basophils 4.30 (*)     Neutrophils (Absolute) 9.03 (*)     Monos (Absolute) 1.90 (*)     Baso (Absolute) 0.64 (*)     Macrocytosis 3+ (*)     All other components within normal limits   COMP METABOLIC PANEL - Abnormal; Notable for the following components:    Sodium 130 (*)     Chloride 87 (*)     Anion Gap 19.0 (*)     Glucose 102 (*)     Bun 6 (*)     AST(SGOT) 334 (*)     ALT(SGPT) 131 (*)     Alkaline Phosphatase 354 (*)     Total Bilirubin 11.6 (*)     All other components within normal limits   URINALYSIS,CULTURE IF INDICATED - Abnormal; Notable for the following components:    Character Cloudy (*)     Ketones Trace (*)     Bilirubin Large (*)     Leukocyte " Esterase Trace (*)     All other components within normal limits   URINE MICROSCOPIC (W/UA) - Abnormal; Notable for the following components:    Bacteria Many (*)     Urine Casts 3-5 (*)     All other components within normal limits   PROTHROMBIN TIME - Abnormal; Notable for the following components:    PT 21.6 (*)     INR 1.87 (*)     All other components within normal limits   APTT - Abnormal; Notable for the following components:    APTT 41.1 (*)     All other components within normal limits   LIPASE   HCG QUAL SERUM   PROBRAIN NATRIURETIC PEPTIDE, NT   TROPONIN   ESTIMATED GFR   AMMONIA   DIFFERENTIAL MANUAL   PERIPHERAL SMEAR REVIEW   PLATELET ESTIMATE   MORPHOLOGY   CBC WITH DIFFERENTIAL   COMP METABOLIC PANEL     Results for orders placed or performed during the hospital encounter of 25   EKG   Result Value Ref Range    Report       [facility] Emergency Dept.    Test Date:  2025  Pt Name:    GIULIA HERNANDEZ                   Department: ER  MRN:        [id number]                      Room:       [facility]  Gender:     Female                       Technician: [id number]  :        1975                   Requested By:ER TRIAGE PROTOCOL  Order #:    [id number]                    Reading MD: Eliu Driscoll    Measurements  Intervals                                Axis  Rate:       81                           P:          30  WV:         163                          QRS:        30  QRSD:       107                          T:          29  QT:         433  QTc:        503    Interpretive Statements  Sinus rhythm  Nonspecific T abnormalities, anterior leads  Borderline prolonged QT interval  Compared to ECG 10/25/2024 03:25:13  No significant changes  Electronically Signed On 2025 19:24:00 PDT by Eliu Driscoll       *Note: Due to a large number of results and/or encounters for the requested time period, some results have not been displayed. A complete set of results can be found in  Results Review.       I have independently interpreted this EKG    RADIOLOGY/PROCEDURES   I have independently interpreted the diagnostic imaging associated with this visit and am waiting the final reading from the radiologist.   My preliminary interpretation is as follows: Chest x-ray demonstrates no obvious focal pneumonia    Radiologist interpretation:  DX-CHEST-PORTABLE (1 VIEW)   Final Result      Mild left lung infiltrate.      US-RUQ    (Results Pending)   US-RENAL    (Results Pending)       COURSE & MEDICAL DECISION MAKING    ASSESSMENT, COURSE AND PLAN  Care Narrative:     Patient presents to the emergency room for evaluation of skin color change, progressive abdominal distention and concern for alcohol use disorder.  Has a known history of alcohol abuse disorder, alcohol withdrawal and what sounds like alcohol hepatitis in the past.  She is jaundiced with scleral icterus and has evidence of progressively worsening abdominal distention with ascites present and small volume on point-of-care ultrasound of her abdomen today.  There is not enough fluid to safely perform paracentesis in the emergency room however.  Her abdomen is not tender or peritoneal as would be suggestive of SBP but I did provide a dose of empiric antibiotic therapy in the setting of such.  Overall I suspect alcoholic hepatitis and her labs confirm such with elevation in transaminases and significant elevation in total bilirubin level.  Patient without evidence of alcohol withdrawal currently though given her history she was monitored closely and placed on CIWA protocol.  Urinalysis was obtained in the setting of her hematuria which was remarkable for large bilirubin but no blood.  Suspect the bilirubin is the true etiology of her hematuria.  She likewise has significant bacteriuria and notes a history of pyelonephritis.  Ceftriaxone likewise will cover this.  Overall her case was discussed with Dr. Ivy, hospitalist who accepts for  admission for ongoing care.      ADDITIONAL PROBLEMS MANAGED  UTI -ceftriaxone provided    DISPOSITION AND DISCUSSIONS  I have discussed management of the patient with the following physicians and KRISTINE's: Hospitalist as above    Decision tools and prescription drugs considered including, but not limited to: Antibiotics ceftriaxone.    FINAL DIAGNOSIS  1. Alcoholic hepatitis with ascites    2. Hyponatremia    3. Disorder due to alcohol abuse (HCC)         Electronically signed by: Eliu Driscoll M.D., 6/19/2025 6:57 PM           [1]   Allergies  Allergen Reactions    [place] Hives     Patient states not allergic anymore, just a one time thing    Sulfa Drugs Hives     FEH=[id number]

## 2025-06-20 NOTE — ED TRIAGE NOTES
".  Chief Complaint   Patient presents with    Detox     Requests help with alcohol withdrawal, last drink at noon    Abdominal Swelling     SOB, Jaundice, HX Ascites.        49 yo female wheeled to triage for above complaint. SOB/Abdominal pain protocol placed. Will obtain EKG. Urine sent in triage     Pt is alert and oriented, speaking in full sentences, follows commands and responds appropriately to questions.      Patient placed back for EKG and educated on triage process. Asked to inform RN of any changes or concerns.     /78   Pulse 96   Temp 36.4 °C (97.6 °F) (Temporal)   Resp 16   Ht 1.575 m (5' 2\")   Wt 64.6 kg (142 lb 6.7 oz)   SpO2 93%   BMI 26.05 kg/m²     "

## 2025-06-20 NOTE — CARE PLAN
Problem: Knowledge Deficit - Standard  Goal: Patient and family/care givers will demonstrate understanding of plan of care, disease process/condition, diagnostic tests and medications  Outcome: Progressing     Problem: Optimal Care for Alcohol Withdrawal  Goal: Optimal Care for the alcohol withdrawal patient  Outcome: Progressing     Problem: Lifestyle Changes  Goal: Patient's ability to identify lifestyle changes and available resources to help reduce recurrence of condition will improve  Outcome: Progressing   The patient is Stable - Low risk of patient condition declining or worsening    Shift Goals  Clinical Goals: monitor CIWA, fluids

## 2025-06-20 NOTE — PROGRESS NOTES
Received bedside report and accepted care of patient.  Patient currently resting in bed in no visible or stated distress.  Bed controls on and bed in locked position.  Bed alarm on.  Call light and personal possessions within reach.  Plan of care to include pain management, assistance with ADL's, oxygen therapy, CIWA scoring and treatment, and continued work up of admit diagnois.  Patient verbalizes agreement with plan of care, and has no additional questions or concerns at this time.  Will continue to update notes/plan of care as needed throughout shift.

## 2025-06-20 NOTE — DISCHARGE PLANNING
Care Transition Team Assessment    Primary emergency Contact is pt's Spouse Jeff Tillman [phone]     LMSW met with pt at bedside to complete discharge assessment and chart review was completed to obtain the information used in this assessment. Pt is A/Ox4 and agreeable to assessment. Pt verified information on face sheet.      - Prior to admission, pt lived with her spouse Jeff at [address] [place] [address]  - Per pt, family is good support for discharge back into community.   - Pt stated to be independent with ADL/IADLs and drives self-prior to admission.   - Per pt, she uses 2 L NC 02 at home via [facility]   - Pt is note employed and insured [employer]   - [facility]   -PCP is Donita Asif M.D.   -Patient does have an extensive Alcohol use problem along with mental health diagnosis that she is receiving treatment for.    LMSW discussed discharge planning for the patient with the Spouse. LMSW provided resources for potential placement options and the process of intake with [facility] treatment and placement. LMSW referred the patient to the [facility] for community connections for [facility]'s resources.     Addendum @ 1450: LMSW received notification from Spouse and [facility] that records are needing to be faxed to [facility], [facility], Fax # [phone]. Records faxed.     Information Source  Orientation Level: Oriented X4  Information Given By: Patient  Who is responsible for making decisions for patient? : Patient    Readmission Evaluation  Is this a readmission?: No    Elopement Risk  Legal Hold: No  Ambulatory or Self Mobile in Wheelchair: No-Not an Elopement Risk  Elopement Risk: Not at Risk for Elopement    Interdisciplinary Discharge Planning  Lives with - Patient's Self Care Capacity: Spouse  Patient or legal guardian wants to designate a caregiver: No  Support Systems: Family Member(s)  Housing / Facility: Unable To Determine At This Time    Discharge Preparedness  What  is your plan after discharge?: Home with help  What are your discharge supports?: Spouse  Prior Functional Level: Independent with Activities of Daily Living, Independent with Medication Management, Ambulatory  Difficulity with ADLs: None  Difficulity with IADLs: None    Functional Assesment  Prior Functional Level: Independent with Activities of Daily Living, Independent with Medication Management, Ambulatory    Finances  Financial Barriers to Discharge: No  Prescription Coverage: Yes    Vision / Hearing Impairment  Vision Impairment : No  Hearing Impairment : No    Advance Directive  Advance Directive?: None    Domestic Abuse  Have you ever been the victim of abuse or violence?: No  Possible Abuse/Neglect Reported to:: Not Applicable    Psychological Assessment  History of Substance Abuse: Alcohol  Date Last Used - Alcohol: 06/19/2025  History of Psychiatric Problems: Yes  Non-compliant with Treatment: No  Newly Diagnosed Illness: No    Discharge Risks or Barriers  Discharge risks or barriers?: Complex medical needs, Substance abuse, Mental health  Patient risk factors: Cognitive / sensory / physical deficit, Complex medical needs, Mental health, Substance abuse    Anticipated Discharge Information  Discharge Disposition: Discharged to home/self care (01)

## 2025-06-20 NOTE — ASSESSMENT & PLAN NOTE
H/h stable, no evidence of bleeding  Discussed with pt and GI  Holding eliquis since an increase in INR from 1.8-5.36.  following  Cbc stable

## 2025-06-20 NOTE — ASSESSMENT & PLAN NOTE
Maddery dissemination score 46  Bili continues to increase, steroids started  Assess for response after 7 days with jeramie score

## 2025-06-21 ENCOUNTER — APPOINTMENT (OUTPATIENT)
Dept: RADIOLOGY | Facility: MEDICAL CENTER | Age: 50
DRG: 896 | End: 2025-06-21
Attending: HOSPITALIST
Payer: COMMERCIAL

## 2025-06-21 LAB
ALBUMIN FLD-MCNC: 0.5 G/DL
ALBUMIN SERPL BCP-MCNC: 2.8 G/DL (ref 3.2–4.9)
ALBUMIN SERPL BCP-MCNC: 2.8 G/DL (ref 3.2–4.9)
ALBUMIN/GLOB SERPL: 1 G/DL
ALP SERPL-CCNC: 295 U/L (ref 30–99)
ALT SERPL-CCNC: 112 U/L (ref 2–50)
ANION GAP SERPL CALC-SCNC: 11 MMOL/L (ref 7–16)
APPEARANCE FLD: CLEAR
AST SERPL-CCNC: 310 U/L (ref 12–45)
BILIRUB CONJ SERPL-MCNC: 8.8 MG/DL (ref 0.1–0.5)
BILIRUB INDIRECT SERPL-MCNC: 5 MG/DL (ref 0–1)
BILIRUB SERPL-MCNC: 13.8 MG/DL (ref 0.1–1.5)
BODY FLD TYPE: NORMAL
BODY FLD TYPE: NORMAL
BUN SERPL-MCNC: 7 MG/DL (ref 8–22)
CA-I SERPL-SCNC: 1.1 MMOL/L (ref 1.1–1.3)
CALCIUM ALBUM COR SERPL-MCNC: 9.4 MG/DL (ref 8.5–10.5)
CALCIUM SERPL-MCNC: 8.4 MG/DL (ref 8.5–10.5)
CELLS FLD: 1
CHLORIDE SERPL-SCNC: 95 MMOL/L (ref 96–112)
CO2 SERPL-SCNC: 27 MMOL/L (ref 20–33)
COLOR FLD: YELLOW
CREAT SERPL-MCNC: 0.76 MG/DL (ref 0.5–1.4)
GFR SERPLBLD CREATININE-BSD FMLA CKD-EPI: 95 ML/MIN/1.73 M 2
GLOBULIN SER CALC-MCNC: 2.8 G/DL (ref 1.9–3.5)
GLUCOSE SERPL-MCNC: 96 MG/DL (ref 65–99)
GRAM STN SPEC: NORMAL
INR PPP: 1.89 (ref 0.87–1.13)
LYMPHOCYTES NFR FLD: 33 %
MAGNESIUM SERPL-MCNC: 1.7 MG/DL (ref 1.5–2.5)
MONOS+MACROS NFR FLD MANUAL: 39 %
NEUTROPHILS NFR FLD: 27 %
NUC CELL # FLD: 106 CELLS/UL
POTASSIUM SERPL-SCNC: 4.4 MMOL/L (ref 3.6–5.5)
PROCALCITONIN SERPL-MCNC: 0.32 NG/ML
PROT FLD-MCNC: 0.6 G/DL
PROT SERPL-MCNC: 5.6 G/DL (ref 6–8.2)
PROTHROMBIN TIME: 21.8 SEC (ref 12–14.6)
RBC # FLD: <2000 CELLS/UL
SIGNIFICANT IND 70042: NORMAL
SITE SITE: NORMAL
SODIUM SERPL-SCNC: 133 MMOL/L (ref 135–145)
SOURCE SOURCE: NORMAL

## 2025-06-21 PROCEDURE — A9270 NON-COVERED ITEM OR SERVICE: HCPCS | Performed by: HOSPITALIST

## 2025-06-21 PROCEDURE — 88112 CYTOPATH CELL ENHANCE TECH: CPT | Performed by: PATHOLOGY

## 2025-06-21 PROCEDURE — 85610 PROTHROMBIN TIME: CPT

## 2025-06-21 PROCEDURE — 82042 OTHER SOURCE ALBUMIN QUAN EA: CPT

## 2025-06-21 PROCEDURE — 88305 TISSUE EXAM BY PATHOLOGIST: CPT | Performed by: PATHOLOGY

## 2025-06-21 PROCEDURE — 84157 ASSAY OF PROTEIN OTHER: CPT

## 2025-06-21 PROCEDURE — 83735 ASSAY OF MAGNESIUM: CPT

## 2025-06-21 PROCEDURE — 36415 COLL VENOUS BLD VENIPUNCTURE: CPT

## 2025-06-21 PROCEDURE — 99232 SBSQ HOSP IP/OBS MODERATE 35: CPT | Performed by: NURSE PRACTITIONER

## 2025-06-21 PROCEDURE — 700111 HCHG RX REV CODE 636 W/ 250 OVERRIDE (IP): Performed by: HOSPITALIST

## 2025-06-21 PROCEDURE — 82105 ALPHA-FETOPROTEIN SERUM: CPT

## 2025-06-21 PROCEDURE — 87070 CULTURE OTHR SPECIMN AEROBIC: CPT

## 2025-06-21 PROCEDURE — 82330 ASSAY OF CALCIUM: CPT

## 2025-06-21 PROCEDURE — 87205 SMEAR GRAM STAIN: CPT

## 2025-06-21 PROCEDURE — 82040 ASSAY OF SERUM ALBUMIN: CPT

## 2025-06-21 PROCEDURE — 80053 COMPREHEN METABOLIC PANEL: CPT

## 2025-06-21 PROCEDURE — 0W9G3ZZ DRAINAGE OF PERITONEAL CAVITY, PERCUTANEOUS APPROACH: ICD-10-PCS

## 2025-06-21 PROCEDURE — 88112 CYTOPATH CELL ENHANCE TECH: CPT | Mod: 26 | Performed by: PATHOLOGY

## 2025-06-21 PROCEDURE — 88305 TISSUE EXAM BY PATHOLOGIST: CPT | Mod: 26 | Performed by: PATHOLOGY

## 2025-06-21 PROCEDURE — 700105 HCHG RX REV CODE 258: Performed by: HOSPITALIST

## 2025-06-21 PROCEDURE — 89051 BODY FLUID CELL COUNT: CPT

## 2025-06-21 PROCEDURE — 700102 HCHG RX REV CODE 250 W/ 637 OVERRIDE(OP): Performed by: HOSPITALIST

## 2025-06-21 PROCEDURE — 87015 SPECIMEN INFECT AGNT CONCNTJ: CPT

## 2025-06-21 PROCEDURE — 83951 ONCOPROTEIN DCP: CPT

## 2025-06-21 PROCEDURE — 84145 PROCALCITONIN (PCT): CPT

## 2025-06-21 PROCEDURE — 99232 SBSQ HOSP IP/OBS MODERATE 35: CPT | Performed by: HOSPITALIST

## 2025-06-21 PROCEDURE — 4410569 US-PARACENTESIS, ABD WITH IMAGING

## 2025-06-21 PROCEDURE — 770001 HCHG ROOM/CARE - MED/SURG/GYN PRIV*

## 2025-06-21 PROCEDURE — 82248 BILIRUBIN DIRECT: CPT

## 2025-06-21 RX ORDER — LACTULOSE 10 G/15ML
15 SOLUTION ORAL DAILY
Status: DISCONTINUED | OUTPATIENT
Start: 2025-06-22 | End: 2025-06-24

## 2025-06-21 RX ORDER — SPIRONOLACTONE 100 MG/1
100 TABLET, FILM COATED ORAL
Status: DISCONTINUED | OUTPATIENT
Start: 2025-06-22 | End: 2025-07-03

## 2025-06-21 RX ORDER — LACTULOSE 10 G/15ML
15 SOLUTION ORAL 2 TIMES DAILY
Status: DISCONTINUED | OUTPATIENT
Start: 2025-06-21 | End: 2025-06-21

## 2025-06-21 RX ADMIN — THERA TABS 1 TABLET: TAB at 05:50

## 2025-06-21 RX ADMIN — CARVEDILOL 3.12 MG: 3.12 TABLET, FILM COATED ORAL at 16:32

## 2025-06-21 RX ADMIN — GABAPENTIN 300 MG: 300 CAPSULE ORAL at 16:32

## 2025-06-21 RX ADMIN — HYDROXYZINE HYDROCHLORIDE 25 MG: 50 TABLET ORAL at 23:48

## 2025-06-21 RX ADMIN — ALBUTEROL SULFATE 2 PUFF: 90 AEROSOL, METERED RESPIRATORY (INHALATION) at 23:47

## 2025-06-21 RX ADMIN — LEVOTHYROXINE SODIUM 50 MCG: 0.05 TABLET ORAL at 05:51

## 2025-06-21 RX ADMIN — CEFTRIAXONE SODIUM 2000 MG: 10 INJECTION, POWDER, FOR SOLUTION INTRAVENOUS at 16:32

## 2025-06-21 RX ADMIN — ESCITALOPRAM OXALATE 20 MG: 10 TABLET ORAL at 05:50

## 2025-06-21 RX ADMIN — ALBUTEROL SULFATE 2 PUFF: 90 AEROSOL, METERED RESPIRATORY (INHALATION) at 17:14

## 2025-06-21 RX ADMIN — PREDNISOLONE SODIUM PHOSPHATE 39.9 MG: 15 SOLUTION ORAL at 05:46

## 2025-06-21 RX ADMIN — Medication 100 MG: at 05:51

## 2025-06-21 RX ADMIN — FOLIC ACID 1 MG: 1 TABLET ORAL at 05:50

## 2025-06-21 RX ADMIN — GABAPENTIN 300 MG: 300 CAPSULE ORAL at 05:51

## 2025-06-21 RX ADMIN — LACTULOSE 15 ML: 10 SOLUTION ORAL at 07:49

## 2025-06-21 ASSESSMENT — LIFESTYLE VARIABLES
ORIENTATION AND CLOUDING OF SENSORIUM: ORIENTED AND CAN DO SERIAL ADDITIONS
ANXIETY: NO ANXIETY (AT EASE)
AUDITORY DISTURBANCES: NOT PRESENT
TOTAL SCORE: 2
HEADACHE, FULLNESS IN HEAD: NOT PRESENT
NAUSEA AND VOMITING: NO NAUSEA AND NO VOMITING
AGITATION: NORMAL ACTIVITY
AUDITORY DISTURBANCES: NOT PRESENT
AGITATION: NORMAL ACTIVITY
ORIENTATION AND CLOUDING OF SENSORIUM: ORIENTED AND CAN DO SERIAL ADDITIONS
ORIENTATION AND CLOUDING OF SENSORIUM: ORIENTED AND CAN DO SERIAL ADDITIONS
PAROXYSMAL SWEATS: NO SWEAT VISIBLE
ANXIETY: MILDLY ANXIOUS
AUDITORY DISTURBANCES: NOT PRESENT
AGITATION: NORMAL ACTIVITY
ANXIETY: NO ANXIETY (AT EASE)
HEADACHE, FULLNESS IN HEAD: NOT PRESENT
AGITATION: NORMAL ACTIVITY
HEADACHE, FULLNESS IN HEAD: NOT PRESENT
VISUAL DISTURBANCES: NOT PRESENT
TOTAL SCORE: 2
HEADACHE, FULLNESS IN HEAD: NOT PRESENT
SUBSTANCE_ABUSE: 1
ORIENTATION AND CLOUDING OF SENSORIUM: ORIENTED AND CAN DO SERIAL ADDITIONS
TOTAL SCORE: 4
ANXIETY: NO ANXIETY (AT EASE)
AGITATION: NORMAL ACTIVITY
VISUAL DISTURBANCES: NOT PRESENT
TREMOR: *
NAUSEA AND VOMITING: NO NAUSEA AND NO VOMITING
AUDITORY DISTURBANCES: NOT PRESENT
ANXIETY: MILDLY ANXIOUS
TREMOR: *
NAUSEA AND VOMITING: NO NAUSEA AND NO VOMITING
VISUAL DISTURBANCES: NOT PRESENT
TOTAL SCORE: 4
PAROXYSMAL SWEATS: NO SWEAT VISIBLE
HEADACHE, FULLNESS IN HEAD: NOT PRESENT
AUDITORY DISTURBANCES: NOT PRESENT
TREMOR: *
PAROXYSMAL SWEATS: NO SWEAT VISIBLE
VISUAL DISTURBANCES: NOT PRESENT
TREMOR: *
TOTAL SCORE: 2
NAUSEA AND VOMITING: NO NAUSEA AND NO VOMITING
TREMOR: *
PAROXYSMAL SWEATS: NO SWEAT VISIBLE
PAROXYSMAL SWEATS: NO SWEAT VISIBLE
NAUSEA AND VOMITING: NO NAUSEA AND NO VOMITING
ORIENTATION AND CLOUDING OF SENSORIUM: ORIENTED AND CAN DO SERIAL ADDITIONS
VISUAL DISTURBANCES: NOT PRESENT

## 2025-06-21 ASSESSMENT — ENCOUNTER SYMPTOMS
RESPIRATORY NEGATIVE: 1
HEADACHES: 0
WEIGHT LOSS: 0
COUGH: 0
NAUSEA: 0
EYES NEGATIVE: 1
BACK PAIN: 0
ABDOMINAL PAIN: 0
DIAPHORESIS: 0
CARDIOVASCULAR NEGATIVE: 1
CHILLS: 0
CONSTIPATION: 0
TREMORS: 1
NEUROLOGICAL NEGATIVE: 1
DIZZINESS: 0
PALPITATIONS: 0
MYALGIAS: 0
DEPRESSION: 0
WEAKNESS: 0
BLOOD IN STOOL: 0
BLURRED VISION: 0
MUSCULOSKELETAL NEGATIVE: 1
FOCAL WEAKNESS: 0
FEVER: 0
DIARRHEA: 1
HEARTBURN: 0
SORE THROAT: 0
BRUISES/BLEEDS EASILY: 0
VOMITING: 0
SHORTNESS OF BREATH: 0

## 2025-06-21 ASSESSMENT — PAIN DESCRIPTION - PAIN TYPE: TYPE: ACUTE PAIN

## 2025-06-21 NOTE — PROGRESS NOTES
..Gastroenterology Progress Note               Author:  Beena Alex, DNP,  APRN Date & Time Created: 6/21/2025 7:49 AM       Patient ID:  Name:             Minerva Tillman  YOB: 1975  Age:                 50 y.o.  female  MRN:               [id number]    Medical Decision Making, by Problem:  Active Hospital Problems    Diagnosis     Hyponatremia [E87.1]     Portal vein thrombosis [I81]     Alcohol abuse with withdrawal and perceptual disturbance (HCC) [F10.132]     Alcoholic cirrhosis (HCC) [K70.30]     Alcoholic hepatitis [K70.10]     High anion gap metabolic acidosis [E87.29]     Acute hypoxic respiratory failure (HCC) [J96.01]     Ascites [R18.8]     Hypokalemia [E87.6]     Acute cystitis [N30.00]      Presenting Chief Complaint:  Decompensated cirrhosis     History of Present Illness:   50-year-old female presents to the emergency department for abdominal swelling and shortness of breath and desire to discontinue alcohol intake.  Patient was seen by gastroenterology as an inpatient in 2024.  Dr. Daniels had recommended outpatient evaluation.  Patient followed up with Dr. Conley on 7/1/2024 with an additional follow-up appointment on 10/17/2024.  She had a FibroScan done which suggested advanced fibrosis and possible cirrhosis.  She was recommended to pursue upper and lower endoscopy.  Patient states that when she was told of this information she became quite concerned and did not follow-up because she was scared of the results.  Patient continues to drink alcohol.  She wants help in quitting.  She denies hematemesis or melena.  She endorses new onset abdominal swelling without lower extremity edema.     Home medications reviewed.  Patient not taking anything in the way of diuretics or medications for encephalopathy.  Patient is not on a beta-blocker.     Review of systems at this time is as stated above as well as patient feeling a little bit anxious but comfortable with regard to her  detoxification/CIWA protocol.    Interval History:  6/21/2025: Patient seen.  Reports feeling much better but having multiple liquid bowel movements.  Mild tremors, very hungry.  Has started wearing oxygen at night at home, reports it is due to increased elevation at home in [facility].  Slightly dyspneic today.  N.p.o. pending paracentesis. VSS, CIWA 4.  Transaminitis worsening, T. bili 13.8 > 9.4.  INR 1.89.  Hoping to go directly to rehab from the hospital.    Hospital Medications:  Current Facility-Administered Medications   Medication Dose Frequency Provider Last Rate Last Admin    lactulose 20 GM/30ML solution 15 mL  15 mL BID Helen Mendez M.D.        Respiratory Therapy Consult   Continuous RT Helen Mendez M.D.        furosemide (Lasix) tablet 40 mg  40 mg Q DAY Helen Mendez M.D.        spironolactone (Aldactone) tablet 50 mg  50 mg Q DAY Helen Mendez M.D.        carvedilol (Coreg) tablet 3.125 mg  3.125 mg BID WITH MEALS Helen Mendez M.D.        melatonin tablet 5 mg  5 mg HS PRN Roseanne Crowder, A.P.R.N.   5 mg at 06/20/25 2133    labetalol (Normodyne/Trandate) injection 10 mg  10 mg Q4HRS PRN Abby Ivy M.D.        LORazepam (Ativan) tablet 0.5 mg  0.5 mg Q4HRS PRN Abby Ivy M.D.   0.5 mg at 06/20/25 2353    LORazepam (Ativan) tablet 1 mg  1 mg Q4HRS PRN Abby Ivy M.D.   1 mg at 06/20/25 0747    LORazepam (Ativan) tablet 2 mg  2 mg Q2HRS PRN Abby Ivy M.D.        LORazepam (Ativan) tablet 3 mg  3 mg Q HOUR PRN Abby Ivy M.D.        LORazepam (Ativan) tablet 4 mg  4 mg Q15 MIN PRN Abby Ivy M.D.        Or    diazePAM (Valium) injection 10 mg  10 mg Q15 MIN PRN Abby Ivy M.D.        ondansetron (Zofran) syringe/vial injection 4 mg  4 mg Q4HRS PRN Abby Ivy M.D.        ondansetron (Zofran ODT) dispertab 4 mg  4 mg Q4HRS PRN Abby Ivy M.D.   4 mg at 06/20/25 0745    thiamine (Vitamin B-1) tablet 100 mg  100 mg DAILY Abby Ivy M.D.   100 mg at 06/21/25 0505     "And    multivitamin tablet 1 Tablet  1 Tablet DAILY Abby Ivy M.D.   1 Tablet at 06/21/25 0550    And    folic acid (Folvite) tablet 1 mg  1 mg DAILY Abby Ivy M.D.   1 mg at 06/21/25 0550    cefTRIAXone (Rocephin) syringe 2,000 mg  2,000 mg Q EVENING Abby Ivy M.D.   2,000 mg at 06/20/25 1809    escitalopram (Lexapro) tablet 20 mg  20 mg DAILY Abby Ivy M.D.   20 mg at 06/21/25 0550    gabapentin (Neurontin) capsule 300 mg  300 mg BID Abby Ivy M.D.   300 mg at 06/21/25 0551    levothyroxine (Synthroid) tablet 50 mcg  50 mcg AM ES TYRON HopkinsDMiko   50 mcg at 06/21/25 0551    prednisoLONE sodium phosphate (Pediapred) 15 MG/5ML oral solution 39.9 mg  39.9 mg DAILY Abby Ivy M.D.   39.9 mg at 06/21/25 0546    hydrOXYzine HCl (Atarax) tablet 25 mg  25 mg QDAY PRN Abby Ivy M.D.   25 mg at 06/20/25 0456    albuterol inhaler 1-2 Puff  1-2 Puff Q6HRS PRN Abby Ivy M.D.   2 Puff at 06/20/25 1808   Last reviewed on 6/19/2025  7:43 PM by Spencer Camacho ADITYA       Review of Systems:  Review of Systems   Constitutional:  Negative for chills, fever and malaise/fatigue.   HENT:  Negative for hearing loss.    Eyes:  Negative for blurred vision.   Respiratory:  Negative for cough and shortness of breath.    Cardiovascular:  Negative for chest pain and leg swelling.   Gastrointestinal:  Positive for diarrhea. Negative for abdominal pain, blood in stool, constipation, heartburn, melena, nausea and vomiting.   Genitourinary:  Negative for dysuria.   Musculoskeletal:  Negative for back pain.   Skin:  Negative for rash.   Neurological:  Positive for tremors. Negative for dizziness and weakness.   Psychiatric/Behavioral:  Positive for substance abuse. Negative for depression.    All other systems reviewed and are negative.    Vital signs:  Weight/BMI: Body mass index is 26.05 kg/m².  BP 97/70   Pulse 77   Temp 36.5 °C (97.7 °F) (Temporal)   Resp 17   Ht 1.575 m (5' 2\")   Wt 64.6 kg (142 lb 6.7 oz)  "  SpO2 95%   Vitals:    06/20/25 1427 06/20/25 1558 06/20/25 1917 06/21/25 0419   BP:  98/71 120/79 97/70   Pulse: 77 79 67 77   Resp: 18 18 17 17   Temp:  36.1 °C (97 °F) 36.6 °C (97.8 °F) 36.5 °C (97.7 °F)   TempSrc:  Temporal Temporal Temporal   SpO2: 92% 92% 91% 95%   Weight:       Height:         Oxygen Therapy:  Pulse Oximetry: 95 %, O2 (LPM): 2, O2 Delivery Device: Silicone Nasal Cannula    Intake/Output Summary (Last 24 hours) at 6/21/2025 0749  Last data filed at 6/20/2025 1917  Gross per 24 hour   Intake 240 ml   Output --   Net 240 ml       Physical Exam  Vitals and nursing note reviewed.   Constitutional:       General: She is not in acute distress.     Appearance: She is ill-appearing.   HENT:      Head: Normocephalic and atraumatic.      Right Ear: External ear normal.      Left Ear: External ear normal.      Nose: Nose normal.      Mouth/Throat:      Mouth: Mucous membranes are moist.      Pharynx: Oropharynx is clear.   Eyes:      General: Scleral icterus present.   Cardiovascular:      Rate and Rhythm: Normal rate and regular rhythm.      Pulses: Normal pulses.      Heart sounds: Normal heart sounds.   Pulmonary:      Effort: Pulmonary effort is normal. No respiratory distress.      Breath sounds: Normal breath sounds.   Abdominal:      General: Abdomen is flat. Bowel sounds are normal. There is no distension.      Palpations: Abdomen is soft.      Tenderness: There is no abdominal tenderness.   Musculoskeletal:         General: Normal range of motion.      Cervical back: Normal range of motion.   Skin:     General: Skin is warm and dry.      Capillary Refill: Capillary refill takes less than 2 seconds.      Coloration: Skin is jaundiced.   Neurological:      Mental Status: She is alert and oriented to person, place, and time.      Comments: Tremulous   Psychiatric:         Mood and Affect: Mood normal.         Behavior: Behavior normal.         Labs:  Recent Labs     06/19/25  1835 06/20/25  0045  06/21/25  0218   SODIUM 130* 129*  --    POTASSIUM 4.0 3.5*  --    CHLORIDE 87* 91*  --    CO2 24 21  --    BUN 6* 6*  --    CREATININE 0.73 0.59  --    MAGNESIUM  --   --  1.7   CALCIUM 8.6 7.9*  --      Recent Labs     06/19/25 1835 06/20/25  0045   ALTSGPT 131* 110*   ASTSGOT 334* 282*   ALKPHOSPHAT 354* 278*   TBILIRUBIN 11.6* 9.4*   LIPASE 17  --    GLUCOSE 102* 119*     Recent Labs     06/19/25 1835 06/20/25  0045   WBC 14.9* 10.3   NEUTSPOLYS 59.80 75.50*   LYMPHOCYTES 21.40* 12.00*   MONOCYTES 12.80 9.90   EOSINOPHILS 0.00 0.20   BASOPHILS 4.30* 1.60   ASTSGOT 334* 282*   ALTSGPT 131* 110*   ALKPHOSPHAT 354* 278*   TBILIRUBIN 11.6* 9.4*     Recent Labs     06/19/25 1835 06/20/25  0045   RBC 4.14* 3.74*   HEMOGLOBIN 14.7 13.6   HEMATOCRIT 43.3 39.1   PLATELETCT 228 191   PROTHROMBTM 21.6*  --    APTT 41.1*  --    INR 1.87*  --      Recent Results (from the past 24 hours)   PROCALCITONIN    Collection Time: 06/21/25  2:18 AM   Result Value Ref Range    Procalcitonin 0.32 (H) <0.25 ng/mL   MAGNESIUM    Collection Time: 06/21/25  2:18 AM   Result Value Ref Range    Magnesium 1.7 1.5 - 2.5 mg/dL   ALBUMIN    Collection Time: 06/21/25  2:18 AM   Result Value Ref Range    Albumin 2.8 (L) 3.2 - 4.9 g/dL   IONIZED CALCIUM    Collection Time: 06/21/25  2:18 AM   Result Value Ref Range    Ionized Calcium 1.1 1.1 - 1.3 mmol/L       Radiology Review:  US-ABDOMEN COMPLETE SURVEY   Final Result      1.  Cirrhotic appearance of the liver with a lobulated cyst.   2.  Sludge is present within the gallbladder. There is no definite stone.   3.  Ascites.   4.  There appears to be occlusion of the portal vein and at least partial occlusion of the inferior vena cava.   5.  Evaluation is limited with poor visualization of the midline structures.         DX-CHEST-PORTABLE (1 VIEW)   Final Result      Mild left lung infiltrate.      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 Continue ceftriaxone until resulted.  - Anticoagulation for PVT when hematuria improved  - Hold prednisolone at this time  - AFP and DCP (discussed with lab, collecting this morning)  - Continue with CIWA protocol.  - Consider carvedilol.  Would begin at low-dose i.e. 3.125 mg titrate upwards based on tolerance and blood pressure.  - Consider echocardiogram to evaluate right-sided heart pressures  - Having multiple liquid bowel movements, reduce lactulose to daily   - Would discontinue first generation antipsychotics for nausea therapy.  Compazine and Phenergan can have deleterious effects and cirrhosis.  Would use Zofran alone.  - Diuretic initiation with Lasix and Aldactone in a 40/100 ratio.  Would begin with Lasix 40 mg p.o. every morning and Aldactone 100 mg p.o. every morning  - 2 g sodium restriction diet  - Outpatient referral for alcohol use disorder counseling/support.  Discontinue all toxic habits in the interim.  - Outpatient follow-up with [facility] for EGD and colonoscopy as previously recommended.  - Patient reports that her  and case management are working on discharging directly to rehab     Discussed with patient, RN, primary team, Dr. Mcknight    ..Beena Alex, DNP,  APRN    Core Quality Measures   Reviewed items::  Labs, Medications and Radiology reports reviewed

## 2025-06-21 NOTE — CARE PLAN
The patient is Stable - Low risk of patient condition declining or worsening      Problem: Safety  Goal: Will remain free from injury  Outcome: Progressing     Problem: Pain Management  Goal: Pain level will decrease to patient's comfort goal  Outcome: Progressing     Problem: Optimal Care for Alcohol Withdrawal  Goal: Optimal Care for the alcohol withdrawal patient  Outcome: Progressing     Shift Goals  Clinical Goals: CIWA, Safety, free from fall  Patient Goals: Rest  Family Goals: eddie    Progress made toward(s) clinical / shift goals:      Pt alert and oriented. VSS. On O2 2L via nasal cannula. No c/o pain. CIWA score <4. Pt has been ambulating to bathroom, reinforced education on fall precaution, call light within reach, bed alarm on.

## 2025-06-21 NOTE — PROGRESS NOTES
[facility] Medicine Daily Progress Note    Date of Service  6/21/2025    Chief Complaint  Minerva Tillman is a 50 y.o. female admitted 6/19/2025 with jaundice    Hospital Course  Minerva Tillman is a 50 y.o. female who presented 6/19/2025 with past medical history of liver cirrhosis, alcohol abuse, depression who presents to the hospital for jaundice the past 1 week.  It is associated with abdominal distention, periumbilical abdominal pain, nausea, vomiting and hematuria.  She does complain of shortness of breath but denies any orthopnea, fevers or cough.  Patient states that she drinks wine on a daily basis.  She last had a drink this morning.     Interval Problem Update  Axox3, much more alert and awake today, some tremor, stand by assist. No hallucinations, denies abdominal pain, hypoxia improving. Tolerating diuretics, bilirubin increasing.  No further hematuria.- will consider anticoagulation tomorrow if h/h stable. Paracentesis pending. ROS otherwise negative.     I have discussed this patient's plan of care and discharge plan at IDT rounds today with Case Management, Nursing, Nursing leadership, and other members of the IDT team.    Consultants/Specialty  GI    Code Status  Full Code    Disposition  The patient is not medically cleared for discharge to home or a post-acute facility.      I have placed the appropriate orders for post-discharge needs.    Review of Systems  Review of Systems   Constitutional:  Positive for malaise/fatigue. Negative for chills, diaphoresis, fever and weight loss.   HENT: Negative.  Negative for sore throat.    Eyes: Negative.  Negative for blurred vision.   Respiratory: Negative.  Negative for cough and shortness of breath.    Cardiovascular: Negative.  Negative for chest pain, palpitations and leg swelling.   Gastrointestinal:  Negative for abdominal pain, nausea and vomiting.   Genitourinary:  Positive for hematuria (resolving). Negative for dysuria.   Musculoskeletal:  Negative.  Negative for myalgias.   Skin: Negative.  Negative for itching and rash.   Neurological: Negative.  Negative for dizziness, focal weakness, weakness and headaches.   Endo/Heme/Allergies: Negative.  Does not bruise/bleed easily.   Psychiatric/Behavioral:  Positive for substance abuse. Negative for depression and suicidal ideas.    All other systems reviewed and are negative.       Physical Exam  Temp:  [36.1 °C (97 °F)-36.6 °C (97.8 °F)] 36.4 °C (97.5 °F)  Pulse:  [67-79] 73  Resp:  [17-18] 17  BP: ()/(70-80) 115/80  SpO2:  [91 %-95 %] 95 %    Physical Exam  Vitals and nursing note reviewed. Exam conducted with a chaperone present.   Constitutional:       General: She is not in acute distress.     Appearance: Normal appearance. She is not diaphoretic.   HENT:      Head: Normocephalic.      Nose: Nose normal.      Mouth/Throat:      Mouth: Mucous membranes are moist.   Eyes:      General: Scleral icterus present.      Pupils: Pupils are equal, round, and reactive to light.   Cardiovascular:      Rate and Rhythm: Normal rate and regular rhythm.      Pulses: Normal pulses.      Heart sounds: Normal heart sounds.   Pulmonary:      Effort: Pulmonary effort is normal.      Breath sounds: Normal breath sounds.   Abdominal:      General: Abdomen is flat. Bowel sounds are normal. There is distension (mild).      Palpations: Abdomen is soft.      Tenderness: There is no abdominal tenderness.   Musculoskeletal:         General: No swelling or deformity. Normal range of motion.   Skin:     General: Skin is warm and dry.      Capillary Refill: Capillary refill takes less than 2 seconds.      Coloration: Skin is jaundiced.      Findings: No bruising or lesion.   Neurological:      General: No focal deficit present.      Mental Status: She is alert and oriented to person, place, and time.      Cranial Nerves: No cranial nerve deficit.   Psychiatric:         Mood and Affect: Mood normal.         Behavior: Behavior  normal.         Fluids    Intake/Output Summary (Last 24 hours) at 6/21/2025 1416  Last data filed at 6/20/2025 1917  Gross per 24 hour   Intake 240 ml   Output --   Net 240 ml        Laboratory  Recent Labs     06/19/25 1835 06/20/25  0045   WBC 14.9* 10.3   RBC 4.14* 3.74*   HEMOGLOBIN 14.7 13.6   HEMATOCRIT 43.3 39.1   .6* 104.5*   MCH 35.5* 36.4*   MCHC 33.9 34.8   RDW 71.5* 70.4*   PLATELETCT 228 191   MPV 10.3 10.3     Recent Labs     06/19/25 1835 06/20/25 0045 06/21/25  0758   SODIUM 130* 129* 133*   POTASSIUM 4.0 3.5* 4.4   CHLORIDE 87* 91* 95*   CO2 24 21 27   GLUCOSE 102* 119* 96   BUN 6* 6* 7*   CREATININE 0.73 0.59 0.76   CALCIUM 8.6 7.9* 8.4*     Recent Labs     06/19/25 1835 06/21/25 0758   APTT 41.1*  --    INR 1.87* 1.89*               Imaging  US-ABDOMEN COMPLETE SURVEY   Final Result      1.  Cirrhotic appearance of the liver with a lobulated cyst.   2.  Sludge is present within the gallbladder. There is no definite stone.   3.  Ascites.   4.  There appears to be occlusion of the portal vein and at least partial occlusion of the inferior vena cava.   5.  Evaluation is limited with poor visualization of the midline structures.         DX-CHEST-PORTABLE (1 VIEW)   Final Result      Mild left lung infiltrate.      US-PARACENTESIS, ABD WITH IMAGING    (Results Pending)        Assessment/Plan  * Alcohol abuse with withdrawal and perceptual disturbance (HCC)- (present on admission)  Assessment & Plan  CIWA protocol  multivitamin, thiamine and folate  replacing electrolytes  See discussion above  CIWA score increasing  - continue to follow closely  No hallucinations or seizure today - precautions    Portal vein thrombosis  Assessment & Plan  Hematuria resolving, pending renal u/s - if resolved and h/h stable will consider anticoagulation     Hyponatremia  Assessment & Plan  Consistent with cirrhosis  following    Alcoholic hepatitis  Assessment & Plan  Maddery dissemination score 46  start  prednislone 40 mg  Assess for response after 7 days with jeramie score     Alcoholic cirrhosis (HCC)  Assessment & Plan  Decompensated  Volume overloaded, improving with diuretics - tapering as tolerated  MELD 27    Acute hypoxic respiratory failure (HCC)- (present on admission)  Assessment & Plan  Improving, down to 2L - continue to titrate as tolerated  Volume overloaded on exam - likely contributing - improving with diuretics  Query mild community acquired pna, non specific changes on x ray - currently denies sob  No cough, on empiric ceftriaxone  Wean as tolerated  Following  RT      High anion gap metabolic acidosis- (present on admission)  Assessment & Plan  Continue to trend secondary to liver dysfunction    Ascites- (present on admission)  Assessment & Plan  Due to cirrhosis  Continue diuretics, continue to titrate as tolerated  Paracentesis pending  following    Hypokalemia- (present on admission)  Assessment & Plan  Mild  Replacing with low dose as also starting lasix and aldactone  Low mag in past - will recheck  following    Acute cystitis- (present on admission)  Assessment & Plan  Renal ultrasound has been ordered for hematuria  UA + for bacteria, LE and nitrite  On iv ceftriaxone, pending paracentesis fluid analysis  Cultures pending  following           VTE prophylaxis: SCD    I have performed a physical exam and reviewed and updated ROS and Plan today (6/21/2025). In review of yesterday's note (6/20/2025), there are no changes except as documented above.

## 2025-06-21 NOTE — CARE PLAN
The patient is Stable - Low risk of patient condition declining or worsening    Shift Goals  Clinical Goals: complete CIWA as scheduled and remain free from GLF through out my shift  Patient Goals: feel better  Family Goals: eddie    Progress made toward(s) clinical / shift goals:  pt is A&Ox4 currently on RA, pt CIWA are q4hrs and has been receiving 0.5mg of ativan. Pt has bed alarm and bed strip alarm in place, currently pt has remained free from GLF.    Patient is not progressing towards the following goals:

## 2025-06-22 LAB
AFP-TM SERPL-MCNC: 2 NG/ML (ref 0–9)
ALBUMIN SERPL BCP-MCNC: 2.7 G/DL (ref 3.2–4.9)
ALBUMIN/GLOB SERPL: 1 G/DL
ALP SERPL-CCNC: 275 U/L (ref 30–99)
ALT SERPL-CCNC: 107 U/L (ref 2–50)
ANION GAP SERPL CALC-SCNC: 11 MMOL/L (ref 7–16)
AST SERPL-CCNC: 266 U/L (ref 12–45)
BASOPHILS # BLD AUTO: 0.5 % (ref 0–1.8)
BASOPHILS # BLD: 0.05 K/UL (ref 0–0.12)
BILIRUB SERPL-MCNC: 14.2 MG/DL (ref 0.1–1.5)
BUN SERPL-MCNC: 8 MG/DL (ref 8–22)
CALCIUM ALBUM COR SERPL-MCNC: 9.6 MG/DL (ref 8.5–10.5)
CALCIUM SERPL-MCNC: 8.6 MG/DL (ref 8.5–10.5)
CHLORIDE SERPL-SCNC: 95 MMOL/L (ref 96–112)
CO2 SERPL-SCNC: 28 MMOL/L (ref 20–33)
CREAT SERPL-MCNC: 0.81 MG/DL (ref 0.5–1.4)
CYTOLOGY REG CYTOL: NORMAL
EOSINOPHIL # BLD AUTO: 0.01 K/UL (ref 0–0.51)
EOSINOPHIL NFR BLD: 0.1 % (ref 0–6.9)
ERYTHROCYTE [DISTWIDTH] IN BLOOD BY AUTOMATED COUNT: 77.4 FL (ref 35.9–50)
GFR SERPLBLD CREATININE-BSD FMLA CKD-EPI: 88 ML/MIN/1.73 M 2
GLOBULIN SER CALC-MCNC: 2.6 G/DL (ref 1.9–3.5)
GLUCOSE SERPL-MCNC: 104 MG/DL (ref 65–99)
HCT VFR BLD AUTO: 39.6 % (ref 37–47)
HGB BLD-MCNC: 13.4 G/DL (ref 12–16)
IMM GRANULOCYTES # BLD AUTO: 0.09 K/UL (ref 0–0.11)
IMM GRANULOCYTES NFR BLD AUTO: 0.9 % (ref 0–0.9)
LYMPHOCYTES # BLD AUTO: 1.94 K/UL (ref 1–4.8)
LYMPHOCYTES NFR BLD: 18.4 % (ref 22–41)
MCH RBC QN AUTO: 36.4 PG (ref 27–33)
MCHC RBC AUTO-ENTMCNC: 33.8 G/DL (ref 32.2–35.5)
MCV RBC AUTO: 107.6 FL (ref 81.4–97.8)
MONOCYTES # BLD AUTO: 1.44 K/UL (ref 0–0.85)
MONOCYTES NFR BLD AUTO: 13.7 % (ref 0–13.4)
NEUTROPHILS # BLD AUTO: 7 K/UL (ref 1.82–7.42)
NEUTROPHILS NFR BLD: 66.4 % (ref 44–72)
NRBC # BLD AUTO: 0.03 K/UL
NRBC BLD-RTO: 0.3 /100 WBC (ref 0–0.2)
PLATELET # BLD AUTO: 167 K/UL (ref 164–446)
PMV BLD AUTO: 10.5 FL (ref 9–12.9)
POTASSIUM SERPL-SCNC: 4.1 MMOL/L (ref 3.6–5.5)
PROT SERPL-MCNC: 5.3 G/DL (ref 6–8.2)
RBC # BLD AUTO: 3.68 M/UL (ref 4.2–5.4)
SODIUM SERPL-SCNC: 134 MMOL/L (ref 135–145)
WBC # BLD AUTO: 10.5 K/UL (ref 4.8–10.8)

## 2025-06-22 PROCEDURE — 770001 HCHG ROOM/CARE - MED/SURG/GYN PRIV*

## 2025-06-22 PROCEDURE — 700111 HCHG RX REV CODE 636 W/ 250 OVERRIDE (IP): Performed by: HOSPITALIST

## 2025-06-22 PROCEDURE — 700102 HCHG RX REV CODE 250 W/ 637 OVERRIDE(OP): Performed by: HOSPITALIST

## 2025-06-22 PROCEDURE — A9270 NON-COVERED ITEM OR SERVICE: HCPCS | Performed by: HOSPITALIST

## 2025-06-22 PROCEDURE — 99232 SBSQ HOSP IP/OBS MODERATE 35: CPT | Performed by: NURSE PRACTITIONER

## 2025-06-22 PROCEDURE — 36415 COLL VENOUS BLD VENIPUNCTURE: CPT

## 2025-06-22 PROCEDURE — 80053 COMPREHEN METABOLIC PANEL: CPT

## 2025-06-22 PROCEDURE — 99233 SBSQ HOSP IP/OBS HIGH 50: CPT | Performed by: HOSPITALIST

## 2025-06-22 PROCEDURE — 85025 COMPLETE CBC W/AUTO DIFF WBC: CPT

## 2025-06-22 RX ORDER — PREDNISOLONE SODIUM PHOSPHATE 15 MG/5ML
40 SOLUTION ORAL DAILY
Status: DISCONTINUED | OUTPATIENT
Start: 2025-06-22 | End: 2025-06-28

## 2025-06-22 RX ORDER — MAGNESIUM SULFATE HEPTAHYDRATE 40 MG/ML
2 INJECTION, SOLUTION INTRAVENOUS ONCE
Status: COMPLETED | OUTPATIENT
Start: 2025-06-22 | End: 2025-06-22

## 2025-06-22 RX ORDER — PREDNISOLONE SODIUM PHOSPHATE 15 MG/5ML
15 SOLUTION ORAL 2 TIMES DAILY
Status: DISCONTINUED | OUTPATIENT
Start: 2025-06-22 | End: 2025-06-22

## 2025-06-22 RX ADMIN — ALBUTEROL SULFATE 2 PUFF: 90 AEROSOL, METERED RESPIRATORY (INHALATION) at 07:35

## 2025-06-22 RX ADMIN — APIXABAN 10 MG: 5 TABLET, FILM COATED ORAL at 17:07

## 2025-06-22 RX ADMIN — LORAZEPAM 0.5 MG: 0.5 TABLET ORAL at 07:51

## 2025-06-22 RX ADMIN — AMOXICILLIN AND CLAVULANATE POTASSIUM 1 TABLET: 875; 125 TABLET, FILM COATED ORAL at 17:07

## 2025-06-22 RX ADMIN — PREDNISOLONE SODIUM PHOSPHATE 39.9 MG: 15 SOLUTION ORAL at 04:09

## 2025-06-22 RX ADMIN — GABAPENTIN 300 MG: 300 CAPSULE ORAL at 17:07

## 2025-06-22 RX ADMIN — ALBUTEROL SULFATE 2 PUFF: 90 AEROSOL, METERED RESPIRATORY (INHALATION) at 22:49

## 2025-06-22 RX ADMIN — MAGNESIUM SULFATE HEPTAHYDRATE 2 G: 2 INJECTION, SOLUTION INTRAVENOUS at 12:51

## 2025-06-22 RX ADMIN — GABAPENTIN 300 MG: 300 CAPSULE ORAL at 04:08

## 2025-06-22 RX ADMIN — APIXABAN 10 MG: 5 TABLET, FILM COATED ORAL at 12:03

## 2025-06-22 RX ADMIN — ESCITALOPRAM OXALATE 20 MG: 10 TABLET ORAL at 04:08

## 2025-06-22 RX ADMIN — LEVOTHYROXINE SODIUM 50 MCG: 0.05 TABLET ORAL at 04:08

## 2025-06-22 RX ADMIN — THERA TABS 1 TABLET: TAB at 04:08

## 2025-06-22 RX ADMIN — PREDNISOLONE SODIUM PHOSPHATE 39.9 MG: 15 SOLUTION ORAL at 14:05

## 2025-06-22 RX ADMIN — HYDROXYZINE HYDROCHLORIDE 25 MG: 50 TABLET ORAL at 19:52

## 2025-06-22 RX ADMIN — ONDANSETRON 4 MG: 4 TABLET, ORALLY DISINTEGRATING ORAL at 19:53

## 2025-06-22 RX ADMIN — FUROSEMIDE 40 MG: 40 TABLET ORAL at 04:08

## 2025-06-22 RX ADMIN — Medication 100 MG: at 04:08

## 2025-06-22 RX ADMIN — FOLIC ACID 1 MG: 1 TABLET ORAL at 04:08

## 2025-06-22 RX ADMIN — SPIRONOLACTONE 100 MG: 100 TABLET ORAL at 04:07

## 2025-06-22 ASSESSMENT — ENCOUNTER SYMPTOMS
DIAPHORESIS: 0
SORE THROAT: 0
CARDIOVASCULAR NEGATIVE: 1
EYES NEGATIVE: 1
PALPITATIONS: 0
NAUSEA: 0
DIZZINESS: 0
MYALGIAS: 0
CONSTIPATION: 0
HEADACHES: 0
BLOOD IN STOOL: 0
TREMORS: 1
DEPRESSION: 0
SHORTNESS OF BREATH: 0
VOMITING: 0
RESPIRATORY NEGATIVE: 1
CHILLS: 0
WEAKNESS: 0
NEUROLOGICAL NEGATIVE: 1
FOCAL WEAKNESS: 0
BACK PAIN: 0
BLURRED VISION: 0
COUGH: 0
WEIGHT LOSS: 0
MUSCULOSKELETAL NEGATIVE: 1
HEARTBURN: 0
BRUISES/BLEEDS EASILY: 0
DIARRHEA: 1
FEVER: 0
ABDOMINAL PAIN: 0

## 2025-06-22 ASSESSMENT — LIFESTYLE VARIABLES
AUDITORY DISTURBANCES: NOT PRESENT
HEADACHE, FULLNESS IN HEAD: NOT PRESENT
NAUSEA AND VOMITING: NO NAUSEA AND NO VOMITING
ANXIETY: *
AUDITORY DISTURBANCES: NOT PRESENT
VISUAL DISTURBANCES: NOT PRESENT
ORIENTATION AND CLOUDING OF SENSORIUM: ORIENTED AND CAN DO SERIAL ADDITIONS
TREMOR: TREMOR NOT VISIBLE BUT CAN BE FELT, FINGERTIP TO FINGERTIP
PAROXYSMAL SWEATS: NO SWEAT VISIBLE
ANXIETY: *
HEADACHE, FULLNESS IN HEAD: NOT PRESENT
ANXIETY: *
NAUSEA AND VOMITING: NO NAUSEA AND NO VOMITING
ORIENTATION AND CLOUDING OF SENSORIUM: ORIENTED AND CAN DO SERIAL ADDITIONS
PAROXYSMAL SWEATS: NO SWEAT VISIBLE
SUBSTANCE_ABUSE: 1
PAROXYSMAL SWEATS: NO SWEAT VISIBLE
TOTAL SCORE: 4
TOTAL SCORE: 4
VISUAL DISTURBANCES: NOT PRESENT
AUDITORY DISTURBANCES: NOT PRESENT
AGITATION: SOMEWHAT MORE THAN NORMAL ACTIVITY
HEADACHE, FULLNESS IN HEAD: NOT PRESENT
TREMOR: *
HEADACHE, FULLNESS IN HEAD: NOT PRESENT
ANXIETY: MILDLY ANXIOUS
AGITATION: NORMAL ACTIVITY
ORIENTATION AND CLOUDING OF SENSORIUM: ORIENTED AND CAN DO SERIAL ADDITIONS
ANXIETY: MILDLY ANXIOUS
NAUSEA AND VOMITING: MILD NAUSEA WITH NO VOMITING
TREMOR: TREMOR NOT VISIBLE BUT CAN BE FELT, FINGERTIP TO FINGERTIP
TREMOR: TREMOR NOT VISIBLE BUT CAN BE FELT, FINGERTIP TO FINGERTIP
TREMOR: *
AUDITORY DISTURBANCES: NOT PRESENT
TOTAL SCORE: 2
AUDITORY DISTURBANCES: NOT PRESENT
ORIENTATION AND CLOUDING OF SENSORIUM: ORIENTED AND CAN DO SERIAL ADDITIONS
VISUAL DISTURBANCES: NOT PRESENT
NAUSEA AND VOMITING: NO NAUSEA AND NO VOMITING
ORIENTATION AND CLOUDING OF SENSORIUM: ORIENTED AND CAN DO SERIAL ADDITIONS
NAUSEA AND VOMITING: NO NAUSEA AND NO VOMITING
HEADACHE, FULLNESS IN HEAD: NOT PRESENT
TOTAL SCORE: 5
VISUAL DISTURBANCES: NOT PRESENT
PAROXYSMAL SWEATS: NO SWEAT VISIBLE
AGITATION: SOMEWHAT MORE THAN NORMAL ACTIVITY
AGITATION: SOMEWHAT MORE THAN NORMAL ACTIVITY
VISUAL DISTURBANCES: NOT PRESENT
AGITATION: NORMAL ACTIVITY
TOTAL SCORE: 4
PAROXYSMAL SWEATS: NO SWEAT VISIBLE

## 2025-06-22 ASSESSMENT — PAIN DESCRIPTION - PAIN TYPE
TYPE: ACUTE PAIN
TYPE: ACUTE PAIN

## 2025-06-22 NOTE — PROGRESS NOTES
Hospital Medicine Daily Progress Note    Date of Service  6/22/2025    Chief Complaint  Minerva Tillman is a 50 y.o. female admitted 6/19/2025 with jaundice    Hospital Course  Minerva Tillman is a 50 y.o. female who presented 6/19/2025 with past medical history of liver cirrhosis, alcohol abuse, depression who presents to the hospital for jaundice the past 1 week.  It is associated with abdominal distention, periumbilical abdominal pain, nausea, vomiting and hematuria.  She does complain of shortness of breath but denies any orthopnea, fevers or cough.  Patient states that she drinks wine on a daily basis.  She last had a drink this morning.     Interval Problem Update  Axox3, ciwa up to 5 this am, mild tremor, no hallucinations, poor recall. States she is feeling better. We discussed portal vein thrombosis and she agrees to start anticoagulation. We also further discussed her d/c plans - she states she is again open to going to the facility in [facility] that social work recommended. BP low normal - in range expected with cirrhosis, tolerating diuretics well, hypoxia improved, paracentesis removed > 1L fluid, no evidence of sbp. Discussed with GI, they are re considering restarting steroids. Bili continues to increase. Will titrate down lactulose as frequent bowel movements on current dose. ROS otherwise negative.     I have discussed this patient's plan of care and discharge plan at IDT rounds today with Case Management, Nursing, Nursing leadership, and other members of the IDT team.    Consultants/Specialty  GI    Code Status  Full Code    Disposition  The patient is not medically cleared for discharge to home or a post-acute facility.      I have placed the appropriate orders for post-discharge needs.    Review of Systems  Review of Systems   Constitutional:  Positive for malaise/fatigue. Negative for chills, diaphoresis, fever and weight loss.   HENT: Negative.  Negative for sore throat.    Eyes:  Negative.  Negative for blurred vision.   Respiratory: Negative.  Negative for cough and shortness of breath.    Cardiovascular: Negative.  Negative for chest pain, palpitations and leg swelling.   Gastrointestinal:  Negative for abdominal pain, nausea and vomiting.   Genitourinary:  Negative for dysuria and hematuria.   Musculoskeletal: Negative.  Negative for myalgias.   Skin: Negative.  Negative for itching and rash.   Neurological: Negative.  Negative for dizziness, focal weakness, weakness and headaches.   Endo/Heme/Allergies: Negative.  Does not bruise/bleed easily.   Psychiatric/Behavioral:  Positive for substance abuse. Negative for depression and suicidal ideas.    All other systems reviewed and are negative.       Physical Exam  Temp:  [36 °C (96.8 °F)-36.5 °C (97.7 °F)] 36 °C (96.8 °F)  Pulse:  [66-73] 66  Resp:  [17-18] 18  BP: ()/(63-80) 99/71  SpO2:  [92 %-95 %] 92 %    Physical Exam  Vitals and nursing note reviewed. Exam conducted with a chaperone present.   Constitutional:       General: She is not in acute distress.     Appearance: Normal appearance. She is not diaphoretic.   HENT:      Head: Normocephalic.      Nose: Nose normal.      Mouth/Throat:      Mouth: Mucous membranes are moist.   Eyes:      General: Scleral icterus present.      Pupils: Pupils are equal, round, and reactive to light.   Cardiovascular:      Rate and Rhythm: Normal rate and regular rhythm.      Pulses: Normal pulses.      Heart sounds: Normal heart sounds.   Pulmonary:      Effort: Pulmonary effort is normal.      Breath sounds: Normal breath sounds.   Abdominal:      General: Abdomen is flat. Bowel sounds are normal. There is distension (mild).      Palpations: Abdomen is soft.      Tenderness: There is no abdominal tenderness.   Musculoskeletal:         General: No swelling or deformity. Normal range of motion.   Skin:     General: Skin is warm and dry.      Capillary Refill: Capillary refill takes less than 2  seconds.      Coloration: Skin is jaundiced.      Findings: No bruising or lesion.   Neurological:      General: No focal deficit present.      Mental Status: She is alert and oriented to person, place, and time.      Cranial Nerves: No cranial nerve deficit.   Psychiatric:         Mood and Affect: Mood normal.         Behavior: Behavior normal.         Fluids    Intake/Output Summary (Last 24 hours) at 6/22/2025 0748  Last data filed at 6/21/2025 2000  Gross per 24 hour   Intake 240 ml   Output --   Net 240 ml        Laboratory  Recent Labs     06/19/25  1835 06/20/25  0045 06/22/25  0402   WBC 14.9* 10.3 10.5   RBC 4.14* 3.74* 3.68*   HEMOGLOBIN 14.7 13.6 13.4   HEMATOCRIT 43.3 39.1 39.6   .6* 104.5* 107.6*   MCH 35.5* 36.4* 36.4*   MCHC 33.9 34.8 33.8   RDW 71.5* 70.4* 77.4*   PLATELETCT 228 191 167   MPV 10.3 10.3 10.5     Recent Labs     06/20/25  0045 06/21/25  0758 06/22/25  0402   SODIUM 129* 133* 134*   POTASSIUM 3.5* 4.4 4.1   CHLORIDE 91* 95* 95*   CO2 21 27 28   GLUCOSE 119* 96 104*   BUN 6* 7* 8   CREATININE 0.59 0.76 0.81   CALCIUM 7.9* 8.4* 8.6     Recent Labs     06/19/25  1835 06/21/25  0758   APTT 41.1*  --    INR 1.87* 1.89*               Imaging  US-PARACENTESIS, ABD WITH IMAGING   Final Result      1. Ultrasound-guided diagnostic and therapeutic paracentesis of the right lower quadrant of the abdominal wall.      2. 1200 mL of fluid withdrawn.      US-ABDOMEN COMPLETE SURVEY   Final Result      1.  Cirrhotic appearance of the liver with a lobulated cyst.   2.  Sludge is present within the gallbladder. There is no definite stone.   3.  Ascites.   4.  There appears to be occlusion of the portal vein and at least partial occlusion of the inferior vena cava.   5.  Evaluation is limited with poor visualization of the midline structures.         DX-CHEST-PORTABLE (1 VIEW)   Final Result      Mild left lung infiltrate.           Assessment/Plan  * Alcohol abuse with withdrawal and perceptual  disturbance (HCC)- (present on admission)  Assessment & Plan  CIWA protocol  multivitamin, thiamine and folate  replacing electrolytes  See discussion above  CIWA score increasing  - continue to follow closely  No hallucinations or seizure today - precautions    Portal vein thrombosis  Assessment & Plan  Hematuria resolving, pending renal u/s - if resolved and h/h stable will consider anticoagulation     Hyponatremia  Assessment & Plan  Consistent with cirrhosis  following    Alcoholic hepatitis  Assessment & Plan  Maddery dissemination score 46  start prednislone 40 mg  Assess for response after 7 days with jeramie score     Alcoholic cirrhosis (HCC)  Assessment & Plan  Decompensated  Volume overloaded, improving with diuretics - tapering as tolerated  MELD 27    Acute hypoxic respiratory failure (HCC)- (present on admission)  Assessment & Plan  Improving, down to 2L - continue to titrate as tolerated  Volume overloaded on exam - likely contributing - improving with diuretics  Query mild community acquired pna, non specific changes on x ray - currently denies sob  No cough, on empiric ceftriaxone  Wean as tolerated  Following  RT      High anion gap metabolic acidosis- (present on admission)  Assessment & Plan  Continue to trend secondary to liver dysfunction    Ascites- (present on admission)  Assessment & Plan  Due to cirrhosis  Continue diuretics, continue to titrate as tolerated  Paracentesis pending  following    Hypokalemia- (present on admission)  Assessment & Plan  Mild  Replacing with low dose as also starting lasix and aldactone  Low mag in past - will recheck  following    Acute cystitis- (present on admission)  Assessment & Plan  Renal ultrasound has been ordered for hematuria  UA + for bacteria, LE and nitrite  On iv ceftriaxone, pending paracentesis fluid analysis  Cultures pending  following           VTE prophylaxis: SCD/ eliquis    I have performed a physical exam and reviewed and updated ROS and  Plan today (6/22/2025). In review of yesterday's note (6/21/2025), there are no changes except as documented above.

## 2025-06-22 NOTE — CARE PLAN
The patient is Watcher - Medium risk of patient condition declining or worsening    Shift Goals  Clinical Goals: CIWA, Safety  Patient Goals: comfort and rest  Family Goals: none present    Progress made toward(s) clinical / shift goals:    Problem: Safety  Goal: Will remain free from injury  Outcome: Progressing   Pt continues to remain free from falls throughout shift   Problem: Pain Management  Goal: Pain level will decrease to patient's comfort goal  Outcome: Progressing   No pain noted by patient     Patient is not progressing towards the following goals:

## 2025-06-22 NOTE — CARE PLAN
The patient is Stable - Low risk of patient condition declining or worsening      Problem: Safety  Goal: Will remain free from injury  Outcome: Progressing     Problem: Pain Management  Goal: Pain level will decrease to patient's comfort goal  Outcome: Progressing     Problem: Knowledge Deficit  Goal: Knowledge of disease process/condition, treatment plan, diagnostic tests, and medications will improve  Outcome: Progressing     Shift Goals  Clinical Goals: CIWA, safety, free from fall  Patient Goals: Feel better  Family Goals: none present    Progress made toward(s) clinical / shift goals:      Pt alert and oriented. BP soft, BP med held. On O2 3L via nasal cannula. No c/o pain. On CIWA, med given as ordered. Reinforced education on fall precaution, call light within reach, bed alarm on.

## 2025-06-22 NOTE — PROGRESS NOTES
..Gastroenterology Progress Note               Author:  Beena Alex, DNP,  APRN Date & Time Created: 6/22/2025 7:52 AM       Patient ID:  Name:             Minerva Tillman  YOB: 1975  Age:                 50 y.o.  female  MRN:               [id number]    Medical Decision Making, by Problem:  Active Hospital Problems    Diagnosis     Hyponatremia [E87.1]     Portal vein thrombosis [I81]     Alcohol abuse with withdrawal and perceptual disturbance (HCC) [F10.132]     Alcoholic cirrhosis (HCC) [K70.30]     Alcoholic hepatitis [K70.10]     High anion gap metabolic acidosis [E87.29]     Acute hypoxic respiratory failure (HCC) [J96.01]     Ascites [R18.8]     Hypokalemia [E87.6]     Acute cystitis [N30.00]      Presenting Chief Complaint:  Decompensated cirrhosis     History of Present Illness:   50-year-old female presents to the emergency department for abdominal swelling and shortness of breath and desire to discontinue alcohol intake.  Patient was seen by gastroenterology as an inpatient in 2024.  Dr. Daniels had recommended outpatient evaluation.  Patient followed up with Dr. Conley on 7/1/2024 with an additional follow-up appointment on 10/17/2024.  She had a FibroScan done which suggested advanced fibrosis and possible cirrhosis.  She was recommended to pursue upper and lower endoscopy.  Patient states that when she was told of this information she became quite concerned and did not follow-up because she was scared of the results.  Patient continues to drink alcohol.  She wants help in quitting.  She denies hematemesis or melena.  She endorses new onset abdominal swelling without lower extremity edema.     Home medications reviewed.  Patient not taking anything in the way of diuretics or medications for encephalopathy.  Patient is not on a beta-blocker.     Review of systems at this time is as stated above as well as patient feeling a little bit anxious but comfortable with regard to her  detoxification/CIWA protocol.    Interval History:  6/21/2025: Patient seen.  Reports feeling much better but having multiple liquid bowel movements.  Mild tremors, very hungry.  Has started wearing oxygen at night at home, reports it is due to increased elevation at home in [facility].  Slightly dyspneic today.  N.p.o. pending paracentesis. VSS, CIWA 4.  Transaminitis worsening, T. bili 13.8 > 9.4.  INR 1.89.  Hoping to go directly to rehab from the hospital.    6/22/2025: Feels better, tremulous with CIWA 5. Having multiple bowel movements. Fluid negative for SBP and NGTD on culture. Bili up to 14.2    Hospital Medications:  Current Facility-Administered Medications   Medication Dose Frequency Provider Last Rate Last Admin    spironolactone (Aldactone) tablet 100 mg  100 mg Q DAY Helen Mendez M.D.   100 mg at 06/22/25 0407    lactulose 20 GM/30ML solution 15 mL  15 mL DAILY Beena Alex, DNP,  APRN        Respiratory Therapy Consult   Continuous RT Helen Mendez M.D.        furosemide (Lasix) tablet 40 mg  40 mg Q DAY Helen Mendez M.D.   40 mg at 06/22/25 0408    carvedilol (Coreg) tablet 3.125 mg  3.125 mg BID WITH MEALS Helen Mendez M.D.   3.125 mg at 06/21/25 1632    melatonin tablet 5 mg  5 mg HS PRN Roseanne Crowder, A.P.R.N.   5 mg at 06/20/25 2133    labetalol (Normodyne/Trandate) injection 10 mg  10 mg Q4HRS PRN Abby Ivy M.D.        LORazepam (Ativan) tablet 0.5 mg  0.5 mg Q4HRS PRN Abby Ivy M.D.   0.5 mg at 06/22/25 0751    LORazepam (Ativan) tablet 1 mg  1 mg Q4HRS PRN Abby Ivy M.D.   1 mg at 06/20/25 0747    LORazepam (Ativan) tablet 2 mg  2 mg Q2HRS PRN Abby Ivy M.D.        LORazepam (Ativan) tablet 3 mg  3 mg Q HOUR PRN Abby Ivy M.D.        LORazepam (Ativan) tablet 4 mg  4 mg Q15 MIN PRN Abby Ivy M.D.        Or    diazePAM (Valium) injection 10 mg  10 mg Q15 MIN PRN Abby Ivy M.D.        ondansetron (Zofran) syringe/vial injection 4 mg  4 mg Q4HRS  PRN Abby Ivy M.D.        ondansetron (Zofran ODT) dispertab 4 mg  4 mg Q4HRS PRN Abby Ivy M.D.   4 mg at 06/20/25 0745    thiamine (Vitamin B-1) tablet 100 mg  100 mg DAILY Abby Ivy M.D.   100 mg at 06/22/25 0408    And    multivitamin tablet 1 Tablet  1 Tablet DAILY Abby Ivy M.D.   1 Tablet at 06/22/25 0408    And    folic acid (Folvite) tablet 1 mg  1 mg DAILY Abby Ivy M.D.   1 mg at 06/22/25 0408    cefTRIAXone (Rocephin) syringe 2,000 mg  2,000 mg Q EVENING Abby Ivy M.D.   2,000 mg at 06/21/25 1632    escitalopram (Lexapro) tablet 20 mg  20 mg DAILY Abby Ivy M.D.   20 mg at 06/22/25 0408    gabapentin (Neurontin) capsule 300 mg  300 mg BID Abby Ivy M.D.   300 mg at 06/22/25 0408    levothyroxine (Synthroid) tablet 50 mcg  50 mcg AM ES Abby Ivy M.D.   50 mcg at 06/22/25 0408    hydrOXYzine HCl (Atarax) tablet 25 mg  25 mg QDAY PRN Abby Ivy M.D.   25 mg at 06/21/25 2348    albuterol inhaler 1-2 Puff  1-2 Puff Q6HRS PRN Abby Ivy M.D.   2 Puff at 06/22/25 0735   Last reviewed on 6/19/2025  7:43 PM by Spencer Camacho T       Review of Systems:  Review of Systems   Constitutional:  Negative for chills, fever and malaise/fatigue.   HENT:  Negative for hearing loss.    Eyes:  Negative for blurred vision.   Respiratory:  Negative for cough and shortness of breath.    Cardiovascular:  Negative for chest pain and leg swelling.   Gastrointestinal:  Positive for diarrhea. Negative for abdominal pain, blood in stool, constipation, heartburn, melena, nausea and vomiting.   Genitourinary:  Negative for dysuria.   Musculoskeletal:  Negative for back pain.   Skin:  Negative for rash.   Neurological:  Positive for tremors. Negative for dizziness and weakness.   Psychiatric/Behavioral:  Positive for substance abuse. Negative for depression.    All other systems reviewed and are negative.    Vital signs:  Weight/BMI: Body mass index is 26.05 kg/m².  BP 99/71   Pulse 66   Temp  "36 °C (96.8 °F) (Temporal)   Resp 18   Ht 1.575 m (5' 2\")   Wt 64.6 kg (142 lb 6.7 oz)   SpO2 92%   Vitals:    06/21/25 1535 06/21/25 1924 06/22/25 0420 06/22/25 0745   BP: (!) 141/63 91/64 97/65 99/71   Pulse: 68 72 66 66   Resp: 18 17 18 18   Temp: 36.4 °C (97.5 °F) 36.5 °C (97.7 °F) 36.4 °C (97.5 °F) 36 °C (96.8 °F)   TempSrc: Temporal Temporal Temporal Temporal   SpO2: 94% 92% 94% 92%   Weight:       Height:         Oxygen Therapy:  Pulse Oximetry: 92 %, O2 (LPM): 3, O2 Delivery Device: Silicone Nasal Cannula    Intake/Output Summary (Last 24 hours) at 6/22/2025 0752  Last data filed at 6/21/2025 2000  Gross per 24 hour   Intake 240 ml   Output --   Net 240 ml       Physical Exam  Vitals and nursing note reviewed.   Constitutional:       General: She is not in acute distress.     Appearance: She is ill-appearing.   HENT:      Head: Normocephalic and atraumatic.      Right Ear: External ear normal.      Left Ear: External ear normal.      Nose: Nose normal.      Mouth/Throat:      Mouth: Mucous membranes are moist.      Pharynx: Oropharynx is clear.   Eyes:      General: Scleral icterus present.   Cardiovascular:      Rate and Rhythm: Normal rate and regular rhythm.      Pulses: Normal pulses.      Heart sounds: Normal heart sounds.   Pulmonary:      Effort: Pulmonary effort is normal. No respiratory distress.      Breath sounds: Normal breath sounds.   Abdominal:      General: Abdomen is flat. Bowel sounds are normal. There is no distension.      Palpations: Abdomen is soft.      Tenderness: There is no abdominal tenderness.   Musculoskeletal:         General: Normal range of motion.      Cervical back: Normal range of motion.   Skin:     General: Skin is warm and dry.      Capillary Refill: Capillary refill takes less than 2 seconds.      Coloration: Skin is jaundiced.   Neurological:      Mental Status: She is alert and oriented to person, place, and time.      Comments: Tremulous   Psychiatric:         " Mood and Affect: Mood normal.         Behavior: Behavior normal.         Labs:  Recent Labs     06/20/25 0045 06/21/25 0218 06/21/25 0758 06/22/25  0402   SODIUM 129*  --  133* 134*   POTASSIUM 3.5*  --  4.4 4.1   CHLORIDE 91*  --  95* 95*   CO2 21  --  27 28   BUN 6*  --  7* 8   CREATININE 0.59  --  0.76 0.81   MAGNESIUM  --  1.7  --   --    CALCIUM 7.9*  --  8.4* 8.6     Recent Labs     06/19/25 1835 06/20/25 0045 06/21/25 0758 06/22/25  0402   ALTSGPT 131* 110* 112* 107*   ASTSGOT 334* 282* 310* 266*   ALKPHOSPHAT 354* 278* 295* 275*   TBILIRUBIN 11.6* 9.4* 13.8* 14.2*   DBILIRUBIN  --   --  8.8*  --    LIPASE 17  --   --   --    GLUCOSE 102* 119* 96 104*     Recent Labs     06/19/25 1835 06/20/25 0045 06/21/25 0758 06/22/25  0402   WBC 14.9* 10.3  --  10.5   NEUTSPOLYS 59.80 75.50*  --  66.40   LYMPHOCYTES 21.40* 12.00*  --  18.40*   MONOCYTES 12.80 9.90  --  13.70*   EOSINOPHILS 0.00 0.20  --  0.10   BASOPHILS 4.30* 1.60  --  0.50   ASTSGOT 334* 282* 310* 266*   ALTSGPT 131* 110* 112* 107*   ALKPHOSPHAT 354* 278* 295* 275*   TBILIRUBIN 11.6* 9.4* 13.8* 14.2*     Recent Labs     06/19/25 1835 06/20/25 0045 06/21/25 0758 06/22/25  0402   RBC 4.14* 3.74*  --  3.68*   HEMOGLOBIN 14.7 13.6  --  13.4   HEMATOCRIT 43.3 39.1  --  39.6   PLATELETCT 228 191  --  167   PROTHROMBTM 21.6*  --  21.8*  --    APTT 41.1*  --   --   --    INR 1.87*  --  1.89*  --      Recent Results (from the past 24 hours)   BILIRUBIN DIRECT    Collection Time: 06/21/25  7:58 AM   Result Value Ref Range    Direct Bilirubin 8.8 (H) 0.1 - 0.5 mg/dL   Prothrombin Time    Collection Time: 06/21/25  7:58 AM   Result Value Ref Range    PT 21.8 (H) 12.0 - 14.6 sec    INR 1.89 (H) 0.87 - 1.13   Comp Metabolic Panel    Collection Time: 06/21/25  7:58 AM   Result Value Ref Range    Sodium 133 (L) 135 - 145 mmol/L    Potassium 4.4 3.6 - 5.5 mmol/L    Chloride 95 (L) 96 - 112 mmol/L    Co2 27 20 - 33 mmol/L    Anion Gap 11.0 7.0 - 16.0     Glucose 96 65 - 99 mg/dL    Bun 7 (L) 8 - 22 mg/dL    Creatinine 0.76 0.50 - 1.40 mg/dL    Calcium 8.4 (L) 8.5 - 10.5 mg/dL    Correct Calcium 9.4 8.5 - 10.5 mg/dL    AST(SGOT) 310 (H) 12 - 45 U/L    ALT(SGPT) 112 (H) 2 - 50 U/L    Alkaline Phosphatase 295 (H) 30 - 99 U/L    Total Bilirubin 13.8 (H) 0.1 - 1.5 mg/dL    Albumin 2.8 (L) 3.2 - 4.9 g/dL    Total Protein 5.6 (L) 6.0 - 8.2 g/dL    Globulin 2.8 1.9 - 3.5 g/dL    A-G Ratio 1.0 g/dL   BILIRUBIN INDIRECT    Collection Time: 06/21/25  7:58 AM   Result Value Ref Range    Indirect Bilirubin 5.0 (H) 0.0 - 1.0 mg/dL   ESTIMATED GFR    Collection Time: 06/21/25  7:58 AM   Result Value Ref Range    GFR (CKD-EPI) 95 >60 mL/min/1.73 m 2   Fluid Cell Count w/Diff    Collection Time: 06/21/25  3:25 PM   Result Value Ref Range    Fluid Type Ascites     Color-Body Fluid Yellow     Character-Body Fluid Clear     Total RBC Count <2000 cells/uL    FL Total Nucleated Cells 106 cells/uL    Polys 27 %    Lymphs 33 %    Fl Mono Macrophages 39 %    Fl Lining Cells 1    FLUID TOTAL PROTEIN    Collection Time: 06/21/25  3:25 PM   Result Value Ref Range    Fluid Type Ascites     Total Protein Fluid 0.6 g/dL   FLUID CULTURE W/GRAM STAIN    Collection Time: 06/21/25  3:25 PM    Specimen: Body Fluid   Result Value Ref Range    Significant Indicator NEG     Source BF     Site Paracentesis Fluid     Culture Result -     Gram Stain Result Rare WBCs.  No organisms seen.      FLUID ALBUMIN    Collection Time: 06/21/25  3:25 PM   Result Value Ref Range    Albumin 0.5 g/dL   GRAM STAIN    Collection Time: 06/21/25  3:25 PM    Specimen: Body Fluid   Result Value Ref Range    Significant Indicator .     Source BF     Site Paracentesis Fluid     Gram Stain Result Rare WBCs.  No organisms seen.      CBC WITH DIFFERENTIAL    Collection Time: 06/22/25  4:02 AM   Result Value Ref Range    WBC 10.5 4.8 - 10.8 K/uL    RBC 3.68 (L) 4.20 - 5.40 M/uL    Hemoglobin 13.4 12.0 - 16.0 g/dL    Hematocrit 39.6  37.0 - 47.0 %    .6 (H) 81.4 - 97.8 fL    MCH 36.4 (H) 27.0 - 33.0 pg    MCHC 33.8 32.2 - 35.5 g/dL    RDW 77.4 (H) 35.9 - 50.0 fL    Platelet Count 167 164 - 446 K/uL    MPV 10.5 9.0 - 12.9 fL    Neutrophils-Polys 66.40 44.00 - 72.00 %    Lymphocytes 18.40 (L) 22.00 - 41.00 %    Monocytes 13.70 (H) 0.00 - 13.40 %    Eosinophils 0.10 0.00 - 6.90 %    Basophils 0.50 0.00 - 1.80 %    Immature Granulocytes 0.90 0.00 - 0.90 %    Nucleated RBC 0.30 (H) 0.00 - 0.20 /100 WBC    Neutrophils (Absolute) 7.00 1.82 - 7.42 K/uL    Lymphs (Absolute) 1.94 1.00 - 4.80 K/uL    Monos (Absolute) 1.44 (H) 0.00 - 0.85 K/uL    Eos (Absolute) 0.01 0.00 - 0.51 K/uL    Baso (Absolute) 0.05 0.00 - 0.12 K/uL    Immature Granulocytes (abs) 0.09 0.00 - 0.11 K/uL    NRBC (Absolute) 0.03 K/uL   Comp Metabolic Panel    Collection Time: 06/22/25  4:02 AM   Result Value Ref Range    Sodium 134 (L) 135 - 145 mmol/L    Potassium 4.1 3.6 - 5.5 mmol/L    Chloride 95 (L) 96 - 112 mmol/L    Co2 28 20 - 33 mmol/L    Anion Gap 11.0 7.0 - 16.0    Glucose 104 (H) 65 - 99 mg/dL    Bun 8 8 - 22 mg/dL    Creatinine 0.81 0.50 - 1.40 mg/dL    Calcium 8.6 8.5 - 10.5 mg/dL    Correct Calcium 9.6 8.5 - 10.5 mg/dL    AST(SGOT) 266 (H) 12 - 45 U/L    ALT(SGPT) 107 (H) 2 - 50 U/L    Alkaline Phosphatase 275 (H) 30 - 99 U/L    Total Bilirubin 14.2 (H) 0.1 - 1.5 mg/dL    Albumin 2.7 (L) 3.2 - 4.9 g/dL    Total Protein 5.3 (L) 6.0 - 8.2 g/dL    Globulin 2.6 1.9 - 3.5 g/dL    A-G Ratio 1.0 g/dL   ESTIMATED GFR    Collection Time: 06/22/25  4:02 AM   Result Value Ref Range    GFR (CKD-EPI) 88 >60 mL/min/1.73 m 2       Radiology Review:  US-PARACENTESIS, ABD WITH IMAGING   Final Result      1. Ultrasound-guided diagnostic and therapeutic paracentesis of the right lower quadrant of the abdominal wall.      2. 1200 mL of fluid withdrawn.      US-ABDOMEN COMPLETE SURVEY   Final Result      1.  Cirrhotic appearance of the liver with a lobulated cyst.   2.  Sludge is  present within the gallbladder. There is no definite stone.   3.  Ascites.   4.  There appears to be occlusion of the portal vein and at least partial occlusion of the inferior vena cava.   5.  Evaluation is limited with poor visualization of the midline structures.         DX-CHEST-PORTABLE (1 VIEW)   Final Result      Mild left lung infiltrate.            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  She was not on diuretics at home.    - OK to start prednisilone for alcoholic hepatitis, no evidence of infection in ascites fluid  - follow cytology   - trend liver enzymes, check INR in am  - lactulose daily   - continue with CIWA protocol.  - consider echocardiogram to evaluate right-sided heart pressures  - would discontinue first generation antipsychotics for nausea therapy.  Compazine and Phenergan can have deleterious effects and cirrhosis.  Would use Zofran alone.  - 2 g sodium restriction diet  - Outpatient referral for alcohol use disorder counseling/support.  Discontinue all toxic habits in the interim.  - Outpatient follow-up with [facility] for EGD and colonoscopy as previously recommended.  - Patient reports that her  and case management are working on discharging directly to rehab     Discussed with patient, Dr. Vanessa Dr.  Suhail    ..Beena Alex, MAXWELL,  APRN    Core Quality Measures   Reviewed items::  Labs, Medications and Radiology reports reviewed

## 2025-06-23 LAB
ACARBOXYPROTHROMBIN SERPL-MCNC: 12.5 NG/ML (ref 0–7.4)
ALBUMIN SERPL BCP-MCNC: 2.8 G/DL (ref 3.2–4.9)
ALBUMIN/GLOB SERPL: 1 G/DL
ALP SERPL-CCNC: 284 U/L (ref 30–99)
ALT SERPL-CCNC: 117 U/L (ref 2–50)
ANION GAP SERPL CALC-SCNC: 12 MMOL/L (ref 7–16)
AST SERPL-CCNC: 268 U/L (ref 12–45)
BILIRUB SERPL-MCNC: 17.2 MG/DL (ref 0.1–1.5)
BUN SERPL-MCNC: 8 MG/DL (ref 8–22)
CALCIUM ALBUM COR SERPL-MCNC: 9.6 MG/DL (ref 8.5–10.5)
CALCIUM SERPL-MCNC: 8.6 MG/DL (ref 8.5–10.5)
CHLORIDE SERPL-SCNC: 91 MMOL/L (ref 96–112)
CO2 SERPL-SCNC: 29 MMOL/L (ref 20–33)
CREAT SERPL-MCNC: 0.81 MG/DL (ref 0.5–1.4)
ERYTHROCYTE [DISTWIDTH] IN BLOOD BY AUTOMATED COUNT: 74.5 FL (ref 35.9–50)
GFR SERPLBLD CREATININE-BSD FMLA CKD-EPI: 88 ML/MIN/1.73 M 2
GLOBULIN SER CALC-MCNC: 2.7 G/DL (ref 1.9–3.5)
GLUCOSE SERPL-MCNC: 136 MG/DL (ref 65–99)
HCT VFR BLD AUTO: 41.5 % (ref 37–47)
HGB BLD-MCNC: 14.5 G/DL (ref 12–16)
MCH RBC QN AUTO: 37.2 PG (ref 27–33)
MCHC RBC AUTO-ENTMCNC: 34.9 G/DL (ref 32.2–35.5)
MCV RBC AUTO: 106.4 FL (ref 81.4–97.8)
PLATELET # BLD AUTO: 182 K/UL (ref 164–446)
PMV BLD AUTO: 10.4 FL (ref 9–12.9)
POTASSIUM SERPL-SCNC: 3.8 MMOL/L (ref 3.6–5.5)
PROT SERPL-MCNC: 5.5 G/DL (ref 6–8.2)
RBC # BLD AUTO: 3.9 M/UL (ref 4.2–5.4)
SODIUM SERPL-SCNC: 132 MMOL/L (ref 135–145)
WBC # BLD AUTO: 10.3 K/UL (ref 4.8–10.8)

## 2025-06-23 PROCEDURE — 99232 SBSQ HOSP IP/OBS MODERATE 35: CPT | Performed by: NURSE PRACTITIONER

## 2025-06-23 PROCEDURE — 700102 HCHG RX REV CODE 250 W/ 637 OVERRIDE(OP): Performed by: NURSE PRACTITIONER

## 2025-06-23 PROCEDURE — 700102 HCHG RX REV CODE 250 W/ 637 OVERRIDE(OP): Performed by: HOSPITALIST

## 2025-06-23 PROCEDURE — A9270 NON-COVERED ITEM OR SERVICE: HCPCS | Performed by: NURSE PRACTITIONER

## 2025-06-23 PROCEDURE — 770001 HCHG ROOM/CARE - MED/SURG/GYN PRIV*

## 2025-06-23 PROCEDURE — A9270 NON-COVERED ITEM OR SERVICE: HCPCS | Performed by: HOSPITALIST

## 2025-06-23 PROCEDURE — 99232 SBSQ HOSP IP/OBS MODERATE 35: CPT | Performed by: HOSPITALIST

## 2025-06-23 PROCEDURE — 97165 OT EVAL LOW COMPLEX 30 MIN: CPT

## 2025-06-23 PROCEDURE — 80053 COMPREHEN METABOLIC PANEL: CPT

## 2025-06-23 PROCEDURE — 36415 COLL VENOUS BLD VENIPUNCTURE: CPT

## 2025-06-23 PROCEDURE — 85027 COMPLETE CBC AUTOMATED: CPT

## 2025-06-23 RX ORDER — BENZONATATE 100 MG/1
100 CAPSULE ORAL 3 TIMES DAILY PRN
Status: DISCONTINUED | OUTPATIENT
Start: 2025-06-23 | End: 2025-06-23

## 2025-06-23 RX ADMIN — PREDNISOLONE SODIUM PHOSPHATE 39.9 MG: 15 SOLUTION ORAL at 05:07

## 2025-06-23 RX ADMIN — LACTULOSE 15 ML: 10 SOLUTION ORAL at 05:06

## 2025-06-23 RX ADMIN — SPIRONOLACTONE 100 MG: 100 TABLET ORAL at 05:07

## 2025-06-23 RX ADMIN — THERA TABS 1 TABLET: TAB at 05:07

## 2025-06-23 RX ADMIN — FUROSEMIDE 40 MG: 40 TABLET ORAL at 05:06

## 2025-06-23 RX ADMIN — Medication 100 MG: at 05:07

## 2025-06-23 RX ADMIN — FOLIC ACID 1 MG: 1 TABLET ORAL at 05:06

## 2025-06-23 RX ADMIN — AMOXICILLIN AND CLAVULANATE POTASSIUM 1 TABLET: 875; 125 TABLET, FILM COATED ORAL at 17:07

## 2025-06-23 RX ADMIN — LEVOTHYROXINE SODIUM 50 MCG: 0.05 TABLET ORAL at 05:07

## 2025-06-23 RX ADMIN — GABAPENTIN 300 MG: 300 CAPSULE ORAL at 17:07

## 2025-06-23 RX ADMIN — LORAZEPAM 0.5 MG: 0.5 TABLET ORAL at 08:26

## 2025-06-23 RX ADMIN — GABAPENTIN 300 MG: 300 CAPSULE ORAL at 05:06

## 2025-06-23 RX ADMIN — CARVEDILOL 3.12 MG: 3.12 TABLET, FILM COATED ORAL at 08:19

## 2025-06-23 RX ADMIN — ESCITALOPRAM OXALATE 20 MG: 10 TABLET ORAL at 05:06

## 2025-06-23 RX ADMIN — AMOXICILLIN AND CLAVULANATE POTASSIUM 1 TABLET: 875; 125 TABLET, FILM COATED ORAL at 05:05

## 2025-06-23 RX ADMIN — APIXABAN 10 MG: 5 TABLET, FILM COATED ORAL at 05:06

## 2025-06-23 RX ADMIN — Medication 5 MG: at 22:01

## 2025-06-23 RX ADMIN — HYDROXYZINE HYDROCHLORIDE 25 MG: 50 TABLET ORAL at 22:01

## 2025-06-23 RX ADMIN — APIXABAN 10 MG: 5 TABLET, FILM COATED ORAL at 17:07

## 2025-06-23 ASSESSMENT — COGNITIVE AND FUNCTIONAL STATUS - GENERAL
DAILY ACTIVITIY SCORE: 22
HELP NEEDED FOR BATHING: A LITTLE
DRESSING REGULAR LOWER BODY CLOTHING: A LITTLE
SUGGESTED CMS G CODE MODIFIER DAILY ACTIVITY: CJ

## 2025-06-23 ASSESSMENT — LIFESTYLE VARIABLES
AUDITORY DISTURBANCES: NOT PRESENT
AUDITORY DISTURBANCES: NOT PRESENT
VISUAL DISTURBANCES: NOT PRESENT
PAROXYSMAL SWEATS: NO SWEAT VISIBLE
AUDITORY DISTURBANCES: NOT PRESENT
ORIENTATION AND CLOUDING OF SENSORIUM: ORIENTED AND CAN DO SERIAL ADDITIONS
VISUAL DISTURBANCES: NOT PRESENT
NAUSEA AND VOMITING: NO NAUSEA AND NO VOMITING
AGITATION: NORMAL ACTIVITY
ANXIETY: MILDLY ANXIOUS
ORIENTATION AND CLOUDING OF SENSORIUM: ORIENTED AND CAN DO SERIAL ADDITIONS
AGITATION: NORMAL ACTIVITY
ANXIETY: MILDLY ANXIOUS
ANXIETY: MILDLY ANXIOUS
TOTAL SCORE: 2
AGITATION: NORMAL ACTIVITY
VISUAL DISTURBANCES: NOT PRESENT
AGITATION: NORMAL ACTIVITY
VISUAL DISTURBANCES: NOT PRESENT
TOTAL SCORE: 2
PAROXYSMAL SWEATS: NO SWEAT VISIBLE
SUBSTANCE_ABUSE: 1
NAUSEA AND VOMITING: NO NAUSEA AND NO VOMITING
TREMOR: TREMOR NOT VISIBLE BUT CAN BE FELT, FINGERTIP TO FINGERTIP
PAROXYSMAL SWEATS: NO SWEAT VISIBLE
ORIENTATION AND CLOUDING OF SENSORIUM: ORIENTED AND CAN DO SERIAL ADDITIONS
HEADACHE, FULLNESS IN HEAD: NOT PRESENT
ORIENTATION AND CLOUDING OF SENSORIUM: ORIENTED AND CAN DO SERIAL ADDITIONS
ANXIETY: NO ANXIETY (AT EASE)
TOTAL SCORE: 5
TOTAL SCORE: 2
ANXIETY: NO ANXIETY (AT EASE)
ORIENTATION AND CLOUDING OF SENSORIUM: ORIENTED AND CAN DO SERIAL ADDITIONS
ANXIETY: MILDLY ANXIOUS
HEADACHE, FULLNESS IN HEAD: NOT PRESENT
PAROXYSMAL SWEATS: NO SWEAT VISIBLE
HEADACHE, FULLNESS IN HEAD: NOT PRESENT
PAROXYSMAL SWEATS: NO SWEAT VISIBLE
HEADACHE, FULLNESS IN HEAD: NOT PRESENT
TREMOR: MODERATE TREMOR WITH ARMS EXTENDED
AGITATION: NORMAL ACTIVITY
HEADACHE, FULLNESS IN HEAD: NOT PRESENT
ORIENTATION AND CLOUDING OF SENSORIUM: ORIENTED AND CAN DO SERIAL ADDITIONS
VISUAL DISTURBANCES: NOT PRESENT
HEADACHE, FULLNESS IN HEAD: NOT PRESENT
VISUAL DISTURBANCES: NOT PRESENT
TOTAL SCORE: 1
TOTAL SCORE: 2
NAUSEA AND VOMITING: MILD NAUSEA WITH NO VOMITING
PAROXYSMAL SWEATS: NO SWEAT VISIBLE
NAUSEA AND VOMITING: NO NAUSEA AND NO VOMITING
AUDITORY DISTURBANCES: NOT PRESENT
AGITATION: NORMAL ACTIVITY
TREMOR: TREMOR NOT VISIBLE BUT CAN BE FELT, FINGERTIP TO FINGERTIP
NAUSEA AND VOMITING: NO NAUSEA AND NO VOMITING
TREMOR: TREMOR NOT VISIBLE BUT CAN BE FELT, FINGERTIP TO FINGERTIP
NAUSEA AND VOMITING: NO NAUSEA AND NO VOMITING
TREMOR: TREMOR NOT VISIBLE BUT CAN BE FELT, FINGERTIP TO FINGERTIP
TREMOR: TREMOR NOT VISIBLE BUT CAN BE FELT, FINGERTIP TO FINGERTIP

## 2025-06-23 ASSESSMENT — ENCOUNTER SYMPTOMS
PALPITATIONS: 0
BRUISES/BLEEDS EASILY: 0
MYALGIAS: 0
DIZZINESS: 0
ABDOMINAL PAIN: 0
DIAPHORESIS: 0
CONSTIPATION: 0
DEPRESSION: 0
CHILLS: 0
COUGH: 0
NAUSEA: 0
FEVER: 0
MUSCULOSKELETAL NEGATIVE: 1
NEUROLOGICAL NEGATIVE: 1
BLURRED VISION: 0
SORE THROAT: 0
CARDIOVASCULAR NEGATIVE: 1
DIARRHEA: 1
FOCAL WEAKNESS: 0
SHORTNESS OF BREATH: 0
VOMITING: 0
HEADACHES: 0
WEAKNESS: 0
BACK PAIN: 0
EYES NEGATIVE: 1
TREMORS: 1
RESPIRATORY NEGATIVE: 1
HEARTBURN: 0
BLOOD IN STOOL: 0
WEIGHT LOSS: 0

## 2025-06-23 ASSESSMENT — PAIN DESCRIPTION - PAIN TYPE
TYPE: ACUTE PAIN
TYPE: ACUTE PAIN

## 2025-06-23 ASSESSMENT — ACTIVITIES OF DAILY LIVING (ADL): TOILETING: INDEPENDENT

## 2025-06-23 NOTE — THERAPY
"Occupational Therapy   Initial Evaluation     Patient Name:  Minerva Tillman  Age:  50 y.o., Sex:  female  Medical Record #:  [id number]  Today's Date:  6/23/2025     Precautions  Medical: (P) Fall Risk    Assessment  Patient is 50 y.o. female  who presented 6/19/2025 with past medical history of liver cirrhosis, alcohol abuse, depression who presents to the hospital for jaundice the past 1 week. She was found to have acute decompensated cirrhosis with ascites with a MELD of 27, acute on chronic respiratory failure with volume overload and possible PNA.   Pt seen for OT eval. Pt presents with limitations in strength, activity tolerance and balance. Pt overall SBA for functional mobility, no overt LOB however pt was reaching to hold onto wall. Pt reports she has been mostly in bed since admit. Discussed importance of mobilizing with nursing staff daily, up to chair for all meals and up with nursing to bathroom. Pt in agreement. Pt also educ on use of IS. Pt remains on 3L Of O2. Will continue to benefit from inpt OT, anticipate pt will be safe to dc to inpt alcohol rehab upon dc.     Plan    Occupational Therapy Initial Treatment Plan   Treatment Interventions: (P) Self Care / Activities of Daily Living, Adaptive Equipment, Neuro Re-Education / Balance, Therapeutic Exercises, Therapeutic Activity  Treatment Frequency: (P) 3 Times per Week  Duration: (P) Until Therapy Goals Met    DC Equipment Recommendations: (P) None  Discharge Recommendations: (P) Anticipate that the patient will have no further occupational therapy needs after discharge from the hospital (DC to inpatient alcohol rehab)     Subjective    \"Im going to go to alcohol rehab\"      Objective       06/23/25 1114   Prior Living Situation   Prior Services Home-Independent   Housing / Facility 2 Story House  (bed/bath on main floor)   Steps In Home   (FOS down to parlor and garage)   Bathroom Set up Walk In Shower;Shower Chair   Equipment Owned Front-Wheel " Walker;Wheelchair;Tub / Shower Seat;Oxygen   Lives with - Patient's Self Care Capacity Spouse   Comments Pt lives with spouse, reports she broke her ankle last year therefore has equipment. normally very indep for ADLs/IADLs. Has been using oxygen for approx 6 months per pt   Prior Level of ADL Function   Self Feeding Independent   Grooming / Hygiene Independent   Bathing Independent   Dressing Independent   Toileting Independent   Prior Level of IADL Function   Medication Management Independent   Laundry Independent   Kitchen Mobility Independent   Finances Independent   Home Management Independent   Shopping Independent   Prior Level Of Mobility Independent Without Device in Community   Driving / Transportation Driving Independent   Occupation (Pre-Hospital Vocational) Not Employed   History of Falls   History of Falls Yes   Date of Last Fall   (last december)   Precautions   Medical Fall Risk   Vitals   Pulse Oximetry 93 %   O2 (LPM) 3   O2 Delivery Device Silicone Nasal Cannula   Pain   Intervention Declines   Pain 0 - 10 Group   Therapist Pain Assessment During Activity;Post Activity Pain Same as Prior to Activity;Nurse Notified;0   Cognition    Cognition / Consciousness WDL   Level of Consciousness Alert   Comments pleasant and cooperative, receptive to educ   Passive ROM Upper Body   Passive ROM Upper Body WDL   Active ROM Upper Body   Active ROM Upper Body  WDL   Dominant Hand Right   Strength Upper Body   Upper Body Strength  X   Gross Strength Generalized Weakness, Equal Bilaterally.    Sensation Upper Body   Upper Extremity Sensation  WDL   Upper Body Muscle Tone   Upper Body Muscle Tone  WDL   Neurological Concerns   Neurological Concerns No   Coordination Upper Body   Coordination WDL   Balance Assessment   Sitting Balance (Static) Fair   Sitting Balance (Dynamic) Fair   Standing Balance (Static) Fair   Standing Balance (Dynamic) Fair -   Weight Shift Sitting Fair   Weight Shift Standing Fair   Comments  no AD   Bed Mobility    Supine to Sit Contact Guard Assist   Sit to Supine Supervised   Scooting Supervised   Rolling Supervised   Comments HOB slightly elevated   ADL Assessment   Eating Independent   Grooming Standby Assist;Standing  (hand hygiene)   Upper Body Dressing Supervision   Lower Body Dressing Standby Assist   Toileting Standby Assist   Functional Mobility   Sit to Stand Standby Assist   Bed, Chair, Wheelchair Transfer Standby Assist   Toilet Transfers Standby Assist   Transfer Method Stand Step   Mobility sup>sit EOb> STs> in rm> bathroom> sink> supine   Comments no AD   Visual Perception   Visual Perception  WDL   Edema / Skin Assessment   Edema / Skin  WDL   Activity Tolerance   Sitting Edge of Bed 5 min   Standing 6 min   Comments limited d/t weakness and fatigue   Patient / Family Goals   Patient / Family Goal #1 to go to inpt alcohol rehab   Short Term Goals   Short Term Goal # 1 Pt will complete toilet transfer supervised   Short Term Goal # 2 Pt will complete UB/LB dressing supervised   Short Term Goal # 3 pt will complete toileting supervised   Education Group   Education Provided Role of Occupational Therapist;Activities of Daily Living   Role of Occupational Therapist Patient Response Patient;Acceptance;Explanation;Demonstration;Verbal Demonstration;Action Demonstration   ADL Patient Response Patient;Acceptance;Demonstration;Explanation;Verbal Demonstration;Action Demonstration   Occupational Therapy Initial Treatment Plan    Treatment Interventions Self Care / Activities of Daily Living;Adaptive Equipment;Neuro Re-Education / Balance;Therapeutic Exercises;Therapeutic Activity   Treatment Frequency 3 Times per Week   Duration Until Therapy Goals Met   Problem List   Problem List Decreased Active Daily Living Skills;Decreased Activity Tolerance;Decreased Functional Mobility   Anticipated Discharge Equipment and Recommendations   DC Equipment Recommendations None   Discharge Recommendations  Anticipate that the patient will have no further occupational therapy needs after discharge from the hospital  (DC to inpatient alcohol rehab)   Interdisciplinary Plan of Care Collaboration   IDT Collaboration with  Nursing   Patient Position at End of Therapy In Bed;Bed Alarm On;Tray Table within Reach;Call Light within Reach   Collaboration Comments RN updated   Session Information   Date / Session Number  6/23, #1 (1/3, 6/29)

## 2025-06-23 NOTE — CARE PLAN
The patient is Stable - Low risk of patient condition declining or worsening    Shift Goals  Clinical Goals: CIWA, Free from injury this shift  Patient Goals: less anxiety  Family Goals: none present    Progress made toward(s) clinical / shift goals:  Aox4, verbalizes willingness to refrain from going back to alcohol. Monitored for s/s of withdrawal. CIWA q4. Utilizes call light for needs.   Problem: Communication  Goal: The ability to communicate needs accurately and effectively will improve  Outcome: Progressing     Problem: Pain Management  Goal: Pain level will decrease to patient's comfort goal  Outcome: Progressing     Problem: Optimal Care for Alcohol Withdrawal  Goal: Optimal Care for the alcohol withdrawal patient  Outcome: Progressing       Patient is not progressing towards the following goals:

## 2025-06-23 NOTE — DISCHARGE PLANNING
Case Management Discharge Planning    Admission Date: 6/19/2025  GMLOS: 4.9  ALOS: 4    6-Clicks ADL Score: 24  6-Clicks Mobility Score: 23      Anticipated Discharge Dispo: Discharge Disposition: Discharged to home/self care (01)    DME Needed: No    Action(s) Taken: LMSW spoke with spouse this morning. Patient and family are still wanting to discharge to an inpatient [facility] facility.     Addendum @ 1056: LMSW discussed the patient during IDT rounds. Patient is going to work with PT/OT and attempt to be weaned off Oxygen. Patient's labs are needing to be improved for discharge.     Escalations Completed: None    Medically Clear: No    Next Steps: Case Management to follow for discharge.     Barriers to Discharge: Medical clearance and Pending Placement    Is the patient up for discharge tomorrow: No

## 2025-06-23 NOTE — CARE PLAN
The patient is Stable - Low risk of patient condition declining or worsening    Shift Goals  Clinical Goals: CIWA score <8, remain safe and free from falls, wean oxygen  Patient Goals: Decrease anxiety, rest  Family Goals: PT/OT evaluation    Progress made toward(s) clinical / shift goals:      Problem: Safety  Goal: Will remain free from injury  Outcome: Progressing  Note: Patient utilizing call-light appropriately. Bed and chair alarms in use. Up to bathroom and ambulates with stand-by-assist or walker. Has remained safe and free from falls     Problem: Pain Management  Goal: Pain level will decrease to patient's comfort goal  Outcome: Progressing  Note: Patient has denied pain this shift     Problem: Optimal Care for Alcohol Withdrawal  Goal: Optimal Care for the alcohol withdrawal patient  Outcome: Progressing  Note: Patient continues on CIWA protocol (see flowsheets).     Patient is not progressing towards the following goals:    Problem: Respiratory:  Goal: Respiratory status will improve  Outcome: Not Progressing  Note: Attempts to wean patient to room air unsuccessful. Patient desats to 86% on 2L oxygen. Has remained on 3L oxygen via nasal cannula throughout shift. Patient instructed and educated on incentive spirometer use and encouraged to ambulate to bathroom, in nolan and sit up to chair for meals.

## 2025-06-23 NOTE — PROGRESS NOTES
Hospital Medicine Daily Progress Note    Date of Service  6/23/2025    Chief Complaint  Minerva Tillman is a 50 y.o. female admitted 6/19/2025 with jaundice    Hospital Course  Minerva Tillman is a 50 y.o. female who presented 6/19/2025 with past medical history of liver cirrhosis, alcohol abuse, depression who presents to the hospital for jaundice the past 1 week. She was found to have acute decompensated cirrhosis with ascites with a MELD of 27, acute on chronic respiratory failure with volume overload, portal vein thrombosis and possible community acquired pneumonia and was started on empiric antibiotics    She required CIWA and support with alcohol withdrawl symptoms. She underwent a paracentesis and had no evidence sbp. She was started on anticoagulation for the acute portal vein thrombosis.GI is following     Interval Problem Update  Axox3, ciwa improved today, currently 2/10, mild tremor today and unsteady on her feet. She reports she is very tired but denies sob, no cp, no abdominal pain, no n/v. She is having loose bowel mvts with the lactulose, no signs of encephalopathy at this time, her recall has improved today. Plan remains to go to inpatient alcohol rehab from here, social work assisting with intake interview which is planned for today. Cytology from paracentesis pending. Bilirubin continues to increase. ROS otherwise negative.     I have discussed this patient's plan of care and discharge plan at IDT rounds today with Case Management, Nursing, Nursing leadership, and other members of the IDT team.    Consultants/Specialty  GI    Code Status  Full Code    Disposition  The patient is not medically cleared for discharge to home or a post-acute facility.      I have placed the appropriate orders for post-discharge needs.    Review of Systems  Review of Systems   Constitutional:  Positive for malaise/fatigue. Negative for chills, diaphoresis, fever and weight loss.   HENT: Negative.  Negative  for sore throat.    Eyes: Negative.  Negative for blurred vision.   Respiratory: Negative.  Negative for cough and shortness of breath.    Cardiovascular: Negative.  Negative for chest pain, palpitations and leg swelling.   Gastrointestinal:  Negative for abdominal pain, nausea and vomiting.   Genitourinary:  Negative for dysuria and hematuria.   Musculoskeletal: Negative.  Negative for myalgias.   Skin: Negative.  Negative for itching and rash.   Neurological: Negative.  Negative for dizziness, focal weakness, weakness and headaches.   Endo/Heme/Allergies: Negative.  Does not bruise/bleed easily.   Psychiatric/Behavioral:  Positive for substance abuse. Negative for depression and suicidal ideas.    All other systems reviewed and are negative.       Physical Exam  Temp:  [36.1 °C (96.9 °F)-36.4 °C (97.6 °F)] 36.1 °C (96.9 °F)  Pulse:  [61-64] 64  Resp:  [17-18] 18  BP: ()/(68-76) 112/76  SpO2:  [86 %-98 %] 92 %    Physical Exam  Vitals and nursing note reviewed. Exam conducted with a chaperone present.   Constitutional:       General: She is not in acute distress.     Appearance: Normal appearance. She is not diaphoretic.   HENT:      Head: Normocephalic.      Nose: Nose normal.      Mouth/Throat:      Mouth: Mucous membranes are moist.   Eyes:      General: Scleral icterus present.      Pupils: Pupils are equal, round, and reactive to light.   Cardiovascular:      Rate and Rhythm: Normal rate and regular rhythm.      Pulses: Normal pulses.      Heart sounds: Normal heart sounds.   Pulmonary:      Effort: Pulmonary effort is normal.      Breath sounds: Normal breath sounds.   Abdominal:      General: Abdomen is flat. Bowel sounds are normal. There is no distension.      Palpations: Abdomen is soft.      Tenderness: There is no abdominal tenderness.   Musculoskeletal:         General: No swelling or deformity. Normal range of motion.   Skin:     General: Skin is warm and dry.      Capillary Refill: Capillary  refill takes less than 2 seconds.      Coloration: Skin is jaundiced.      Findings: No bruising or lesion.   Neurological:      General: No focal deficit present.      Mental Status: She is alert and oriented to person, place, and time.      Cranial Nerves: No cranial nerve deficit.   Psychiatric:         Mood and Affect: Mood normal.         Behavior: Behavior normal.         Fluids    Intake/Output Summary (Last 24 hours) at 6/23/2025 1242  Last data filed at 6/23/2025 1000  Gross per 24 hour   Intake 660 ml   Output --   Net 660 ml        Laboratory  Recent Labs     06/22/25  0402 06/23/25  0559   WBC 10.5 10.3   RBC 3.68* 3.90*   HEMOGLOBIN 13.4 14.5   HEMATOCRIT 39.6 41.5   .6* 106.4*   MCH 36.4* 37.2*   MCHC 33.8 34.9   RDW 77.4* 74.5*   PLATELETCT 167 182   MPV 10.5 10.4     Recent Labs     06/21/25  0758 06/22/25  0402 06/23/25  0559   SODIUM 133* 134* 132*   POTASSIUM 4.4 4.1 3.8   CHLORIDE 95* 95* 91*   CO2 27 28 29   GLUCOSE 96 104* 136*   BUN 7* 8 8   CREATININE 0.76 0.81 0.81   CALCIUM 8.4* 8.6 8.6     Recent Labs     06/21/25  0758   INR 1.89*               Imaging  US-PARACENTESIS, ABD WITH IMAGING   Final Result      1. Ultrasound-guided diagnostic and therapeutic paracentesis of the right lower quadrant of the abdominal wall.      2. 1200 mL of fluid withdrawn.      US-ABDOMEN COMPLETE SURVEY   Final Result      1.  Cirrhotic appearance of the liver with a lobulated cyst.   2.  Sludge is present within the gallbladder. There is no definite stone.   3.  Ascites.   4.  There appears to be occlusion of the portal vein and at least partial occlusion of the inferior vena cava.   5.  Evaluation is limited with poor visualization of the midline structures.         DX-CHEST-PORTABLE (1 VIEW)   Final Result      Mild left lung infiltrate.           Assessment/Plan  * Alcohol abuse with withdrawal and perceptual disturbance (HCC)- (present on admission)  Assessment & Plan  CIWA protocol  multivitamin,  thiamine and folate  replacing electrolytes  See discussion above  CIWA score and sx much improved, continue supportive care  No hallucinations or seizure today - precautions    Portal vein thrombosis  Assessment & Plan  H/h stable, no evidence of bleeding  Discussed with pt and GI  Tolerating eliquis  following  Cbc in am     Hyponatremia  Assessment & Plan  Consistent with cirrhosis  following    Alcoholic hepatitis  Assessment & Plan  Maddery dissemination score 46  Bili continues to increase, steroids started  Assess for response after 7 days with jeramie score     Alcoholic cirrhosis (HCC)  Assessment & Plan  Decompensated  Volume overloaded, improving with diuretics - tapering as tolerated  MELD 27    Acute hypoxic respiratory failure (HCC)- (present on admission)  Assessment & Plan  Fluctuating, had titrated down to 1L after paracentesis but increased to 3 L overnight - exam stable- wean as tolerated  Continue diuretics as tolerated   Query mild community acquired pna, non specific changes on x ray -  No cough, on empiric augmentin   Wean as tolerated  Following  RT      High anion gap metabolic acidosis- (present on admission)  Assessment & Plan  Continue to trend secondary to liver dysfunction    Ascites- (present on admission)  Assessment & Plan  Due to cirrhosis  Tolerating diuretics  S/p paracentesis  following    Hypokalemia- (present on admission)  Assessment & Plan  Replaced and resolved    Acute cystitis- (present on admission)  Assessment & Plan  Resolved  Completed abc           VTE prophylaxis: SCD/ eliquis    I have performed a physical exam and reviewed and updated ROS and Plan today (6/23/2025). In review of yesterday's note (6/22/2025), there are no changes except as documented above.

## 2025-06-23 NOTE — PROGRESS NOTES
..Gastroenterology Progress Note               Author:  Beena Alex, DNP,  APRN Date & Time Created: 6/23/2025 8:25 AM       Patient ID:  Name:             Minerva Tillman  YOB: 1975  Age:                 50 y.o.  female  MRN:               [id number]    Medical Decision Making, by Problem:  Active Hospital Problems    Diagnosis     Hyponatremia [E87.1]     Portal vein thrombosis [I81]     Alcohol abuse with withdrawal and perceptual disturbance (HCC) [F10.132]     Alcoholic cirrhosis (HCC) [K70.30]     Alcoholic hepatitis [K70.10]     High anion gap metabolic acidosis [E87.29]     Acute hypoxic respiratory failure (HCC) [J96.01]     Ascites [R18.8]     Hypokalemia [E87.6]     Acute cystitis [N30.00]      Presenting Chief Complaint:  Decompensated cirrhosis     History of Present Illness:   50-year-old female presents to the emergency department for abdominal swelling and shortness of breath and desire to discontinue alcohol intake.  Patient was seen by gastroenterology as an inpatient in 2024.  Dr. Daniels had recommended outpatient evaluation.  Patient followed up with Dr. Conley on 7/1/2024 with an additional follow-up appointment on 10/17/2024.  She had a FibroScan done which suggested advanced fibrosis and possible cirrhosis.  She was recommended to pursue upper and lower endoscopy.  Patient states that when she was told of this information she became quite concerned and did not follow-up because she was scared of the results.  Patient continues to drink alcohol.  She wants help in quitting.  She denies hematemesis or melena.  She endorses new onset abdominal swelling without lower extremity edema.     Home medications reviewed.  Patient not taking anything in the way of diuretics or medications for encephalopathy.  Patient is not on a beta-blocker.     Review of systems at this time is as stated above as well as patient feeling a little bit anxious but comfortable with regard to her  detoxification/CIWA protocol.    Interval History:  6/21/2025: Patient seen.  Reports feeling much better but having multiple liquid bowel movements.  Mild tremors, very hungry.  Has started wearing oxygen at night at home, reports it is due to increased elevation at home in [facility].  Slightly dyspneic today.  N.p.o. pending paracentesis. VSS, CIWA 4.  Transaminitis worsening, T. bili 13.8 > 9.4.  INR 1.89.  Hoping to go directly to rehab from the hospital.    6/22/2025: Feels better, tremulous with CIWA 5. Having multiple bowel movements. Fluid negative for SBP and NGTD on culture. Bili up to 14.2    6/23/2025: Feels much better today, no complaints. Eating and drinking well. Reports she was unable to be weaned off oxygen. Liver enzymes uptrending, T. Bili 17.2>14.2    Hospital Medications:  Current Facility-Administered Medications   Medication Dose Frequency Provider Last Rate Last Admin    apixaban (Eliquis) tablet 10 mg  10 mg BID Helen Mendez M.D.   10 mg at 06/23/25 0506    [START ON 6/29/2025] apixaban (Eliquis) tablet 5 mg  5 mg BID Helen Mendez M.D.        amoxicillin-clavulanate (Augmentin) 875-125 MG per tablet 1 Tablet  1 Tablet Q12HRS Helen Mendez M.D.   1 Tablet at 06/23/25 0505    prednisoLONE sodium phosphate (Pediapred) 15 MG/5ML oral solution 39.9 mg  39.9 mg DAILY Helen Mendez M.D.   39.9 mg at 06/23/25 0507    spironolactone (Aldactone) tablet 100 mg  100 mg Q DAY Helen Mendez M.D.   100 mg at 06/23/25 0507    lactulose 20 GM/30ML solution 15 mL  15 mL DAILY Beena Alex, DNP,  APRN   15 mL at 06/23/25 0506    Respiratory Therapy Consult   Continuous RT Helen Mendez M.D.        furosemide (Lasix) tablet 40 mg  40 mg Q DAY Helen Mendez M.D.   40 mg at 06/23/25 0506    carvedilol (Coreg) tablet 3.125 mg  3.125 mg BID WITH MEALS Helen Mendez M.D.   3.125 mg at 06/23/25 0819    melatonin tablet 5 mg  5 mg HS PRN Roseanne Crowder A.P.R.NMiko   5 mg at 06/20/25 6867     labetalol (Normodyne/Trandate) injection 10 mg  10 mg Q4HRS PRN Abby Ivy M.D.        LORazepam (Ativan) tablet 0.5 mg  0.5 mg Q4HRS PRN Abby Ivy M.D.   0.5 mg at 06/22/25 0751    LORazepam (Ativan) tablet 1 mg  1 mg Q4HRS PRN Abby Ivy M.D.   1 mg at 06/20/25 0747    LORazepam (Ativan) tablet 2 mg  2 mg Q2HRS PRN Abby Ivy M.D.        LORazepam (Ativan) tablet 3 mg  3 mg Q HOUR PRN Abby Ivy M.D.        LORazepam (Ativan) tablet 4 mg  4 mg Q15 MIN PRN Abby Ivy M.D.        Or    diazePAM (Valium) injection 10 mg  10 mg Q15 MIN PRN Abby Ivy M.D.        ondansetron (Zofran) syringe/vial injection 4 mg  4 mg Q4HRS PRN Abby Ivy M.D.        ondansetron (Zofran ODT) dispertab 4 mg  4 mg Q4HRS PRN Abby Ivy M.D.   4 mg at 06/22/25 1953    escitalopram (Lexapro) tablet 20 mg  20 mg DAILY Abby Ivy M.D.   20 mg at 06/23/25 0506    gabapentin (Neurontin) capsule 300 mg  300 mg BID Abby Ivy M.D.   300 mg at 06/23/25 0506    levothyroxine (Synthroid) tablet 50 mcg  50 mcg AM ES Abby Ivy M.D.   50 mcg at 06/23/25 0507    hydrOXYzine HCl (Atarax) tablet 25 mg  25 mg QDAY PRN Abby Ivy M.D.   25 mg at 06/22/25 1952    albuterol inhaler 1-2 Puff  1-2 Puff Q6HRS PRN Abby Ivy M.D.   2 Puff at 06/22/25 2249   Last reviewed on 6/19/2025  7:43 PM by Lorna Lubin       Review of Systems:  Review of Systems   Constitutional:  Negative for chills, fever and malaise/fatigue.   HENT:  Negative for hearing loss.    Eyes:  Negative for blurred vision.   Respiratory:  Negative for cough and shortness of breath.    Cardiovascular:  Negative for chest pain and leg swelling.   Gastrointestinal:  Positive for diarrhea. Negative for abdominal pain, blood in stool, constipation, heartburn, melena, nausea and vomiting.   Genitourinary:  Negative for dysuria.   Musculoskeletal:  Negative for back pain.   Skin:  Negative for rash.   Neurological:  Positive for tremors. Negative for  "dizziness and weakness.   Psychiatric/Behavioral:  Positive for substance abuse. Negative for depression.    All other systems reviewed and are negative.    Vital signs:  Weight/BMI: Body mass index is 26.05 kg/m².  /76   Pulse 64   Temp 36.1 °C (96.9 °F) (Temporal)   Resp 17   Ht 1.575 m (5' 2\")   Wt 64.6 kg (142 lb 6.7 oz)   SpO2 93%   Vitals:    06/22/25 1619 06/22/25 1913 06/23/25 0400 06/23/25 0720   BP: 103/69 102/69 99/68 112/76   Pulse: 63 61 61 64   Resp: 18 17 17 17   Temp: 36.4 °C (97.6 °F) 36.1 °C (97 °F) 36.4 °C (97.5 °F) 36.1 °C (96.9 °F)   TempSrc: Temporal Temporal Temporal Temporal   SpO2: 93% 97% 98% 93%   Weight:       Height:         Oxygen Therapy:  Pulse Oximetry: 93 %, O2 (LPM): 3, O2 Delivery Device: Silicone Nasal Cannula    Intake/Output Summary (Last 24 hours) at 6/23/2025 0825  Last data filed at 6/22/2025 1959  Gross per 24 hour   Intake 300 ml   Output --   Net 300 ml       Physical Exam  Vitals and nursing note reviewed.   Constitutional:       General: She is not in acute distress.     Appearance: She is ill-appearing.   HENT:      Head: Normocephalic and atraumatic.      Right Ear: External ear normal.      Left Ear: External ear normal.      Nose: Nose normal.      Mouth/Throat:      Mouth: Mucous membranes are moist.      Pharynx: Oropharynx is clear.   Eyes:      General: Scleral icterus present.   Cardiovascular:      Rate and Rhythm: Normal rate and regular rhythm.      Pulses: Normal pulses.      Heart sounds: Normal heart sounds.   Pulmonary:      Effort: Pulmonary effort is normal. No respiratory distress.      Breath sounds: Normal breath sounds.   Abdominal:      General: Abdomen is flat. Bowel sounds are normal. There is no distension.      Palpations: Abdomen is soft.      Tenderness: There is no abdominal tenderness.   Musculoskeletal:         General: Normal range of motion.      Cervical back: Normal range of motion.   Skin:     General: Skin is warm and " dry.      Capillary Refill: Capillary refill takes less than 2 seconds.      Coloration: Skin is jaundiced.   Neurological:      Mental Status: She is alert and oriented to person, place, and time.      Comments: Tremulous   Psychiatric:         Mood and Affect: Mood normal.         Behavior: Behavior normal.         Labs:  Recent Labs     06/21/25 0218 06/21/25 0758 06/22/25 0402 06/23/25  0559   SODIUM  --  133* 134* 132*   POTASSIUM  --  4.4 4.1 3.8   CHLORIDE  --  95* 95* 91*   CO2  --  27 28 29   BUN  --  7* 8 8   CREATININE  --  0.76 0.81 0.81   MAGNESIUM 1.7  --   --   --    CALCIUM  --  8.4* 8.6 8.6     Recent Labs     06/21/25 0758 06/22/25 0402 06/23/25  0559   ALTSGPT 112* 107* 117*   ASTSGOT 310* 266* 268*   ALKPHOSPHAT 295* 275* 284*   TBILIRUBIN 13.8* 14.2* 17.2*   DBILIRUBIN 8.8*  --   --    GLUCOSE 96 104* 136*     Recent Labs     06/21/25 0758 06/22/25 0402 06/23/25  0559   WBC  --  10.5 10.3   NEUTSPOLYS  --  66.40  --    LYMPHOCYTES  --  18.40*  --    MONOCYTES  --  13.70*  --    EOSINOPHILS  --  0.10  --    BASOPHILS  --  0.50  --    ASTSGOT 310* 266* 268*   ALTSGPT 112* 107* 117*   ALKPHOSPHAT 295* 275* 284*   TBILIRUBIN 13.8* 14.2* 17.2*     Recent Labs     06/21/25 0758 06/22/25 0402 06/23/25  0559   RBC  --  3.68* 3.90*   HEMOGLOBIN  --  13.4 14.5   HEMATOCRIT  --  39.6 41.5   PLATELETCT  --  167 182   PROTHROMBTM 21.8*  --   --    INR 1.89*  --   --      Recent Results (from the past 24 hours)   CBC WITHOUT DIFFERENTIAL    Collection Time: 06/23/25  5:59 AM   Result Value Ref Range    WBC 10.3 4.8 - 10.8 K/uL    RBC 3.90 (L) 4.20 - 5.40 M/uL    Hemoglobin 14.5 12.0 - 16.0 g/dL    Hematocrit 41.5 37.0 - 47.0 %    .4 (H) 81.4 - 97.8 fL    MCH 37.2 (H) 27.0 - 33.0 pg    MCHC 34.9 32.2 - 35.5 g/dL    RDW 74.5 (H) 35.9 - 50.0 fL    Platelet Count 182 164 - 446 K/uL    MPV 10.4 9.0 - 12.9 fL   Comp Metabolic Panel    Collection Time: 06/23/25  5:59 AM   Result Value Ref Range     Sodium 132 (L) 135 - 145 mmol/L    Potassium 3.8 3.6 - 5.5 mmol/L    Chloride 91 (L) 96 - 112 mmol/L    Co2 29 20 - 33 mmol/L    Anion Gap 12.0 7.0 - 16.0    Glucose 136 (H) 65 - 99 mg/dL    Bun 8 8 - 22 mg/dL    Creatinine 0.81 0.50 - 1.40 mg/dL    Calcium 8.6 8.5 - 10.5 mg/dL    Correct Calcium 9.6 8.5 - 10.5 mg/dL    AST(SGOT) 268 (H) 12 - 45 U/L    ALT(SGPT) 117 (H) 2 - 50 U/L    Alkaline Phosphatase 284 (H) 30 - 99 U/L    Total Bilirubin 17.2 (H) 0.1 - 1.5 mg/dL    Albumin 2.8 (L) 3.2 - 4.9 g/dL    Total Protein 5.5 (L) 6.0 - 8.2 g/dL    Globulin 2.7 1.9 - 3.5 g/dL    A-G Ratio 1.0 g/dL   ESTIMATED GFR    Collection Time: 06/23/25  5:59 AM   Result Value Ref Range    GFR (CKD-EPI) 88 >60 mL/min/1.73 m 2       Radiology Review:  US-PARACENTESIS, ABD WITH IMAGING   Final Result      1. Ultrasound-guided diagnostic and therapeutic paracentesis of the right lower quadrant of the abdominal wall.      2. 1200 mL of fluid withdrawn.      US-ABDOMEN COMPLETE SURVEY   Final Result      1.  Cirrhotic appearance of the liver with a lobulated cyst.   2.  Sludge is present within the gallbladder. There is no definite stone.   3.  Ascites.   4.  There appears to be occlusion of the portal vein and at least partial occlusion of the inferior vena cava.   5.  Evaluation is limited with poor visualization of the midline structures.         DX-CHEST-PORTABLE (1 VIEW)   Final Result      Mild left lung infiltrate.            MDM (Data Review):   -Records reviewed and summarized in current documentation  -I personally reviewed and interpreted the laboratory results  -I personally reviewed the radiology images    Assessment/Recommendations:  Decompensated alcoholic cirrhosis - MELD 3.0 - 27 points  Ascites - US with paracentesis pending  Portal vein thrombosis and partially occluded inferior vena cava  Acute hypoxic respiratory failure -requiring 2 L nasal cannula  Portal hypertension - SAAG 2.2  Alcoholic hepatitis - MDF 53.3  AFP  negative, DCP elevated    Recommendations:  - started on Eliquis for PVT  - Tolerating 100 mg spirinolactone and 40 mg lasix  - Last seen by GI team March 2024 where she was noted to have trace ascites.  She was not on diuretics at home.    - OK to start prednisilone for alcoholic hepatitis, no evidence of infection in ascites fluid  - follow cytology   - trend liver enzymes, check INR in am  - lactulose daily   - continue with CIWA protocol.  - Shortness of breath persists. Consider echocardiogram to evaluate right-sided heart pressures. Will discontinue beta blocker if this does not improve.  - would discontinue first generation antipsychotics for nausea therapy.  Compazine and Phenergan can have deleterious effects and cirrhosis.  Would use Zofran alone.  - 2 g sodium restriction diet  - Outpatient referral for alcohol use disorder counseling/support.  Discontinue all toxic habits in the interim.  - Outpatient follow-up with [facility] for EGD and colonoscopy as previously recommended.  - Patient reports that her  and case management are working on discharging directly to rehab     Discussed with patient, Dr. Mendez, Dr. Sow    ..Beena Alex, MAXWELL,  APRN    Core Quality Measures   Reviewed items::  Labs, Medications and Radiology reports reviewed

## 2025-06-23 NOTE — DISCHARGE PLANNING
TRISTA Roberts received a call from Dr Tillman and pt is agreeable to move forward with inpatient treatment . Dr Tillman has questions about the length of stay and out of pocket cost etc.   Called [facility] [place] and spoke with Teddy [phone] and discussed next steps for admission. Pt will need to call [phone] and complete the Intake assessment, at that time pt can ask any questions or concerns, out of pocket cost and length of stay etc. Length of stay will be determined by clinical care team , type of treatment and pt.    Met with patient bedside and called [phone] and spoke with Lisette who assisted and completed intake assessment with pt.  Called [place] with an update of intake assessment.          Facility is [facility]. Insurance will cover/accommodate a shared room with another female. Private rooms available per request and will be a out of pocket expense.   Called Dr Tillman and discussed all of the above.

## 2025-06-24 ENCOUNTER — APPOINTMENT (OUTPATIENT)
Dept: CARDIOLOGY | Facility: MEDICAL CENTER | Age: 50
End: 2025-06-24
Attending: HOSPITALIST
Payer: COMMERCIAL

## 2025-06-24 ENCOUNTER — APPOINTMENT (OUTPATIENT)
Dept: BEHAVIORAL HEALTH | Facility: PSYCHIATRIC FACILITY | Age: 50
End: 2025-06-24
Payer: COMMERCIAL

## 2025-06-24 LAB
ALBUMIN SERPL BCP-MCNC: 2.7 G/DL (ref 3.2–4.9)
ALBUMIN/GLOB SERPL: 1.1 G/DL
ALP SERPL-CCNC: 252 U/L (ref 30–99)
ALT SERPL-CCNC: 112 U/L (ref 2–50)
ANION GAP SERPL CALC-SCNC: 12 MMOL/L (ref 7–16)
AST SERPL-CCNC: 249 U/L (ref 12–45)
BACTERIA FLD AEROBE CULT: NORMAL
BILIRUB SERPL-MCNC: 15.5 MG/DL (ref 0.1–1.5)
BUN SERPL-MCNC: 11 MG/DL (ref 8–22)
CALCIUM ALBUM COR SERPL-MCNC: 9.5 MG/DL (ref 8.5–10.5)
CALCIUM SERPL-MCNC: 8.5 MG/DL (ref 8.5–10.5)
CHLORIDE SERPL-SCNC: 91 MMOL/L (ref 96–112)
CO2 SERPL-SCNC: 27 MMOL/L (ref 20–33)
CREAT SERPL-MCNC: 0.8 MG/DL (ref 0.5–1.4)
ERYTHROCYTE [DISTWIDTH] IN BLOOD BY AUTOMATED COUNT: 73.2 FL (ref 35.9–50)
GFR SERPLBLD CREATININE-BSD FMLA CKD-EPI: 89 ML/MIN/1.73 M 2
GLOBULIN SER CALC-MCNC: 2.4 G/DL (ref 1.9–3.5)
GLUCOSE SERPL-MCNC: 116 MG/DL (ref 65–99)
GRAM STN SPEC: NORMAL
HCT VFR BLD AUTO: 39.5 % (ref 37–47)
HGB BLD-MCNC: 13.8 G/DL (ref 12–16)
INR PPP: 5.36 (ref 0.87–1.13)
LV EJECT FRACT  99904: 62
LV EJECT FRACT MOD 2C 99903: 65.98
LV EJECT FRACT MOD 4C 99902: 63.61
LV EJECT FRACT MOD BP 99901: 65.18
MCH RBC QN AUTO: 36.6 PG (ref 27–33)
MCHC RBC AUTO-ENTMCNC: 34.9 G/DL (ref 32.2–35.5)
MCV RBC AUTO: 104.8 FL (ref 81.4–97.8)
PLATELET # BLD AUTO: 173 K/UL (ref 164–446)
PMV BLD AUTO: 10.5 FL (ref 9–12.9)
POTASSIUM SERPL-SCNC: 3 MMOL/L (ref 3.6–5.5)
PROT SERPL-MCNC: 5.1 G/DL (ref 6–8.2)
PROTHROMBIN TIME: 49.5 SEC (ref 12–14.6)
RBC # BLD AUTO: 3.77 M/UL (ref 4.2–5.4)
SIGNIFICANT IND 70042: NORMAL
SITE SITE: NORMAL
SODIUM SERPL-SCNC: 130 MMOL/L (ref 135–145)
SOURCE SOURCE: NORMAL
WBC # BLD AUTO: 12.6 K/UL (ref 4.8–10.8)

## 2025-06-24 PROCEDURE — 80053 COMPREHEN METABOLIC PANEL: CPT

## 2025-06-24 PROCEDURE — 85027 COMPLETE CBC AUTOMATED: CPT

## 2025-06-24 PROCEDURE — 99232 SBSQ HOSP IP/OBS MODERATE 35: CPT | Performed by: NURSE PRACTITIONER

## 2025-06-24 PROCEDURE — A9270 NON-COVERED ITEM OR SERVICE: HCPCS | Performed by: NURSE PRACTITIONER

## 2025-06-24 PROCEDURE — 93306 TTE W/DOPPLER COMPLETE: CPT

## 2025-06-24 PROCEDURE — A9270 NON-COVERED ITEM OR SERVICE: HCPCS | Performed by: HOSPITALIST

## 2025-06-24 PROCEDURE — 99233 SBSQ HOSP IP/OBS HIGH 50: CPT | Performed by: HOSPITALIST

## 2025-06-24 PROCEDURE — 36415 COLL VENOUS BLD VENIPUNCTURE: CPT

## 2025-06-24 PROCEDURE — 770001 HCHG ROOM/CARE - MED/SURG/GYN PRIV*

## 2025-06-24 PROCEDURE — 700102 HCHG RX REV CODE 250 W/ 637 OVERRIDE(OP): Performed by: NURSE PRACTITIONER

## 2025-06-24 PROCEDURE — 93306 TTE W/DOPPLER COMPLETE: CPT | Mod: 26 | Performed by: INTERNAL MEDICINE

## 2025-06-24 PROCEDURE — 700102 HCHG RX REV CODE 250 W/ 637 OVERRIDE(OP): Performed by: HOSPITALIST

## 2025-06-24 PROCEDURE — 700117 HCHG RX CONTRAST REV CODE 255: Performed by: HOSPITALIST

## 2025-06-24 PROCEDURE — 85610 PROTHROMBIN TIME: CPT

## 2025-06-24 RX ORDER — LACTULOSE 10 G/15ML
30 SOLUTION ORAL EVERY EVENING
Status: DISCONTINUED | OUTPATIENT
Start: 2025-06-25 | End: 2025-06-26

## 2025-06-24 RX ORDER — POTASSIUM CHLORIDE 1500 MG/1
40 TABLET, EXTENDED RELEASE ORAL ONCE
Status: COMPLETED | OUTPATIENT
Start: 2025-06-24 | End: 2025-06-24

## 2025-06-24 RX ORDER — GAUZE BANDAGE 2" X 2"
100 BANDAGE TOPICAL DAILY
Status: DISCONTINUED | OUTPATIENT
Start: 2025-06-24 | End: 2025-07-04 | Stop reason: HOSPADM

## 2025-06-24 RX ADMIN — LEVOTHYROXINE SODIUM 50 MCG: 0.05 TABLET ORAL at 05:10

## 2025-06-24 RX ADMIN — GABAPENTIN 300 MG: 300 CAPSULE ORAL at 16:48

## 2025-06-24 RX ADMIN — LACTULOSE 15 ML: 10 SOLUTION ORAL at 05:08

## 2025-06-24 RX ADMIN — FUROSEMIDE 40 MG: 40 TABLET ORAL at 05:08

## 2025-06-24 RX ADMIN — APIXABAN 10 MG: 5 TABLET, FILM COATED ORAL at 05:09

## 2025-06-24 RX ADMIN — CARVEDILOL 3.12 MG: 3.12 TABLET, FILM COATED ORAL at 08:17

## 2025-06-24 RX ADMIN — AMOXICILLIN AND CLAVULANATE POTASSIUM 1 TABLET: 875; 125 TABLET, FILM COATED ORAL at 05:09

## 2025-06-24 RX ADMIN — GABAPENTIN 300 MG: 300 CAPSULE ORAL at 05:10

## 2025-06-24 RX ADMIN — POTASSIUM CHLORIDE 40 MEQ: 1500 TABLET, EXTENDED RELEASE ORAL at 13:01

## 2025-06-24 RX ADMIN — PREDNISOLONE SODIUM PHOSPHATE 39.9 MG: 15 SOLUTION ORAL at 05:10

## 2025-06-24 RX ADMIN — Medication 100 MG: at 13:02

## 2025-06-24 RX ADMIN — AMOXICILLIN AND CLAVULANATE POTASSIUM 1 TABLET: 875; 125 TABLET, FILM COATED ORAL at 16:48

## 2025-06-24 RX ADMIN — ESCITALOPRAM OXALATE 20 MG: 10 TABLET ORAL at 05:09

## 2025-06-24 RX ADMIN — HUMAN ALBUMIN MICROSPHERES AND PERFLUTREN 3 ML: 10; .22 INJECTION, SOLUTION INTRAVENOUS at 13:45

## 2025-06-24 RX ADMIN — SPIRONOLACTONE 100 MG: 100 TABLET ORAL at 05:09

## 2025-06-24 ASSESSMENT — ENCOUNTER SYMPTOMS
NAUSEA: 0
WEIGHT LOSS: 0
PALPITATIONS: 0
BRUISES/BLEEDS EASILY: 0
COUGH: 0
BLOOD IN STOOL: 0
FOCAL WEAKNESS: 0
DIZZINESS: 0
SHORTNESS OF BREATH: 0
CHILLS: 0
BLURRED VISION: 0
MYALGIAS: 0
RESPIRATORY NEGATIVE: 1
BACK PAIN: 0
CONSTIPATION: 0
SORE THROAT: 0
DIAPHORESIS: 0
ABDOMINAL PAIN: 0
NEUROLOGICAL NEGATIVE: 1
TREMORS: 1
EYES NEGATIVE: 1
HEADACHES: 0
FEVER: 0
DEPRESSION: 0
DIARRHEA: 1
MUSCULOSKELETAL NEGATIVE: 1
WEAKNESS: 0
VOMITING: 0
CARDIOVASCULAR NEGATIVE: 1
NERVOUS/ANXIOUS: 0
HEARTBURN: 0

## 2025-06-24 ASSESSMENT — LIFESTYLE VARIABLES
ORIENTATION AND CLOUDING OF SENSORIUM: ORIENTED AND CAN DO SERIAL ADDITIONS
AGITATION: NORMAL ACTIVITY
AGITATION: NORMAL ACTIVITY
ANXIETY: NO ANXIETY (AT EASE)
HEADACHE, FULLNESS IN HEAD: NOT PRESENT
VISUAL DISTURBANCES: NOT PRESENT
PAROXYSMAL SWEATS: NO SWEAT VISIBLE
ANXIETY: NO ANXIETY (AT EASE)
SUBSTANCE_ABUSE: 1
ORIENTATION AND CLOUDING OF SENSORIUM: ORIENTED AND CAN DO SERIAL ADDITIONS
TREMOR: TREMOR NOT VISIBLE BUT CAN BE FELT, FINGERTIP TO FINGERTIP
NAUSEA AND VOMITING: NO NAUSEA AND NO VOMITING
AUDITORY DISTURBANCES: NOT PRESENT
VISUAL DISTURBANCES: NOT PRESENT
TREMOR: TREMOR NOT VISIBLE BUT CAN BE FELT, FINGERTIP TO FINGERTIP
AUDITORY DISTURBANCES: NOT PRESENT
HEADACHE, FULLNESS IN HEAD: NOT PRESENT
TOTAL SCORE: 1
TOTAL SCORE: 1
NAUSEA AND VOMITING: NO NAUSEA AND NO VOMITING
PAROXYSMAL SWEATS: NO SWEAT VISIBLE

## 2025-06-24 ASSESSMENT — PAIN DESCRIPTION - PAIN TYPE: TYPE: ACUTE PAIN

## 2025-06-24 NOTE — CARE PLAN
The patient is Stable - Low risk of patient condition declining or worsening    Shift Goals  Clinical Goals: Pt will remain safe and free from falls throughout shift  Patient Goals: rest  Family Goals: eddie    Progress made toward(s) clinical / shift goals:  Pt remained safe and free from falls throughout shift due to communication, rest, hourly rounding, and education.     Patient is not progressing towards the following goals: NA

## 2025-06-24 NOTE — PROGRESS NOTES
Clinical Lab called with critical result of PT/INR at 0124. ANABELLE John notified of critical lab result at 0132.

## 2025-06-24 NOTE — PROGRESS NOTES
.Gastroenterology Progress Note               Author:  DOMENICO Jarvis   Date & Time Created: 6/24/2025 1:55 PM       Patient ID:  Name:             Minerva Tillman  YOB: 1975  Age:                 50 y.o.  female  MRN:               [id number]    Medical Decision Making, by Problem:  Active Hospital Problems    Diagnosis     Hyponatremia [E87.1]     Portal vein thrombosis [I81]     Alcohol abuse with withdrawal and perceptual disturbance (HCC) [F10.132]     Alcoholic cirrhosis (HCC) [K70.30]     Alcoholic hepatitis [K70.10]     High anion gap metabolic acidosis [E87.29]     Acute hypoxic respiratory failure (HCC) [J96.01]     Ascites [R18.8]     Hypokalemia [E87.6]     Acute cystitis [N30.00]      Presenting Chief Complaint:  Decompensated cirrhosis     History of Present Illness:   50-year-old female presents to the emergency department for abdominal swelling and shortness of breath and desire to discontinue alcohol intake.  Patient was seen by gastroenterology as an inpatient in 2024.  Dr. Daniels had recommended outpatient evaluation.  Patient followed up with Dr. Conley on 7/1/2024 with an additional follow-up appointment on 10/17/2024.  She had a FibroScan done which suggested advanced fibrosis and possible cirrhosis.  She was recommended to pursue upper and lower endoscopy.  Patient states that when she was told of this information she became quite concerned and did not follow-up because she was scared of the results.  Patient continues to drink alcohol.  She wants help in quitting.  She denies hematemesis or melena.  She endorses new onset abdominal swelling without lower extremity edema.     Home medications reviewed.  Patient not taking anything in the way of diuretics or medications for encephalopathy.  Patient is not on a beta-blocker.     Review of systems at this time is as stated above as well as patient feeling a little bit anxious but comfortable with regard to her  detoxification/CIWA protocol.    Interval History:  6/21/2025: Patient seen.  Reports feeling much better but having multiple liquid bowel movements.  Mild tremors, very hungry.  Has started wearing oxygen at night at home, reports it is due to increased elevation at home in [facility].  Slightly dyspneic today.  N.p.o. pending paracentesis. VSS, CIWA 4.  Transaminitis worsening, T. bili 13.8 > 9.4.  INR 1.89.  Hoping to go directly to rehab from the hospital.    6/22/2025: Feels better, tremulous with CIWA 5. Having multiple bowel movements. Fluid negative for SBP and NGTD on culture. Bili up to 14.2    6/23/2025: Feels much better today, no complaints. Eating and drinking well. Reports she was unable to be weaned off oxygen. Liver enzymes uptrending, T. Bili 17.2>14.2    6/24/2025: Patient seen at bedside with .  Had just completed echocardiogram.  Results are pending.  Tolerating oral intake without difficulty.  Denies dyspnea, nausea, vomiting, abdominal pain.  WBC 12.6.  Hemoglobin stable 13.8.  Platelets 173.  Sodium 130, BUN 11, creatinine 0.8, LFTs downtrending with , , alk phos 252, total bilirubin 15.5.  Albumin 2.7.  INR increased at 5.36.  Eliquis held by primary team.  Cytology resulted showing abnormal cells-discussed this with patient and  at bedside for which they will follow-up with outpatient GI.  Additionally, per flowsheets, patient reported to have 2 bowel movements overnight.  Patient reports having more than 5.  Discussed lactulose daily, titrating to 2-3 bowel movements per day.    Hospital Medications:  Current Facility-Administered Medications   Medication Dose Frequency Provider Last Rate Last Admin    thiamine (Vitamin B-1) tablet 100 mg  100 mg DAILY Vidhi Pond M.D.   100 mg at 06/24/25 1302    [START ON 6/25/2025] lactulose 20 GM/30ML solution 30 mL  30 mL Q EVENING Vidhi Pond M.D.        [Held by provider] apixaban (Eliquis) tablet 10 mg  10 mg  BID Helen Mendez M.D.   10 mg at 06/24/25 0509    [Held by provider] apixaban (Eliquis) tablet 5 mg  5 mg BID Helen Mendez M.D.        amoxicillin-clavulanate (Augmentin) 875-125 MG per tablet 1 Tablet  1 Tablet Q12HRS Helen Mendez M.D.   1 Tablet at 06/24/25 0509    prednisoLONE sodium phosphate (Pediapred) 15 MG/5ML oral solution 39.9 mg  39.9 mg DAILY Helen Mendez M.D.   39.9 mg at 06/24/25 0510    spironolactone (Aldactone) tablet 100 mg  100 mg Q DAY Helen Mendez M.D.   100 mg at 06/24/25 0509    Respiratory Therapy Consult   Continuous RT Helen Mendez M.D.        furosemide (Lasix) tablet 40 mg  40 mg Q DAY Helen Mendez M.D.   40 mg at 06/24/25 0508    carvedilol (Coreg) tablet 3.125 mg  3.125 mg BID WITH MEALS Helen Mendez M.D.   3.125 mg at 06/24/25 0817    melatonin tablet 5 mg  5 mg HS PRN Roseanne Crowder, A.P.R.N.   5 mg at 06/23/25 2201    labetalol (Normodyne/Trandate) injection 10 mg  10 mg Q4HRS PRN Abby Ivy M.D.        ondansetron (Zofran) syringe/vial injection 4 mg  4 mg Q4HRS PRN Abby Ivy M.D.        ondansetron (Zofran ODT) dispertab 4 mg  4 mg Q4HRS PRN Abby Ivy M.D.   4 mg at 06/22/25 1953    escitalopram (Lexapro) tablet 20 mg  20 mg DAILY Abby Ivy M.D.   20 mg at 06/24/25 0509    gabapentin (Neurontin) capsule 300 mg  300 mg BID Abby Ivy M.D.   300 mg at 06/24/25 0510    levothyroxine (Synthroid) tablet 50 mcg  50 mcg AM ES Abby Ivy M.D.   50 mcg at 06/24/25 0510    hydrOXYzine HCl (Atarax) tablet 25 mg  25 mg QDAY PRN Abyb Ivy M.D.   25 mg at 06/23/25 2201    albuterol inhaler 1-2 Puff  1-2 Puff Q6HRS PRN Abby Ivy M.D.   2 Puff at 06/22/25 2249   Last reviewed on 6/19/2025  7:43 PM by Spencer Camacho PhT       Review of Systems:  Review of Systems   Constitutional:  Negative for chills, fever and malaise/fatigue.   HENT:  Negative for hearing loss and sore throat.    Eyes:  Negative for blurred vision.   Respiratory:  Negative for cough and  "shortness of breath.    Cardiovascular:  Negative for chest pain and leg swelling.   Gastrointestinal:  Positive for diarrhea. Negative for abdominal pain, blood in stool, constipation, heartburn, melena, nausea and vomiting.   Genitourinary:  Negative for dysuria.   Musculoskeletal:  Negative for back pain.   Skin:  Negative for rash.   Neurological:  Positive for tremors. Negative for dizziness and weakness.   Psychiatric/Behavioral:  Positive for substance abuse. Negative for depression. The patient is not nervous/anxious.    All other systems reviewed and are negative.    Vital signs:  Weight/BMI: Body mass index is 26.05 kg/m².  /76   Pulse 65   Temp 36.3 °C (97.4 °F) (Temporal)   Resp 19   Ht 1.575 m (5' 2\")   Wt 64.6 kg (142 lb 6.7 oz)   SpO2 90%   Vitals:    06/24/25 0015 06/24/25 0403 06/24/25 0748 06/24/25 1300   BP: 104/67 104/68 117/76    Pulse: 65 63 65    Resp: 17 18 19    Temp: 36.1 °C (96.9 °F) 36.4 °C (97.5 °F) 36.3 °C (97.4 °F)    TempSrc: Temporal Temporal Temporal    SpO2: 94% 95% 90% 90%   Weight:       Height:         Oxygen Therapy:  Pulse Oximetry: 90 %, O2 (LPM): 2, O2 Delivery Device: Silicone Nasal Cannula    Intake/Output Summary (Last 24 hours) at 6/24/2025 1355  Last data filed at 6/23/2025 2000  Gross per 24 hour   Intake 480 ml   Output --   Net 480 ml       Physical Exam  Vitals and nursing note reviewed.   Constitutional:       General: She is not in acute distress.     Appearance: She is ill-appearing.   HENT:      Head: Normocephalic and atraumatic.      Nose: Nose normal.      Mouth/Throat:      Mouth: Mucous membranes are moist.      Pharynx: Oropharynx is clear.   Eyes:      General: Scleral icterus present.      Extraocular Movements: Extraocular movements intact.      Conjunctiva/sclera: Conjunctivae normal.   Cardiovascular:      Rate and Rhythm: Normal rate and regular rhythm.      Pulses: Normal pulses.      Heart sounds: Normal heart sounds.   Pulmonary:      " Effort: Pulmonary effort is normal. No respiratory distress.      Breath sounds: Normal breath sounds.   Abdominal:      General: Abdomen is flat. Bowel sounds are normal. There is no distension.      Palpations: Abdomen is soft.      Tenderness: There is no abdominal tenderness.   Musculoskeletal:         General: Normal range of motion.      Cervical back: Normal range of motion.      Right lower leg: No edema.      Left lower leg: No edema.   Skin:     General: Skin is warm and dry.      Capillary Refill: Capillary refill takes less than 2 seconds.      Coloration: Skin is jaundiced.   Neurological:      Mental Status: She is alert and oriented to person, place, and time.      Comments: Tremulous, no asterixis   Psychiatric:         Mood and Affect: Mood normal.         Behavior: Behavior normal.         Labs:  Recent Labs     06/22/25 0402 06/23/25  0559 06/24/25  0043   SODIUM 134* 132* 130*   POTASSIUM 4.1 3.8 3.0*   CHLORIDE 95* 91* 91*   CO2 28 29 27   BUN 8 8 11   CREATININE 0.81 0.81 0.80   CALCIUM 8.6 8.6 8.5     Recent Labs     06/22/25 0402 06/23/25  0559 06/24/25  0043   ALTSGPT 107* 117* 112*   ASTSGOT 266* 268* 249*   ALKPHOSPHAT 275* 284* 252*   TBILIRUBIN 14.2* 17.2* 15.5*   GLUCOSE 104* 136* 116*     Recent Labs     06/22/25 0402 06/23/25  0559 06/24/25  0043   WBC 10.5 10.3 12.6*   NEUTSPOLYS 66.40  --   --    LYMPHOCYTES 18.40*  --   --    MONOCYTES 13.70*  --   --    EOSINOPHILS 0.10  --   --    BASOPHILS 0.50  --   --    ASTSGOT 266* 268* 249*   ALTSGPT 107* 117* 112*   ALKPHOSPHAT 275* 284* 252*   TBILIRUBIN 14.2* 17.2* 15.5*     Recent Labs     06/22/25 0402 06/23/25  0559 06/24/25  0043   RBC 3.68* 3.90* 3.77*   HEMOGLOBIN 13.4 14.5 13.8   HEMATOCRIT 39.6 41.5 39.5   PLATELETCT 167 182 173   PROTHROMBTM  --   --  49.5*   INR  --   --  5.36*     Recent Results (from the past 24 hours)   CBC WITHOUT DIFFERENTIAL    Collection Time: 06/24/25 12:43 AM   Result Value Ref Range    WBC 12.6  (H) 4.8 - 10.8 K/uL    RBC 3.77 (L) 4.20 - 5.40 M/uL    Hemoglobin 13.8 12.0 - 16.0 g/dL    Hematocrit 39.5 37.0 - 47.0 %    .8 (H) 81.4 - 97.8 fL    MCH 36.6 (H) 27.0 - 33.0 pg    MCHC 34.9 32.2 - 35.5 g/dL    RDW 73.2 (H) 35.9 - 50.0 fL    Platelet Count 173 164 - 446 K/uL    MPV 10.5 9.0 - 12.9 fL   Comp Metabolic Panel    Collection Time: 06/24/25 12:43 AM   Result Value Ref Range    Sodium 130 (L) 135 - 145 mmol/L    Potassium 3.0 (L) 3.6 - 5.5 mmol/L    Chloride 91 (L) 96 - 112 mmol/L    Co2 27 20 - 33 mmol/L    Anion Gap 12.0 7.0 - 16.0    Glucose 116 (H) 65 - 99 mg/dL    Bun 11 8 - 22 mg/dL    Creatinine 0.80 0.50 - 1.40 mg/dL    Calcium 8.5 8.5 - 10.5 mg/dL    Correct Calcium 9.5 8.5 - 10.5 mg/dL    AST(SGOT) 249 (H) 12 - 45 U/L    ALT(SGPT) 112 (H) 2 - 50 U/L    Alkaline Phosphatase 252 (H) 30 - 99 U/L    Total Bilirubin 15.5 (H) 0.1 - 1.5 mg/dL    Albumin 2.7 (L) 3.2 - 4.9 g/dL    Total Protein 5.1 (L) 6.0 - 8.2 g/dL    Globulin 2.4 1.9 - 3.5 g/dL    A-G Ratio 1.1 g/dL   Prothrombin Time    Collection Time: 06/24/25 12:43 AM   Result Value Ref Range    PT 49.5 (H) 12.0 - 14.6 sec    INR 5.36 (H) 0.87 - 1.13   ESTIMATED GFR    Collection Time: 06/24/25 12:43 AM   Result Value Ref Range    GFR (CKD-EPI) 89 >60 mL/min/1.73 m 2   EC-ECHOCARDIOGRAM COMPLETE W/ CONT    Collection Time: 06/24/25  1:15 PM   Result Value Ref Range    Eject.Frac. MOD BP 65.18     Eject.Frac. MOD 4C 63.61     Eject.Frac. MOD 2C 65.98     Left Ventrical Ejection Fraction 62        Radiology Review:  EC-ECHOCARDIOGRAM COMPLETE W/ CONT   Final Result      US-PARACENTESIS, ABD WITH IMAGING   Final Result      1. Ultrasound-guided diagnostic and therapeutic paracentesis of the right lower quadrant of the abdominal wall.      2. 1200 mL of fluid withdrawn.      US-ABDOMEN COMPLETE SURVEY   Final Result      1.  Cirrhotic appearance of the liver with a lobulated cyst.   2.  Sludge is present within the gallbladder. There is no  definite stone.   3.  Ascites.   4.  There appears to be occlusion of the portal vein and at least partial occlusion of the inferior vena cava.   5.  Evaluation is limited with poor visualization of the midline structures.         DX-CHEST-PORTABLE (1 VIEW)   Final Result      Mild left lung infiltrate.            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  She was not on diuretics at home.    - Continue prednisilone for alcoholic hepatitis, no evidence of infection in ascites fluid (started on 6/22/2025: If patient still here on day 4, may calculate a 40 Delmy score.  Patient/ aware that there is a 10-15% false-negative result with this and more accurately calculated on day 7)  - trend liver enzymes  - lactulose daily-titrate to have 3 loose bowel moods per day  CIWA discontinued per primary team  -Echocardiogram to evaluate right-sided heart pressures pending.  Discontinue beta blocker if this does not improve.  - would discontinue first generation antipsychotics for nausea therapy.  Compazine and Phenergan can have deleterious effects and cirrhosis.  Would use Zofran alone.  - 2 g sodium restriction diet  - Outpatient referral for alcohol use disorder counseling/support.  Discontinue all toxic habits in the interim.  -  Outpatient follow-up with [facility] for EGD and colonoscopy as previously recommended.  - Patient reports that her  and case management are working on discharging directly to rehab     GI to follow    Discussed with patient,  at bedside, Dr. Daniels, Dr. Pond    ..DOMENICO Jarvis    Core Quality Measures   Reviewed items::  Labs, Medications and Radiology reports reviewed

## 2025-06-24 NOTE — CARE PLAN
The patient is Stable - Low risk of patient condition declining or worsening    Shift Goals  Clinical Goals: monitor CIWA  Patient Goals: decrease anxiety  Family Goals: eddie    Progress made toward(s) clinical / shift goals:  Aox4. With some anxiety. PRN atarax given. CIWA monitored. With call light in reach and bed in lowest position.    Patient is not progressing towards the following goals:

## 2025-06-24 NOTE — DISCHARGE PLANNING
Case Management Discharge Planning    Admission Date: 6/19/2025  GMLOS: 4.8  ALOS: 5    6-Clicks ADL Score: 22  6-Clicks Mobility Score: 23      Anticipated Discharge Dispo: Discharge Disposition: Discharged to home/self care (01)    DME Needed: No    Action(s) Taken: LMSW was updated during IDT rounds. Patient is likely to be medically cleared within the next day or two.     Escalations Completed: None    Medically Clear: No    Next Steps: Case Management to follow to fax updated clinicals to SUDS program.     Barriers to Discharge: Medical clearance    Is the patient up for discharge tomorrow: No

## 2025-06-24 NOTE — PROGRESS NOTES
Hospital Medicine Daily Progress Note    Date of Service  6/24/2025    Chief Complaint  Minerva Tillman is a 50 y.o. female admitted 6/19/2025 with jaundice    Hospital Course  Minerva Tillman is a 50 y.o. female who presented 6/19/2025 with past medical history of liver cirrhosis, alcohol abuse, depression who presents to the hospital for jaundice the past 1 week. She was found to have acute decompensated cirrhosis with ascites with a MELD of 27, acute on chronic respiratory failure with volume overload, portal vein thrombosis and possible community acquired pneumonia and was started on empiric antibiotics    She required CIWA and support with alcohol withdrawl symptoms. She underwent a paracentesis and had no evidence sbp. She was started on anticoagulation for the acute portal vein thrombosis.GI is following     Interval Problem Update  6/23: Axox3, ciwa improved today, currently 2/10, mild tremor today and unsteady on her feet. She reports she is very tired but denies sob, no cp, no abdominal pain, no n/v. She is having loose bowel mvts with the lactulose, no signs of encephalopathy at this time, her recall has improved today. Plan remains to go to inpatient alcohol rehab from here, social work assisting with intake interview which is planned for today. Cytology from paracentesis pending. Bilirubin continues to increase. ROS otherwise negative.   6/24:  d/w GI this a.m., stopped Eliquis since INR increased to 5.36 from 1.8, hepatitis panel ordered. T. Bilirubin increased, decreased lactulose due to multiple loose stools, replaced K.   Dc'd CIWA. Continue prednisolone daily.      I have discussed this patient's plan of care and discharge plan at IDT rounds today with Case Management, Nursing, Nursing leadership, and other members of the IDT team.    Consultants/Specialty  GI    Code Status  Full Code    Disposition  The patient is not medically cleared for discharge to home or a post-acute  facility.  Anticipate discharge to: home with close outpatient follow-up    I have placed the appropriate orders for post-discharge needs.    Review of Systems  Review of Systems   Constitutional:  Positive for malaise/fatigue. Negative for chills, diaphoresis, fever and weight loss.   HENT: Negative.  Negative for sore throat.    Eyes: Negative.  Negative for blurred vision.   Respiratory: Negative.  Negative for cough and shortness of breath.    Cardiovascular: Negative.  Negative for chest pain, palpitations and leg swelling.   Gastrointestinal:  Negative for abdominal pain, nausea and vomiting.   Genitourinary:  Negative for dysuria and hematuria.   Musculoskeletal: Negative.  Negative for myalgias.   Skin: Negative.  Negative for itching and rash.   Neurological: Negative.  Negative for dizziness, focal weakness, weakness and headaches.   Endo/Heme/Allergies: Negative.  Does not bruise/bleed easily.   Psychiatric/Behavioral:  Positive for substance abuse. Negative for depression and suicidal ideas.    All other systems reviewed and are negative.       Physical Exam  Temp:  [36.1 °C (96.9 °F)-36.4 °C (97.5 °F)] 36.1 °C (97 °F)  Pulse:  [60-65] 60  Resp:  [17-20] 20  BP: ()/(67-76) 96/68  SpO2:  [90 %-95 %] 90 %    Physical Exam  Vitals and nursing note reviewed. Exam conducted with a chaperone present.   Constitutional:       General: She is not in acute distress.     Appearance: Normal appearance. She is not diaphoretic.   HENT:      Head: Normocephalic.      Nose: Nose normal.      Mouth/Throat:      Mouth: Mucous membranes are moist.   Eyes:      General: Scleral icterus present.      Pupils: Pupils are equal, round, and reactive to light.   Cardiovascular:      Rate and Rhythm: Normal rate and regular rhythm.      Pulses: Normal pulses.      Heart sounds: Normal heart sounds.   Pulmonary:      Effort: Pulmonary effort is normal.      Breath sounds: Normal breath sounds.   Abdominal:      General:  Abdomen is flat. Bowel sounds are normal. There is no distension.      Palpations: Abdomen is soft.      Tenderness: There is no abdominal tenderness.   Musculoskeletal:         General: No swelling or deformity. Normal range of motion.   Skin:     General: Skin is warm and dry.      Capillary Refill: Capillary refill takes less than 2 seconds.      Coloration: Skin is jaundiced.      Findings: No bruising or lesion.   Neurological:      General: No focal deficit present.      Mental Status: She is alert and oriented to person, place, and time.      Cranial Nerves: No cranial nerve deficit.   Psychiatric:         Mood and Affect: Mood normal.         Behavior: Behavior normal.         Fluids    Intake/Output Summary (Last 24 hours) at 6/24/2025 1644  Last data filed at 6/23/2025 2000  Gross per 24 hour   Intake 240 ml   Output --   Net 240 ml        Laboratory  Recent Labs     06/22/25  0402 06/23/25  0559 06/24/25  0043   WBC 10.5 10.3 12.6*   RBC 3.68* 3.90* 3.77*   HEMOGLOBIN 13.4 14.5 13.8   HEMATOCRIT 39.6 41.5 39.5   .6* 106.4* 104.8*   MCH 36.4* 37.2* 36.6*   MCHC 33.8 34.9 34.9   RDW 77.4* 74.5* 73.2*   PLATELETCT 167 182 173   MPV 10.5 10.4 10.5     Recent Labs     06/22/25  0402 06/23/25  0559 06/24/25  0043   SODIUM 134* 132* 130*   POTASSIUM 4.1 3.8 3.0*   CHLORIDE 95* 91* 91*   CO2 28 29 27   GLUCOSE 104* 136* 116*   BUN 8 8 11   CREATININE 0.81 0.81 0.80   CALCIUM 8.6 8.6 8.5     Recent Labs     06/24/25  0043   INR 5.36*               Imaging  EC-ECHOCARDIOGRAM COMPLETE W/ CONT   Final Result      US-PARACENTESIS, ABD WITH IMAGING   Final Result      1. Ultrasound-guided diagnostic and therapeutic paracentesis of the right lower quadrant of the abdominal wall.      2. 1200 mL of fluid withdrawn.      US-ABDOMEN COMPLETE SURVEY   Final Result      1.  Cirrhotic appearance of the liver with a lobulated cyst.   2.  Sludge is present within the gallbladder. There is no definite stone.   3.   Ascites.   4.  There appears to be occlusion of the portal vein and at least partial occlusion of the inferior vena cava.   5.  Evaluation is limited with poor visualization of the midline structures.         DX-CHEST-PORTABLE (1 VIEW)   Final Result      Mild left lung infiltrate.           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  multivitamin, thiamine and folate  replacing electrolytes  See discussion above  No hallucinations or seizure today - precautions    Portal vein thrombosis- (present on admission)  Assessment & Plan  H/h stable, no evidence of bleeding  Discussed with pt and GI  Holding eliquis since an increase in INR from 1.8-5.36.  following  Cbc in am     Hyponatremia  Assessment & Plan  Consistent with cirrhosis  following    Alcoholic hepatitis- (present on admission)  Assessment & Plan  Maddery dissemination score 46  Bili continues to increase, steroids started  Assess for response after 7 days with jeramie score     Alcoholic cirrhosis (HCC)- (present on admission)  Assessment & Plan  Decompensated  Volume overloaded, improving with diuretics - tapering as tolerated  MELD 27  Hepatitis panel-repeat  Prednisolone started 6/22 for worsening LFTs.  Lactulose qhs, NH3 elevated 66 at one point, now excessive BMs and normal mentation.    Acute hypoxic respiratory failure (HCC)- (present on admission)  Assessment & Plan  Fluctuating, had titrated down to 1L after paracentesis but increased to 3 L overnight - exam stable- wean as tolerated  Continue diuretics as tolerated   Query mild community acquired pna, non specific changes on x ray -  No cough, on empiric augmentin   Wean as tolerated  Following  RT      High anion gap metabolic acidosis- (present on admission)  Assessment & Plan  Continue to trend secondary to liver dysfunction    Ascites- (present on  admission)  Assessment & Plan  Due to cirrhosis  Tolerating diuretics  S/p paracentesis  following    Hypokalemia- (present on admission)  Assessment & Plan  Replaced and resolved    Acute cystitis- (present on admission)  Assessment & Plan  Resolved  Completed augmentin x 5 days.           VTE prophylaxis: KEVIN/ eliquis    I have performed a physical exam and reviewed and updated ROS and Plan today (6/24/2025). In review of yesterday's note (6/23/2025), there are no changes except as documented above.

## 2025-06-24 NOTE — THERAPY
Physical Therapy Contact Note    Patient Name: Minerva Tillman  Age:  50 y.o., Sex:  female  Medical Record #: [id number]  Today's Date: 6/24/2025    PT consult received. Met with pt at bedside. Pt had just finished showering with assist from CNA. Pt was very pleasant and politely declined any mobility concerns. Reported that she walked in the hallway yesterday with nursing, no issues. Pt did walk ~450 ft per kardex. PT will sign off 2/2 no acute needs.

## 2025-06-25 LAB
ALBUMIN SERPL BCP-MCNC: 2.6 G/DL (ref 3.2–4.9)
ALBUMIN/GLOB SERPL: 1.1 G/DL
ALP SERPL-CCNC: 232 U/L (ref 30–99)
ALT SERPL-CCNC: 126 U/L (ref 2–50)
ANION GAP SERPL CALC-SCNC: 11 MMOL/L (ref 7–16)
AST SERPL-CCNC: 263 U/L (ref 12–45)
BILIRUB SERPL-MCNC: 16.3 MG/DL (ref 0.1–1.5)
BUN SERPL-MCNC: 14 MG/DL (ref 8–22)
CALCIUM ALBUM COR SERPL-MCNC: 9.9 MG/DL (ref 8.5–10.5)
CALCIUM SERPL-MCNC: 8.8 MG/DL (ref 8.5–10.5)
CHLORIDE SERPL-SCNC: 92 MMOL/L (ref 96–112)
CO2 SERPL-SCNC: 30 MMOL/L (ref 20–33)
CREAT SERPL-MCNC: 0.82 MG/DL (ref 0.5–1.4)
ERYTHROCYTE [DISTWIDTH] IN BLOOD BY AUTOMATED COUNT: 75.4 FL (ref 35.9–50)
GFR SERPLBLD CREATININE-BSD FMLA CKD-EPI: 86 ML/MIN/1.73 M 2
GLOBULIN SER CALC-MCNC: 2.4 G/DL (ref 1.9–3.5)
GLUCOSE SERPL-MCNC: 107 MG/DL (ref 65–99)
HAV IGM SERPL QL IA: NORMAL
HBV CORE IGM SER QL: NORMAL
HBV SURFACE AG SER QL: NORMAL
HCT VFR BLD AUTO: 40.1 % (ref 37–47)
HCV AB SER QL: NORMAL
HGB BLD-MCNC: 13.6 G/DL (ref 12–16)
INR PPP: 2.97 (ref 0.87–1.13)
MCH RBC QN AUTO: 35.8 PG (ref 27–33)
MCHC RBC AUTO-ENTMCNC: 33.9 G/DL (ref 32.2–35.5)
MCV RBC AUTO: 105.5 FL (ref 81.4–97.8)
PLATELET # BLD AUTO: 190 K/UL (ref 164–446)
PMV BLD AUTO: 10.5 FL (ref 9–12.9)
POTASSIUM SERPL-SCNC: 3.4 MMOL/L (ref 3.6–5.5)
PROT SERPL-MCNC: 5 G/DL (ref 6–8.2)
PROTHROMBIN TIME: 31.1 SEC (ref 12–14.6)
RBC # BLD AUTO: 3.8 M/UL (ref 4.2–5.4)
SODIUM SERPL-SCNC: 133 MMOL/L (ref 135–145)
WBC # BLD AUTO: 12.1 K/UL (ref 4.8–10.8)

## 2025-06-25 PROCEDURE — A9270 NON-COVERED ITEM OR SERVICE: HCPCS | Performed by: HOSPITALIST

## 2025-06-25 PROCEDURE — 99232 SBSQ HOSP IP/OBS MODERATE 35: CPT | Performed by: NURSE PRACTITIONER

## 2025-06-25 PROCEDURE — 80074 ACUTE HEPATITIS PANEL: CPT

## 2025-06-25 PROCEDURE — 700102 HCHG RX REV CODE 250 W/ 637 OVERRIDE(OP): Performed by: HOSPITALIST

## 2025-06-25 PROCEDURE — 80053 COMPREHEN METABOLIC PANEL: CPT

## 2025-06-25 PROCEDURE — 99233 SBSQ HOSP IP/OBS HIGH 50: CPT | Performed by: HOSPITALIST

## 2025-06-25 PROCEDURE — A9270 NON-COVERED ITEM OR SERVICE: HCPCS | Performed by: NURSE PRACTITIONER

## 2025-06-25 PROCEDURE — 770001 HCHG ROOM/CARE - MED/SURG/GYN PRIV*

## 2025-06-25 PROCEDURE — 36415 COLL VENOUS BLD VENIPUNCTURE: CPT

## 2025-06-25 PROCEDURE — 85610 PROTHROMBIN TIME: CPT

## 2025-06-25 PROCEDURE — 85027 COMPLETE CBC AUTOMATED: CPT

## 2025-06-25 PROCEDURE — 700102 HCHG RX REV CODE 250 W/ 637 OVERRIDE(OP): Performed by: NURSE PRACTITIONER

## 2025-06-25 RX ORDER — OXYCODONE HYDROCHLORIDE 5 MG/1
5 TABLET ORAL EVERY 4 HOURS PRN
Refills: 0 | Status: DISCONTINUED | OUTPATIENT
Start: 2025-06-25 | End: 2025-07-04 | Stop reason: HOSPADM

## 2025-06-25 RX ORDER — POTASSIUM CHLORIDE 1500 MG/1
40 TABLET, EXTENDED RELEASE ORAL ONCE
Status: COMPLETED | OUTPATIENT
Start: 2025-06-25 | End: 2025-06-25

## 2025-06-25 RX ADMIN — Medication 100 MG: at 05:03

## 2025-06-25 RX ADMIN — LACTULOSE 30 ML: 10 SOLUTION ORAL at 17:39

## 2025-06-25 RX ADMIN — PREDNISOLONE SODIUM PHOSPHATE 39.9 MG: 15 SOLUTION ORAL at 05:04

## 2025-06-25 RX ADMIN — FUROSEMIDE 40 MG: 40 TABLET ORAL at 05:03

## 2025-06-25 RX ADMIN — GABAPENTIN 300 MG: 300 CAPSULE ORAL at 05:04

## 2025-06-25 RX ADMIN — AMOXICILLIN AND CLAVULANATE POTASSIUM 1 TABLET: 875; 125 TABLET, FILM COATED ORAL at 05:03

## 2025-06-25 RX ADMIN — OXYCODONE 5 MG: 5 TABLET ORAL at 15:15

## 2025-06-25 RX ADMIN — LEVOTHYROXINE SODIUM 50 MCG: 0.05 TABLET ORAL at 05:04

## 2025-06-25 RX ADMIN — ESCITALOPRAM OXALATE 20 MG: 10 TABLET ORAL at 05:04

## 2025-06-25 RX ADMIN — CARVEDILOL 3.12 MG: 3.12 TABLET, FILM COATED ORAL at 08:21

## 2025-06-25 RX ADMIN — Medication 5 MG: at 01:09

## 2025-06-25 RX ADMIN — HYDROXYZINE HYDROCHLORIDE 25 MG: 50 TABLET ORAL at 01:09

## 2025-06-25 RX ADMIN — SPIRONOLACTONE 100 MG: 100 TABLET ORAL at 05:03

## 2025-06-25 RX ADMIN — POTASSIUM CHLORIDE 40 MEQ: 1500 TABLET, EXTENDED RELEASE ORAL at 12:24

## 2025-06-25 RX ADMIN — GABAPENTIN 300 MG: 300 CAPSULE ORAL at 17:39

## 2025-06-25 ASSESSMENT — ENCOUNTER SYMPTOMS
ABDOMINAL PAIN: 0
NEUROLOGICAL NEGATIVE: 1
DEPRESSION: 0
DIZZINESS: 0
COUGH: 0
VOMITING: 0
FEVER: 0
TREMORS: 1
HEARTBURN: 0
EYES NEGATIVE: 1
BACK PAIN: 0
SORE THROAT: 0
FOCAL WEAKNESS: 0
MYALGIAS: 0
ABDOMINAL PAIN: 1
DIAPHORESIS: 0
NERVOUS/ANXIOUS: 0
RESPIRATORY NEGATIVE: 1
BLURRED VISION: 0
BRUISES/BLEEDS EASILY: 0
CHILLS: 0
WEIGHT LOSS: 0
PALPITATIONS: 0
MUSCULOSKELETAL NEGATIVE: 1
DIARRHEA: 1
SHORTNESS OF BREATH: 0
HEADACHES: 0
CARDIOVASCULAR NEGATIVE: 1
BLOOD IN STOOL: 0
WEAKNESS: 0
NAUSEA: 0
CONSTIPATION: 0

## 2025-06-25 ASSESSMENT — PAIN DESCRIPTION - PAIN TYPE
TYPE: ACUTE PAIN

## 2025-06-25 ASSESSMENT — LIFESTYLE VARIABLES: SUBSTANCE_ABUSE: 1

## 2025-06-25 NOTE — CARE PLAN
The patient is Stable - Low risk of patient condition declining or worsening    Shift Goals  Clinical Goals: Pt will remain safe and free from falls throughout shift; pt will mobilized to the hallways throughout shift  Patient Goals: rest  Family Goals: eddie    Progress made toward(s) clinical / shift goals:  Pt remained safe and free from falls throughout shift due to communication, rest, hourly rounding, and education. Pt mobilized to nolan once during shift.     Patient is not progressing towards the following goals: NA

## 2025-06-25 NOTE — PROGRESS NOTES
Pt alert/oriented x4. no c/o pain/discomfort. Medicated for anxiety per MAR. Pt fatigued, resting quietly in bed. Call light within reach, personal belongings available, bed in lowest position, treaded socks on, and bed alarm on. Hourly rounding in place.

## 2025-06-25 NOTE — CARE PLAN
The patient is Stable - Low risk of patient condition declining or worsening    Shift Goals  Clinical Goals: Pt will remain safe and free from falls/injury throughout shift  Patient Goals: sleep, rest  Family Goals: eddie    Progress made toward(s) clinical / shift goals:    Problem: Communication  Goal: The ability to communicate needs accurately and effectively will improve  Outcome: Progressing     Problem: Safety  Goal: Will remain free from injury  Outcome: Progressing  Goal: Will remain free from falls  Outcome: Progressing     Problem: Pain Management  Goal: Pain level will decrease to patient's comfort goal  Outcome: Progressing     Problem: Psychosocial Needs:  Goal: Level of anxiety will decrease  Outcome: Progressing     Problem: Skin Integrity  Goal: Risk for impaired skin integrity will decrease  Outcome: Progressing       Patient is not progressing towards the following goals:

## 2025-06-25 NOTE — DISCHARGE PLANNING
TRISTA Roberts met with pt and pt would like to keep moving forward with inpatient treatment. Pt would like a list of items that pt will be able to bring to facility .  Called Joy [phone] [facility] with update,  tentative discharge in 3 days. Teddy will email list of items.  Pt will need to bring all medications. Records will need to be sent when cleared for discharge fax # [phone]  Called Dr Tillman  [phone] with an update of pt's decision to keep moving forward with inpatient treatment.   Provided pt a list of what and what not to bring to [facility].

## 2025-06-26 PROBLEM — R97.1 ELEVATED CANCER ANTIGEN 125 (CA 125): Status: ACTIVE | Noted: 2025-06-26

## 2025-06-26 LAB
ALBUMIN SERPL BCP-MCNC: 2.6 G/DL (ref 3.2–4.9)
ALBUMIN/GLOB SERPL: 1 G/DL
ALP SERPL-CCNC: 213 U/L (ref 30–99)
ALT SERPL-CCNC: 139 U/L (ref 2–50)
ANION GAP SERPL CALC-SCNC: 16 MMOL/L (ref 7–16)
APPEARANCE UR: CLEAR
AST SERPL-CCNC: 290 U/L (ref 12–45)
BILIRUB SERPL-MCNC: 18.3 MG/DL (ref 0.1–1.5)
BILIRUB UR QL STRIP.AUTO: ABNORMAL
BUN SERPL-MCNC: 16 MG/DL (ref 8–22)
CALCIUM ALBUM COR SERPL-MCNC: 10.1 MG/DL (ref 8.5–10.5)
CALCIUM SERPL-MCNC: 9 MG/DL (ref 8.5–10.5)
CANCER AG125 SERPL-ACNC: 797 U/ML (ref 0–35)
CHLORIDE SERPL-SCNC: 93 MMOL/L (ref 96–112)
CO2 SERPL-SCNC: 25 MMOL/L (ref 20–33)
COLOR UR: ABNORMAL
CREAT SERPL-MCNC: 0.72 MG/DL (ref 0.5–1.4)
GFR SERPLBLD CREATININE-BSD FMLA CKD-EPI: 101 ML/MIN/1.73 M 2
GLOBULIN SER CALC-MCNC: 2.5 G/DL (ref 1.9–3.5)
GLUCOSE SERPL-MCNC: 99 MG/DL (ref 65–99)
GLUCOSE UR STRIP.AUTO-MCNC: NEGATIVE MG/DL
INR PPP: 1.87 (ref 0.87–1.13)
KETONES UR STRIP.AUTO-MCNC: NEGATIVE MG/DL
LEUKOCYTE ESTERASE UR QL STRIP.AUTO: NEGATIVE
MICRO URNS: ABNORMAL
NITRITE UR QL STRIP.AUTO: NEGATIVE
PH UR STRIP.AUTO: 7 [PH] (ref 5–8)
POTASSIUM SERPL-SCNC: 3.8 MMOL/L (ref 3.6–5.5)
PROT SERPL-MCNC: 5.1 G/DL (ref 6–8.2)
PROT UR QL STRIP: NEGATIVE MG/DL
PROTHROMBIN TIME: 21.6 SEC (ref 12–14.6)
RBC UR QL AUTO: NEGATIVE
SODIUM SERPL-SCNC: 134 MMOL/L (ref 135–145)
SP GR UR STRIP.AUTO: 1.01
UROBILINOGEN UR STRIP.AUTO-MCNC: 0.2 EU/DL

## 2025-06-26 PROCEDURE — 700102 HCHG RX REV CODE 250 W/ 637 OVERRIDE(OP): Performed by: HOSPITALIST

## 2025-06-26 PROCEDURE — 99232 SBSQ HOSP IP/OBS MODERATE 35: CPT | Performed by: NURSE PRACTITIONER

## 2025-06-26 PROCEDURE — 85610 PROTHROMBIN TIME: CPT

## 2025-06-26 PROCEDURE — 99233 SBSQ HOSP IP/OBS HIGH 50: CPT | Performed by: HOSPITALIST

## 2025-06-26 PROCEDURE — 81003 URINALYSIS AUTO W/O SCOPE: CPT

## 2025-06-26 PROCEDURE — 82105 ALPHA-FETOPROTEIN SERUM: CPT

## 2025-06-26 PROCEDURE — 86304 IMMUNOASSAY TUMOR CA 125: CPT

## 2025-06-26 PROCEDURE — 770001 HCHG ROOM/CARE - MED/SURG/GYN PRIV*

## 2025-06-26 PROCEDURE — A9270 NON-COVERED ITEM OR SERVICE: HCPCS | Performed by: HOSPITALIST

## 2025-06-26 PROCEDURE — 80053 COMPREHEN METABOLIC PANEL: CPT

## 2025-06-26 PROCEDURE — 36415 COLL VENOUS BLD VENIPUNCTURE: CPT

## 2025-06-26 PROCEDURE — 82107 ALPHA-FETOPROTEIN L3: CPT

## 2025-06-26 RX ORDER — NYSTATIN 100000 [USP'U]/G
POWDER TOPICAL 2 TIMES DAILY
Status: DISPENSED | OUTPATIENT
Start: 2025-06-26 | End: 2025-07-03

## 2025-06-26 RX ORDER — LACTULOSE 10 G/15ML
15 SOLUTION ORAL EVERY EVENING
Status: DISCONTINUED | OUTPATIENT
Start: 2025-06-26 | End: 2025-07-04 | Stop reason: HOSPADM

## 2025-06-26 RX ADMIN — Medication 100 MG: at 04:45

## 2025-06-26 RX ADMIN — OXYCODONE 5 MG: 5 TABLET ORAL at 10:28

## 2025-06-26 RX ADMIN — FUROSEMIDE 40 MG: 40 TABLET ORAL at 04:45

## 2025-06-26 RX ADMIN — GABAPENTIN 300 MG: 300 CAPSULE ORAL at 17:01

## 2025-06-26 RX ADMIN — CARVEDILOL 3.12 MG: 3.12 TABLET, FILM COATED ORAL at 09:23

## 2025-06-26 RX ADMIN — SPIRONOLACTONE 100 MG: 100 TABLET ORAL at 04:45

## 2025-06-26 RX ADMIN — LEVOTHYROXINE SODIUM 50 MCG: 0.05 TABLET ORAL at 04:45

## 2025-06-26 RX ADMIN — GABAPENTIN 300 MG: 300 CAPSULE ORAL at 04:45

## 2025-06-26 RX ADMIN — PREDNISOLONE SODIUM PHOSPHATE 39.9 MG: 15 SOLUTION ORAL at 04:44

## 2025-06-26 RX ADMIN — LACTULOSE 15 ML: 10 SOLUTION ORAL at 18:15

## 2025-06-26 RX ADMIN — ESCITALOPRAM OXALATE 20 MG: 10 TABLET ORAL at 04:45

## 2025-06-26 ASSESSMENT — ENCOUNTER SYMPTOMS
MUSCULOSKELETAL NEGATIVE: 1
DIAPHORESIS: 0
FEVER: 0
ABDOMINAL PAIN: 0
CARDIOVASCULAR NEGATIVE: 1
SORE THROAT: 0
CHILLS: 0
CONSTIPATION: 0
DIARRHEA: 1
WEAKNESS: 0
BLOOD IN STOOL: 0
HEARTBURN: 0
ABDOMINAL PAIN: 1
FOCAL WEAKNESS: 0
COUGH: 0
WEIGHT LOSS: 0
BRUISES/BLEEDS EASILY: 0
RESPIRATORY NEGATIVE: 1
EYES NEGATIVE: 1
SHORTNESS OF BREATH: 0
TREMORS: 1
BLURRED VISION: 0
VOMITING: 0
DEPRESSION: 0
PALPITATIONS: 0
MYALGIAS: 0
NERVOUS/ANXIOUS: 0
NAUSEA: 0
BACK PAIN: 0
HEADACHES: 0
DIZZINESS: 0
NEUROLOGICAL NEGATIVE: 1

## 2025-06-26 ASSESSMENT — PAIN DESCRIPTION - PAIN TYPE
TYPE: ACUTE PAIN

## 2025-06-26 ASSESSMENT — LIFESTYLE VARIABLES: SUBSTANCE_ABUSE: 1

## 2025-06-26 NOTE — PROGRESS NOTES
Pt alert/oriented x4, no report of pain at this time. No s/s of dyspnea or respiratory distress, continues to be on 2L oxygen via nasal cannula.  Call light within reach, personal belongings available, bed in lowest position, treaded socks on, and bed alarm on. Hourly rounding in place.

## 2025-06-26 NOTE — CARE PLAN
The patient is Stable - Low risk of patient condition declining or worsening    Shift Goals  Clinical Goals: Pt will remain safe and free from falls throughout shift; pt will mobilized to the hallways throughout shift  Patient Goals: rest  Family Goals: eddie    Progress made toward(s) clinical / shift goals:  Pt remained safe and free from falls throughout shift due to communication, rest, hourly rounding, and education. Pt mobilized throughout shift.    Patient is not progressing towards the following goals: NA

## 2025-06-26 NOTE — PROGRESS NOTES
Hospital Medicine Daily Progress Note    Date of Service  6/25/2025    Chief Complaint  Minreva Tillman is a 50 y.o. female admitted 6/19/2025 with jaundice    Hospital Course  Minerva Tillman is a 50 y.o. female who presented 6/19/2025 with past medical history of liver cirrhosis, alcohol abuse, depression who presents to the hospital for jaundice the past 1 week. She was found to have acute decompensated cirrhosis with ascites with a MELD of 27, acute on chronic respiratory failure with volume overload, portal vein thrombosis and possible community acquired pneumonia and was started on empiric antibiotics    She required CIWA and support with alcohol withdrawl symptoms. She underwent a paracentesis and had no evidence sbp. She was started on anticoagulation for the acute portal vein thrombosis.GI is following     Interval Problem Update  6/23: Axox3, ciwa improved today, currently 2/10, mild tremor today and unsteady on her feet. She reports she is very tired but denies sob, no cp, no abdominal pain, no n/v. She is having loose bowel mvts with the lactulose, no signs of encephalopathy at this time, her recall has improved today. Plan remains to go to inpatient alcohol rehab from here, social work assisting with intake interview which is planned for today. Cytology from paracentesis pending. Bilirubin continues to increase. ROS otherwise negative.   6/24:  d/w GI this a.m., stopped Eliquis since INR increased to 5.36 from 1.8, hepatitis panel ordered. T. Bilirubin increased, decreased lactulose due to multiple loose stools, replaced K.   Dc'd CIWA. Continue prednisolone daily.  6/25:  no pain on palpation of RUQ, but later patient requested oxycodone for abdominal pain.   LFTs and bilirubin remain same. T. Bili 16.  INR improved to 2.97.  hepatitis panel negative. Pink tinged urine improving.      I have discussed this patient's plan of care and discharge plan at IDT rounds today with Case Management,  Nursing, Nursing leadership, and other members of the IDT team.    Consultants/Specialty  GI    Code Status  Full Code    Disposition  The patient is not medically cleared for discharge to home or a post-acute facility.  Anticipate discharge to: home with close outpatient follow-up    I have placed the appropriate orders for post-discharge needs.    Review of Systems  Review of Systems   Constitutional:  Positive for malaise/fatigue. Negative for chills, diaphoresis, fever and weight loss.   HENT: Negative.  Negative for sore throat.    Eyes: Negative.  Negative for blurred vision.   Respiratory: Negative.  Negative for cough and shortness of breath.    Cardiovascular: Negative.  Negative for chest pain, palpitations and leg swelling.   Gastrointestinal:  Negative for abdominal pain, nausea and vomiting.   Genitourinary:  Negative for dysuria and hematuria.   Musculoskeletal: Negative.  Negative for myalgias.   Skin: Negative.  Negative for itching and rash.   Neurological: Negative.  Negative for dizziness, focal weakness, weakness and headaches.   Endo/Heme/Allergies: Negative.  Does not bruise/bleed easily.   Psychiatric/Behavioral:  Positive for substance abuse. Negative for depression and suicidal ideas.    All other systems reviewed and are negative.       Physical Exam  Temp:  [36.2 °C (97.1 °F)-36.9 °C (98.5 °F)] 36.2 °C (97.1 °F)  Pulse:  [62-79] 63  Resp:  [17-18] 18  BP: ()/(62-75) 96/62  SpO2:  [90 %-94 %] 90 %    Physical Exam  Vitals and nursing note reviewed. Exam conducted with a chaperone present.   Constitutional:       General: She is not in acute distress.     Appearance: Normal appearance. She is not diaphoretic.   HENT:      Head: Normocephalic.      Nose: Nose normal.      Mouth/Throat:      Mouth: Mucous membranes are moist.   Eyes:      General: Scleral icterus present.      Pupils: Pupils are equal, round, and reactive to light.   Cardiovascular:      Rate and Rhythm: Normal rate and  regular rhythm.      Pulses: Normal pulses.      Heart sounds: Normal heart sounds.   Pulmonary:      Effort: Pulmonary effort is normal.      Breath sounds: Normal breath sounds.   Abdominal:      General: Abdomen is flat. Bowel sounds are normal. There is no distension.      Palpations: Abdomen is soft.      Tenderness: There is no abdominal tenderness.   Musculoskeletal:         General: No swelling or deformity. Normal range of motion.   Skin:     General: Skin is warm and dry.      Capillary Refill: Capillary refill takes less than 2 seconds.      Coloration: Skin is jaundiced.      Findings: No bruising or lesion.   Neurological:      General: No focal deficit present.      Mental Status: She is alert and oriented to person, place, and time.      Cranial Nerves: No cranial nerve deficit.   Psychiatric:         Mood and Affect: Mood normal.         Behavior: Behavior normal.         Fluids    Intake/Output Summary (Last 24 hours) at 6/25/2025 1808  Last data filed at 6/25/2025 0900  Gross per 24 hour   Intake 520 ml   Output --   Net 520 ml        Laboratory  Recent Labs     06/23/25  0559 06/24/25  0043 06/25/25  0033   WBC 10.3 12.6* 12.1*   RBC 3.90* 3.77* 3.80*   HEMOGLOBIN 14.5 13.8 13.6   HEMATOCRIT 41.5 39.5 40.1   .4* 104.8* 105.5*   MCH 37.2* 36.6* 35.8*   MCHC 34.9 34.9 33.9   RDW 74.5* 73.2* 75.4*   PLATELETCT 182 173 190   MPV 10.4 10.5 10.5     Recent Labs     06/23/25  0559 06/24/25  0043 06/25/25  0033   SODIUM 132* 130* 133*   POTASSIUM 3.8 3.0* 3.4*   CHLORIDE 91* 91* 92*   CO2 29 27 30   GLUCOSE 136* 116* 107*   BUN 8 11 14   CREATININE 0.81 0.80 0.82   CALCIUM 8.6 8.5 8.8     Recent Labs     06/24/25  0043 06/25/25  0033   INR 5.36* 2.97*               Imaging  EC-ECHOCARDIOGRAM COMPLETE W/ CONT   Final Result      US-PARACENTESIS, ABD WITH IMAGING   Final Result      1. Ultrasound-guided diagnostic and therapeutic paracentesis of the right lower quadrant of the abdominal wall.      2.  1200 mL of fluid withdrawn.      US-ABDOMEN COMPLETE SURVEY   Final Result      1.  Cirrhotic appearance of the liver with a lobulated cyst.   2.  Sludge is present within the gallbladder. There is no definite stone.   3.  Ascites.   4.  There appears to be occlusion of the portal vein and at least partial occlusion of the inferior vena cava.   5.  Evaluation is limited with poor visualization of the midline structures.         DX-CHEST-PORTABLE (1 VIEW)   Final Result      Mild left lung infiltrate.           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  multivitamin, thiamine and folate  replacing electrolytes  See discussion above  No hallucinations or seizure today - precautions    Portal vein thrombosis- (present on admission)  Assessment & Plan  H/h stable, no evidence of bleeding  Discussed with pt and GI  Holding eliquis since an increase in INR from 1.8-5.36.  following  Cbc in am     Hyponatremia  Assessment & Plan  Consistent with cirrhosis  following    Alcoholic hepatitis- (present on admission)  Assessment & Plan  Maddery dissemination score 46  Bili continues to increase, steroids started  Assess for response after 7 days with jeramie score     Alcoholic cirrhosis (HCC)- (present on admission)  Assessment & Plan  Decompensated  Volume overloaded, improving with diuretics - tapering as tolerated  MELD 27  Hepatitis panel-repeat  Prednisolone started 6/22 for worsening LFTs.  Lactulose qhs, NH3 elevated 66 at one point, now excessive BMs and normal mentation.    Acute hypoxic respiratory failure (HCC)- (present on admission)  Assessment & Plan  Fluctuating, had titrated down to 1L after paracentesis but increased to 3 L overnight - exam stable- wean as tolerated  Continue diuretics as tolerated   Query mild community acquired pna, non specific changes on x ray  -  No cough, on empiric augmentin   Wean as tolerated  Following  RT      High anion gap metabolic acidosis- (present on admission)  Assessment & Plan  Continue to trend secondary to liver dysfunction    Ascites- (present on admission)  Assessment & Plan  Due to cirrhosis  Tolerating diuretics  S/p paracentesis  following    Hypokalemia- (present on admission)  Assessment & Plan  Replaced and resolved    Acute cystitis- (present on admission)  Assessment & Plan  Resolved  Completed augmentin x 5 days.           VTE prophylaxis: KEVIN/ eliquis    I have performed a physical exam and reviewed and updated ROS and Plan today (6/25/2025). In review of yesterday's note (6/24/2025), there are no changes except as documented above.

## 2025-06-26 NOTE — CARE PLAN
The patient is Stable - Low risk of patient condition declining or worsening    Shift Goals  Clinical Goals: pt will remain safe and free from falls/injury throughout shift  Patient Goals: rest  Family Goals: eddie    Progress made toward(s) clinical / shift goals:    Problem: Communication  Goal: The ability to communicate needs accurately and effectively will improve  Outcome: Progressing     Problem: Safety  Goal: Will remain free from injury  Outcome: Progressing     Problem: Pain Management  Goal: Pain level will decrease to patient's comfort goal  Outcome: Progressing     Problem: Psychosocial Needs:  Goal: Level of anxiety will decrease  Outcome: Progressing     Problem: Mobility  Goal: Risk for activity intolerance will decrease  Outcome: Progressing     Problem: Knowledge Deficit  Goal: Knowledge of disease process/condition, treatment plan, diagnostic tests, and medications will improve  Outcome: Progressing       Patient is not progressing towards the following goals:

## 2025-06-26 NOTE — DISCHARGE PLANNING
Case Management Discharge Planning    Admission Date: 6/19/2025  GMLOS: 4.8  ALOS: 7    6-Clicks ADL Score: 22  6-Clicks Mobility Score: 23      Anticipated Discharge Dispo: Discharge Disposition: Discharged to home/self care (01)    DME Needed: Yes    DME Ordered: No    Action(s) Taken: LMSW was updated during IDT rounds that the patient is going to be discharging directly from [facility] [place] to an inpatient treatment center in [place]. Patient is on Oxygen, but does not have a portable Oxygen concentrator or tanks. LMSW requested a walking 02 test and DME order for a POC to prepare for a potential discharge on Monday.     Escalations Completed: None    Medically Clear: No    Next Steps: Patient to work with GI and have her Bilirubin be monitored for improvement.     Barriers to Discharge: Medical clearance    Is the patient up for discharge tomorrow: No

## 2025-06-26 NOTE — PROGRESS NOTES
.Gastroenterology Progress Note               Author:  DOMENICO Jarvis   Date & Time Created: 6/26/2025 2:46 PM       Patient ID:  Name:             Minerva Tillman  YOB: 1975  Age:                 50 y.o.  female  MRN:               [id number]    Medical Decision Making, by Problem:  Active Hospital Problems    Diagnosis     Hyponatremia [E87.1]     Portal vein thrombosis [I81]     Alcohol abuse with withdrawal and perceptual disturbance (HCC) [F10.132]     Alcoholic cirrhosis (HCC) [K70.30]     Alcoholic hepatitis [K70.10]     High anion gap metabolic acidosis [E87.29]     Acute hypoxic respiratory failure (HCC) [J96.01]     Ascites [R18.8]     Hypokalemia [E87.6]     Acute cystitis [N30.00]      Presenting Chief Complaint:  Decompensated cirrhosis     History of Present Illness:   50-year-old female presents to the emergency department for abdominal swelling and shortness of breath and desire to discontinue alcohol intake.  Patient was seen by gastroenterology as an inpatient in 2024.  Dr. Daniels had recommended outpatient evaluation.  Patient followed up with Dr. Conley on 7/1/2024 with an additional follow-up appointment on 10/17/2024.  She had a FibroScan done which suggested advanced fibrosis and possible cirrhosis.  She was recommended to pursue upper and lower endoscopy.  Patient states that when she was told of this information she became quite concerned and did not follow-up because she was scared of the results.  Patient continues to drink alcohol.  She wants help in quitting.  She denies hematemesis or melena.  She endorses new onset abdominal swelling without lower extremity edema.     Home medications reviewed.  Patient not taking anything in the way of diuretics or medications for encephalopathy.  Patient is not on a beta-blocker.     Review of systems at this time is as stated above as well as patient feeling a little bit anxious but comfortable with regard to her  detoxification/CIWA protocol.    Interval History:  6/21/2025: Patient seen.  Reports feeling much better but having multiple liquid bowel movements.  Mild tremors, very hungry.  Has started wearing oxygen at night at home, reports it is due to increased elevation at home in [facility].  Slightly dyspneic today.  N.p.o. pending paracentesis. VSS, CIWA 4.  Transaminitis worsening, T. bili 13.8 > 9.4.  INR 1.89.  Hoping to go directly to rehab from the hospital.    6/22/2025: Feels better, tremulous with CIWA 5. Having multiple bowel movements. Fluid negative for SBP and NGTD on culture. Bili up to 14.2    6/23/2025: Feels much better today, no complaints. Eating and drinking well. Reports she was unable to be weaned off oxygen. Liver enzymes uptrending, T. Bili 17.2>14.2    6/24/2025: Patient seen at bedside with .  Had just completed echocardiogram.  Results are pending.  Tolerating oral intake without difficulty.  Denies dyspnea, nausea, vomiting, abdominal pain.  WBC 12.6.  Hemoglobin stable 13.8.  Platelets 173.  Sodium 130, BUN 11, creatinine 0.8, LFTs downtrending with , , alk phos 252, total bilirubin 15.5.  Albumin 2.7.  INR increased at 5.36.  Eliquis held by primary team.  Cytology resulted showing abnormal cells-discussed this with patient and  at bedside for which they will follow-up with outpatient GI.  Additionally, per flowsheets, patient reported to have 2 bowel movements overnight.  Patient reports having more than 5.  Discussed lactulose daily, titrating to 2-3 bowel movements per day.    6/25/2025: AO x 4.  She reports having bilateral upper quadrant/epigastric discomfort, relieved with pain medication.  She reports 3-4 bowel movements overnight.  Reports she is not able to eat a lot of food at once but is grazing/eating throughout the day and tolerating well thus far.  No nausea, vomiting, dyspnea.  WBC 12.1, hemoglobin 13.6, platelets 190.  Sodium 133,  potassium 3.4, BUN 14, creatinine 0.82.  , , alk phos 232, total bilirubin 16.3.  Albumin 2.6.  INR 2.97.  Echocardiogram completed, unable to assess right ventricular function.  MELD 3.0 score 32; 63.5% estimated 90-day survival.      6/26/2025, AO x 4.  Reports mild generalized upper abdominal pain relieved with narcotics.  She reports bowel movement x 10 since 8:00 last night.  Appetite unchanged.  No other nausea, vomiting, dyspnea.  , , alk phos 213, total bilirubin 18.3, INR improved to 1.87.  CA-125 797.    Hospital Medications:  Current Facility-Administered Medications   Medication Dose Frequency Provider Last Rate Last Admin    lactulose 20 GM/30ML solution 15 mL  15 mL Q EVENING Vidhi Pond M.D.        oxyCODONE immediate-release (Roxicodone) tablet 5 mg  5 mg Q4HRS PRN Vidhi Pond M.D.   5 mg at 06/26/25 1028    thiamine (Vitamin B-1) tablet 100 mg  100 mg DAILY Vidhi Pond M.D.   100 mg at 06/26/25 0445    [Held by provider] apixaban (Eliquis) tablet 10 mg  10 mg BID Helen Mendez M.D.   10 mg at 06/24/25 0509    [Held by provider] apixaban (Eliquis) tablet 5 mg  5 mg BID Helen Mendez M.D.        prednisoLONE sodium phosphate (Pediapred) 15 MG/5ML oral solution 39.9 mg  39.9 mg DAILY Helen Mendez M.D.   39.9 mg at 06/26/25 0444    spironolactone (Aldactone) tablet 100 mg  100 mg Q DAY Helen Mendez M.D.   100 mg at 06/26/25 0445    Respiratory Therapy Consult   Continuous RT Helen Mendez M.D.        furosemide (Lasix) tablet 40 mg  40 mg Q DAY Helen Mendez M.D.   40 mg at 06/26/25 0445    carvedilol (Coreg) tablet 3.125 mg  3.125 mg BID WITH MEALS Helen Mendez M.D.   3.125 mg at 06/26/25 0923    melatonin tablet 5 mg  5 mg HS PRN TRE Mcghee.P.R.N.   5 mg at 06/25/25 0109    labetalol (Normodyne/Trandate) injection 10 mg  10 mg Q4HRS PRN Abby Ivy M.D.        ondansetron (Zofran) syringe/vial injection 4 mg  4 mg Q4HRS PRN Abby Ivy M.D.         "ondansetron (Zofran ODT) dispertab 4 mg  4 mg Q4HRS PRN Abby Ivy M.D.   4 mg at 06/22/25 1953    escitalopram (Lexapro) tablet 20 mg  20 mg DAILY Abby Ivy M.D.   20 mg at 06/26/25 0445    gabapentin (Neurontin) capsule 300 mg  300 mg BID Abby Ivy M.D.   300 mg at 06/26/25 0445    levothyroxine (Synthroid) tablet 50 mcg  50 mcg AM ES Abby Ivy M.D.   50 mcg at 06/26/25 0445    hydrOXYzine HCl (Atarax) tablet 25 mg  25 mg QDAY PRN Abby Ivy M.D.   25 mg at 06/25/25 0109    albuterol inhaler 1-2 Puff  1-2 Puff Q6HRS PRN Abby Ivy M.D.   2 Puff at 06/22/25 2249   Last reviewed on 6/19/2025  7:43 PM by Lorna Lubin       Review of Systems:  Review of Systems   Constitutional:  Positive for malaise/fatigue. Negative for chills and fever.   HENT:  Negative for hearing loss and sore throat.    Eyes:  Negative for blurred vision.   Respiratory:  Negative for cough and shortness of breath.    Cardiovascular:  Negative for chest pain and leg swelling.   Gastrointestinal:  Positive for abdominal pain and diarrhea. Negative for blood in stool, constipation, heartburn, melena, nausea and vomiting.   Genitourinary:  Negative for dysuria and hematuria.   Musculoskeletal:  Negative for back pain and myalgias.   Skin:  Negative for rash.   Neurological:  Positive for tremors. Negative for dizziness and weakness.   Psychiatric/Behavioral:  Positive for substance abuse. Negative for depression. The patient is not nervous/anxious.    All other systems reviewed and are negative.    Vital signs:  Weight/BMI: Body mass index is 26.05 kg/m².  /75   Pulse 61   Temp 36.4 °C (97.6 °F) (Temporal)   Resp 17   Ht 1.575 m (5' 2\")   Wt 64.6 kg (142 lb 6.7 oz)   SpO2 94%   Vitals:    06/25/25 1600 06/25/25 2000 06/26/25 0358 06/26/25 0711   BP:  116/79 114/70 109/75   Pulse:  62 60 61   Resp: 17 18 17 17   Temp:  36.7 °C (98 °F) 36.5 °C (97.7 °F) 36.4 °C (97.6 °F)   TempSrc:  Temporal Temporal Temporal "   SpO2:  90% 92% 94%   Weight:       Height:         Oxygen Therapy:  Pulse Oximetry: 94 %, O2 (LPM): 2.5, O2 Delivery Device: Silicone Nasal Cannula    Intake/Output Summary (Last 24 hours) at 6/26/2025 1446  Last data filed at 6/26/2025 0130  Gross per 24 hour   Intake --   Output 0 ml   Net 0 ml       Physical Exam  Vitals and nursing note reviewed.   Constitutional:       General: She is awake. She is not in acute distress.     Appearance: She is overweight. She is ill-appearing.   HENT:      Head: Normocephalic and atraumatic.      Nose: Nose normal.      Mouth/Throat:      Mouth: Mucous membranes are moist.      Pharynx: Oropharynx is clear.   Eyes:      General: Scleral icterus present.      Extraocular Movements: Extraocular movements intact.      Conjunctiva/sclera: Conjunctivae normal.   Cardiovascular:      Rate and Rhythm: Normal rate and regular rhythm.      Pulses: Normal pulses.      Heart sounds: Normal heart sounds.   Pulmonary:      Effort: Pulmonary effort is normal. No respiratory distress.      Breath sounds: Normal breath sounds.   Abdominal:      General: Abdomen is flat. Bowel sounds are normal. There is no distension.      Palpations: Abdomen is soft.      Tenderness: There is no abdominal tenderness. There is no guarding.   Musculoskeletal:         General: Normal range of motion.      Cervical back: Normal range of motion.      Right lower leg: No edema.      Left lower leg: No edema.   Skin:     General: Skin is warm and dry.      Capillary Refill: Capillary refill takes less than 2 seconds.      Coloration: Skin is jaundiced.   Neurological:      General: No focal deficit present.      Mental Status: She is alert and oriented to person, place, and time. Mental status is at baseline.      Comments: Tremulous, no asterixis   Psychiatric:         Mood and Affect: Mood normal.         Behavior: Behavior normal. Behavior is cooperative.         Labs:  Recent Labs     06/24/25  0043  06/25/25  0033 06/26/25  0122   SODIUM 130* 133* 134*   POTASSIUM 3.0* 3.4* 3.8   CHLORIDE 91* 92* 93*   CO2 27 30 25   BUN 11 14 16   CREATININE 0.80 0.82 0.72   CALCIUM 8.5 8.8 9.0     Recent Labs     06/24/25  0043 06/25/25  0033 06/26/25  0122   ALTSGPT 112* 126* 139*   ASTSGOT 249* 263* 290*   ALKPHOSPHAT 252* 232* 213*   TBILIRUBIN 15.5* 16.3* 18.3*   GLUCOSE 116* 107* 99     Recent Labs     06/24/25  0043 06/25/25  0033 06/26/25  0122   WBC 12.6* 12.1*  --    ASTSGOT 249* 263* 290*   ALTSGPT 112* 126* 139*   ALKPHOSPHAT 252* 232* 213*   TBILIRUBIN 15.5* 16.3* 18.3*     Recent Labs     06/24/25  0043 06/25/25  0033 06/26/25  0122   RBC 3.77* 3.80*  --    HEMOGLOBIN 13.8 13.6  --    HEMATOCRIT 39.5 40.1  --    PLATELETCT 173 190  --    PROTHROMBTM 49.5* 31.1* 21.6*   INR 5.36* 2.97* 1.87*     Recent Results (from the past 24 hours)       Collection Time: 06/26/25  1:22 AM   Result Value Ref Range    Ca 125 797.0 (H) 0.0 - 35.0 U/mL   Prothrombin Time    Collection Time: 06/26/25  1:22 AM   Result Value Ref Range    PT 21.6 (H) 12.0 - 14.6 sec    INR 1.87 (H) 0.87 - 1.13   Comp Metabolic Panel    Collection Time: 06/26/25  1:22 AM   Result Value Ref Range    Sodium 134 (L) 135 - 145 mmol/L    Potassium 3.8 3.6 - 5.5 mmol/L    Chloride 93 (L) 96 - 112 mmol/L    Co2 25 20 - 33 mmol/L    Anion Gap 16.0 7.0 - 16.0    Glucose 99 65 - 99 mg/dL    Bun 16 8 - 22 mg/dL    Creatinine 0.72 0.50 - 1.40 mg/dL    Calcium 9.0 8.5 - 10.5 mg/dL    Correct Calcium 10.1 8.5 - 10.5 mg/dL    AST(SGOT) 290 (H) 12 - 45 U/L    ALT(SGPT) 139 (H) 2 - 50 U/L    Alkaline Phosphatase 213 (H) 30 - 99 U/L    Total Bilirubin 18.3 (H) 0.1 - 1.5 mg/dL    Albumin 2.6 (L) 3.2 - 4.9 g/dL    Total Protein 5.1 (L) 6.0 - 8.2 g/dL    Globulin 2.5 1.9 - 3.5 g/dL    A-G Ratio 1.0 g/dL   ESTIMATED GFR    Collection Time: 06/26/25  1:22 AM   Result Value Ref Range    GFR (CKD-EPI) 101 >60 mL/min/1.73 m 2   URINALYSIS    Collection Time: 06/26/25  10:50 AM    Specimen: Urine, Clean Catch   Result Value Ref Range    Color Dark Yellow     Character Clear     Specific Gravity 1.008 <1.035    Ph 7.0 5.0 - 8.0    Glucose Negative Negative mg/dL    Ketones Negative Negative mg/dL    Protein Negative Negative mg/dL    Bilirubin Large (A) Negative    Urobilinogen, Urine 0.2 <=1.0 EU/dL    Nitrite Negative Negative    Leukocyte Esterase Negative Negative    Occult Blood Negative Negative    Micro Urine Req see below        Radiology Review:  EC-ECHOCARDIOGRAM COMPLETE W/ CONT   Final Result      US-PARACENTESIS, ABD WITH IMAGING   Final Result      1. Ultrasound-guided diagnostic and therapeutic paracentesis of the right lower quadrant of the abdominal wall.      2. 1200 mL of fluid withdrawn.      US-ABDOMEN COMPLETE SURVEY   Final Result      1.  Cirrhotic appearance of the liver with a lobulated cyst.   2.  Sludge is present within the gallbladder. There is no definite stone.   3.  Ascites.   4.  There appears to be occlusion of the portal vein and at least partial occlusion of the inferior vena cava.   5.  Evaluation is limited with poor visualization of the midline structures.         DX-CHEST-PORTABLE (1 VIEW)   Final Result      Mild left lung infiltrate.      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  She was not on diuretics at home.    - Continue prednisilone for alcoholic hepatitis, no evidence of infection in ascites fluid (started on 6/22/2025: If patient still here on day 4, may calculate a 40 Delmy score.  Patient/ aware that there is a 10-15% false-negative result with this and more accurately calculated on day 7- 6/28/2025)  - trend liver enzymes  - lactulose daily-titrate to have 3 loose bowel movements per day  CIWA discontinued per primary team  -Echocardiogram unable to visualize/assess right ventricular function.  Given that patient denies any dyspnea, may continue beta-blocker at this time.  - would discontinue first generation antipsychotics for nausea therapy.  Compazine and Phenergan can have deleterious effects and cirrhosis.  Would use Zofran alone.  - 2 g sodium restriction diet  - Outpatient referral for alcohol use disorder counseling/support.  Discontinue all toxic habits in the interim.  - Outpatient follow-up with [facility] for EGD and colonoscopy as previously recommended.  -AFP and AFP L3% tumor markers ordered.   - Consider imaging MRI pelvis versus transvaginal ultrasound to assess ovaries for malignancy/tumor    GI to follow    Discussed with patient, Dr. Daniels,  Dr. Pond    ..IRA Jarvis.    Core Quality Measures   Reviewed items::  Labs, Medications and Radiology reports reviewed

## 2025-06-26 NOTE — PROGRESS NOTES
.Gastroenterology Progress Note               Author:  DOMENICO Jarvis   Date & Time Created: 6/25/2025 6:38 PM       Patient ID:  Name:             Minerva Tillman  YOB: 1975  Age:                 50 y.o.  female  MRN:               [id number]    Medical Decision Making, by Problem:  Active Hospital Problems    Diagnosis     Hyponatremia [E87.1]     Portal vein thrombosis [I81]     Alcohol abuse with withdrawal and perceptual disturbance (HCC) [F10.132]     Alcoholic cirrhosis (HCC) [K70.30]     Alcoholic hepatitis [K70.10]     High anion gap metabolic acidosis [E87.29]     Acute hypoxic respiratory failure (HCC) [J96.01]     Ascites [R18.8]     Hypokalemia [E87.6]     Acute cystitis [N30.00]      Presenting Chief Complaint:  Decompensated cirrhosis     History of Present Illness:   50-year-old female presents to the emergency department for abdominal swelling and shortness of breath and desire to discontinue alcohol intake.  Patient was seen by gastroenterology as an inpatient in 2024.  Dr. Daniels had recommended outpatient evaluation.  Patient followed up with Dr. Conley on 7/1/2024 with an additional follow-up appointment on 10/17/2024.  She had a FibroScan done which suggested advanced fibrosis and possible cirrhosis.  She was recommended to pursue upper and lower endoscopy.  Patient states that when she was told of this information she became quite concerned and did not follow-up because she was scared of the results.  Patient continues to drink alcohol.  She wants help in quitting.  She denies hematemesis or melena.  She endorses new onset abdominal swelling without lower extremity edema.     Home medications reviewed.  Patient not taking anything in the way of diuretics or medications for encephalopathy.  Patient is not on a beta-blocker.     Review of systems at this time is as stated above as well as patient feeling a little bit anxious but comfortable with regard to her  detoxification/CIWA protocol.    Interval History:  6/21/2025: Patient seen.  Reports feeling much better but having multiple liquid bowel movements.  Mild tremors, very hungry.  Has started wearing oxygen at night at home, reports it is due to increased elevation at home in [facility].  Slightly dyspneic today.  N.p.o. pending paracentesis. VSS, CIWA 4.  Transaminitis worsening, T. bili 13.8 > 9.4.  INR 1.89.  Hoping to go directly to rehab from the hospital.    6/22/2025: Feels better, tremulous with CIWA 5. Having multiple bowel movements. Fluid negative for SBP and NGTD on culture. Bili up to 14.2    6/23/2025: Feels much better today, no complaints. Eating and drinking well. Reports she was unable to be weaned off oxygen. Liver enzymes uptrending, T. Bili 17.2>14.2    6/24/2025: Patient seen at bedside with .  Had just completed echocardiogram.  Results are pending.  Tolerating oral intake without difficulty.  Denies dyspnea, nausea, vomiting, abdominal pain.  WBC 12.6.  Hemoglobin stable 13.8.  Platelets 173.  Sodium 130, BUN 11, creatinine 0.8, LFTs downtrending with , , alk phos 252, total bilirubin 15.5.  Albumin 2.7.  INR increased at 5.36.  Eliquis held by primary team.  Cytology resulted showing abnormal cells-discussed this with patient and  at bedside for which they will follow-up with outpatient GI.  Additionally, per flowsheets, patient reported to have 2 bowel movements overnight.  Patient reports having more than 5.  Discussed lactulose daily, titrating to 2-3 bowel movements per day.    6/25/2025: AO x 4.  She reports having bilateral upper quadrant/epigastric discomfort, relieved with pain medication.  She reports 3-4 bowel movements overnight.  Reports she is not able to eat a lot of food at once but is grazing/eating throughout the day and tolerating well thus far.  No nausea, vomiting, dyspnea.  WBC 12.1, hemoglobin 13.6, platelets 190.  Sodium 133,  potassium 3.4, BUN 14, creatinine 0.82.  , , alk phos 232, total bilirubin 16.3.  Albumin 2.6.  INR 2.97.  Echocardiogram completed, unable to assess right ventricular function.  MELD 3.0 score 32; 63.5% estimated 90-day survival.    Hospital Medications:  Current Facility-Administered Medications   Medication Dose Frequency Provider Last Rate Last Admin    oxyCODONE immediate-release (Roxicodone) tablet 5 mg  5 mg Q4HRS PRN Vidhi Pond M.D.   5 mg at 06/25/25 1515    thiamine (Vitamin B-1) tablet 100 mg  100 mg DAILY Vidhi Pond M.D.   100 mg at 06/25/25 0503    lactulose 20 GM/30ML solution 30 mL  30 mL Q EVENING Vidhi Pond M.D.   30 mL at 06/25/25 1739    [Held by provider] apixaban (Eliquis) tablet 10 mg  10 mg BID Helen Mendez M.D.   10 mg at 06/24/25 0509    [Held by provider] apixaban (Eliquis) tablet 5 mg  5 mg BID Helen Mendez M.D.        prednisoLONE sodium phosphate (Pediapred) 15 MG/5ML oral solution 39.9 mg  39.9 mg DAILY Helen Mendez M.D.   39.9 mg at 06/25/25 0504    spironolactone (Aldactone) tablet 100 mg  100 mg Q DAY Helen Mendez M.D.   100 mg at 06/25/25 0503    Respiratory Therapy Consult   Continuous RT Helen Mendez M.D.        furosemide (Lasix) tablet 40 mg  40 mg Q DAY Helen Mendez M.D.   40 mg at 06/25/25 0503    carvedilol (Coreg) tablet 3.125 mg  3.125 mg BID WITH MEALS Helen Mendez M.D.   3.125 mg at 06/25/25 0821    melatonin tablet 5 mg  5 mg HS PRN MADDY McgheeP.RMikoN.   5 mg at 06/25/25 0109    labetalol (Normodyne/Trandate) injection 10 mg  10 mg Q4HRS PRN Abby Ivy M.D.        ondansetron (Zofran) syringe/vial injection 4 mg  4 mg Q4HRS PRN Abby Ivy M.D.        ondansetron (Zofran ODT) dispertab 4 mg  4 mg Q4HRS PRN Abby Ivy M.D.   4 mg at 06/22/25 1953    escitalopram (Lexapro) tablet 20 mg  20 mg DAILY Abby Ivy M.D.   20 mg at 06/25/25 0504    gabapentin (Neurontin) capsule 300 mg  300 mg BID Abby Ivy M.D.   300 mg at  "06/25/25 1739    levothyroxine (Synthroid) tablet 50 mcg  50 mcg AM ES Abby Ivy M.D.   50 mcg at 06/25/25 0504    hydrOXYzine HCl (Atarax) tablet 25 mg  25 mg QDAY PRN Abby Ivy M.D.   25 mg at 06/25/25 0109    albuterol inhaler 1-2 Puff  1-2 Puff Q6HRS PRN Abby Ivy M.D.   2 Puff at 06/22/25 2249   Last reviewed on 6/19/2025  7:43 PM by Spencer Camacho PhT       Review of Systems:  Review of Systems   Constitutional:  Negative for chills, fever and malaise/fatigue.   HENT:  Negative for hearing loss and sore throat.    Eyes:  Negative for blurred vision.   Respiratory:  Negative for cough and shortness of breath.    Cardiovascular:  Negative for chest pain and leg swelling.   Gastrointestinal:  Positive for abdominal pain and diarrhea. Negative for blood in stool, constipation, heartburn, melena, nausea and vomiting.   Genitourinary:  Negative for dysuria and hematuria.   Musculoskeletal:  Negative for back pain and myalgias.   Skin:  Negative for rash.   Neurological:  Positive for tremors. Negative for dizziness and weakness.   Psychiatric/Behavioral:  Positive for substance abuse. Negative for depression. The patient is not nervous/anxious.    All other systems reviewed and are negative.    Vital signs:  Weight/BMI: Body mass index is 26.05 kg/m².  BP 96/62   Pulse 63   Temp 36.2 °C (97.1 °F) (Temporal)   Resp 18   Ht 1.575 m (5' 2\")   Wt 64.6 kg (142 lb 6.7 oz)   SpO2 90%   Vitals:    06/25/25 0349 06/25/25 0759 06/25/25 1515 06/25/25 1535   BP: 102/66 115/75  96/62   Pulse: 62 65  63   Resp: 18 18 17 18   Temp: 36.9 °C (98.5 °F) 36.3 °C (97.4 °F)  36.2 °C (97.1 °F)   TempSrc: Temporal Temporal  Temporal   SpO2: 94% 92%  90%   Weight:       Height:         Oxygen Therapy:  Pulse Oximetry: 90 %, O2 (LPM): 2, O2 Delivery Device: Silicone Nasal Cannula    Intake/Output Summary (Last 24 hours) at 6/25/2025 1838  Last data filed at 6/25/2025 0900  Gross per 24 hour   Intake 520 ml   Output --   Net " 520 ml       Physical Exam  Vitals and nursing note reviewed.   Constitutional:       General: She is awake. She is not in acute distress.     Appearance: She is overweight. She is ill-appearing.   HENT:      Head: Normocephalic and atraumatic.      Nose: Nose normal.      Mouth/Throat:      Mouth: Mucous membranes are moist.      Pharynx: Oropharynx is clear.   Eyes:      General: Scleral icterus present.      Extraocular Movements: Extraocular movements intact.      Conjunctiva/sclera: Conjunctivae normal.   Cardiovascular:      Rate and Rhythm: Normal rate and regular rhythm.      Pulses: Normal pulses.      Heart sounds: Normal heart sounds.   Pulmonary:      Effort: Pulmonary effort is normal. No respiratory distress.      Breath sounds: Normal breath sounds.   Abdominal:      General: Abdomen is flat. Bowel sounds are normal. There is no distension.      Palpations: Abdomen is soft.      Tenderness: There is no abdominal tenderness. There is no guarding.   Musculoskeletal:         General: Normal range of motion.      Cervical back: Normal range of motion.      Right lower leg: No edema.      Left lower leg: No edema.   Skin:     General: Skin is warm and dry.      Capillary Refill: Capillary refill takes less than 2 seconds.      Coloration: Skin is jaundiced.   Neurological:      General: No focal deficit present.      Mental Status: She is alert and oriented to person, place, and time. Mental status is at baseline.      Comments: Tremulous, no asterixis   Psychiatric:         Mood and Affect: Mood normal.         Behavior: Behavior normal. Behavior is cooperative.         Labs:  Recent Labs     06/23/25  0559 06/24/25  0043 06/25/25  0033   SODIUM 132* 130* 133*   POTASSIUM 3.8 3.0* 3.4*   CHLORIDE 91* 91* 92*   CO2 29 27 30   BUN 8 11 14   CREATININE 0.81 0.80 0.82   CALCIUM 8.6 8.5 8.8     Recent Labs     06/23/25  0559 06/24/25  0043 06/25/25  0033   ALTSGPT 117* 112* 126*   ASTSGOT 268* 249* 263*    ALKPHOSPHAT 284* 252* 232*   TBILIRUBIN 17.2* 15.5* 16.3*   GLUCOSE 136* 116* 107*     Recent Labs     06/23/25  0559 06/24/25  0043 06/25/25  0033   WBC 10.3 12.6* 12.1*   ASTSGOT 268* 249* 263*   ALTSGPT 117* 112* 126*   ALKPHOSPHAT 284* 252* 232*   TBILIRUBIN 17.2* 15.5* 16.3*     Recent Labs     06/23/25  0559 06/24/25  0043 06/25/25  0033   RBC 3.90* 3.77* 3.80*   HEMOGLOBIN 14.5 13.8 13.6   HEMATOCRIT 41.5 39.5 40.1   PLATELETCT 182 173 190   PROTHROMBTM  --  49.5* 31.1*   INR  --  5.36* 2.97*     Recent Results (from the past 24 hours)   HEPATITIS PANEL ACUTE(4 COMPONENTS)    Collection Time: 06/25/25 12:33 AM   Result Value Ref Range    Hepatitis B Surface Antigen Non-Reactive Non-Reactive    Hepatitis B Cors Ab,IgM Non-Reactive Non-Reactive    Hepatitis A Virus Ab, IgM Non-Reactive Non-Reactive    Hepatitis C Antibody Non-Reactive Non-Reactive   Prothrombin Time    Collection Time: 06/25/25 12:33 AM   Result Value Ref Range    PT 31.1 (H) 12.0 - 14.6 sec    INR 2.97 (H) 0.87 - 1.13   Comp Metabolic Panel    Collection Time: 06/25/25 12:33 AM   Result Value Ref Range    Sodium 133 (L) 135 - 145 mmol/L    Potassium 3.4 (L) 3.6 - 5.5 mmol/L    Chloride 92 (L) 96 - 112 mmol/L    Co2 30 20 - 33 mmol/L    Anion Gap 11.0 7.0 - 16.0    Glucose 107 (H) 65 - 99 mg/dL    Bun 14 8 - 22 mg/dL    Creatinine 0.82 0.50 - 1.40 mg/dL    Calcium 8.8 8.5 - 10.5 mg/dL    Correct Calcium 9.9 8.5 - 10.5 mg/dL    AST(SGOT) 263 (H) 12 - 45 U/L    ALT(SGPT) 126 (H) 2 - 50 U/L    Alkaline Phosphatase 232 (H) 30 - 99 U/L    Total Bilirubin 16.3 (H) 0.1 - 1.5 mg/dL    Albumin 2.6 (L) 3.2 - 4.9 g/dL    Total Protein 5.0 (L) 6.0 - 8.2 g/dL    Globulin 2.4 1.9 - 3.5 g/dL    A-G Ratio 1.1 g/dL   CBC WITHOUT DIFFERENTIAL    Collection Time: 06/25/25 12:33 AM   Result Value Ref Range    WBC 12.1 (H) 4.8 - 10.8 K/uL    RBC 3.80 (L) 4.20 - 5.40 M/uL    Hemoglobin 13.6 12.0 - 16.0 g/dL    Hematocrit 40.1 37.0 - 47.0 %    .5 (H) 81.4 -  97.8 fL    MCH 35.8 (H) 27.0 - 33.0 pg    MCHC 33.9 32.2 - 35.5 g/dL    RDW 75.4 (H) 35.9 - 50.0 fL    Platelet Count 190 164 - 446 K/uL    MPV 10.5 9.0 - 12.9 fL   ESTIMATED GFR    Collection Time: 06/25/25 12:33 AM   Result Value Ref Range    GFR (CKD-EPI) 86 >60 mL/min/1.73 m 2       Radiology Review:  EC-ECHOCARDIOGRAM COMPLETE W/ CONT   Final Result      US-PARACENTESIS, ABD WITH IMAGING   Final Result      1. Ultrasound-guided diagnostic and therapeutic paracentesis of the right lower quadrant of the abdominal wall.      2. 1200 mL of fluid withdrawn.      US-ABDOMEN COMPLETE SURVEY   Final Result      1.  Cirrhotic appearance of the liver with a lobulated cyst.   2.  Sludge is present within the gallbladder. There is no definite stone.   3.  Ascites.   4.  There appears to be occlusion of the portal vein and at least partial occlusion of the inferior vena cava.   5.  Evaluation is limited with poor visualization of the midline structures.         DX-CHEST-PORTABLE (1 VIEW)   Final Result      Mild left lung infiltrate.            MDM (Data Review):   -Records reviewed and summarized in current documentation  -I personally reviewed and interpreted the laboratory results  -I personally reviewed the radiology images    Assessment/Recommendations:  Decompensated alcoholic cirrhosis - MELD 3.0 score 32; 63.5% estimated 90-day survival.  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  She was not on diuretics at home.    - Continue prednisilone for alcoholic hepatitis, no evidence of infection in ascites fluid  (started on 6/22/2025: If patient still here on day 4, may calculate a 40 Delmy score.  Patient/ aware that there is a 10-15% false-negative result with this and more accurately calculated on day 7)  - trend liver enzymes  - lactulose daily-titrate to have 3 loose bowel movements per day  CIWA discontinued per primary team  -Echocardiogram unable to visualize/assess right ventricular function.  Given that patient denies any dyspnea, may continue beta-blocker at this time.  - would discontinue first generation antipsychotics for nausea therapy.  Compazine and Phenergan can have deleterious effects and cirrhosis.  Would use Zofran alone.  - 2 g sodium restriction diet  - Outpatient referral for alcohol use disorder counseling/support.  Discontinue all toxic habits in the interim.  - Outpatient follow-up with [facility] for EGD and colonoscopy as previously recommended.  -AFP and AFP L3% tumor markers ordered.     GI to follow    Discussed with patient, Dr. Sow,  Dr. Pond    ..Jadyn Zamora, A.P.R.N.    Core Quality Measures   Reviewed items::  Labs, Medications and Radiology reports reviewed

## 2025-06-27 LAB
25(OH)D3 SERPL-MCNC: 35 NG/ML (ref 30–100)
AFP L3 MFR SERPL: 28.6 % (ref 0–9.9)
AFP SERPL-MCNC: 2 NG/ML (ref 0–15)
AFP-TM SERPL-MCNC: 3 NG/ML (ref 0–9)
ALBUMIN SERPL BCP-MCNC: 2.5 G/DL (ref 3.2–4.9)
ALBUMIN/GLOB SERPL: 1 G/DL
ALP SERPL-CCNC: 202 U/L (ref 30–99)
ALT SERPL-CCNC: 165 U/L (ref 2–50)
ANION GAP SERPL CALC-SCNC: 13 MMOL/L (ref 7–16)
AST SERPL-CCNC: 319 U/L (ref 12–45)
BILIRUB CONJ SERPL-MCNC: 12.6 MG/DL (ref 0.1–0.5)
BILIRUB SERPL-MCNC: 18.7 MG/DL (ref 0.1–1.5)
BUN SERPL-MCNC: 20 MG/DL (ref 8–22)
CALCIUM ALBUM COR SERPL-MCNC: 10 MG/DL (ref 8.5–10.5)
CALCIUM SERPL-MCNC: 8.8 MG/DL (ref 8.5–10.5)
CHLORIDE SERPL-SCNC: 89 MMOL/L (ref 96–112)
CO2 SERPL-SCNC: 30 MMOL/L (ref 20–33)
CREAT SERPL-MCNC: 0.68 MG/DL (ref 0.5–1.4)
GFR SERPLBLD CREATININE-BSD FMLA CKD-EPI: 105 ML/MIN/1.73 M 2
GLOBULIN SER CALC-MCNC: 2.5 G/DL (ref 1.9–3.5)
GLUCOSE SERPL-MCNC: 87 MG/DL (ref 65–99)
INR PPP: 1.61 (ref 0.87–1.13)
POTASSIUM SERPL-SCNC: 3.2 MMOL/L (ref 3.6–5.5)
PROT SERPL-MCNC: 5 G/DL (ref 6–8.2)
PROTHROMBIN TIME: 19.2 SEC (ref 12–14.6)
SODIUM SERPL-SCNC: 132 MMOL/L (ref 135–145)

## 2025-06-27 PROCEDURE — 700102 HCHG RX REV CODE 250 W/ 637 OVERRIDE(OP): Performed by: HOSPITALIST

## 2025-06-27 PROCEDURE — A9270 NON-COVERED ITEM OR SERVICE: HCPCS | Performed by: HOSPITALIST

## 2025-06-27 PROCEDURE — 84590 ASSAY OF VITAMIN A: CPT

## 2025-06-27 PROCEDURE — 80053 COMPREHEN METABOLIC PANEL: CPT

## 2025-06-27 PROCEDURE — 700111 HCHG RX REV CODE 636 W/ 250 OVERRIDE (IP): Performed by: HOSPITALIST

## 2025-06-27 PROCEDURE — 700102 HCHG RX REV CODE 250 W/ 637 OVERRIDE(OP): Performed by: PHYSICIAN ASSISTANT

## 2025-06-27 PROCEDURE — 770001 HCHG ROOM/CARE - MED/SURG/GYN PRIV*

## 2025-06-27 PROCEDURE — 82248 BILIRUBIN DIRECT: CPT

## 2025-06-27 PROCEDURE — 85610 PROTHROMBIN TIME: CPT

## 2025-06-27 PROCEDURE — 99233 SBSQ HOSP IP/OBS HIGH 50: CPT | Performed by: HOSPITALIST

## 2025-06-27 PROCEDURE — A9270 NON-COVERED ITEM OR SERVICE: HCPCS | Performed by: PHYSICIAN ASSISTANT

## 2025-06-27 PROCEDURE — 36415 COLL VENOUS BLD VENIPUNCTURE: CPT

## 2025-06-27 PROCEDURE — 82306 VITAMIN D 25 HYDROXY: CPT

## 2025-06-27 PROCEDURE — 99232 SBSQ HOSP IP/OBS MODERATE 35: CPT | Performed by: NURSE PRACTITIONER

## 2025-06-27 RX ORDER — POTASSIUM CHLORIDE 1500 MG/1
40 TABLET, EXTENDED RELEASE ORAL 2 TIMES DAILY
Status: COMPLETED | OUTPATIENT
Start: 2025-06-27 | End: 2025-06-27

## 2025-06-27 RX ORDER — DIPHENHYDRAMINE HCL 25 MG
25 TABLET ORAL ONCE
Status: COMPLETED | OUTPATIENT
Start: 2025-06-27 | End: 2025-06-27

## 2025-06-27 RX ADMIN — DIPHENHYDRAMINE HYDROCHLORIDE 25 MG: 25 TABLET ORAL at 00:12

## 2025-06-27 RX ADMIN — NYSTATIN: 100000 POWDER TOPICAL at 18:00

## 2025-06-27 RX ADMIN — ESCITALOPRAM OXALATE 20 MG: 10 TABLET ORAL at 05:42

## 2025-06-27 RX ADMIN — CARVEDILOL 3.12 MG: 3.12 TABLET, FILM COATED ORAL at 08:07

## 2025-06-27 RX ADMIN — SPIRONOLACTONE 100 MG: 100 TABLET ORAL at 05:42

## 2025-06-27 RX ADMIN — LACTULOSE 15 ML: 10 SOLUTION ORAL at 17:06

## 2025-06-27 RX ADMIN — OXYCODONE 5 MG: 5 TABLET ORAL at 17:05

## 2025-06-27 RX ADMIN — PREDNISOLONE SODIUM PHOSPHATE 39.9 MG: 15 SOLUTION ORAL at 05:42

## 2025-06-27 RX ADMIN — LEVOTHYROXINE SODIUM 50 MCG: 0.05 TABLET ORAL at 05:42

## 2025-06-27 RX ADMIN — FUROSEMIDE 40 MG: 40 TABLET ORAL at 05:42

## 2025-06-27 RX ADMIN — GABAPENTIN 300 MG: 300 CAPSULE ORAL at 17:06

## 2025-06-27 RX ADMIN — POTASSIUM CHLORIDE 40 MEQ: 1500 TABLET, EXTENDED RELEASE ORAL at 17:06

## 2025-06-27 RX ADMIN — ONDANSETRON 4 MG: 4 TABLET, ORALLY DISINTEGRATING ORAL at 08:07

## 2025-06-27 RX ADMIN — POTASSIUM CHLORIDE 40 MEQ: 1500 TABLET, EXTENDED RELEASE ORAL at 11:56

## 2025-06-27 RX ADMIN — Medication 100 MG: at 05:42

## 2025-06-27 RX ADMIN — GABAPENTIN 300 MG: 300 CAPSULE ORAL at 05:42

## 2025-06-27 RX ADMIN — OXYCODONE 5 MG: 5 TABLET ORAL at 08:07

## 2025-06-27 RX ADMIN — OXYCODONE 5 MG: 5 TABLET ORAL at 23:28

## 2025-06-27 ASSESSMENT — ENCOUNTER SYMPTOMS
MUSCULOSKELETAL NEGATIVE: 1
VOMITING: 0
FEVER: 0
NERVOUS/ANXIOUS: 0
DIARRHEA: 1
MYALGIAS: 0
HEADACHES: 0
EYES NEGATIVE: 1
ABDOMINAL PAIN: 1
BACK PAIN: 0
HEARTBURN: 0
CARDIOVASCULAR NEGATIVE: 1
TREMORS: 1
COUGH: 0
BLOOD IN STOOL: 0
FOCAL WEAKNESS: 0
CHILLS: 0
SHORTNESS OF BREATH: 0
RESPIRATORY NEGATIVE: 1
BLURRED VISION: 0
CONSTIPATION: 0
ABDOMINAL PAIN: 0
WEAKNESS: 0
NEUROLOGICAL NEGATIVE: 1
SORE THROAT: 0
DIZZINESS: 0
DIAPHORESIS: 0
WEIGHT LOSS: 0
PALPITATIONS: 0
BRUISES/BLEEDS EASILY: 0
NAUSEA: 0
DEPRESSION: 0

## 2025-06-27 ASSESSMENT — PAIN DESCRIPTION - PAIN TYPE
TYPE: ACUTE PAIN

## 2025-06-27 ASSESSMENT — LIFESTYLE VARIABLES: SUBSTANCE_ABUSE: 1

## 2025-06-27 NOTE — DIETARY
"Nutrition Services: Initial Assessment     Day 8 6/19/2025of admit. Minerva Tillman is 50 y.o., female with admitting DX of Alcohol abuse with withdrawal and perceptual disturbance (HCC) [F10.132].    Consult Received for: decompensated cirrhosis, alcoholic hepatitis, high protein (vegetarian)    Pt admitted with alcohol abuse with withdrawal, elevated cancer antigen, portal vein thrombosis, ascites s/p paracentesis pmh alcoholic hepatitis, alcoholic cirrhosis per EMR/provider note. Unable to contact pt at time of assessment.        Nutrition Assessment:      Height: 157.5 cm (5' 2\")  Weight: 64.6 kg (142 lb 6.7 oz)  Weight taken via: Stand Up Scale  BMI Calculated: 26.05  BMI Classification: Overweight, For age group       Weight Readings from Last 5 encounters:   Wt Readings from Last 5 Encounters:   06/19/25 64.6 kg (142 lb 6.7 oz)   12/11/24 71 kg (156 lb 8.4 oz)   10/25/24 65.8 kg (145 lb 1 oz)   07/24/24 61.2 kg (135 lb)   06/19/24 61.1 kg (134 lb 12.8 oz)         Objective:   Pertinent Labs: Na 134  Pertinent Meds: furosemide, lactulose, levothyroxine, prednisolone, spironolactone, thiamine  Skin/Wounds:  no concerns noted  Food Allergies: strawberries  Last BM:  Type 6: Fluffy pieces with ragged edges, a mushy stool  06/27/25       Current Diet Order/Intake:   2gm Na    PO intake:   50-75% x 2 meal  25-50% x 1 meal  <25% x 5 meals    Nutrition Focused Physical Exam (NFPE)  NFPE did not occur    Nutrition Diagnosis:      Inadequate oral intake related to decreased appetite/ alcohol withdrawal as evidence by PO intake averages <25% of meals    Unable to fully assess for malnutrition at this time    Nutrition Interventions:      Resume current diet. Consider liberalization if PO intake remains inadequate.   Recommend Ensure Plus High Protein (provides 350 calories) 20 g protein per 8 fl oz) BID.  Recommend multivitamin and thiamine.      Nutrition Monitoring and Evaluation:      Monitor nutrition POC, goal " for >75% intake from meals and supplements.  Additional fluids per MD/DO  Monitor vital signs pertinent to nutrition.    RD following and will provide updated recommendations as indicated.      Tomasa Scales R.D.                                         ASP[facility]/AND CRITERIA FOR MALNUTRITION

## 2025-06-27 NOTE — PROGRESS NOTES
.Gastroenterology Progress Note               Author:  DOMENICO Jarvis   Date & Time Created: 6/27/2025 2:49 PM       Patient ID:  Name:             Minerva Tillman  YOB: 1975  Age:                 50 y.o.  female  MRN:               [id number]    Medical Decision Making, by Problem:  Active Hospital Problems    Diagnosis     Elevated cancer antigen 125 () [R97.1]     Hyponatremia [E87.1]     Portal vein thrombosis [I81]     Alcohol abuse with withdrawal and perceptual disturbance (HCC) [F10.132]     Alcoholic cirrhosis (HCC) [K70.30]     Alcoholic hepatitis [K70.10]     High anion gap metabolic acidosis [E87.29]     Acute hypoxic respiratory failure (HCC) [J96.01]     Ascites [R18.8]     Hypokalemia [E87.6]     Acute cystitis [N30.00]      Presenting Chief Complaint:  Decompensated cirrhosis     History of Present Illness:   50-year-old female presents to the emergency department for abdominal swelling and shortness of breath and desire to discontinue alcohol intake.  Patient was seen by gastroenterology as an inpatient in 2024.  Dr. Daniels had recommended outpatient evaluation.  Patient followed up with Dr. Conley on 7/1/2024 with an additional follow-up appointment on 10/17/2024.  She had a FibroScan done which suggested advanced fibrosis and possible cirrhosis.  She was recommended to pursue upper and lower endoscopy.  Patient states that when she was told of this information she became quite concerned and did not follow-up because she was scared of the results.  Patient continues to drink alcohol.  She wants help in quitting.  She denies hematemesis or melena.  She endorses new onset abdominal swelling without lower extremity edema.     Home medications reviewed.  Patient not taking anything in the way of diuretics or medications for encephalopathy.  Patient is not on a beta-blocker.     Review of systems at this time is as stated above as well as patient feeling a little bit  anxious but comfortable with regard to her detoxification/CIWA protocol.    Interval History:  6/21/2025: Patient seen.  Reports feeling much better but having multiple liquid bowel movements.  Mild tremors, very hungry.  Has started wearing oxygen at night at home, reports it is due to increased elevation at home in [facility].  Slightly dyspneic today.  N.p.o. pending paracentesis. VSS, CIWA 4.  Transaminitis worsening, T. bili 13.8 > 9.4.  INR 1.89.  Hoping to go directly to rehab from the hospital.    6/22/2025: Feels better, tremulous with CIWA 5. Having multiple bowel movements. Fluid negative for SBP and NGTD on culture. Bili up to 14.2    6/23/2025: Feels much better today, no complaints. Eating and drinking well. Reports she was unable to be weaned off oxygen. Liver enzymes uptrending, T. Bili 17.2>14.2    6/24/2025: Patient seen at bedside with .  Had just completed echocardiogram.  Results are pending.  Tolerating oral intake without difficulty.  Denies dyspnea, nausea, vomiting, abdominal pain.  WBC 12.6.  Hemoglobin stable 13.8.  Platelets 173.  Sodium 130, BUN 11, creatinine 0.8, LFTs downtrending with , , alk phos 252, total bilirubin 15.5.  Albumin 2.7.  INR increased at 5.36.  Eliquis held by primary team.  Cytology resulted showing abnormal cells-discussed this with patient and  at bedside for which they will follow-up with outpatient GI.  Additionally, per flowsheets, patient reported to have 2 bowel movements overnight.  Patient reports having more than 5.  Discussed lactulose daily, titrating to 2-3 bowel movements per day.    6/25/2025: AO x 4.  She reports having bilateral upper quadrant/epigastric discomfort, relieved with pain medication.  She reports 3-4 bowel movements overnight.  Reports she is not able to eat a lot of food at once but is grazing/eating throughout the day and tolerating well thus far.  No nausea, vomiting, dyspnea.  WBC 12.1, hemoglobin  13.6, platelets 190.  Sodium 133, potassium 3.4, BUN 14, creatinine 0.82.  , , alk phos 232, total bilirubin 16.3.  Albumin 2.6.  INR 2.97.  Echocardiogram completed, unable to assess right ventricular function.  MELD 3.0 score 32; 63.5% estimated 90-day survival.      6/26/2025, AO x 4.  Reports mild generalized upper abdominal pain relieved with narcotics.  She reports bowel movement x 10 since 8:00 last night.  Appetite unchanged.  No other nausea, vomiting, dyspnea.  , , alk phos 213, total bilirubin 18.3, INR improved to 1.87.  CA-125 797.    6/27/2025: AO x 4.  Negative for asterixis.  Patient to problems overnight.  Patient reports grazing/eating small amounts throughout the day.  Upon questioning types of food she is eating, she states that she eats vegetables and her protein inpatient.  , , alk phos 202, total bilirubin 18.7.  INR 1.61.  AFP 3. Lille model 0.789.  MRI pelvis, MRCP pending.    Hospital Medications:  Current Facility-Administered Medications   Medication Dose Frequency Provider Last Rate Last Admin    potassium chloride SA (Kdur) tablet 40 mEq  40 mEq BID Vidhi Pond M.D.   40 mEq at 06/27/25 1156    lactulose 20 GM/30ML solution 15 mL  15 mL Q EVENING Vidhi Pond M.D.   15 mL at 06/26/25 1815    nystatin (Mycostatin) powder   BID Vidhi Pond M.D.        oxyCODONE immediate-release (Roxicodone) tablet 5 mg  5 mg Q4HRS PRN Vidhi Pond M.D.   5 mg at 06/27/25 0807    thiamine (Vitamin B-1) tablet 100 mg  100 mg DAILY Vidhi Pond M.D.   100 mg at 06/27/25 0542    prednisoLONE sodium phosphate (Pediapred) 15 MG/5ML oral solution 39.9 mg  39.9 mg DAILY Helen Mendez M.D.   39.9 mg at 06/27/25 0542    spironolactone (Aldactone) tablet 100 mg  100 mg Q DAY Helen Mendez M.D.   100 mg at 06/27/25 0542    Respiratory Therapy Consult   Continuous RT Helen Mendez M.D.        furosemide (Lasix) tablet 40 mg  40 mg Q DAY Helen Mendez M.D.   40 mg at  06/27/25 0542    carvedilol (Coreg) tablet 3.125 mg  3.125 mg BID WITH MEALS Helen Mendez M.D.   3.125 mg at 06/27/25 0807    melatonin tablet 5 mg  5 mg HS PRN Roseanne Crowder A.P.R.NMiko   5 mg at 06/25/25 0109    labetalol (Normodyne/Trandate) injection 10 mg  10 mg Q4HRS PRN Abby Ivy M.D.        ondansetron (Zofran) syringe/vial injection 4 mg  4 mg Q4HRS PRN Abby Ivy M.D.        ondansetron (Zofran ODT) dispertab 4 mg  4 mg Q4HRS PRN Abby Ivy M.D.   4 mg at 06/27/25 0807    escitalopram (Lexapro) tablet 20 mg  20 mg DAILY Abby Ivy M.D.   20 mg at 06/27/25 0542    gabapentin (Neurontin) capsule 300 mg  300 mg BID Abby Ivy M.D.   300 mg at 06/27/25 0542    levothyroxine (Synthroid) tablet 50 mcg  50 mcg AM ES Abby Ivy M.D.   50 mcg at 06/27/25 0542    hydrOXYzine HCl (Atarax) tablet 25 mg  25 mg QDAY PRN Abby Ivy M.D.   25 mg at 06/25/25 0109    albuterol inhaler 1-2 Puff  1-2 Puff Q6HRS PRN Abby Ivy M.D.   2 Puff at 06/22/25 2249   Last reviewed on 6/19/2025  7:43 PM by Lorna Lubin       Review of Systems:  Review of Systems   Constitutional:  Positive for malaise/fatigue. Negative for chills and fever.   HENT:  Negative for hearing loss and sore throat.    Eyes:  Negative for blurred vision.   Respiratory:  Negative for cough and shortness of breath.    Cardiovascular:  Negative for chest pain and leg swelling.   Gastrointestinal:  Positive for abdominal pain and diarrhea. Negative for blood in stool, constipation, heartburn, melena, nausea and vomiting.   Genitourinary:  Negative for dysuria and hematuria.   Musculoskeletal:  Negative for back pain and myalgias.   Skin:  Negative for rash.   Neurological:  Positive for tremors. Negative for dizziness and weakness.   Psychiatric/Behavioral:  Positive for substance abuse. Negative for depression. The patient is not nervous/anxious.    All other systems reviewed and are negative.    Vital signs:  Weight/BMI: Body mass  "index is 26.05 kg/m².  BP (!) 130/92   Pulse 60   Temp 36.4 °C (97.6 °F) (Temporal)   Resp 18   Ht 1.575 m (5' 2\")   Wt 64.6 kg (142 lb 6.7 oz)   SpO2 91%   Vitals:    06/26/25 1937 06/27/25 0338 06/27/25 0702 06/27/25 0807   BP: 105/71 100/61 (!) 130/92    Pulse: 61 65 60    Resp: 17 17 18 18   Temp: 36.1 °C (97 °F) 36.1 °C (97 °F) 36.4 °C (97.6 °F)    TempSrc: Temporal Temporal Temporal    SpO2: 92% 93% 91%    Weight:       Height:         Oxygen Therapy:  Pulse Oximetry: 91 %, O2 (LPM): 2, O2 Delivery Device: Nasal Cannula    Intake/Output Summary (Last 24 hours) at 6/27/2025 1449  Last data filed at 6/27/2025 1007  Gross per 24 hour   Intake 240 ml   Output --   Net 240 ml       Physical Exam  Vitals and nursing note reviewed.   Constitutional:       General: She is awake. She is not in acute distress.     Appearance: She is overweight. She is ill-appearing.   HENT:      Head: Normocephalic and atraumatic.      Nose: Nose normal.      Mouth/Throat:      Mouth: Mucous membranes are moist.      Pharynx: Oropharynx is clear.   Eyes:      General: Scleral icterus present.      Extraocular Movements: Extraocular movements intact.      Conjunctiva/sclera: Conjunctivae normal.   Cardiovascular:      Rate and Rhythm: Normal rate and regular rhythm.      Pulses: Normal pulses.      Heart sounds: Normal heart sounds.   Pulmonary:      Effort: Pulmonary effort is normal. No respiratory distress.      Breath sounds: Normal breath sounds.   Abdominal:      General: Abdomen is flat. Bowel sounds are normal. There is no distension.      Palpations: Abdomen is soft.      Tenderness: There is no abdominal tenderness. There is no guarding.   Musculoskeletal:         General: Normal range of motion.      Cervical back: Normal range of motion.      Right lower leg: No edema.      Left lower leg: No edema.   Skin:     General: Skin is warm and dry.      Capillary Refill: Capillary refill takes less than 2 seconds.      " Coloration: Skin is jaundiced.   Neurological:      General: No focal deficit present.      Mental Status: She is alert and oriented to person, place, and time. Mental status is at baseline.      Comments: AO x 3, no asterixis   Psychiatric:         Mood and Affect: Mood normal.         Behavior: Behavior normal. Behavior is cooperative.         Labs:  Recent Labs     06/25/25  0033 06/26/25  0122 06/27/25  0506   SODIUM 133* 134* 132*   POTASSIUM 3.4* 3.8 3.2*   CHLORIDE 92* 93* 89*   CO2 30 25 30   BUN 14 16 20   CREATININE 0.82 0.72 0.68   CALCIUM 8.8 9.0 8.8     Recent Labs     06/25/25  0033 06/26/25  0122 06/27/25  0506   ALTSGPT 126* 139* 165*   ASTSGOT 263* 290* 319*   ALKPHOSPHAT 232* 213* 202*   TBILIRUBIN 16.3* 18.3* 18.7*   DBILIRUBIN  --   --  12.6*   GLUCOSE 107* 99 87     Recent Labs     06/25/25  0033 06/26/25  0122 06/27/25  0506   WBC 12.1*  --   --    ASTSGOT 263* 290* 319*   ALTSGPT 126* 139* 165*   ALKPHOSPHAT 232* 213* 202*   TBILIRUBIN 16.3* 18.3* 18.7*     Recent Labs     06/25/25  0033 06/26/25  0122 06/27/25  0506   RBC 3.80*  --   --    HEMOGLOBIN 13.6  --   --    HEMATOCRIT 40.1  --   --    PLATELETCT 190  --   --    PROTHROMBTM 31.1* 21.6* 19.2*   INR 2.97* 1.87* 1.61*     Recent Results (from the past 24 hours)   Prothrombin Time    Collection Time: 06/27/25  5:06 AM   Result Value Ref Range    PT 19.2 (H) 12.0 - 14.6 sec    INR 1.61 (H) 0.87 - 1.13   BILIRUBIN DIRECT    Collection Time: 06/27/25  5:06 AM   Result Value Ref Range    Direct Bilirubin 12.6 (H) 0.1 - 0.5 mg/dL   Comp Metabolic Panel    Collection Time: 06/27/25  5:06 AM   Result Value Ref Range    Sodium 132 (L) 135 - 145 mmol/L    Potassium 3.2 (L) 3.6 - 5.5 mmol/L    Chloride 89 (L) 96 - 112 mmol/L    Co2 30 20 - 33 mmol/L    Anion Gap 13.0 7.0 - 16.0    Glucose 87 65 - 99 mg/dL    Bun 20 8 - 22 mg/dL    Creatinine 0.68 0.50 - 1.40 mg/dL    Calcium 8.8 8.5 - 10.5 mg/dL    Correct Calcium 10.0 8.5 - 10.5 mg/dL     AST(SGOT) 319 (H) 12 - 45 U/L    ALT(SGPT) 165 (H) 2 - 50 U/L    Alkaline Phosphatase 202 (H) 30 - 99 U/L    Total Bilirubin 18.7 (H) 0.1 - 1.5 mg/dL    Albumin 2.5 (L) 3.2 - 4.9 g/dL    Total Protein 5.0 (L) 6.0 - 8.2 g/dL    Globulin 2.5 1.9 - 3.5 g/dL    A-G Ratio 1.0 g/dL   VITAMIN D,25 HYDROXY (DEFICIENCY)    Collection Time: 06/27/25  5:06 AM   Result Value Ref Range    25-Hydroxy   Vitamin D 25 35 30 - 100 ng/mL   ESTIMATED GFR    Collection Time: 06/27/25  5:06 AM   Result Value Ref Range    GFR (CKD-EPI) 105 >60 mL/min/1.73 m 2       Radiology Review:  EC-ECHOCARDIOGRAM COMPLETE W/ CONT   Final Result      US-PARACENTESIS, ABD WITH IMAGING   Final Result      1. Ultrasound-guided diagnostic and therapeutic paracentesis of the right lower quadrant of the abdominal wall.      2. 1200 mL of fluid withdrawn.      US-ABDOMEN COMPLETE SURVEY   Final Result      1.  Cirrhotic appearance of the liver with a lobulated cyst.   2.  Sludge is present within the gallbladder. There is no definite stone.   3.  Ascites.   4.  There appears to be occlusion of the portal vein and at least partial occlusion of the inferior vena cava.   5.  Evaluation is limited with poor visualization of the midline structures.         DX-CHEST-PORTABLE (1 VIEW)   Final Result      Mild left lung infiltrate.      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  She was not on diuretics at home.    - 6 day Lille score 0.789- not responsive to steroids.  May discontinue or return to try another day but suspect the patient does not responding to steroids and we discontinue on day 7 anyways.  - trend liver enzymes  - lactulose daily-titrate to have 3 loose bowel movements per day  -Echocardiogram unable to visualize/assess right ventricular function.  Given that patient denies any dyspnea, may continue beta-blocker at this time.  -Zofran as needed nausea  - 2 g sodium restriction diet  - Outpatient referral for alcohol use disorder counseling/support.  Discontinue all toxic habits in the interim.  - Outpatient follow-up with [facility] for EGD and colonoscopy as previously recommended.  -AFP L3% tumor marker pending  -MRI pelvis and MRCP pending  - Vitamin D and vitamin A ordered/pending  - Nutrition consult placed    GI to follow    Discussed with patient, Dr. Sow,  Dr. Pond    ..Jadyn Zamora, A.P.R.N.    Core Quality Measures   Reviewed items::  Labs, Medications and Radiology reports reviewed

## 2025-06-27 NOTE — CARE PLAN
The patient is Stable - Low risk of patient condition declining or worsening    Shift Goals  Clinical Goals: pt will remain safe and free from injury/fall throughout shift  Patient Goals: rest  Family Goals: eddie    Progress made toward(s) clinical / shift goals:    Problem: Communication  Goal: The ability to communicate needs accurately and effectively will improve  Outcome: Progressing     Problem: Safety  Goal: Will remain free from injury  Outcome: Progressing  Goal: Will remain free from falls  Outcome: Progressing     Problem: Pain Management  Goal: Pain level will decrease to patient's comfort goal  Outcome: Progressing     Problem: Bowel/Gastric:  Goal: Normal bowel function is maintained or improved  Outcome: Progressing     Problem: Skin Integrity  Goal: Risk for impaired skin integrity will decrease  Outcome: Progressing       Patient is not progressing towards the following goals:

## 2025-06-27 NOTE — ASSESSMENT & PLAN NOTE
797  Checked due to atypical cells noted on pathology of ascites fluid.   6/28 MRI abdomen/pelvis with and w/o contrast no ovarian or abdominal malignancy.  Renal function wnl.   CT AP did not reveal any ovarian mass.

## 2025-06-27 NOTE — DISCHARGE PLANNING
Case Management Discharge Planning    Admission Date: 6/19/2025  GMLOS: 4.8  ALOS: 8    6-Clicks ADL Score: 22  6-Clicks Mobility Score: 23      Anticipated Discharge Dispo: Discharge Disposition: Discharged to home/self care (01)    DME Needed: Yes    DME Ordered: Yes    Action(s) Taken: LMSW was updated during IDT rounds. Patient is not medically cleared at this time. Patient's Bili is worsening. Patient is pending an MRI. LMSW reminded IDT of the DME order for POC.      Addendum @ 1538: LMSW met with the patient at bedside to complete a choice for DME for a POC. 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 Choice scanned to [facility].     Escalations Completed: None    Medically Clear: No    Next Steps: Case Management to follow for discharge planning.     Barriers to Discharge: Medical clearance    Is the patient up for discharge tomorrow: No

## 2025-06-27 NOTE — FACE TO FACE
"Face to Face Note  -  Durable Medical Equipment    Vidhi Pond M.D. - NPI: [id number]  I certify that this patient is under my care and that they had a durable medical equipment(DME)face to face encounter by myself that meets the physician DME face-to-face encounter requirements with this patient on:    Date of encounter:   Patient:                    MRN:                       YOB: 2026  Minerva Tillman  [id number]  1975     The encounter with the patient was in whole, or in part, for the following medical condition, which is the primary reason for durable medical equipment:  COPD    I certify that, based on my findings, the following durable medical equipment is medically necessary:    Oxygen   HOME O2 Saturation Measurements:(Values must be present for Home Oxygen orders)  Room air sat at rest: 82  Room air sat with amb: 80  With liters of O2: 2, O2 sat at rest with O2: 92  With Liters of O2: 2, O2 sat with amb with O2 : 89  Is the patient mobile?: Yes  If patient feels more short of breath, they can go up to 6 liters per minute and contact healthcare provider.    Supporting Symptoms: The patient requires supplemental oxygen, as the following interventions have been tried with limited or no improvement: \"Oral and/or IV steroids, \"Ambulation with oximetry, and \"Incentive spirometry.    My Clinical findings support the need for the above equipment due to:  Hypoxia  "

## 2025-06-27 NOTE — CARE PLAN
The patient is Watcher - Medium risk of patient condition declining or worsening    Shift Goals  Clinical Goals: Patient will report a pain level less than or equal to 5 by the end of shift  Patient Goals: comfort, rest  Family Goals: eddie    Progress made toward(s) clinical / shift goals:  Patients pain controlled with medication as per MAR, rest, and distraction. Patient did report a pain level less than or equal to 5.     Patient is not progressing towards the following goals: progressing

## 2025-06-27 NOTE — PROGRESS NOTES
Hospital Medicine Daily Progress Note    Date of Service  6/26/2025    Chief Complaint  Minerva Tillman is a 50 y.o. female admitted 6/19/2025 with jaundice    Hospital Course  Minerva Tillman is a 50 y.o. female who presented 6/19/2025 with past medical history of liver cirrhosis, alcohol abuse, depression who presents to the hospital for jaundice the past 1 week. She was found to have acute decompensated cirrhosis with ascites with a MELD of 27, acute on chronic respiratory failure with volume overload, portal vein thrombosis and possible community acquired pneumonia and was started on empiric antibiotics    She required CIWA and support with alcohol withdrawl symptoms. She underwent a paracentesis and had no evidence sbp. She was started on anticoagulation for the acute portal vein thrombosis.GI is following     Interval Problem Update  6/23: Axox3, ciwa improved today, currently 2/10, mild tremor today and unsteady on her feet. She reports she is very tired but denies sob, no cp, no abdominal pain, no n/v. She is having loose bowel mvts with the lactulose, no signs of encephalopathy at this time, her recall has improved today. Plan remains to go to inpatient alcohol rehab from here, social work assisting with intake interview which is planned for today. Cytology from paracentesis pending. Bilirubin continues to increase. ROS otherwise negative.   6/24:  d/w GI this a.m., stopped Eliquis since INR increased to 5.36 from 1.8, hepatitis panel ordered. T. Bilirubin increased, decreased lactulose due to multiple loose stools, replaced K.   Dc'd CIWA. Continue prednisolone daily.  6/25:  no pain on palpation of RUQ, but later patient requested oxycodone for abdominal pain.   LFTs and bilirubin remain same. T. Bili 16.  INR improved to 2.97.  hepatitis panel negative. Pink tinged urine improving.  6/26:  increased frequency of urination, UA negative.  Likely from frequent bowel movements 8 per patient.  Return to the emergency department for new or worsening symptoms including, but not limited to, developing open sores, inability to tolerate food or drink, urinary retention, fever, chills, sweats, or other concerns.    You will need to use alternative forms of birth control as antibiotics interfere with the effectiveness of birth control pills.    Do not drink alcohol while with the antibiotics.    Do not have sexual contact/activities for the next 2 weeks/until the potential infection clears and when you resume sexual activities utilize condoms.    STD Clinics  FOR MORE INFORMATION ABOUT STD’S, CONTACT YOUR PHYSICIAN OR:    Sexually Transmitted Disease Clinic                              [facility]                              [address], [place]  [address] [phone]    Sexually Transmitted Disease [facility]                              [address]  [address] [phone]    [address]  [address] [phone]                      [place] AIDS Hotline     [phone]                    Decreased lactulose.   elevated at 797.  Ordered MRI abdomen, pelvis with and wo to look for malignancy. Abnormal pathology on ascites fluid with atypical cells found.  AFP and AFP L 3% pending.  Repeat direct bilirubin in am. Since prior both direct and indirect were elevated.  Increase in t. Bili to 18 today, but INR normalizing off Eliquis to INR 1.87.       I have discussed this patient's plan of care and discharge plan at IDT rounds today with Case Management, Nursing, Nursing leadership, and other members of the IDT team.    Consultants/Specialty  GI    Code Status  Full Code    Disposition  The patient is not medically cleared for discharge to home or a post-acute facility.  Anticipate discharge to: home with close outpatient follow-up    I have placed the appropriate orders for post-discharge needs.    Review of Systems  Review of Systems   Constitutional:  Positive for malaise/fatigue. Negative for chills, diaphoresis, fever and weight loss.   HENT: Negative.  Negative for sore throat.    Eyes: Negative.  Negative for blurred vision.   Respiratory: Negative.  Negative for cough and shortness of breath.    Cardiovascular: Negative.  Negative for chest pain, palpitations and leg swelling.   Gastrointestinal:  Negative for abdominal pain, nausea and vomiting.   Genitourinary:  Negative for dysuria and hematuria.   Musculoskeletal: Negative.  Negative for myalgias.   Skin: Negative.  Negative for itching and rash.   Neurological: Negative.  Negative for dizziness, focal weakness, weakness and headaches.   Endo/Heme/Allergies: Negative.  Does not bruise/bleed easily.   Psychiatric/Behavioral:  Positive for substance abuse. Negative for depression and suicidal ideas.    All other systems reviewed and are negative.       Physical Exam  Temp:  [36.1 °C (97 °F)-36.7 °C (98 °F)] 36.1 °C (97 °F)  Pulse:  [60-62] 61  Resp:  [17-18] 17  BP: (105-116)/(70-79) 105/71  SpO2:  [90 %-94 %] 92 %    Physical Exam  Vitals  and nursing note reviewed. Exam conducted with a chaperone present.   Constitutional:       General: She is not in acute distress.     Appearance: Normal appearance. She is not diaphoretic.   HENT:      Head: Normocephalic.      Nose: Nose normal.      Mouth/Throat:      Mouth: Mucous membranes are moist.   Eyes:      General: Scleral icterus present.      Pupils: Pupils are equal, round, and reactive to light.   Cardiovascular:      Rate and Rhythm: Normal rate and regular rhythm.      Pulses: Normal pulses.      Heart sounds: Normal heart sounds.   Pulmonary:      Effort: Pulmonary effort is normal.      Breath sounds: Normal breath sounds.   Abdominal:      General: Abdomen is flat. Bowel sounds are normal. There is no distension.      Palpations: Abdomen is soft.      Tenderness: There is no abdominal tenderness.   Musculoskeletal:         General: No swelling or deformity. Normal range of motion.   Skin:     General: Skin is warm and dry.      Capillary Refill: Capillary refill takes less than 2 seconds.      Coloration: Skin is jaundiced.      Findings: No bruising or lesion.   Neurological:      General: No focal deficit present.      Mental Status: She is alert and oriented to person, place, and time.      Cranial Nerves: No cranial nerve deficit.   Psychiatric:         Mood and Affect: Mood normal.         Behavior: Behavior normal.         Fluids    Intake/Output Summary (Last 24 hours) at 6/26/2025 1939  Last data filed at 6/26/2025 1300  Gross per 24 hour   Intake 620 ml   Output 0 ml   Net 620 ml        Laboratory  Recent Labs     06/24/25  0043 06/25/25  0033   WBC 12.6* 12.1*   RBC 3.77* 3.80*   HEMOGLOBIN 13.8 13.6   HEMATOCRIT 39.5 40.1   .8* 105.5*   MCH 36.6* 35.8*   MCHC 34.9 33.9   RDW 73.2* 75.4*   PLATELETCT 173 190   MPV 10.5 10.5     Recent Labs     06/24/25  0043 06/25/25  0033 06/26/25  0122   SODIUM 130* 133* 134*   POTASSIUM 3.0* 3.4* 3.8   CHLORIDE 91* 92* 93*   CO2 27 30 25    GLUCOSE 116* 107* 99   BUN 11 14 16   CREATININE 0.80 0.82 0.72   CALCIUM 8.5 8.8 9.0     Recent Labs     06/24/25  0043 06/25/25  0033 06/26/25  0122   INR 5.36* 2.97* 1.87*               Imaging  EC-ECHOCARDIOGRAM COMPLETE W/ CONT   Final Result      US-PARACENTESIS, ABD WITH IMAGING   Final Result      1. Ultrasound-guided diagnostic and therapeutic paracentesis of the right lower quadrant of the abdominal wall.      2. 1200 mL of fluid withdrawn.      US-ABDOMEN COMPLETE SURVEY   Final Result      1.  Cirrhotic appearance of the liver with a lobulated cyst.   2.  Sludge is present within the gallbladder. There is no definite stone.   3.  Ascites.   4.  There appears to be occlusion of the portal vein and at least partial occlusion of the inferior vena cava.   5.  Evaluation is limited with poor visualization of the midline structures.         DX-CHEST-PORTABLE (1 VIEW)   Final Result      Mild left lung infiltrate.      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  multivitamin, thiamine and folate  replacing electrolytes  See discussion above  No hallucinations or seizure today - precautions    Elevated cancer antigen 125 ()  Assessment & Plan   797  Checked due to atypical cells noted on pathology of ascites fluid.   Plan for MRI abdomen/pelvis with and w/o contrast to evaluate for malignancy.  Renal function wnl.   CT AP did not reveal any ovarian mass.    Portal vein thrombosis- (present on admission)  Assessment & Plan  H/h stable, no evidence of bleeding  Discussed with pt and GI  Holding eliquis since an increase in INR from 1.8-5.36.  following  Cbc stable    Hyponatremia- (present on admission)  Assessment & Plan  Consistent with  cirrhosis  following    Alcoholic hepatitis- (present on admission)  Assessment & Plan  Maddery dissemination score 46  Bili continues to increase, steroids started  Assess for response after 7 days with jeramie score     Alcoholic cirrhosis (HCC)- (present on admission)  Assessment & Plan  Decompensated  Volume overloaded, improving with diuretics - tapering as tolerated  MELD 27  Hepatitis panel-repeat negative.  Prednisolone started 6/22 for worsening LFTs.  Lactulose qhs, NH3 elevated 66 at one point, now excessive BMs and normal mentation.    Acute hypoxic respiratory failure (HCC)- (present on admission)  Assessment & Plan  Fluctuating, had titrated down to 1L after paracentesis but increased to 3 L overnight - exam stable- wean as tolerated  Continue diuretics as tolerated   Query mild community acquired pna, non specific changes on x ray -  No cough, on empiric augmentin   Wean as tolerated  Following  RT      High anion gap metabolic acidosis- (present on admission)  Assessment & Plan  Continue to trend secondary to liver dysfunction    Ascites- (present on admission)  Assessment & Plan  Due to cirrhosis  Tolerating diuretics  S/p paracentesis      Hypokalemia- (present on admission)  Assessment & Plan  Replaced and resolved    Acute cystitis- (present on admission)  Assessment & Plan  Resolved  Completed augmentin x 5 days.           VTE prophylaxis: SCD/ eliquis    I have performed a physical exam and reviewed and updated ROS and Plan today (6/26/2025). In review of yesterday's note (6/25/2025), there are no changes except as documented above.

## 2025-06-27 NOTE — PROGRESS NOTES
Pt alert/oriented x4, denies pain/discomfort at this time. Still has frequency/urgency when going to the bathroom. Tolerating 2L oxygen via nasal cannula. Pt sitting up in bed, needs met. Call light within reach, personal belongings available, bed in lowest position, treaded socks on, and bed alarm on. Hourly rounding in place.

## 2025-06-28 ENCOUNTER — APPOINTMENT (OUTPATIENT)
Dept: RADIOLOGY | Facility: MEDICAL CENTER | Age: 50
DRG: 896 | End: 2025-06-28
Attending: HOSPITALIST
Payer: COMMERCIAL

## 2025-06-28 LAB
ALBUMIN SERPL BCP-MCNC: 2.5 G/DL (ref 3.2–4.9)
ALBUMIN/GLOB SERPL: 1 G/DL
ALP SERPL-CCNC: 193 U/L (ref 30–99)
ALT SERPL-CCNC: 188 U/L (ref 2–50)
ANION GAP SERPL CALC-SCNC: 10 MMOL/L (ref 7–16)
AST SERPL-CCNC: 355 U/L (ref 12–45)
BILIRUB SERPL-MCNC: 16.7 MG/DL (ref 0.1–1.5)
BUN SERPL-MCNC: 23 MG/DL (ref 8–22)
CALCIUM ALBUM COR SERPL-MCNC: 10 MG/DL (ref 8.5–10.5)
CALCIUM SERPL-MCNC: 8.8 MG/DL (ref 8.5–10.5)
CHLORIDE SERPL-SCNC: 91 MMOL/L (ref 96–112)
CO2 SERPL-SCNC: 32 MMOL/L (ref 20–33)
CREAT SERPL-MCNC: 0.76 MG/DL (ref 0.5–1.4)
GFR SERPLBLD CREATININE-BSD FMLA CKD-EPI: 95 ML/MIN/1.73 M 2
GLOBULIN SER CALC-MCNC: 2.5 G/DL (ref 1.9–3.5)
GLUCOSE SERPL-MCNC: 88 MG/DL (ref 65–99)
INR PPP: 1.47 (ref 0.87–1.13)
POTASSIUM SERPL-SCNC: 4.5 MMOL/L (ref 3.6–5.5)
PROT SERPL-MCNC: 5 G/DL (ref 6–8.2)
PROTHROMBIN TIME: 17.9 SEC (ref 12–14.6)
SODIUM SERPL-SCNC: 133 MMOL/L (ref 135–145)

## 2025-06-28 PROCEDURE — 99233 SBSQ HOSP IP/OBS HIGH 50: CPT | Performed by: HOSPITALIST

## 2025-06-28 PROCEDURE — 700111 HCHG RX REV CODE 636 W/ 250 OVERRIDE (IP): Performed by: HOSPITALIST

## 2025-06-28 PROCEDURE — 700102 HCHG RX REV CODE 250 W/ 637 OVERRIDE(OP): Performed by: HOSPITALIST

## 2025-06-28 PROCEDURE — 80053 COMPREHEN METABOLIC PANEL: CPT

## 2025-06-28 PROCEDURE — A9579 GAD-BASE MR CONTRAST NOS,1ML: HCPCS | Mod: JZ | Performed by: HOSPITALIST

## 2025-06-28 PROCEDURE — A9270 NON-COVERED ITEM OR SERVICE: HCPCS | Performed by: HOSPITALIST

## 2025-06-28 PROCEDURE — 36415 COLL VENOUS BLD VENIPUNCTURE: CPT

## 2025-06-28 PROCEDURE — 770001 HCHG ROOM/CARE - MED/SURG/GYN PRIV*

## 2025-06-28 PROCEDURE — 74183 MRI ABD W/O CNTR FLWD CNTR: CPT

## 2025-06-28 PROCEDURE — 85610 PROTHROMBIN TIME: CPT

## 2025-06-28 PROCEDURE — 72197 MRI PELVIS W/O & W/DYE: CPT

## 2025-06-28 PROCEDURE — 700117 HCHG RX CONTRAST REV CODE 255: Mod: JZ | Performed by: HOSPITALIST

## 2025-06-28 RX ORDER — DIAZEPAM 10 MG/2ML
2.5 INJECTION, SOLUTION INTRAMUSCULAR; INTRAVENOUS ONCE
Status: COMPLETED | OUTPATIENT
Start: 2025-06-28 | End: 2025-06-28

## 2025-06-28 RX ORDER — HYDROXYZINE HYDROCHLORIDE 50 MG/1
25 TABLET, FILM COATED ORAL EVERY 4 HOURS PRN
Status: DISCONTINUED | OUTPATIENT
Start: 2025-06-28 | End: 2025-07-04 | Stop reason: HOSPADM

## 2025-06-28 RX ORDER — GADOTERIDOL 279.3 MG/ML
15 INJECTION INTRAVENOUS ONCE
Status: COMPLETED | OUTPATIENT
Start: 2025-06-28 | End: 2025-06-28

## 2025-06-28 RX ADMIN — DIAZEPAM 2.5 MG: 10 INJECTION, SOLUTION INTRAMUSCULAR; INTRAVENOUS at 11:12

## 2025-06-28 RX ADMIN — HYDROXYZINE HYDROCHLORIDE 25 MG: 50 TABLET ORAL at 22:32

## 2025-06-28 RX ADMIN — GADOTERIDOL 15 ML: 279.3 INJECTION, SOLUTION INTRAVENOUS at 12:20

## 2025-06-28 RX ADMIN — Medication 100 MG: at 05:20

## 2025-06-28 RX ADMIN — NYSTATIN: 100000 POWDER TOPICAL at 12:48

## 2025-06-28 RX ADMIN — CARVEDILOL 3.12 MG: 3.12 TABLET, FILM COATED ORAL at 16:17

## 2025-06-28 RX ADMIN — LACTULOSE 15 ML: 10 SOLUTION ORAL at 16:16

## 2025-06-28 RX ADMIN — PREDNISOLONE SODIUM PHOSPHATE 39.9 MG: 15 SOLUTION ORAL at 05:19

## 2025-06-28 RX ADMIN — GABAPENTIN 300 MG: 300 CAPSULE ORAL at 16:17

## 2025-06-28 RX ADMIN — HYDROXYZINE HYDROCHLORIDE 25 MG: 50 TABLET ORAL at 15:29

## 2025-06-28 RX ADMIN — LEVOTHYROXINE SODIUM 50 MCG: 0.05 TABLET ORAL at 05:20

## 2025-06-28 RX ADMIN — ESCITALOPRAM OXALATE 20 MG: 10 TABLET ORAL at 05:20

## 2025-06-28 RX ADMIN — HYDROXYZINE HYDROCHLORIDE 25 MG: 50 TABLET ORAL at 01:34

## 2025-06-28 RX ADMIN — GABAPENTIN 300 MG: 300 CAPSULE ORAL at 05:20

## 2025-06-28 ASSESSMENT — ENCOUNTER SYMPTOMS
HEADACHES: 0
SHORTNESS OF BREATH: 0
SORE THROAT: 0
NAUSEA: 0
MUSCULOSKELETAL NEGATIVE: 1
FEVER: 0
CHILLS: 0
DEPRESSION: 0
COUGH: 0
BRUISES/BLEEDS EASILY: 0
EYES NEGATIVE: 1
RESPIRATORY NEGATIVE: 1
WEAKNESS: 0
DIAPHORESIS: 0
WEIGHT LOSS: 0
DIZZINESS: 0
ABDOMINAL PAIN: 0
PALPITATIONS: 0
BLURRED VISION: 0
VOMITING: 0
MYALGIAS: 0
NEUROLOGICAL NEGATIVE: 1
CARDIOVASCULAR NEGATIVE: 1
FOCAL WEAKNESS: 0

## 2025-06-28 ASSESSMENT — PAIN DESCRIPTION - PAIN TYPE
TYPE: ACUTE PAIN
TYPE: ACUTE PAIN

## 2025-06-28 ASSESSMENT — LIFESTYLE VARIABLES: SUBSTANCE_ABUSE: 1

## 2025-06-28 NOTE — PROGRESS NOTES
Hospital Medicine Daily Progress Note    Date of Service  6/27/2025    Chief Complaint  Minerva Tillman is a 50 y.o. female admitted 6/19/2025 with jaundice    Hospital Course  Minerva Tillman is a 50 y.o. female who presented 6/19/2025 with past medical history of liver cirrhosis, alcohol abuse, depression who presents to the hospital for jaundice the past 1 week. She was found to have acute decompensated cirrhosis with ascites with a MELD of 27, acute on chronic respiratory failure with volume overload, portal vein thrombosis and possible community acquired pneumonia and was started on empiric antibiotics    She required CIWA and support with alcohol withdrawl symptoms. She underwent a paracentesis and had no evidence sbp. She was started on anticoagulation for the acute portal vein thrombosis.GI is following     Interval Problem Update  6/23: Axox3, ciwa improved today, currently 2/10, mild tremor today and unsteady on her feet. She reports she is very tired but denies sob, no cp, no abdominal pain, no n/v. She is having loose bowel mvts with the lactulose, no signs of encephalopathy at this time, her recall has improved today. Plan remains to go to inpatient alcohol rehab from here, social work assisting with intake interview which is planned for today. Cytology from paracentesis pending. Bilirubin continues to increase. ROS otherwise negative.   6/24:  d/w GI this a.m., stopped Eliquis since INR increased to 5.36 from 1.8, hepatitis panel ordered. T. Bilirubin increased, decreased lactulose due to multiple loose stools, replaced K.   Dc'd CIWA. Continue prednisolone daily.  6/25:  no pain on palpation of RUQ, but later patient requested oxycodone for abdominal pain.   LFTs and bilirubin remain same. T. Bili 16.  INR improved to 2.97.  hepatitis panel negative. Pink tinged urine improving.  6/26:  increased frequency of urination, UA negative.  Likely from frequent bowel movements 8 per patient.  Decreased lactulose.   elevated at 797.  Ordered MRI abdomen, pelvis with and wo to look for malignancy. Abnormal pathology on ascites fluid with atypical cells found.  AFP and AFP L 3% pending.  Repeat direct bilirubin in am. Since prior both direct and indirect were elevated.  Increase in t. Bili to 18 today, but INR normalizing off Eliquis to INR 1.87.   MELD 3.0 32 with 63.5% 90 day survival.   6/27:  t. Bili remains 18.  No significant change.        I have discussed this patient's plan of care and discharge plan at IDT rounds today with Case Management, Nursing, Nursing leadership, and other members of the IDT team.    Consultants/Specialty  GI    Code Status  Full Code    Disposition  The patient is not medically cleared for discharge to home or a post-acute facility.  Anticipate discharge to: home with close outpatient follow-up    I have placed the appropriate orders for post-discharge needs.    Review of Systems  Review of Systems   Constitutional:  Positive for malaise/fatigue. Negative for chills, diaphoresis, fever and weight loss.   HENT: Negative.  Negative for sore throat.    Eyes: Negative.  Negative for blurred vision.   Respiratory: Negative.  Negative for cough and shortness of breath.    Cardiovascular: Negative.  Negative for chest pain, palpitations and leg swelling.   Gastrointestinal:  Negative for abdominal pain, nausea and vomiting.   Genitourinary:  Negative for dysuria and hematuria.   Musculoskeletal: Negative.  Negative for myalgias.   Skin: Negative.  Negative for itching and rash.   Neurological: Negative.  Negative for dizziness, focal weakness, weakness and headaches.   Endo/Heme/Allergies: Negative.  Does not bruise/bleed easily.   Psychiatric/Behavioral:  Positive for substance abuse. Negative for depression and suicidal ideas.    All other systems reviewed and are negative.       Physical Exam  Temp:  [36.1 °C (97 °F)-36.4 °C (97.6 °F)] 36.2 °C (97.1 °F)  Pulse:  [60-65]  60  Resp:  [17-18] 18  BP: (100-130)/(61-92) 100/67  SpO2:  [91 %-93 %] 92 %    Physical Exam  Vitals and nursing note reviewed. Exam conducted with a chaperone present.   Constitutional:       General: She is not in acute distress.     Appearance: Normal appearance. She is not diaphoretic.   HENT:      Head: Normocephalic.      Nose: Nose normal.      Mouth/Throat:      Mouth: Mucous membranes are moist.   Eyes:      General: Scleral icterus present.      Pupils: Pupils are equal, round, and reactive to light.   Cardiovascular:      Rate and Rhythm: Normal rate and regular rhythm.      Pulses: Normal pulses.      Heart sounds: Normal heart sounds.   Pulmonary:      Effort: Pulmonary effort is normal.      Breath sounds: Normal breath sounds.   Abdominal:      General: Abdomen is flat. Bowel sounds are normal. There is no distension.      Palpations: Abdomen is soft.      Tenderness: There is no abdominal tenderness.   Musculoskeletal:         General: No swelling or deformity. Normal range of motion.   Skin:     General: Skin is warm and dry.      Capillary Refill: Capillary refill takes less than 2 seconds.      Coloration: Skin is jaundiced.      Findings: No bruising or lesion.   Neurological:      General: No focal deficit present.      Mental Status: She is alert and oriented to person, place, and time.      Cranial Nerves: No cranial nerve deficit.   Psychiatric:         Mood and Affect: Mood normal.         Behavior: Behavior normal.         Fluids    Intake/Output Summary (Last 24 hours) at 6/27/2025 1705  Last data filed at 6/27/2025 1007  Gross per 24 hour   Intake 240 ml   Output --   Net 240 ml        Laboratory  Recent Labs     06/25/25  0033   WBC 12.1*   RBC 3.80*   HEMOGLOBIN 13.6   HEMATOCRIT 40.1   .5*   MCH 35.8*   MCHC 33.9   RDW 75.4*   PLATELETCT 190   MPV 10.5     Recent Labs     06/25/25  0033 06/26/25  0122 06/27/25  0506   SODIUM 133* 134* 132*   POTASSIUM 3.4* 3.8 3.2*   CHLORIDE  92* 93* 89*   CO2 30 25 30   GLUCOSE 107* 99 87   BUN 14 16 20   CREATININE 0.82 0.72 0.68   CALCIUM 8.8 9.0 8.8     Recent Labs     06/25/25  0033 06/26/25  0122 06/27/25  0506   INR 2.97* 1.87* 1.61*               Imaging  EC-ECHOCARDIOGRAM COMPLETE W/ CONT   Final Result      US-PARACENTESIS, ABD WITH IMAGING   Final Result      1. Ultrasound-guided diagnostic and therapeutic paracentesis of the right lower quadrant of the abdominal wall.      2. 1200 mL of fluid withdrawn.      US-ABDOMEN COMPLETE SURVEY   Final Result      1.  Cirrhotic appearance of the liver with a lobulated cyst.   2.  Sludge is present within the gallbladder. There is no definite stone.   3.  Ascites.   4.  There appears to be occlusion of the portal vein and at least partial occlusion of the inferior vena cava.   5.  Evaluation is limited with poor visualization of the midline structures.         DX-CHEST-PORTABLE (1 VIEW)   Final Result      Mild left lung infiltrate.      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  multivitamin, thiamine and folate  replacing electrolytes  See discussion above  No hallucinations or seizure today - precautions    Elevated cancer antigen 125 ()  Assessment & Plan   797  Checked due to atypical cells noted on pathology of ascites fluid.   Plan for MRI abdomen/pelvis with and w/o contrast to evaluate for malignancy.  Renal function wnl.   CT AP did not reveal any ovarian mass.    Portal vein thrombosis- (present on admission)  Assessment & Plan  H/h stable, no evidence of bleeding  Discussed with pt and GI  Holding eliquis since an increase in INR from 1.8-5.36.  following  Cbc stable    Hyponatremia- (present on admission)  Assessment &  Plan  Consistent with cirrhosis  following    Alcoholic hepatitis- (present on admission)  Assessment & Plan  Maddery dissemination score 46  Bili continues to increase, steroids started  Assess for response after 7 days with jeramie score     Alcoholic cirrhosis (HCC)- (present on admission)  Assessment & Plan  Decompensated  Volume overloaded, improving with diuretics - tapering as tolerated  MELD 27  Hepatitis panel-repeat negative.  Prednisolone started 6/22 for worsening LFTs.  Lactulose qhs, NH3 elevated 66 at one point, now excessive BMs and normal mentation.    Acute hypoxic respiratory failure (HCC)- (present on admission)  Assessment & Plan  Fluctuating, had titrated down to 1L after paracentesis but increased to 3 L overnight - exam stable- wean as tolerated  Continue diuretics as tolerated   Query mild community acquired pna, non specific changes on x ray -  No cough, on empiric augmentin   Wean as tolerated  Following  RT      High anion gap metabolic acidosis- (present on admission)  Assessment & Plan  Continue to trend secondary to liver dysfunction    Ascites- (present on admission)  Assessment & Plan  Due to cirrhosis  Tolerating diuretics  S/p paracentesis      Hypokalemia- (present on admission)  Assessment & Plan  Replaced and resolved    Acute cystitis- (present on admission)  Assessment & Plan  Resolved  Completed augmentin x 5 days.           VTE prophylaxis: SCD/ eliquis    I have performed a physical exam and reviewed and updated ROS and Plan today (6/27/2025). In review of yesterday's note (6/26/2025), there are no changes except as documented above.

## 2025-06-28 NOTE — CARE PLAN
The patient is Watcher - Medium risk of patient condition declining or worsening    Shift Goals  Clinical Goals: Patient to be safe from falls during shift, decreased pain less than 6  Patient Goals: comfort, sleep  Family Goals: eddie    Progress made toward(s) clinical / shift goals:    Problem: Communication  Goal: The ability to communicate needs accurately and effectively will improve  Outcome: Progressing  Note: Patient communicates all needs effectively and accurately throughout the day.     Problem: Pain Management  Goal: Pain level will decrease to patient's comfort goal  Outcome: Progressing  Note: Patient has PRN medication available for pain when needed, patient alert and oriented x4 and able to effectively communicate needs.       Patient is not progressing towards the following goals:

## 2025-06-28 NOTE — PROGRESS NOTES
GI APRN interim note    Patient off the floor at MRI.  Chart/labs reviewed.  Sodium 133, BUN 23, creatinine 0.76, , , alk phos 183, total bilirubin 16.7, BUN 2.5.  PT 17.9, INR 1.47.  AFP L3 percent elevated 28.6 AFP 3, vitamin D 35     Lille score 54730.  Patient not responsive to steroid-okay to discontinue  MELD 3.0 score 26; 85.4% 90-day survival rate    Plan:  Continue with diuretics with spironolactone 100 mg  daily, Lasix 40 mg daily  Okay to discontinue steroids  Trend LFTs  Continue lactulose, titrate to 3 loose bowel once per day  Zofran as needed  2 g sodium restriction diet  Cessation of all toxic habits.  Patient to follow-up for outpatient alcohol use disorder counseling/report, inpatient rehab  Follow-up outpatient [facility] for EGD/colonoscopy as previously recommended  Vitamin A pending  Nutrition consult order previously placed  MRI pelvis, MRCP pending.      Discussed with Dr. Daniels, Dr. Pond

## 2025-06-28 NOTE — CARE PLAN
The patient is Stable - Low risk of patient condition declining or worsening    Shift Goals  Clinical Goals: Pt will remain free from fall throughout the shift, Pain control, MRI  Patient Goals: Pain control  Family Goals: eddie    Progress made toward(s) clinical / shift goals: Patient reports abdominal pain and prn medication given with good relief. Ambulated to BR with steady gait. Patient on SCD's, fall precaution in place and bed to lowest position. Call light in reach.     Patient is not progressing towards the following goals: N/A

## 2025-06-29 PROBLEM — I85.10 ESOPHAGEAL VARICES IN ALCOHOLIC CIRRHOSIS (HCC): Status: ACTIVE | Noted: 2025-06-19

## 2025-06-29 LAB
ALBUMIN SERPL BCP-MCNC: 2.6 G/DL (ref 3.2–4.9)
ALBUMIN/GLOB SERPL: 1 G/DL
ALP SERPL-CCNC: 204 U/L (ref 30–99)
ALT SERPL-CCNC: 195 U/L (ref 2–50)
ANION GAP SERPL CALC-SCNC: 11 MMOL/L (ref 7–16)
AST SERPL-CCNC: 333 U/L (ref 12–45)
BILIRUB SERPL-MCNC: 13.6 MG/DL (ref 0.1–1.5)
BUN SERPL-MCNC: 20 MG/DL (ref 8–22)
CALCIUM ALBUM COR SERPL-MCNC: 9.8 MG/DL (ref 8.5–10.5)
CALCIUM SERPL-MCNC: 8.7 MG/DL (ref 8.5–10.5)
CHLORIDE SERPL-SCNC: 91 MMOL/L (ref 96–112)
CO2 SERPL-SCNC: 29 MMOL/L (ref 20–33)
CREAT SERPL-MCNC: 0.67 MG/DL (ref 0.5–1.4)
GFR SERPLBLD CREATININE-BSD FMLA CKD-EPI: 105 ML/MIN/1.73 M 2
GLOBULIN SER CALC-MCNC: 2.6 G/DL (ref 1.9–3.5)
GLUCOSE SERPL-MCNC: 91 MG/DL (ref 65–99)
INR PPP: 1.36 (ref 0.87–1.13)
POTASSIUM SERPL-SCNC: 3.2 MMOL/L (ref 3.6–5.5)
PROT SERPL-MCNC: 5.2 G/DL (ref 6–8.2)
PROTHROMBIN TIME: 16.8 SEC (ref 12–14.6)
SODIUM SERPL-SCNC: 131 MMOL/L (ref 135–145)

## 2025-06-29 PROCEDURE — 36415 COLL VENOUS BLD VENIPUNCTURE: CPT

## 2025-06-29 PROCEDURE — 700102 HCHG RX REV CODE 250 W/ 637 OVERRIDE(OP): Performed by: HOSPITALIST

## 2025-06-29 PROCEDURE — 770001 HCHG ROOM/CARE - MED/SURG/GYN PRIV*

## 2025-06-29 PROCEDURE — 700101 HCHG RX REV CODE 250: Performed by: NURSE PRACTITIONER

## 2025-06-29 PROCEDURE — 99232 SBSQ HOSP IP/OBS MODERATE 35: CPT | Performed by: NURSE PRACTITIONER

## 2025-06-29 PROCEDURE — 99232 SBSQ HOSP IP/OBS MODERATE 35: CPT | Performed by: HOSPITALIST

## 2025-06-29 PROCEDURE — A9270 NON-COVERED ITEM OR SERVICE: HCPCS | Performed by: HOSPITALIST

## 2025-06-29 PROCEDURE — 85610 PROTHROMBIN TIME: CPT

## 2025-06-29 PROCEDURE — 80053 COMPREHEN METABOLIC PANEL: CPT

## 2025-06-29 RX ADMIN — SPIRONOLACTONE 100 MG: 100 TABLET ORAL at 04:24

## 2025-06-29 RX ADMIN — ESCITALOPRAM OXALATE 20 MG: 10 TABLET ORAL at 04:24

## 2025-06-29 RX ADMIN — HYDROXYZINE HYDROCHLORIDE 25 MG: 50 TABLET ORAL at 04:25

## 2025-06-29 RX ADMIN — GABAPENTIN 300 MG: 300 CAPSULE ORAL at 04:24

## 2025-06-29 RX ADMIN — LEVOTHYROXINE SODIUM 50 MCG: 0.05 TABLET ORAL at 04:24

## 2025-06-29 RX ADMIN — HYDROXYZINE HYDROCHLORIDE 25 MG: 50 TABLET ORAL at 14:19

## 2025-06-29 RX ADMIN — NYSTATIN: 100000 POWDER TOPICAL at 16:15

## 2025-06-29 RX ADMIN — GABAPENTIN 300 MG: 300 CAPSULE ORAL at 16:14

## 2025-06-29 RX ADMIN — Medication 100 MG: at 04:24

## 2025-06-29 RX ADMIN — FUROSEMIDE 40 MG: 40 TABLET ORAL at 04:24

## 2025-06-29 RX ADMIN — OXYCODONE 5 MG: 5 TABLET ORAL at 21:48

## 2025-06-29 RX ADMIN — POLYETHYLENE GLYCOL-3350 AND ELECTROLYTES 4 L: 236; 6.74; 5.86; 2.97; 22.74 POWDER, FOR SOLUTION ORAL at 18:18

## 2025-06-29 RX ADMIN — NYSTATIN: 100000 POWDER TOPICAL at 04:25

## 2025-06-29 RX ADMIN — LACTULOSE 15 ML: 10 SOLUTION ORAL at 16:14

## 2025-06-29 ASSESSMENT — ENCOUNTER SYMPTOMS
NEUROLOGICAL NEGATIVE: 1
DIARRHEA: 1
SORE THROAT: 0
WEIGHT LOSS: 0
PALPITATIONS: 0
COUGH: 0
HEARTBURN: 0
FEVER: 0
ABDOMINAL PAIN: 0
NAUSEA: 0
CONSTIPATION: 0
NERVOUS/ANXIOUS: 0
MUSCULOSKELETAL NEGATIVE: 1
DIAPHORESIS: 0
BRUISES/BLEEDS EASILY: 0
BLOOD IN STOOL: 0
MYALGIAS: 0
DEPRESSION: 0
RESPIRATORY NEGATIVE: 1
HEADACHES: 0
FOCAL WEAKNESS: 0
CARDIOVASCULAR NEGATIVE: 1
SHORTNESS OF BREATH: 0
TREMORS: 1
BACK PAIN: 0
BLURRED VISION: 0
DIZZINESS: 0
WEAKNESS: 0
VOMITING: 0
CHILLS: 0
EYES NEGATIVE: 1

## 2025-06-29 ASSESSMENT — PAIN DESCRIPTION - PAIN TYPE
TYPE: ACUTE PAIN
TYPE: ACUTE PAIN

## 2025-06-29 ASSESSMENT — LIFESTYLE VARIABLES: SUBSTANCE_ABUSE: 1

## 2025-06-29 NOTE — PROGRESS NOTES
Hospital Medicine Daily Progress Note    Date of Service  6/29/2025    Chief Complaint  Minerva Tillman is a 50 y.o. female admitted 6/19/2025 with jaundice    Hospital Course  Minerva Tillman is a 50 y.o. female who presented 6/19/2025 with past medical history of liver cirrhosis, alcohol abuse, depression who presents to the hospital for jaundice the past 1 week. She was found to have acute decompensated cirrhosis with ascites with a MELD of 27, acute on chronic respiratory failure with volume overload, portal vein thrombosis and possible community acquired pneumonia and was started on empiric antibiotics    She required CIWA and support with alcohol withdrawl symptoms. She underwent a paracentesis and had no evidence sbp. She was started on anticoagulation for the acute portal vein thrombosis.GI is following     Interval Problem Update  6/23: Axox3, ciwa improved today, currently 2/10, mild tremor today and unsteady on her feet. She reports she is very tired but denies sob, no cp, no abdominal pain, no n/v. She is having loose bowel mvts with the lactulose, no signs of encephalopathy at this time, her recall has improved today. Plan remains to go to inpatient alcohol rehab from here, social work assisting with intake interview which is planned for today. Cytology from paracentesis pending. Bilirubin continues to increase. ROS otherwise negative.   6/24:  d/w GI this a.m., stopped Eliquis since INR increased to 5.36 from 1.8, hepatitis panel ordered. T. Bilirubin increased, decreased lactulose due to multiple loose stools, replaced K.   Dc'd CIWA. Continue prednisolone daily.  6/25:  no pain on palpation of RUQ, but later patient requested oxycodone for abdominal pain.   LFTs and bilirubin remain same. T. Bili 16.  INR improved to 2.97.  hepatitis panel negative. Pink tinged urine improving.  6/26:  increased frequency of urination, UA negative.  Likely from frequent bowel movements 8 per patient.  Decreased lactulose.   elevated at 797.  Ordered MRI abdomen, pelvis with and wo to look for malignancy. Abnormal pathology on ascites fluid with atypical cells found.  AFP and AFP L 3% pending.  Repeat direct bilirubin in am. Since prior both direct and indirect were elevated.  Increase in t. Bili to 18 today, but INR normalizing off Eliquis to INR 1.87.   MELD 3.0 32 with 63.5% 90 day survival.   6/27:  t. Bili remains 18.  No significant change.    6/28:  LFTs increased , but t.bili and INR decreased.  MELD 3.0 26 85.4% 90 day survival rate.  MRIs pending read.  Dc'd prednisolone since no significant improvement after 7 days.  6/29: Total bilirubin with improvement for the second day in a row to 13.6.  AST showing improvement 333 although ALT remains 198.  AFP L3 percent 28.6 indicating increased risk for hepatocellular carcinoma.  Reviewed with patient MRI pelvis and liver negative for malignancy.  Spoke with GI.  Plan for EGD to evaluate esophageal varices and colonoscopy.  Bowel prep this evening.  N.p.o. at midnight.      I have discussed this patient's plan of care and discharge plan at IDT rounds today with Case Management, Nursing, Nursing leadership, and other members of the IDT team.    Consultants/Specialty  GI    Code Status  Full Code    Disposition  The patient is not medically cleared for discharge to home or a post-acute facility.  Anticipate discharge to: home with close outpatient follow-up    I have placed the appropriate orders for post-discharge needs.    Review of Systems  Review of Systems   Constitutional:  Positive for malaise/fatigue. Negative for chills, diaphoresis, fever and weight loss.   HENT: Negative.  Negative for sore throat.    Eyes: Negative.  Negative for blurred vision.   Respiratory: Negative.  Negative for cough and shortness of breath.    Cardiovascular: Negative.  Negative for chest pain, palpitations and leg swelling.   Gastrointestinal:  Negative for abdominal  pain, nausea and vomiting.   Genitourinary:  Negative for dysuria and hematuria.   Musculoskeletal: Negative.  Negative for myalgias.   Skin: Negative.  Negative for itching and rash.   Neurological: Negative.  Negative for dizziness, focal weakness, weakness and headaches.   Endo/Heme/Allergies: Negative.  Does not bruise/bleed easily.   Psychiatric/Behavioral:  Positive for substance abuse. Negative for depression and suicidal ideas.    All other systems reviewed and are negative.       Physical Exam  Temp:  [36.3 °C (97.4 °F)-36.4 °C (97.5 °F)] 36.4 °C (97.5 °F)  Pulse:  [60-66] 61  Resp:  [16-18] 16  BP: ()/(58-72) 94/58  SpO2:  [93 %-98 %] 95 %    Physical Exam  Vitals and nursing note reviewed. Exam conducted with a chaperone present.   Constitutional:       General: She is not in acute distress.     Appearance: Normal appearance. She is not diaphoretic.   HENT:      Head: Normocephalic.      Nose: Nose normal.      Mouth/Throat:      Mouth: Mucous membranes are moist.   Eyes:      General: Scleral icterus present.      Pupils: Pupils are equal, round, and reactive to light.   Cardiovascular:      Rate and Rhythm: Normal rate and regular rhythm.      Pulses: Normal pulses.      Heart sounds: Normal heart sounds.   Pulmonary:      Effort: Pulmonary effort is normal.      Breath sounds: Normal breath sounds.   Abdominal:      General: Abdomen is flat. Bowel sounds are normal. There is no distension.      Palpations: Abdomen is soft.      Tenderness: There is no abdominal tenderness.   Musculoskeletal:         General: No swelling or deformity. Normal range of motion.   Skin:     General: Skin is warm and dry.      Capillary Refill: Capillary refill takes less than 2 seconds.      Coloration: Skin is jaundiced.      Findings: No bruising or lesion.   Neurological:      General: No focal deficit present.      Mental Status: She is alert and oriented to person, place, and time.      Cranial Nerves: No cranial  nerve deficit.   Psychiatric:         Mood and Affect: Mood normal.         Behavior: Behavior normal.         Fluids    Intake/Output Summary (Last 24 hours) at 6/29/2025 1539  Last data filed at 6/28/2025 1951  Gross per 24 hour   Intake 200 ml   Output --   Net 200 ml        Laboratory        Recent Labs     06/27/25  0506 06/28/25  0348 06/29/25  0800   SODIUM 132* 133* 131*   POTASSIUM 3.2* 4.5 3.2*   CHLORIDE 89* 91* 91*   CO2 30 32 29   GLUCOSE 87 88 91   BUN 20 23* 20   CREATININE 0.68 0.76 0.67   CALCIUM 8.8 8.8 8.7     Recent Labs     06/27/25  0506 06/28/25  0348 06/29/25  0800   INR 1.61* 1.47* 1.36*               Imaging  MR-ABDOMEN-WITH & W/O   Final Result      1.  Cirrhosis and portal hypertension, including gastroesophageal varices and small to moderate volume ascites.   2.  No suspicious liver lesions.   3.  No acute inflammatory process in the abdomen. No malignancy or metastatic disease.         MR-PELVIS-WITH & W/O AND SEQUENCES   Final Result      1.  No pelvic mass lesions. No evidence of malignancy or metastatic disease in the pelvis.   2.  Atrophic ovaries.   3.  Moderate volume pelvic ascites.               EC-ECHOCARDIOGRAM COMPLETE W/ CONT   Final Result      US-PARACENTESIS, ABD WITH IMAGING   Final Result      1. Ultrasound-guided diagnostic and therapeutic paracentesis of the right lower quadrant of the abdominal wall.      2. 1200 mL of fluid withdrawn.      US-ABDOMEN COMPLETE SURVEY   Final Result      1.  Cirrhotic appearance of the liver with a lobulated cyst.   2.  Sludge is present within the gallbladder. There is no definite stone.   3.  Ascites.   4.  There appears to be occlusion of the portal vein and at least partial occlusion of the inferior vena cava.   5.  Evaluation is limited with poor visualization of the midline structures.         DX-CHEST-PORTABLE (1 VIEW)   Final Result      Mild left lung infiltrate.           Assessment/Plan  * Alcohol abuse with withdrawal and  perceptual disturbance (HCC)- (present on admission)  Assessment & Plan  6/24 continued CIWA protocol as patient no longer in withdrawal.  multivitamin, thiamine and folate  replacing electrolytes  See discussion above  No hallucinations or seizure today - precautions    Elevated cancer antigen 125 ()  Assessment & Plan   797  Checked due to atypical cells noted on pathology of ascites fluid.   6/28 MRI abdomen/pelvis with and w/o contrast no ovarian or abdominal malignancy.  Renal function wnl.   CT AP did not reveal any ovarian mass.    Portal vein thrombosis- (present on admission)  Assessment & Plan  H/h stable, no evidence of bleeding  Discussed with pt and GI  Holding eliquis since an increase in INR from 1.8-5.36.  following  Cbc stable    Hyponatremia- (present on admission)  Assessment & Plan  Consistent with cirrhosis  following    Alcoholic hepatitis- (present on admission)  Assessment & Plan  Maddery dissemination score 46  Bili continues to increase, steroids started  Assess for response after 7 days with jeramie score     Esophageal varices in alcoholic cirrhosis (HCC)- (present on admission)  Assessment & Plan  Decompensated  Volume overloaded, improving with diuretics - tapering as tolerated  MELD 3.0  repeat 26.   Hepatitis panel-repeat negative.  Prednisolone started 6/22 for worsening LFTs.  Lactulose qhs, NH3 elevated 66 at one point, now excessive BMs and normal mentation.  Improving t. Bilirubin, plan for EGD and colonoscopy to evaluate esophageal varices seen on MRCP.   MRCP no evidence of carcinoma, no liver lesions.    Acute hypoxic respiratory failure (HCC)- (present on admission)  Assessment & Plan  Fluctuating, had titrated down to 1L after paracentesis but increased to 3 L overnight - exam stable- wean as tolerated  Continue diuretics as tolerated   Query mild community acquired pna, non specific changes on x ray -  No cough, on empiric augmentin   Wean as  tolerated  Following  RT      High anion gap metabolic acidosis- (present on admission)  Assessment & Plan  Continue to trend secondary to liver dysfunction    Ascites- (present on admission)  Assessment & Plan  Due to cirrhosis  Tolerating diuretics  S/p paracentesis      Hypokalemia- (present on admission)  Assessment & Plan  Replaced and resolved    Acute cystitis- (present on admission)  Assessment & Plan  Resolved  Completed augmentin x 5 days.           VTE prophylaxis: KEVIN/ eliquis    I have performed a physical exam and reviewed and updated ROS and Plan today (6/29/2025). In review of yesterday's note (6/28/2025), there are no changes except as documented above.

## 2025-06-29 NOTE — PROGRESS NOTES
.Gastroenterology Progress Note               Author:  DOMENICO Jarvis   Date & Time Created: 6/29/2025 3:18 PM       Patient ID:  Name:             Minerva Tillman  YOB: 1975  Age:                 50 y.o.  female  MRN:               [id number]    Medical Decision Making, by Problem:  Active Hospital Problems    Diagnosis     Elevated cancer antigen 125 () [R97.1]     Hyponatremia [E87.1]     Portal vein thrombosis [I81]     Alcohol abuse with withdrawal and perceptual disturbance (HCC) [F10.132]     Alcoholic cirrhosis (HCC) [K70.30]     Alcoholic hepatitis [K70.10]     High anion gap metabolic acidosis [E87.29]     Acute hypoxic respiratory failure (HCC) [J96.01]     Ascites [R18.8]     Hypokalemia [E87.6]     Acute cystitis [N30.00]      Presenting Chief Complaint:  Decompensated cirrhosis     History of Present Illness:   50-year-old female presents to the emergency department for abdominal swelling and shortness of breath and desire to discontinue alcohol intake.  Patient was seen by gastroenterology as an inpatient in 2024.  Dr. Daniels had recommended outpatient evaluation.  Patient followed up with Dr. Conley on 7/1/2024 with an additional follow-up appointment on 10/17/2024.  She had a FibroScan done which suggested advanced fibrosis and possible cirrhosis.  She was recommended to pursue upper and lower endoscopy.  Patient states that when she was told of this information she became quite concerned and did not follow-up because she was scared of the results.  Patient continues to drink alcohol.  She wants help in quitting.  She denies hematemesis or melena.  She endorses new onset abdominal swelling without lower extremity edema.     Home medications reviewed.  Patient not taking anything in the way of diuretics or medications for encephalopathy.  Patient is not on a beta-blocker.     Review of systems at this time is as stated above as well as patient feeling a little bit  anxious but comfortable with regard to her detoxification/CIWA protocol.    Interval History:  6/21/2025: Patient seen.  Reports feeling much better but having multiple liquid bowel movements.  Mild tremors, very hungry.  Has started wearing oxygen at night at home, reports it is due to increased elevation at home in [facility].  Slightly dyspneic today.  N.p.o. pending paracentesis. VSS, CIWA 4.  Transaminitis worsening, T. bili 13.8 > 9.4.  INR 1.89.  Hoping to go directly to rehab from the hospital.    6/22/2025: Feels better, tremulous with CIWA 5. Having multiple bowel movements. Fluid negative for SBP and NGTD on culture. Bili up to 14.2    6/23/2025: Feels much better today, no complaints. Eating and drinking well. Reports she was unable to be weaned off oxygen. Liver enzymes uptrending, T. Bili 17.2>14.2    6/24/2025: Patient seen at bedside with .  Had just completed echocardiogram.  Results are pending.  Tolerating oral intake without difficulty.  Denies dyspnea, nausea, vomiting, abdominal pain.  WBC 12.6.  Hemoglobin stable 13.8.  Platelets 173.  Sodium 130, BUN 11, creatinine 0.8, LFTs downtrending with , , alk phos 252, total bilirubin 15.5.  Albumin 2.7.  INR increased at 5.36.  Eliquis held by primary team.  Cytology resulted showing abnormal cells-discussed this with patient and  at bedside for which they will follow-up with outpatient GI.  Additionally, per flowsheets, patient reported to have 2 bowel movements overnight.  Patient reports having more than 5.  Discussed lactulose daily, titrating to 2-3 bowel movements per day.    6/25/2025: AO x 4.  She reports having bilateral upper quadrant/epigastric discomfort, relieved with pain medication.  She reports 3-4 bowel movements overnight.  Reports she is not able to eat a lot of food at once but is grazing/eating throughout the day and tolerating well thus far.  No nausea, vomiting, dyspnea.  WBC 12.1, hemoglobin  13.6, platelets 190.  Sodium 133, potassium 3.4, BUN 14, creatinine 0.82.  , , alk phos 232, total bilirubin 16.3.  Albumin 2.6.  INR 2.97.  Echocardiogram completed, unable to assess right ventricular function.  MELD 3.0 score 32; 63.5% estimated 90-day survival.      6/26/2025, AO x 4.  Reports mild generalized upper abdominal pain relieved with narcotics.  She reports bowel movement x 10 since 8:00 last night.  Appetite unchanged.  No other nausea, vomiting, dyspnea.  , , alk phos 213, total bilirubin 18.3, INR improved to 1.87.  CA-125 797.    6/27/2025: AO x 4.  Negative for asterixis.  Patient to problems overnight.  Patient reports grazing/eating small amounts throughout the day.  Upon questioning types of food she is eating, she states that she eats vegetables and her protein inpatient.  , , alk phos 202, total bilirubin 18.7.  INR 1.61.  AFP 3. [place]ille model 0.789.  MRI pelvis, MRCP pending.    6/29/2025: AAO x 4.  Negative for asterixis.  Patient reports feeling well without nausea, vomiting, abdominal pain.  Sodium 131, potassium 3.2 renal functions normal.  , , alk phos 204, total bilirubin improved to 13.6, albumin 2.6 INR 1.36.  MELD 3.0 score 25; 87.7% 90-day survival rate.  MRI pelvis negative for malignancy/metastases.  MRCP with cirrhosis, signs of portal hypertensive including gastroesophageal varices.  Patient was supposed to have outpatient EGD/colonoscopy with [facility] provider.  She states that she had completed her prep and was ready for her procedure but then her  was out of town and was unable to take her.  She had not rescheduled endoscopy.  Offered to do EGD/colonoscopy inpatient particularly given that she has gastroesophageal varices seen on MRCP and she is off anticoagulation.  She is agreeable to proceed.  Bowel movement x 5 semiformed overnight-  Patient reports this is tolerable.    Hospital Medications:  Current  Facility-Administered Medications   Medication Dose Frequency Provider Last Rate Last Admin    hydrOXYzine HCl (Atarax) tablet 25 mg  25 mg Q4HRS PRN Vidhi Pond M.D.   25 mg at 06/29/25 1419    lactulose 20 GM/30ML solution 15 mL  15 mL Q EVENING Vidhi Pond M.D.   15 mL at 06/28/25 1616    nystatin (Mycostatin) powder   BID Vidhi Pond M.D.   Given at 06/29/25 0425    oxyCODONE immediate-release (Roxicodone) tablet 5 mg  5 mg Q4HRS PRN Vidhi Pond M.D.   5 mg at 06/27/25 2328    thiamine (Vitamin B-1) tablet 100 mg  100 mg DAILY Vidhi Pond M.D.   100 mg at 06/29/25 0424    spironolactone (Aldactone) tablet 100 mg  100 mg Q DAY Helen Mendez M.D.   100 mg at 06/29/25 0424    Respiratory Therapy Consult   Continuous RT Helen Mendez M.D.        furosemide (Lasix) tablet 40 mg  40 mg Q DAY Helen Mendez M.D.   40 mg at 06/29/25 0424    carvedilol (Coreg) tablet 3.125 mg  3.125 mg BID WITH MEALS Helen Mendez M.D.   3.125 mg at 06/28/25 1617    melatonin tablet 5 mg  5 mg HS PRN Roseanne Crowder, A.P.R.N.   5 mg at 06/25/25 0109    labetalol (Normodyne/Trandate) injection 10 mg  10 mg Q4HRS PRN Abby Ivy M.D.        ondansetron (Zofran) syringe/vial injection 4 mg  4 mg Q4HRS PRN Abby Ivy M.D.        ondansetron (Zofran ODT) dispertab 4 mg  4 mg Q4HRS PRN Abby Ivy M.D.   4 mg at 06/27/25 0807    escitalopram (Lexapro) tablet 20 mg  20 mg DAILY Abby Ivy M.D.   20 mg at 06/29/25 0424    gabapentin (Neurontin) capsule 300 mg  300 mg BID Abby Ivy M.D.   300 mg at 06/29/25 0424    levothyroxine (Synthroid) tablet 50 mcg  50 mcg AM ES Abby Ivy M.D.   50 mcg at 06/29/25 0424    albuterol inhaler 1-2 Puff  1-2 Puff Q6HRS PRN Abby Ivy M.D.   2 Puff at 06/22/25 2249   Last reviewed on 6/19/2025  7:43 PM by Spencer Camacho PhT       Review of Systems:  Review of Systems   Constitutional:  Positive for malaise/fatigue. Negative for chills and fever.   HENT:  Negative for hearing loss and  "sore throat.    Eyes:  Negative for blurred vision.   Respiratory:  Negative for cough and shortness of breath.    Cardiovascular:  Negative for chest pain and leg swelling.   Gastrointestinal:  Positive for diarrhea. Negative for abdominal pain, blood in stool, constipation, heartburn, melena, nausea and vomiting.   Genitourinary:  Negative for dysuria and hematuria.   Musculoskeletal:  Negative for back pain and myalgias.   Skin:  Negative for rash.   Neurological:  Positive for tremors. Negative for dizziness and weakness.   Psychiatric/Behavioral:  Positive for substance abuse. Negative for depression. The patient is not nervous/anxious.    All other systems reviewed and are negative.    Vital signs:  Weight/BMI: Body mass index is 26.05 kg/m².  BP 94/58   Pulse 61   Temp 36.4 °C (97.5 °F) (Temporal)   Resp 16   Ht 1.575 m (5' 2\")   Wt 64.6 kg (142 lb 6.7 oz)   SpO2 95%   Vitals:    06/28/25 1605 06/28/25 1951 06/29/25 0342 06/29/25 0716   BP: 116/72 106/68 112/72 94/58   Pulse: 66 60 62 61   Resp: 16 17 18 16   Temp: 36.3 °C (97.4 °F) 36.4 °C (97.5 °F) 36.4 °C (97.5 °F) 36.4 °C (97.5 °F)   TempSrc: Temporal Temporal Temporal Temporal   SpO2: 93% 98% 95% 95%   Weight:       Height:         Oxygen Therapy:  Pulse Oximetry: 95 %, O2 (LPM): 2, O2 Delivery Device: Nasal Cannula    Intake/Output Summary (Last 24 hours) at 6/29/2025 1518  Last data filed at 6/28/2025 1951  Gross per 24 hour   Intake 200 ml   Output --   Net 200 ml       Physical Exam  Vitals and nursing note reviewed.   Constitutional:       General: She is awake. She is not in acute distress.     Appearance: She is overweight. She is ill-appearing.   HENT:      Head: Normocephalic and atraumatic.      Nose: Nose normal.      Mouth/Throat:      Mouth: Mucous membranes are moist.      Pharynx: Oropharynx is clear.   Eyes:      General: Scleral icterus present.      Extraocular Movements: Extraocular movements intact.      Conjunctiva/sclera: " Conjunctivae normal.   Cardiovascular:      Rate and Rhythm: Normal rate and regular rhythm.      Pulses: Normal pulses.      Heart sounds: Normal heart sounds.   Pulmonary:      Effort: Pulmonary effort is normal. No respiratory distress.      Breath sounds: Normal breath sounds.   Abdominal:      General: Abdomen is flat. Bowel sounds are normal. There is no distension.      Palpations: Abdomen is soft.      Tenderness: There is no abdominal tenderness. There is no guarding.   Musculoskeletal:         General: Normal range of motion.      Cervical back: Normal range of motion.      Right lower leg: No edema.      Left lower leg: No edema.   Skin:     General: Skin is warm and dry.      Capillary Refill: Capillary refill takes less than 2 seconds.      Coloration: Skin is jaundiced.   Neurological:      General: No focal deficit present.      Mental Status: She is alert and oriented to person, place, and time. Mental status is at baseline.      Comments: AO x 3, no asterixis   Psychiatric:         Mood and Affect: Mood normal.         Behavior: Behavior normal. Behavior is cooperative.         Labs:  Recent Labs     06/27/25  0506 06/28/25 0348 06/29/25  0800   SODIUM 132* 133* 131*   POTASSIUM 3.2* 4.5 3.2*   CHLORIDE 89* 91* 91*   CO2 30 32 29   BUN 20 23* 20   CREATININE 0.68 0.76 0.67   CALCIUM 8.8 8.8 8.7     Recent Labs     06/27/25  0506 06/28/25  0348 06/29/25  0800   ALTSGPT 165* 188* 195*   ASTSGOT 319* 355* 333*   ALKPHOSPHAT 202* 193* 204*   TBILIRUBIN 18.7* 16.7* 13.6*   DBILIRUBIN 12.6*  --   --    GLUCOSE 87 88 91     Recent Labs     06/27/25  0506 06/28/25  0348 06/29/25  0800   ASTSGOT 319* 355* 333*   ALTSGPT 165* 188* 195*   ALKPHOSPHAT 202* 193* 204*   TBILIRUBIN 18.7* 16.7* 13.6*     Recent Labs     06/27/25  0506 06/28/25  0348 06/29/25  0800   PROTHROMBTM 19.2* 17.9* 16.8*   INR 1.61* 1.47* 1.36*     Recent Results (from the past 24 hours)   Prothrombin Time    Collection Time: 06/29/25   8:00 AM   Result Value Ref Range    PT 16.8 (H) 12.0 - 14.6 sec    INR 1.36 (H) 0.87 - 1.13   Comp Metabolic Panel    Collection Time: 06/29/25  8:00 AM   Result Value Ref Range    Sodium 131 (L) 135 - 145 mmol/L    Potassium 3.2 (L) 3.6 - 5.5 mmol/L    Chloride 91 (L) 96 - 112 mmol/L    Co2 29 20 - 33 mmol/L    Anion Gap 11.0 7.0 - 16.0    Glucose 91 65 - 99 mg/dL    Bun 20 8 - 22 mg/dL    Creatinine 0.67 0.50 - 1.40 mg/dL    Calcium 8.7 8.5 - 10.5 mg/dL    Correct Calcium 9.8 8.5 - 10.5 mg/dL    AST(SGOT) 333 (H) 12 - 45 U/L    ALT(SGPT) 195 (H) 2 - 50 U/L    Alkaline Phosphatase 204 (H) 30 - 99 U/L    Total Bilirubin 13.6 (H) 0.1 - 1.5 mg/dL    Albumin 2.6 (L) 3.2 - 4.9 g/dL    Total Protein 5.2 (L) 6.0 - 8.2 g/dL    Globulin 2.6 1.9 - 3.5 g/dL    A-G Ratio 1.0 g/dL   ESTIMATED GFR    Collection Time: 06/29/25  8:00 AM   Result Value Ref Range    GFR (CKD-EPI) 105 >60 mL/min/1.73 m 2       Radiology Review:  MR-ABDOMEN-WITH & W/O   Final Result      1.  Cirrhosis and portal hypertension, including gastroesophageal varices and small to moderate volume ascites.   2.  No suspicious liver lesions.   3.  No acute inflammatory process in the abdomen. No malignancy or metastatic disease.         MR-PELVIS-WITH & W/O AND SEQUENCES   Final Result      1.  No pelvic mass lesions. No evidence of malignancy or metastatic disease in the pelvis.   2.  Atrophic ovaries.   3.  Moderate volume pelvic ascites.               EC-ECHOCARDIOGRAM COMPLETE W/ CONT   Final Result      US-PARACENTESIS, ABD WITH IMAGING   Final Result      1. Ultrasound-guided diagnostic and therapeutic paracentesis of the right lower quadrant of the abdominal wall.      2. 1200 mL of fluid withdrawn.      US-ABDOMEN COMPLETE SURVEY   Final Result      1.  Cirrhotic appearance of the liver with a lobulated cyst.   2.  Sludge is present within the gallbladder. There is no definite stone.   3.  Ascites.   4.  There appears to be occlusion of the portal vein  and at least partial occlusion of the inferior vena cava.   5.  Evaluation is limited with poor visualization of the midline structures.         DX-CHEST-PORTABLE (1 VIEW)   Final Result      Mild left lung infiltrate.            MDM (Data Review):   -Records reviewed and summarized in current documentation  -I personally reviewed and interpreted the laboratory results  -I personally reviewed the radiology images    Assessment/Recommendations:  Decompensated alcoholic cirrhosis - MELD 3.0 score 32; 63.5% estimated 90-day survival 6/26/2025  Ascites - US with paracentesis pending  Portal vein thrombosis and partially occluded inferior vena cava  Acute hypoxic respiratory failure -requiring 2 L nasal cannula  Portal hypertension - SAAG 2.2  Alcoholic hepatitis - MDF 53.3  AFP negative, DCP elevated  Elevated     Recommendations:  - Hold Eliquis given elevated INR, trend INR  - Tolerating 100 mg spirinolactone and 40 mg lasix  - Last seen by GI team March 2024 where she was noted to have trace ascites.  She was not on diuretics at home.    - Prednisone discontinued 6/28/2025  - trend liver enzymes  - lactulose daily-titrate to have 3 loose bowel movements per day  -Echocardiogram unable to visualize/assess right ventricular function.  Given that patient denies any dyspnea, may continue beta-blocker at this time.  -Zofran as needed nausea  - Outpatient referral for alcohol use disorder counseling/support.  Discontinue all toxic habits in the interim.  - Vitamin D and vitamin A ordered/pending  -AFP L3% 28.6- can have false positive with acute hepatitis, phono hepatitis, cirrhosis.   - Clear liquid diet; n.p.o. midnight  -GoLytely prep tonight  - Plan for EGD/colonoscopy tomorrow    GI to follow    Discussed with patient, Dr. Mcknight,  Dr. Pond    ..Jadyn Zamora, A.P.R.N.    Core Quality Measures   Reviewed items::  Labs, Medications and Radiology reports reviewed

## 2025-06-29 NOTE — CARE PLAN
The patient is Stable - Low risk of patient condition declining or worsening    Shift Goals  Clinical Goals: maintain pt safety and anxiety management during shift  Patient Goals: rest/sleep  Family Goals: MISTY    Progress made toward(s) clinical / shift goals:  Pt A&Ox4. Denies pain. PRN anxiety med given. All needs met. Bed in low locked position with call light and belongings within reach.       Problem: Safety  Goal: Will remain free from injury  Outcome: Progressing     Problem: Pain Management  Goal: Pain level will decrease to patient's comfort goal  Outcome: Progressing     Problem: Psychosocial Needs:  Goal: Level of anxiety will decrease  Outcome: Progressing       Patient is not progressing towards the following goals:

## 2025-06-30 ENCOUNTER — ANESTHESIA EVENT (OUTPATIENT)
Dept: SURGERY | Facility: MEDICAL CENTER | Age: 50
End: 2025-06-30
Payer: COMMERCIAL

## 2025-06-30 ENCOUNTER — ANESTHESIA (OUTPATIENT)
Dept: SURGERY | Facility: MEDICAL CENTER | Age: 50
End: 2025-06-30
Payer: COMMERCIAL

## 2025-06-30 VITALS
HEIGHT: 62 IN | HEART RATE: 75 BPM | DIASTOLIC BLOOD PRESSURE: 66 MMHG | BODY MASS INDEX: 26.21 KG/M2 | SYSTOLIC BLOOD PRESSURE: 105 MMHG | RESPIRATION RATE: 16 BRPM | WEIGHT: 142.42 LBS | OXYGEN SATURATION: 94 % | TEMPERATURE: 97.4 F

## 2025-06-30 LAB
ALBUMIN SERPL BCP-MCNC: 2.7 G/DL (ref 3.2–4.9)
ALBUMIN/GLOB SERPL: 1 G/DL
ALP SERPL-CCNC: 191 U/L (ref 30–99)
ALT SERPL-CCNC: 197 U/L (ref 2–50)
ANION GAP SERPL CALC-SCNC: 13 MMOL/L (ref 7–16)
ANNOTATION COMMENT IMP: ABNORMAL
AST SERPL-CCNC: 326 U/L (ref 12–45)
BILIRUB SERPL-MCNC: 13.1 MG/DL (ref 0.1–1.5)
BUN SERPL-MCNC: 18 MG/DL (ref 8–22)
CALCIUM ALBUM COR SERPL-MCNC: 9.3 MG/DL (ref 8.5–10.5)
CALCIUM SERPL-MCNC: 8.3 MG/DL (ref 8.5–10.5)
CHLORIDE SERPL-SCNC: 87 MMOL/L (ref 96–112)
CO2 SERPL-SCNC: 32 MMOL/L (ref 20–33)
CREAT SERPL-MCNC: 0.72 MG/DL (ref 0.5–1.4)
GFR SERPLBLD CREATININE-BSD FMLA CKD-EPI: 101 ML/MIN/1.73 M 2
GLOBULIN SER CALC-MCNC: 2.8 G/DL (ref 1.9–3.5)
GLUCOSE SERPL-MCNC: 64 MG/DL (ref 65–99)
POTASSIUM SERPL-SCNC: 3.5 MMOL/L (ref 3.6–5.5)
PROT SERPL-MCNC: 5.5 G/DL (ref 6–8.2)
RETINYL PALMITATE SERPL-MCNC: 0.08 MG/L (ref 0–0.1)
SODIUM SERPL-SCNC: 132 MMOL/L (ref 135–145)
VIT A SERPL-MCNC: 0.19 MG/L (ref 0.3–1.2)

## 2025-06-30 PROCEDURE — 0DJ08ZZ INSPECTION OF UPPER INTESTINAL TRACT, VIA NATURAL OR ARTIFICIAL OPENING ENDOSCOPIC: ICD-10-PCS | Performed by: SPECIALIST

## 2025-06-30 PROCEDURE — 160048 HCHG OR STATISTICAL LEVEL 1-5: Performed by: SPECIALIST

## 2025-06-30 PROCEDURE — 36415 COLL VENOUS BLD VENIPUNCTURE: CPT

## 2025-06-30 PROCEDURE — 700102 HCHG RX REV CODE 250 W/ 637 OVERRIDE(OP): Performed by: HOSPITALIST

## 2025-06-30 PROCEDURE — 160192 HCHG ANESTHESIA COMPLEX: Performed by: SPECIALIST

## 2025-06-30 PROCEDURE — 700101 HCHG RX REV CODE 250: Performed by: STUDENT IN AN ORGANIZED HEALTH CARE EDUCATION/TRAINING PROGRAM

## 2025-06-30 PROCEDURE — 45378 DIAGNOSTIC COLONOSCOPY: CPT | Performed by: SPECIALIST

## 2025-06-30 PROCEDURE — 700111 HCHG RX REV CODE 636 W/ 250 OVERRIDE (IP): Performed by: STUDENT IN AN ORGANIZED HEALTH CARE EDUCATION/TRAINING PROGRAM

## 2025-06-30 PROCEDURE — 770001 HCHG ROOM/CARE - MED/SURG/GYN PRIV*

## 2025-06-30 PROCEDURE — 160202 HCHG ENDO MINUTES - 1ST 30 MINS LEVEL 3: Performed by: SPECIALIST

## 2025-06-30 PROCEDURE — 160002 HCHG RECOVERY MINUTES (STAT): Performed by: SPECIALIST

## 2025-06-30 PROCEDURE — 700105 HCHG RX REV CODE 258: Performed by: STUDENT IN AN ORGANIZED HEALTH CARE EDUCATION/TRAINING PROGRAM

## 2025-06-30 PROCEDURE — A9270 NON-COVERED ITEM OR SERVICE: HCPCS | Performed by: HOSPITALIST

## 2025-06-30 PROCEDURE — 99233 SBSQ HOSP IP/OBS HIGH 50: CPT | Performed by: HOSPITALIST

## 2025-06-30 PROCEDURE — 43235 EGD DIAGNOSTIC BRUSH WASH: CPT | Performed by: SPECIALIST

## 2025-06-30 PROCEDURE — 160015 HCHG STAT PREOP MINUTES: Performed by: SPECIALIST

## 2025-06-30 PROCEDURE — 160193 HCHG PACU STANDARD - 1ST 60 MINS: Performed by: SPECIALIST

## 2025-06-30 PROCEDURE — 0DJD8ZZ INSPECTION OF LOWER INTESTINAL TRACT, VIA NATURAL OR ARTIFICIAL OPENING ENDOSCOPIC: ICD-10-PCS | Performed by: SPECIALIST

## 2025-06-30 PROCEDURE — 80053 COMPREHEN METABOLIC PANEL: CPT

## 2025-06-30 RX ORDER — DIPHENHYDRAMINE HYDROCHLORIDE 50 MG/ML
12.5 INJECTION, SOLUTION INTRAMUSCULAR; INTRAVENOUS
Status: DISCONTINUED | OUTPATIENT
Start: 2025-06-30 | End: 2025-06-30 | Stop reason: HOSPADM

## 2025-06-30 RX ORDER — POTASSIUM CHLORIDE 1500 MG/1
40 TABLET, EXTENDED RELEASE ORAL DAILY
Status: DISCONTINUED | OUTPATIENT
Start: 2025-06-30 | End: 2025-07-04 | Stop reason: HOSPADM

## 2025-06-30 RX ORDER — ONDANSETRON 2 MG/ML
4 INJECTION INTRAMUSCULAR; INTRAVENOUS
Status: DISCONTINUED | OUTPATIENT
Start: 2025-06-30 | End: 2025-06-30 | Stop reason: HOSPADM

## 2025-06-30 RX ORDER — SODIUM CHLORIDE, SODIUM LACTATE, POTASSIUM CHLORIDE, CALCIUM CHLORIDE 600; 310; 30; 20 MG/100ML; MG/100ML; MG/100ML; MG/100ML
INJECTION, SOLUTION INTRAVENOUS
Status: DISCONTINUED | OUTPATIENT
Start: 2025-06-30 | End: 2025-06-30 | Stop reason: SURG

## 2025-06-30 RX ORDER — HYDROCORTISONE ACETATE 25 MG/1
25 SUPPOSITORY RECTAL EVERY 12 HOURS
Status: DISCONTINUED | OUTPATIENT
Start: 2025-06-30 | End: 2025-07-04 | Stop reason: HOSPADM

## 2025-06-30 RX ORDER — MIDAZOLAM HYDROCHLORIDE 1 MG/ML
INJECTION INTRAMUSCULAR; INTRAVENOUS PRN
Status: DISCONTINUED | OUTPATIENT
Start: 2025-06-30 | End: 2025-06-30 | Stop reason: SURG

## 2025-06-30 RX ORDER — ALBUTEROL SULFATE 5 MG/ML
2.5 SOLUTION RESPIRATORY (INHALATION)
Status: DISCONTINUED | OUTPATIENT
Start: 2025-06-30 | End: 2025-06-30 | Stop reason: HOSPADM

## 2025-06-30 RX ORDER — SODIUM CHLORIDE, SODIUM LACTATE, POTASSIUM CHLORIDE, CALCIUM CHLORIDE 600; 310; 30; 20 MG/100ML; MG/100ML; MG/100ML; MG/100ML
INJECTION, SOLUTION INTRAVENOUS CONTINUOUS
Status: DISCONTINUED | OUTPATIENT
Start: 2025-06-30 | End: 2025-06-30 | Stop reason: HOSPADM

## 2025-06-30 RX ORDER — HALOPERIDOL 5 MG/ML
1 INJECTION INTRAMUSCULAR
Status: DISCONTINUED | OUTPATIENT
Start: 2025-06-30 | End: 2025-06-30 | Stop reason: HOSPADM

## 2025-06-30 RX ORDER — HYDRALAZINE HYDROCHLORIDE 20 MG/ML
5 INJECTION INTRAMUSCULAR; INTRAVENOUS
Status: DISCONTINUED | OUTPATIENT
Start: 2025-06-30 | End: 2025-06-30 | Stop reason: HOSPADM

## 2025-06-30 RX ORDER — LIDOCAINE HYDROCHLORIDE 20 MG/ML
INJECTION, SOLUTION EPIDURAL; INFILTRATION; INTRACAUDAL; PERINEURAL PRN
Status: DISCONTINUED | OUTPATIENT
Start: 2025-06-30 | End: 2025-06-30 | Stop reason: SURG

## 2025-06-30 RX ORDER — PHENYLEPHRINE HCL IN 0.9% NACL 1 MG/10 ML
SYRINGE (ML) INTRAVENOUS PRN
Status: DISCONTINUED | OUTPATIENT
Start: 2025-06-30 | End: 2025-06-30 | Stop reason: SURG

## 2025-06-30 RX ORDER — EPHEDRINE SULFATE 50 MG/ML
5 INJECTION, SOLUTION INTRAVENOUS
Status: DISCONTINUED | OUTPATIENT
Start: 2025-06-30 | End: 2025-06-30 | Stop reason: HOSPADM

## 2025-06-30 RX ADMIN — LEVOTHYROXINE SODIUM 50 MCG: 0.05 TABLET ORAL at 04:19

## 2025-06-30 RX ADMIN — HYDROCORTISONE ACETATE 25 MG: 25 SUPPOSITORY RECTAL at 18:50

## 2025-06-30 RX ADMIN — OXYCODONE 5 MG: 5 TABLET ORAL at 17:03

## 2025-06-30 RX ADMIN — LIDOCAINE HYDROCHLORIDE 20 MG: 20 INJECTION, SOLUTION EPIDURAL; INFILTRATION; INTRACAUDAL; PERINEURAL at 08:10

## 2025-06-30 RX ADMIN — POTASSIUM CHLORIDE 40 MEQ: 1500 TABLET, EXTENDED RELEASE ORAL at 12:09

## 2025-06-30 RX ADMIN — SODIUM CHLORIDE, POTASSIUM CHLORIDE, SODIUM LACTATE AND CALCIUM CHLORIDE: 600; 310; 30; 20 INJECTION, SOLUTION INTRAVENOUS at 08:08

## 2025-06-30 RX ADMIN — Medication 100 MG: at 04:19

## 2025-06-30 RX ADMIN — GABAPENTIN 300 MG: 300 CAPSULE ORAL at 04:19

## 2025-06-30 RX ADMIN — PROPOFOL 120 MCG/KG/MIN: 10 INJECTION, EMULSION INTRAVENOUS at 08:11

## 2025-06-30 RX ADMIN — OXYCODONE 5 MG: 5 TABLET ORAL at 22:03

## 2025-06-30 RX ADMIN — NYSTATIN: 100000 POWDER TOPICAL at 17:06

## 2025-06-30 RX ADMIN — Medication 200 MCG: at 08:29

## 2025-06-30 RX ADMIN — GABAPENTIN 300 MG: 300 CAPSULE ORAL at 17:03

## 2025-06-30 RX ADMIN — SPIRONOLACTONE 100 MG: 100 TABLET ORAL at 04:19

## 2025-06-30 RX ADMIN — Medication 150 MCG: at 08:13

## 2025-06-30 RX ADMIN — Medication 150 MCG: at 08:21

## 2025-06-30 RX ADMIN — PROPOFOL 50 MG: 10 INJECTION, EMULSION INTRAVENOUS at 08:10

## 2025-06-30 RX ADMIN — HYDROXYZINE HYDROCHLORIDE 25 MG: 50 TABLET ORAL at 17:03

## 2025-06-30 RX ADMIN — MIDAZOLAM HYDROCHLORIDE 2 MG: 1 INJECTION, SOLUTION INTRAMUSCULAR; INTRAVENOUS at 08:06

## 2025-06-30 RX ADMIN — FUROSEMIDE 40 MG: 40 TABLET ORAL at 04:19

## 2025-06-30 RX ADMIN — ESCITALOPRAM OXALATE 20 MG: 10 TABLET ORAL at 04:19

## 2025-06-30 RX ADMIN — NYSTATIN: 100000 POWDER TOPICAL at 04:19

## 2025-06-30 RX ADMIN — LACTULOSE 15 ML: 10 SOLUTION ORAL at 17:04

## 2025-06-30 RX ADMIN — CARVEDILOL 3.12 MG: 3.12 TABLET, FILM COATED ORAL at 17:03

## 2025-06-30 ASSESSMENT — ENCOUNTER SYMPTOMS
WEIGHT LOSS: 0
COUGH: 0
BRUISES/BLEEDS EASILY: 0
SHORTNESS OF BREATH: 0
RESPIRATORY NEGATIVE: 1
CHILLS: 0
DIZZINESS: 0
FEVER: 0
DIAPHORESIS: 0
ABDOMINAL PAIN: 0
FOCAL WEAKNESS: 0
SORE THROAT: 0
WEAKNESS: 0
NEUROLOGICAL NEGATIVE: 1
BLURRED VISION: 0
PALPITATIONS: 0
NAUSEA: 0
HEADACHES: 0
EYES NEGATIVE: 1
MYALGIAS: 0
CARDIOVASCULAR NEGATIVE: 1
DEPRESSION: 0
VOMITING: 0
MUSCULOSKELETAL NEGATIVE: 1

## 2025-06-30 ASSESSMENT — PAIN DESCRIPTION - PAIN TYPE
TYPE: ACUTE PAIN;SURGICAL PAIN
TYPE: SURGICAL PAIN
TYPE: SURGICAL PAIN
TYPE: ACUTE PAIN;SURGICAL PAIN
TYPE: ACUTE PAIN
TYPE: ACUTE PAIN

## 2025-06-30 ASSESSMENT — PAIN SCALES - GENERAL: PAIN_LEVEL: 1

## 2025-06-30 ASSESSMENT — LIFESTYLE VARIABLES: SUBSTANCE_ABUSE: 1

## 2025-06-30 NOTE — ANESTHESIA TIME REPORT
Anesthesia Start and Stop Event Times       Date Time Event    6/30/2025 0754 Ready for Procedure     0808 Anesthesia Start     0835 Anesthesia Stop          Responsible Staff  06/30/25      Name Role Begin End    Lv Bunch D.O. Anesth 0808 0835          Overtime Reason:  no overtime (within assigned shift)    Comments:

## 2025-06-30 NOTE — CARE PLAN
The patient is Stable - Low risk of patient condition declining or worsening    Shift Goals  Clinical Goals: monitor VS post op EGD and colonoscopy, monitoring labs  Patient Goals: rest/sleep, get ready for procedure  Family Goals: MISTY    Progress made toward(s) clinical / shift goals:  pt. Resumed diet, no bleeding noted. Pt. On post op vitals monitoring. Poc discussed with the pt. Needs attended.     Problem: Safety  Goal: Will remain free from injury  Outcome: Progressing     Problem: Pain Management  Goal: Pain level will decrease to patient's comfort goal  Outcome: Progressing     Problem: Psychosocial Needs:  Goal: Level of anxiety will decrease  Outcome: Progressing     Problem: Skin Integrity  Goal: Risk for impaired skin integrity will decrease  Outcome: Progressing     Problem: Urinary Elimination:  Goal: Ability to reestablish a normal urinary elimination pattern will improve  Outcome: Progressing     Problem: Mobility  Goal: Risk for activity intolerance will decrease  Outcome: Progressing       Patient is not progressing towards the following goals:

## 2025-06-30 NOTE — OR NURSING
Per Dr LEON Bunch, no additional Hcg test needed for this patient prior to procedure. Hcg from 6/19/25 sufficient.

## 2025-06-30 NOTE — PROGRESS NOTES
Pt refusing bed alarm. Charge RN notified. Education provided, pt encouraged to utilize call light. Verbalizes understanding. Call light within reach.

## 2025-06-30 NOTE — ANESTHESIA PREPROCEDURE EVALUATION
Case: [id number] Date/Time: 06/30/25 0832    Procedures:       COLONOSCOPY      GASTROSCOPY    Pre-op diagnosis: Decompensated cirrhosis    Location: [facility] ROOM 26 / SURGERY SAME DAY [facility]    Surgeons: April Mcknight M.D.            Relevant Problems   PULMONARY   (positive) Community acquired pneumonia of left lower lobe of lung      NEURO   (positive) Headache   (positive) Migraine with aura and without status migrainosus, not intractable      CARDIAC   (positive) Esophageal varices in alcoholic cirrhosis (HCC)   (positive) Essential hypertension   (positive) Migraine with aura and without status migrainosus, not intractable   (positive) Portal hypertension with esophageal varices (HCC)   (positive) Portal vein thrombosis   (positive) Primary hypertension         (positive) Alcoholic hepatitis   (positive) Decompensated hepatic cirrhosis, hypoxia, chronic diarrhea, alcoholism (HCC)   (positive) Fatty liver, alcoholic      ENDO   (positive) Hypothyroidism       Physical Exam    Airway   Mallampati: II  TM distance: >3 FB  Neck ROM: full       Cardiovascular - normal exam  Rhythm: regular  Rate: normal    (-) murmur     Dental - normal exam           Pulmonary - normal examBreath sounds clear to auscultation     Abdominal    Neurological - normal exam                   Anesthesia Plan    ASA 4   ASA physical status 4 criteria: decompensated cirrhosis    Plan - general       Airway plan will be natural airway          Induction: intravenous    Postoperative Plan: Postoperative administration of opioids is intended.    Pertinent diagnostic labs and testing reviewed    Informed Consent:    Anesthetic plan and risks discussed with patient.    Use of blood products discussed with: patient whom consented to blood products.

## 2025-06-30 NOTE — PROCEDURES
OPERATIVE REPORT    PATIENT:   Minerva Tillman   1975       PREOPERATIVE DIAGNOSES/INDICATIONS: Cirrhosis, anemia    POSTOPERATIVE DIAGNOSIS: Esophageal varices portal hypertensive gastropathy    PROCEDURE:  ESOPHAGOGASTRODUODENOSCOPY    PHYSICIAN:  April Mcknight MD    ANESTHESIA:  Per anesthesiologist.    ESTIMATED BLOOD LOSS:nil    LOCATION: [facility]    CONSENT:  OBTAINED. The risks, benefits and alternatives of the procedure were discussed in details. The risks include and are not limited to bleeding, infection, perforation, missed lesions, and sedations risks (cardiopulmonary compromise and allergic reaction to medications).    DESCRIPTION: The patient presented to the procedure room.  After routine checkup was performed, patient was brought into the endoscopy suite.  Patient was placed on his left lateral decubitus position. A bite block was placed in patient's mouth. Patient was sedated by anesthesia.  Vital signs were monitored throughout the procedure.  Oxygenation support was provided throughout the procedure. Upper endoscope was inserted into patient's mouth and advanced to the second portion of the duodenum under direct visualization.      Once the site was reached and examined, the upper endoscope was withdrawn.  Retroflexion was made within the stomach.  The stomach was decompressed, scope was withdrawn and the procedure was terminated.     The patient tolerated the procedure well.  There were no immediate complications.    OPERATIVE FINDINGS:    1. Esophagus: Grade 1 esophageal varices, three columns, extend throughout esophagus  2. Stomach: 0.5 cm hiatal hernia, portal hypertensive gastropathy, friable, but not bleeding  3. Duodenum: normal bulb and second portion    IMPRESSION/RECOMMENDATIONS:  Esophageal varices - Grade 1. Coreg can be used if her ascites is treatable with diuretics. It is not recommended if refractory ascites or hepatorenal syndrome is present. Repeat EGD in 6  month  Portal hypertensive gastropathy  Very small hiatal hernia      This note has been transcribed with digital voice recognition software and although it has been reviewed may contain grammatical or word errors

## 2025-06-30 NOTE — PROCEDURES
OPERATIVE REPORT        PATIENT: Minerva Tillman  1975      PREOPERATIVE DIAGNOSIS/INDICATION: Cirrhosis, colon cancer screening, anemia    POSTOPERATIVE DIAGNOSES: Few scattered diverticula, rectal varices    PROCEDURE: COLONOSCOPY    PHYSICIAN: April Mcknight MD    CONSENT:  OBTAINED. The risks, benefits and alternatives of the procedure were discussed in details. The risks include and are not limited to bleeding, infection, perforation, missed lesions, and sedations risks (cardiopulmonary compromise and allergic reaction to medications).    ANESTHESIA:  Per anesthesiologist.    LOCATION: [facility]    DESCRIPTION:  The patient presented to the procedure room.  After routine checkup was performed, patient was brought into endoscopy suite.  Patient was placed on his left lateral decubitus position.  Patient was sedated by anesthesia. Vital signs were monitored throughout procedure.  Oxygenation support was provided throughout procedure. Digital rectal examination was performed.  Then, a colonoscope was inserted into patient's anus, advanced to the cecum under direct visualization. Both the ileocecal valve and appendiceal orifice were seen.  Once this site was reached and examined, the colonoscope was withdrawn slowly to rectum where retroflexion was performed. The scope was then fully removed from the rectum/anal canal and the  procedure was terminated. Withdrawal time was at least 6 minutes to ensure adequate examination.     The patient tolerated the procedure well.  There were no immediate complications.         Digital rectal examination hemorrhoids  Endoscope advanced to the cecum.  Buckland prep score:   Right colon: 2; Transverse colon: 3; Left Colon: 3. BPS score: 8    FINDINGS:  Few scattered diverticula throughout colon. Rectal varices seen on retroflexion. No other lesion seen.     RECOMMENDATIONS:  Repeat in ten years  Anusol HC NE BID for 7 days      This note has been transcribed with digital  voice recognition software and although it has been reviewed may contain grammatical or word errors

## 2025-06-30 NOTE — OR NURSING
0913: Report from TONYA Davis. Pt sleeping, 6 L O2 via mask. Breathing calm, even and unlabored.     0920: Updated pt's spouse via phone      0935: Report to floor TONYA Ly via phone. Transport requested.     0940: Pt declines water at this time    0948: Pt meets criteria to return to room. [facility]. No belongings with pt.

## 2025-06-30 NOTE — DISCHARGE PLANNING
Case Management Discharge Planning    Admission Date: 6/19/2025  GMLOS: 4.8  ALOS: 11    6-Clicks ADL Score: 22  6-Clicks Mobility Score: 23      Anticipated Discharge Dispo: Discharge Disposition: Discharged to home/self care (01)    DME Needed: Yes    DME Ordered: No    Action(s) Taken: Patient went for an EGD and colonoscopy this morning. RNCM requested a new walking O2 eval as the last one was completed on 6/26. Once updated walking O2 is in, will need an updated oxygen order to send to [facility]. Patient is already on service with [facility] for NOC O2. Once medically cleared, CHW is helping arrange a flight to [facility] for an inpatient alcohol treatment program. Patient will need oxygen for the flight.     Escalations Completed: None    Medically Clear: No    Next Steps: pending new walking O2 eval and order    Barriers to Discharge: Medical clearance and DME    Is the patient up for discharge tomorrow: No

## 2025-06-30 NOTE — DISCHARGE PLANNING
TRISTA Roberts met with patient bedside and pt is very excited and looking forward to inpatient alcohol treatment.   Per rounds today pending discharge date.   Called [place] [phone] [facility] with update .  Will continue to follow to assist with needs.

## 2025-06-30 NOTE — PROGRESS NOTES
Pt. Received in bed awake, orientedx4. PIV in placed. Pt. On oxygen at 2LPM sats at 94% on . Pt. Scheduled today for OR,pt. Had finished her golytely per pt. She is continent of bowel and bladder. Pt. Aware of the POC. Cmp was drawn before she was picked up by transport around 0740am. Pt. Had been NPO since MN aside from the golytely that she had taken. Needs attended.

## 2025-06-30 NOTE — CARE PLAN
The patient is Stable - Low risk of patient condition declining or worsening    Shift Goals  Clinical Goals: NPO at midnight and golytely for procedure tomorrow  Patient Goals: rest/sleep, get ready for procedure  Family Goals: MISTY    Progress made toward(s) clinical / shift goals:  Pt A&Ox4. Reports pain, PRN meds given. Pt NPO at midnight with golytely for procedure. Bed in low locked position with call light and belongings within reach. Pt refusing bed alarm, charge RN notified. Commode in place and education provided.       Problem: Safety  Goal: Will remain free from injury  Outcome: Progressing     Problem: Pain Management  Goal: Pain level will decrease to patient's comfort goal  Outcome: Progressing       Patient is not progressing towards the following goals:

## 2025-06-30 NOTE — ANESTHESIA POSTPROCEDURE EVALUATION
Patient: Minerva Tillman    Procedure Summary       Date: 06/30/25 Room / Location: [facility] ROOM 26 / SURGERY SAME DAY [facility]    Anesthesia Start: 0808 Anesthesia Stop: 0835    Procedures:       COLONOSCOPY (Anus)      GASTROSCOPY (Esophagus) Diagnosis: (esophageal varices, portal hypertensive gastropathy, diverticulosis, rectal varices, hemorrhoids)    Surgeons: April Mcknight M.D. Responsible Provider: Lv Bunch D.O.    Anesthesia Type: general ASA Status: 4            Final Anesthesia Type: general  Last vitals  BP   Blood Pressure: 116/81    Temp   37.2 °C (99 °F)    Pulse   (!) 59   Resp   18    SpO2   99 %      Anesthesia Post Evaluation    Patient location during evaluation: PACU  Patient participation: complete - patient participated  Level of consciousness: awake and alert  Pain score: 1    Airway patency: patent  Anesthetic complications: no  Cardiovascular status: hemodynamically stable  Respiratory status: acceptable  Hydration status: euvolemic    PONV: none          No notable events documented.     Nurse Pain Score: 0 (NPRS)

## 2025-06-30 NOTE — OR NURSING
0833- pt arrives from OR to PACU 1, report received from RN and anesthesia. Pt place on monitor. VSS,  NAD noted.   O2 6L via mask.      0850-pt continues to sleep, even unlabored resps    0913-hand off to TONYA Cast

## 2025-06-30 NOTE — THERAPY
Occupational Therapy Contact Note    Patient Name: Minerva Tillman  Age:  50 y.o., Sex:  female  Medical Record #: [id number]  Today's Date: 6/30/2025    Discussed missed therapy with RN     06/30/25 1626   Treatment Variance   Reason For Missed Therapy Non-Medical - Patient Refused   Interdisciplinary Plan of Care Collaboration   IDT Collaboration with  Nursing   Collaboration Comments OT treatment session attempted. Pt declined stating she has been waiting on anxiety medication for over an hour and that shes too fatigued from not sleeping well/days activity. Will re-attempt as appropriate/able.   Session Information   Date / Session Number  6/23, #1 (1/3, 6/29)  (Attempted 6/30)

## 2025-06-30 NOTE — PROGRESS NOTES
OPERATIVE REPORT        PATIENT: Minerva Tillman  1975      PREOPERATIVE DIAGNOSIS/INDICATION: Cirrhosis, colon cancer screening, anemia    POSTOPERATIVE DIAGNOSES: Few scattered diverticula, rectal varices    PROCEDURE: COLONOSCOPY    PHYSICIAN: April Mcknight MD    CONSENT:  OBTAINED. The risks, benefits and alternatives of the procedure were discussed in details. The risks include and are not limited to bleeding, infection, perforation, missed lesions, and sedations risks (cardiopulmonary compromise and allergic reaction to medications).    ANESTHESIA:  Per anesthesiologist.    LOCATION: [facility]    DESCRIPTION:  The patient presented to the procedure room.  After routine checkup was performed, patient was brought into endoscopy suite.  Patient was placed on his left lateral decubitus position.  Patient was sedated by anesthesia. Vital signs were monitored throughout procedure.  Oxygenation support was provided throughout procedure. Digital rectal examination was performed.  Then, a colonoscope was inserted into patient's anus, advanced to the cecum under direct visualization. Both the ileocecal valve and appendiceal orifice were seen.  Once this site was reached and examined, the colonoscope was withdrawn slowly to rectum where retroflexion was performed. The scope was then fully removed from the rectum/anal canal and the  procedure was terminated. Withdrawal time was at least 6 minutes to ensure adequate examination.     The patient tolerated the procedure well.  There were no immediate complications.         Digital rectal examination hemorrhoids  Endoscope advanced to the cecum.  Hobucken prep score:   Right colon: 2; Transverse colon: 3; Left Colon: 3. BPS score: 8    FINDINGS:  Few scattered diverticula throughout colon. Rectal varices seen on retroflexion. No other lesion seen.     RECOMMENDATIONS:  Repeat in ten years  Anusol HC FL BID for 7 days      This note has been transcribed with digital  voice recognition software and although it has been reviewed may contain grammatical or word errors

## 2025-06-30 NOTE — CARE PLAN
Problem: Communication  Goal: The ability to communicate needs accurately and effectively will improve  Outcome: Progressing     Problem: Psychosocial Needs:  Goal: Level of anxiety will decrease  Outcome: Progressing     Problem: Skin Integrity  Goal: Risk for impaired skin integrity will decrease  Outcome: Progressing   The patient is Stable - Low risk of patient condition declining or worsening    Shift Goals  Clinical Goals: safety  Patient Goals: sleep  Family Goals: MISTY    Progress made toward(s) clinical / shift goals:      Patient is not progressing towards the following goals:

## 2025-07-01 ENCOUNTER — APPOINTMENT (OUTPATIENT)
Dept: RADIOLOGY | Facility: MEDICAL CENTER | Age: 50
DRG: 896 | End: 2025-07-01
Attending: INTERNAL MEDICINE
Payer: COMMERCIAL

## 2025-07-01 PROBLEM — E50.9 VITAMIN A DEFICIENCY: Status: ACTIVE | Noted: 2025-07-01

## 2025-07-01 LAB
ALBUMIN FLD-MCNC: 0.4 G/DL
ALBUMIN SERPL BCP-MCNC: 2.5 G/DL (ref 3.2–4.9)
ALBUMIN/GLOB SERPL: 1 G/DL
ALP SERPL-CCNC: 170 U/L (ref 30–99)
ALT SERPL-CCNC: 163 U/L (ref 2–50)
ANION GAP SERPL CALC-SCNC: 11 MMOL/L (ref 7–16)
APPEARANCE FLD: CLEAR
AST SERPL-CCNC: 259 U/L (ref 12–45)
BILIRUB SERPL-MCNC: 9.9 MG/DL (ref 0.1–1.5)
BODY FLD TYPE: NORMAL
BODY FLD TYPE: NORMAL
BUN SERPL-MCNC: 17 MG/DL (ref 8–22)
CALCIUM ALBUM COR SERPL-MCNC: 9.3 MG/DL (ref 8.5–10.5)
CALCIUM SERPL-MCNC: 8.1 MG/DL (ref 8.5–10.5)
CELLS FLD: 10
CHLORIDE SERPL-SCNC: 88 MMOL/L (ref 96–112)
CO2 SERPL-SCNC: 30 MMOL/L (ref 20–33)
COLOR FLD: YELLOW
CREAT SERPL-MCNC: 0.63 MG/DL (ref 0.5–1.4)
CYTOLOGY REG CYTOL: NORMAL
GFR SERPLBLD CREATININE-BSD FMLA CKD-EPI: 107 ML/MIN/1.73 M 2
GLOBULIN SER CALC-MCNC: 2.4 G/DL (ref 1.9–3.5)
GLUCOSE SERPL-MCNC: 84 MG/DL (ref 65–99)
GRAM STN SPEC: NORMAL
LYMPHOCYTES NFR FLD: 39 %
MONOS+MACROS NFR FLD MANUAL: 35 %
NEUTROPHILS NFR FLD: 16 %
NUC CELL # FLD: 93 CELLS/UL
POTASSIUM SERPL-SCNC: 3.4 MMOL/L (ref 3.6–5.5)
PROT FLD-MCNC: 0.5 G/DL
PROT SERPL-MCNC: 4.9 G/DL (ref 6–8.2)
RBC # FLD: <2000 CELLS/UL
SIGNIFICANT IND 70042: NORMAL
SITE SITE: NORMAL
SODIUM SERPL-SCNC: 129 MMOL/L (ref 135–145)
SOURCE SOURCE: NORMAL

## 2025-07-01 PROCEDURE — 88305 TISSUE EXAM BY PATHOLOGIST: CPT | Performed by: PATHOLOGY

## 2025-07-01 PROCEDURE — 82042 OTHER SOURCE ALBUMIN QUAN EA: CPT

## 2025-07-01 PROCEDURE — 80053 COMPREHEN METABOLIC PANEL: CPT

## 2025-07-01 PROCEDURE — 88305 TISSUE EXAM BY PATHOLOGIST: CPT | Mod: 26 | Performed by: PATHOLOGY

## 2025-07-01 PROCEDURE — 99232 SBSQ HOSP IP/OBS MODERATE 35: CPT | Performed by: NURSE PRACTITIONER

## 2025-07-01 PROCEDURE — 88112 CYTOPATH CELL ENHANCE TECH: CPT | Mod: 26 | Performed by: PATHOLOGY

## 2025-07-01 PROCEDURE — 87070 CULTURE OTHR SPECIMN AEROBIC: CPT

## 2025-07-01 PROCEDURE — A9270 NON-COVERED ITEM OR SERVICE: HCPCS | Performed by: HOSPITALIST

## 2025-07-01 PROCEDURE — A9270 NON-COVERED ITEM OR SERVICE: HCPCS | Performed by: INTERNAL MEDICINE

## 2025-07-01 PROCEDURE — 0W9G3ZZ DRAINAGE OF PERITONEAL CAVITY, PERCUTANEOUS APPROACH: ICD-10-PCS | Performed by: NURSE PRACTITIONER

## 2025-07-01 PROCEDURE — 770001 HCHG ROOM/CARE - MED/SURG/GYN PRIV*

## 2025-07-01 PROCEDURE — 700102 HCHG RX REV CODE 250 W/ 637 OVERRIDE(OP): Performed by: HOSPITALIST

## 2025-07-01 PROCEDURE — 700102 HCHG RX REV CODE 250 W/ 637 OVERRIDE(OP): Performed by: NURSE PRACTITIONER

## 2025-07-01 PROCEDURE — 89051 BODY FLUID CELL COUNT: CPT

## 2025-07-01 PROCEDURE — 700102 HCHG RX REV CODE 250 W/ 637 OVERRIDE(OP): Performed by: INTERNAL MEDICINE

## 2025-07-01 PROCEDURE — 88112 CYTOPATH CELL ENHANCE TECH: CPT | Performed by: PATHOLOGY

## 2025-07-01 PROCEDURE — 99233 SBSQ HOSP IP/OBS HIGH 50: CPT | Performed by: INTERNAL MEDICINE

## 2025-07-01 PROCEDURE — C1729 CATH, DRAINAGE: HCPCS

## 2025-07-01 PROCEDURE — A9270 NON-COVERED ITEM OR SERVICE: HCPCS | Performed by: NURSE PRACTITIONER

## 2025-07-01 PROCEDURE — 84157 ASSAY OF PROTEIN OTHER: CPT

## 2025-07-01 PROCEDURE — 87015 SPECIMEN INFECT AGNT CONCNTJ: CPT

## 2025-07-01 PROCEDURE — 87205 SMEAR GRAM STAIN: CPT

## 2025-07-01 PROCEDURE — 36415 COLL VENOUS BLD VENIPUNCTURE: CPT

## 2025-07-01 RX ORDER — M-VIT,TX,IRON,MINS/CALC/FOLIC 27MG-0.4MG
1 TABLET ORAL DAILY
Status: DISCONTINUED | OUTPATIENT
Start: 2025-07-01 | End: 2025-07-04 | Stop reason: HOSPADM

## 2025-07-01 RX ORDER — POTASSIUM CHLORIDE 1500 MG/1
40 TABLET, EXTENDED RELEASE ORAL ONCE
Status: COMPLETED | OUTPATIENT
Start: 2025-07-01 | End: 2025-07-01

## 2025-07-01 RX ADMIN — NYSTATIN: 100000 POWDER TOPICAL at 18:00

## 2025-07-01 RX ADMIN — GABAPENTIN 300 MG: 300 CAPSULE ORAL at 04:43

## 2025-07-01 RX ADMIN — OXYCODONE 5 MG: 5 TABLET ORAL at 21:20

## 2025-07-01 RX ADMIN — FUROSEMIDE 40 MG: 40 TABLET ORAL at 04:43

## 2025-07-01 RX ADMIN — OXYCODONE 5 MG: 5 TABLET ORAL at 14:46

## 2025-07-01 RX ADMIN — HYDROCORTISONE ACETATE 25 MG: 25 SUPPOSITORY RECTAL at 18:10

## 2025-07-01 RX ADMIN — POTASSIUM CHLORIDE 40 MEQ: 1500 TABLET, EXTENDED RELEASE ORAL at 10:25

## 2025-07-01 RX ADMIN — POTASSIUM CHLORIDE 40 MEQ: 1500 TABLET, EXTENDED RELEASE ORAL at 04:44

## 2025-07-01 RX ADMIN — Medication 100 MG: at 04:43

## 2025-07-01 RX ADMIN — Medication 1 TABLET: at 18:10

## 2025-07-01 RX ADMIN — SPIRONOLACTONE 100 MG: 100 TABLET ORAL at 04:44

## 2025-07-01 RX ADMIN — HYDROXYZINE HYDROCHLORIDE 25 MG: 50 TABLET ORAL at 21:20

## 2025-07-01 RX ADMIN — HYDROCORTISONE ACETATE 25 MG: 25 SUPPOSITORY RECTAL at 04:44

## 2025-07-01 RX ADMIN — LEVOTHYROXINE SODIUM 50 MCG: 0.05 TABLET ORAL at 04:44

## 2025-07-01 RX ADMIN — CARVEDILOL 3.12 MG: 3.12 TABLET, FILM COATED ORAL at 10:25

## 2025-07-01 RX ADMIN — NYSTATIN: 100000 POWDER TOPICAL at 04:50

## 2025-07-01 RX ADMIN — LACTULOSE 15 ML: 10 SOLUTION ORAL at 18:10

## 2025-07-01 RX ADMIN — HYDROXYZINE HYDROCHLORIDE 25 MG: 50 TABLET ORAL at 14:46

## 2025-07-01 RX ADMIN — GABAPENTIN 300 MG: 300 CAPSULE ORAL at 18:10

## 2025-07-01 RX ADMIN — ESCITALOPRAM OXALATE 20 MG: 10 TABLET ORAL at 04:44

## 2025-07-01 ASSESSMENT — ENCOUNTER SYMPTOMS
DEPRESSION: 0
NAUSEA: 0
NERVOUS/ANXIOUS: 0
CHILLS: 0
DIZZINESS: 0
HEARTBURN: 0
MYALGIAS: 0
COUGH: 0
TREMORS: 0
BLOOD IN STOOL: 0
BACK PAIN: 0
WEAKNESS: 0
BLURRED VISION: 0
ABDOMINAL PAIN: 1
HEADACHES: 0
VOMITING: 0
SORE THROAT: 0
FEVER: 0
ABDOMINAL PAIN: 0
SHORTNESS OF BREATH: 0
DIARRHEA: 0
CONSTIPATION: 0

## 2025-07-01 ASSESSMENT — PAIN DESCRIPTION - PAIN TYPE
TYPE: ACUTE PAIN

## 2025-07-01 ASSESSMENT — LIFESTYLE VARIABLES: SUBSTANCE_ABUSE: 1

## 2025-07-01 NOTE — DISCHARGE PLANNING
Case Management Discharge Planning    Admission Date: 6/19/2025  GMLOS: 4.8  ALOS: 12    6-Clicks ADL Score: 22  6-Clicks Mobility Score: 23      Anticipated Discharge Dispo: Discharge Disposition: Discharged to home/self care (01)    DME Needed: Yes    DME Ordered: No - Needs oxygen concentrator plus portable or POC    Action(s) Taken: Updated Provider/Nurse on Discharge Plan  Discussed in IDT. GI has cleared patient. CHW has assisted patient in selecting a facility for ETOH IPR in [place], [place].   Patient in need of continuous oxygen now.     Referred this to leadership to determine CM's level of involvement. We will set up oxygen for home and 30 days of medication. Travel and oxygen for travel to be arranged by patient or her family.     I contacted [employer]. Patient's copays have not been paid for a year so patient's account is on hold. They need a new order, and the account to be paid.     @1330 I spoke with patient about the need for a POC for flight, and [employer] informing me of patient's account not being paid. She received no bills. Their  manages these bills.     [employer] needs an updated order, account balance paid. For travel, patient would need to contact their travel department, and receiving a POC could take some time.     Patient given paperwork for oxygen to go if she wishes to rent a unit for her flight.   Patient asked what the cost of about buying a POC would be.     I will call her , at her request.     @1355 [employer] sells POC for $2500.   @1404 VMM left on home and cell phone for patient's , requesting a return phone call.     @3154  Spoke with patient's  re: [employer] option of POC purchase and informed of [employer] stating copays not paid. He chose [employer] for POC purchase and will call them. He will also f/u with [employer].   Attending, bedside RN and Charge RN updated on POC purchase plan.               Escalations Completed: Leadership    Medically  Clear: No    Next Steps: oxygen need    Barriers to Discharge: Medical clearance and DME    Is the patient up for discharge tomorrow: Possibly

## 2025-07-01 NOTE — DISCHARGE PLANNING
TRISTA Roberts met with pt bedside and advised of possible discharge for 07/02/25 per rounds. Pt is agreeable to direct admit to [facility] from hospital to avoid relapse returning to home.    Barriers to discharge- Pt will need portable oxygen and all medications that are needed for 30 days.   Called [place] [phone] [facility] with update, [facility] will need updated medical records faxed to  [phone] and pt will need to bring all medications enough for a 30 day supply.   Per TONYA Diaz case mgmt-This is not a transfer or admission and records cannot be sent. Emily will contact leadership for direction.   Called Dr Tillman with an update and discussed barrier to discharge. Once approved for discharge Dr Tillman will contact [facility] and set up flight etc.   TONYA Diaz will call Dr Tillman and discuss options for oxygen.

## 2025-07-01 NOTE — PROGRESS NOTES
.Gastroenterology Progress Note               Author:  Beena Alex APRN   Date & Time Created: 7/1/2025 7:39 AM       Patient ID:  Name:             Minerva Tillman  YOB: 1975  Age:                 50 y.o.  female  MRN:               [id number]    Medical Decision Making, by Problem:  Active Hospital Problems    Diagnosis     Elevated cancer antigen 125 () [R97.1]     Hyponatremia [E87.1]     Portal vein thrombosis [I81]     Alcohol abuse with withdrawal and perceptual disturbance (HCC) [F10.132]     Esophageal varices in alcoholic cirrhosis (HCC) [K70.30, I85.10]     Alcoholic hepatitis [K70.10]     High anion gap metabolic acidosis [E87.29]     Acute hypoxic respiratory failure (HCC) [J96.01]     Ascites [R18.8]     Hypokalemia [E87.6]     Acute cystitis [N30.00]      Presenting Chief Complaint:  Decompensated cirrhosis     History of Present Illness:   50-year-old female presents to the emergency department for abdominal swelling and shortness of breath and desire to discontinue alcohol intake.  Patient was seen by gastroenterology as an inpatient in 2024.  Dr. Daniels had recommended outpatient evaluation.  Patient followed up with Dr. Conley on 7/1/2024 with an additional follow-up appointment on 10/17/2024.  She had a FibroScan done which suggested advanced fibrosis and possible cirrhosis.  She was recommended to pursue upper and lower endoscopy.  Patient states that when she was told of this information she became quite concerned and did not follow-up because she was scared of the results.  Patient continues to drink alcohol.  She wants help in quitting.  She denies hematemesis or melena.  She endorses new onset abdominal swelling without lower extremity edema.     Home medications reviewed.  Patient not taking anything in the way of diuretics or medications for encephalopathy.  Patient is not on a beta-blocker.     Review of systems at this time is as stated above as well as  patient feeling a little bit anxious but comfortable with regard to her detoxification/CIWA protocol.    Interval History:  6/21/2025: Patient seen.  Reports feeling much better but having multiple liquid bowel movements.  Mild tremors, very hungry.  Has started wearing oxygen at night at home, reports it is due to increased elevation at home in [facility].  Slightly dyspneic today.  N.p.o. pending paracentesis. VSS, CIWA 4.  Transaminitis worsening, T. bili 13.8 > 9.4.  INR 1.89.  Hoping to go directly to rehab from the hospital.    6/22/2025: Feels better, tremulous with CIWA 5. Having multiple bowel movements. Fluid negative for SBP and NGTD on culture. Bili up to 14.2    6/23/2025: Feels much better today, no complaints. Eating and drinking well. Reports she was unable to be weaned off oxygen. Liver enzymes uptrending, T. Bili 17.2>14.2    6/24/2025: Patient seen at bedside with .  Had just completed echocardiogram.  Results are pending.  Tolerating oral intake without difficulty.  Denies dyspnea, nausea, vomiting, abdominal pain.  WBC 12.6.  Hemoglobin stable 13.8.  Platelets 173.  Sodium 130, BUN 11, creatinine 0.8, LFTs downtrending with , , alk phos 252, total bilirubin 15.5.  Albumin 2.7.  INR increased at 5.36.  Eliquis held by primary team.  Cytology resulted showing abnormal cells-discussed this with patient and  at bedside for which they will follow-up with outpatient GI.  Additionally, per flowsheets, patient reported to have 2 bowel movements overnight.  Patient reports having more than 5.  Discussed lactulose daily, titrating to 2-3 bowel movements per day.    6/25/2025: AO x 4.  She reports having bilateral upper quadrant/epigastric discomfort, relieved with pain medication.  She reports 3-4 bowel movements overnight.  Reports she is not able to eat a lot of food at once but is grazing/eating throughout the day and tolerating well thus far.  No nausea, vomiting,  dyspnea.  WBC 12.1, hemoglobin 13.6, platelets 190.  Sodium 133, potassium 3.4, BUN 14, creatinine 0.82.  , , alk phos 232, total bilirubin 16.3.  Albumin 2.6.  INR 2.97.  Echocardiogram completed, unable to assess right ventricular function.  MELD 3.0 score 32; 63.5% estimated 90-day survival.    6/26/2025, AO x 4.  Reports mild generalized upper abdominal pain relieved with narcotics.  She reports bowel movement x 10 since 8:00 last night.  Appetite unchanged.  No other nausea, vomiting, dyspnea.  , , alk phos 213, total bilirubin 18.3, INR improved to 1.87.  CA-125 797.    6/27/2025: AO x 4.  Negative for asterixis.  Patient to problems overnight.  Patient reports grazing/eating small amounts throughout the day.  Upon questioning types of food she is eating, she states that she eats vegetables and her protein inpatient.  , , alk phos 202, total bilirubin 18.7.  INR 1.61.  AFP 3. Lille model 0.789.  MRI pelvis, MRCP pending.    6/29/2025: AAO x 4.  Negative for asterixis.  Patient reports feeling well without nausea, vomiting, abdominal pain.  Sodium 131, potassium 3.2 renal functions normal.  , , alk phos 204, total bilirubin improved to 13.6, albumin 2.6 INR 1.36.  MELD 3.0 score 25; 87.7% 90-day survival rate.  MRI pelvis negative for malignancy/metastases.  MRCP with cirrhosis, signs of portal hypertensive including gastroesophageal varices.  Patient was supposed to have outpatient EGD/colonoscopy with [facility] provider.  She states that she had completed her prep and was ready for her procedure but then her  was out of town and was unable to take her.  She had not rescheduled endoscopy.  Offered to do EGD/colonoscopy inpatient particularly given that she has gastroesophageal varices seen on MRCP and she is off anticoagulation.  She is agreeable to proceed.  Bowel movement x 5 semiformed overnight-  Patient reports this is tolerable.    6/30/2025: EGD:    Esophagus: Grade 1 esophageal varices, three columns, extend throughout esophagus  Stomach: 0.5 cm hiatal hernia, portal hypertensive gastropathy, friable, but not bleeding  Duodenum: normal bulb and second portion    Colonoscopy:  Few scattered diverticula throughout colon. Rectal varices seen on retroflexion. No other lesion seen.     7/1/2025: Patient seen. Looks and feels better every day. Liver enzymes and T. Bili downtrending. Awaiting oxygen delivery so she can go to rehab in [place].     Hospital Medications:  Current Facility-Administered Medications   Medication Dose Frequency Provider Last Rate Last Admin    potassium chloride SA (Kdur) tablet 40 mEq  40 mEq DAILY Vidhi Pond M.D.   40 mEq at 07/01/25 0444    hydrocortisone (Anusol-HC) suppository 25 mg  25 mg Q12HRS Vidhi Pond M.D.   25 mg at 07/01/25 0444    hydrOXYzine HCl (Atarax) tablet 25 mg  25 mg Q4HRS PRN Vidhi Pond M.D.   25 mg at 06/30/25 1703    lactulose 20 GM/30ML solution 15 mL  15 mL Q EVENING Vidhi Pond M.D.   15 mL at 06/30/25 1704    nystatin (Mycostatin) powder   BID Vidhi Pond M.D.   Given at 07/01/25 0450    oxyCODONE immediate-release (Roxicodone) tablet 5 mg  5 mg Q4HRS PRN Vidhi Pond M.D.   5 mg at 06/30/25 2203    thiamine (Vitamin B-1) tablet 100 mg  100 mg DAILY Vidhi Pond M.D.   100 mg at 07/01/25 0443    spironolactone (Aldactone) tablet 100 mg  100 mg Q DAY Helen Mendez M.D.   100 mg at 07/01/25 0444    Respiratory Therapy Consult   Continuous RT Helen Mendez M.D.        furosemide (Lasix) tablet 40 mg  40 mg Q DAY Helen Mendez M.D.   40 mg at 07/01/25 0443    carvedilol (Coreg) tablet 3.125 mg  3.125 mg BID WITH MEALS Helen Mendez M.D.   3.125 mg at 06/30/25 1703    melatonin tablet 5 mg  5 mg HS PRN TRE Mcghee.P.RMikoN.   5 mg at 06/25/25 0109    labetalol (Normodyne/Trandate) injection 10 mg  10 mg Q4HRS PRN Abby Ivy M.D.        ondansetron (Zofran) syringe/vial injection 4 mg  4 mg  "Q4HRS PRN Abby Ivy M.D.        ondansetron (Zofran ODT) dispertab 4 mg  4 mg Q4HRS PRN Abby Ivy M.D.   4 mg at 06/27/25 0807    escitalopram (Lexapro) tablet 20 mg  20 mg DAILY Abby Ivy M.D.   20 mg at 07/01/25 0444    gabapentin (Neurontin) capsule 300 mg  300 mg BID Abby Ivy M.D.   300 mg at 07/01/25 0443    levothyroxine (Synthroid) tablet 50 mcg  50 mcg AM ES Abby Ivy M.D.   50 mcg at 07/01/25 0444    albuterol inhaler 1-2 Puff  1-2 Puff Q6HRS PRN Abby Ivy M.D.   2 Puff at 06/22/25 2249   Last reviewed on 6/30/2025  7:28 AM by Lv Bunch D.O.       Review of Systems:  Review of Systems   Constitutional:  Negative for chills, fever and malaise/fatigue.   HENT:  Negative for hearing loss and sore throat.    Eyes:  Negative for blurred vision.   Respiratory:  Negative for cough and shortness of breath.    Cardiovascular:  Negative for chest pain and leg swelling.   Gastrointestinal:  Negative for abdominal pain, blood in stool, constipation, diarrhea, heartburn, melena, nausea and vomiting.   Genitourinary:  Negative for dysuria and hematuria.   Musculoskeletal:  Negative for back pain and myalgias.   Skin:  Negative for rash.   Neurological:  Negative for dizziness, tremors and weakness.   Psychiatric/Behavioral:  Positive for substance abuse. Negative for depression. The patient is not nervous/anxious.    All other systems reviewed and are negative.    Vital signs:  Weight/BMI: Body mass index is 26.05 kg/m².  /70   Pulse 72   Temp 36.3 °C (97.4 °F) (Temporal)   Resp 18   Ht 1.575 m (5' 2\")   Wt 64.6 kg (142 lb 6.7 oz)   SpO2 93%   Vitals:    06/30/25 1703 06/30/25 1933 06/30/25 2203 07/01/25 0353   BP:  105/66  108/70   Pulse:  75  72   Resp: 17 18 16 18   Temp:  36.3 °C (97.4 °F)  36.3 °C (97.4 °F)   TempSrc:  Temporal  Temporal   SpO2:  94%  93%   Weight:       Height:         Oxygen Therapy:  Pulse Oximetry: 93 %, O2 (LPM): 2, O2 Delivery Device: Nasal " Cannula    Intake/Output Summary (Last 24 hours) at 7/1/2025 0739  Last data filed at 6/30/2025 1933  Gross per 24 hour   Intake 500 ml   Output --   Net 500 ml       Physical Exam  Vitals and nursing note reviewed.   Constitutional:       General: She is awake. She is not in acute distress.     Appearance: She is overweight. She is not ill-appearing.   HENT:      Head: Normocephalic and atraumatic.      Nose: Nose normal.      Mouth/Throat:      Mouth: Mucous membranes are moist.      Pharynx: Oropharynx is clear.   Eyes:      General: Scleral icterus present.      Extraocular Movements: Extraocular movements intact.      Conjunctiva/sclera: Conjunctivae normal.   Cardiovascular:      Rate and Rhythm: Normal rate and regular rhythm.      Pulses: Normal pulses.      Heart sounds: Normal heart sounds.   Pulmonary:      Effort: Pulmonary effort is normal. No respiratory distress.      Breath sounds: Normal breath sounds.   Abdominal:      General: Abdomen is flat. Bowel sounds are normal. There is no distension.      Palpations: Abdomen is soft.      Tenderness: There is no abdominal tenderness. There is no guarding.   Musculoskeletal:         General: Normal range of motion.      Cervical back: Normal range of motion.      Right lower leg: No edema.      Left lower leg: No edema.   Skin:     General: Skin is warm and dry.      Capillary Refill: Capillary refill takes less than 2 seconds.      Coloration: Skin is jaundiced.   Neurological:      General: No focal deficit present.      Mental Status: She is alert and oriented to person, place, and time. Mental status is at baseline.      Comments: AO x 3, no asterixis   Psychiatric:         Mood and Affect: Mood normal.         Behavior: Behavior normal. Behavior is cooperative.       Labs:  Recent Labs     06/29/25  0800 06/30/25  0723 07/01/25  0435   SODIUM 131* 132* 129*   POTASSIUM 3.2* 3.5* 3.4*   CHLORIDE 91* 87* 88*   CO2 29 32 30   BUN 20 18 17   CREATININE  0.67 0.72 0.63   CALCIUM 8.7 8.3* 8.1*     Recent Labs     06/29/25  0800 06/30/25  0723 07/01/25  0435   ALTSGPT 195* 197* 163*   ASTSGOT 333* 326* 259*   ALKPHOSPHAT 204* 191* 170*   TBILIRUBIN 13.6* 13.1* 9.9*   GLUCOSE 91 64* 84     Recent Labs     06/29/25  0800 06/30/25  0723 07/01/25  0435   ASTSGOT 333* 326* 259*   ALTSGPT 195* 197* 163*   ALKPHOSPHAT 204* 191* 170*   TBILIRUBIN 13.6* 13.1* 9.9*     Recent Labs     06/29/25  0800   PROTHROMBTM 16.8*   INR 1.36*     Recent Results (from the past 24 hours)   Comp Metabolic Panel    Collection Time: 07/01/25  4:35 AM   Result Value Ref Range    Sodium 129 (L) 135 - 145 mmol/L    Potassium 3.4 (L) 3.6 - 5.5 mmol/L    Chloride 88 (L) 96 - 112 mmol/L    Co2 30 20 - 33 mmol/L    Anion Gap 11.0 7.0 - 16.0    Glucose 84 65 - 99 mg/dL    Bun 17 8 - 22 mg/dL    Creatinine 0.63 0.50 - 1.40 mg/dL    Calcium 8.1 (L) 8.5 - 10.5 mg/dL    Correct Calcium 9.3 8.5 - 10.5 mg/dL    AST(SGOT) 259 (H) 12 - 45 U/L    ALT(SGPT) 163 (H) 2 - 50 U/L    Alkaline Phosphatase 170 (H) 30 - 99 U/L    Total Bilirubin 9.9 (H) 0.1 - 1.5 mg/dL    Albumin 2.5 (L) 3.2 - 4.9 g/dL    Total Protein 4.9 (L) 6.0 - 8.2 g/dL    Globulin 2.4 1.9 - 3.5 g/dL    A-G Ratio 1.0 g/dL   ESTIMATED GFR    Collection Time: 07/01/25  4:35 AM   Result Value Ref Range    GFR (CKD-EPI) 107 >60 mL/min/1.73 m 2       Radiology Review:  MR-ABDOMEN-WITH & W/O   Final Result      1.  Cirrhosis and portal hypertension, including gastroesophageal varices and small to moderate volume ascites.   2.  No suspicious liver lesions.   3.  No acute inflammatory process in the abdomen. No malignancy or metastatic disease.         MR-PELVIS-WITH & W/O AND SEQUENCES   Final Result      1.  No pelvic mass lesions. No evidence of malignancy or metastatic disease in the pelvis.   2.  Atrophic ovaries.   3.  Moderate volume pelvic ascites.               EC-ECHOCARDIOGRAM COMPLETE W/ CONT   Final Result      US-PARACENTESIS, ABD WITH  IMAGING   Final Result      1. Ultrasound-guided diagnostic and therapeutic paracentesis of the right lower quadrant of the abdominal wall.      2. 1200 mL of fluid withdrawn.      US-ABDOMEN COMPLETE SURVEY   Final Result      1.  Cirrhotic appearance of the liver with a lobulated cyst.   2.  Sludge is present within the gallbladder. There is no definite stone.   3.  Ascites.   4.  There appears to be occlusion of the portal vein and at least partial occlusion of the inferior vena cava.   5.  Evaluation is limited with poor visualization of the midline structures.         DX-CHEST-PORTABLE (1 VIEW)   Final Result      Mild left lung infiltrate.            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  Acute hypoxic respiratory failure - requiring 2 L nasal cannula  Portal hypertension - SAAG 2.2  Alcoholic hepatitis - MDF 53.3  AFP negative, AFP L3% 28.6- can have false positive with acute hepatitis, cirrhosis  Rare cluster of atypical cells in ascitic fluid suspicious for malignancy, elevated , MRCP with no evidence of malignancy  Vitamin A deficiency  Rectal and grade 1 esophageal varices   Portal hypertensive gastropathy  Small hiatal hernia    Recommendations:  - Paracentesis with repeat fluid analysis and cytology   - Recommend restarting Eliquis today, she will likely be here over the next day or two and we can monitor for bleeding  - Tolerating 100 mg spirinolactone and 40 mg lasix  - Last seen by GI team March 2024 where she was noted to have trace ascites.  She was not on diuretics at home.    - Prednisone discontinued 6/28/2025, Lille score indicated she was a  non-responder  - Lactulose daily-titrate to have 3 loose bowel movements per day  - Echocardiogram unable to visualize/assess right ventricular function. Given that patient denies any dyspnea, may continue beta-blocker at this time.  - Recommend MV with no iron to for Vitamin A deficiency  - Anusol HC MA BID x 7 days    Pending discharge to outpatient addiction center once she has oxygen for the plane ride.    GI to follow    Discussed with patient, Dr. Mcknight,  Dr. Dela Cruz    ..Beena Alex, MAXWELL,  APRN    Core Quality Measures   Reviewed items::  Labs, Medications and Radiology reports reviewed

## 2025-07-01 NOTE — CARE PLAN
The patient is Stable - Low risk of patient condition declining or worsening    Shift Goals  Clinical Goals: monitor labs and VS, safety pt will remain free from falls this shift  Patient Goals: comfort and rest  Family Goals: none present    Progress made toward(s) clinical / shift goals:    Problem: Safety  Goal: Will remain free from injury  Outcome: Progressing   Pt continues to remain free from falls throughout this shift, calls for assistance appropriately   Problem: Pain Management  Goal: Pain level will decrease to patient's comfort goal  Outcome: Progressing   Pain being managed with use of PRN medication    Patient is not progressing towards the following goals:

## 2025-07-01 NOTE — PROGRESS NOTES
Hospital Medicine Daily Progress Note    Date of Service  6/30/2025    Chief Complaint  Minerva Tillman is a 50 y.o. female admitted 6/19/2025 with jaundice    Hospital Course  Minerva Tillman is a 50 y.o. female who presented 6/19/2025 with past medical history of liver cirrhosis, alcohol abuse, depression who presents to the hospital for jaundice the past 1 week. She was found to have acute decompensated cirrhosis with ascites with a MELD of 27, acute on chronic respiratory failure with volume overload, portal vein thrombosis and possible community acquired pneumonia and was started on empiric antibiotics    She required CIWA and support with alcohol withdrawl symptoms. She underwent a paracentesis and had no evidence sbp. She was started on anticoagulation for the acute portal vein thrombosis.GI is following     Interval Problem Update  6/23: Axox3, ciwa improved today, currently 2/10, mild tremor today and unsteady on her feet. She reports she is very tired but denies sob, no cp, no abdominal pain, no n/v. She is having loose bowel mvts with the lactulose, no signs of encephalopathy at this time, her recall has improved today. Plan remains to go to inpatient alcohol rehab from here, social work assisting with intake interview which is planned for today. Cytology from paracentesis pending. Bilirubin continues to increase. ROS otherwise negative.   6/24:  d/w GI this a.m., stopped Eliquis since INR increased to 5.36 from 1.8, hepatitis panel ordered. T. Bilirubin increased, decreased lactulose due to multiple loose stools, replaced K.   Dc'd CIWA. Continue prednisolone daily.  6/25:  no pain on palpation of RUQ, but later patient requested oxycodone for abdominal pain.   LFTs and bilirubin remain same. T. Bili 16.  INR improved to 2.97.  hepatitis panel negative. Pink tinged urine improving.  6/26:  increased frequency of urination, UA negative.  Likely from frequent bowel movements 8 per patient.  Decreased lactulose.   elevated at 797.  Ordered MRI abdomen, pelvis with and wo to look for malignancy. Abnormal pathology on ascites fluid with atypical cells found.  AFP and AFP L 3% pending.  Repeat direct bilirubin in am. Since prior both direct and indirect were elevated.  Increase in t. Bili to 18 today, but INR normalizing off Eliquis to INR 1.87.   MELD 3.0 32 with 63.5% 90 day survival.   6/27:  t. Bili remains 18.  No significant change.    6/28:  LFTs increased , but t.bili and INR decreased.  MELD 3.0 26 85.4% 90 day survival rate.  MRIs pending read.  Dc'd prednisolone since no significant improvement after 7 days.  6/29: Total bilirubin with improvement for the second day in a row to 13.6.  AST showing improvement 333 although ALT remains 198.  AFP L3 percent 28.6 indicating increased risk for hepatocellular carcinoma.  Reviewed with patient MRI pelvis and liver negative for malignancy.  Spoke with GI.  Plan for EGD to evaluate esophageal varices and colonoscopy.  Bowel prep this evening.  N.p.o. at midnight.  6/30: Patient states she is too tired to awaken.  She has the covers up over her head.  OT in room with her.  Status post EGD with finding of esophageal varices and portal hypertension.  GI started Coreg twice daily.  Watch blood pressure since blood pressure is on the low side.  Holding parameters ordered.  Also colonoscopy found rectal varices started on Anusol HC twice daily for 7 days.  Per GI they want a repeat colonoscopy in 7 days.  Total bilirubin remains 13.1.  .      I have discussed this patient's plan of care and discharge plan at IDT rounds today with Case Management, Nursing, Nursing leadership, and other members of the IDT team.    Consultants/Specialty  GI    Code Status  Full Code    Disposition  The patient is not medically cleared for discharge to home or a post-acute facility.  Anticipate discharge to: home with close outpatient follow-up    I have placed the  appropriate orders for post-discharge needs.    Review of Systems  Review of Systems   Constitutional:  Positive for malaise/fatigue. Negative for chills, diaphoresis, fever and weight loss.   HENT: Negative.  Negative for sore throat.    Eyes: Negative.  Negative for blurred vision.   Respiratory: Negative.  Negative for cough and shortness of breath.    Cardiovascular: Negative.  Negative for chest pain, palpitations and leg swelling.   Gastrointestinal:  Negative for abdominal pain, nausea and vomiting.   Genitourinary:  Negative for dysuria and hematuria.   Musculoskeletal: Negative.  Negative for myalgias.   Skin: Negative.  Negative for itching and rash.   Neurological: Negative.  Negative for dizziness, focal weakness, weakness and headaches.   Endo/Heme/Allergies: Negative.  Does not bruise/bleed easily.   Psychiatric/Behavioral:  Positive for substance abuse. Negative for depression and suicidal ideas.    All other systems reviewed and are negative.       Physical Exam  Temp:  [36.1 °C (97 °F)-37.2 °C (99 °F)] 36.7 °C (98 °F)  Pulse:  [57-76] 72  Resp:  [12-18] 17  BP: ()/(54-87) 115/78  SpO2:  [92 %-99 %] 94 %    Physical Exam  Vitals and nursing note reviewed. Exam conducted with a chaperone present.   Constitutional:       General: She is not in acute distress.     Appearance: Normal appearance. She is not diaphoretic.   HENT:      Head: Normocephalic.      Nose: Nose normal.      Mouth/Throat:      Mouth: Mucous membranes are moist.   Eyes:      General: Scleral icterus present.      Pupils: Pupils are equal, round, and reactive to light.   Cardiovascular:      Rate and Rhythm: Normal rate and regular rhythm.      Pulses: Normal pulses.      Heart sounds: Normal heart sounds.   Pulmonary:      Effort: Pulmonary effort is normal.      Breath sounds: Normal breath sounds.   Abdominal:      General: Abdomen is flat. Bowel sounds are normal. There is no distension.      Palpations: Abdomen is soft.       Tenderness: There is no abdominal tenderness.   Musculoskeletal:         General: No swelling or deformity. Normal range of motion.   Skin:     General: Skin is warm and dry.      Capillary Refill: Capillary refill takes less than 2 seconds.      Coloration: Skin is jaundiced.      Findings: No bruising or lesion.   Neurological:      General: No focal deficit present.      Mental Status: She is alert and oriented to person, place, and time.      Cranial Nerves: No cranial nerve deficit.   Psychiatric:         Mood and Affect: Mood normal.         Behavior: Behavior normal.         Fluids    Intake/Output Summary (Last 24 hours) at 6/30/2025 1736  Last data filed at 6/30/2025 0835  Gross per 24 hour   Intake 300 ml   Output --   Net 300 ml        Laboratory        Recent Labs     06/28/25  0348 06/29/25  0800 06/30/25  0723   SODIUM 133* 131* 132*   POTASSIUM 4.5 3.2* 3.5*   CHLORIDE 91* 91* 87*   CO2 32 29 32   GLUCOSE 88 91 64*   BUN 23* 20 18   CREATININE 0.76 0.67 0.72   CALCIUM 8.8 8.7 8.3*     Recent Labs     06/28/25  0348 06/29/25  0800   INR 1.47* 1.36*               Imaging  MR-ABDOMEN-WITH & W/O   Final Result      1.  Cirrhosis and portal hypertension, including gastroesophageal varices and small to moderate volume ascites.   2.  No suspicious liver lesions.   3.  No acute inflammatory process in the abdomen. No malignancy or metastatic disease.         MR-PELVIS-WITH & W/O AND SEQUENCES   Final Result      1.  No pelvic mass lesions. No evidence of malignancy or metastatic disease in the pelvis.   2.  Atrophic ovaries.   3.  Moderate volume pelvic ascites.               EC-ECHOCARDIOGRAM COMPLETE W/ CONT   Final Result      US-PARACENTESIS, ABD WITH IMAGING   Final Result      1. Ultrasound-guided diagnostic and therapeutic paracentesis of the right lower quadrant of the abdominal wall.      2. 1200 mL of fluid withdrawn.      US-ABDOMEN COMPLETE SURVEY   Final Result      1.  Cirrhotic appearance of  the liver with a lobulated cyst.   2.  Sludge is present within the gallbladder. There is no definite stone.   3.  Ascites.   4.  There appears to be occlusion of the portal vein and at least partial occlusion of the inferior vena cava.   5.  Evaluation is limited with poor visualization of the midline structures.         DX-CHEST-PORTABLE (1 VIEW)   Final Result      Mild left lung infiltrate.           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  multivitamin, thiamine and folate  replacing electrolytes  See discussion above  No hallucinations or seizure today - precautions    Elevated cancer antigen 125 ()  Assessment & Plan   797  Checked due to atypical cells noted on pathology of ascites fluid.   6/28 MRI abdomen/pelvis with and w/o contrast no ovarian or abdominal malignancy.  Renal function wnl.   CT AP did not reveal any ovarian mass.    Portal vein thrombosis- (present on admission)  Assessment & Plan  H/h stable, no evidence of bleeding  Discussed with pt and GI  Holding eliquis since an increase in INR from 1.8-5.36.  following  Cbc stable    Hyponatremia- (present on admission)  Assessment & Plan  Consistent with cirrhosis  following    Alcoholic hepatitis- (present on admission)  Assessment & Plan  Maddery dissemination score 46  Bili continues to increase, steroids started  Assess for response after 7 days with jeramie score     Esophageal varices in alcoholic cirrhosis (HCC)- (present on admission)  Assessment & Plan  Decompensated  Volume overloaded, improving with diuretics - tapering as tolerated  MELD 3.0  repeat 26.   Hepatitis panel-repeat negative.  Prednisolone started 6/22 for worsening LFTs.  Lactulose qhs, NH3 elevated 66 at one point, now excessive BMs and normal mentation.  Improving t. Bilirubin, plan for EGD and colonoscopy to  evaluate esophageal varices seen on MRCP.   MRCP no evidence of carcinoma, no liver lesions.  6/30 EGD with esophageal varices 3 columns and portal hypertension.  Started on coreg 3.125mg po bid   Also with rectal varices on colonoscopy, started anusol HC bid x7 days, repeat colonoscopy in 7 days.    Acute hypoxic respiratory failure (HCC)- (present on admission)  Assessment & Plan  Fluctuating, had titrated down to 1L after paracentesis but increased to 3 L overnight - exam stable- wean as tolerated  Continue diuretics as tolerated   Query mild community acquired pna, non specific changes on x ray -  No cough, on empiric augmentin   Wean as tolerated  Following  RT      High anion gap metabolic acidosis- (present on admission)  Assessment & Plan  Continue to trend secondary to liver dysfunction    Ascites- (present on admission)  Assessment & Plan  Due to cirrhosis  Tolerating diuretics  S/p paracentesis      Hypokalemia- (present on admission)  Assessment & Plan  Replaced and resolved    Acute cystitis- (present on admission)  Assessment & Plan  Resolved  Completed augmentin x 5 days.           VTE prophylaxis: SCD/ eliquis    I have performed a physical exam and reviewed and updated ROS and Plan today (6/30/2025). In review of yesterday's note (6/29/2025), there are no changes except as documented above.

## 2025-07-01 NOTE — DISCHARGE PLANNING
Agency/Facility Name: [facility]  Spoke To:   Outcome: The POC that [facility] has are FDA approved for flying and the cost is $2500.00    @1436  Agency/Facility Name: [facility]  Spoke To: Nirmal  Outcome: [facility] does not sell POC's, they can be rented through the travel dept.    Phone number to the travel dept is [phone].    @1885  MARLEE faxed over [facility]  This is self pay.  DPA added note to contact the pt's spouse for payment.      Agency/Facility Name: [facility]  Spoke To: [employer]  Outcome: MARLEE called [employer] to let her know that this DPA sent over a DME referral for a POC.  DPA stated this is self pay and the pt's spouse will [facility] a [facility]  DPA also noted spouse's phone number on the referral.

## 2025-07-01 NOTE — FACE TO FACE
"Face to Face Note  -  Durable Medical Equipment    Shivani Dela Cruz D.O. - NPI: [id number]  I certify that this patient is under my care and that they had a durable medical equipment(DME)face to face encounter by myself that meets the physician DME face-to-face encounter requirements with this patient on:    Date of encounter:   Patient:                    MRN:                       YOB: 2026  Minerva Tillman  [id number]  1975     The encounter with the patient was in whole, or in part, for the following medical condition, which is the primary reason for durable medical equipment:  COPD    I certify that, based on my findings, the following durable medical equipment is medically necessary:    Oxygen   HOME O2 Saturation Measurements:(Values must be present for Home Oxygen orders)  Room air sat at rest: 84  Room air sat with amb: 80  With liters of O2: 2, O2 sat at rest with O2: 96  With Liters of O2: 2, O2 sat with amb with O2 : 90  Is the patient mobile?: Yes  If patient feels more short of breath, they can go up to 6 liters per minute and contact healthcare provider.    Supporting Symptoms: The patient requires supplemental oxygen, as the following interventions have been tried with limited or no improvement: \"Ambulation with oximetry and \"Incentive spirometry.    My Clinical findings support the need for the above equipment due to:  Hypoxia  "

## 2025-07-01 NOTE — DIETARY
Nutrition Services: Follow-up for Nutrition Care Plan   Day 12 of admit. Minerva Tillman is a 50 y.o. female with admitting DX of Alcohol abuse with withdrawal and perceptual disturbance (HCC) [F10.132]      Current diet order:   2gm Na  Ensure Plus High Protein (provides 350 calories, 20 g protein per 8 fl oz) BID     PO Intake:   >75% x 1 meal  50-75% x 4 meals  25-50% x 1 meal  <25% x 1 meal   Last BM 6/30/25  Na 129, K 3.4  Pertinent Medications: furosemide, lactulose, levothyroxine, potassium chloride , spironolactone, thiamine      Inadequate oral intake related to decreased appetite/ alcohol withdrawal as evidence by PO intake averages <25% of meals     Unable to fully assess for malnutrition at this time        Nutrition Dx Status: Ongoing Improving    Problem: Nutritional:  Goal: Achieve adequate nutritional intake  Description: Patient will consume >65% of meals  Outcome: Improving       Plan/ Recommendations:      Encourage intake of meals, goal for >65% consumption from meals and/or supplements  Document intake of all meals as % taken in ADLs to provide interdisciplinary communication across all shifts.   Monitor weight.  Nutrition rep available upon request to see patient for ongoing meal and snack preferences.  Resume Ensure Plus BID       RD following

## 2025-07-01 NOTE — PROGRESS NOTES
Hospital Medicine Daily Progress Note    Date of Service  7/1/2025    Chief Complaint  Minerva Tillman is a 50 y.o. female admitted 6/19/2025 with jaundice    Hospital Course  Minerva Tillman is a 50 y.o. female who presented 6/19/2025 with past medical history of liver cirrhosis, alcohol abuse, depression who presents to the hospital for jaundice the past 1 week. She was found to have acute decompensated cirrhosis with ascites with a MELD of 27, acute on chronic respiratory failure with volume overload, portal vein thrombosis and possible community acquired pneumonia and was started on empiric antibiotics    She required CIWA and support with alcohol withdrawl symptoms. She underwent a paracentesis and had no evidence sbp. She was started on anticoagulation for the acute portal vein thrombosis. GI was consulted and patient was started on prednisolone.  Patient had atypical cells suspicious for malignancy in her ascites fluid.  aFP 3% 28.6,  797 and MRCP was performed which showed cirrhosis and portal hypertension with esophageal varices and small to moderate volume ascites, no malignancy or metastatic disease seen.  Patient's alcohol withdrawal symptoms resolved and CIWA protocol was discontinued.  Prednisone was discontinued since there is no significant improvement after 7 days.  GI took patient for EGD 6/30 found to have grade 1 esophageal varices, 0.5 cm hiatal hernia recommended to start Coreg and repeat EGD in 6 months.  Colonoscopy also performed found to have hemorrhoids no other lesions seen recommended repeat colonoscopy in 10 years and Anusol MO twice daily for 7 days.     Interval Problem Update  7/1 Vitals stable on 2 L NC   Na 129, K 3.4, replaced   , , total bili 9.9   Vitamin A returned low 0.19, started on multivitamin  Patient feels like her abdominal ascites is returning and feeling more distended.  Repeat paracentesis ordered with repeat cytology  Home O2 evaluation  performed, portable oxygen concentrator ordered for home  Discussed with gastroenterology, patient needs to restart anticoagulation for portal vein thrombosis.  Will plan to restart and monitor post paracentesis  Discussed with case management plan for patient to discharge to inpatient alcohol rehab in [place], transport to be set up by family.    I have discussed this patient's plan of care and discharge plan at IDT rounds today with Case Management, Nursing, Nursing leadership, and other members of the IDT team.    Consultants/Specialty  GI    Code Status  Full Code    Disposition  The patient is not medically cleared for discharge to home or a post-acute facility.      I have placed the appropriate orders for post-discharge needs.    Review of Systems  Review of Systems   Constitutional:  Positive for malaise/fatigue. Negative for chills and fever.   Respiratory:  Negative for shortness of breath.    Cardiovascular:  Negative for chest pain.   Gastrointestinal:  Positive for abdominal pain. Negative for nausea and vomiting.   Musculoskeletal:  Negative for myalgias.   Skin:  Negative for rash.   Neurological:  Negative for dizziness and headaches.   Psychiatric/Behavioral:  Negative for depression.    All other systems reviewed and are negative.       Physical Exam  Temp:  [36.3 °C (97.4 °F)-36.6 °C (97.9 °F)] 36.6 °C (97.9 °F)  Pulse:  [60-75] 71  Resp:  [16-18] 16  BP: (105-110)/(65-73) 110/65  SpO2:  [93 %-95 %] 95 %    Physical Exam  Vitals and nursing note reviewed. Exam conducted with a chaperone present.   Constitutional:       General: She is not in acute distress.     Appearance: Normal appearance.   HENT:      Head: Normocephalic.   Eyes:      General: Scleral icterus present.   Cardiovascular:      Rate and Rhythm: Normal rate and regular rhythm.      Pulses: Normal pulses.   Pulmonary:      Effort: Pulmonary effort is normal. No respiratory distress.      Breath sounds: Normal breath sounds.    Abdominal:      General: There is distension.      Palpations: Abdomen is soft.      Tenderness: There is abdominal tenderness (Mild diffuse, worse left upper quadrant).   Musculoskeletal:         General: No swelling or deformity. Normal range of motion.   Skin:     General: Skin is warm and dry.      Coloration: Skin is jaundiced.      Findings: No bruising or lesion.   Neurological:      General: No focal deficit present.      Mental Status: She is alert and oriented to person, place, and time.      Cranial Nerves: No cranial nerve deficit.   Psychiatric:         Mood and Affect: Mood normal.         Behavior: Behavior normal.         Fluids    Intake/Output Summary (Last 24 hours) at 7/1/2025 1614  Last data filed at 7/1/2025 0900  Gross per 24 hour   Intake 440 ml   Output --   Net 440 ml        Laboratory        Recent Labs     06/29/25  0800 06/30/25  0723 07/01/25  0435   SODIUM 131* 132* 129*   POTASSIUM 3.2* 3.5* 3.4*   CHLORIDE 91* 87* 88*   CO2 29 32 30   GLUCOSE 91 64* 84   BUN 20 18 17   CREATININE 0.67 0.72 0.63   CALCIUM 8.7 8.3* 8.1*     Recent Labs     06/29/25  0800   INR 1.36*               Imaging  MR-ABDOMEN-WITH & W/O   Final Result      1.  Cirrhosis and portal hypertension, including gastroesophageal varices and small to moderate volume ascites.   2.  No suspicious liver lesions.   3.  No acute inflammatory process in the abdomen. No malignancy or metastatic disease.         MR-PELVIS-WITH & W/O AND SEQUENCES   Final Result      1.  No pelvic mass lesions. No evidence of malignancy or metastatic disease in the pelvis.   2.  Atrophic ovaries.   3.  Moderate volume pelvic ascites.               EC-ECHOCARDIOGRAM COMPLETE W/ CONT   Final Result      US-PARACENTESIS, ABD WITH IMAGING   Final Result      1. Ultrasound-guided diagnostic and therapeutic paracentesis of the right lower quadrant of the abdominal wall.      2. 1200 mL of fluid withdrawn.      US-ABDOMEN COMPLETE SURVEY   Final  Result      1.  Cirrhotic appearance of the liver with a lobulated cyst.   2.  Sludge is present within the gallbladder. There is no definite stone.   3.  Ascites.   4.  There appears to be occlusion of the portal vein and at least partial occlusion of the inferior vena cava.   5.  Evaluation is limited with poor visualization of the midline structures.         DX-CHEST-PORTABLE (1 VIEW)   Final Result      Mild left lung infiltrate.      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  multivitamin, thiamine and folate  replacing electrolytes  See discussion above  No hallucinations or seizure today - precautions    Vitamin A deficiency  Assessment & Plan  Start multivitamin    Elevated cancer antigen 125 ()  Assessment & Plan   797  Checked due to atypical cells noted on pathology of ascites fluid.   6/28 MRI abdomen/pelvis with and w/o contrast no ovarian or abdominal malignancy.  Renal function wnl.   CT AP did not reveal any ovarian mass.    Portal vein thrombosis- (present on admission)  Assessment & Plan  H/h stable, no evidence of bleeding  Discussed with pt and GI  Holding eliquis since an increase in INR from 1.8-5.36.  following  Cbc stable    - Plan to restart Eliquis post paracentesis    Hyponatremia- (present on admission)  Assessment & Plan  Consistent with cirrhosis  following    Alcoholic hepatitis- (present on admission)  Assessment & Plan  Maddery dissemination score 46  Bili continues to increase, steroids started  Poor response after 7 days per Delmy score, prednisolone discontinued    Esophageal varices in alcoholic cirrhosis (HCC)- (present on admission)  Assessment & Plan  Decompensated  Volume overloaded, improving with diuretics - tapering as tolerated  MELD 3.0  repeat 26.    Hepatitis panel-repeat negative.  Prednisolone started 6/22 for worsening LFTs.  Lactulose qhs, NH3 elevated 66 at one point, now excessive BMs and normal mentation.  Improving t. Bilirubin, plan for EGD and colonoscopy to evaluate esophageal varices seen on MRCP.   MRCP no evidence of carcinoma, no liver lesions.  6/30 EGD with esophageal varices 3 columns and portal hypertension.  -Repeat EGD in 6 months  Started on coreg 3.125mg po bid   Also with rectal varices on colonoscopy, started anusol HC bid x7 days, repeat colonoscopy in 10 years    Acute hypoxic respiratory failure (HCC)- (present on admission)  Assessment & Plan  Fluctuating, had titrated down to 1L after paracentesis but increased to 3 L overnight - exam stable- wean as tolerated  Continue diuretics as tolerated   Query mild community acquired pna, non specific changes on x ray -  No cough, on empiric augmentin   Wean as tolerated  Following  RT      High anion gap metabolic acidosis- (present on admission)  Assessment & Plan  Continue to trend secondary to liver dysfunction    Ascites- (present on admission)  Assessment & Plan  Due to cirrhosis  Tolerating diuretics  S/p paracentesis    Repeat paracentesis ordered 7/1    Hypokalemia- (present on admission)  Assessment & Plan  Replaced and resolved    Acute cystitis- (present on admission)  Assessment & Plan  Resolved  Completed augmentin x 5 days.           VTE prophylaxis: SCD/ eliquis    I have performed a physical exam and reviewed and updated ROS and Plan today (7/1/2025). In review of yesterday's note (6/30/2025), there are no changes except as documented above.

## 2025-07-02 LAB
ALBUMIN SERPL BCP-MCNC: 2.5 G/DL (ref 3.2–4.9)
ALBUMIN/GLOB SERPL: 1 G/DL
ALP SERPL-CCNC: 178 U/L (ref 30–99)
ALT SERPL-CCNC: 153 U/L (ref 2–50)
ANION GAP SERPL CALC-SCNC: 8 MMOL/L (ref 7–16)
AST SERPL-CCNC: 226 U/L (ref 12–45)
BILIRUB SERPL-MCNC: 9.7 MG/DL (ref 0.1–1.5)
BUN SERPL-MCNC: 13 MG/DL (ref 8–22)
CALCIUM ALBUM COR SERPL-MCNC: 9.4 MG/DL (ref 8.5–10.5)
CALCIUM SERPL-MCNC: 8.2 MG/DL (ref 8.5–10.5)
CHLORIDE SERPL-SCNC: 90 MMOL/L (ref 96–112)
CO2 SERPL-SCNC: 30 MMOL/L (ref 20–33)
CREAT SERPL-MCNC: 0.55 MG/DL (ref 0.5–1.4)
ERYTHROCYTE [DISTWIDTH] IN BLOOD BY AUTOMATED COUNT: 68.4 FL (ref 35.9–50)
GFR SERPLBLD CREATININE-BSD FMLA CKD-EPI: 111 ML/MIN/1.73 M 2
GLOBULIN SER CALC-MCNC: 2.6 G/DL (ref 1.9–3.5)
GLUCOSE SERPL-MCNC: 120 MG/DL (ref 65–99)
HCT VFR BLD AUTO: 35.8 % (ref 37–47)
HGB BLD-MCNC: 12.3 G/DL (ref 12–16)
MAGNESIUM SERPL-MCNC: 1.5 MG/DL (ref 1.5–2.5)
MCH RBC QN AUTO: 37.2 PG (ref 27–33)
MCHC RBC AUTO-ENTMCNC: 34.4 G/DL (ref 32.2–35.5)
MCV RBC AUTO: 108.2 FL (ref 81.4–97.8)
PHOSPHATE SERPL-MCNC: 2.2 MG/DL (ref 2.5–4.5)
PLATELET # BLD AUTO: 157 K/UL (ref 164–446)
PMV BLD AUTO: 10.7 FL (ref 9–12.9)
POTASSIUM SERPL-SCNC: 3.7 MMOL/L (ref 3.6–5.5)
PROT SERPL-MCNC: 5.1 G/DL (ref 6–8.2)
RBC # BLD AUTO: 3.31 M/UL (ref 4.2–5.4)
SODIUM SERPL-SCNC: 128 MMOL/L (ref 135–145)
WBC # BLD AUTO: 12.7 K/UL (ref 4.8–10.8)

## 2025-07-02 PROCEDURE — 700102 HCHG RX REV CODE 250 W/ 637 OVERRIDE(OP): Performed by: INTERNAL MEDICINE

## 2025-07-02 PROCEDURE — A9270 NON-COVERED ITEM OR SERVICE: HCPCS | Performed by: INTERNAL MEDICINE

## 2025-07-02 PROCEDURE — 700102 HCHG RX REV CODE 250 W/ 637 OVERRIDE(OP): Performed by: HOSPITALIST

## 2025-07-02 PROCEDURE — 99232 SBSQ HOSP IP/OBS MODERATE 35: CPT | Performed by: NURSE PRACTITIONER

## 2025-07-02 PROCEDURE — A9270 NON-COVERED ITEM OR SERVICE: HCPCS | Performed by: HOSPITALIST

## 2025-07-02 PROCEDURE — 770001 HCHG ROOM/CARE - MED/SURG/GYN PRIV*

## 2025-07-02 PROCEDURE — 36415 COLL VENOUS BLD VENIPUNCTURE: CPT

## 2025-07-02 PROCEDURE — 99233 SBSQ HOSP IP/OBS HIGH 50: CPT | Performed by: INTERNAL MEDICINE

## 2025-07-02 PROCEDURE — 83735 ASSAY OF MAGNESIUM: CPT

## 2025-07-02 PROCEDURE — 85027 COMPLETE CBC AUTOMATED: CPT

## 2025-07-02 PROCEDURE — 700111 HCHG RX REV CODE 636 W/ 250 OVERRIDE (IP): Performed by: INTERNAL MEDICINE

## 2025-07-02 PROCEDURE — 97535 SELF CARE MNGMENT TRAINING: CPT

## 2025-07-02 PROCEDURE — 80053 COMPREHEN METABOLIC PANEL: CPT

## 2025-07-02 PROCEDURE — 84100 ASSAY OF PHOSPHORUS: CPT

## 2025-07-02 PROCEDURE — A9270 NON-COVERED ITEM OR SERVICE: HCPCS | Performed by: NURSE PRACTITIONER

## 2025-07-02 PROCEDURE — 700102 HCHG RX REV CODE 250 W/ 637 OVERRIDE(OP): Performed by: NURSE PRACTITIONER

## 2025-07-02 RX ORDER — MAGNESIUM SULFATE HEPTAHYDRATE 40 MG/ML
4 INJECTION, SOLUTION INTRAVENOUS ONCE
Status: COMPLETED | OUTPATIENT
Start: 2025-07-02 | End: 2025-07-02

## 2025-07-02 RX ADMIN — NYSTATIN: 100000 POWDER TOPICAL at 16:26

## 2025-07-02 RX ADMIN — ALBUTEROL SULFATE 2 PUFF: 90 AEROSOL, METERED RESPIRATORY (INHALATION) at 12:16

## 2025-07-02 RX ADMIN — ESCITALOPRAM OXALATE 20 MG: 10 TABLET ORAL at 05:12

## 2025-07-02 RX ADMIN — FUROSEMIDE 40 MG: 40 TABLET ORAL at 05:12

## 2025-07-02 RX ADMIN — SPIRONOLACTONE 100 MG: 100 TABLET ORAL at 05:12

## 2025-07-02 RX ADMIN — HYDROXYZINE HYDROCHLORIDE 25 MG: 50 TABLET ORAL at 21:15

## 2025-07-02 RX ADMIN — MAGNESIUM SULFATE HEPTAHYDRATE 4 G: 4 INJECTION, SOLUTION INTRAVENOUS at 08:44

## 2025-07-02 RX ADMIN — OXYCODONE 5 MG: 5 TABLET ORAL at 05:54

## 2025-07-02 RX ADMIN — LACTULOSE 15 ML: 10 SOLUTION ORAL at 16:25

## 2025-07-02 RX ADMIN — Medication 1 TABLET: at 05:11

## 2025-07-02 RX ADMIN — OXYCODONE 5 MG: 5 TABLET ORAL at 15:04

## 2025-07-02 RX ADMIN — HYDROXYZINE HYDROCHLORIDE 25 MG: 50 TABLET ORAL at 05:54

## 2025-07-02 RX ADMIN — HYDROCORTISONE ACETATE 25 MG: 25 SUPPOSITORY RECTAL at 05:12

## 2025-07-02 RX ADMIN — POTASSIUM CHLORIDE 40 MEQ: 1500 TABLET, EXTENDED RELEASE ORAL at 05:12

## 2025-07-02 RX ADMIN — APIXABAN 10 MG: 5 TABLET, FILM COATED ORAL at 05:11

## 2025-07-02 RX ADMIN — HYDROXYZINE HYDROCHLORIDE 25 MG: 50 TABLET ORAL at 12:14

## 2025-07-02 RX ADMIN — DIBASIC SODIUM PHOSPHATE, MONOBASIC POTASSIUM PHOSPHATE AND MONOBASIC SODIUM PHOSPHATE 500 MG: 852; 155; 130 TABLET ORAL at 08:15

## 2025-07-02 RX ADMIN — Medication 100 MG: at 05:11

## 2025-07-02 RX ADMIN — HYDROCORTISONE ACETATE 25 MG: 25 SUPPOSITORY RECTAL at 16:25

## 2025-07-02 RX ADMIN — NYSTATIN: 100000 POWDER TOPICAL at 05:12

## 2025-07-02 RX ADMIN — APIXABAN 10 MG: 5 TABLET, FILM COATED ORAL at 16:25

## 2025-07-02 RX ADMIN — GABAPENTIN 300 MG: 300 CAPSULE ORAL at 16:25

## 2025-07-02 RX ADMIN — OXYCODONE 5 MG: 5 TABLET ORAL at 20:17

## 2025-07-02 RX ADMIN — LEVOTHYROXINE SODIUM 50 MCG: 0.05 TABLET ORAL at 05:12

## 2025-07-02 RX ADMIN — GABAPENTIN 300 MG: 300 CAPSULE ORAL at 05:11

## 2025-07-02 ASSESSMENT — ENCOUNTER SYMPTOMS
SHORTNESS OF BREATH: 0
VOMITING: 0
DEPRESSION: 0
BLOOD IN STOOL: 0
MYALGIAS: 0
SORE THROAT: 0
DIZZINESS: 0
BLURRED VISION: 0
FEVER: 0
ABDOMINAL PAIN: 1
CONSTIPATION: 0
DIARRHEA: 0
WEAKNESS: 0
HEARTBURN: 0
ABDOMINAL PAIN: 0
HEADACHES: 0
NAUSEA: 0
BACK PAIN: 0
CHILLS: 0
COUGH: 0
TREMORS: 0
NERVOUS/ANXIOUS: 0

## 2025-07-02 ASSESSMENT — COGNITIVE AND FUNCTIONAL STATUS - GENERAL
SUGGESTED CMS G CODE MODIFIER DAILY ACTIVITY: CH
DAILY ACTIVITIY SCORE: 24

## 2025-07-02 ASSESSMENT — PAIN DESCRIPTION - PAIN TYPE
TYPE: ACUTE PAIN

## 2025-07-02 ASSESSMENT — LIFESTYLE VARIABLES: SUBSTANCE_ABUSE: 1

## 2025-07-02 NOTE — CARE PLAN
The patient is Stable - Low risk of patient condition declining or worsening      Problem: Safety  Goal: Will remain free from injury  Outcome: Progressing     Problem: Pain Management  Goal: Pain level will decrease to patient's comfort goal  Outcome: Progressing     Problem: Knowledge Deficit  Goal: Knowledge of disease process/condition, treatment plan, diagnostic tests, and medications will improve  Outcome: Progressing     Shift Goals  Clinical Goals: monitor labs and VS, safety pt will remain free from falls this shift  Patient Goals: comfort and rest  Family Goals: none present    Progress made toward(s) clinical / shift goals:      Pt alert and oriernd. BP soft, BP med held. On O2 2L via nasal cannula. No c/o pain at this time. PRN anxiety med and inhaler given. OOB to bathroom, reinforced education on fall precaution, call light within reach, refused bed alarm.

## 2025-07-02 NOTE — THERAPY
Occupational Therapy  Discharge      Patient Name:  Minerva Tillman  Age:  50 y.o., Sex:  female  Medical Record #:  [id number]  Today's Date:  7/2/2025          Assessment    Pt seen for follow up OT treatment. Pt demonstrates improved independence in all self-care ADLs compared to prior OT session, has met acute OT goals. Pt reports no concerns for ADLs and good insight into importance of functional activity engagement to rebuild strength and balance. Pt continues to require supplemental O2, receptive to compensatory strategies for O2 line mgmt upon DC. Anticipate no additional acute OT needs at this time.     Plan    Reason for Discharge From Therapy: Discharge Secondary to Goals Met    DC Equipment Recommendations: None  Discharge Recommendations: Anticipate that the patient will have no further occupational therapy needs after discharge from the hospital    Objective       07/02/25 1542   Vitals   Room Air Oximetry 94   O2 (LPM) 2   O2 Delivery Device Nasal Cannula   Vitals Comments desaturated to 89 on RA   Pain 0 - 10 Group   Therapist Pain Assessment Nurse Notified;Post Activity Pain Same as Prior to Activity;0   Cognition    Cognition / Consciousness WDL   Level of Consciousness Alert   Strength Upper Body   Upper Body Strength  X   Gross Strength Generalized Weakness, Equal Bilaterally.    Balance   Sitting Balance (Static) Fair +   Sitting Balance (Dynamic) Fair +   Standing Balance (Static) Fair   Standing Balance (Dynamic) Fair   Weight Shift Sitting Fair   Weight Shift Standing Fair   Skilled Intervention Verbal Cuing   Comments no AD   Bed Mobility    Supine to Sit Supervised   Sit to Supine Supervised   Scooting Supervised   Skilled Intervention Verbal Cuing   Activities of Daily Living   Eating Independent   Grooming Supervision;Standing   Upper Body Dressing Supervision   Lower Body Dressing Supervision   Toileting Supervision   Skilled Intervention Verbal Cuing   Comments takes showers  independently per RN   How much help from another person does the patient currently need...   Putting on and taking off regular lower body clothing? 4   Bathing (including washing, rinsing, and drying)? 4   Toileting, which includes using a toilet, bedpan, or urinal? 4   Putting on and taking off regular upper body clothing? 4   Taking care of personal grooming such as brushing teeth? 4   Eating meals? 4   6 Clicks Daily Activity Score 24   Functional Mobility   Sit to Stand Supervised   Bed, Chair, Wheelchair Transfer Supervised   Toilet Transfers Supervised   Transfer Method Stand Step   Mobility no AD   Activity Tolerance   Comments remains below baseline 2/2 pain at paracentesis site however much improved from prior OT session   Patient / Family Goals   Patient / Family Goal #1 to go to inpt alcohol rehab   Goal #1 Outcome Progressing as expected   Short Term Goals   Short Term Goal # 1 Pt will complete toilet transfer supervised   Goal Outcome # 1 Goal met   Short Term Goal # 2 Pt will complete UB/LB dressing supervised   Goal Outcome # 2 Goal met   Short Term Goal # 3 pt will complete toileting supervised   Goal Outcome # 3 Goal met   Education Group   Education Provided Adaptive Equipment;Role of Occupational Therapist;Activities of Daily Living;Transfers;Home Safety;Pathology of bedrest   Role of Occupational Therapist Patient Response Patient;Acceptance;Explanation;Demonstration;Verbal Demonstration;Action Demonstration   Home Safety Patient Response Patient;Acceptance;Explanation;Demonstration;Verbal Demonstration;Action Demonstration   Transfers Patient Response Patient;Acceptance;Explanation;Demonstration;Verbal Demonstration;Action Demonstration   ADL Patient Response Patient;Acceptance;Explanation;Demonstration;Verbal Demonstration;Action Demonstration   Adaptive Equipment Patient Response Patient;Acceptance;Explanation;Demonstration;Verbal Demonstration;Action Demonstration   Pathology of Bedrest  Patient Response Patient;Acceptance;Explanation;Demonstration;Verbal Demonstration;Action Demonstration   Occupational Therapy Treatment Plan    O.T. Treatment Plan Modify Current Treatment Plan   Reason For Discharge Discharge Secondary to Goals Met   Anticipated Discharge Equipment and Recommendations   DC Equipment Recommendations None   Discharge Recommendations Anticipate that the patient will have no further occupational therapy needs after discharge from the hospital   Interdisciplinary Plan of Care Collaboration   IDT Collaboration with  Nursing   Patient Position at End of Therapy In Bed;Call Light within Reach;Tray Table within Reach;Phone within Reach

## 2025-07-02 NOTE — DISCHARGE PLANNING
Case Management Discharge Planning    Admission Date: 6/19/2025  GMLOS: 4.8  ALOS: 13    6-Clicks ADL Score: 22  6-Clicks Mobility Score: 23      Anticipated Discharge Dispo: Discharge Disposition: Discharged to home/self care (01)    DME Needed: Yes    DME Ordered: Yes - portable oxygen concentrator    Action(s) Taken: Updated Provider/Nurse on Discharge Plan  @ 0830 Discussed in IDT. Patient started on Eliquis today. Plan is to monitor her today and discharge tomorrow.     @0925 Spoke with patient to f/u on oxygen need and update her on POC purchase plan. Patient's  had stopped by today and informed patient that POC should be delivered to her room today.    I updated patient on plan for discharge tomorrow, and she stated that she is still receiving treatments, and her  set up flight for Friday to [place] for [facility] [place].   She also has a few questions for attending.     @0951 Updated attending and bedside RN. Attending will go see patient.     @1110 Discussed in IDT. Plan DC on Friday, 7/4/25. Attending plans to follow hgb and kidney function. POC is at bedside.     Escalations Completed: Provider    Medically Clear: No    Next Steps: follow for POC delivery. Will need meds to beds.     Barriers to Discharge: Medical clearance    Is the patient up for discharge tomorrow: No    Is transport arranged for discharge disposition: Yes -  to transport

## 2025-07-02 NOTE — CARE PLAN
The patient is Watcher - Medium risk of patient condition declining or worsening    Shift Goals  Clinical Goals: monitor labs and VS, safety pt will remain free from falls this shift  Patient Goals: comfort and rest  Family Goals: none present    Progress made toward(s) clinical / shift goals:    Problem: Safety  Goal: Will remain free from falls  Outcome: Progressing   Pt continues to remain free from falls throughout this shift  Problem: Pain Management  Goal: Pain level will decrease to patient's comfort goal  Outcome: Progressing   Pain being managed with use of PRN medications   Problem: Skin Integrity  Goal: Risk for impaired skin integrity will decrease  Outcome: Progressing   Pt encouraged to turn throughout this shift to help maintain skin integrity.     Patient is not progressing towards the following goals:

## 2025-07-02 NOTE — CARE PLAN
The patient is Stable - Low risk of patient condition declining or worsening    Shift Goals  Clinical Goals: monitor labs and VS, safety pt will remain free from falls this shift    Progress made toward(s) clinical / shift goals:  refused bed alarm, pt. Steady ambulating. Ensured that oxygen on, educated pt. Had shower today. Due meds given and tolerated. Per pt. Only had 1 BM today and more solid. Needs attended.      Problem: Safety  Goal: Will remain free from injury  Outcome: Progressing     Problem: Safety  Goal: Will remain free from falls  Outcome: Progressing     Problem: Pain Management  Goal: Pain level will decrease to patient's comfort goal  Outcome: Progressing     Problem: Psychosocial Needs:  Goal: Level of anxiety will decrease  Outcome: Progressing       Patient is not progressing towards the following goals:

## 2025-07-02 NOTE — PROGRESS NOTES
Hospital Medicine Daily Progress Note    Date of Service  7/2/2025    Chief Complaint  Minerva Tillman is a 50 y.o. female admitted 6/19/2025 with jaundice    Hospital Course  Minerva Tillman is a 50 y.o. female who presented 6/19/2025 with past medical history of liver cirrhosis, alcohol abuse, depression who presents to the hospital for jaundice the past 1 week. She was found to have acute decompensated cirrhosis with ascites with a MELD of 27, acute on chronic respiratory failure with volume overload, portal vein thrombosis and possible community acquired pneumonia and was started on empiric antibiotics    She required CIWA and support with alcohol withdrawl symptoms. She underwent a paracentesis and had no evidence sbp. She was started on anticoagulation for the acute portal vein thrombosis. GI was consulted and patient was started on prednisolone.  Patient had atypical cells suspicious for malignancy in her ascites fluid.  aFP 3% 28.6,  797 and MRCP was performed which showed cirrhosis and portal hypertension with esophageal varices and small to moderate volume ascites, no malignancy or metastatic disease seen.  Patient's alcohol withdrawal symptoms resolved and CIWA protocol was discontinued.  Prednisone was discontinued since there is no significant improvement after 7 days.  GI took patient for EGD 6/30 found to have grade 1 esophageal varices, 0.5 cm hiatal hernia recommended to start Coreg and repeat EGD in 6 months.  Colonoscopy also performed found to have hemorrhoids no other lesions seen recommended repeat colonoscopy in 10 years and Anusol MO twice daily for 7 days.     Interval Problem Update  7/1 Vitals stable on 2 L NC   Na 129, K 3.4, replaced   , , total bili 9.9   Vitamin A returned low 0.19, started on multivitamin  Patient feels like her abdominal ascites is returning and feeling more distended.  Repeat paracentesis ordered with repeat cytology  Home O2 evaluation  performed, portable oxygen concentrator ordered for home  Discussed with gastroenterology, patient needs to restart anticoagulation for portal vein thrombosis.  Will plan to restart and monitor post paracentesis  Discussed with case management plan for patient to discharge to inpatient alcohol rehab in [place], transport to be set up by family.    7/2 sating 95% on 2 L NC   WBC 12.7, Hb 12.3, plt 157   Na 128, renal function stable   , , bili 9.7   Mg 1.5, IV replaced   Phos 2.2, replacement ordered   Paracentesis performed yesterday evening, 1.5 L fluid removed   -no SBP per fluid studies, cytology pending   -Monitor renal function after fluid removal  Discussed with pharmacy, patient previously received 5 doses of the Eliquis but then was off for almost a week, will restart Eliquis loading dose   - Monitor hemoglobin closely  Patient reports she was told she likely has asthma and was given a pulmonology referral in the past when she needed oxygen a few years ago.  She was unable to follow-up or have pulmonary function testing performed for official diagnosis.  Discussed she has albuterol as needed if she feels short of breath.  She reports her abdomen feels much better today after the paracentesis yesterday  Discussed with case management patient self paying for oxygen which should be delivered to bedside today.  Flight being set up to [facility] on Friday to ensure stable labs prior to transfer    I have discussed this patient's plan of care and discharge plan at IDT rounds today with Case Management, Nursing, Nursing leadership, and other members of the IDT team.    Consultants/Specialty  GI    Code Status  Full Code    Disposition  The patient is not medically cleared for discharge to home or a post-acute facility.  Anticipate discharge to: home with close outpatient follow-up    I have placed the appropriate orders for post-discharge needs.    Review of Systems  Review of Systems   Constitutional:   Positive for malaise/fatigue. Negative for chills and fever.   Respiratory:  Negative for shortness of breath.    Cardiovascular:  Negative for chest pain.   Gastrointestinal:  Positive for abdominal pain. Negative for nausea and vomiting.   Musculoskeletal:  Negative for myalgias.   Skin:  Negative for rash.   Neurological:  Negative for dizziness and headaches.   Psychiatric/Behavioral:  Negative for depression.    All other systems reviewed and are negative.       Physical Exam  Temp:  [36.1 °C (97 °F)-36.7 °C (98 °F)] 36.3 °C (97.4 °F)  Pulse:  [60-71] 71  Resp:  [16-18] 16  BP: ()/(64-72) 100/68  SpO2:  [93 %-97 %] 93 %    Physical Exam  Vitals and nursing note reviewed. Exam conducted with a chaperone present.   Constitutional:       General: She is not in acute distress.     Appearance: Normal appearance.   HENT:      Head: Normocephalic.   Eyes:      General: Scleral icterus present.   Cardiovascular:      Rate and Rhythm: Normal rate and regular rhythm.      Pulses: Normal pulses.   Pulmonary:      Effort: Pulmonary effort is normal. No respiratory distress.      Breath sounds: Normal breath sounds.   Abdominal:      General: There is distension.      Palpations: Abdomen is soft.      Tenderness: There is abdominal tenderness (Mild diffuse, worse left upper quadrant).   Musculoskeletal:         General: No swelling or deformity. Normal range of motion.   Skin:     General: Skin is warm and dry.      Coloration: Skin is jaundiced.      Findings: No bruising or lesion.   Neurological:      General: No focal deficit present.      Mental Status: She is alert and oriented to person, place, and time.      Cranial Nerves: No cranial nerve deficit.   Psychiatric:         Mood and Affect: Mood normal.         Behavior: Behavior normal.         Fluids    Intake/Output Summary (Last 24 hours) at 7/2/2025 1704  Last data filed at 7/2/2025 1100  Gross per 24 hour   Intake 120 ml   Output --   Net 120 ml         Laboratory  Recent Labs     07/02/25  0056   WBC 12.7*   RBC 3.31*   HEMOGLOBIN 12.3   HEMATOCRIT 35.8*   .2*   MCH 37.2*   MCHC 34.4   RDW 68.4*   PLATELETCT 157*   MPV 10.7       Recent Labs     06/30/25  0723 07/01/25  0435 07/02/25  0056   SODIUM 132* 129* 128*   POTASSIUM 3.5* 3.4* 3.7   CHLORIDE 87* 88* 90*   CO2 32 30 30   GLUCOSE 64* 84 120*   BUN 18 17 13   CREATININE 0.72 0.63 0.55   CALCIUM 8.3* 8.1* 8.2*                     Imaging  MR-ABDOMEN-WITH & W/O   Final Result      1.  Cirrhosis and portal hypertension, including gastroesophageal varices and small to moderate volume ascites.   2.  No suspicious liver lesions.   3.  No acute inflammatory process in the abdomen. No malignancy or metastatic disease.         MR-PELVIS-WITH & W/O AND SEQUENCES   Final Result      1.  No pelvic mass lesions. No evidence of malignancy or metastatic disease in the pelvis.   2.  Atrophic ovaries.   3.  Moderate volume pelvic ascites.               EC-ECHOCARDIOGRAM COMPLETE W/ CONT   Final Result      US-PARACENTESIS, ABD WITH IMAGING   Final Result      1. Ultrasound-guided diagnostic and therapeutic paracentesis of the right lower quadrant of the abdominal wall.      2. 1200 mL of fluid withdrawn.      US-ABDOMEN COMPLETE SURVEY   Final Result      1.  Cirrhotic appearance of the liver with a lobulated cyst.   2.  Sludge is present within the gallbladder. There is no definite stone.   3.  Ascites.   4.  There appears to be occlusion of the portal vein and at least partial occlusion of the inferior vena cava.   5.  Evaluation is limited with poor visualization of the midline structures.         DX-CHEST-PORTABLE (1 VIEW)   Final Result      Mild left lung infiltrate.      US-PARACENTESIS, ABD WITH IMAGING    (Results Pending)        Assessment/Plan  * Alcohol abuse with withdrawal and perceptual disturbance (HCC)- (present on admission)  Assessment & Plan  6/24 discontinued CIWA protocol as patient no longer  in withdrawal.  multivitamin, thiamine and folate  replacing electrolytes  See discussion above  No hallucinations or seizure today - precautions    Vitamin A deficiency  Assessment & Plan  Start multivitamin    Elevated cancer antigen 125 ()  Assessment & Plan   797  Checked due to atypical cells noted on pathology of ascites fluid.   6/28 MRI abdomen/pelvis with and w/o contrast no ovarian or abdominal malignancy.  Renal function wnl.   CT AP did not reveal any ovarian mass.    Portal vein thrombosis- (present on admission)  Assessment & Plan  H/h stable, no evidence of bleeding  Discussed with pt and GI  Holding eliquis since an increase in INR from 1.8-5.36.  following  Cbc stable    Restart Eliquis 7/2 with full loading dose as she only had a few days previously    Hyponatremia- (present on admission)  Assessment & Plan  Consistent with cirrhosis  following    Alcoholic hepatitis- (present on admission)  Assessment & Plan  Maddery dissemination score 46  Bili continues to increase, steroids started  Poor response after 7 days per Delmy score, prednisolone discontinued    Esophageal varices in alcoholic cirrhosis (HCC)- (present on admission)  Assessment & Plan  Decompensated  Volume overloaded, improving with diuretics - tapering as tolerated  MELD 3.0  repeat 26.   Hepatitis panel-repeat negative.  Prednisolone started 6/22 for worsening LFTs.  Lactulose qhs, NH3 elevated 66 at one point, now excessive BMs and normal mentation.  Improving t. Bilirubin, plan for EGD and colonoscopy to evaluate esophageal varices seen on MRCP.   MRCP no evidence of carcinoma, no liver lesions.  6/30 EGD with esophageal varices 3 columns and portal hypertension.  -Repeat EGD in 6 months  Started on coreg 3.125mg po bid   Also with rectal varices on colonoscopy, started anusol HC bid x7 days, repeat colonoscopy in 10 years    Hypophosphatemia- (present on admission)  Assessment & Plan  Replace as needed    Acute  hypoxic respiratory failure (HCC)- (present on admission)  Assessment & Plan  Fluctuating, had titrated down to 1L after paracentesis but increased to 3 L overnight - exam stable- wean as tolerated  Continue diuretics as tolerated   Query mild community acquired pna, non specific changes on x ray -  No cough, on empiric augmentin   Wean as tolerated  Following  RT      High anion gap metabolic acidosis- (present on admission)  Assessment & Plan  Continue to trend secondary to liver dysfunction    Ascites- (present on admission)  Assessment & Plan  Due to cirrhosis  Tolerating diuretics  S/p paracentesis    Repeat paracentesis ordered 7/1    Hypomagnesemia- (present on admission)  Assessment & Plan  Replace as needed    Hypokalemia- (present on admission)  Assessment & Plan  Replaced and resolved    Acute cystitis- (present on admission)  Assessment & Plan  Resolved  Completed augmentin x 5 days.           VTE prophylaxis: KEVIN/ eliquis    I have performed a physical exam and reviewed and updated ROS and Plan today (7/2/2025). In review of yesterday's note (7/1/2025), there are no changes except as documented above.

## 2025-07-02 NOTE — DISCHARGE PLANNING
@0923  Agency/Facility Name: [facility]  Spoke To: Mabel  Outcome: Referral is under review.  DPA will follow up in about an hour.      Per CM's note, POC is bedside.

## 2025-07-02 NOTE — PROGRESS NOTES
.Gastroenterology Progress Note               Author:  Beena Alex APRN   Date & Time Created: 7/2/2025 7:49 AM       Patient ID:  Name:             Minerva Tillman  YOB: 1975  Age:                 50 y.o.  female  MRN:               [id number]    Medical Decision Making, by Problem:  Active Hospital Problems    Diagnosis     Vitamin A deficiency [E50.9]     Elevated cancer antigen 125 () [R97.1]     Hyponatremia [E87.1]     Portal vein thrombosis [I81]     Alcohol abuse with withdrawal and perceptual disturbance (HCC) [F10.132]     Esophageal varices in alcoholic cirrhosis (HCC) [K70.30, I85.10]     Alcoholic hepatitis [K70.10]     High anion gap metabolic acidosis [E87.29]     Acute hypoxic respiratory failure (HCC) [J96.01]     Ascites [R18.8]     Hypokalemia [E87.6]     Acute cystitis [N30.00]      Presenting Chief Complaint:  Decompensated cirrhosis     History of Present Illness:   50-year-old female presents to the emergency department for abdominal swelling and shortness of breath and desire to discontinue alcohol intake.  Patient was seen by gastroenterology as an inpatient in 2024.  Dr. Daniels had recommended outpatient evaluation.  Patient followed up with Dr. Conley on 7/1/2024 with an additional follow-up appointment on 10/17/2024.  She had a FibroScan done which suggested advanced fibrosis and possible cirrhosis.  She was recommended to pursue upper and lower endoscopy.  Patient states that when she was told of this information she became quite concerned and did not follow-up because she was scared of the results.  Patient continues to drink alcohol.  She wants help in quitting.  She denies hematemesis or melena.  She endorses new onset abdominal swelling without lower extremity edema.     Home medications reviewed.  Patient not taking anything in the way of diuretics or medications for encephalopathy.  Patient is not on a beta-blocker.     Review of systems at this time is  as stated above as well as patient feeling a little bit anxious but comfortable with regard to her detoxification/CIWA protocol.    Interval History:  6/21/2025: Patient seen.  Reports feeling much better but having multiple liquid bowel movements.  Mild tremors, very hungry.  Has started wearing oxygen at night at home, reports it is due to increased elevation at home in [facility].  Slightly dyspneic today.  N.p.o. pending paracentesis. VSS, CIWA 4.  Transaminitis worsening, T. bili 13.8 > 9.4.  INR 1.89.  Hoping to go directly to rehab from the hospital.    6/22/2025: Feels better, tremulous with CIWA 5. Having multiple bowel movements. Fluid negative for SBP and NGTD on culture. Bili up to 14.2    6/23/2025: Feels much better today, no complaints. Eating and drinking well. Reports she was unable to be weaned off oxygen. Liver enzymes uptrending, T. Bili 17.2>14.2    6/24/2025: Patient seen at bedside with .  Had just completed echocardiogram.  Results are pending.  Tolerating oral intake without difficulty.  Denies dyspnea, nausea, vomiting, abdominal pain.  WBC 12.6.  Hemoglobin stable 13.8.  Platelets 173.  Sodium 130, BUN 11, creatinine 0.8, LFTs downtrending with , , alk phos 252, total bilirubin 15.5.  Albumin 2.7.  INR increased at 5.36.  Eliquis held by primary team.  Cytology resulted showing abnormal cells-discussed this with patient and  at bedside for which they will follow-up with outpatient GI.  Additionally, per flowsheets, patient reported to have 2 bowel movements overnight.  Patient reports having more than 5.  Discussed lactulose daily, titrating to 2-3 bowel movements per day.    6/25/2025: AO x 4.  She reports having bilateral upper quadrant/epigastric discomfort, relieved with pain medication.  She reports 3-4 bowel movements overnight.  Reports she is not able to eat a lot of food at once but is grazing/eating throughout the day and tolerating well thus  far.  No nausea, vomiting, dyspnea.  WBC 12.1, hemoglobin 13.6, platelets 190.  Sodium 133, potassium 3.4, BUN 14, creatinine 0.82.  , , alk phos 232, total bilirubin 16.3.  Albumin 2.6.  INR 2.97.  Echocardiogram completed, unable to assess right ventricular function.  MELD 3.0 score 32; 63.5% estimated 90-day survival.    6/26/2025, AO x 4.  Reports mild generalized upper abdominal pain relieved with narcotics.  She reports bowel movement x 10 since 8:00 last night.  Appetite unchanged.  No other nausea, vomiting, dyspnea.  , , alk phos 213, total bilirubin 18.3, INR improved to 1.87.  CA-125 797.    6/27/2025: AO x 4.  Negative for asterixis.  Patient to problems overnight.  Patient reports grazing/eating small amounts throughout the day.  Upon questioning types of food she is eating, she states that she eats vegetables and her protein inpatient.  , , alk phos 202, total bilirubin 18.7.  INR 1.61.  AFP 3. Lille model 0.789.  MRI pelvis, MRCP pending.    6/29/2025: AAO x 4.  Negative for asterixis.  Patient reports feeling well without nausea, vomiting, abdominal pain.  Sodium 131, potassium 3.2 renal functions normal.  , , alk phos 204, total bilirubin improved to 13.6, albumin 2.6 INR 1.36.  MELD 3.0 score 25; 87.7% 90-day survival rate.  MRI pelvis negative for malignancy/metastases.  MRCP with cirrhosis, signs of portal hypertensive including gastroesophageal varices.  Patient was supposed to have outpatient EGD/colonoscopy with [facility] provider.  She states that she had completed her prep and was ready for her procedure but then her  was out of town and was unable to take her.  She had not rescheduled endoscopy.  Offered to do EGD/colonoscopy inpatient particularly given that she has gastroesophageal varices seen on MRCP and she is off anticoagulation.  She is agreeable to proceed.  Bowel movement x 5 semiformed overnight-  Patient reports this is  tolerable.    6/30/2025: EGD:   Esophagus: Grade 1 esophageal varices, three columns, extend throughout esophagus  Stomach: 0.5 cm hiatal hernia, portal hypertensive gastropathy, friable, but not bleeding  Duodenum: normal bulb and second portion    Colonoscopy:  Few scattered diverticula throughout colon. Rectal varices seen on retroflexion. No other lesion seen.     7/1/2025: Patient seen. Looks and feels better every day. Liver enzymes and T. Bili downtrending. Awaiting oxygen delivery so she can go to rehab in [place].     7/2/2025: Continues to feel better. Only one BM yesterday. Na 128, Bili 9.7 <--- 9.9, plt 157, phos 2.2. Para complete yesterday, 1500 ml removed.  Neg for SBP, SAAG 2.1, repeat cytology pending. INR tomorrow morning.     Hospital Medications:  Current Facility-Administered Medications   Medication Dose Frequency Provider Last Rate Last Admin    magnesium sulfate IVPB premix 4 g  4 g Once CHANTAL VarelaOMiko        phosphorus (K-Phos-Neutral) per tablet 500 mg  500 mg Once KAT Varela.O.        therapeutic multivitamin-minerals (Theragran-M) tablet 1 Tablet  1 Tablet DAILY Beena Alex, DNP,  APRN   1 Tablet at 07/02/25 0511    apixaban (Eliquis) tablet 10 mg  10 mg BID Shivani Dela Cruz D.O.   10 mg at 07/02/25 0511    [START ON 7/9/2025] apixaban (Eliquis) tablet 5 mg  5 mg BID Shivani Dela Cruz D.O.        potassium chloride SA (Kdur) tablet 40 mEq  40 mEq DAILY Vidhi Pond M.D.   40 mEq at 07/02/25 0512    hydrocortisone (Anusol-HC) suppository 25 mg  25 mg Q12HRS Vidhi Pond M.D.   25 mg at 07/02/25 0512    hydrOXYzine HCl (Atarax) tablet 25 mg  25 mg Q4HRS PRN Vidhi Pond M.D.   25 mg at 07/02/25 0554    lactulose 20 GM/30ML solution 15 mL  15 mL Q EVENING Vidhi Pond M.D.   15 mL at 07/01/25 1810    nystatin (Mycostatin) powder   BID Vidhi Pond M.D.   Given at 07/02/25 0512    oxyCODONE immediate-release (Roxicodone) tablet 5 mg  5 mg Q4HRS PRN Vidhi Pond,  M.D.   5 mg at 07/02/25 0554    thiamine (Vitamin B-1) tablet 100 mg  100 mg DAILY Vidhi Pond M.D.   100 mg at 07/02/25 0511    spironolactone (Aldactone) tablet 100 mg  100 mg Q DAY Helen Mendez M.D.   100 mg at 07/02/25 0512    Respiratory Therapy Consult   Continuous RT Helen Mendez M.D.        furosemide (Lasix) tablet 40 mg  40 mg Q DAY Helen Mendez M.D.   40 mg at 07/02/25 0512    carvedilol (Coreg) tablet 3.125 mg  3.125 mg BID WITH MEALS Helen Mendez M.D.   3.125 mg at 07/01/25 1025    melatonin tablet 5 mg  5 mg HS PRN MADDY cMgheeP.[email or web address]   5 mg at 06/25/25 0109    labetalol (Normodyne/Trandate) injection 10 mg  10 mg Q4HRS PRN Abby Ivy M.D.        ondansetron (Zofran) syringe/vial injection 4 mg  4 mg Q4HRS PRN Abby Ivy M.D.        ondansetron (Zofran ODT) dispertab 4 mg  4 mg Q4HRS PRN Abby Ivy M.D.   4 mg at 06/27/25 0807    escitalopram (Lexapro) tablet 20 mg  20 mg DAILY Abby Ivy M.D.   20 mg at 07/02/25 0512    gabapentin (Neurontin) capsule 300 mg  300 mg BID Abby Ivy M.D.   300 mg at 07/02/25 0511    levothyroxine (Synthroid) tablet 50 mcg  50 mcg AM ES Abby Ivy M.D.   50 mcg at 07/02/25 0512    albuterol inhaler 1-2 Puff  1-2 Puff Q6HRS PRN Abby Ivy M.D.   2 Puff at 06/22/25 5702   Last reviewed on 6/30/2025  7:28 AM by Lv Bunch D.O.       Review of Systems:  Review of Systems   Constitutional:  Negative for chills, fever and malaise/fatigue.   HENT:  Negative for hearing loss and sore throat.    Eyes:  Negative for blurred vision.   Respiratory:  Negative for cough and shortness of breath.    Cardiovascular:  Negative for chest pain and leg swelling.   Gastrointestinal:  Negative for abdominal pain, blood in stool, constipation, diarrhea, heartburn, melena, nausea and vomiting.   Genitourinary:  Negative for dysuria and hematuria.   Musculoskeletal:  Negative for back pain and myalgias.   Skin:  Negative for rash.   Neurological:  Negative for  "dizziness, tremors and weakness.   Psychiatric/Behavioral:  Positive for substance abuse. Negative for depression. The patient is not nervous/anxious.    All other systems reviewed and are negative.    Vital signs:  Weight/BMI: Body mass index is 26.05 kg/m².  /72   Pulse 64   Temp 36.1 °C (97 °F) (Temporal)   Resp 16   Ht 1.575 m (5' 2\")   Wt 64.6 kg (142 lb 6.7 oz)   SpO2 95%   Vitals:    07/01/25 1951 07/01/25 2120 07/02/25 0429 07/02/25 0554   BP: 98/64  112/72    Pulse: 64  64    Resp: 18 16 18 16   Temp: 36.4 °C (97.5 °F)  36.1 °C (97 °F)    TempSrc: Temporal  Temporal    SpO2: 93%  95%    Weight:       Height:         Oxygen Therapy:  Pulse Oximetry: 95 %, O2 (LPM): 2, O2 Delivery Device: Nasal Cannula    Intake/Output Summary (Last 24 hours) at 7/2/2025 0749  Last data filed at 7/1/2025 1345  Gross per 24 hour   Intake 360 ml   Output --   Net 360 ml       Physical Exam  Vitals and nursing note reviewed.   Constitutional:       General: She is awake. She is not in acute distress.     Appearance: She is overweight. She is not ill-appearing.   HENT:      Head: Normocephalic and atraumatic.      Nose: Nose normal.      Mouth/Throat:      Mouth: Mucous membranes are moist.      Pharynx: Oropharynx is clear.   Eyes:      General: Scleral icterus present.      Extraocular Movements: Extraocular movements intact.      Conjunctiva/sclera: Conjunctivae normal.   Cardiovascular:      Rate and Rhythm: Normal rate and regular rhythm.      Pulses: Normal pulses.      Heart sounds: Normal heart sounds.   Pulmonary:      Effort: Pulmonary effort is normal. No respiratory distress.      Breath sounds: Normal breath sounds.   Abdominal:      General: Abdomen is flat. Bowel sounds are normal. There is no distension.      Palpations: Abdomen is soft.      Tenderness: There is no abdominal tenderness. There is no guarding.   Musculoskeletal:         General: Normal range of motion.      Cervical back: Normal range " of motion.      Right lower leg: No edema.      Left lower leg: No edema.   Skin:     General: Skin is warm and dry.      Capillary Refill: Capillary refill takes less than 2 seconds.      Coloration: Skin is jaundiced.   Neurological:      General: No focal deficit present.      Mental Status: She is alert and oriented to person, place, and time. Mental status is at baseline.      Comments: AO x 3, no asterixis   Psychiatric:         Mood and Affect: Mood normal.         Behavior: Behavior normal. Behavior is cooperative.     Labs:  Recent Labs     06/30/25 0723 07/01/25 0435 07/02/25  0056   SODIUM 132* 129* 128*   POTASSIUM 3.5* 3.4* 3.7   CHLORIDE 87* 88* 90*   CO2 32 30 30   BUN 18 17 13   CREATININE 0.72 0.63 0.55   MAGNESIUM  --   --  1.5   PHOSPHORUS  --   --  2.2*   CALCIUM 8.3* 8.1* 8.2*     Recent Labs     06/30/25  0723 07/01/25 0435 07/02/25  0056   ALTSGPT 197* 163* 153*   ASTSGOT 326* 259* 226*   ALKPHOSPHAT 191* 170* 178*   TBILIRUBIN 13.1* 9.9* 9.7*   GLUCOSE 64* 84 120*     Recent Labs     06/30/25  0723 07/01/25 0435 07/02/25  0056   WBC  --   --  12.7*   ASTSGOT 326* 259* 226*   ALTSGPT 197* 163* 153*   ALKPHOSPHAT 191* 170* 178*   TBILIRUBIN 13.1* 9.9* 9.7*     Recent Labs     06/29/25  0800 07/02/25  0056   RBC  --  3.31*   HEMOGLOBIN  --  12.3   HEMATOCRIT  --  35.8*   PLATELETCT  --  157*   PROTHROMBTM 16.8*  --    INR 1.36*  --      Recent Results (from the past 24 hours)   Fluid Cell Count w/Diff    Collection Time: 07/01/25  3:29 PM   Result Value Ref Range    Fluid Type Ascites     Color-Body Fluid Yellow     Character-Body Fluid Clear     Total RBC Count <2000 cells/uL    FL Total Nucleated Cells 93 cells/uL    Polys 16 %    Lymphs 39 %    Fl Mono Macrophages 35 %    Fl Lining Cells 10    Fluid Total Protein    Collection Time: 07/01/25  3:29 PM   Result Value Ref Range    Fluid Type Ascites     Total Protein Fluid 0.5 g/dL   CYTOLOGY    Collection Time: 07/01/25  3:29 PM   Result  Value Ref Range    Cytology Reg See Path Report    FLUID ALBUMIN    Collection Time: 07/01/25  3:29 PM   Result Value Ref Range    Albumin 0.4 g/dL   FLUID CULTURE W/GRAM STAIN    Collection Time: 07/01/25  3:29 PM    Specimen: Body Fluid   Result Value Ref Range    Significant Indicator NEG     Source BF     Site Paracentesis     Culture Result -     Gram Stain Result Moderate WBCs.  No organisms seen.      GRAM STAIN    Collection Time: 07/01/25  3:29 PM    Specimen: Body Fluid   Result Value Ref Range    Significant Indicator .     Source BF     Site Paracentesis     Gram Stain Result Moderate WBCs.  No organisms seen.      Comp Metabolic Panel    Collection Time: 07/02/25 12:56 AM   Result Value Ref Range    Sodium 128 (L) 135 - 145 mmol/L    Potassium 3.7 3.6 - 5.5 mmol/L    Chloride 90 (L) 96 - 112 mmol/L    Co2 30 20 - 33 mmol/L    Anion Gap 8.0 7.0 - 16.0    Glucose 120 (H) 65 - 99 mg/dL    Bun 13 8 - 22 mg/dL    Creatinine 0.55 0.50 - 1.40 mg/dL    Calcium 8.2 (L) 8.5 - 10.5 mg/dL    Correct Calcium 9.4 8.5 - 10.5 mg/dL    AST(SGOT) 226 (H) 12 - 45 U/L    ALT(SGPT) 153 (H) 2 - 50 U/L    Alkaline Phosphatase 178 (H) 30 - 99 U/L    Total Bilirubin 9.7 (H) 0.1 - 1.5 mg/dL    Albumin 2.5 (L) 3.2 - 4.9 g/dL    Total Protein 5.1 (L) 6.0 - 8.2 g/dL    Globulin 2.6 1.9 - 3.5 g/dL    A-G Ratio 1.0 g/dL   CBC WITHOUT DIFFERENTIAL    Collection Time: 07/02/25 12:56 AM   Result Value Ref Range    WBC 12.7 (H) 4.8 - 10.8 K/uL    RBC 3.31 (L) 4.20 - 5.40 M/uL    Hemoglobin 12.3 12.0 - 16.0 g/dL    Hematocrit 35.8 (L) 37.0 - 47.0 %    .2 (H) 81.4 - 97.8 fL    MCH 37.2 (H) 27.0 - 33.0 pg    MCHC 34.4 32.2 - 35.5 g/dL    RDW 68.4 (H) 35.9 - 50.0 fL    Platelet Count 157 (L) 164 - 446 K/uL    MPV 10.7 9.0 - 12.9 fL   Renal Function Panel    Collection Time: 07/02/25 12:56 AM   Result Value Ref Range    Phosphorus 2.2 (L) 2.5 - 4.5 mg/dL   MAGNESIUM    Collection Time: 07/02/25 12:56 AM   Result Value Ref Range     Magnesium 1.5 1.5 - 2.5 mg/dL   ESTIMATED GFR    Collection Time: 07/02/25 12:56 AM   Result Value Ref Range    GFR (CKD-EPI) 111 >60 mL/min/1.73 m 2       Radiology Review:  MR-ABDOMEN-WITH & W/O   Final Result      1.  Cirrhosis and portal hypertension, including gastroesophageal varices and small to moderate volume ascites.   2.  No suspicious liver lesions.   3.  No acute inflammatory process in the abdomen. No malignancy or metastatic disease.         MR-PELVIS-WITH & W/O AND SEQUENCES   Final Result      1.  No pelvic mass lesions. No evidence of malignancy or metastatic disease in the pelvis.   2.  Atrophic ovaries.   3.  Moderate volume pelvic ascites.               EC-ECHOCARDIOGRAM COMPLETE W/ CONT   Final Result      US-PARACENTESIS, ABD WITH IMAGING   Final Result      1. Ultrasound-guided diagnostic and therapeutic paracentesis of the right lower quadrant of the abdominal wall.      2. 1200 mL of fluid withdrawn.      US-ABDOMEN COMPLETE SURVEY   Final Result      1.  Cirrhotic appearance of the liver with a lobulated cyst.   2.  Sludge is present within the gallbladder. There is no definite stone.   3.  Ascites.   4.  There appears to be occlusion of the portal vein and at least partial occlusion of the inferior vena cava.   5.  Evaluation is limited with poor visualization of the midline structures.         DX-CHEST-PORTABLE (1 VIEW)   Final Result      Mild left lung infiltrate.      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  Acute hypoxic respiratory failure - requiring 2 L nasal  cannula  Portal hypertension - SAAG 2.2  Alcoholic hepatitis - MDF 53.3  AFP negative, AFP L3% 28.6- can have false positive with acute hepatitis, cirrhosis  Rare cluster of atypical cells in ascitic fluid suspicious for malignancy, elevated , MRCP with no evidence of malignancy  Vitamin A deficiency  Rectal and grade 1 esophageal varices   Portal hypertensive gastropathy  Small hiatal hernia    Recommendations:  - Follow cytology from para 7/1, monitor sodium and kidney function  - Continue Eliquis with close monitoring for bleeding   - Tolerating 100 mg spirinolactone and 40 mg lasix  - Last seen by GI team March 2024 where she was noted to have trace ascites.  She was not on diuretics at home.  Continue beta blocker given no dyspnea  - Prednisone discontinued 6/28/2025, Lille score indicated she was a non-responder  - Lactulose daily-titrate to have 3 loose bowel movements per day  - Recommend MV with no iron to for Vitamin A deficiency  - Anusol HC MS BID x 7 days  - Pending transfer to addiction center in [place] on Friday     Discussed with patient, Dr. Mcknight,  Dr. Dela Cruz    ..Beena Alex, DNP,  APRN    Core Quality Measures   Reviewed items::  Labs, Medications and Radiology reports reviewed

## 2025-07-03 LAB
ALBUMIN SERPL BCP-MCNC: 2.4 G/DL (ref 3.2–4.9)
ALBUMIN/GLOB SERPL: 0.9 G/DL
ALP SERPL-CCNC: 174 U/L (ref 30–99)
ALT SERPL-CCNC: 139 U/L (ref 2–50)
ANION GAP SERPL CALC-SCNC: 9 MMOL/L (ref 7–16)
AST SERPL-CCNC: 197 U/L (ref 12–45)
BILIRUB SERPL-MCNC: 6.7 MG/DL (ref 0.1–1.5)
BUN SERPL-MCNC: 16 MG/DL (ref 8–22)
CALCIUM ALBUM COR SERPL-MCNC: 9.7 MG/DL (ref 8.5–10.5)
CALCIUM SERPL-MCNC: 8.4 MG/DL (ref 8.5–10.5)
CHLORIDE SERPL-SCNC: 94 MMOL/L (ref 96–112)
CO2 SERPL-SCNC: 28 MMOL/L (ref 20–33)
CREAT SERPL-MCNC: 0.57 MG/DL (ref 0.5–1.4)
GFR SERPLBLD CREATININE-BSD FMLA CKD-EPI: 110 ML/MIN/1.73 M 2
GLOBULIN SER CALC-MCNC: 2.8 G/DL (ref 1.9–3.5)
GLUCOSE SERPL-MCNC: 91 MG/DL (ref 65–99)
INR PPP: 1.75 (ref 0.87–1.13)
MAGNESIUM SERPL-MCNC: 1.7 MG/DL (ref 1.5–2.5)
PHOSPHATE SERPL-MCNC: 2.4 MG/DL (ref 2.5–4.5)
POTASSIUM SERPL-SCNC: 4 MMOL/L (ref 3.6–5.5)
PROT SERPL-MCNC: 5.2 G/DL (ref 6–8.2)
PROTHROMBIN TIME: 20.5 SEC (ref 12–14.6)
SODIUM SERPL-SCNC: 131 MMOL/L (ref 135–145)

## 2025-07-03 PROCEDURE — 700102 HCHG RX REV CODE 250 W/ 637 OVERRIDE(OP): Performed by: HOSPITALIST

## 2025-07-03 PROCEDURE — 36415 COLL VENOUS BLD VENIPUNCTURE: CPT

## 2025-07-03 PROCEDURE — A9270 NON-COVERED ITEM OR SERVICE: HCPCS | Performed by: HOSPITALIST

## 2025-07-03 PROCEDURE — 83735 ASSAY OF MAGNESIUM: CPT

## 2025-07-03 PROCEDURE — 99232 SBSQ HOSP IP/OBS MODERATE 35: CPT | Performed by: NURSE PRACTITIONER

## 2025-07-03 PROCEDURE — 700111 HCHG RX REV CODE 636 W/ 250 OVERRIDE (IP): Mod: JZ | Performed by: HOSPITALIST

## 2025-07-03 PROCEDURE — 770001 HCHG ROOM/CARE - MED/SURG/GYN PRIV*

## 2025-07-03 PROCEDURE — RXMED WILLOW AMBULATORY MEDICATION CHARGE: Performed by: INTERNAL MEDICINE

## 2025-07-03 PROCEDURE — 99233 SBSQ HOSP IP/OBS HIGH 50: CPT | Performed by: INTERNAL MEDICINE

## 2025-07-03 PROCEDURE — 700102 HCHG RX REV CODE 250 W/ 637 OVERRIDE(OP): Performed by: INTERNAL MEDICINE

## 2025-07-03 PROCEDURE — A9270 NON-COVERED ITEM OR SERVICE: HCPCS | Performed by: NURSE PRACTITIONER

## 2025-07-03 PROCEDURE — 80053 COMPREHEN METABOLIC PANEL: CPT

## 2025-07-03 PROCEDURE — 85610 PROTHROMBIN TIME: CPT

## 2025-07-03 PROCEDURE — A9270 NON-COVERED ITEM OR SERVICE: HCPCS | Performed by: INTERNAL MEDICINE

## 2025-07-03 PROCEDURE — 700102 HCHG RX REV CODE 250 W/ 637 OVERRIDE(OP): Performed by: NURSE PRACTITIONER

## 2025-07-03 PROCEDURE — 84100 ASSAY OF PHOSPHORUS: CPT

## 2025-07-03 RX ORDER — LANOLIN ALCOHOL/MO/W.PET/CERES
400 CREAM (GRAM) TOPICAL DAILY
Status: DISCONTINUED | OUTPATIENT
Start: 2025-07-03 | End: 2025-07-04 | Stop reason: HOSPADM

## 2025-07-03 RX ORDER — M-VIT,TX,IRON,MINS/CALC/FOLIC 27MG-0.4MG
1 TABLET ORAL DAILY
Qty: 30 TABLET | Refills: 0 | Status: ON HOLD | OUTPATIENT
Start: 2025-07-04 | End: 2025-07-16

## 2025-07-03 RX ORDER — LEVOTHYROXINE SODIUM 50 UG/1
50 TABLET ORAL
Qty: 30 TABLET | Refills: 0 | Status: ON HOLD | OUTPATIENT
Start: 2025-07-03 | End: 2025-07-16

## 2025-07-03 RX ORDER — ONDANSETRON 4 MG/1
4 TABLET, ORALLY DISINTEGRATING ORAL EVERY 6 HOURS PRN
Qty: 60 TABLET | Refills: 0 | Status: SHIPPED | OUTPATIENT
Start: 2025-07-03 | End: 2025-07-09

## 2025-07-03 RX ORDER — OXYCODONE HYDROCHLORIDE 5 MG/1
5 TABLET ORAL EVERY 4 HOURS PRN
Qty: 30 TABLET | Refills: 0 | Status: ON HOLD | OUTPATIENT
Start: 2025-07-03 | End: 2025-07-16

## 2025-07-03 RX ORDER — HYDROXYZINE HYDROCHLORIDE 25 MG/1
25 TABLET, FILM COATED ORAL EVERY 4 HOURS PRN
Qty: 60 TABLET | Refills: 0 | Status: ON HOLD | OUTPATIENT
Start: 2025-07-03 | End: 2025-07-16

## 2025-07-03 RX ORDER — FUROSEMIDE 40 MG/1
40 TABLET ORAL DAILY
Qty: 30 TABLET | Refills: 0 | Status: SHIPPED | OUTPATIENT
Start: 2025-07-04 | End: 2025-07-09

## 2025-07-03 RX ORDER — CARVEDILOL 3.12 MG/1
3.12 TABLET ORAL 2 TIMES DAILY WITH MEALS
Qty: 60 TABLET | Refills: 0 | Status: ON HOLD | OUTPATIENT
Start: 2025-07-03 | End: 2025-07-16

## 2025-07-03 RX ORDER — GABAPENTIN 300 MG/1
300 CAPSULE ORAL 2 TIMES DAILY
Qty: 60 CAPSULE | Refills: 0 | Status: ON HOLD | OUTPATIENT
Start: 2025-07-03 | End: 2025-07-16

## 2025-07-03 RX ORDER — HYDROCORTISONE ACETATE 25 MG/1
25 SUPPOSITORY RECTAL EVERY 12 HOURS
Qty: 8 SUPPOSITORY | Refills: 0 | Status: SHIPPED | OUTPATIENT
Start: 2025-07-03 | End: 2025-07-08

## 2025-07-03 RX ORDER — POTASSIUM CHLORIDE 1500 MG/1
20 TABLET, EXTENDED RELEASE ORAL DAILY
Qty: 30 TABLET | Refills: 0 | Status: ON HOLD | OUTPATIENT
Start: 2025-07-04 | End: 2025-07-16

## 2025-07-03 RX ORDER — ALBUTEROL SULFATE 90 UG/1
1-2 INHALANT RESPIRATORY (INHALATION) EVERY 6 HOURS PRN
Qty: 8.5 G | Refills: 0 | Status: SHIPPED | OUTPATIENT
Start: 2025-07-03 | End: 2025-07-09

## 2025-07-03 RX ORDER — ESCITALOPRAM OXALATE 20 MG/1
20 TABLET ORAL DAILY
Qty: 30 TABLET | Refills: 0 | Status: ON HOLD | OUTPATIENT
Start: 2025-07-03 | End: 2025-07-16

## 2025-07-03 RX ORDER — SPIRONOLACTONE 100 MG/1
200 TABLET, FILM COATED ORAL DAILY
Qty: 60 TABLET | Refills: 0 | Status: ON HOLD | OUTPATIENT
Start: 2025-07-04 | End: 2025-07-16

## 2025-07-03 RX ORDER — LACTULOSE 10 G/15ML
15 SOLUTION ORAL EVERY EVENING
Qty: 450 ML | Refills: 0 | Status: ON HOLD | OUTPATIENT
Start: 2025-07-03 | End: 2025-07-16

## 2025-07-03 RX ORDER — LANOLIN ALCOHOL/MO/W.PET/CERES
100 CREAM (GRAM) TOPICAL DAILY
Qty: 30 TABLET | Refills: 0 | Status: ON HOLD | OUTPATIENT
Start: 2025-07-03 | End: 2025-07-16

## 2025-07-03 RX ORDER — SPIRONOLACTONE 100 MG/1
200 TABLET, FILM COATED ORAL
Status: DISCONTINUED | OUTPATIENT
Start: 2025-07-04 | End: 2025-07-04 | Stop reason: HOSPADM

## 2025-07-03 RX ADMIN — ESCITALOPRAM OXALATE 20 MG: 10 TABLET ORAL at 06:00

## 2025-07-03 RX ADMIN — FUROSEMIDE 40 MG: 40 TABLET ORAL at 06:00

## 2025-07-03 RX ADMIN — NYSTATIN: 100000 POWDER TOPICAL at 06:05

## 2025-07-03 RX ADMIN — APIXABAN 10 MG: 5 TABLET, FILM COATED ORAL at 18:04

## 2025-07-03 RX ADMIN — CARVEDILOL 3.12 MG: 3.12 TABLET, FILM COATED ORAL at 18:05

## 2025-07-03 RX ADMIN — GABAPENTIN 300 MG: 300 CAPSULE ORAL at 05:59

## 2025-07-03 RX ADMIN — LACTULOSE 15 ML: 10 SOLUTION ORAL at 18:05

## 2025-07-03 RX ADMIN — SPIRONOLACTONE 100 MG: 100 TABLET ORAL at 06:00

## 2025-07-03 RX ADMIN — HYDROCORTISONE ACETATE 25 MG: 25 SUPPOSITORY RECTAL at 06:01

## 2025-07-03 RX ADMIN — HYDROXYZINE HYDROCHLORIDE 25 MG: 50 TABLET ORAL at 07:28

## 2025-07-03 RX ADMIN — LEVOTHYROXINE SODIUM 50 MCG: 0.05 TABLET ORAL at 06:00

## 2025-07-03 RX ADMIN — ONDANSETRON 4 MG: 2 INJECTION INTRAMUSCULAR; INTRAVENOUS at 07:28

## 2025-07-03 RX ADMIN — HYDROCORTISONE ACETATE 25 MG: 25 SUPPOSITORY RECTAL at 18:05

## 2025-07-03 RX ADMIN — ALBUTEROL SULFATE 2 PUFF: 90 AEROSOL, METERED RESPIRATORY (INHALATION) at 18:03

## 2025-07-03 RX ADMIN — DIBASIC SODIUM PHOSPHATE, MONOBASIC POTASSIUM PHOSPHATE AND MONOBASIC SODIUM PHOSPHATE 500 MG: 852; 155; 130 TABLET ORAL at 18:05

## 2025-07-03 RX ADMIN — Medication 100 MG: at 06:00

## 2025-07-03 RX ADMIN — HYDROXYZINE HYDROCHLORIDE 25 MG: 50 TABLET ORAL at 21:45

## 2025-07-03 RX ADMIN — HYDROXYZINE HYDROCHLORIDE 25 MG: 50 TABLET ORAL at 16:17

## 2025-07-03 RX ADMIN — GABAPENTIN 300 MG: 300 CAPSULE ORAL at 18:05

## 2025-07-03 RX ADMIN — Medication 1 TABLET: at 05:59

## 2025-07-03 RX ADMIN — APIXABAN 10 MG: 5 TABLET, FILM COATED ORAL at 05:59

## 2025-07-03 RX ADMIN — OXYCODONE 5 MG: 5 TABLET ORAL at 21:45

## 2025-07-03 RX ADMIN — CARVEDILOL 3.12 MG: 3.12 TABLET, FILM COATED ORAL at 08:49

## 2025-07-03 RX ADMIN — Medication 400 MG: at 18:04

## 2025-07-03 RX ADMIN — POTASSIUM CHLORIDE 40 MEQ: 1500 TABLET, EXTENDED RELEASE ORAL at 05:59

## 2025-07-03 ASSESSMENT — ENCOUNTER SYMPTOMS
ABDOMINAL PAIN: 0
COUGH: 0
HEARTBURN: 0
DIZZINESS: 0
TREMORS: 0
BACK PAIN: 0
MYALGIAS: 0
NERVOUS/ANXIOUS: 1
WEAKNESS: 0
FEVER: 0
DIARRHEA: 0
BLURRED VISION: 0
CONSTIPATION: 0
CHILLS: 0
NAUSEA: 0
SHORTNESS OF BREATH: 0
SORE THROAT: 0
HEADACHES: 0
NAUSEA: 1
BLOOD IN STOOL: 0
VOMITING: 0
NERVOUS/ANXIOUS: 0
DEPRESSION: 0

## 2025-07-03 ASSESSMENT — LIFESTYLE VARIABLES: SUBSTANCE_ABUSE: 1

## 2025-07-03 ASSESSMENT — PAIN DESCRIPTION - PAIN TYPE
TYPE: ACUTE PAIN

## 2025-07-03 NOTE — DISCHARGE PLANNING
CHW Alejandra met with pt and went over any last minute questions or concerns. Pt states pt is feeling anxious and knew that discharge was coming. Pt is emotional. This CHW reassured pt that is a positive change and pt has got a chance to continue better health and sobriety for herself.    Called Dr Tillman and left a voicemail all contact information if needing additional assistance.   Called Teddy Gomez [place] [phone] and confirmed admission for tomorrow. Teddy and Dr Tillman have arranged flight.

## 2025-07-03 NOTE — CARE PLAN
The patient is Stable - Low risk of patient condition declining or worsening    Shift Goals  Clinical Goals: Patient's pain will be <5 throughout shift  Patient Goals: Sleep and rest  Family Goals: none present    Progress made toward(s) clinical / shift goals:    Patient is alert and oriented x4, able to communicate needs and calls appropriately. Patient's pain is controlled with medication, and rest. Patient refused bed alarm despite explanation of it's importance, charge RN notified. Patient's bed in low position, hourly rounding done , call light within reach.

## 2025-07-03 NOTE — PROGRESS NOTES
.Gastroenterology Progress Note               Author:  Beena Alex APRN   Date & Time Created: 7/3/2025 9:13 AM       Patient ID:  Name:             Minerva Tillman  YOB: 1975  Age:                 50 y.o.  female  MRN:               [id number]    Medical Decision Making, by Problem:  Active Hospital Problems    Diagnosis     Vitamin A deficiency [E50.9]     Elevated cancer antigen 125 () [R97.1]     Hyponatremia [E87.1]     Portal vein thrombosis [I81]     Alcohol abuse with withdrawal and perceptual disturbance (HCC) [F10.132]     Esophageal varices in alcoholic cirrhosis (HCC) [K70.30, I85.10]     Alcoholic hepatitis [K70.10]     Hypophosphatemia [E83.39]     High anion gap metabolic acidosis [E87.29]     Acute hypoxic respiratory failure (HCC) [J96.01]     Ascites [R18.8]     Hypokalemia [E87.6]     Hypomagnesemia [E83.42]     Acute cystitis [N30.00]      Presenting Chief Complaint:  Decompensated cirrhosis     History of Present Illness:   50-year-old female presents to the emergency department for abdominal swelling and shortness of breath and desire to discontinue alcohol intake.  Patient was seen by gastroenterology as an inpatient in 2024.  Dr. Daniels had recommended outpatient evaluation.  Patient followed up with Dr. Conley on 7/1/2024 with an additional follow-up appointment on 10/17/2024.  She had a FibroScan done which suggested advanced fibrosis and possible cirrhosis.  She was recommended to pursue upper and lower endoscopy.  Patient states that when she was told of this information she became quite concerned and did not follow-up because she was scared of the results.  Patient continues to drink alcohol.  She wants help in quitting.  She denies hematemesis or melena.  She endorses new onset abdominal swelling without lower extremity edema.     Home medications reviewed.  Patient not taking anything in the way of diuretics or medications for encephalopathy.  Patient is not  on a beta-blocker.     Review of systems at this time is as stated above as well as patient feeling a little bit anxious but comfortable with regard to her detoxification/CIWA protocol.    Interval History:  6/21/2025: Patient seen.  Reports feeling much better but having multiple liquid bowel movements.  Mild tremors, very hungry.  Has started wearing oxygen at night at home, reports it is due to increased elevation at home in [facility].  Slightly dyspneic today.  N.p.o. pending paracentesis. VSS, CIWA 4.  Transaminitis worsening, T. bili 13.8 > 9.4.  INR 1.89.  Hoping to go directly to rehab from the hospital.    6/22/2025: Feels better, tremulous with CIWA 5. Having multiple bowel movements. Fluid negative for SBP and NGTD on culture. Bili up to 14.2    6/23/2025: Feels much better today, no complaints. Eating and drinking well. Reports she was unable to be weaned off oxygen. Liver enzymes uptrending, T. Bili 17.2>14.2    6/24/2025: Patient seen at bedside with .  Had just completed echocardiogram.  Results are pending.  Tolerating oral intake without difficulty.  Denies dyspnea, nausea, vomiting, abdominal pain.  WBC 12.6.  Hemoglobin stable 13.8.  Platelets 173.  Sodium 130, BUN 11, creatinine 0.8, LFTs downtrending with , , alk phos 252, total bilirubin 15.5.  Albumin 2.7.  INR increased at 5.36.  Eliquis held by primary team.  Cytology resulted showing abnormal cells-discussed this with patient and  at bedside for which they will follow-up with outpatient GI.  Additionally, per flowsheets, patient reported to have 2 bowel movements overnight.  Patient reports having more than 5.  Discussed lactulose daily, titrating to 2-3 bowel movements per day.    6/25/2025: AO x 4.  She reports having bilateral upper quadrant/epigastric discomfort, relieved with pain medication.  She reports 3-4 bowel movements overnight.  Reports she is not able to eat a lot of food at once but is  grazing/eating throughout the day and tolerating well thus far.  No nausea, vomiting, dyspnea.  WBC 12.1, hemoglobin 13.6, platelets 190.  Sodium 133, potassium 3.4, BUN 14, creatinine 0.82.  , , alk phos 232, total bilirubin 16.3.  Albumin 2.6.  INR 2.97.  Echocardiogram completed, unable to assess right ventricular function.  MELD 3.0 score 32; 63.5% estimated 90-day survival.    6/26/2025, AO x 4.  Reports mild generalized upper abdominal pain relieved with narcotics.  She reports bowel movement x 10 since 8:00 last night.  Appetite unchanged.  No other nausea, vomiting, dyspnea.  , , alk phos 213, total bilirubin 18.3, INR improved to 1.87.  CA-125 797.    6/27/2025: AO x 4.  Negative for asterixis.  Patient to problems overnight.  Patient reports grazing/eating small amounts throughout the day.  Upon questioning types of food she is eating, she states that she eats vegetables and her protein inpatient.  , , alk phos 202, total bilirubin 18.7.  INR 1.61.  AFP 3. Lille model 0.789.  MRI pelvis, MRCP pending.    6/29/2025: AAO x 4.  Negative for asterixis.  Patient reports feeling well without nausea, vomiting, abdominal pain.  Sodium 131, potassium 3.2 renal functions normal.  , , alk phos 204, total bilirubin improved to 13.6, albumin 2.6 INR 1.36.  MELD 3.0 score 25; 87.7% 90-day survival rate.  MRI pelvis negative for malignancy/metastases.  MRCP with cirrhosis, signs of portal hypertensive including gastroesophageal varices.  Patient was supposed to have outpatient EGD/colonoscopy with [facility] provider.  She states that she had completed her prep and was ready for her procedure but then her  was out of town and was unable to take her.  She had not rescheduled endoscopy.  Offered to do EGD/colonoscopy inpatient particularly given that she has gastroesophageal varices seen on MRCP and she is off anticoagulation.  She is agreeable to proceed.  Bowel  movement x 5 semiformed overnight-  Patient reports this is tolerable.    6/30/2025: EGD:   Esophagus: Grade 1 esophageal varices, three columns, extend throughout esophagus  Stomach: 0.5 cm hiatal hernia, portal hypertensive gastropathy, friable, but not bleeding  Duodenum: normal bulb and second portion    Colonoscopy:  Few scattered diverticula throughout colon. Rectal varices seen on retroflexion. No other lesion seen.     7/1/2025: Patient seen. Looks and feels better every day. Liver enzymes and T. Bili downtrending. Awaiting oxygen delivery so she can go to rehab in [place].     7/2/2025: Continues to feel better. Only one BM yesterday. Na 128, Bili 9.7 <--- 9.9, plt 157, phos 2.2. Para complete yesterday, 1500 ml removed.  Neg for SBP, SAAG 2.1, repeat cytology pending. INR tomorrow morning.       7/3/2025: Patient seen, very anxious about leaving tomorrow. Did not sleep last night. Overall labs continue to improve. Having 2-3 BM daily.     Hospital Medications:  Current Facility-Administered Medications   Medication Dose Frequency Provider Last Rate Last Admin    therapeutic multivitamin-minerals (Theragran-M) tablet 1 Tablet  1 Tablet DAILY Beena Alex, DNP,  APRN   1 Tablet at 07/03/25 0559    apixaban (Eliquis) tablet 10 mg  10 mg BID Shivani Dela Cruz D.O.   10 mg at 07/03/25 0559    [START ON 7/9/2025] apixaban (Eliquis) tablet 5 mg  5 mg BID Shivani Dela Cruz D.O.        potassium chloride SA (Kdur) tablet 40 mEq  40 mEq DAILY Vidhi Pond M.D.   40 mEq at 07/03/25 0559    hydrocortisone (Anusol-HC) suppository 25 mg  25 mg Q12HRS Vidhi Pond M.D.   25 mg at 07/03/25 0601    hydrOXYzine HCl (Atarax) tablet 25 mg  25 mg Q4HRS PRN Vidhi Pond M.D.   25 mg at 07/03/25 0728    lactulose 20 GM/30ML solution 15 mL  15 mL Q EVENING Vidhi Pond M.D.   15 mL at 07/02/25 1625    nystatin (Mycostatin) powder   BID Vidhi Pond M.D.   Given at 07/03/25 0605    oxyCODONE immediate-release  (Roxicodone) tablet 5 mg  5 mg Q4HRS PRN Vidhi Pond M.D.   5 mg at 07/02/25 2017    thiamine (Vitamin B-1) tablet 100 mg  100 mg DAILY Vidhi Pond M.D.   100 mg at 07/03/25 0600    spironolactone (Aldactone) tablet 100 mg  100 mg Q DAY Helen Mendez M.D.   100 mg at 07/03/25 0600    Respiratory Therapy Consult   Continuous RT Helen Mendez M.D.        furosemide (Lasix) tablet 40 mg  40 mg Q DAY Helen Mendez M.D.   40 mg at 07/03/25 0600    carvedilol (Coreg) tablet 3.125 mg  3.125 mg BID WITH MEALS Helen Mendez M.D.   3.125 mg at 07/03/25 0849    melatonin tablet 5 mg  5 mg HS PRN TRE Mcghee.P.[email or web address]   5 mg at 06/25/25 0109    labetalol (Normodyne/Trandate) injection 10 mg  10 mg Q4HRS PRN Abby Ivy M.D.        ondansetron (Zofran) syringe/vial injection 4 mg  4 mg Q4HRS PRN Abby Ivy M.D.   4 mg at 07/03/25 0728    ondansetron (Zofran ODT) dispertab 4 mg  4 mg Q4HRS PRN Abby Ivy M.D.   4 mg at 06/27/25 0807    escitalopram (Lexapro) tablet 20 mg  20 mg DAILY Abby Ivy M.D.   20 mg at 07/03/25 0600    gabapentin (Neurontin) capsule 300 mg  300 mg BID Abby Ivy M.D.   300 mg at 07/03/25 0559    levothyroxine (Synthroid) tablet 50 mcg  50 mcg AM ES Abby Ivy M.D.   50 mcg at 07/03/25 0600    albuterol inhaler 1-2 Puff  1-2 Puff Q6HRS PRN Abby Ivy M.D.   2 Puff at 07/02/25 1216   Last reviewed on 6/30/2025  7:28 AM by Lv Bunch D.O.       Review of Systems:  Review of Systems   Constitutional:  Negative for chills, fever and malaise/fatigue.   HENT:  Negative for hearing loss and sore throat.    Eyes:  Negative for blurred vision.   Respiratory:  Negative for cough and shortness of breath.    Cardiovascular:  Negative for chest pain and leg swelling.   Gastrointestinal:  Negative for abdominal pain, blood in stool, constipation, diarrhea, heartburn, melena, nausea and vomiting.   Genitourinary:  Negative for dysuria and hematuria.   Musculoskeletal:  Negative for back  "pain and myalgias.   Skin:  Negative for rash.   Neurological:  Negative for dizziness, tremors and weakness.   Psychiatric/Behavioral:  Positive for substance abuse. Negative for depression. The patient is not nervous/anxious.    All other systems reviewed and are negative.    Vital signs:  Weight/BMI: Body mass index is 26.05 kg/m².  /79   Pulse 71   Temp 36.4 °C (97.6 °F) (Temporal)   Resp 16   Ht 1.575 m (5' 2\")   Wt 64.6 kg (142 lb 6.7 oz)   SpO2 94%   Vitals:    07/02/25 2115 07/03/25 0351 07/03/25 0602 07/03/25 0710   BP:  102/70 116/78 109/79   Pulse:  83  71   Resp: 16 17  16   Temp:  36.4 °C (97.5 °F)  36.4 °C (97.6 °F)   TempSrc:  Temporal  Temporal   SpO2:  92%  94%   Weight:       Height:         Oxygen Therapy:  Pulse Oximetry: 94 %, O2 (LPM): 2, O2 Delivery Device: Nasal Cannula    Intake/Output Summary (Last 24 hours) at 7/3/2025 0913  Last data filed at 7/2/2025 2100  Gross per 24 hour   Intake 360 ml   Output --   Net 360 ml       Physical Exam  Vitals and nursing note reviewed.   Constitutional:       General: She is awake. She is not in acute distress.     Appearance: She is overweight. She is not ill-appearing.   HENT:      Head: Normocephalic and atraumatic.      Nose: Nose normal.      Mouth/Throat:      Mouth: Mucous membranes are moist.      Pharynx: Oropharynx is clear.   Eyes:      General: Scleral icterus present.      Extraocular Movements: Extraocular movements intact.      Conjunctiva/sclera: Conjunctivae normal.   Cardiovascular:      Rate and Rhythm: Normal rate and regular rhythm.      Pulses: Normal pulses.      Heart sounds: Normal heart sounds.   Pulmonary:      Effort: Pulmonary effort is normal. No respiratory distress.      Breath sounds: Normal breath sounds.   Abdominal:      General: Abdomen is flat. Bowel sounds are normal. There is no distension.      Palpations: Abdomen is soft.      Tenderness: There is no abdominal tenderness. There is no guarding. "   Musculoskeletal:         General: Normal range of motion.      Cervical back: Normal range of motion.      Right lower leg: No edema.      Left lower leg: No edema.   Skin:     General: Skin is warm and dry.      Capillary Refill: Capillary refill takes less than 2 seconds.      Coloration: Skin is jaundiced.   Neurological:      General: No focal deficit present.      Mental Status: She is alert and oriented to person, place, and time. Mental status is at baseline.      Comments: AO x 3, no asterixis   Psychiatric:         Mood and Affect: Mood normal.         Behavior: Behavior normal. Behavior is cooperative.     Labs:  Recent Labs     07/01/25 0435 07/02/25 0056 07/03/25  0112   SODIUM 129* 128* 131*   POTASSIUM 3.4* 3.7 4.0   CHLORIDE 88* 90* 94*   CO2 30 30 28   BUN 17 13 16   CREATININE 0.63 0.55 0.57   MAGNESIUM  --  1.5  --    PHOSPHORUS  --  2.2*  --    CALCIUM 8.1* 8.2* 8.4*     Recent Labs     07/01/25 0435 07/02/25 0056 07/03/25  0112   ALTSGPT 163* 153* 139*   ASTSGOT 259* 226* 197*   ALKPHOSPHAT 170* 178* 174*   TBILIRUBIN 9.9* 9.7* 6.7*   GLUCOSE 84 120* 91     Recent Labs     07/01/25 0435 07/02/25 0056 07/03/25  0112   WBC  --  12.7*  --    ASTSGOT 259* 226* 197*   ALTSGPT 163* 153* 139*   ALKPHOSPHAT 170* 178* 174*   TBILIRUBIN 9.9* 9.7* 6.7*     Recent Labs     07/02/25 0056 07/03/25  0112   RBC 3.31*  --    HEMOGLOBIN 12.3  --    HEMATOCRIT 35.8*  --    PLATELETCT 157*  --    PROTHROMBTM  --  20.5*   INR  --  1.75*     Recent Results (from the past 24 hours)   Prothrombin Time    Collection Time: 07/03/25  1:12 AM   Result Value Ref Range    PT 20.5 (H) 12.0 - 14.6 sec    INR 1.75 (H) 0.87 - 1.13   Comp Metabolic Panel    Collection Time: 07/03/25  1:12 AM   Result Value Ref Range    Sodium 131 (L) 135 - 145 mmol/L    Potassium 4.0 3.6 - 5.5 mmol/L    Chloride 94 (L) 96 - 112 mmol/L    Co2 28 20 - 33 mmol/L    Anion Gap 9.0 7.0 - 16.0    Glucose 91 65 - 99 mg/dL    Bun 16 8 - 22 mg/dL     Creatinine 0.57 0.50 - 1.40 mg/dL    Calcium 8.4 (L) 8.5 - 10.5 mg/dL    Correct Calcium 9.7 8.5 - 10.5 mg/dL    AST(SGOT) 197 (H) 12 - 45 U/L    ALT(SGPT) 139 (H) 2 - 50 U/L    Alkaline Phosphatase 174 (H) 30 - 99 U/L    Total Bilirubin 6.7 (H) 0.1 - 1.5 mg/dL    Albumin 2.4 (L) 3.2 - 4.9 g/dL    Total Protein 5.2 (L) 6.0 - 8.2 g/dL    Globulin 2.8 1.9 - 3.5 g/dL    A-G Ratio 0.9 g/dL   ESTIMATED GFR    Collection Time: 07/03/25  1:12 AM   Result Value Ref Range    GFR (CKD-EPI) 110 >60 mL/min/1.73 m 2       Radiology Review:  US-PARACENTESIS, ABD WITH IMAGING   Final Result      1. Ultrasound-guided therapeutic paracentesis of the left lower quadrant of the abdominal wall.      2. 1550 mL of fluid withdrawn.      MR-ABDOMEN-WITH & W/O   Final Result      1.  Cirrhosis and portal hypertension, including gastroesophageal varices and small to moderate volume ascites.   2.  No suspicious liver lesions.   3.  No acute inflammatory process in the abdomen. No malignancy or metastatic disease.         MR-PELVIS-WITH & W/O AND SEQUENCES   Final Result      1.  No pelvic mass lesions. No evidence of malignancy or metastatic disease in the pelvis.   2.  Atrophic ovaries.   3.  Moderate volume pelvic ascites.               EC-ECHOCARDIOGRAM COMPLETE W/ CONT   Final Result      US-PARACENTESIS, ABD WITH IMAGING   Final Result      1. Ultrasound-guided diagnostic and therapeutic paracentesis of the right lower quadrant of the abdominal wall.      2. 1200 mL of fluid withdrawn.      US-ABDOMEN COMPLETE SURVEY   Final Result      1.  Cirrhotic appearance of the liver with a lobulated cyst.   2.  Sludge is present within the gallbladder. There is no definite stone.   3.  Ascites.   4.  There appears to be occlusion of the portal vein and at least partial occlusion of the inferior vena cava.   5.  Evaluation is limited with poor visualization of the midline structures.         DX-CHEST-PORTABLE (1 VIEW)   Final Result      Mild  left lung infiltrate.            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  Acute hypoxic respiratory failure - requiring 2 L nasal cannula  Portal hypertension - SAAG 2.2  Alcoholic hepatitis - MDF 53.3  AFP negative, AFP L3% 28.6- can have false positive with acute hepatitis, cirrhosis  Rare cluster of atypical cells in ascitic fluid suspicious for malignancy, elevated , MRCP with no evidence of malignancy  Vitamin A deficiency  Rectal and grade 1 esophageal varices   Portal hypertensive gastropathy  Small hiatal hernia    Recommendations:  - Follow cytology from para 7/1, monitor sodium and kidney function  - Continue Eliquis with close monitoring for bleeding   -  Increase to 200 mg spirinolactone and 40 mg lasix  - Last seen by GI team March 2024 where she was noted to have trace ascites.  She was not on diuretics at home.  Continue beta blocker given no dyspnea  - Prednisone discontinued 6/28/2025, Lille score indicated she was a non-responder  - Lactulose daily-titrate to have 3 loose bowel movements per day  - Recommend MV with no iron to for Vitamin A deficiency  - Anusol HC NE BID x 7 days  - Pending transfer to addiction center in [place] on Friday     Discussed with patient, Dr. Mcknight,  Dr. Dlea Cruz    ..Beena Alex, DNP,  APRN    Core Quality Measures   Reviewed items::  Labs, Medications and Radiology reports reviewed

## 2025-07-03 NOTE — PROGRESS NOTES
Hospital Medicine Daily Progress Note    Date of Service  7/3/2025    Chief Complaint  Minerva Tillman is a 50 y.o. female admitted 6/19/2025 with jaundice    Hospital Course  Minerva Tillman is a 50 y.o. female who presented 6/19/2025 with past medical history of liver cirrhosis, alcohol abuse, depression who presents to the hospital for jaundice the past 1 week. She was found to have acute decompensated cirrhosis with ascites with a MELD of 27, acute on chronic respiratory failure with volume overload, portal vein thrombosis and possible community acquired pneumonia and was started on empiric antibiotics    She required CIWA and support with alcohol withdrawl symptoms. She underwent a paracentesis and had no evidence sbp. She was started on anticoagulation for the acute portal vein thrombosis. GI was consulted and patient was started on prednisolone.  Patient had atypical cells suspicious for malignancy in her ascites fluid.  aFP 3% 28.6,  797 and MRCP was performed which showed cirrhosis and portal hypertension with esophageal varices and small to moderate volume ascites, no malignancy or metastatic disease seen.  Patient's alcohol withdrawal symptoms resolved and CIWA protocol was discontinued.  Prednisone was discontinued since there is no significant improvement after 7 days.  GI took patient for EGD 6/30 found to have grade 1 esophageal varices, 0.5 cm hiatal hernia recommended to start Coreg and repeat EGD in 6 months.  Colonoscopy also performed found to have hemorrhoids no other lesions seen recommended repeat colonoscopy in 10 years and Anusol IL twice daily for 7 days.     Interval Problem Update  7/1 Vitals stable on 2 L NC   Na 129, K 3.4, replaced   , , total bili 9.9   Vitamin A returned low 0.19, started on multivitamin  Patient feels like her abdominal ascites is returning and feeling more distended.  Repeat paracentesis ordered with repeat cytology  Home O2 evaluation  performed, portable oxygen concentrator ordered for home  Discussed with gastroenterology, patient needs to restart anticoagulation for portal vein thrombosis.  Will plan to restart and monitor post paracentesis  Discussed with case management plan for patient to discharge to inpatient alcohol rehab in [place], transport to be set up by family.    7/2 sating 95% on 2 L NC   WBC 12.7, Hb 12.3, plt 157   Na 128, renal function stable   , , bili 9.7   Mg 1.5, IV replaced   Phos 2.2, replacement ordered   Paracentesis performed yesterday evening, 1.5 L fluid removed   -no SBP per fluid studies, cytology pending   -Monitor renal function after fluid removal  Discussed with pharmacy, patient previously received 5 doses of the Eliquis but then was off for almost a week, will restart Eliquis loading dose   - Monitor hemoglobin closely  Patient reports she was told she likely has asthma and was given a pulmonology referral in the past when she needed oxygen a few years ago.  She was unable to follow-up or have pulmonary function testing performed for official diagnosis.  Discussed she has albuterol as needed if she feels short of breath.  She reports her abdomen feels much better today after the paracentesis yesterday  Discussed with case management patient self paying for oxygen which should be delivered to bedside today.  Flight being set up to [facility] on Friday to ensure stable labs prior to transfer    7/3 no acute events overnight, blood pressure stable  Na 131, renal function stable   , , t bili 6.7   INR 1.75   Paracentesis fluid culture negative   Cytology showed no malignancy identified  Discussed with gastroenterology will increase Aldactone to 200 mg daily  Phosphorus 2.4, magnesium 1.7, oral replacement  Oxygen has been delivered to bedside  If patient continues to be stable tomorrow anticipate discharge to alcohol rehab.  Patient's main complaint today is anxiety that she has  associated nausea.  Improved with Zofran.  Has been tolerating Eliquis    I have discussed this patient's plan of care and discharge plan at IDT rounds today with Case Management, Nursing, Nursing leadership, and other members of the IDT team.    Consultants/Specialty  GI    Code Status  Full Code    Disposition  The patient is not medically cleared for discharge to home or a post-acute facility.  Anticipate discharge to: home with close outpatient follow-up    I have placed the appropriate orders for post-discharge needs.    Review of Systems  Review of Systems   Constitutional:  Positive for malaise/fatigue. Negative for chills and fever.   Respiratory:  Negative for shortness of breath.    Cardiovascular:  Negative for chest pain.   Gastrointestinal:  Positive for nausea. Negative for abdominal pain and vomiting.   Musculoskeletal:  Negative for myalgias.   Skin:  Negative for rash.   Neurological:  Negative for dizziness and headaches.   Psychiatric/Behavioral:  Negative for depression. The patient is nervous/anxious.    All other systems reviewed and are negative.       Physical Exam  Temp:  [36.1 °C (97 °F)-36.4 °C (97.6 °F)] 36.4 °C (97.6 °F)  Pulse:  [71-83] 71  Resp:  [16-18] 16  BP: (100-116)/(68-81) 109/79  SpO2:  [92 %-94 %] 94 %    Physical Exam  Vitals and nursing note reviewed. Exam conducted with a chaperone present.   Constitutional:       General: She is not in acute distress.     Appearance: Normal appearance.   HENT:      Head: Normocephalic.   Eyes:      General: Scleral icterus present.   Cardiovascular:      Rate and Rhythm: Normal rate and regular rhythm.      Pulses: Normal pulses.   Pulmonary:      Effort: Pulmonary effort is normal. No respiratory distress.      Breath sounds: Normal breath sounds.   Abdominal:      General: There is no distension.      Palpations: Abdomen is soft.      Tenderness: There is no abdominal tenderness.   Musculoskeletal:         General: No swelling or  deformity. Normal range of motion.   Skin:     General: Skin is warm and dry.      Coloration: Skin is jaundiced.   Neurological:      General: No focal deficit present.      Mental Status: She is alert and oriented to person, place, and time.      Cranial Nerves: No cranial nerve deficit.   Psychiatric:         Mood and Affect: Mood normal.         Behavior: Behavior normal.         Fluids    Intake/Output Summary (Last 24 hours) at 7/3/2025 1522  Last data filed at 7/3/2025 1430  Gross per 24 hour   Intake 1040 ml   Output --   Net 1040 ml        Laboratory  Recent Labs     07/02/25  0056   WBC 12.7*   RBC 3.31*   HEMOGLOBIN 12.3   HEMATOCRIT 35.8*   .2*   MCH 37.2*   MCHC 34.4   RDW 68.4*   PLATELETCT 157*   MPV 10.7       Recent Labs     07/01/25  0435 07/02/25  0056 07/03/25  0112   SODIUM 129* 128* 131*   POTASSIUM 3.4* 3.7 4.0   CHLORIDE 88* 90* 94*   CO2 30 30 28   GLUCOSE 84 120* 91   BUN 17 13 16   CREATININE 0.63 0.55 0.57   CALCIUM 8.1* 8.2* 8.4*     Recent Labs     07/03/25  0112   INR 1.75*                 Imaging  US-PARACENTESIS, ABD WITH IMAGING   Final Result      1. Ultrasound-guided therapeutic paracentesis of the left lower quadrant of the abdominal wall.      2. 1550 mL of fluid withdrawn.      MR-ABDOMEN-WITH & W/O   Final Result      1.  Cirrhosis and portal hypertension, including gastroesophageal varices and small to moderate volume ascites.   2.  No suspicious liver lesions.   3.  No acute inflammatory process in the abdomen. No malignancy or metastatic disease.         MR-PELVIS-WITH & W/O AND SEQUENCES   Final Result      1.  No pelvic mass lesions. No evidence of malignancy or metastatic disease in the pelvis.   2.  Atrophic ovaries.   3.  Moderate volume pelvic ascites.               EC-ECHOCARDIOGRAM COMPLETE W/ CONT   Final Result      US-PARACENTESIS, ABD WITH IMAGING   Final Result      1. Ultrasound-guided diagnostic and therapeutic paracentesis of the right lower quadrant  of the abdominal wall.      2. 1200 mL of fluid withdrawn.      US-ABDOMEN COMPLETE SURVEY   Final Result      1.  Cirrhotic appearance of the liver with a lobulated cyst.   2.  Sludge is present within the gallbladder. There is no definite stone.   3.  Ascites.   4.  There appears to be occlusion of the portal vein and at least partial occlusion of the inferior vena cava.   5.  Evaluation is limited with poor visualization of the midline structures.         DX-CHEST-PORTABLE (1 VIEW)   Final Result      Mild left lung infiltrate.           Assessment/Plan  * Alcohol abuse with withdrawal and perceptual disturbance (HCC)- (present on admission)  Assessment & Plan  6/24 discontinued CIWA protocol as patient no longer in withdrawal.  multivitamin, thiamine and folate  replacing electrolytes  See discussion above  No hallucinations or seizure today - precautions    Vitamin A deficiency  Assessment & Plan  Start multivitamin    Elevated cancer antigen 125 ()  Assessment & Plan   797  Checked due to atypical cells noted on pathology of ascites fluid.   6/28 MRI abdomen/pelvis with and w/o contrast no ovarian or abdominal malignancy.  Renal function wnl.   CT AP did not reveal any ovarian mass.    Portal vein thrombosis- (present on admission)  Assessment & Plan  H/h stable, no evidence of bleeding  Discussed with pt and GI  Holding eliquis since an increase in INR from 1.8-5.36.  following  Cbc stable    Restart Eliquis 7/2 with full loading dose as she only had a few days previously    Hyponatremia- (present on admission)  Assessment & Plan  Consistent with cirrhosis  following    Alcoholic hepatitis- (present on admission)  Assessment & Plan  Maddery dissemination score 46  Bili continues to increase, steroids started  Poor response after 7 days per Delmy score, prednisolone discontinued    Esophageal varices in alcoholic cirrhosis (HCC)- (present on admission)  Assessment & Plan  Decompensated  Volume  overloaded, improving with diuretics - tapering as tolerated  MELD 3.0  repeat 26.   Hepatitis panel-repeat negative.  Prednisolone started 6/22 for worsening LFTs.  Lactulose qhs, NH3 elevated 66 at one point, now excessive BMs and normal mentation.  Improving t. Bilirubin, plan for EGD and colonoscopy to evaluate esophageal varices seen on MRCP.   MRCP no evidence of carcinoma, no liver lesions.  6/30 EGD with esophageal varices 3 columns and portal hypertension.  -Repeat EGD in 6 months  Started on coreg 3.125mg po bid   Also with rectal varices on colonoscopy, started anusol HC bid x7 days, repeat colonoscopy in 10 years    Hypophosphatemia- (present on admission)  Assessment & Plan  Replace as needed    Acute hypoxic respiratory failure (HCC)- (present on admission)  Assessment & Plan  Fluctuating, had titrated down to 1L after paracentesis but increased to 3 L overnight - exam stable- wean as tolerated  Continue diuretics as tolerated   Query mild community acquired pna, non specific changes on x ray -  No cough, on empiric augmentin   Wean as tolerated  Following  RT      High anion gap metabolic acidosis- (present on admission)  Assessment & Plan  Continue to trend secondary to liver dysfunction    Ascites- (present on admission)  Assessment & Plan  Due to cirrhosis  Tolerating diuretics  S/p paracentesis    Repeat paracentesis ordered 7/1    Hypomagnesemia- (present on admission)  Assessment & Plan  Replace as needed    Hypokalemia- (present on admission)  Assessment & Plan  Replaced and resolved    Acute cystitis- (present on admission)  Assessment & Plan  Resolved  Completed augmentin x 5 days.           VTE prophylaxis: SCD/ eliquis    I have performed a physical exam and reviewed and updated ROS and Plan today (7/3/2025). In review of yesterday's note (7/2/2025), there are no changes except as documented above.

## 2025-07-03 NOTE — CARE PLAN
The patient is Stable - Low risk of patient condition declining or worsening    Shift Goals  Clinical Goals: Patient's pain will be <5 throughout shift  Patient Goals: Sleep and rest  Family Goals: none present      Problem: Infection  Goal: Will remain free from infection  Outcome: Progressing     Problem: Safety  Goal: Will remain free from injury  Outcome: Progressing

## 2025-07-03 NOTE — PROGRESS NOTES
4 Eyes Skin Assessment Completed by TONYA Lamas and TONYA Grewal.    Skin assessment is primarily focused on high risk bony prominences. Pay special attention to skin beneath and around medical devices, high risk bony prominences, skin to skin areas and areas where the patient lacks sensation to feel pain and areas where the patient previously had breakdown.     Head (Occipital):  Yellow   Ears (Under Medical Devices): WDL   Nose (Under Medical Devices): WDL   Mouth:  WDL   Neck: Yellow   Breast/Chest:  Yellow   Shoulder Blades:  Yellow   Spine:   Yellow   (R) Arm/Elbow/Hand: Yellow   (L) Arm/Elbow/Hand: Yellow   Abdomen: Left lower abdomen with paracentesis site, dressing in place : CDI   Pannus/Groin:  Redness; blanching on perineal area   Sacrum/Coccyx:   Red and Blanching   (R) Ischial Tuberosity (Sit Bones):  Red and Blanching   (L) Ischial Tuberosity (Sit Bones):  Red and Blanching   (R) Leg:  Yellow   (L) Leg:  Yellow   (R) Heel:  WDL   (R) Foot/Toe: WDL   (L) Heel: WDL   (L) Foot/Toe:  WDL     Generalized yellowish skin color: jaundice    DEVICES IN USE:   Respiratory Devices:  Nasal cannula and Pulse ox  Feeding Devices:  N/A   Lines & BP Monitoring Devices:  Peripheral IV, BP cuff, and Pulse ox    Orthopedic Devices:  N/A  Miscellaneous Devices:  N/A    PROTOCOL INTERVENTIONS:   Standard/Trauma Bed:  Already in place  Nasal Cannula with Gray Foams:  Already in place  :  Already in place: nystatin powder, barrier wipes    WOUND PHOTOS:   N/A no wounds identified    WOUND CONSULT:   N/A, no advanced wound care needs identified

## 2025-07-04 ENCOUNTER — PHARMACY VISIT (OUTPATIENT)
Dept: PHARMACY | Facility: MEDICAL CENTER | Age: 50
End: 2025-07-04
Payer: COMMERCIAL

## 2025-07-04 VITALS
DIASTOLIC BLOOD PRESSURE: 69 MMHG | OXYGEN SATURATION: 95 % | SYSTOLIC BLOOD PRESSURE: 105 MMHG | BODY MASS INDEX: 26.21 KG/M2 | RESPIRATION RATE: 17 BRPM | TEMPERATURE: 97.8 F | HEART RATE: 68 BPM | HEIGHT: 62 IN | WEIGHT: 142.42 LBS

## 2025-07-04 LAB
ALBUMIN SERPL BCP-MCNC: 2.3 G/DL (ref 3.2–4.9)
ALBUMIN SERPL BCP-MCNC: 2.5 G/DL (ref 3.2–4.9)
ALBUMIN/GLOB SERPL: 0.9 G/DL
ALBUMIN/GLOB SERPL: 0.9 G/DL
ALP SERPL-CCNC: 169 U/L (ref 30–99)
ALP SERPL-CCNC: 171 U/L (ref 30–99)
ALT SERPL-CCNC: 127 U/L (ref 2–50)
ALT SERPL-CCNC: 135 U/L (ref 2–50)
ANION GAP SERPL CALC-SCNC: 10 MMOL/L (ref 7–16)
ANION GAP SERPL CALC-SCNC: 11 MMOL/L (ref 7–16)
AST SERPL-CCNC: 185 U/L (ref 12–45)
AST SERPL-CCNC: 188 U/L (ref 12–45)
BACTERIA FLD AEROBE CULT: NORMAL
BILIRUB SERPL-MCNC: 5.9 MG/DL (ref 0.1–1.5)
BILIRUB SERPL-MCNC: 6.9 MG/DL (ref 0.1–1.5)
BUN SERPL-MCNC: 16 MG/DL (ref 8–22)
BUN SERPL-MCNC: 17 MG/DL (ref 8–22)
CALCIUM ALBUM COR SERPL-MCNC: 9.6 MG/DL (ref 8.5–10.5)
CALCIUM ALBUM COR SERPL-MCNC: 9.7 MG/DL (ref 8.5–10.5)
CALCIUM SERPL-MCNC: 8.3 MG/DL (ref 8.5–10.5)
CALCIUM SERPL-MCNC: 8.4 MG/DL (ref 8.5–10.5)
CHLORIDE SERPL-SCNC: 95 MMOL/L (ref 96–112)
CHLORIDE SERPL-SCNC: 95 MMOL/L (ref 96–112)
CO2 SERPL-SCNC: 25 MMOL/L (ref 20–33)
CO2 SERPL-SCNC: 26 MMOL/L (ref 20–33)
CREAT SERPL-MCNC: 0.57 MG/DL (ref 0.5–1.4)
CREAT SERPL-MCNC: 0.59 MG/DL (ref 0.5–1.4)
GFR SERPLBLD CREATININE-BSD FMLA CKD-EPI: 109 ML/MIN/1.73 M 2
GFR SERPLBLD CREATININE-BSD FMLA CKD-EPI: 110 ML/MIN/1.73 M 2
GLOBULIN SER CALC-MCNC: 2.7 G/DL (ref 1.9–3.5)
GLOBULIN SER CALC-MCNC: 2.8 G/DL (ref 1.9–3.5)
GLUCOSE SERPL-MCNC: 101 MG/DL (ref 65–99)
GLUCOSE SERPL-MCNC: 86 MG/DL (ref 65–99)
GRAM STN SPEC: NORMAL
INR PPP: 2.23 (ref 0.87–1.13)
POTASSIUM SERPL-SCNC: 4 MMOL/L (ref 3.6–5.5)
POTASSIUM SERPL-SCNC: 4.4 MMOL/L (ref 3.6–5.5)
PROT SERPL-MCNC: 5 G/DL (ref 6–8.2)
PROT SERPL-MCNC: 5.3 G/DL (ref 6–8.2)
PROTHROMBIN TIME: 24.9 SEC (ref 12–14.6)
SIGNIFICANT IND 70042: NORMAL
SITE SITE: NORMAL
SODIUM SERPL-SCNC: 131 MMOL/L (ref 135–145)
SODIUM SERPL-SCNC: 131 MMOL/L (ref 135–145)
SOURCE SOURCE: NORMAL

## 2025-07-04 PROCEDURE — 36415 COLL VENOUS BLD VENIPUNCTURE: CPT

## 2025-07-04 PROCEDURE — 700102 HCHG RX REV CODE 250 W/ 637 OVERRIDE(OP): Performed by: HOSPITALIST

## 2025-07-04 PROCEDURE — 85610 PROTHROMBIN TIME: CPT

## 2025-07-04 PROCEDURE — 99232 SBSQ HOSP IP/OBS MODERATE 35: CPT | Performed by: NURSE PRACTITIONER

## 2025-07-04 PROCEDURE — A9270 NON-COVERED ITEM OR SERVICE: HCPCS | Performed by: NURSE PRACTITIONER

## 2025-07-04 PROCEDURE — 700102 HCHG RX REV CODE 250 W/ 637 OVERRIDE(OP): Performed by: INTERNAL MEDICINE

## 2025-07-04 PROCEDURE — A9270 NON-COVERED ITEM OR SERVICE: HCPCS | Performed by: HOSPITALIST

## 2025-07-04 PROCEDURE — RXMED WILLOW AMBULATORY MEDICATION CHARGE: Performed by: INTERNAL MEDICINE

## 2025-07-04 PROCEDURE — A9270 NON-COVERED ITEM OR SERVICE: HCPCS | Performed by: INTERNAL MEDICINE

## 2025-07-04 PROCEDURE — 99239 HOSP IP/OBS DSCHRG MGMT >30: CPT | Performed by: INTERNAL MEDICINE

## 2025-07-04 PROCEDURE — 80053 COMPREHEN METABOLIC PANEL: CPT

## 2025-07-04 PROCEDURE — 700102 HCHG RX REV CODE 250 W/ 637 OVERRIDE(OP): Performed by: NURSE PRACTITIONER

## 2025-07-04 RX ADMIN — OXYCODONE 5 MG: 5 TABLET ORAL at 08:16

## 2025-07-04 RX ADMIN — Medication 100 MG: at 05:58

## 2025-07-04 RX ADMIN — GABAPENTIN 300 MG: 300 CAPSULE ORAL at 05:57

## 2025-07-04 RX ADMIN — ESCITALOPRAM OXALATE 20 MG: 10 TABLET ORAL at 05:58

## 2025-07-04 RX ADMIN — Medication 400 MG: at 05:58

## 2025-07-04 RX ADMIN — HYDROXYZINE HYDROCHLORIDE 25 MG: 50 TABLET ORAL at 06:03

## 2025-07-04 RX ADMIN — LEVOTHYROXINE SODIUM 50 MCG: 0.05 TABLET ORAL at 05:58

## 2025-07-04 RX ADMIN — HYDROXYZINE HYDROCHLORIDE 25 MG: 50 TABLET ORAL at 11:34

## 2025-07-04 RX ADMIN — APIXABAN 10 MG: 5 TABLET, FILM COATED ORAL at 05:58

## 2025-07-04 RX ADMIN — POTASSIUM CHLORIDE 40 MEQ: 1500 TABLET, EXTENDED RELEASE ORAL at 05:58

## 2025-07-04 RX ADMIN — Medication 1 TABLET: at 05:58

## 2025-07-04 ASSESSMENT — ENCOUNTER SYMPTOMS
COUGH: 0
BLURRED VISION: 0
BACK PAIN: 0
WEAKNESS: 0
VOMITING: 0
DIZZINESS: 0
ABDOMINAL PAIN: 0
NERVOUS/ANXIOUS: 0
BLOOD IN STOOL: 0
CONSTIPATION: 0
MYALGIAS: 0
SORE THROAT: 0
NAUSEA: 0
SHORTNESS OF BREATH: 0
FEVER: 0
DEPRESSION: 0
CHILLS: 0
HEARTBURN: 0
TREMORS: 0
DIARRHEA: 0

## 2025-07-04 ASSESSMENT — PAIN DESCRIPTION - PAIN TYPE: TYPE: ACUTE PAIN

## 2025-07-04 ASSESSMENT — LIFESTYLE VARIABLES: SUBSTANCE_ABUSE: 1

## 2025-07-04 NOTE — CARE PLAN
The patient is Stable - Low risk of patient condition declining or worsening    Shift Goals  Clinical Goals: Patient's pain will be <5 throughout shift  Patient Goals: Sleep and rest  Family Goals: none present    Progress made toward(s) clinical / shift goals:    Patient is alert and oriented, able to communicate needs and calls appropriately. Patient's pain is controlled with medication, and rest. Patient refused bed alarm despite explanation of it's importance, charge RN notified. Patient's bed in low position, hourly rounding done and call light within reach.

## 2025-07-04 NOTE — DISCHARGE PLANNING
Case Management Discharge Planning    Admission Date: 6/19/2025  GMLOS: 4.8  ALOS: 15    6-Clicks ADL Score: 24  6-Clicks Mobility Score: 23      Anticipated Discharge Dispo: Discharge Disposition: Discharged to home/self care (01)      Action(s) Taken: Patient discharging today. RN notified this RN MULU that she received phone call from Felicitas at [facility] stating that they cannot accept her without medical records. RN CM tried calling back Felicitas at [phone], but had to leave . RN CM escalated to leadership if it is appropriate to send medical records for patient if an EDMUND is signed by patient since [facility] is not directly transferring patient. Per leadership, this is okay. RN CM obtained patient's signature on EDMUND and faxed DPA to scan into chart. RN CM printed MD progress notes and faxed to [phone].    Escalations Completed: Leadership    Medically Clear: Yes    Next Steps: Patient discharging today to fly to [facility]    Barriers to Discharge: None    Is the patient up for discharge tomorrow: No

## 2025-07-04 NOTE — PROGRESS NOTES
.Gastroenterology Progress Note               Author:  Beena Alex APRN   Date & Time Created: 7/4/2025 7:58 AM       Patient ID:  Name:             Minerva Tillman  YOB: 1975  Age:                 50 y.o.  female  MRN:               [id number]    Medical Decision Making, by Problem:  Active Hospital Problems    Diagnosis     Vitamin A deficiency [E50.9]     Elevated cancer antigen 125 () [R97.1]     Hyponatremia [E87.1]     Portal vein thrombosis [I81]     Alcohol abuse with withdrawal and perceptual disturbance (HCC) [F10.132]     Esophageal varices in alcoholic cirrhosis (HCC) [K70.30, I85.10]     Alcoholic hepatitis [K70.10]     Hypophosphatemia [E83.39]     High anion gap metabolic acidosis [E87.29]     Acute hypoxic respiratory failure (HCC) [J96.01]     Ascites [R18.8]     Hypokalemia [E87.6]     Hypomagnesemia [E83.42]     Acute cystitis [N30.00]      Presenting Chief Complaint:  Decompensated cirrhosis     History of Present Illness:   50-year-old female presents to the emergency department for abdominal swelling and shortness of breath and desire to discontinue alcohol intake.  Patient was seen by gastroenterology as an inpatient in 2024.  Dr. Daniels had recommended outpatient evaluation.  Patient followed up with Dr. Conley on 7/1/2024 with an additional follow-up appointment on 10/17/2024.  She had a FibroScan done which suggested advanced fibrosis and possible cirrhosis.  She was recommended to pursue upper and lower endoscopy.  Patient states that when she was told of this information she became quite concerned and did not follow-up because she was scared of the results.  Patient continues to drink alcohol.  She wants help in quitting.  She denies hematemesis or melena.  She endorses new onset abdominal swelling without lower extremity edema.     Home medications reviewed.  Patient not taking anything in the way of diuretics or medications for encephalopathy.  Patient is not  on a beta-blocker.     Review of systems at this time is as stated above as well as patient feeling a little bit anxious but comfortable with regard to her detoxification/CIWA protocol.    Interval History:  6/21/2025: Patient seen.  Reports feeling much better but having multiple liquid bowel movements.  Mild tremors, very hungry.  Has started wearing oxygen at night at home, reports it is due to increased elevation at home in [facility].  Slightly dyspneic today.  N.p.o. pending paracentesis. VSS, CIWA 4.  Transaminitis worsening, T. bili 13.8 > 9.4.  INR 1.89.  Hoping to go directly to rehab from the hospital.    6/22/2025: Feels better, tremulous with CIWA 5. Having multiple bowel movements. Fluid negative for SBP and NGTD on culture. Bili up to 14.2    6/23/2025: Feels much better today, no complaints. Eating and drinking well. Reports she was unable to be weaned off oxygen. Liver enzymes uptrending, T. Bili 17.2>14.2    6/24/2025: Patient seen at bedside with .  Had just completed echocardiogram.  Results are pending.  Tolerating oral intake without difficulty.  Denies dyspnea, nausea, vomiting, abdominal pain.  WBC 12.6.  Hemoglobin stable 13.8.  Platelets 173.  Sodium 130, BUN 11, creatinine 0.8, LFTs downtrending with , , alk phos 252, total bilirubin 15.5.  Albumin 2.7.  INR increased at 5.36.  Eliquis held by primary team.  Cytology resulted showing abnormal cells-discussed this with patient and  at bedside for which they will follow-up with outpatient GI.  Additionally, per flowsheets, patient reported to have 2 bowel movements overnight.  Patient reports having more than 5.  Discussed lactulose daily, titrating to 2-3 bowel movements per day.    6/25/2025: AO x 4.  She reports having bilateral upper quadrant/epigastric discomfort, relieved with pain medication.  She reports 3-4 bowel movements overnight.  Reports she is not able to eat a lot of food at once but is  grazing/eating throughout the day and tolerating well thus far.  No nausea, vomiting, dyspnea.  WBC 12.1, hemoglobin 13.6, platelets 190.  Sodium 133, potassium 3.4, BUN 14, creatinine 0.82.  , , alk phos 232, total bilirubin 16.3.  Albumin 2.6.  INR 2.97.  Echocardiogram completed, unable to assess right ventricular function.  MELD 3.0 score 32; 63.5% estimated 90-day survival.    6/26/2025, AO x 4.  Reports mild generalized upper abdominal pain relieved with narcotics.  She reports bowel movement x 10 since 8:00 last night.  Appetite unchanged.  No other nausea, vomiting, dyspnea.  , , alk phos 213, total bilirubin 18.3, INR improved to 1.87.  CA-125 797.    6/27/2025: AO x 4.  Negative for asterixis.  Patient to problems overnight.  Patient reports grazing/eating small amounts throughout the day.  Upon questioning types of food she is eating, she states that she eats vegetables and her protein inpatient.  , , alk phos 202, total bilirubin 18.7.  INR 1.61.  AFP 3. Lille model 0.789.  MRI pelvis, MRCP pending.    6/29/2025: AAO x 4.  Negative for asterixis.  Patient reports feeling well without nausea, vomiting, abdominal pain.  Sodium 131, potassium 3.2 renal functions normal.  , , alk phos 204, total bilirubin improved to 13.6, albumin 2.6 INR 1.36.  MELD 3.0 score 25; 87.7% 90-day survival rate.  MRI pelvis negative for malignancy/metastases.  MRCP with cirrhosis, signs of portal hypertensive including gastroesophageal varices.  Patient was supposed to have outpatient EGD/colonoscopy with [facility] provider.  She states that she had completed her prep and was ready for her procedure but then her  was out of town and was unable to take her.  She had not rescheduled endoscopy.  Offered to do EGD/colonoscopy inpatient particularly given that she has gastroesophageal varices seen on MRCP and she is off anticoagulation.  She is agreeable to proceed.  Bowel  movement x 5 semiformed overnight-  Patient reports this is tolerable.    6/30/2025: EGD:   Esophagus: Grade 1 esophageal varices, three columns, extend throughout esophagus  Stomach: 0.5 cm hiatal hernia, portal hypertensive gastropathy, friable, but not bleeding  Duodenum: normal bulb and second portion    Colonoscopy:  Few scattered diverticula throughout colon. Rectal varices seen on retroflexion. No other lesion seen.     7/1/2025: Patient seen. Looks and feels better every day. Liver enzymes and T. Bili downtrending. Awaiting oxygen delivery so she can go to rehab in [place].     7/2/2025: Continues to feel better. Only one BM yesterday. Na 128, Bili 9.7 <--- 9.9, plt 157, phos 2.2. Para complete yesterday, 1500 ml removed.  Neg for SBP, SAAG 2.1, repeat cytology pending. INR tomorrow morning.       7/3/2025: Patient seen, very anxious about leaving tomorrow. Did not sleep last night. Overall labs continue to improve. Having 2-3 BM daily.     7/4/2025: Feels well, no issues. T. Bili increased to 6.9, INR 2.23. Looking forward to leaving today. Discharging today    MELD 3.0 - 27 points    Hospital Medications:  Current Facility-Administered Medications   Medication Dose Frequency Provider Last Rate Last Admin    spironolactone (Aldactone) tablet 200 mg  200 mg Q DAY CHANTAL VarelaO.        magnesium oxide tablet 400 mg  400 mg DAILY CHANTAL VarelaO.   400 mg at 07/04/25 0558    therapeutic multivitamin-minerals (Theragran-M) tablet 1 Tablet  1 Tablet DAILY Beena Alex, DNP,  APRN   1 Tablet at 07/04/25 0558    apixaban (Eliquis) tablet 10 mg  10 mg BID KAT Varela.O.   10 mg at 07/04/25 0558    [START ON 7/9/2025] apixaban (Eliquis) tablet 5 mg  5 mg BID CHANTAL VarelaO.        potassium chloride SA (Kdur) tablet 40 mEq  40 mEq DAILY Vidhi Pond M.D.   40 mEq at 07/04/25 0558    hydrocortisone (Anusol-HC) suppository 25 mg  25 mg Q12HRS Vidhi Pond M.D.   25 mg at 07/03/25  1805    hydrOXYzine HCl (Atarax) tablet 25 mg  25 mg Q4HRS PRN Vidhi Pond M.D.   25 mg at 07/04/25 0603    lactulose 20 GM/30ML solution 15 mL  15 mL Q EVENING Vidhi Pond M.D.   15 mL at 07/03/25 1805    oxyCODONE immediate-release (Roxicodone) tablet 5 mg  5 mg Q4HRS PRN Vidhi Pond M.D.   5 mg at 07/03/25 2145    thiamine (Vitamin B-1) tablet 100 mg  100 mg DAILY Vidhi Pond M.D.   100 mg at 07/04/25 0558    Respiratory Therapy Consult   Continuous RT Helen Mendez M.D.        furosemide (Lasix) tablet 40 mg  40 mg Q DAY Helen Mendez M.D.   40 mg at 07/03/25 0600    carvedilol (Coreg) tablet 3.125 mg  3.125 mg BID WITH MEALS Helen Mendez M.D.   3.125 mg at 07/03/25 1805    melatonin tablet 5 mg  5 mg HS PRN Roseanne Crowder, A.P.R.N.   5 mg at 06/25/25 0109    labetalol (Normodyne/Trandate) injection 10 mg  10 mg Q4HRS PRN Abby Ivy M.D.        ondansetron (Zofran) syringe/vial injection 4 mg  4 mg Q4HRS PRN Abby Ivy M.D.   4 mg at 07/03/25 0728    ondansetron (Zofran ODT) dispertab 4 mg  4 mg Q4HRS PRN Abby Ivy M.D.   4 mg at 06/27/25 0807    escitalopram (Lexapro) tablet 20 mg  20 mg DAILY Abby Ivy M.D.   20 mg at 07/04/25 0558    gabapentin (Neurontin) capsule 300 mg  300 mg BID Abby Ivy M.D.   300 mg at 07/04/25 0557    levothyroxine (Synthroid) tablet 50 mcg  50 mcg AM ES Abby Ivy M.D.   50 mcg at 07/04/25 0558    albuterol inhaler 1-2 Puff  1-2 Puff Q6HRS PRN Michaelad Biju, M.D.   2 Puff at 07/03/25 1803   Last reviewed on 6/30/2025  7:28 AM by Lv Bunch D.O.       Review of Systems:  Review of Systems   Constitutional:  Negative for chills, fever and malaise/fatigue.   HENT:  Negative for hearing loss and sore throat.    Eyes:  Negative for blurred vision.   Respiratory:  Negative for cough and shortness of breath.    Cardiovascular:  Negative for chest pain and leg swelling.   Gastrointestinal:  Negative for abdominal pain, blood in stool, constipation,  "diarrhea, heartburn, melena, nausea and vomiting.   Genitourinary:  Negative for dysuria and hematuria.   Musculoskeletal:  Negative for back pain and myalgias.   Skin:  Negative for rash.   Neurological:  Negative for dizziness, tremors and weakness.   Psychiatric/Behavioral:  Positive for substance abuse. Negative for depression. The patient is not nervous/anxious.    All other systems reviewed and are negative.    Vital signs:  Weight/BMI: Body mass index is 26.05 kg/m².  /65   Pulse 65   Temp 36.3 °C (97.4 °F) (Temporal)   Resp 18   Ht 1.575 m (5' 2\")   Wt 64.6 kg (142 lb 6.7 oz)   SpO2 94%   Vitals:    07/03/25 2145 07/03/25 2245 07/04/25 0414 07/04/25 0600   BP:   105/65 102/65   Pulse:   77 65   Resp: 18 17 18    Temp:   36.3 °C (97.4 °F)    TempSrc:   Temporal    SpO2:   94%    Weight:       Height:         Oxygen Therapy:  Pulse Oximetry: 94 %, O2 (LPM): 2, O2 Delivery Device: Nasal Cannula    Intake/Output Summary (Last 24 hours) at 7/4/2025 0758  Last data filed at 7/3/2025 2145  Gross per 24 hour   Intake 1320 ml   Output --   Net 1320 ml       Physical Exam  Vitals and nursing note reviewed.   Constitutional:       General: She is awake. She is not in acute distress.     Appearance: She is overweight. She is not ill-appearing.   HENT:      Head: Normocephalic and atraumatic.      Nose: Nose normal.      Mouth/Throat:      Mouth: Mucous membranes are moist.      Pharynx: Oropharynx is clear.   Eyes:      General: Scleral icterus present.      Extraocular Movements: Extraocular movements intact.      Conjunctiva/sclera: Conjunctivae normal.   Cardiovascular:      Rate and Rhythm: Normal rate and regular rhythm.      Pulses: Normal pulses.      Heart sounds: Normal heart sounds.   Pulmonary:      Effort: Pulmonary effort is normal. No respiratory distress.      Breath sounds: Normal breath sounds.   Abdominal:      General: Abdomen is flat. Bowel sounds are normal. There is no distension.      " Palpations: Abdomen is soft.      Tenderness: There is no abdominal tenderness. There is no guarding.   Musculoskeletal:         General: Normal range of motion.      Cervical back: Normal range of motion.      Right lower leg: No edema.      Left lower leg: No edema.   Skin:     General: Skin is warm and dry.      Capillary Refill: Capillary refill takes less than 2 seconds.      Coloration: Skin is jaundiced.   Neurological:      General: No focal deficit present.      Mental Status: She is alert and oriented to person, place, and time. Mental status is at baseline.      Comments: AO x 3, no asterixis   Psychiatric:         Mood and Affect: Mood normal.         Behavior: Behavior normal. Behavior is cooperative.       Labs:  Recent Labs     07/02/25 0056 07/03/25 0112 07/03/25  2359   SODIUM 128* 131* 131*   POTASSIUM 3.7 4.0 4.0   CHLORIDE 90* 94* 95*   CO2 30 28 25   BUN 13 16 17   CREATININE 0.55 0.57 0.57   MAGNESIUM 1.5 1.7  --    PHOSPHORUS 2.2* 2.4*  --    CALCIUM 8.2* 8.4* 8.3*     Recent Labs     07/02/25 0056 07/03/25  0112 07/03/25  2359   ALTSGPT 153* 139* 127*   ASTSGOT 226* 197* 185*   ALKPHOSPHAT 178* 174* 169*   TBILIRUBIN 9.7* 6.7* 5.9*   GLUCOSE 120* 91 101*     Recent Labs     07/02/25 0056 07/03/25  0112 07/03/25  2359   WBC 12.7*  --   --    ASTSGOT 226* 197* 185*   ALTSGPT 153* 139* 127*   ALKPHOSPHAT 178* 174* 169*   TBILIRUBIN 9.7* 6.7* 5.9*     Recent Labs     07/02/25 0056 07/03/25  0112   RBC 3.31*  --    HEMOGLOBIN 12.3  --    HEMATOCRIT 35.8*  --    PLATELETCT 157*  --    PROTHROMBTM  --  20.5*   INR  --  1.75*     Recent Results (from the past 24 hours)   Comp Metabolic Panel    Collection Time: 07/03/25 11:59 PM   Result Value Ref Range    Sodium 131 (L) 135 - 145 mmol/L    Potassium 4.0 3.6 - 5.5 mmol/L    Chloride 95 (L) 96 - 112 mmol/L    Co2 25 20 - 33 mmol/L    Anion Gap 11.0 7.0 - 16.0    Glucose 101 (H) 65 - 99 mg/dL    Bun 17 8 - 22 mg/dL    Creatinine 0.57 0.50 - 1.40  mg/dL    Calcium 8.3 (L) 8.5 - 10.5 mg/dL    Correct Calcium 9.7 8.5 - 10.5 mg/dL    AST(SGOT) 185 (H) 12 - 45 U/L    ALT(SGPT) 127 (H) 2 - 50 U/L    Alkaline Phosphatase 169 (H) 30 - 99 U/L    Total Bilirubin 5.9 (H) 0.1 - 1.5 mg/dL    Albumin 2.3 (L) 3.2 - 4.9 g/dL    Total Protein 5.0 (L) 6.0 - 8.2 g/dL    Globulin 2.7 1.9 - 3.5 g/dL    A-G Ratio 0.9 g/dL   ESTIMATED GFR    Collection Time: 07/03/25 11:59 PM   Result Value Ref Range    GFR (CKD-EPI) 110 >60 mL/min/1.73 m 2       Radiology Review:  US-PARACENTESIS, ABD WITH IMAGING   Final Result      1. Ultrasound-guided therapeutic paracentesis of the left lower quadrant of the abdominal wall.      2. 1550 mL of fluid withdrawn.      MR-ABDOMEN-WITH & W/O   Final Result      1.  Cirrhosis and portal hypertension, including gastroesophageal varices and small to moderate volume ascites.   2.  No suspicious liver lesions.   3.  No acute inflammatory process in the abdomen. No malignancy or metastatic disease.         MR-PELVIS-WITH & W/O AND SEQUENCES   Final Result      1.  No pelvic mass lesions. No evidence of malignancy or metastatic disease in the pelvis.   2.  Atrophic ovaries.   3.  Moderate volume pelvic ascites.               EC-ECHOCARDIOGRAM COMPLETE W/ CONT   Final Result      US-PARACENTESIS, ABD WITH IMAGING   Final Result      1. Ultrasound-guided diagnostic and therapeutic paracentesis of the right lower quadrant of the abdominal wall.      2. 1200 mL of fluid withdrawn.      US-ABDOMEN COMPLETE SURVEY   Final Result      1.  Cirrhotic appearance of the liver with a lobulated cyst.   2.  Sludge is present within the gallbladder. There is no definite stone.   3.  Ascites.   4.  There appears to be occlusion of the portal vein and at least partial occlusion of the inferior vena cava.   5.  Evaluation is limited with poor visualization of the midline structures.         DX-CHEST-PORTABLE (1 VIEW)   Final Result      Mild left lung infiltrate.         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  Acute hypoxic respiratory failure - requiring 2 L nasal cannula  Portal hypertension - SAAG 2.2  Alcoholic hepatitis - MDF 53.3  AFP negative, AFP L3% 28.6- can have false positive with acute hepatitis, cirrhosis  Rare cluster of atypical cells in ascitic fluid suspicious for malignancy, elevated , MRCP with no evidence of malignancy  Vitamin A deficiency  Rectal and grade 1 esophageal varices   Portal hypertensive gastropathy  Small hiatal hernia    Recommendations:  - Repeat cytology negative for malignant cells  - Continue Eliquis with close monitoring for bleeding   -  Continue 200 mg spirinolactone and 40 mg lasix, increase as BP and kidney function tolerates  - Last seen by GI team March 2024 where she was noted to have trace ascites.  She was not on diuretics at home.  Continue beta blocker given no dyspnea  - Prednisone discontinued 6/28/2025, Lille score indicated she was a non-responder  - Lactulose daily-titrate to have 3 loose bowel movements per day  - Recommend MV with no iron to for Vitamin A deficiency  - Anusol HC MI BID x 7 days  - Pending transfer to addiction center in [place] today. Recommend close laboratory monitoring of liver and kidney functions    Discussed with patient, Dr. Mcknight,  Dr. Dela Cruz    ..Beena Alex, DNP,  APRN    Core Quality Measures   Reviewed items::  Labs, Medications and Radiology reports reviewed

## 2025-07-04 NOTE — PROGRESS NOTES
Discharge instructions reviewed with patient and  at bedside. Meds 2 beds provided to patient. PIV removed.

## 2025-07-04 NOTE — DISCHARGE SUMMARY
Discharge Summary    CHIEF COMPLAINT ON ADMISSION  Chief Complaint   Patient presents with    Detox     Requests help with alcohol withdrawal, last drink at noon    Abdominal Swelling     SOB, Jaundice, HX Ascites.        Reason for Admission  abdominal pain     Admission Date  6/19/2025    CODE STATUS  Full Code    HPI & HOSPITAL COURSE  This is a 50 y.o. female here with jaundice.     50 y.o. female who presented 6/19/2025 with past medical history of liver cirrhosis, alcohol abuse, depression who presents to the hospital for jaundice the past 1 week. She was found to have acute decompensated cirrhosis with ascites with a MELD of 27, acute on chronic respiratory failure with volume overload, portal vein thrombosis and possible community acquired pneumonia and was started on empiric antibiotics     She required CIWA and support with alcohol withdrawl symptoms. She underwent a paracentesis and had no evidence sbp. She was started on anticoagulation for the acute portal vein thrombosis. GI was consulted and patient was started on prednisolone.  Patient had atypical cells suspicious for malignancy in her ascites fluid.  aFP 3% 28.6,  797 and MRCP was performed which showed cirrhosis and portal hypertension with esophageal varices and small to moderate volume ascites, no malignancy or metastatic disease seen.  Patient's alcohol withdrawal symptoms resolved and CIWA protocol was discontinued.  Prednisone was discontinued since there is no significant improvement after 7 days.  GI took patient for EGD 6/30 found to have grade 1 esophageal varices, 0.5 cm hiatal hernia recommended to start Coreg and repeat EGD in 6 months.  Colonoscopy also performed found to have hemorrhoids no other lesions seen recommended repeat colonoscopy in 10 years and Anusol ND twice daily for 7 days. Another paracentesis was performed 7/1 with 1 point liters of fluid removed.  The cytology came back within normal limits.  Patient was  restarted on Eliquis for her portal vein thrombosis which she is tolerating well.  Patient continues to require 2 L nasal cannula which she states started after episode of blood they considered to be an asthma attack previously.  She is supposed to follow-up with pulmonary for PFTs but never did.  Patient's vital signs are stable and she has been medically cleared for discharge by GI she will need to follow-up closely with outpatient GI and primary care.  She has agreed to check herself into an inpatient alcohol rehab in [place].  She is to return to the ER if her condition worsens.       Therefore, she is discharged in fair and stable condition to home with close outpatient follow-up.    The patient met 2-midnight criteria for an inpatient stay at the time of discharge.    Discharge Date  7/4/2025    FOLLOW UP ITEMS POST DISCHARGE  Follow up with gastroenterology and primary care     DISCHARGE DIAGNOSES  Principal Problem:    Alcohol abuse with withdrawal and perceptual disturbance (HCC) (POA: Yes)  Active Problems:    Acute cystitis (POA: Yes)    Hypokalemia (POA: Yes)    Hypomagnesemia (POA: Yes)    Ascites (POA: Yes)      Overview: >>OVERVIEW FOR ALCOHOLIC CIRRHOSIS OF LIVER WITHOUT ASCITES (HCC) WRITTEN       ON 6/20/2024  2:05 PM BY MARIKA MACIEL M.D.            Diagnosed In 03/2024             CT abdomen showed  Cirrhosis with portal hypertension      Associated ascites and splenomegaly      Hepatic steatosis, extremely severe      With elevated transaminitis with bilirubinemia      Initially elevated ammonia as well.      Rifaximin and Spironolactone was added      Not started on propranolol or nadolol for prophylaxis for portal       hypertension because her blood pressure was soft while in hospital.      [facility] as [facility] colonoscopy for chronic       diarrhea.    High anion gap metabolic acidosis (POA: Yes)    Acute hypoxic respiratory failure (HCC) (POA: Yes)    Hypophosphatemia  (POA: Yes)    Esophageal varices in alcoholic cirrhosis (HCC) (POA: Yes)    Alcoholic hepatitis (POA: Yes)    Hyponatremia (POA: Yes)    Portal vein thrombosis (POA: Yes)    Elevated cancer antigen 125 () (POA: No)    Vitamin A deficiency (POA: Unknown)  Resolved Problems:    * No resolved hospital problems. *      FOLLOW UP  Future Appointments   Date Time Provider Department Center   7/25/2025 11:00 AM Alexsander Chappell M.D. [facility]     Donita Asif M.D.  [address]  [address]  [facility] [address]  [phone]    Follow up  Follow-up with primary care post alcohol rehab      MEDICATIONS ON DISCHARGE     Medication List        START taking these medications        Instructions   apixaban 5 MG Tabs  Start taking on: July 3, 2025  Commonly known as: Eliquis   Take 2 Tablets by mouth 2 times a day for 5 days, THEN 1 Tablet 2 times a day for 25 days.     carvedilol 3.125 MG Tabs  Commonly known as: Coreg   Take 1 Tablet by mouth 2 times a day with meals.  Dose: 3.125 mg     furosemide 40 MG Tabs  Commonly known as: Lasix   Take 1 Tablet by mouth every day.  Dose: 40 mg     hydrocortisone 25 MG Supp  Commonly known as: Anusol-HC   Insert 1 Suppository into the rectum every 12 hours for 4 days.  Dose: 25 mg     lactulose 20 GM/30ML Soln   Take 15 mL by mouth every evening.  Dose: 15 mL     oxyCODONE immediate-release 5 MG Tabs  Commonly known as: Roxicodone   Take 1 Tablet by mouth every four hours as needed for Severe Pain for up to 5 days.  Dose: 5 mg     potassium chloride SA 20 MEQ Tbcr  Commonly known as: Kdur   Take 1 Tablet by mouth every day.  Dose: 20 mEq     spironolactone 100 MG Tabs  Commonly known as: Aldactone   Take 2 Tablets by mouth every day.  Dose: 200 mg     therapeutic multivitamin-minerals Tabs   Take 1 Tablet by mouth every day.  Dose: 1 Tablet            CHANGE how you take these medications        Instructions   hydrOXYzine HCl 25 MG Tabs  What changed: when to take  this  Commonly known as: Atarax   Take 1 Tablet by mouth every four hours as needed for Anxiety.  Dose: 25 mg            CONTINUE taking these medications        Instructions   albuterol 108 (90 Base) MCG/ACT Aers inhalation aerosol   Inhale 1-2 Puffs every 6 hours as needed for Shortness of Breath.  Dose: 1-2 Puff     escitalopram 20 MG tablet  Commonly known as: Lexapro   Take 1 Tablet by mouth every day for 30 days.  Dose: 20 mg     gabapentin 300 MG Caps  Commonly known as: Neurontin   Take 1 Capsule by mouth 2 times a day for 30 days.  Dose: 300 mg     levothyroxine 50 MCG Tabs  Commonly known as: Synthroid   Take 1 Tablet by mouth every morning on an empty stomach.  Dose: 50 mcg     ondansetron 4 MG Tbdp  Commonly known as: Zofran ODT   Dissolve 1 Tablet by mouth every 6 hours as needed for Nausea/Vomiting.  Dose: 4 mg     thiamine 100 MG tablet  Commonly known as: Thiamine   Take 1 Tablet by mouth every day.  Dose: 100 mg     TYLENOL PO   Take 1 Tablet by mouth 1 time a day as needed (pain).  Dose: 1 Tablet            STOP taking these medications      hydrOXYzine pamoate 25 MG Caps  Commonly known as: Vistaril     multivitamin Tabs              Allergies  Allergies[1]    DIET  Orders Placed This Encounter   Procedures    Diet Order Diet: 2 Gram Sodium     Standing Status:   Standing     Number of Occurrences:   1     Diet::   2 Gram Sodium [7]       ACTIVITY  As tolerated.  Weight bearing as tolerated    CONSULTATIONS  Gastroenterology       PROCEDURES  6/30/2025   ESOPHAGOGASTRODUODENOSCOPY   COLONOSCOPY     LABORATORY  Lab Results   Component Value Date    SODIUM 131 (L) 07/03/2025    POTASSIUM 4.0 07/03/2025    CHLORIDE 95 (L) 07/03/2025    CO2 25 07/03/2025    GLUCOSE 101 (H) 07/03/2025    BUN 17 07/03/2025    CREATININE 0.57 07/03/2025    CREATININE 0.83 02/01/2013        Lab Results   Component Value Date    WBC 12.7 (H) 07/02/2025    WBC 5.2 02/01/2013    HEMOGLOBIN 12.3 07/02/2025    HEMATOCRIT 35.8  (L) 07/02/2025    PLATELETCT 157 (L) 07/02/2025        Total time of the discharge process exceeds 32 minutes.       [1]   Allergies  Allergen Reactions    [place] Hives     Patient states not allergic anymore, just a one time thing    S[facility]a Drugs Hives     [id number]

## 2025-07-07 ENCOUNTER — PATIENT OUTREACH (OUTPATIENT)
Dept: HEALTH INFORMATION MANAGEMENT | Facility: OTHER | Age: 50
End: 2025-07-07
Payer: COMMERCIAL

## 2025-07-07 NOTE — PROGRESS NOTES
"W Alejandra received a voicemail from Dr Tillman stating admission to [facility] was not good. Staff treated pt like a criminal and medical records were not received and facility wanted to admit pt in the hospital with concerns of being able to go through treatment.  Pt returned home.  Called Dr Tillman and apologized for bad experience. Plan going forward is to let pt get stronger and in better health before inpatient treatment.  Provided [facility] [phone] located in [facility] [place] information.   Called Pt and pt states pt cannot discuss experience right now that pt is that upset. Pt asked that this W ask Dr Dela Cruz to put in lab orders for blood work.   Sent Voalte message to Dr Dela Cruz requesting lab orders.   Received Voalte from Dr Dela Cruz, GI will order labs.   Called pt and advised pt of labs.     2:56 pm  Received a Voalte message from Beena Alex stating that pt will need to have pt' primary care order the labs. Beena is happy to discuss the results with pt.   Called pt advising about PCP ordering labs. Pt states \" I will do that, thank you\". And hung up phone.   Will assist pt if pt request assistance.     "

## 2025-07-09 ENCOUNTER — APPOINTMENT (OUTPATIENT)
Dept: RADIOLOGY | Facility: MEDICAL CENTER | Age: 50
DRG: 189 | End: 2025-07-09
Attending: HOSPITALIST
Payer: COMMERCIAL

## 2025-07-09 ENCOUNTER — APPOINTMENT (OUTPATIENT)
Dept: RADIOLOGY | Facility: MEDICAL CENTER | Age: 50
DRG: 189 | End: 2025-07-09
Payer: COMMERCIAL

## 2025-07-09 ENCOUNTER — APPOINTMENT (OUTPATIENT)
Dept: RADIOLOGY | Facility: MEDICAL CENTER | Age: 50
DRG: 189 | End: 2025-07-09
Attending: EMERGENCY MEDICINE
Payer: COMMERCIAL

## 2025-07-09 ENCOUNTER — HOSPITAL ENCOUNTER (INPATIENT)
Facility: MEDICAL CENTER | Age: 50
LOS: 7 days | DRG: 189 | End: 2025-07-16
Attending: EMERGENCY MEDICINE | Admitting: HOSPITALIST
Payer: COMMERCIAL

## 2025-07-09 DIAGNOSIS — S82.851A TRIMALLEOLAR FRACTURE OF ANKLE, CLOSED, RIGHT, INITIAL ENCOUNTER: ICD-10-CM

## 2025-07-09 DIAGNOSIS — K70.30 ESOPHAGEAL VARICES IN ALCOHOLIC CIRRHOSIS (HCC): ICD-10-CM

## 2025-07-09 DIAGNOSIS — D75.89 MACROCYTOSIS: ICD-10-CM

## 2025-07-09 DIAGNOSIS — R60.9 SWELLING: ICD-10-CM

## 2025-07-09 DIAGNOSIS — K70.31 ALCOHOLIC CIRRHOSIS OF LIVER WITH ASCITES (HCC): ICD-10-CM

## 2025-07-09 DIAGNOSIS — R35.0 URINARY FREQUENCY: ICD-10-CM

## 2025-07-09 DIAGNOSIS — I81 PORTAL VEIN THROMBOSIS: ICD-10-CM

## 2025-07-09 DIAGNOSIS — K70.0 ALCOHOLIC FATTY LIVER: ICD-10-CM

## 2025-07-09 DIAGNOSIS — K74.60 DECOMPENSATED HEPATIC CIRRHOSIS (HCC): ICD-10-CM

## 2025-07-09 DIAGNOSIS — R74.8 ELEVATED ALKALINE PHOSPHATASE LEVEL: ICD-10-CM

## 2025-07-09 DIAGNOSIS — F41.1 GENERALIZED ANXIETY DISORDER: ICD-10-CM

## 2025-07-09 DIAGNOSIS — E83.42 HYPOMAGNESEMIA: ICD-10-CM

## 2025-07-09 DIAGNOSIS — I85.10 ESOPHAGEAL VARICES IN ALCOHOLIC CIRRHOSIS (HCC): ICD-10-CM

## 2025-07-09 DIAGNOSIS — E83.39 HYPOPHOSPHATEMIA: ICD-10-CM

## 2025-07-09 DIAGNOSIS — G62.9 NEUROPATHY: ICD-10-CM

## 2025-07-09 DIAGNOSIS — J18.9 COMMUNITY ACQUIRED PNEUMONIA OF LEFT LOWER LOBE OF LUNG: ICD-10-CM

## 2025-07-09 DIAGNOSIS — E78.00 HYPERCHOLESTEROLEMIA: ICD-10-CM

## 2025-07-09 DIAGNOSIS — H91.92 DECREASED HEARING, LEFT: ICD-10-CM

## 2025-07-09 DIAGNOSIS — N39.0 URINARY TRACT INFECTION WITHOUT HEMATURIA, SITE UNSPECIFIED: ICD-10-CM

## 2025-07-09 DIAGNOSIS — F10.930 ALCOHOL WITHDRAWAL SYNDROME WITHOUT COMPLICATION (HCC): ICD-10-CM

## 2025-07-09 DIAGNOSIS — K70.11 ALCOHOLIC HEPATITIS WITH ASCITES: ICD-10-CM

## 2025-07-09 DIAGNOSIS — I10 PRIMARY HYPERTENSION: ICD-10-CM

## 2025-07-09 DIAGNOSIS — R09.02 HYPOXIA: Primary | ICD-10-CM

## 2025-07-09 DIAGNOSIS — R71.8 ELEVATED HEMATOCRIT: ICD-10-CM

## 2025-07-09 DIAGNOSIS — E55.9 VITAMIN D INSUFFICIENCY: ICD-10-CM

## 2025-07-09 DIAGNOSIS — J96.01 ACUTE HYPOXIC RESPIRATORY FAILURE (HCC): ICD-10-CM

## 2025-07-09 DIAGNOSIS — I10 ESSENTIAL HYPERTENSION: ICD-10-CM

## 2025-07-09 DIAGNOSIS — J96.21 ACUTE ON CHRONIC RESPIRATORY FAILURE WITH HYPOXIA (HCC): ICD-10-CM

## 2025-07-09 DIAGNOSIS — F10.20 ALCOHOL USE DISORDER, SEVERE, DEPENDENCE (HCC): Chronic | ICD-10-CM

## 2025-07-09 DIAGNOSIS — R10.84 GENERALIZED ABDOMINAL PAIN: ICD-10-CM

## 2025-07-09 DIAGNOSIS — E87.6 HYPOKALEMIA: ICD-10-CM

## 2025-07-09 DIAGNOSIS — R73.9 HYPERGLYCEMIA: ICD-10-CM

## 2025-07-09 DIAGNOSIS — E50.9 VITAMIN A DEFICIENCY: ICD-10-CM

## 2025-07-09 DIAGNOSIS — F33.0 MILD EPISODE OF RECURRENT MAJOR DEPRESSIVE DISORDER (HCC): ICD-10-CM

## 2025-07-09 DIAGNOSIS — E87.1 HYPONATREMIA: ICD-10-CM

## 2025-07-09 DIAGNOSIS — Z71.89 ADVANCE CARE PLANNING: ICD-10-CM

## 2025-07-09 DIAGNOSIS — F40.10 SOCIAL ANXIETY DISORDER: ICD-10-CM

## 2025-07-09 DIAGNOSIS — I85.00 PORTAL HYPERTENSION WITH ESOPHAGEAL VARICES (HCC): ICD-10-CM

## 2025-07-09 DIAGNOSIS — D69.6 THROMBOCYTOPENIA (HCC): ICD-10-CM

## 2025-07-09 DIAGNOSIS — G47.09 OTHER INSOMNIA: ICD-10-CM

## 2025-07-09 DIAGNOSIS — R74.01 TRANSAMINITIS: ICD-10-CM

## 2025-07-09 DIAGNOSIS — K76.6 PORTAL HYPERTENSION WITH ESOPHAGEAL VARICES (HCC): ICD-10-CM

## 2025-07-09 DIAGNOSIS — K72.90 DECOMPENSATED HEPATIC CIRRHOSIS (HCC): ICD-10-CM

## 2025-07-09 DIAGNOSIS — R63.5 WEIGHT GAIN: ICD-10-CM

## 2025-07-09 DIAGNOSIS — R47.1 DYSARTHRIA: ICD-10-CM
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
## 2025-07-09 DIAGNOSIS — R26.89 BALANCE PROBLEM: ICD-10-CM

## 2025-07-09 DIAGNOSIS — G43.109 MIGRAINE WITH AURA AND WITHOUT STATUS MIGRAINOSUS, NOT INTRACTABLE: ICD-10-CM

## 2025-07-09 DIAGNOSIS — R17 TOTAL BILIRUBIN, ELEVATED: ICD-10-CM

## 2025-07-09 DIAGNOSIS — E87.29 HIGH ANION GAP METABOLIC ACIDOSIS: ICD-10-CM

## 2025-07-09 DIAGNOSIS — F10.132: ICD-10-CM

## 2025-07-09 DIAGNOSIS — R97.1 ELEVATED CANCER ANTIGEN 125 (CA 125): ICD-10-CM

## 2025-07-09 PROBLEM — J96.20 ACUTE-ON-CHRONIC RESPIRATORY FAILURE (HCC): Status: ACTIVE | Noted: 2025-07-09

## 2025-07-09 LAB
ACTION RANGE TRIGGERED IACRT: NO
ALBUMIN SERPL BCP-MCNC: 2.5 G/DL (ref 3.2–4.9)
ALBUMIN/GLOB SERPL: 0.7 G/DL
ALP SERPL-CCNC: 166 U/L (ref 30–99)
ALT SERPL-CCNC: 107 U/L (ref 2–50)
ANION GAP SERPL CALC-SCNC: 12 MMOL/L (ref 7–16)
ANISOCYTOSIS BLD QL SMEAR: ABNORMAL
APPEARANCE UR: CLEAR
ARTERIAL PATENCY WRIST A: ABNORMAL
AST SERPL-CCNC: 156 U/L (ref 12–45)
B PARAP IS1001 DNA NPH QL NAA+NON-PROBE: NOT DETECTED
B PERT.PT PRMT NPH QL NAA+NON-PROBE: NOT DETECTED
BACTERIA #/AREA URNS HPF: ABNORMAL /HPF
BASE EXCESS BLDA CALC-SCNC: 0 MMOL/L (ref -4–3)
BASOPHILS # BLD AUTO: 0.7 % (ref 0–1.8)
BASOPHILS # BLD: 0.1 K/UL (ref 0–0.12)
BILIRUB SERPL-MCNC: 4.8 MG/DL (ref 0.1–1.5)
BILIRUB UR QL STRIP.AUTO: ABNORMAL
BUN SERPL-MCNC: 12 MG/DL (ref 8–22)
C PNEUM DNA NPH QL NAA+NON-PROBE: NOT DETECTED
CALCIUM ALBUM COR SERPL-MCNC: 9.8 MG/DL (ref 8.5–10.5)
CALCIUM SERPL-MCNC: 8.6 MG/DL (ref 8.5–10.5)
CASTS URNS QL MICRO: ABNORMAL /LPF (ref 0–2)
CHLORIDE SERPL-SCNC: 101 MMOL/L (ref 96–112)
CO2 BLDA-SCNC: 22 MMOL/L (ref 20–33)
CO2 SERPL-SCNC: 20 MMOL/L (ref 20–33)
COLOR UR: ABNORMAL
COMMENT 1642: NORMAL
CREAT SERPL-MCNC: 0.81 MG/DL (ref 0.5–1.4)
DELSYS IDSYS: ABNORMAL
EKG IMPRESSION: NORMAL
EOSINOPHIL # BLD AUTO: 0.1 K/UL (ref 0–0.51)
EOSINOPHIL NFR BLD: 0.7 % (ref 0–6.9)
EPITHELIAL CELLS 1715: ABNORMAL /HPF (ref 0–5)
ERYTHROCYTE [DISTWIDTH] IN BLOOD BY AUTOMATED COUNT: 73.2 FL (ref 35.9–50)
ERYTHROCYTE [SEDIMENTATION RATE] IN BLOOD BY WESTERGREN METHOD: 30 MM/HOUR (ref 0–25)
FLUAV RNA NPH QL NAA+NON-PROBE: NOT DETECTED
FLUAV RNA SPEC QL NAA+PROBE: NEGATIVE
FLUBV RNA NPH QL NAA+NON-PROBE: NOT DETECTED
FLUBV RNA SPEC QL NAA+PROBE: NEGATIVE
GFR SERPLBLD CREATININE-BSD FMLA CKD-EPI: 88 ML/MIN/1.73 M 2
GLOBULIN SER CALC-MCNC: 3.6 G/DL (ref 1.9–3.5)
GLUCOSE SERPL-MCNC: 91 MG/DL (ref 65–99)
GLUCOSE UR STRIP.AUTO-MCNC: NEGATIVE MG/DL
HADV DNA NPH QL NAA+NON-PROBE: NOT DETECTED
HCO3 BLDA-SCNC: 21 MMOL/L (ref 21–28)
HCOV 229E RNA NPH QL NAA+NON-PROBE: NOT DETECTED
HCOV HKU1 RNA NPH QL NAA+NON-PROBE: NOT DETECTED
HCOV NL63 RNA NPH QL NAA+NON-PROBE: NOT DETECTED
HCOV OC43 RNA NPH QL NAA+NON-PROBE: NOT DETECTED
HCT VFR BLD AUTO: 43.4 % (ref 37–47)
HGB BLD-MCNC: 14.2 G/DL (ref 12–16)
HMPV RNA NPH QL NAA+NON-PROBE: NOT DETECTED
HPIV1 RNA NPH QL NAA+NON-PROBE: NOT DETECTED
HPIV2 RNA NPH QL NAA+NON-PROBE: NOT DETECTED
HPIV3 RNA NPH QL NAA+NON-PROBE: NOT DETECTED
HPIV4 RNA NPH QL NAA+NON-PROBE: NOT DETECTED
IMM GRANULOCYTES # BLD AUTO: 0.2 K/UL (ref 0–0.11)
IMM GRANULOCYTES NFR BLD AUTO: 1.3 % (ref 0–0.9)
INST. QUALIFIED PATIENT IIQPT: YES
KETONES UR STRIP.AUTO-MCNC: NEGATIVE MG/DL
LACTATE BLD-SCNC: 1.21 MMOL/L (ref 0.5–2)
LACTATE SERPL-SCNC: 1.9 MMOL/L (ref 0.5–2)
LEUKOCYTE ESTERASE UR QL STRIP.AUTO: ABNORMAL
LPM ILPM: 35
LYMPHOCYTES # BLD AUTO: 2.48 K/UL (ref 1–4.8)
LYMPHOCYTES NFR BLD: 16.7 % (ref 22–41)
Lab: ABNORMAL
M PNEUMO DNA NPH QL NAA+NON-PROBE: NOT DETECTED
MACROCYTES BLD QL SMEAR: ABNORMAL
MAGNESIUM SERPL-MCNC: 1.4 MG/DL (ref 1.5–2.5)
MCH RBC QN AUTO: 37.6 PG (ref 27–33)
MCHC RBC AUTO-ENTMCNC: 32.7 G/DL (ref 32.2–35.5)
MCV RBC AUTO: 114.8 FL (ref 81.4–97.8)
MICRO URNS: ABNORMAL
MONOCYTES # BLD AUTO: 1.53 K/UL (ref 0–0.85)
MONOCYTES NFR BLD AUTO: 10.3 % (ref 0–13.4)
MORPHOLOGY BLD-IMP: NORMAL
NEUTROPHILS # BLD AUTO: 10.46 K/UL (ref 1.82–7.42)
NEUTROPHILS NFR BLD: 70.3 % (ref 44–72)
NITRITE UR QL STRIP.AUTO: NEGATIVE
NRBC # BLD AUTO: 0.09 K/UL
NRBC BLD-RTO: 0.6 /100 WBC (ref 0–0.2)
NT-PROBNP SERPL IA-MCNC: 238 PG/ML (ref 0–125)
O2/TOTAL GAS SETTING VFR VENT: 60 %
O2/TOTAL GAS SETTING VFR VENT: 60 %
PCO2 BLDA: 25 MMHG (ref 32–48)
PH BLDA: 7.54 [PH] (ref 7.35–7.45)
PH UR STRIP.AUTO: 7.5 [PH] (ref 5–8)
PLATELET # BLD AUTO: 457 K/UL (ref 164–446)
PLATELET BLD QL SMEAR: NORMAL
PMV BLD AUTO: 10.4 FL (ref 9–12.9)
PO2 BLDA: 84 MMHG (ref 83–108)
POLYCHROMASIA BLD QL SMEAR: NORMAL
POTASSIUM SERPL-SCNC: 4.7 MMOL/L (ref 3.6–5.5)
PROCALCITONIN SERPL-MCNC: 0.37 NG/ML
PROT SERPL-MCNC: 6.1 G/DL (ref 6–8.2)
PROT UR QL STRIP: NEGATIVE MG/DL
RBC # BLD AUTO: 3.78 M/UL (ref 4.2–5.4)
RBC # URNS HPF: ABNORMAL /HPF (ref 0–2)
RBC BLD AUTO: PRESENT
RBC UR QL AUTO: NEGATIVE
RSV RNA NPH QL NAA+NON-PROBE: NOT DETECTED
RSV RNA SPEC QL NAA+PROBE: NEGATIVE
RV+EV RNA NPH QL NAA+NON-PROBE: NOT DETECTED
SAO2 % BLDA: 98 % (ref 93–99)
SARS-COV-2 RNA NPH QL NAA+NON-PROBE: NOTDETECTED
SARS-COV-2 RNA RESP QL NAA+PROBE: NEGATIVE
SCCMEC + MECA PNL NOSE NAA+PROBE: NEGATIVE
SODIUM SERPL-SCNC: 133 MMOL/L (ref 135–145)
SP GR UR STRIP.AUTO: 1.01
SPECIMEN DRAWN FROM PATIENT: ABNORMAL
T4 FREE SERPL-MCNC: 0.91 NG/DL (ref 0.93–1.7)
TROPONIN T SERPL-MCNC: 9 NG/L (ref 6–19)
TSH SERPL DL<=0.005 MIU/L-ACNC: 35.3 UIU/ML (ref 0.38–5.33)
UROBILINOGEN UR STRIP.AUTO-MCNC: 1 EU/DL
WBC # BLD AUTO: 14.9 K/UL (ref 4.8–10.8)
WBC #/AREA URNS HPF: ABNORMAL /HPF

## 2025-07-09 PROCEDURE — 0241U POC COV-2, FLU A/B, RSV BY PCR: CPT | Performed by: EMERGENCY MEDICINE

## 2025-07-09 PROCEDURE — 84443 ASSAY THYROID STIM HORMONE: CPT

## 2025-07-09 PROCEDURE — 700111 HCHG RX REV CODE 636 W/ 250 OVERRIDE (IP): Mod: JZ | Performed by: EMERGENCY MEDICINE

## 2025-07-09 PROCEDURE — 84484 ASSAY OF TROPONIN QUANT: CPT

## 2025-07-09 PROCEDURE — 82803 BLOOD GASES ANY COMBINATION: CPT | Performed by: HOSPITALIST

## 2025-07-09 PROCEDURE — 36415 COLL VENOUS BLD VENIPUNCTURE: CPT

## 2025-07-09 PROCEDURE — 0202U NFCT DS 22 TRGT SARS-COV-2: CPT

## 2025-07-09 PROCEDURE — 700101 HCHG RX REV CODE 250: Performed by: EMERGENCY MEDICINE

## 2025-07-09 PROCEDURE — 83605 ASSAY OF LACTIC ACID: CPT | Performed by: HOSPITALIST

## 2025-07-09 PROCEDURE — 93005 ELECTROCARDIOGRAM TRACING: CPT | Mod: TC | Performed by: EMERGENCY MEDICINE

## 2025-07-09 PROCEDURE — 770020 HCHG ROOM/CARE - TELE (206)

## 2025-07-09 PROCEDURE — 700105 HCHG RX REV CODE 258: Performed by: HOSPITALIST

## 2025-07-09 PROCEDURE — 84439 ASSAY OF FREE THYROXINE: CPT

## 2025-07-09 PROCEDURE — 85025 COMPLETE CBC W/AUTO DIFF WBC: CPT

## 2025-07-09 PROCEDURE — 71250 CT THORAX DX C-: CPT

## 2025-07-09 PROCEDURE — A9270 NON-COVERED ITEM OR SERVICE: HCPCS | Performed by: HOSPITALIST

## 2025-07-09 PROCEDURE — 83735 ASSAY OF MAGNESIUM: CPT

## 2025-07-09 PROCEDURE — 81001 URINALYSIS AUTO W/SCOPE: CPT

## 2025-07-09 PROCEDURE — 83880 ASSAY OF NATRIURETIC PEPTIDE: CPT

## 2025-07-09 PROCEDURE — 87641 MR-STAPH DNA AMP PROBE: CPT

## 2025-07-09 PROCEDURE — 84145 PROCALCITONIN (PCT): CPT

## 2025-07-09 PROCEDURE — 96374 THER/PROPH/DIAG INJ IV PUSH: CPT

## 2025-07-09 PROCEDURE — 94640 AIRWAY INHALATION TREATMENT: CPT

## 2025-07-09 PROCEDURE — 85652 RBC SED RATE AUTOMATED: CPT

## 2025-07-09 PROCEDURE — 71045 X-RAY EXAM CHEST 1 VIEW: CPT

## 2025-07-09 PROCEDURE — 93005 ELECTROCARDIOGRAM TRACING: CPT | Mod: TC

## 2025-07-09 PROCEDURE — 83605 ASSAY OF LACTIC ACID: CPT

## 2025-07-09 PROCEDURE — 700111 HCHG RX REV CODE 636 W/ 250 OVERRIDE (IP): Mod: JZ | Performed by: HOSPITALIST

## 2025-07-09 PROCEDURE — 700102 HCHG RX REV CODE 250 W/ 637 OVERRIDE(OP): Performed by: HOSPITALIST

## 2025-07-09 PROCEDURE — 82103 ALPHA-1-ANTITRYPSIN TOTAL: CPT

## 2025-07-09 PROCEDURE — 36600 WITHDRAWAL OF ARTERIAL BLOOD: CPT

## 2025-07-09 PROCEDURE — 99223 1ST HOSP IP/OBS HIGH 75: CPT | Performed by: HOSPITALIST

## 2025-07-09 PROCEDURE — 80053 COMPREHEN METABOLIC PANEL: CPT

## 2025-07-09 PROCEDURE — 99222 1ST HOSP IP/OBS MODERATE 55: CPT | Performed by: STUDENT IN AN ORGANIZED HEALTH CARE EDUCATION/TRAINING PROGRAM

## 2025-07-09 PROCEDURE — 74176 CT ABD & PELVIS W/O CONTRAST: CPT

## 2025-07-09 PROCEDURE — 99285 EMERGENCY DEPT VISIT HI MDM: CPT

## 2025-07-09 RX ORDER — HYDROXYZINE HYDROCHLORIDE 25 MG/1
25 TABLET, FILM COATED ORAL EVERY 4 HOURS PRN
Status: DISCONTINUED | OUTPATIENT
Start: 2025-07-09 | End: 2025-07-16 | Stop reason: HOSPADM

## 2025-07-09 RX ORDER — IPRATROPIUM BROMIDE AND ALBUTEROL SULFATE 2.5; .5 MG/3ML; MG/3ML
3 SOLUTION RESPIRATORY (INHALATION)
Status: COMPLETED | OUTPATIENT
Start: 2025-07-09 | End: 2025-07-09

## 2025-07-09 RX ORDER — LACTULOSE 10 G/15ML
15 SOLUTION ORAL EVERY EVENING
Status: DISCONTINUED | OUTPATIENT
Start: 2025-07-09 | End: 2025-07-16 | Stop reason: HOSPADM

## 2025-07-09 RX ORDER — PROMETHAZINE HYDROCHLORIDE 25 MG/1
12.5-25 SUPPOSITORY RECTAL EVERY 4 HOURS PRN
Status: DISCONTINUED | OUTPATIENT
Start: 2025-07-09 | End: 2025-07-16 | Stop reason: HOSPADM

## 2025-07-09 RX ORDER — ONDANSETRON 2 MG/ML
4 INJECTION INTRAMUSCULAR; INTRAVENOUS EVERY 4 HOURS PRN
Status: DISCONTINUED | OUTPATIENT
Start: 2025-07-09 | End: 2025-07-16 | Stop reason: HOSPADM

## 2025-07-09 RX ORDER — ACETAMINOPHEN 325 MG/1
650 TABLET ORAL EVERY 6 HOURS PRN
Status: DISCONTINUED | OUTPATIENT
Start: 2025-07-09 | End: 2025-07-16 | Stop reason: HOSPADM

## 2025-07-09 RX ORDER — GAUZE BANDAGE 2" X 2"
100 BANDAGE TOPICAL DAILY
Status: DISCONTINUED | OUTPATIENT
Start: 2025-07-10 | End: 2025-07-16 | Stop reason: HOSPADM

## 2025-07-09 RX ORDER — PROCHLORPERAZINE EDISYLATE 5 MG/ML
5-10 INJECTION INTRAMUSCULAR; INTRAVENOUS EVERY 4 HOURS PRN
Status: DISCONTINUED | OUTPATIENT
Start: 2025-07-09 | End: 2025-07-16 | Stop reason: HOSPADM

## 2025-07-09 RX ORDER — PROMETHAZINE HYDROCHLORIDE 25 MG/1
12.5-25 TABLET ORAL EVERY 4 HOURS PRN
Status: DISCONTINUED | OUTPATIENT
Start: 2025-07-09 | End: 2025-07-16 | Stop reason: HOSPADM

## 2025-07-09 RX ORDER — CEFAZOLIN 2 G/1
2 INJECTION, POWDER, FOR SOLUTION INTRAMUSCULAR; INTRAVENOUS ONCE
Status: COMPLETED | OUTPATIENT
Start: 2025-07-09 | End: 2025-07-09

## 2025-07-09 RX ORDER — ONDANSETRON 4 MG/1
4 TABLET, ORALLY DISINTEGRATING ORAL EVERY 4 HOURS PRN
Status: DISCONTINUED | OUTPATIENT
Start: 2025-07-09 | End: 2025-07-16 | Stop reason: HOSPADM

## 2025-07-09 RX ORDER — M-VIT,TX,IRON,MINS/CALC/FOLIC 27MG-0.4MG
1 TABLET ORAL DAILY
Status: DISCONTINUED | OUTPATIENT
Start: 2025-07-10 | End: 2025-07-16 | Stop reason: HOSPADM

## 2025-07-09 RX ORDER — HYDROCORTISONE ACETATE 25 MG/1
25 SUPPOSITORY RECTAL 2 TIMES DAILY
Status: ON HOLD | COMMUNITY
Start: 2025-07-02 | End: 2025-07-16

## 2025-07-09 RX ORDER — SPIRONOLACTONE 100 MG/1
200 TABLET, FILM COATED ORAL DAILY
Status: DISCONTINUED | OUTPATIENT
Start: 2025-07-10 | End: 2025-07-16 | Stop reason: HOSPADM

## 2025-07-09 RX ORDER — MIDODRINE HYDROCHLORIDE 5 MG/1
10 TABLET ORAL
Status: DISCONTINUED | OUTPATIENT
Start: 2025-07-09 | End: 2025-07-11

## 2025-07-09 RX ORDER — FUROSEMIDE 10 MG/ML
40 INJECTION INTRAMUSCULAR; INTRAVENOUS ONCE
Status: DISCONTINUED | OUTPATIENT
Start: 2025-07-09 | End: 2025-07-09

## 2025-07-09 RX ORDER — OXYCODONE HYDROCHLORIDE 5 MG/1
5 TABLET ORAL EVERY 4 HOURS PRN
Refills: 0 | Status: DISCONTINUED | OUTPATIENT
Start: 2025-07-09 | End: 2025-07-16 | Stop reason: HOSPADM

## 2025-07-09 RX ORDER — HYDROCORTISONE SODIUM SUCCINATE 100 MG/2ML
50 INJECTION INTRAMUSCULAR; INTRAVENOUS EVERY 6 HOURS
Status: DISCONTINUED | OUTPATIENT
Start: 2025-07-09 | End: 2025-07-12

## 2025-07-09 RX ORDER — CARVEDILOL 3.12 MG/1
3.12 TABLET ORAL 2 TIMES DAILY WITH MEALS
Status: DISCONTINUED | OUTPATIENT
Start: 2025-07-09 | End: 2025-07-16 | Stop reason: HOSPADM

## 2025-07-09 RX ORDER — POTASSIUM CHLORIDE 1500 MG/1
20 TABLET, EXTENDED RELEASE ORAL DAILY
Status: DISCONTINUED | OUTPATIENT
Start: 2025-07-10 | End: 2025-07-10

## 2025-07-09 RX ORDER — GABAPENTIN 300 MG/1
300 CAPSULE ORAL 2 TIMES DAILY
Status: DISCONTINUED | OUTPATIENT
Start: 2025-07-09 | End: 2025-07-16 | Stop reason: HOSPADM

## 2025-07-09 RX ORDER — LEVOTHYROXINE SODIUM 50 UG/1
50 TABLET ORAL
Status: DISCONTINUED | OUTPATIENT
Start: 2025-07-10 | End: 2025-07-10

## 2025-07-09 RX ORDER — ESCITALOPRAM OXALATE 10 MG/1
20 TABLET ORAL DAILY
Status: DISCONTINUED | OUTPATIENT
Start: 2025-07-10 | End: 2025-07-16 | Stop reason: HOSPADM

## 2025-07-09 RX ORDER — LINEZOLID 600 MG/1
600 TABLET, FILM COATED ORAL EVERY 12 HOURS
Status: DISCONTINUED | OUTPATIENT
Start: 2025-07-09 | End: 2025-07-10

## 2025-07-09 RX ADMIN — PIPERACILLIN AND TAZOBACTAM 3.38 G: 3; .375 INJECTION, POWDER, FOR SOLUTION INTRAVENOUS at 22:50

## 2025-07-09 RX ADMIN — HYDROXYZINE HYDROCHLORIDE 25 MG: 25 TABLET ORAL at 17:38

## 2025-07-09 RX ADMIN — GABAPENTIN 300 MG: 300 CAPSULE ORAL at 17:32

## 2025-07-09 RX ADMIN — LINEZOLID 600 MG: 600 TABLET, FILM COATED ORAL at 17:32

## 2025-07-09 RX ADMIN — OXYCODONE 5 MG: 5 TABLET ORAL at 13:51

## 2025-07-09 RX ADMIN — HYDROCORTISONE SODIUM SUCCINATE 50 MG: 100 INJECTION, POWDER, FOR SOLUTION INTRAMUSCULAR; INTRAVENOUS at 13:16

## 2025-07-09 RX ADMIN — MIDODRINE HYDROCHLORIDE 10 MG: 5 TABLET ORAL at 16:45

## 2025-07-09 RX ADMIN — OXYCODONE 5 MG: 5 TABLET ORAL at 20:51

## 2025-07-09 RX ADMIN — APIXABAN 5 MG: 5 TABLET, FILM COATED ORAL at 17:32

## 2025-07-09 RX ADMIN — PIPERACILLIN AND TAZOBACTAM 3.38 G: 3; .375 INJECTION, POWDER, FOR SOLUTION INTRAVENOUS at 16:46

## 2025-07-09 RX ADMIN — PIPERACILLIN AND TAZOBACTAM 3.38 G: 3; .375 INJECTION, POWDER, FOR SOLUTION INTRAVENOUS at 13:36

## 2025-07-09 RX ADMIN — CARVEDILOL 3.12 MG: 3.12 TABLET, FILM COATED ORAL at 17:32

## 2025-07-09 RX ADMIN — LACTULOSE 15 ML: 10 SOLUTION ORAL at 17:33

## 2025-07-09 RX ADMIN — APIXABAN 5 MG: 5 TABLET, FILM COATED ORAL at 13:27

## 2025-07-09 RX ADMIN — HYDROXYZINE HYDROCHLORIDE 25 MG: 25 TABLET ORAL at 22:50

## 2025-07-09 RX ADMIN — IPRATROPIUM BROMIDE AND ALBUTEROL SULFATE 3 ML: .5; 2.5 SOLUTION RESPIRATORY (INHALATION) at 10:01

## 2025-07-09 RX ADMIN — HYDROCORTISONE SODIUM SUCCINATE 50 MG: 100 INJECTION, POWDER, FOR SOLUTION INTRAMUSCULAR; INTRAVENOUS at 19:58

## 2025-07-09 RX ADMIN — MIDODRINE HYDROCHLORIDE 10 MG: 5 TABLET ORAL at 13:27

## 2025-07-09 RX ADMIN — CEFAZOLIN 2 G: 2 INJECTION, POWDER, FOR SOLUTION INTRAVENOUS at 09:57

## 2025-07-09 ASSESSMENT — LIFESTYLE VARIABLES
TOTAL SCORE: 0
EVER FELT BAD OR GUILTY ABOUT YOUR DRINKING: NO
EVER HAD A DRINK FIRST THING IN THE MORNING TO STEADY YOUR NERVES TO GET RID OF A HANGOVER: NO
SUBSTANCE_ABUSE: 0
ALCOHOL_USE: NO
ON A TYPICAL DAY WHEN YOU DRINK ALCOHOL HOW MANY DRINKS DO YOU HAVE: 0
AVERAGE NUMBER OF DAYS PER WEEK YOU HAVE A DRINK CONTAINING ALCOHOL: 0
HAVE PEOPLE ANNOYED YOU BY CRITICIZING YOUR DRINKING: NO
HAVE YOU EVER FELT YOU SHOULD CUT DOWN ON YOUR DRINKING: NO
CONSUMPTION TOTAL: NEGATIVE
TOTAL SCORE: 0
HOW MANY TIMES IN THE PAST YEAR HAVE YOU HAD 5 OR MORE DRINKS IN A DAY: 0
TOTAL SCORE: 0

## 2025-07-09 ASSESSMENT — ENCOUNTER SYMPTOMS
CLAUDICATION: 0
NAUSEA: 1
FEVER: 0
PALPITATIONS: 0
DIARRHEA: 0
TREMORS: 0
CHILLS: 0
ABDOMINAL PAIN: 0
SPUTUM PRODUCTION: 0
VOMITING: 0
HEMOPTYSIS: 0
DIZZINESS: 0
BRUISES/BLEEDS EASILY: 0
HALLUCINATIONS: 0
NECK PAIN: 0
EYES NEGATIVE: 1
TINGLING: 0
DEPRESSION: 0
MYALGIAS: 0
HEADACHES: 0
ORTHOPNEA: 0
WEIGHT LOSS: 0
NAUSEA: 0
SHORTNESS OF BREATH: 0
SHORTNESS OF BREATH: 1
BACK PAIN: 0
CONSTIPATION: 0
LOSS OF CONSCIOUSNESS: 0
SENSORY CHANGE: 0
NERVOUS/ANXIOUS: 0
COUGH: 1
SPEECH CHANGE: 0
POLYDIPSIA: 0
COUGH: 0

## 2025-07-09 ASSESSMENT — SOCIAL DETERMINANTS OF HEALTH (SDOH)
WITHIN THE LAST YEAR, HAVE YOU BEEN HUMILIATED OR EMOTIONALLY ABUSED IN OTHER WAYS BY YOUR PARTNER OR EX-PARTNER?: NO
IN THE PAST 12 MONTHS, HAS THE ELECTRIC, GAS, OIL, OR WATER COMPANY THREATENED TO SHUT OFF SERVICE IN YOUR HOME?: NO
WITHIN THE LAST YEAR, HAVE TO BEEN RAPED OR FORCED TO HAVE ANY KIND OF SEXUAL ACTIVITY BY YOUR PARTNER OR EX-PARTNER?: NO
WITHIN THE LAST YEAR, HAVE TO BEEN RAPED OR FORCED TO HAVE ANY KIND OF SEXUAL ACTIVITY BY YOUR PARTNER OR EX-PARTNER?: NO
WITHIN THE LAST YEAR, HAVE YOU BEEN HUMILIATED OR EMOTIONALLY ABUSED IN OTHER WAYS BY YOUR PARTNER OR EX-PARTNER?: NO
WITHIN THE LAST YEAR, HAVE YOU BEEN AFRAID OF YOUR PARTNER OR EX-PARTNER?: NO
WITHIN THE LAST YEAR, HAVE YOU BEEN KICKED, HIT, SLAPPED, OR OTHERWISE PHYSICALLY HURT BY YOUR PARTNER OR EX-PARTNER?: NO
WITHIN THE LAST YEAR, HAVE YOU BEEN AFRAID OF YOUR PARTNER OR EX-PARTNER?: NO
WITHIN THE PAST 12 MONTHS, THE FOOD YOU BOUGHT JUST DIDN'T LAST AND YOU DIDN'T HAVE MONEY TO GET MORE: NEVER TRUE
WITHIN THE PAST 12 MONTHS, YOU WORRIED THAT YOUR FOOD WOULD RUN OUT BEFORE YOU GOT THE MONEY TO BUY MORE: NEVER TRUE
WITHIN THE LAST YEAR, HAVE YOU BEEN KICKED, HIT, SLAPPED, OR OTHERWISE PHYSICALLY HURT BY YOUR PARTNER OR EX-PARTNER?: NO

## 2025-07-09 ASSESSMENT — PATIENT HEALTH QUESTIONNAIRE - PHQ9
2. FEELING DOWN, DEPRESSED, IRRITABLE, OR HOPELESS: NOT AT ALL
1. LITTLE INTEREST OR PLEASURE IN DOING THINGS: NOT AT ALL
SUM OF ALL RESPONSES TO PHQ9 QUESTIONS 1 AND 2: 0

## 2025-07-09 ASSESSMENT — COGNITIVE AND FUNCTIONAL STATUS - GENERAL
TURNING FROM BACK TO SIDE WHILE IN FLAT BAD: A LITTLE
HELP NEEDED FOR BATHING: A LITTLE
STANDING UP FROM CHAIR USING ARMS: A LITTLE
MOBILITY SCORE: 15
SUGGESTED CMS G CODE MODIFIER DAILY ACTIVITY: CJ
SUGGESTED CMS G CODE MODIFIER MOBILITY: CK
MOVING TO AND FROM BED TO CHAIR: A LOT
WALKING IN HOSPITAL ROOM: A LOT
CLIMB 3 TO 5 STEPS WITH RAILING: A LOT
DAILY ACTIVITIY SCORE: 22
TOILETING: A LITTLE
MOVING FROM LYING ON BACK TO SITTING ON SIDE OF FLAT BED: A LITTLE

## 2025-07-09 ASSESSMENT — PAIN DESCRIPTION - PAIN TYPE
TYPE: ACUTE PAIN
TYPE: CHRONIC PAIN
TYPE: ACUTE PAIN
TYPE: ACUTE PAIN

## 2025-07-09 ASSESSMENT — COPD QUESTIONNAIRES
COPD SCREENING SCORE: 4
HAVE YOU SMOKED AT LEAST 100 CIGARETTES IN YOUR ENTIRE LIFE: NO/DON'T KNOW
DO YOU EVER COUGH UP ANY MUCUS OR PHLEGM?: YES, A FEW DAYS A WEEK OR MONTH
DURING THE PAST 4 WEEKS HOW MUCH DID YOU FEEL SHORT OF BREATH: SOME OF THE TIME

## 2025-07-09 ASSESSMENT — FIBROSIS 4 INDEX
FIB4 SCORE: 1.68
FIB4 SCORE: 5.26

## 2025-07-09 NOTE — CONSULTS
Pulmonary Consultation    Date of Service: 7/9/2025      Referring Provider: Dr Pimentel    Date of Admission:  7/9/2025  7:18 AM    Attending Physician: Dr. Yohan Price     Chief Complaint:  Shortness of Breath (C/o shortness of breath x 4 days. Yesterday and today worse. Patient on 3 liters of oxygen 24/7. Hx of Liver Cirrhosis. + non productive cough. Hypotensive in triage.)      History of Present Illness: Minerva Tillman is a 50 y.o. female with a past medical history of hypertension, hypothyroidism, alcohol abuse, cirrhosis (MELD 27), hypothyroidism, portal vein thrombosis (on apixaban), nocturnal respiratory failure (2L baseline) who is presenting in the setting of shortness of breath, fatigue, cough.     The patient reports experiencing shortness of breath, low oxygen readings, and a cough since Friday, which have all progressively worsened. The patient denies coughing up any sputum or blood. There have been no known sick contacts or recent exposures or inhalations. The patient reports recent changes in skin coloration, noting pale lips yesterday and this morning. Additionally, the patient experienced jaundice but denies any current abdominal distention or pain. Review of systems is notable for no productive cough, no hemoptysis, and no abdominal pain. Denies any aspiration or issues with swallowing or regurgitation.     Patient was recently hospitalized from 6/19 - 7/4 for alcohol detox and jaundice. She had two paracenteses performed, and was found to have an acute portal vein thrombosis as well. GI started patient on steroids and performed MRCP as well as EGD found cirrhosis, portal HTN, and esophageal varices with small to moderate volume ascites with no evidence of malignancy. The patient is on nighttime oxygen therapy since a prior admission, she was meant to see pulmonology in the outpatient setting but has yet to be able to do this.    In the ED vitals remarkable for hypotension with  systolics in 80-90s primarily MAPs > 65, one value of 65/41 but repeat BP after this showed improved pressures. CT scan show diffuse groundglass densities with occasional cysts. likely multifocal pneumonia. Requiring 60 L HFNC (35% FiO2).     Review of Systems   Constitutional:  Negative for chills, fever, malaise/fatigue and weight loss.   HENT: Negative.     Eyes: Negative.    Respiratory:  Negative for cough, hemoptysis, sputum production and shortness of breath.    Cardiovascular:  Negative for chest pain, palpitations, orthopnea, claudication and leg swelling.   Gastrointestinal:  Negative for abdominal pain, constipation, diarrhea, nausea and vomiting.   Genitourinary:  Negative for dysuria, frequency and urgency.   Musculoskeletal:  Negative for back pain, joint pain, myalgias and neck pain.   Skin:  Negative for itching and rash.   Neurological:  Negative for dizziness, tingling, tremors, sensory change, speech change and headaches.   Endo/Heme/Allergies:  Negative for environmental allergies and polydipsia. Does not bruise/bleed easily.   Psychiatric/Behavioral:  Negative for depression, hallucinations, substance abuse and suicidal ideas. The patient is not nervous/anxious.    All other systems reviewed and are negative.      Home Medications       Reviewed by Lorna Craft (Pharmacy Tech) on 07/09/25 at 1015  Med List Status: Complete     Medication Last Dose Status   apixaban (ELIQUIS) 5 MG Tab 7/9/2025 Active   carvedilol (COREG) 3.125 MG Tab 7/9/2025 Active   escitalopram (LEXAPRO) 20 MG tablet 7/9/2025 Active   gabapentin (NEURONTIN) 300 MG Cap 7/9/2025 Active   hydrocortisone (ANUSOL-HC) 25 MG Suppos 7/7/2025 Active   hydrOXYzine HCl (ATARAX) 25 MG Tab Unknown Active   lactulose 20 GM/30ML Solution 7/8/2025 Active   levothyroxine (SYNTHROID) 50 MCG Tab 7/9/2025 Active   oxyCODONE immediate-release (ROXICODONE) 5 MG Tab Unknown Active   potassium chloride SA (KDUR) 20 MEQ Tab CR 7/9/2025 Active  "  spironolactone (ALDACTONE) 100 MG Tab 2025 Active   therapeutic multivitamin-minerals (THERAGRAN-M) Tab 2025 Active   thiamine (THIAMINE) 100 MG tablet 2025 Active                  Audit from Redirected Encounters    **Home medications have not yet been reviewed for this encounter**         Social History[1]     Past Medical History[2]    Past Surgical History[3]    Allergies: Sulfa drugs    Family History   Problem Relation Age of Onset    Hypertension Mother     Hyperlipidemia Mother     Depression Father     Hypertension Father     Asthma Father     Hyperlipidemia Father     Alcohol abuse Father     Hypertension Brother     Depression Brother     Alcohol abuse Brother     Hypertension Brother     Depression Brother     Alcohol abuse Brother     Suicide Attempts Paternal Uncle          by suicide    Cancer Maternal Grandfather 60        esphogus    Cancer Maternal Grandmother 60        breast and skin    Stroke Maternal Grandmother     Other Paternal Grandmother         complications from surgery       Physical Examination:  BP 93/63   Pulse 61   Temp 36.4 °C (97.6 °F) (Temporal)   Resp (!) 22   Ht 1.575 m (5' 2\")   Wt 63 kg (138 lb 14.2 oz)   SpO2 93%   BMI 25.40 kg/m²   Physical Exam  Vitals reviewed.   Constitutional:       Appearance: Normal appearance.   HENT:      Head: Normocephalic and atraumatic.      Nose: Nose normal.      Mouth/Throat:      Mouth: Mucous membranes are moist.      Pharynx: Oropharynx is clear.   Eyes:      General: Scleral icterus present.      Extraocular Movements: Extraocular movements intact.      Conjunctiva/sclera: Conjunctivae normal.      Pupils: Pupils are equal, round, and reactive to light.   Cardiovascular:      Rate and Rhythm: Normal rate and regular rhythm.      Pulses: Normal pulses.      Heart sounds: Normal heart sounds. No murmur heard.  Pulmonary:      Effort: Pulmonary effort is normal. No respiratory distress.      Breath sounds: Normal " "breath sounds. No wheezing, rhonchi or rales.   Abdominal:      General: Abdomen is flat. Bowel sounds are normal. There is no distension.      Palpations: Abdomen is soft. There is no mass.      Tenderness: There is no abdominal tenderness. There is no guarding or rebound.   Musculoskeletal:         General: Normal range of motion.      Right lower leg: No edema.      Left lower leg: No edema.   Skin:     General: Skin is warm and dry.      Capillary Refill: Capillary refill takes less than 2 seconds.      Findings: No lesion or rash.      Comments: Slight yellowing of the skin    Neurological:      General: No focal deficit present.      Mental Status: She is alert and oriented to person, place, and time. Mental status is at baseline.      Cranial Nerves: No cranial nerve deficit.      Motor: No weakness.      Comments: No asterixis on exam   Psychiatric:         Mood and Affect: Mood normal.         Behavior: Behavior normal.         Thought Content: Thought content normal.         Judgment: Judgment normal.         No intake or output data in the 24 hours ending 07/09/25 1417      LABS:  Recent Labs     07/09/25  0753   WBC 14.9*      Recent Labs     07/09/25  0753   HEMOGLOBIN 14.2   HEMATOCRIT 43.4   .8*   MCH 37.6*   MACROCYTOSIS 2+*   ANISOCYTOSIS 2+*   PLATELETCT 457*       Recent Labs     07/09/25  0753   SODIUM 133*   POTASSIUM 4.7   CHLORIDE 101   CO2 20   CREATININE 0.81        Recent Labs     07/09/25  0753   ALBUMIN 2.5*        MICRO:  No results found for: \"BLOODCULTU\", \"BLDCULT\", \"BCHOLD\"     Latest pertinent labs were reviewed    Imaging:  CXR (personally reviewed) -   CT-CHEST (THORAX) W/O   Final Result      1.  Diffuse groundglass densities, likely multifocal pneumonia. Edema is a less likely differential consideration.   2.  5 mm right lower lobe pulmonary nodule.   3.  Trace pericardial effusion.   4.  Coronary artery calcifications.   5.  Cirrhosis.   6.  Ascites in the upper abdomen. "   7.  Hypodense hepatic lesions as evaluated on recent MRI.         Fleischner society recommendations for follow up:   Low Risk: No routine follow-up      High Risk: Optional CT at 12 months      Comments: Nodules less than 6 mm do not require routine follow-up, but certain patients at high risk with suspicious nodule morphology, upper lobe location, or both may warrant 12-month follow-up.      Low Risk - Minimal or absent history of smoking and of other known risk factors.      High Risk - History of smoking or of other known risk factors.      Note: These recommendations do not apply to lung cancer screening, patients with immunosuppression, or patients with known primary cancer.      Fleischner Society 2017 Guidelines for Management of Incidentally Detected Pulmonary Nodules in Adults         Less than 6 mm: CT at 3-6 months. If stable, consider CT at 2 and 4 years.      6 mm or greater: CT at 3-6 months. Subsequent management based on the most suspicious nodule(s).      Comments: Multiple less than 6 mm pure ground-glass nodules are usually benign, but consider follow-up in selected patients at high risk at 2 and 4 years.      Low Risk - Minimal or absent history of smoking and of other known risk factors.      High Risk - History of smoking or of other known risk factors.      Note: These recommendations do not apply to lung cancer screening, patients with immunosuppression, or patients with known primary cancer.      Fleischner Society 2017 Guidelines for Management of Incidentally Detected Pulmonary Nodules in Adults      DX-CHEST-PORTABLE (1 VIEW)   Final Result      1.  Low lung volumes.   2.  Cardiomegaly, possibly on the basis of hypoventilation.   3.  Mild interstitial infiltrates and/or edema.          Problem List[4]  CT-CHEST (THORAX) W/O  Narrative: 7/9/2025 10:52 AM    HISTORY/REASON FOR EXAM:  SHORTNESS OF BREATH.    TECHNIQUE/EXAM DESCRIPTION:  CT scan of the chest without  contrast.    Noncontrast helical scanning of the chest was obtained from the lung apices through the adrenal glands.    Low dose optimization technique was utilized for this CT exam including automated exposure control and adjustment of the mA and/or kV according to patient size.    COMPARISON: CT chest 3/8/2024    FINDINGS:  Lungs: Diffuse groundglass densities. 5 mm right lower lobe pulmonary nodule.    Mediastinum/Deborah: No significant adenopathy.    Pleura: No pleural effusion.    Cardiac: Heart normal in size with trace pericardial effusion. There is coronary artery calcification.    Vascular: Unremarkable.    Soft tissues: Bilateral breast implants.    Bones: No acute or destructive process.    Nodular hepatic contour. Ascites in the upper abdomen. Hypodense hepatic lesions as evaluated on recent MRI.  Impression: 1.  Diffuse groundglass densities, likely multifocal pneumonia. Edema is a less likely differential consideration.  2.  5 mm right lower lobe pulmonary nodule.  3.  Trace pericardial effusion.  4.  Coronary artery calcifications.  5.  Cirrhosis.  6.  Ascites in the upper abdomen.  7.  Hypodense hepatic lesions as evaluated on recent MRI.    Fleischner society recommendations for follow up:  Low Risk: No routine follow-up    High Risk: Optional CT at 12 months    Comments: Nodules less than 6 mm do not require routine follow-up, but certain patients at high risk with suspicious nodule morphology, upper lobe location, or both may warrant 12-month follow-up.    Low Risk - Minimal or absent history of smoking and of other known risk factors.    High Risk - History of smoking or of other known risk factors.    Note: These recommendations do not apply to lung cancer screening, patients with immunosuppression, or patients with known primary cancer.    Fleischner Society 2017 Guidelines for Management of Incidentally Detected Pulmonary Nodules in Adults    Less than 6 mm: CT at 3-6 months. If stable, consider  CT at 2 and 4 years.    6 mm or greater: CT at 3-6 months. Subsequent management based on the most suspicious nodule(s).    Comments: Multiple less than 6 mm pure ground-glass nodules are usually benign, but consider follow-up in selected patients at high risk at 2 and 4 years.    Low Risk - Minimal or absent history of smoking and of other known risk factors.    High Risk - History of smoking or of other known risk factors.    Note: These recommendations do not apply to lung cancer screening, patients with immunosuppression, or patients with known primary cancer.    Fleischner Society 2017 Guidelines for Management of Incidentally Detected Pulmonary Nodules in Adults  DX-CHEST-PORTABLE (1 VIEW)  Narrative: 7/9/2025 7:59 AM    HISTORY/REASON FOR EXAM:  Shortness of Breath    TECHNIQUE/EXAM DESCRIPTION AND NUMBER OF VIEWS:  Single portable view of the chest.    COMPARISON: 6/20/2025    FINDINGS:    HEART: Enlarged.  LUNGS: Low lung volumes. Mild diffuse interstitial thickening.  PLEURA: No effusion or pneumothorax.  Impression: 1.  Low lung volumes.  2.  Cardiomegaly, possibly on the basis of hypoventilation.  3.  Mild interstitial infiltrates and/or edema.      Assessment and Plan:  #Hospital Acquired Pneumonia   #Multifocal Pneumonia   #Acute Hypoxic Respiratory Failure   #Nocturnal Hypoxia  Patient with signs and symptoms of a multifocal pneumonia, WBC count elevated with left shift. Afebrile but with fatigue, cough, and CT findings consistent with multifocal pneumonia, recent extended hospital stay, so would treat for hospital acquired. No aspiration hx. Was on a brief course of steroids at prior admission (7 days) but not enough to warrant PJP treatment. ABG w/ resp alkalosis and given patient not on RA, not particularly revealing. Flu, covid, rsv negative. TSH markedly elevated. Ascites is quite minimal so less likely from fluid overload. Potentially component of presumptive OHS contributory. Family history of  issues with mucociliary clearance and frequent infections (brothers follow with pulm) patient does not have these symptoms however. Potentially contributory viral component.     - Zosyn and Linezolid to cover for hospital acquired total of 7 day course, can deescalate linezolid if MRSA nares negative   -Given requiring HFNC, start 50 mg IV Hydrocortisone q6h for 5 days  - MRSA Nares  --Procalcitonin, Resp Viral PCR, Strep test, Legionella urine, ESR, CRP, Beta-D-Glucan  -If no improvement and tests unrevealing, can consider autoimmune workup.   -Titrate oxygen to 88-92%   -Encourage IS usage  -Treat hypothyroidism accordingly  -Continue outpatient diuretic regimen  -Patient will need outpatient PFTs and Sleep Medicine follow up  -Likely will need follow up CT outpatient to ensure resolution of these findings      Neil Mg MD   PGY-2 Internal Medicine Resident          [1]   Social History  Tobacco Use    Smoking status: Never    Smokeless tobacco: Never   Vaping Use    Vaping status: Never Used   Substance Use Topics    Alcohol use: Yes     Alcohol/week: 8.4 oz     Types: 14 Glasses of wine per week     Comment: Bottle of chardonnay a day.    Drug use: No   [2]   Past Medical History:  Diagnosis Date    Acute alcohol intoxication (HCC) 05/25/2021    Alcohol intoxication delirium (HCC) 04/30/2024    Anxiety     Depression     Gynecological disorder     High cholesterol     Hypertension     Snoring     Urinary tract infection    [3]   Past Surgical History:  Procedure Laterality Date    MT UPPER GI ENDOSCOPY,DIAGNOSIS N/A 6/30/2025    Procedure: GASTROSCOPY;  Surgeon: April Mcknight M.D.;  Location: SURGERY SAME DAY [facility];  Service: Gastroenterology    COLONOSCOPY N/A 6/30/2025    Procedure: COLONOSCOPY;  Surgeon: April Mcknight M.D.;  Location: SURGERY SAME DAY [facility];  Service: Gastroenterology    ORIF, ANKLE Right 12/7/2024    Procedure: ORIF, ANKLE;  Surgeon: Sami Clinton M.D.;  Location:  SURGERY [facility];  Service: Orthopedics    LIPOMA EXCISION  08/12/2024    HYSTEROSCOPY WITH ENDOMETRIAL RADIOFREQUENCY ABLATION  08/02/2017    Procedure: HYSTEROSCOPY NOVASURE;  Surgeon: Hedy Hardy M.D.;  Location: [facility];  Service:     DILATION AND CURETTAGE  08/02/2017    Procedure: DILATION AND CURETTAGE;  Surgeon: Hedy Hardy M.D.;  Location: [facility];  Service:     [place] BREAST AUGMENTATION WITH IMPLANT  01/01/2006    MAMMOPLASTY AUGMENTATION     [4]   Patient Active Problem List  Diagnosis    Acute cystitis    Weight gain    Hypercholesterolemia    Essential hypertension    Fatty liver, alcoholic    Vitamin D insufficiency    Hypothyroidism    Elevated hematocrit    Mild episode of recurrent major depressive disorder (HCC)    Other insomnia    Decreased hearing, left    Migraine with aura and without status migrainosus, not intractable    Dysarthria    Swelling    Allergy    Macrocytosis    Social anxiety disorder    Generalized anxiety disorder    Alcohol use disorder, severe, dependence (HCC)    Urinary frequency    Transaminitis    Nausea & vomiting    Generalized abdominal pain    Total bilirubin, elevated    Hypokalemia    Hypomagnesemia    Hyperglycemia    Elevated alkaline phosphatase level    UTI (urinary tract infection)    Ascites    Decompensated hepatic cirrhosis, hypoxia, chronic diarrhea, alcoholism (HCC)    Leukocytosis    High anion gap metabolic acidosis    Advance care planning    Diarrhea    Acute hypoxic respiratory failure (HCC)    Hypophosphatemia    Neuropathy    Balance problem    Portal hypertension with esophageal varices (HCC)    Depression    Alcohol withdrawal syndrome without complication (HCC)    Community acquired pneumonia of left lower lobe of lung    Headache    Primary hypertension    Trimalleolar fracture of ankle, closed, right, initial encounter    Ground-level fall    Thrombocytopenia (HCC)    Alcohol abuse with withdrawal  and perceptual disturbance (HCC)    Esophageal varices in alcoholic cirrhosis (HCC)    Alcoholic hepatitis    Hyponatremia    Portal vein thrombosis    Elevated cancer antigen 125 ()    Vitamin A deficiency    Acute on chronic respiratory failure with hypoxia (HCC)    Acute-on-chronic respiratory failure (HCC)

## 2025-07-09 NOTE — ED NOTES
Pharmacy Medication Reconciliation      ~Medication reconciliation updated and complete per patient at bedside & patient home pharmacy   ~Allergies have been verified and updated   ~Patient home pharmacy :  [facility]       ~Anticoagulants (rivaroxaban, apixaban, edoxaban, dabigatran, warfarin, enoxaparin) taken in the last 14 days? Yes   ~Anticoagulant: Eliquis, Last dose: 7/9/2025  ~Antimicrobials include (antifungals antivirals, antiparasitics,antibiotics) in the last 30 days? No

## 2025-07-09 NOTE — ASSESSMENT & PLAN NOTE
TSH 35.300 and FT4 0.91   Increased levothyroxine    7/15/2025  Continue levothyroxine 75 mcg daily

## 2025-07-09 NOTE — PROGRESS NOTES
4 Eyes Skin Assessment Completed by TONYA Heller and George RN.    Skin assessment is primarily focused on high risk bony prominences. Pay special attention to skin beneath and around medical devices, high risk bony prominences, skin to skin areas and areas where the patient lacks sensation to feel pain and areas where the patient previously had breakdown.     Head (Occipital):  WDL   Ears (Under Medical Devices): Pink and Blanching   Nose (Under Medical Devices): WDL   Mouth:  WDL   Neck: WDL   Breast/Chest:  WDL   Shoulder Blades:  WDL   Spine:   WDL   (R) Arm/Elbow/Hand: WDL   (L) Arm/Elbow/Hand: WDL   Abdomen: WDL   Pannus/Groin:  WDL   Sacrum/Coccyx:   Open wound, Pink, Red, and Non-Blanching     (R) Ischial Tuberosity (Sit Bones):  WDL   (L) Ischial Tuberosity (Sit Bones):  WDL   (R) Leg:  Scar   (L) Leg:  WDL   (R) Heel:  Pink and Blanching   (R) Foot/Toe: WDL   (L) Heel: Pink and Blanching   (L) Foot/Toe:  WDL       DEVICES IN USE:   Respiratory Devices:  High flow nasal cannula and Pulse ox  Feeding Devices:  N/A   Lines & BP Monitoring Devices:  Peripheral IV, BP cuff, and Pulse ox    Orthopedic Devices:  N/A  Miscellaneous Devices:  N/A    PROTOCOL INTERVENTIONS:   Standard/Trauma Bed:  Already in place  Offloading Dressing - Sacrum:  Applied this assessment  Offloading Dressing - Heel:  Applied this assessment  Dri-Chad/Micro Climate Pads:  Already in place  Barrier Paste:  Applied this assessment    WOUND PHOTOS:   See Above    WOUND CONSULT:   Consult ordered for the following areas Sacrum

## 2025-07-09 NOTE — FLOWSHEET NOTE
07/09/25 1001   Pulmonary Function Group   Peak Flow--Pre (ml / sec)  150   Peak Flow-Post (ml/sec) 150

## 2025-07-09 NOTE — ASSESSMENT & PLAN NOTE
Patient was discharged from here on July 4, and went to [place] to be admitted to an inpatient rehab program however she was felt to be medically to ill

## 2025-07-09 NOTE — H&P
Hospital Medicine History & Physical Note    Date of Service  7/9/2025    Primary Care Physician  Donita Asif M.D.    Consultants  pulmonary    Specialist Names: Albert    Code Status  Prior    Chief Complaint  Chief Complaint   Patient presents with    Shortness of Breath     C/o shortness of breath x 4 days. Yesterday and today worse. Patient on 3 liters of oxygen 24/7. Hx of Liver Cirrhosis. + non productive cough. Hypotensive in triage.       History of Presenting Illness  Minerva Tillman is a 50 y.o. female who presented 7/9/2025 with history of hypertension, alcohol abuse, hypothyroidism, primary hypertension, fatty liver, portal vein thrombosis for which the patient is on apixaban, chronic hypoxic respiratory failure on 2 L nasal cannula baseline, depression, hypothyroidism.  Patient was recently in the hospital and was discharged on July 4.  She was admitted for acute decompensated cirrhosis and at that time had an RADHA D score of 27.  She was treated for community-acquired pneumonia and volume overload as well as alcohol withdrawal.  She had 2 paracenteses done.  After discharge here, the patient went to [place] to enter an inpatient alcohol detox program however she was felt to be medically too ill for enrollment.    History is obtained from the patient, her  who is a physician, and her mom who is a nurse.    Patient presents today for evaluation of weakness, shortness of breath, and persistent cough.  She has a history of chronic respiratory failure and had been on 2 L nasal cannula oxygen only at night for quite some time however after recent hospitalization earlier this month, she has been on 2 L 24/7.  Over the last 3 or 4 days, her oxygen requirements have increased steadily.  Now on 5 L at home, she is saturating upper 80s or low 90s.  If she takes her oxygen off to walk to the bathroom, she drops to the 30s by the time she gets back.  During this time she has had an increase in  cough which is nonproductive and quite persistent.  She denies any fever or chills, there has been no sick contacts that she is aware of.  She is on apixaban for her history of portal vein thrombosis and reports she has been compliant with this medication.  She does have albuterol at home which she has tried on several occasions it seems to help the cough a little bit but does not do much for the shortness of breath.    I discussed the plan of care with patient and family.    Review of Systems  Review of Systems   Constitutional:  Positive for malaise/fatigue. Negative for chills and fever.   Respiratory:  Positive for cough and shortness of breath. Negative for sputum production.    Cardiovascular:  Negative for chest pain, palpitations and leg swelling.   Gastrointestinal:  Positive for nausea. Negative for abdominal pain, diarrhea and vomiting.   Genitourinary:  Negative for dysuria and urgency.   Musculoskeletal:  Negative for back pain.        R hip pain   Skin:  Negative for rash.   Neurological:  Negative for dizziness, loss of consciousness and headaches.       Past Medical History   has a past medical history of Acute alcohol intoxication (HCC) (05/25/2021), Alcohol intoxication delirium (HCC) (04/30/2024), Anxiety, Depression, Gynecological disorder, High cholesterol, Hypertension, Snoring, and Urinary tract infection.    Surgical History   has a past surgical history that includes pr breast augmentation with implant (01/01/2006); mammoplasty augmentation; hysteroscopy with endometrial radiofrequency ablation (08/02/2017); dilation and curettage (08/02/2017); lipoma excision (08/12/2024); orif, ankle (Right, 12/7/2024); pr upper gi endoscopy,diagnosis (N/A, 6/30/2025); and colonoscopy (N/A, 6/30/2025).     Family History  Family History   Problem Relation Age of Onset    Hypertension Mother     Hyperlipidemia Mother     Depression Father     Hypertension Father     Asthma Father     Hyperlipidemia Father      Alcohol abuse Father     Hypertension Brother     Depression Brother     Alcohol abuse Brother     Hypertension Brother     Depression Brother     Alcohol abuse Brother     Suicide Attempts Paternal Uncle          by suicide    Cancer Maternal Grandfather 60        esphogus    Cancer Maternal Grandmother 60        breast and skin    Stroke Maternal Grandmother     Other Paternal Grandmother         complications from surgery        Family history reviewed with patient. There is no family history that is pertinent to the chief complaint.     Social History   reports that she has never smoked. She has never used smokeless tobacco. She reports current alcohol use of about 8.4 oz of alcohol per week. She reports that she does not use drugs.    Allergies  Allergies[1]    Medications  Prior to Admission Medications   Prescriptions Last Dose Informant Patient Reported? Taking?   Acetaminophen (TYLENOL PO)  Patient Yes No   Sig: Take 1 Tablet by mouth 1 time a day as needed (pain).   albuterol 108 (90 Base) MCG/ACT Aero Soln inhalation aerosol Unknown  No No   Sig: Inhale 1-2 Puffs every 6 hours as needed for Shortness of Breath.   apixaban (ELIQUIS) 5 MG Tab Unknown  No No   Sig: Take 2 Tablets by mouth 2 times a day for 5 days, THEN 1 Tablet 2 times a day for 25 days.   carvedilol (COREG) 3.125 MG Tab Unknown  No No   Sig: Take 1 Tablet by mouth 2 times a day with meals.   escitalopram (LEXAPRO) 20 MG tablet Unknown  No No   Sig: Take 1 Tablet by mouth every day for 30 days.   furosemide (LASIX) 40 MG Tab Unknown  No No   Sig: Take 1 Tablet by mouth every day.   gabapentin (NEURONTIN) 300 MG Cap Unknown  No No   Sig: Take 1 Capsule by mouth 2 times a day for 30 days.   hydrOXYzine HCl (ATARAX) 25 MG Tab Unknown  No No   Sig: Take 1 Tablet by mouth every four hours as needed for Anxiety.   lactulose 20 GM/30ML Solution Unknown  No No   Sig: Take 15 mL by mouth every evening.   levothyroxine (SYNTHROID) 50 MCG Tab  Unknown  No No   Sig: Take 1 Tablet by mouth every morning on an empty stomach.   ondansetron (ZOFRAN ODT) 4 MG TABLET DISPERSIBLE Unknown  No No   Sig: Dissolve 1 Tablet by mouth every 6 hours as needed for Nausea/Vomiting.   oxyCODONE immediate-release (ROXICODONE) 5 MG Tab Unknown  No No   Sig: Take 1 Tablet by mouth every four hours as needed for Severe Pain for up to 5 days.   potassium chloride SA (KDUR) 20 MEQ Tab CR Unknown  No No   Sig: Take 1 Tablet by mouth every day.   spironolactone (ALDACTONE) 100 MG Tab Unknown  No No   Sig: Take 2 Tablets by mouth every day.   therapeutic multivitamin-minerals (THERAGRAN-M) Tab Unknown  No No   Sig: Take 1 Tablet by mouth every day.   thiamine (THIAMINE) 100 MG tablet Unknown  No No   Sig: Take 1 Tablet by mouth every day.      Facility-Administered Medications: None       Physical Exam  Temp:  [36.4 °C (97.6 °F)-36.9 °C (98.5 °F)] 36.4 °C (97.6 °F)  Pulse:  [58-72] 62  Resp:  [18-34] 24  BP: ()/(41-70) 99/67  SpO2:  [92 %-97 %] 93 %  Blood Pressure: 99/67   Temperature: 36.4 °C (97.6 °F)   Pulse: 62   Respiration: (!) 24   Pulse Oximetry: 93 %       Physical Exam  Constitutional:       General: She is not in acute distress.     Appearance: Normal appearance. She is well-developed. She is not diaphoretic.   HENT:      Head: Normocephalic and atraumatic.   Eyes:      General: Scleral icterus present.   Neck:      Vascular: No JVD.   Cardiovascular:      Rate and Rhythm: Normal rate and regular rhythm.      Heart sounds: No murmur heard.  Pulmonary:      Effort: Pulmonary effort is normal. No respiratory distress.      Breath sounds: No stridor. No wheezing or rales.      Comments: Diminished BSs B  Abdominal:      Palpations: Abdomen is soft.      Tenderness: There is no abdominal tenderness. There is no guarding or rebound.   Musculoskeletal:         General: No swelling or tenderness.      Right lower leg: No edema.      Left lower leg: No edema.   Skin:      General: Skin is warm and dry.      Capillary Refill: Capillary refill takes less than 2 seconds.      Findings: No rash.   Neurological:      Mental Status: She is oriented to person, place, and time.   Psychiatric:         Mood and Affect: Mood normal.         Behavior: Behavior normal.         Thought Content: Thought content normal.         Laboratory:  Recent Labs     07/09/25  0753   WBC 14.9*   RBC 3.78*   HEMOGLOBIN 14.2   HEMATOCRIT 43.4   .8*   MCH 37.6*   MCHC 32.7   RDW 73.2*   PLATELETCT 457*   MPV 10.4     Recent Labs     07/09/25  0753   SODIUM 133*   POTASSIUM 4.7   CHLORIDE 101   CO2 20   GLUCOSE 91   BUN 12   CREATININE 0.81   CALCIUM 8.6     Recent Labs     07/09/25  0753   ALTSGPT 107*   ASTSGOT 156*   ALKPHOSPHAT 166*   TBILIRUBIN 4.8*   GLUCOSE 91         Recent Labs     07/09/25  0753   NTPROBNP 238*         Recent Labs     07/09/25  0753   TROPONINT 9       Imaging:  DX-CHEST-PORTABLE (1 VIEW)   Final Result      1.  Low lung volumes.   2.  Cardiomegaly, possibly on the basis of hypoventilation.   3.  Mild interstitial infiltrates and/or edema.          X-Ray:  I have personally reviewed the images and compared with prior images.    Assessment/Plan:  Justification for Admission Status  I anticipate this patient will require at least two midnights for appropriate medical management, necessitating inpatient admission because acute on chronic hypoxic respiratory failure    Patient will need a Med/Surg bed on EMERGENCY service .  The need is secondary to acute on chronic hypoxic respiratory failure.    Acute on chronic respiratory failure with hypoxia (HCC)  Assessment & Plan  Patient had been on 2 L nightly for some years, but has recently in the last month gone from that to requiring 5 L nasal cannula and currently we are titrating her onto high flow nasal cannula.  Chest x-ray is not impressive  BNP not dramatically elevated  Recent cardiac echo showed preserved EF and no significant  valvular issues  Patient has been compliant with apixaban which was started about 1 month ago.  Was recently on a course of prednisolone for purposes of her alcoholic hepatitis, this did not significantly improve her respiratory status  Initial viral panel is negative: Check extended viral panel  Check CT chest  Discussed with RT who are going to get peak flows  Given concurrent newly diagnosed cirrhosis, check alpha-1 antitrypsin  Consult pulmonology  Family history of mucociliary disease in her brother and possibly 2 other family members on the father side      Portal vein thrombosis- (present on admission)  Assessment & Plan  Diagnosed 1 month ago and started on apixaban which we will continue    Alcohol use disorder, severe, dependence (HCC)- (present on admission)  Assessment & Plan  Patient was discharged from here on July 4, and went to [place] to be admitted to an inpatient rehab program however she was felt to be medically to ill    Hypothyroidism- (present on admission)  Assessment & Plan  Last TSH was 1 year ago, 3.10  We will repeat a TSH now    Fatty liver, alcoholic- (present on admission)  Assessment & Plan  Patient is currently maintained on lactulose, spironolactone, and Lasix  On recent admission her MELD was 27.  She did receive 1 week of prednisolone however her Delmy score did not improve significantly and therefore it was not continued beyond 7 days.  She is working on her sobriety    Essential hypertension- (present on admission)  Assessment & Plan  Patient carries this diagnosis but is not on scheduled medication for purposes of her hypertension rather she is on carvedilol spironolactone and Lasix for purposes of liver cirrhosis    Acute cystitis- (present on admission)  Assessment & Plan  Start the patient on Ancef  Follow urine cultures        VTE prophylaxis: therapeutic anticoagulation with apixaban         [1]   Allergies  Allergen Reactions    Sulfa Drugs Hives     [id number]

## 2025-07-09 NOTE — ASSESSMENT & PLAN NOTE
Patient is currently maintained on lactulose, spironolactone, and Lasix  On recent admission her MELD was 27.  She did receive 1 week of prednisolone however her Delmy score did not improve significantly and therefore it was not continued beyond 7 days.  She is working on her sobriety    7/15/2025  Transaminitis improving.  States that she has been off of alcohol  Ordered paracentesis

## 2025-07-09 NOTE — ED TRIAGE NOTES
Minerva Tillman  50 y.o.  female  Chief Complaint   Patient presents with    Shortness of Breath     C/o shortness of breath x 4 days. Yesterday and today worse. Patient on 3 liters of oxygen 24/7. Hx of Liver Cirrhosis. + non productive cough. Hypotensive in triage.

## 2025-07-09 NOTE — ED PROVIDER NOTES
CHIEF COMPLAINT  Chief Complaint   Patient presents with    Shortness of Breath     C/o shortness of breath x 4 days. Yesterday and today worse. Patient on 3 liters of oxygen 24/7. Hx of Liver Cirrhosis. + non productive cough. Hypotensive in triage.       LIMITATION TO HISTORY   None      HPI    Minerva Tillman is a 50 y.o. female with a history of liver cirrhosis with portal hypertension likely due to alcohol abuse, presents to the ED for evaluation of progressive shortness of breath, onset 6 days ago. Patient reports that her oxygen saturation has been low associated with severe coughing fits. She has bought a home pulse oximeter, and has been tracking it.  states that her oxygen saturation levels remain low despite using multiple condensers. Her saturation has reached as low as 33%. She normally is on 2 liters, but had to be increased to 3 liters. She also is complaining of right hip pain after twisting it in bed. Patient describes the pain as waxing and waning, and the pain can reach high in severity.  adds that she was sent to an alcohol treatment program in [place] and was discharged on  but was told there that she should be sent back to the hospital in [place].  states that her condition has worsened since returning to [place], prompting her to present to the ED today.  Patient adds that she is currently taking lactulose and Eliquis or blood clot in her liver. Denies history of deep vein thrombosis.  Patient reports abdominal swelling, but denies any changes. Her shortness of breath is exacerbated with walking room to room. Denies any fever, vomiting, or diarrhea.       OUTSIDE HISTORIAN(S):   at bedside contributing to history as noted above.    EXTERNAL RECORDS REVIEWED  I reviewed chart, Patient has a history of liver cirrhosis with portal hypertension likely due to alcohol abuse. Recent admission in early July for paracentesis due to ascites.  Patient has a MELD  "score of 27 and has checked herself into alcohol rehab in [place]. Discharged after subsequent treatment 5 days ago.       PAST MEDICAL HISTORY  Past Medical History[1]    FAMILY HISTORY  Family History   Problem Relation Age of Onset    Hypertension Mother     Hyperlipidemia Mother     Depression Father     Hypertension Father     Asthma Father     Hyperlipidemia Father     Alcohol abuse Father     Hypertension Brother     Depression Brother     Alcohol abuse Brother     Hypertension Brother     Depression Brother     Alcohol abuse Brother     Suicide Attempts Paternal Uncle          by suicide    Cancer Maternal Grandfather 60        esphogus    Cancer Maternal Grandmother 60        breast and skin    Stroke Maternal Grandmother     Other Paternal Grandmother         complications from surgery       SOCIAL HISTORY  Social History[2]  Social History     Substance and Sexual Activity   Drug Use No       SURGICAL HISTORY  Past Surgical History[3]    CURRENT MEDICATIONS  Current Medications[4]    ALLERGIES  Allergies[5]    PHYSICAL EXAM  VITAL SIGNS: BP (!) 65/41   Pulse 63   Temp 36.9 °C (98.5 °F) (Temporal)   Resp (!) 23   Ht 1.575 m (5' 2\")   Wt 64.4 kg (142 lb)   SpO2 92%   BMI 25.97 kg/m²   Reviewed and vitals review: low blood pressure elevated respiratory rate  Constitutional: Well developed, Well nourished. Pale.   HENT: Normocephalic, atraumatic, bilateral external ears normal, No intraoral erythema, edema, exudate  Eyes: PERRLA, conjunctiva pink, icteric sclera.  Cardiovascular: Regular rate and rhythm. No murmurs, rubs or gallops.  No dependent edema or calf tenderness  Respiratory: Lungs clear to auscultation bilaterally. No wheezes, rales, or rhonchi. Pulmonary cough noted.   Abdominal:  Abdomen soft, non-tender, non distended. No rebound, or guarding.    Skin: No erythema, no rash. No wounds or bruising.  Genitourinary: No costovertebral angle tenderness.   Musculoskeletal: no deformities. "   Neurologic: Alert, no facial droop noted. All extra ocular muscles intact. Moves all extremities with out weakness noted  Psychiatric: Affect normal, Judgment normal, Mood normal.       PROBLEMS EVALUATED THIS VISIT:  Patient presents with chief complaint breathing difficulty and hypoxia for 6 days with cough noted. Their concern/issue is low oxygen saturation, low hemoglobin, and hip discomfort. Patient's history is noted for liver cirrhosis with portal hypertension likely due to alcohol abuse and is on Eliquis for blood clots, and physical exam finding(s) show coarse breath sounds present. Patient is on 4.5 liters of oxygen and her baseline is 2 liters at home. She is pale.     MEDICAL DECISION MAKING:  Differential Diagnosis considered (and not inclusive of the following) is:  pneumonia viral versus bacterial, Less likely a blood clot, low hemoglobin secondary to anemia, among others as well.     PLAN:    8:00 AM - Patient seen and examined at bedside. Discussed plan of care, including obtaining a cardiac workup. Patient agrees to the plan of care. Ordered for EKG, Troponin, pBNP, CMP, CBC w/ diff, Differential comment, Peripheral snear review, Morphology, Platelet estimate, CoV-2 Flu A/B and RSV PCR, Lactic acid, Urinalysis, Urine microscopic (w/ UA), DX-Chest, to evaluate her symptoms.      RESULTS    LABS Ordered and Reviewed by Me:  Results for orders placed or performed during the hospital encounter of 07/09/25   CBC with Differential    Collection Time: 07/09/25  7:53 AM   Result Value Ref Range    WBC 14.9 (H) 4.8 - 10.8 K/uL    RBC 3.78 (L) 4.20 - 5.40 M/uL    Hemoglobin 14.2 12.0 - 16.0 g/dL    Hematocrit 43.4 37.0 - 47.0 %    .8 (H) 81.4 - 97.8 fL    MCH 37.6 (H) 27.0 - 33.0 pg    MCHC 32.7 32.2 - 35.5 g/dL    RDW 73.2 (H) 35.9 - 50.0 fL    Platelet Count 457 (H) 164 - 446 K/uL    MPV 10.4 9.0 - 12.9 fL    Neutrophils-Polys 70.30 44.00 - 72.00 %    Lymphocytes 16.70 (L) 22.00 - 41.00 %     Monocytes 10.30 0.00 - 13.40 %    Eosinophils 0.70 0.00 - 6.90 %    Basophils 0.70 0.00 - 1.80 %    Immature Granulocytes 1.30 (H) 0.00 - 0.90 %    Nucleated RBC 0.60 (H) 0.00 - 0.20 /100 WBC    Neutrophils (Absolute) 10.46 (H) 1.82 - 7.42 K/uL    Lymphs (Absolute) 2.48 1.00 - 4.80 K/uL    Monos (Absolute) 1.53 (H) 0.00 - 0.85 K/uL    Eos (Absolute) 0.10 0.00 - 0.51 K/uL    Baso (Absolute) 0.10 0.00 - 0.12 K/uL    Immature Granulocytes (abs) 0.20 (H) 0.00 - 0.11 K/uL    NRBC (Absolute) 0.09 K/uL    Anisocytosis 2+ (A)     Macrocytosis 2+ (A)    Comp Metabolic Panel    Collection Time: 07/09/25  7:53 AM   Result Value Ref Range    Sodium 133 (L) 135 - 145 mmol/L    Potassium 4.7 3.6 - 5.5 mmol/L    Chloride 101 96 - 112 mmol/L    Co2 20 20 - 33 mmol/L    Anion Gap 12.0 7.0 - 16.0    Glucose 91 65 - 99 mg/dL    Bun 12 8 - 22 mg/dL    Creatinine 0.81 0.50 - 1.40 mg/dL    Calcium 8.6 8.5 - 10.5 mg/dL    Correct Calcium 9.8 8.5 - 10.5 mg/dL    AST(SGOT) 156 (H) 12 - 45 U/L    ALT(SGPT) 107 (H) 2 - 50 U/L    Alkaline Phosphatase 166 (H) 30 - 99 U/L    Total Bilirubin 4.8 (H) 0.1 - 1.5 mg/dL    Albumin 2.5 (L) 3.2 - 4.9 g/dL    Total Protein 6.1 6.0 - 8.2 g/dL    Globulin 3.6 (H) 1.9 - 3.5 g/dL    A-G Ratio 0.7 g/dL   proBrain Natriuretic Peptide, NT    Collection Time: 07/09/25  7:53 AM   Result Value Ref Range    NT-proBNP 238 (H) 0 - 125 pg/mL   Troponin    Collection Time: 07/09/25  7:53 AM   Result Value Ref Range    Troponin T 9 6 - 19 ng/L   LACTIC ACID    Collection Time: 07/09/25  7:53 AM   Result Value Ref Range    Lactic Acid 1.9 0.5 - 2.0 mmol/L   ESTIMATED GFR    Collection Time: 07/09/25  7:53 AM   Result Value Ref Range    GFR (CKD-EPI) 88 >60 mL/min/1.73 m 2   PLATELET ESTIMATE    Collection Time: 07/09/25  7:53 AM   Result Value Ref Range    Plt Estimation Increased    MORPHOLOGY    Collection Time: 07/09/25  7:53 AM   Result Value Ref Range    RBC Morphology Present     Polychromia 1+    PERIPHERAL SMEAR  REVIEW    Collection Time: 07/09/25  7:53 AM   Result Value Ref Range    Peripheral Smear Review see below    DIFFERENTIAL COMMENT    Collection Time: 07/09/25  7:53 AM   Result Value Ref Range    Comments-Diff see below    Magnesium    Collection Time: 07/09/25  7:53 AM   Result Value Ref Range    Magnesium 1.4 (L) 1.5 - 2.5 mg/dL   TSH WITH REFLEX TO FT4    Collection Time: 07/09/25  7:53 AM   Result Value Ref Range    TSH 35.300 (H) 0.380 - 5.330 uIU/mL   FREE THYROXINE    Collection Time: 07/09/25  7:53 AM   Result Value Ref Range    Free T-4 0.91 (L) 0.93 - 1.70 ng/dL   URINALYSIS    Collection Time: 07/09/25  8:25 AM    Specimen: Urine   Result Value Ref Range    Color Dark Yellow     Character Clear     Specific Gravity 1.010 <1.035    Ph 7.5 5.0 - 8.0    Glucose Negative Negative mg/dL    Ketones Negative Negative mg/dL    Protein Negative Negative mg/dL    Bilirubin Small (A) Negative    Urobilinogen, Urine 1.0 <=1.0 EU/dL    Nitrite Negative Negative    Leukocyte Esterase Moderate (A) Negative    Occult Blood Negative Negative    Micro Urine Req Microscopic    URINE MICROSCOPIC (W/UA)    Collection Time: 07/09/25  8:25 AM   Result Value Ref Range    WBC  (A) /hpf    RBC 3-5 (A) 0 - 2 /hpf    Bacteria Many (A) None /hpf    Epithelial Cells 0-2 0 - 5 /hpf    Urine Casts 0-2 0 - 2 /lpf   POC CoV-2, FLU A/B, RSV by PCR    Collection Time: 07/09/25  8:34 AM   Result Value Ref Range    POC Influenza A RNA, PCR NEGATIVE NEGATIVE    POC Influenza B RNA, PCR NEGATIVE NEGATIVE    POC RSV, by PCR NEGATIVE NEGATIVE    POC SARS-CoV-2, PCR NEGATIVE NEGATIVE   POCT arterial blood gas device results    Collection Time: 07/09/25 10:45 AM   Result Value Ref Range    Ph 7.54 (H) 7.35 - 7.45    Pco2 25 (L) 32 - 48 mmHg    Po2 84 83 - 108 mmHg    Tco2 22 20 - 33 mmol/L    S02 98 93 - 99 %    Hco3 21 21.0 - 28.0 mmol/L    BE 0 -4 - 3 mmol/L    O2 Therapy 60     Site R Radial     DelSys HHFNC     O2 Therapy 60     Action  Range Triggered NO     Inst. Qualified Patient YES     John Test Pass     LPM 35.0000     Specimen ART    POCT lactate device results    Collection Time: 25 10:45 AM   Result Value Ref Range    iStat Lactate 1.21 0.50 - 2.00 mmol/L   EKG    Collection Time: 25  1:43 PM   Result Value Ref Range    Report       [facility] Emergency Dept.    Test Date:  2025  Pt Name:    GIULIA HERNANDEZ                   Department: ER  MRN:        [id number]                      Room:       33  Gender:     Female                       Technician: [id number]  :        [id number]                   Requested By:ER TRIAGE PROTOCOL  Order #:    [id number]                    Reading MD: Richie HIGH MD    Measurements  Intervals                                Axis  Rate:       69                           P:          14  AZ:         141                          QRS:        96  QRSD:       91                           T:          -3  QT:         512  QTc:        549    Interpretive Statements  Sinus rhythm  Rate of 69  Intervals normal AZ, normal QRS, increase QTc  Axis normal  Ischemia no significant ST segment elevations or depressions.  Borderline  depressions noted in V2 there is borderline in V1 but when reviewing EKG  2025  Appears unchanged.  Impression abnormal with no obvi ous acute ischemic findings  Electronically Signed On 2025 13:43:47 PDT by Richie HIGH MD           RADIOLOGY        DX-CHEST-PORTABLE (1 VIEW)   Final Result      1.  Low lung volumes.   2.  Cardiomegaly, possibly on the basis of hypoventilation.   3.  Mild interstitial infiltrates and/or edema.              RISK:  High due to patient hospitalization for further treatment      ED COURSE:        INTERVENTIONS BY ME:  Medications   ceFAZolin (Ancef) injection 2 g (has no administration in time range)   ipratropium-albuterol (DUONEB) nebulizer solution (has no administration in time range)        Response on recheck:      9:11 AM - Review of laboratory results reveal a WBC of 14.9, signifying infection. Plan to admit patient given diagnostic workup results.    9:13 AM - I reevaluated the patient at bedside. I discussed the patient's diagnostic study results which show UTI. I informed the patient of my plan to admit today given the patient's current presentation of hypoxia and diagnostic study results. Patient verbalizes understanding and support with my plan for admission.     9:27 AM - I discussed the patient's case and the above findings with Dr. Pimentel (Hospitalist) who agreed to hospitalize the patient. Patient's care was transferred at this time.      CONSULTANTS/OTHER GROUPS CONTACTED    Dr. Pimentel (Hospitalist)     FINAL DISPO PLAN   Patient will be hospitalized by  Dr. Pimentel (Hospitalist).    CONDITION: guarded    FINAL IMPRESSION  1. Hypoxia    2. Urinary tract infection without hematuria, site unspecified          IStarla (Scrkeysha), am scribing for, and in the presence of, Richie Forte MD.    Electronically signed by: Starla Elizabeth (Scribe), 7/9/2025    I, Richie Forte MD, personally performed the services described in this documentation, as scribed by Starla Elizabeth in my presence, and it is both accurate and complete.     The note accurately reflects work and decisions made by me.  Richie Forte M.D.  7/9/2025  1:44 PM    In summary very pleasant 50-year female on anticoagulation for portal vein thrombosis recent admission oxygen dependent with worsening oxygen need and dyspnea cough is the only new symptoms and physical exams besides increased oxygen demand were noted.  At this point cardiac workup infectious workup revealed possible UTI should be admitted to work hospital for further workup to delineate any other etiologies           [1]   Past Medical History:  Diagnosis Date    Acute alcohol intoxication (HCC) 05/25/2021     Alcohol intoxication delirium (HCC) 04/30/2024    Anxiety     Depression     Gynecological disorder     High cholesterol     Hypertension     Snoring     Urinary tract infection    [2]   Social History  Tobacco Use    Smoking status: Never    Smokeless tobacco: Never   Vaping Use    Vaping status: Never Used   Substance Use Topics    Alcohol use: Yes     Alcohol/week: 8.4 oz     Types: 14 Glasses of wine per week     Comment: Bottle of chardonnay a day.    Drug use: No   [3]   Past Surgical History:  Procedure Laterality Date    KS UPPER GI ENDOSCOPY,DIAGNOSIS N/A 6/30/2025    Procedure: GASTROSCOPY;  Surgeon: April Mcknight M.D.;  Location: SURGERY SAME DAY [facility];  Service: Gastroenterology    COLONOSCOPY N/A 6/30/2025    Procedure: COLONOSCOPY;  Surgeon: April Mcknight M.D.;  Location: SURGERY SAME DAY [facility];  Service: Gastroenterology    ORIF, ANKLE Right 12/7/2024    Procedure: ORIF, ANKLE;  Surgeon: Sami Clinton M.D.;  Location: SURGERY [facility];  Service: Orthopedics    LIPOMA EXCISION  08/12/2024    HYSTEROSCOPY WITH ENDOMETRIAL RADIOFREQUENCY ABLATION  08/02/2017    Procedure: HYSTEROSCOPY NOVASURE;  Surgeon: Hedy Hardy M.D.;  Location: SURGERY [facility];  Service:     DILATION AND CURETTAGE  08/02/2017    Procedure: DILATION AND CURETTAGE;  Surgeon: Hedy Hardy M.D.;  Location: SURGERY [facility];  Service:     KS BREAST AUGMENTATION WITH IMPLANT  01/01/2006    MAMMOPLASTY AUGMENTATION     [4]   Current Facility-Administered Medications:     ceFAZolin (Ancef) injection 2 g, 2 g, Intravenous, Once, Richie Forte M.D.    ipratropium-albuterol (DUONEB) nebulizer solution, 3 mL, Nebulization, ONCE (RT), Richie Forte M.D.    Current Outpatient Medications:     albuterol 108 (90 Base) MCG/ACT Aero Soln inhalation aerosol, Inhale 1-2 Puffs every 6 hours as needed for Shortness of Breath., Disp: 8.5 g, Rfl: 0    escitalopram (LEXAPRO) 20 MG  tablet, Take 1 Tablet by mouth every day for 30 days., Disp: 30 Tablet, Rfl: 0    gabapentin (NEURONTIN) 300 MG Cap, Take 1 Capsule by mouth 2 times a day for 30 days., Disp: 60 Capsule, Rfl: 0    levothyroxine (SYNTHROID) 50 MCG Tab, Take 1 Tablet by mouth every morning on an empty stomach., Disp: 30 Tablet, Rfl: 0    hydrOXYzine HCl (ATARAX) 25 MG Tab, Take 1 Tablet by mouth every four hours as needed for Anxiety., Disp: 60 Tablet, Rfl: 0    ondansetron (ZOFRAN ODT) 4 MG TABLET DISPERSIBLE, Dissolve 1 Tablet by mouth every 6 hours as needed for Nausea/Vomiting., Disp: 60 Tablet, Rfl: 0    thiamine (THIAMINE) 100 MG tablet, Take 1 Tablet by mouth every day., Disp: 30 Tablet, Rfl: 0    apixaban (ELIQUIS) 5 MG Tab, Take 2 Tablets by mouth 2 times a day for 5 days, THEN 1 Tablet 2 times a day for 25 days., Disp: 70 Tablet, Rfl: 0    carvedilol (COREG) 3.125 MG Tab, Take 1 Tablet by mouth 2 times a day with meals., Disp: 60 Tablet, Rfl: 0    furosemide (LASIX) 40 MG Tab, Take 1 Tablet by mouth every day., Disp: 30 Tablet, Rfl: 0    lactulose 20 GM/30ML Solution, Take 15 mL by mouth every evening., Disp: 450 mL, Rfl: 0    oxyCODONE immediate-release (ROXICODONE) 5 MG Tab, Take 1 Tablet by mouth every four hours as needed for Severe Pain for up to 5 days., Disp: 30 Tablet, Rfl: 0    potassium chloride SA (KDUR) 20 MEQ Tab CR, Take 1 Tablet by mouth every day., Disp: 30 Tablet, Rfl: 0    spironolactone (ALDACTONE) 100 MG Tab, Take 2 Tablets by mouth every day., Disp: 60 Tablet, Rfl: 0    therapeutic multivitamin-minerals (THERAGRAN-M) Tab, Take 1 Tablet by mouth every day., Disp: 30 Tablet, Rfl: 0    Acetaminophen (TYLENOL PO), Take 1 Tablet by mouth 1 time a day as needed (pain)., Disp: , Rfl:   [5]   Allergies  Allergen Reactions    Sulfa Drugs Hives     PFT=[phone]

## 2025-07-09 NOTE — ASSESSMENT & PLAN NOTE
On HF on ADT  Down 6L   Chest CT with diffuse groundglass densities, likely multifocal pneumonia.  COVID/RSV/influenza negative  Viral panel negative  Procalcitonin mildly elevated    Fluid restriction  Continue spironolactone  Increase levothyroxine dose  Continue Zosyn/Doxycycline  Continue IV steroids  Pulmonology following  On this admission, CT imaging noted GGO. COVID, influenza, RSV negative. B-D- glucan negative.  Patient to continue with Zosyn and doxycycline for coverage of hospital-acquired pneumonia. Patient was started on high dose steroids, with improvement, patient initially required heated high flow nasal cannula, now weaned down to nasal cannula.    7/15/2025  De-escalating Zosyn to Unasyn.  As the patient does not have any pseudomonal risk factors.  White count increasing to 16.1 from 13.3.  Chest x-ray appears clear.  Continues to require 1.5 LPM oxygen by nasal cannula.

## 2025-07-10 ENCOUNTER — APPOINTMENT (OUTPATIENT)
Dept: MEDICAL GROUP | Facility: MEDICAL CENTER | Age: 50
End: 2025-07-10
Payer: COMMERCIAL

## 2025-07-10 LAB
ALBUMIN SERPL BCP-MCNC: 2.5 G/DL (ref 3.2–4.9)
ALBUMIN/GLOB SERPL: 0.8 G/DL
ALP SERPL-CCNC: 150 U/L (ref 30–99)
ALT SERPL-CCNC: 83 U/L (ref 2–50)
ANION GAP SERPL CALC-SCNC: 12 MMOL/L (ref 7–16)
ANION GAP SERPL CALC-SCNC: 12 MMOL/L (ref 7–16)
AST SERPL-CCNC: 126 U/L (ref 12–45)
BILIRUB SERPL-MCNC: 4.3 MG/DL (ref 0.1–1.5)
BUN SERPL-MCNC: 15 MG/DL (ref 8–22)
BUN SERPL-MCNC: 15 MG/DL (ref 8–22)
CALCIUM ALBUM COR SERPL-MCNC: 9.5 MG/DL (ref 8.5–10.5)
CALCIUM SERPL-MCNC: 8.3 MG/DL (ref 8.5–10.5)
CALCIUM SERPL-MCNC: 8.3 MG/DL (ref 8.5–10.5)
CHLORIDE SERPL-SCNC: 103 MMOL/L (ref 96–112)
CHLORIDE SERPL-SCNC: 103 MMOL/L (ref 96–112)
CO2 SERPL-SCNC: 19 MMOL/L (ref 20–33)
CO2 SERPL-SCNC: 19 MMOL/L (ref 20–33)
CREAT SERPL-MCNC: 0.71 MG/DL (ref 0.5–1.4)
CREAT SERPL-MCNC: 0.71 MG/DL (ref 0.5–1.4)
D DIMER PPP IA.FEU-MCNC: 8.44 UG/ML (FEU) (ref 0–0.5)
EKG IMPRESSION: NORMAL
ERYTHROCYTE [DISTWIDTH] IN BLOOD BY AUTOMATED COUNT: 69.3 FL (ref 35.9–50)
FIBRINOGEN PPP-MCNC: 460 MG/DL (ref 215–460)
GFR SERPLBLD CREATININE-BSD FMLA CKD-EPI: 103 ML/MIN/1.73 M 2
GLOBULIN SER CALC-MCNC: 3.1 G/DL (ref 1.9–3.5)
GLUCOSE SERPL-MCNC: 154 MG/DL (ref 65–99)
GLUCOSE SERPL-MCNC: 154 MG/DL (ref 65–99)
HCT VFR BLD AUTO: 39 % (ref 37–47)
HGB BLD-MCNC: 13.2 G/DL (ref 12–16)
INR PPP: 1.98 (ref 0.87–1.13)
MAGNESIUM SERPL-MCNC: 1.4 MG/DL (ref 1.5–2.5)
MCH RBC QN AUTO: 38.6 PG (ref 27–33)
MCHC RBC AUTO-ENTMCNC: 33.8 G/DL (ref 32.2–35.5)
MCV RBC AUTO: 114 FL (ref 81.4–97.8)
PHOSPHATE SERPL-MCNC: 3.7 MG/DL (ref 2.5–4.5)
PLATELET # BLD AUTO: 365 K/UL (ref 164–446)
PMV BLD AUTO: 10.7 FL (ref 9–12.9)
POTASSIUM SERPL-SCNC: 4.2 MMOL/L (ref 3.6–5.5)
POTASSIUM SERPL-SCNC: 4.2 MMOL/L (ref 3.6–5.5)
PROT SERPL-MCNC: 5.6 G/DL (ref 6–8.2)
PROTHROMBIN TIME: 22.6 SEC (ref 12–14.6)
RBC # BLD AUTO: 3.42 M/UL (ref 4.2–5.4)
SODIUM SERPL-SCNC: 134 MMOL/L (ref 135–145)
SODIUM SERPL-SCNC: 134 MMOL/L (ref 135–145)
WBC # BLD AUTO: 13.9 K/UL (ref 4.8–10.8)

## 2025-07-10 PROCEDURE — 93010 ELECTROCARDIOGRAM REPORT: CPT | Performed by: INTERNAL MEDICINE

## 2025-07-10 PROCEDURE — 87899 AGENT NOS ASSAY W/OPTIC: CPT

## 2025-07-10 PROCEDURE — 93005 ELECTROCARDIOGRAM TRACING: CPT | Mod: TC | Performed by: STUDENT IN AN ORGANIZED HEALTH CARE EDUCATION/TRAINING PROGRAM

## 2025-07-10 PROCEDURE — 85027 COMPLETE CBC AUTOMATED: CPT

## 2025-07-10 PROCEDURE — 99233 SBSQ HOSP IP/OBS HIGH 50: CPT | Performed by: STUDENT IN AN ORGANIZED HEALTH CARE EDUCATION/TRAINING PROGRAM

## 2025-07-10 PROCEDURE — 87305 ASPERGILLUS AG IA: CPT

## 2025-07-10 PROCEDURE — 85384 FIBRINOGEN ACTIVITY: CPT

## 2025-07-10 PROCEDURE — 36415 COLL VENOUS BLD VENIPUNCTURE: CPT

## 2025-07-10 PROCEDURE — 85610 PROTHROMBIN TIME: CPT

## 2025-07-10 PROCEDURE — 700105 HCHG RX REV CODE 258: Performed by: HOSPITALIST

## 2025-07-10 PROCEDURE — A9270 NON-COVERED ITEM OR SERVICE: HCPCS | Performed by: STUDENT IN AN ORGANIZED HEALTH CARE EDUCATION/TRAINING PROGRAM

## 2025-07-10 PROCEDURE — 87449 NOS EACH ORGANISM AG IA: CPT

## 2025-07-10 PROCEDURE — 700102 HCHG RX REV CODE 250 W/ 637 OVERRIDE(OP): Performed by: HOSPITALIST

## 2025-07-10 PROCEDURE — 700111 HCHG RX REV CODE 636 W/ 250 OVERRIDE (IP): Mod: JZ | Performed by: HOSPITALIST

## 2025-07-10 PROCEDURE — 94640 AIRWAY INHALATION TREATMENT: CPT

## 2025-07-10 PROCEDURE — 85379 FIBRIN DEGRADATION QUANT: CPT

## 2025-07-10 PROCEDURE — 80053 COMPREHEN METABOLIC PANEL: CPT

## 2025-07-10 PROCEDURE — 700105 HCHG RX REV CODE 258: Performed by: STUDENT IN AN ORGANIZED HEALTH CARE EDUCATION/TRAINING PROGRAM

## 2025-07-10 PROCEDURE — 84100 ASSAY OF PHOSPHORUS: CPT

## 2025-07-10 PROCEDURE — 99233 SBSQ HOSP IP/OBS HIGH 50: CPT | Mod: GC | Performed by: STUDENT IN AN ORGANIZED HEALTH CARE EDUCATION/TRAINING PROGRAM

## 2025-07-10 PROCEDURE — 770020 HCHG ROOM/CARE - TELE (206)

## 2025-07-10 PROCEDURE — 700111 HCHG RX REV CODE 636 W/ 250 OVERRIDE (IP): Performed by: STUDENT IN AN ORGANIZED HEALTH CARE EDUCATION/TRAINING PROGRAM

## 2025-07-10 PROCEDURE — 97602 WOUND(S) CARE NON-SELECTIVE: CPT

## 2025-07-10 PROCEDURE — A9270 NON-COVERED ITEM OR SERVICE: HCPCS | Performed by: HOSPITALIST

## 2025-07-10 PROCEDURE — 700102 HCHG RX REV CODE 250 W/ 637 OVERRIDE(OP): Performed by: STUDENT IN AN ORGANIZED HEALTH CARE EDUCATION/TRAINING PROGRAM

## 2025-07-10 PROCEDURE — 83735 ASSAY OF MAGNESIUM: CPT

## 2025-07-10 RX ORDER — OMEPRAZOLE 20 MG/1
40 CAPSULE, DELAYED RELEASE ORAL 2 TIMES DAILY
Status: DISCONTINUED | OUTPATIENT
Start: 2025-07-10 | End: 2025-07-16 | Stop reason: HOSPADM

## 2025-07-10 RX ORDER — MAGNESIUM SULFATE HEPTAHYDRATE 40 MG/ML
4 INJECTION, SOLUTION INTRAVENOUS ONCE
Status: COMPLETED | OUTPATIENT
Start: 2025-07-10 | End: 2025-07-10

## 2025-07-10 RX ORDER — LEVOTHYROXINE SODIUM 75 UG/1
75 TABLET ORAL
Status: DISCONTINUED | OUTPATIENT
Start: 2025-07-11 | End: 2025-07-16 | Stop reason: HOSPADM

## 2025-07-10 RX ADMIN — LEVOTHYROXINE SODIUM 50 MCG: 0.05 TABLET ORAL at 05:42

## 2025-07-10 RX ADMIN — MIDODRINE HYDROCHLORIDE 10 MG: 5 TABLET ORAL at 07:37

## 2025-07-10 RX ADMIN — LINEZOLID 600 MG: 600 TABLET, FILM COATED ORAL at 05:42

## 2025-07-10 RX ADMIN — ESCITALOPRAM OXALATE 20 MG: 10 TABLET ORAL at 05:42

## 2025-07-10 RX ADMIN — LACTULOSE 15 ML: 10 SOLUTION ORAL at 17:27

## 2025-07-10 RX ADMIN — MIDODRINE HYDROCHLORIDE 10 MG: 5 TABLET ORAL at 11:28

## 2025-07-10 RX ADMIN — GABAPENTIN 300 MG: 300 CAPSULE ORAL at 05:42

## 2025-07-10 RX ADMIN — POTASSIUM CHLORIDE 20 MEQ: 1500 TABLET, EXTENDED RELEASE ORAL at 05:41

## 2025-07-10 RX ADMIN — HYDROCORTISONE SODIUM SUCCINATE 50 MG: 100 INJECTION, POWDER, FOR SOLUTION INTRAMUSCULAR; INTRAVENOUS at 13:13

## 2025-07-10 RX ADMIN — APIXABAN 5 MG: 5 TABLET, FILM COATED ORAL at 17:26

## 2025-07-10 RX ADMIN — SPIRONOLACTONE 200 MG: 100 TABLET ORAL at 05:47

## 2025-07-10 RX ADMIN — OXYCODONE 5 MG: 5 TABLET ORAL at 03:12

## 2025-07-10 RX ADMIN — APIXABAN 5 MG: 5 TABLET, FILM COATED ORAL at 05:42

## 2025-07-10 RX ADMIN — Medication 100 MG: at 05:42

## 2025-07-10 RX ADMIN — DOXYCYCLINE 100 MG: 100 INJECTION, POWDER, LYOPHILIZED, FOR SOLUTION INTRAVENOUS at 17:42

## 2025-07-10 RX ADMIN — HYDROCORTISONE SODIUM SUCCINATE 50 MG: 100 INJECTION, POWDER, FOR SOLUTION INTRAMUSCULAR; INTRAVENOUS at 19:40

## 2025-07-10 RX ADMIN — GABAPENTIN 300 MG: 300 CAPSULE ORAL at 17:26

## 2025-07-10 RX ADMIN — PIPERACILLIN AND TAZOBACTAM 3.38 G: 3; .375 INJECTION, POWDER, FOR SOLUTION INTRAVENOUS at 13:24

## 2025-07-10 RX ADMIN — Medication 1 TABLET: at 07:37

## 2025-07-10 RX ADMIN — PIPERACILLIN AND TAZOBACTAM 3.38 G: 3; .375 INJECTION, POWDER, FOR SOLUTION INTRAVENOUS at 21:34

## 2025-07-10 RX ADMIN — PIPERACILLIN AND TAZOBACTAM 3.38 G: 3; .375 INJECTION, POWDER, FOR SOLUTION INTRAVENOUS at 05:59

## 2025-07-10 RX ADMIN — MIDODRINE HYDROCHLORIDE 10 MG: 5 TABLET ORAL at 17:26

## 2025-07-10 RX ADMIN — OXYCODONE 5 MG: 5 TABLET ORAL at 19:42

## 2025-07-10 RX ADMIN — HYDROCORTISONE SODIUM SUCCINATE 50 MG: 100 INJECTION, POWDER, FOR SOLUTION INTRAMUSCULAR; INTRAVENOUS at 01:37

## 2025-07-10 RX ADMIN — HYDROCORTISONE SODIUM SUCCINATE 50 MG: 100 INJECTION, POWDER, FOR SOLUTION INTRAMUSCULAR; INTRAVENOUS at 07:38

## 2025-07-10 RX ADMIN — OMEPRAZOLE 40 MG: 20 CAPSULE, DELAYED RELEASE ORAL at 17:26

## 2025-07-10 RX ADMIN — MAGNESIUM SULFATE IN WATER FOR 4 G: 40 INJECTION INTRAVENOUS at 11:42

## 2025-07-10 ASSESSMENT — ENCOUNTER SYMPTOMS
ORTHOPNEA: 0
NAUSEA: 0
GASTROINTESTINAL NEGATIVE: 1
DIZZINESS: 0
CHILLS: 0
DEPRESSION: 0
NECK PAIN: 0
CLAUDICATION: 0
ABDOMINAL PAIN: 0
COUGH: 1
PSYCHIATRIC NEGATIVE: 1
BRUISES/BLEEDS EASILY: 0
PALPITATIONS: 0
FEVER: 0
WEAKNESS: 1
SPEECH CHANGE: 0
SPUTUM PRODUCTION: 0
HEMOPTYSIS: 0
MUSCULOSKELETAL NEGATIVE: 1
HEADACHES: 0
EYES NEGATIVE: 1
CARDIOVASCULAR NEGATIVE: 1
DIARRHEA: 0
WEIGHT LOSS: 0
BACK PAIN: 0
SHORTNESS OF BREATH: 1
HALLUCINATIONS: 0
TINGLING: 0
CONSTIPATION: 0
TREMORS: 0
MYALGIAS: 0
POLYDIPSIA: 0
VOMITING: 0
NERVOUS/ANXIOUS: 0
SENSORY CHANGE: 0

## 2025-07-10 ASSESSMENT — FIBROSIS 4 INDEX: FIB4 SCORE: 1.68

## 2025-07-10 ASSESSMENT — PAIN DESCRIPTION - PAIN TYPE
TYPE: CHRONIC PAIN
TYPE: ACUTE PAIN
TYPE: ACUTE PAIN

## 2025-07-10 ASSESSMENT — LIFESTYLE VARIABLES: SUBSTANCE_ABUSE: 0

## 2025-07-10 NOTE — DISCHARGE PLANNING
Post Acute Navigator Team    Patient screened based off of following information:    End of Life Care Index risk score 65  High LACE + score 70  6 click Mobility score 15   6 click ADL score 22   Readmission: Yes       Per chart review, post acute needs to be determined based on hospital course.   Patient is currently on high flow.      Please reach out to me directly should care team feel patient will benefit from a discharge goals of care conversation regarding outpatient palliative care or hospice.

## 2025-07-10 NOTE — PROGRESS NOTES
Bedside report received from off going RN/tech: Poonam, assumed care of patient.     Fall Risk Score: HIGH RISK  Fall risk interventions in place: Place yellow fall risk ID band on patient, Provide patient/family education based on risk assessment, Educate patient/family to call staff for assistance when getting out of bed, Place fall precaution signage outside patient door, Place patient in room close to nursing station, Utilize bed/chair fall alarm, Notify charge of high risk for huddle, and Bed alarm connected correctly  Bed type: Regular (Sam Score less than 17 interventions in place)  Patient on cardiac monitor: Yes  IVF/IV medications: Infusion per MAR (List Med(s)) Zosyn 3.375g in NS 100mL IVPB  Oxygen: How many liters 40L and Traced the line to wall oxygen 60% HFNC   Bedside sitter: Not Applicable   Isolation: Not applicable

## 2025-07-10 NOTE — PROGRESS NOTES
Pt on HighFlow O2  40 LPM, 60%, o2 sat=70%, coughing, SOB.  Respiratory called (MADDY Calle), said to increase to 100%.  Pt instructed to try to breathe through her nose, satting 98% at this setting.   Pt reports feeling better after the coughing spell. RT arrived.

## 2025-07-10 NOTE — PROGRESS NOTES
Pulmonary Progress Note    Date of Service: 7/10/2025    Date of Admission:  7/9/2025  7:18 AM    Attending Physician: Dr. Yohan Price     Chief Complaint:  Shortness of Breath (C/o shortness of breath x 4 days. Yesterday and today worse. Patient on 3 liters of oxygen 24/7. Hx of Liver Cirrhosis. + non productive cough. Hypotensive in triage.)      History of Present Illness: Minerva Tillman is a 50 y.o. female with a past medical history of hypertension, hypothyroidism, alcohol abuse, cirrhosis (MELD 27), hypothyroidism, portal vein thrombosis (on apixaban), nocturnal respiratory failure (2L baseline) who is presenting in the setting of shortness of breath, fatigue, cough.      The patient reports experiencing shortness of breath, low oxygen readings, and a cough since Friday, which have all progressively worsened. The patient denies coughing up any sputum or blood. There have been no known sick contacts or recent exposures or inhalations. The patient reports recent changes in skin coloration, noting pale lips yesterday and this morning. Additionally, the patient experienced jaundice but denies any current abdominal distention or pain. Review of systems is notable for no productive cough, no hemoptysis, and no abdominal pain. Denies any aspiration or issues with swallowing or regurgitation.      Patient was recently hospitalized from 6/19 - 7/4 for alcohol detox and jaundice. She had two paracenteses performed, and was found to have an acute portal vein thrombosis as well. GI started patient on steroids and performed MRCP as well as EGD found cirrhosis, portal HTN, and esophageal varices with small to moderate volume ascites with no evidence of malignancy. The patient is on nighttime oxygen therapy since a prior admission, she was meant to see pulmonology in the outpatient setting but has not been able to.      In the ED vitals remarkable for hypotension with systolics in 80-90s primarily MAPs > 65, one  value of 65/41 but repeat BP after this showed improved pressures. CT scan show diffuse groundglass densities with occasional cysts, likely multifocal pneumonia. Requiring 60 L HFNC (35% FiO2) initially.       Interval Update:   7/10: Patient now on 40 L and 60% FiO2, procal is elevated. MRSA negative, respiratory viral panel negative. Linezolid was d/[place]. Patient notes her cough is improved and she feels she can walk further without feeling short of breath compared to admission.       Review of Systems   Constitutional:  Positive for malaise/fatigue. Negative for chills, fever and weight loss.   HENT: Negative.     Eyes: Negative.    Respiratory:  Positive for cough and shortness of breath. Negative for hemoptysis and sputum production.    Cardiovascular:  Negative for chest pain, palpitations, orthopnea, claudication and leg swelling.   Gastrointestinal:  Negative for abdominal pain, constipation, diarrhea, nausea and vomiting.   Genitourinary:  Negative for dysuria, frequency and urgency.   Musculoskeletal:  Negative for back pain, joint pain, myalgias and neck pain.   Skin:  Negative for itching and rash.   Neurological:  Negative for dizziness, tingling, tremors, sensory change, speech change and headaches.   Endo/Heme/Allergies:  Negative for environmental allergies and polydipsia. Does not bruise/bleed easily.   Psychiatric/Behavioral:  Negative for depression, hallucinations, substance abuse and suicidal ideas. The patient is not nervous/anxious.    All other systems reviewed and are negative.      Home Medications       Reviewed by Lorna Craft (Pharmacy Tech) on 07/09/25 at 1015  Med List Status: Complete     Medication Last Dose Status   apixaban (ELIQUIS) 5 MG Tab 7/9/2025 Active   carvedilol (COREG) 3.125 MG Tab 7/9/2025 Active   escitalopram (LEXAPRO) 20 MG tablet 7/9/2025 Active   gabapentin (NEURONTIN) 300 MG Cap 7/9/2025 Active   hydrocortisone (ANUSOL-HC) 25 MG Suppos 7/7/2025 Active  "  hydrOXYzine HCl (ATARAX) 25 MG Tab Unknown Active   lactulose 20 GM/30ML Solution 2025 Active   levothyroxine (SYNTHROID) 50 MCG Tab 2025 Active   oxyCODONE immediate-release (ROXICODONE) 5 MG Tab Unknown Active   potassium chloride SA (KDUR) 20 MEQ Tab CR 2025 Active   spironolactone (ALDACTONE) 100 MG Tab 2025 Active   therapeutic multivitamin-minerals (THERAGRAN-M) Tab 2025 Active   thiamine (THIAMINE) 100 MG tablet 2025 Active                  Audit from Redirected Encounters    **Home medications have not yet been reviewed for this encounter**         Social History[1]     Past Medical History[2]    Past Surgical History[3]    Allergies: Sulfa drugs    Family History   Problem Relation Age of Onset    Hypertension Mother     Hyperlipidemia Mother     Depression Father     Hypertension Father     Asthma Father     Hyperlipidemia Father     Alcohol abuse Father     Hypertension Brother     Depression Brother     Alcohol abuse Brother     Hypertension Brother     Depression Brother     Alcohol abuse Brother     Suicide Attempts Paternal Uncle          by suicide    Cancer Maternal Grandfather 60        esphogus    Cancer Maternal Grandmother 60        breast and skin    Stroke Maternal Grandmother     Other Paternal Grandmother         complications from surgery       Physical Examination:  BP (P) 94/61   Pulse (!) (P) 50   Temp (P) 36.1 °C (97 °F) (Temporal)   Resp (P) 18   Ht 1.575 m (5' 2\")   Wt 63.3 kg (139 lb 8.8 oz)   SpO2 (P) 93%   BMI 25.52 kg/m²   Physical Exam  Vitals reviewed.   Constitutional:       Appearance: Normal appearance. She is ill-appearing.   HENT:      Head: Normocephalic and atraumatic.      Nose: Nose normal.      Mouth/Throat:      Mouth: Mucous membranes are moist.      Pharynx: Oropharynx is clear.   Eyes:      General: Scleral icterus present.      Extraocular Movements: Extraocular movements intact.      Conjunctiva/sclera: Conjunctivae normal.    " "  Pupils: Pupils are equal, round, and reactive to light.   Cardiovascular:      Rate and Rhythm: Normal rate and regular rhythm.      Pulses: Normal pulses.      Heart sounds: Normal heart sounds. No murmur heard.  Pulmonary:      Effort: Pulmonary effort is normal. No respiratory distress.      Breath sounds: Normal breath sounds. No wheezing, rhonchi or rales.      Comments: High flow cannula in place  Abdominal:      General: Abdomen is flat. Bowel sounds are normal. There is no distension.      Palpations: Abdomen is soft. There is no mass.      Tenderness: There is no abdominal tenderness. There is no guarding or rebound.   Musculoskeletal:         General: Normal range of motion.      Right lower leg: No edema.      Left lower leg: No edema.   Skin:     General: Skin is warm and dry.      Capillary Refill: Capillary refill takes less than 2 seconds.      Findings: No lesion or rash.      Comments: Slight yellowing of the skin    Neurological:      General: No focal deficit present.      Mental Status: She is alert and oriented to person, place, and time. Mental status is at baseline.      Cranial Nerves: No cranial nerve deficit.      Motor: No weakness.      Comments: No asterixis on exam   Psychiatric:         Mood and Affect: Mood normal.         Behavior: Behavior normal.         Thought Content: Thought content normal.         Judgment: Judgment normal.         No intake or output data in the 24 hours ending 07/10/25 0811    LABS:  Recent Labs     07/09/25  0753   WBC 14.9*      Recent Labs     07/09/25  0753   HEMOGLOBIN 14.2   HEMATOCRIT 43.4   .8*   MCH 37.6*   MACROCYTOSIS 2+*   ANISOCYTOSIS 2+*   PLATELETCT 457*       Recent Labs     07/09/25  0753   SODIUM 133*   POTASSIUM 4.7   CHLORIDE 101   CO2 20   CREATININE 0.81        Recent Labs     07/09/25  0753   ALBUMIN 2.5*        MICRO:  No results found for: \"BLOODCULTU\", \"BLDCULT\", \"BCHOLD\"     Latest pertinent labs were " reviewed      apixaban, 5 mg, Oral, BID  carvedilol, 3.125 mg, Oral, BID WITH MEALS  escitalopram, 20 mg, Oral, DAILY  gabapentin, 300 mg, Oral, BID  lactulose, 15 mL, Oral, Q EVENING  levothyroxine, 50 mcg, Oral, AM ES  potassium chloride SA, 20 mEq, Oral, DAILY  spironolactone, 200 mg, Oral, DAILY  therapeutic multivitamin-minerals, 1 Tablet, Oral, DAILY  thiamine, 100 mg, Oral, DAILY  midodrine, 10 mg, Oral, TID WITH MEALS  linezolid, 600 mg, Oral, Q12HRS  piperacillin-tazobactam, 3.375 g, Intravenous, Q8HRS  hydrocortisone sodium succinate PF, 50 mg, Intravenous, Q6HR        Imaging:  CXR (personally reviewed) -  CT-ABDOMEN-PELVIS W/O   Final Result         1.  Scattered patchy bilateral pulmonary infiltrates.   2.  Nodular hepatic compatible with changes of cirrhosis.   3.  Scattered abdominal ascites.   4.  Atherosclerosis and atherosclerotic coronary artery disease.   5.  Pulmonary nodule, see nodule follow-up recommendations below.      Fleischner Society pulmonary nodule recommendations:   Low Risk: No routine follow-up      High Risk: Optional CT at 12 months      Comments: Nodules less than 6 mm do not require routine follow-up, but certain patients at high risk with suspicious nodule morphology, upper lobe location, or both may warrant 12-month follow-up.      Low Risk - Minimal or absent history of smoking and of other known risk factors.      High Risk - History of smoking or of other known risk factors.      Note: These recommendations do not apply to lung cancer screening, patients with immunosuppression, or patients with known primary cancer.      Fleischner Society 2017 Guidelines for Management of Incidentally Detected Pulmonary Nodules in Adults         CT-CHEST (THORAX) W/O   Final Result      1.  Diffuse groundglass densities, likely multifocal pneumonia. Edema is a less likely differential consideration.   2.  5 mm right lower lobe pulmonary nodule.   3.  Trace pericardial effusion.   4.   Coronary artery calcifications.   5.  Cirrhosis.   6.  Ascites in the upper abdomen.   7.  Hypodense hepatic lesions as evaluated on recent MRI.         Fleischner society recommendations for follow up:   Low Risk: No routine follow-up      High Risk: Optional CT at 12 months      Comments: Nodules less than 6 mm do not require routine follow-up, but certain patients at high risk with suspicious nodule morphology, upper lobe location, or both may warrant 12-month follow-up.      Low Risk - Minimal or absent history of smoking and of other known risk factors.      High Risk - History of smoking or of other known risk factors.      Note: These recommendations do not apply to lung cancer screening, patients with immunosuppression, or patients with known primary cancer.      Fleischner Society 2017 Guidelines for Management of Incidentally Detected Pulmonary Nodules in Adults         Less than 6 mm: CT at 3-6 months. If stable, consider CT at 2 and 4 years.      6 mm or greater: CT at 3-6 months. Subsequent management based on the most suspicious nodule(s).      Comments: Multiple less than 6 mm pure ground-glass nodules are usually benign, but consider follow-up in selected patients at high risk at 2 and 4 years.      Low Risk - Minimal or absent history of smoking and of other known risk factors.      High Risk - History of smoking or of other known risk factors.      Note: These recommendations do not apply to lung cancer screening, patients with immunosuppression, or patients with known primary cancer.      Fleischner Society 2017 Guidelines for Management of Incidentally Detected Pulmonary Nodules in Adults      DX-CHEST-PORTABLE (1 VIEW)   Final Result      1.  Low lung volumes.   2.  Cardiomegaly, possibly on the basis of hypoventilation.   3.  Mild interstitial infiltrates and/or edema.          Problem List[4]  CT-ABDOMEN-PELVIS W/O  Narrative:   7/9/2025 9:07 PM    HISTORY/REASON FOR EXAM:  R hip and lower abd  pain.    TECHNIQUE/EXAM DESCRIPTION:    CT scan of the abdomen and pelvis without contrast.    Noncontrast helical scanning was obtained from the diaphragmatic domes through the pubic symphysis.    Low dose optimization technique was utilized for this CT exam including automated exposure control and adjustment of the mA and/or kV according to patient size.    COMPARISON: March 5, 2024    FINDINGS:    Lower Chest: Scattered patchy bilateral pulmonary opacities are seen. There is 5.7 mm right lower lobe pulmonary nodule is identified on image 9.    Liver: Nodular hepatic contour is seen. Scattered hepatic cysts are seen measuring up to 2.7 cm    Spleen: Unremarkable.    Pancreas: Unremarkable.    Gallbladder: No calcified stones.    Biliary: Nondilated.    Adrenal glands: Normal.    Kidneys: Unremarkable without hydronephrosis.    Bowel: No obstruction or acute inflammation. Radiodensity within the appendix is seen, likely inspissated contrast from prior radiographic examination, otherwise the appendix appears grossly unremarkable.    Lymph nodes: No adenopathy.    Vasculature: Atherosclerotic changes are seen including atherosclerotic coronary artery calcifications.    Peritoneum: Scattered abdominal ascites is seen.    Musculoskeletal: No acute or destructive process.    Pelvis: No adenopathy. The uterus and adnexal structures appear within physiologic limits.  Impression: 1.  Scattered patchy bilateral pulmonary infiltrates.  2.  Nodular hepatic compatible with changes of cirrhosis.  3.  Scattered abdominal ascites.  4.  Atherosclerosis and atherosclerotic coronary artery disease.  5.  Pulmonary nodule, see nodule follow-up recommendations below.    Fleischner Society pulmonary nodule recommendations:  Low Risk: No routine follow-up    High Risk: Optional CT at 12 months    Comments: Nodules less than 6 mm do not require routine follow-up, but certain patients at high risk with suspicious nodule morphology, upper  lobe location, or both may warrant 12-month follow-up.    Low Risk - Minimal or absent history of smoking and of other known risk factors.    High Risk - History of smoking or of other known risk factors.    Note: These recommendations do not apply to lung cancer screening, patients with immunosuppression, or patients with known primary cancer.    Fleischner Society 2017 Guidelines for Management of Incidentally Detected Pulmonary Nodules in Adults      Assessment and Plan:  #Severe Community Acquired Pneumonia   #Multifocal Pneumonia   #Acute Hypoxic Respiratory Failure   #Nocturnal Hypoxia  On HFNC and CT findings consistent with multifocal pneumonia, recent extended hospital stay, so would treat for hospital acquired. No aspiration hx. Was on a brief course of steroids at prior admission (7 days) but not enough to warrant PJP treatment at tis time. Flu, covid, rsv, viral PCR negative. TSH markedly elevated. Ascites is quite minimal so less likely from fluid overload. Potentially component of presumptive OHS contributory. Family history of issues with mucociliary clearance and frequent infections (brothers follow with Pulm - likely primary ciliary dyskinesia) patient denies these symptoms. ESR elevated and procal elevated so infectious etiology more likely.     - Continue Zosyn for total of 7 day course given suspicion for hospital acquired   - Given requiring HFNC, continue 50 mg IV Hydrocortisone q6h for 5 days  - Strep & Legionella urine antigens, Beta-D-Glucan pending   - Will continue monitoring for now, but If worsening or no improvement and tests continue to be unrevealing, will consider autoimmune workup.   - Titrate oxygen to 88-92%   - Encourage IS usage  - Treat hypothyroidism accordingly  - Continue outpatient diuretic regimen  - Patient will need outpatient PFTs and Sleep Medicine follow up    Neil Mg MD   PGY-2  Internal Medicine Resident        [1]   Social History  Tobacco Use    Smoking  status: Never    Smokeless tobacco: Never   Vaping Use    Vaping status: Never Used   Substance Use Topics    Alcohol use: Yes     Alcohol/week: 8.4 oz     Types: 14 Glasses of wine per week     Comment: Bottle of chardonnay a day.    Drug use: No   [2]   Past Medical History:  Diagnosis Date    Acute alcohol intoxication (HCC) 05/25/2021    Alcohol intoxication delirium (HCC) 04/30/2024    Anxiety     Depression     Gynecological disorder     High cholesterol     Hypertension     Snoring     Urinary tract infection    [3]   Past Surgical History:  Procedure Laterality Date    [place] UPPER GI ENDOSCOPY,DIAGNOSIS N/A 6/30/2025    Procedure: GASTROSCOPY;  Surgeon: April Mcknight M.D.;  Location: SURGERY SAME DAY [facility];  Service: Gastroenterology    COLONOSCOPY N/A 6/30/2025    Procedure: COLONOSCOPY;  Surgeon: April Mcknight M.D.;  Location: SURGERY SAME DAY [facility];  Service: Gastroenterology    ORIF, ANKLE Right 12/7/2024    Procedure: ORIF, ANKLE;  Surgeon: Sami Clinton M.D.;  Location: SURGERY [facility];  Service: Orthopedics    LIPOMA EXCISION  08/12/2024    HYSTEROSCOPY WITH ENDOMETRIAL RADIOFREQUENCY ABLATION  08/02/2017    Procedure: HYSTEROSCOPY NOVASURE;  Surgeon: Hedy Hardy M.D.;  Location: SURGERY [facility];  Service:     DILATION AND CURETTAGE  08/02/2017    Procedure: DILATION AND CURETTAGE;  Surgeon: Hedy Hardy M.D.;  Location: SURGERY [facility];  Service:     [place] BREAST AUGMENTATION WITH IMPLANT  01/01/2006    MAMMOPLASTY AUGMENTATION     [4]   Patient Active Problem List  Diagnosis    Acute cystitis    Weight gain    Hypercholesterolemia    Essential hypertension    Fatty liver, alcoholic    Vitamin D insufficiency    Hypothyroidism    Elevated hematocrit    Mild episode of recurrent major depressive disorder (HCC)    Other insomnia    Decreased hearing, left    Migraine with aura and without status migrainosus, not intractable    Dysarthria    Swelling     Allergy    Macrocytosis    Social anxiety disorder    Generalized anxiety disorder    Alcohol use disorder, severe, dependence (HCC)    Urinary frequency    Transaminitis    Nausea & vomiting    Generalized abdominal pain    Total bilirubin, elevated    Hypokalemia    Hypomagnesemia    Hyperglycemia    Elevated alkaline phosphatase level    UTI (urinary tract infection)    Ascites    Decompensated hepatic cirrhosis, hypoxia, chronic diarrhea, alcoholism (HCC)    Leukocytosis    High anion gap metabolic acidosis    Advance care planning    Diarrhea    Acute hypoxic respiratory failure (HCC)    Hypophosphatemia    Neuropathy    Balance problem    Portal hypertension with esophageal varices (HCC)    Depression    Alcohol withdrawal syndrome without complication (HCC)    Community acquired pneumonia of left lower lobe of lung    Headache    Primary hypertension    Trimalleolar fracture of ankle, closed, right, initial encounter    Ground-level fall    Thrombocytopenia (HCC)    Alcohol abuse with withdrawal and perceptual disturbance (HCC)    Esophageal varices in alcoholic cirrhosis (HCC)    Alcoholic hepatitis    Hyponatremia    Portal vein thrombosis    Elevated cancer antigen 125 ()    Vitamin A deficiency    Acute on chronic respiratory failure with hypoxia (HCC)    Acute-on-chronic respiratory failure (HCC)

## 2025-07-10 NOTE — CARE PLAN
Problem: Pain - Standard  Goal: Alleviation of pain or a reduction in pain to the patient’s comfort goal  Description: Target End Date:  Prior to discharge or change in level of care    Document on Vitals flowsheet    1.  Document pain using the appropriate pain scale per order or unit policy  2.  Educate and implement non-pharmacologic comfort measures (i.e. relaxation, distraction, massage, cold/heat therapy, etc.)  3.  Pain management medications as ordered  4.  Reassess pain after pain med administration per policy  5.  If opiods administered assess patient's response to pain medication is appropriate per POSS sedation scale  6.  Follow pain management plan developed in collaboration with patient and interdisciplinary team (including palliative care or pain specialists if applicable)  Outcome: Progressing  Note: Pt calls for PRN medication as needed for chronic pain     Problem: Knowledge Deficit - Standard  Goal: Patient and family/care givers will demonstrate understanding of plan of care, disease process/condition, diagnostic tests and medications  Description: Target End Date:  1-3 days or as soon as patient condition allows    Document in Patient Education    1.  Patient and family/caregiver oriented to unit, equipment, visitation policy and means for communicating concern  2.  Complete/review Learning Assessment  3.  Assess knowledge level of disease process/condition, treatment plan, diagnostic tests and medications  4.  Explain disease process/condition, treatment plan, diagnostic tests and medications  Outcome: Progressing  Note: Pt understands POC, and demonstrates understanding of information provided     Problem: Fall Risk  Goal: Patient will remain free from falls  Description: Target End Date:  Prior to discharge or change in level of care    Document interventions on the [facility] Fall Risk Assessment    1.  Assess for fall risk factors  2.  Implement fall precautions  Outcome:  Progressing  Note: Pt calls in appropriately to get out of bed, hourly rounding to offer toileting   The patient is Watcher - Medium risk of patient condition declining or worsening         Progress made toward(s) clinical / shift goals:Adequate pain management    Patient is not progressing towards the following goals:

## 2025-07-10 NOTE — PROGRESS NOTES
Monitor Summary  Rhythm: Normal Sinus Rhythm and Sinus Bradycardia  Rate: 58-62  Ectopy: None Noted  .16 / .09 / .47              12 hr Chart check.

## 2025-07-10 NOTE — PROGRESS NOTES
[facility] Medicine Daily Progress Note    Date of Service  7/10/2025    Chief Complaint  Minerva Tillman is a 50 y.o. female admitted 7/9/2025 with dyspnea    Hospital Course   50 y.o. female who presented 7/9/2025 with history of hypertension, alcohol abuse, hypothyroidism, primary hypertension, fatty liver, portal vein thrombosis for which the patient is on apixaban, chronic hypoxic respiratory failure on 2 L nasal cannula baseline, depression, hypothyroidism.  Patient was recently in the hospital and was discharged on July 4.  She was admitted for acute decompensated cirrhosis and at that time had an RADHA D score of 27.  She was treated for community-acquired pneumonia and volume overload as well as alcohol withdrawal.  She had 2 paracenteses done.  After discharge here, the patient went to [facility] to enter an inpatient alcohol detox program however she was felt to be medically too ill for enrollment.     History is obtained from the patient, her  who is a physician, and her mom who is a nurse.     Patient presents today for evaluation of weakness, shortness of breath, and persistent cough.  She has a history of chronic respiratory failure and had been on 2 L nasal cannula oxygen only at night for quite some time however after recent hospitalization earlier this month, she has been on 2 L 24/7.  Over the last 3 or 4 days, her oxygen requirements have increased steadily.  Now on 5 L at home, she is saturating upper 80s or low 90s.  If she takes her oxygen off to walk to the bathroom, she drops to the 30s by the time she gets back.  During this time she has had an increase in cough which is nonproductive and quite persistent.  She denies any fever or chills, there has been no sick contacts that she is aware of.  She is on apixaban for her history of portal vein thrombosis and reports she has been compliant with this medication.  She does have albuterol at home which she has tried on several occasions it  seems to help the cough a little bit but does not do much for the shortness of breath.    Chest CT with diffuse groundglass densities, likely multifocal pneumonia.  TSH 35.300 and FT4 0.91  6/24/2025 echocardiogram with ejection fraction 60 to 65%, no diastolic dysfunction noted.    Interval Problem Update  Evaluated at bedside  Reporting improvement with dyspnea, energy,   Up to chair today  SBP 90-140s  On 40 L high flow, titrate  Leukocytosis trending down  LFTs improving  Replete hypomagnesemia.  MELD 23 (19.6% estimated to be month mortality)   COVID/RSV/influenza negative  Viral panel negative  Procalcitonin mildly elevated  MRSA nares negative  DC linezolid  Hold Carvedilol for esophageal variceal prophylaxis, hypotensive this morning  Continue IV pip/tazo  Start doxycycline (chest imaging consistent with atypical pneumonia)  Fluid restriction  Continue spironolactone  Increase levothyroxine dose  Continue Eliquis for portal vein thrombosis  Pulmonology following, continue IV steroids for 5 days  Repeat TTE  Paracentesis  Labs in a.m.    I have discussed this patient's plan of care and discharge plan at IDT rounds today with Case Management, Nursing, Nursing leadership, and other members of the IDT team.    Consultants/Specialty  Pulmonology    Code Status  Full Code    Disposition    I have placed the appropriate orders for post-discharge needs.    Review of Systems  Review of Systems   Constitutional:  Positive for malaise/fatigue.   HENT: Negative.     Eyes: Negative.    Respiratory:  Positive for cough and shortness of breath.    Cardiovascular: Negative.    Gastrointestinal: Negative.    Genitourinary: Negative.    Musculoskeletal: Negative.    Skin: Negative.    Neurological:  Positive for weakness.   Endo/Heme/Allergies: Negative.    Psychiatric/Behavioral: Negative.          Physical Exam  Temp:  [36.1 °C (97 °F)-36.3 °C (97.3 °F)] 36.1 °C (97 °F)  Pulse:  [48-70] 58  Resp:  [16-21] 17  BP:  ()/(57-86) 97/61  SpO2:  [70 %-95 %] 92 %    Physical Exam  Constitutional:       Appearance: She is ill-appearing.   HENT:      Head: Normocephalic and atraumatic.      Mouth/Throat:      Mouth: Mucous membranes are moist.   Eyes:      Extraocular Movements: Extraocular movements intact.      Pupils: Pupils are equal, round, and reactive to light.   Cardiovascular:      Rate and Rhythm: Regular rhythm. Bradycardia present.      Pulses: Normal pulses.      Heart sounds: Normal heart sounds.   Pulmonary:      Effort: Pulmonary effort is normal.      Breath sounds: Rales present.   Abdominal:      General: Bowel sounds are normal.      Palpations: Abdomen is soft.   Musculoskeletal:         General: No swelling. Normal range of motion.      Cervical back: Normal range of motion and neck supple.   Skin:     General: Skin is warm.      Coloration: Skin is not jaundiced.   Neurological:      General: No focal deficit present.      Mental Status: She is alert and oriented to person, place, and time. Mental status is at baseline.      Cranial Nerves: No cranial nerve deficit.   Psychiatric:         Mood and Affect: Mood normal.         Behavior: Behavior normal.         Thought Content: Thought content normal.         Judgment: Judgment normal.         Fluids    Intake/Output Summary (Last 24 hours) at 7/10/2025 1627  Last data filed at 7/10/2025 1540  Gross per 24 hour   Intake 660 ml   Output 450 ml   Net 210 ml        Laboratory  Recent Labs     07/09/25  0753 07/10/25  0900   WBC 14.9* 13.9*   RBC 3.78* 3.42*   HEMOGLOBIN 14.2 13.2   HEMATOCRIT 43.4 39.0   .8* 114.0*   MCH 37.6* 38.6*   MCHC 32.7 33.8   RDW 73.2* 69.3*   PLATELETCT 457* 365   MPV 10.4 10.7     Recent Labs     07/09/25  0753 07/10/25  0900   SODIUM 133* 134*  134*   POTASSIUM 4.7 4.2  4.2   CHLORIDE 101 103  103   CO2 20 19*  19*   GLUCOSE 91 154*  154*   BUN 12 15  15   CREATININE 0.81 0.71  0.71   CALCIUM 8.6 8.3*  8.3*                    Imaging  CT-ABDOMEN-PELVIS W/O   Final Result         1.  Scattered patchy bilateral pulmonary infiltrates.   2.  Nodular hepatic compatible with changes of cirrhosis.   3.  Scattered abdominal ascites.   4.  Atherosclerosis and atherosclerotic coronary artery disease.   5.  Pulmonary nodule, see nodule follow-up recommendations below.      Fleischner Society pulmonary nodule recommendations:   Low Risk: No routine follow-up      High Risk: Optional CT at 12 months      Comments: Nodules less than 6 mm do not require routine follow-up, but certain patients at high risk with suspicious nodule morphology, upper lobe location, or both may warrant 12-month follow-up.      Low Risk - Minimal or absent history of smoking and of other known risk factors.      High Risk - History of smoking or of other known risk factors.      Note: These recommendations do not apply to lung cancer screening, patients with immunosuppression, or patients with known primary cancer.      Fleischner Society 2017 Guidelines for Management of Incidentally Detected Pulmonary Nodules in Adults         CT-CHEST (THORAX) W/O   Final Result      1.  Diffuse groundglass densities, likely multifocal pneumonia. Edema is a less likely differential consideration.   2.  5 mm right lower lobe pulmonary nodule.   3.  Trace pericardial effusion.   4.  Coronary artery calcifications.   5.  Cirrhosis.   6.  Ascites in the upper abdomen.   7.  Hypodense hepatic lesions as evaluated on recent MRI.         Fleischner society recommendations for follow up:   Low Risk: No routine follow-up      High Risk: Optional CT at 12 months      Comments: Nodules less than 6 mm do not require routine follow-up, but certain patients at high risk with suspicious nodule morphology, upper lobe location, or both may warrant 12-month follow-up.      Low Risk - Minimal or absent history of smoking and of other known risk factors.      High Risk - History of smoking or of other  known risk factors.      Note: These recommendations do not apply to lung cancer screening, patients with immunosuppression, or patients with known primary cancer.      Fleischner Society 2017 Guidelines for Management of Incidentally Detected Pulmonary Nodules in Adults         Less than 6 mm: CT at 3-6 months. If stable, consider CT at 2 and 4 years.      6 mm or greater: CT at 3-6 months. Subsequent management based on the most suspicious nodule(s).      Comments: Multiple less than 6 mm pure ground-glass nodules are usually benign, but consider follow-up in selected patients at high risk at 2 and 4 years.      Low Risk - Minimal or absent history of smoking and of other known risk factors.      High Risk - History of smoking or of other known risk factors.      Note: These recommendations do not apply to lung cancer screening, patients with immunosuppression, or patients with known primary cancer.      Fleischner Society 2017 Guidelines for Management of Incidentally Detected Pulmonary Nodules in Adults      DX-CHEST-PORTABLE (1 VIEW)   Final Result      1.  Low lung volumes.   2.  Cardiomegaly, possibly on the basis of hypoventilation.   3.  Mild interstitial infiltrates and/or edema.      EC-ECHOCARDIOGRAM COMPLETE W/O CONT    (Results Pending)   US-PARACENTESIS, ABD WITH IMAGING    (Results Pending)        Assessment/Plan  Acute on chronic respiratory failure with hypoxia (HCC)  Assessment & Plan  Patient had been on 2 L nightly for some years, but has recently in the last month gone from that to requiring 5 L nasal cannula and currently we are titrating her onto high flow nasal cannula.  Chest CT with diffuse groundglass densities, likely multifocal pneumonia.  COVID/RSV/influenza negative  Viral panel negative  Procalcitonin mildly elevated  MRSA nares negative  DC linezolid  Hold Carvedilol for esophageal variceal prophylaxis, hypotensive this morning  Continue IV pip/tazo  Start doxycycline (chest imaging  consistent with atypical pneumonia)  Fluid restriction  Continue spironolactone  Increase levothyroxine dose  Continue Eliquis for portal vein thrombosis  Pulmonology following, continue IV steroids for 5 days  Repeat TTE  Paracentesis  Labs in a.m.    Portal vein thrombosis- (present on admission)  Assessment & Plan  Diagnosed 1 month ago and started on apixaban which we will continue    Alcohol use disorder, severe, dependence (HCC)- (present on admission)  Assessment & Plan  Patient was discharged from here on July 4, and went to [place] to be admitted to an inpatient rehab program however she was felt to be medically to ill    Hypothyroidism- (present on admission)  Assessment & Plan  Last TSH was 1 year ago, 3.10  We will repeat a TSH now    Fatty liver, alcoholic- (present on admission)  Assessment & Plan  Patient is currently maintained on lactulose, spironolactone, and Lasix  On recent admission her MELD was 27.  She did receive 1 week of prednisolone however her Delmy score did not improve significantly and therefore it was not continued beyond 7 days.  She is working on her sobriety    Essential hypertension- (present on admission)  Assessment & Plan  Patient carries this diagnosis but is not on scheduled medication for purposes of her hypertension rather she is on carvedilol spironolactone and Lasix for purposes of liver cirrhosis    Acute cystitis- (present on admission)  Assessment & Plan  Start the patient on Ancef  Follow urine cultures       VTE prophylaxis:  Eliquis    I have performed a physical exam and reviewed and updated ROS and Plan today (7/10/2025). In review of yesterday's note (7/9/2025), there are no changes except as documented above.    Greater than 51 minutes spent prepping to see patient (e.g. review of tests) obtaining and/or reviewing separately obtained history. Performing a medically appropriate examination and/ evaluation.  Counseling and educating the patient/family/caregiver.   Ordering medications, tests, or procedures.  Referring and communicating with other health care professionals.  Documenting clinical information in EPIC.  Independently interpreting results and communicating results to patient/family/caregiver.  Care coordination

## 2025-07-11 ENCOUNTER — APPOINTMENT (OUTPATIENT)
Dept: RADIOLOGY | Facility: MEDICAL CENTER | Age: 50
DRG: 189 | End: 2025-07-11
Attending: STUDENT IN AN ORGANIZED HEALTH CARE EDUCATION/TRAINING PROGRAM
Payer: COMMERCIAL

## 2025-07-11 PROBLEM — R91.8 GROUND GLASS OPACITY PRESENT ON IMAGING OF LUNG: Status: ACTIVE | Noted: 2025-07-11

## 2025-07-11 LAB
A1AT SERPL-MCNC: 248 MG/DL (ref 90–200)
ALBUMIN SERPL BCP-MCNC: 2.5 G/DL (ref 3.2–4.9)
ALBUMIN/GLOB SERPL: 0.9 G/DL
ALP SERPL-CCNC: 152 U/L (ref 30–99)
ALT SERPL-CCNC: 84 U/L (ref 2–50)
AMMONIA PLAS-SCNC: 34 UMOL/L (ref 11–45)
ANION GAP SERPL CALC-SCNC: 11 MMOL/L (ref 7–16)
AST SERPL-CCNC: 115 U/L (ref 12–45)
BILIRUB SERPL-MCNC: 3.6 MG/DL (ref 0.1–1.5)
BUN SERPL-MCNC: 14 MG/DL (ref 8–22)
C3 SERPL-MCNC: 172 MG/DL (ref 87–200)
C4 SERPL-MCNC: 26.5 MG/DL (ref 19–52)
CALCIUM ALBUM COR SERPL-MCNC: 9.3 MG/DL (ref 8.5–10.5)
CALCIUM SERPL-MCNC: 8.1 MG/DL (ref 8.5–10.5)
CHLORIDE SERPL-SCNC: 105 MMOL/L (ref 96–112)
CO2 SERPL-SCNC: 19 MMOL/L (ref 20–33)
CREAT SERPL-MCNC: 0.66 MG/DL (ref 0.5–1.4)
ERYTHROCYTE [DISTWIDTH] IN BLOOD BY AUTOMATED COUNT: 67.7 FL (ref 35.9–50)
GFR SERPLBLD CREATININE-BSD FMLA CKD-EPI: 106 ML/MIN/1.73 M 2
GLOBULIN SER CALC-MCNC: 2.7 G/DL (ref 1.9–3.5)
GLUCOSE SERPL-MCNC: 161 MG/DL (ref 65–99)
HCT VFR BLD AUTO: 38.1 % (ref 37–47)
HGB BLD-MCNC: 12.9 G/DL (ref 12–16)
HIV 1+2 AB+HIV1 P24 AG SERPL QL IA: NORMAL
MAGNESIUM SERPL-MCNC: 2.2 MG/DL (ref 1.5–2.5)
MCH RBC QN AUTO: 37.7 PG (ref 27–33)
MCHC RBC AUTO-ENTMCNC: 33.9 G/DL (ref 32.2–35.5)
MCV RBC AUTO: 111.4 FL (ref 81.4–97.8)
PHOSPHATE SERPL-MCNC: 2.5 MG/DL (ref 2.5–4.5)
PLATELET # BLD AUTO: 454 K/UL (ref 164–446)
PMV BLD AUTO: 10.3 FL (ref 9–12.9)
POTASSIUM SERPL-SCNC: 3.8 MMOL/L (ref 3.6–5.5)
PROT SERPL-MCNC: 5.2 G/DL (ref 6–8.2)
RBC # BLD AUTO: 3.42 M/UL (ref 4.2–5.4)
RHEUMATOID FACT SER IA-ACNC: 80 IU/ML (ref 0–14)
SODIUM SERPL-SCNC: 135 MMOL/L (ref 135–145)
WBC # BLD AUTO: 17.2 K/UL (ref 4.8–10.8)

## 2025-07-11 PROCEDURE — 84155 ASSAY OF PROTEIN SERUM: CPT

## 2025-07-11 PROCEDURE — 700105 HCHG RX REV CODE 258: Performed by: STUDENT IN AN ORGANIZED HEALTH CARE EDUCATION/TRAINING PROGRAM

## 2025-07-11 PROCEDURE — 86160 COMPLEMENT ANTIGEN: CPT | Mod: 91

## 2025-07-11 PROCEDURE — 80053 COMPREHEN METABOLIC PANEL: CPT

## 2025-07-11 PROCEDURE — 76705 ECHO EXAM OF ABDOMEN: CPT

## 2025-07-11 PROCEDURE — 82140 ASSAY OF AMMONIA: CPT

## 2025-07-11 PROCEDURE — 83735 ASSAY OF MAGNESIUM: CPT

## 2025-07-11 PROCEDURE — A9270 NON-COVERED ITEM OR SERVICE: HCPCS | Performed by: HOSPITALIST

## 2025-07-11 PROCEDURE — 700102 HCHG RX REV CODE 250 W/ 637 OVERRIDE(OP): Performed by: STUDENT IN AN ORGANIZED HEALTH CARE EDUCATION/TRAINING PROGRAM

## 2025-07-11 PROCEDURE — 86038 ANTINUCLEAR ANTIBODIES: CPT

## 2025-07-11 PROCEDURE — A9270 NON-COVERED ITEM OR SERVICE: HCPCS | Performed by: STUDENT IN AN ORGANIZED HEALTH CARE EDUCATION/TRAINING PROGRAM

## 2025-07-11 PROCEDURE — 84165 PROTEIN E-PHORESIS SERUM: CPT

## 2025-07-11 PROCEDURE — 700105 HCHG RX REV CODE 258: Performed by: HOSPITALIST

## 2025-07-11 PROCEDURE — 87389 HIV-1 AG W/HIV-1&-2 AB AG IA: CPT

## 2025-07-11 PROCEDURE — 770020 HCHG ROOM/CARE - TELE (206)

## 2025-07-11 PROCEDURE — 99233 SBSQ HOSP IP/OBS HIGH 50: CPT | Performed by: STUDENT IN AN ORGANIZED HEALTH CARE EDUCATION/TRAINING PROGRAM

## 2025-07-11 PROCEDURE — 36415 COLL VENOUS BLD VENIPUNCTURE: CPT

## 2025-07-11 PROCEDURE — 83516 IMMUNOASSAY NONANTIBODY: CPT

## 2025-07-11 PROCEDURE — 700111 HCHG RX REV CODE 636 W/ 250 OVERRIDE (IP): Performed by: STUDENT IN AN ORGANIZED HEALTH CARE EDUCATION/TRAINING PROGRAM

## 2025-07-11 PROCEDURE — 700111 HCHG RX REV CODE 636 W/ 250 OVERRIDE (IP): Mod: JZ | Performed by: HOSPITALIST

## 2025-07-11 PROCEDURE — 85027 COMPLETE CBC AUTOMATED: CPT

## 2025-07-11 PROCEDURE — 82595 ASSAY OF CRYOGLOBULIN: CPT

## 2025-07-11 PROCEDURE — 94640 AIRWAY INHALATION TREATMENT: CPT

## 2025-07-11 PROCEDURE — 700102 HCHG RX REV CODE 250 W/ 637 OVERRIDE(OP): Performed by: HOSPITALIST

## 2025-07-11 PROCEDURE — 84100 ASSAY OF PHOSPHORUS: CPT

## 2025-07-11 PROCEDURE — 86431 RHEUMATOID FACTOR QUANT: CPT

## 2025-07-11 RX ORDER — MIDODRINE HYDROCHLORIDE 5 MG/1
10 TABLET ORAL 3 TIMES DAILY PRN
Status: DISCONTINUED | OUTPATIENT
Start: 2025-07-11 | End: 2025-07-16 | Stop reason: HOSPADM

## 2025-07-11 RX ADMIN — OXYCODONE 5 MG: 5 TABLET ORAL at 16:43

## 2025-07-11 RX ADMIN — APIXABAN 5 MG: 5 TABLET, FILM COATED ORAL at 16:43

## 2025-07-11 RX ADMIN — ESCITALOPRAM OXALATE 20 MG: 10 TABLET ORAL at 05:12

## 2025-07-11 RX ADMIN — Medication 100 MG: at 05:12

## 2025-07-11 RX ADMIN — HYDROCORTISONE SODIUM SUCCINATE 50 MG: 100 INJECTION, POWDER, FOR SOLUTION INTRAMUSCULAR; INTRAVENOUS at 12:37

## 2025-07-11 RX ADMIN — SPIRONOLACTONE 200 MG: 100 TABLET ORAL at 05:13

## 2025-07-11 RX ADMIN — HYDROCORTISONE SODIUM SUCCINATE 50 MG: 100 INJECTION, POWDER, FOR SOLUTION INTRAMUSCULAR; INTRAVENOUS at 07:49

## 2025-07-11 RX ADMIN — HYDROXYZINE HYDROCHLORIDE 25 MG: 25 TABLET ORAL at 16:43

## 2025-07-11 RX ADMIN — OMEPRAZOLE 40 MG: 20 CAPSULE, DELAYED RELEASE ORAL at 16:43

## 2025-07-11 RX ADMIN — Medication 1 TABLET: at 05:55

## 2025-07-11 RX ADMIN — OXYCODONE 5 MG: 5 TABLET ORAL at 23:38

## 2025-07-11 RX ADMIN — HYDROXYZINE HYDROCHLORIDE 25 MG: 25 TABLET ORAL at 23:38

## 2025-07-11 RX ADMIN — MIDODRINE HYDROCHLORIDE 10 MG: 5 TABLET ORAL at 07:49

## 2025-07-11 RX ADMIN — APIXABAN 5 MG: 5 TABLET, FILM COATED ORAL at 05:13

## 2025-07-11 RX ADMIN — DOXYCYCLINE 100 MG: 100 INJECTION, POWDER, LYOPHILIZED, FOR SOLUTION INTRAVENOUS at 05:07

## 2025-07-11 RX ADMIN — PIPERACILLIN AND TAZOBACTAM 3.38 G: 3; .375 INJECTION, POWDER, FOR SOLUTION INTRAVENOUS at 12:45

## 2025-07-11 RX ADMIN — DOXYCYCLINE 100 MG: 100 INJECTION, POWDER, LYOPHILIZED, FOR SOLUTION INTRAVENOUS at 16:49

## 2025-07-11 RX ADMIN — HYDROCORTISONE SODIUM SUCCINATE 50 MG: 100 INJECTION, POWDER, FOR SOLUTION INTRAMUSCULAR; INTRAVENOUS at 19:39

## 2025-07-11 RX ADMIN — OMEPRAZOLE 40 MG: 20 CAPSULE, DELAYED RELEASE ORAL at 05:13

## 2025-07-11 RX ADMIN — MIDODRINE HYDROCHLORIDE 10 MG: 5 TABLET ORAL at 12:42

## 2025-07-11 RX ADMIN — PIPERACILLIN AND TAZOBACTAM 3.38 G: 3; .375 INJECTION, POWDER, FOR SOLUTION INTRAVENOUS at 05:04

## 2025-07-11 RX ADMIN — LEVOTHYROXINE SODIUM 75 MCG: 0.07 TABLET ORAL at 05:12

## 2025-07-11 RX ADMIN — PIPERACILLIN AND TAZOBACTAM 3.38 G: 3; .375 INJECTION, POWDER, FOR SOLUTION INTRAVENOUS at 22:22

## 2025-07-11 RX ADMIN — GABAPENTIN 300 MG: 300 CAPSULE ORAL at 05:13

## 2025-07-11 RX ADMIN — HYDROCORTISONE SODIUM SUCCINATE 50 MG: 100 INJECTION, POWDER, FOR SOLUTION INTRAMUSCULAR; INTRAVENOUS at 01:18

## 2025-07-11 RX ADMIN — GABAPENTIN 300 MG: 300 CAPSULE ORAL at 16:43

## 2025-07-11 ASSESSMENT — PAIN DESCRIPTION - PAIN TYPE
TYPE: ACUTE PAIN

## 2025-07-11 ASSESSMENT — ENCOUNTER SYMPTOMS
TINGLING: 0
NERVOUS/ANXIOUS: 0
ABDOMINAL PAIN: 0
ORTHOPNEA: 0
DIZZINESS: 0
SENSORY CHANGE: 0
BACK PAIN: 0
VOMITING: 0
CHILLS: 0
SHORTNESS OF BREATH: 1
HEADACHES: 0
WEIGHT LOSS: 0
HEMOPTYSIS: 0
CLAUDICATION: 0
HALLUCINATIONS: 0
PALPITATIONS: 0
BRUISES/BLEEDS EASILY: 0
SPEECH CHANGE: 0
CONSTIPATION: 0
PSYCHIATRIC NEGATIVE: 1
CARDIOVASCULAR NEGATIVE: 1
NAUSEA: 0
FEVER: 0
TREMORS: 0
COUGH: 1
POLYDIPSIA: 0
EYES NEGATIVE: 1
WEAKNESS: 1
SPUTUM PRODUCTION: 0
DEPRESSION: 0
DIARRHEA: 0
MUSCULOSKELETAL NEGATIVE: 1
NECK PAIN: 0
GASTROINTESTINAL NEGATIVE: 1
MYALGIAS: 0

## 2025-07-11 ASSESSMENT — LIFESTYLE VARIABLES: SUBSTANCE_ABUSE: 0

## 2025-07-11 ASSESSMENT — FIBROSIS 4 INDEX: FIB4 SCORE: 1.41

## 2025-07-11 NOTE — DIETARY
"Nutrition Services: Initial Assessment     Day 2 7/9/2025of admit. Minerva Tillman is 50 y.o., female with admitting DX of Acute-on-chronic respiratory failure (HCC) [J96.20].    Consult Received for: MST - Malnutrition Screening Tool score of 2 for reported unintentional weight loss of 2-13 lb x 1 month and decreased appetite.    Current Hospital Problems List:    Acute cystitis   Acute on chronic respiratory failure with hypoxia  Alcohol use disorder, severe, dependence  Essential hypertension  Fatty liver, alcoholic  Hypothyroidism  Portal hypertension with esophageal varices  Portal vein thrombosis     Nutrition Assessment:      Height: 157.5 cm (5' 2\")  Weight: 63.3 kg (139 lb 8.8 oz)  Weight taken via: Bed Scale  BMI Calculated: 25.97 kg/m2  BMI Classification: Overweight     Wt Readings from Last 6 Encounters:   07/10/25 63.3 kg (139 lb 8.8 oz)   06/19/25 64.6 kg (142 lb 6.7 oz)   12/11/24 71 kg (156 lb 8.4 oz)   10/25/24 65.8 kg (145 lb 1 oz)   07/24/24 61.2 kg (135 lb)   06/19/24 61.1 kg (134 lb 12.8 oz)   Per chart review, pt with a net ~5lb weight gain over the last year. Down 17 lb (10.9%) x7 months and 6 lb (2.1%) x 1 month. Weight loss is notable but not clinically significant.    Objective:   Pertinent Medical Hx: HTN, hypothyroidism, alcohol abuse, cirrhosis, primary HTN  Pertinent Labs: glucose 161, , ALT 84, alk phos 152  Pertinent Meds: Coreg, lactulose, levothyroxine, omeprazole, thiamine  Skin/Wounds: stage 3 PI, no edema noted  Food Allergies: NKFA  Last BM: Type 7: Watery, no solid pieces-entirely liquid, 07/11/25       Current Diet Order/Intake:   2 Gram Sodium, 1500 mL fluid restriction  PO intake per ADLs has been % x 1 meal    Subjective:   Given weight loss is not clinically significant, pt demonstrating adequate PO intake >75% of meal, no fluid accumulation, and well nourished appearance, no concerns for malnutrition at this time.     Nutrition Diagnosis:      Excessive " alcohol intake related to alcohol addiction as evidenced by elevated liver enzymes.    Nutrition Interventions:      Encourage ongoing PO intake >75% of meals  Nutrition representative available by request for ongoing meal requests    Nutrition Monitoring and Evaluation:      Continue to document PO intake in ADLs  Monitor weight      RD to monitor per department policy                                         ASPEN/AND CRITERIA FOR MALNUTRITION

## 2025-07-11 NOTE — CARE PLAN
The patient is Watcher - Medium risk of patient condition declining or worsening    Shift Goals  Clinical Goals: Wean 02, pending echo, obtain UA, antibiotic therapy  Patient Goals: comfort, pain control  Family Goals: MISTY    Progress made toward(s) clinical / shift goals:        Problem: Pain - Standard  Goal: Alleviation of pain or a reduction in pain to the patient’s comfort goal  Outcome: Progressing     Problem: Knowledge Deficit - Standard  Goal: Patient and family/care givers will demonstrate understanding of plan of care, disease process/condition, diagnostic tests and medications  Outcome: Progressing     Problem: Fall Risk  Goal: Patient will remain free from falls  Outcome: Progressing  Note: Fall prevention measures in place, bed alarm on and connected correctly, call light within reach, hourly rounding, Education provided on fall prevention. Pt instructed to use call light prior to exiting the bed, educated on use of bed alarms, and risks and complications related to falls. Medication orders evaluated for fall risk, gait/mobility assessed, sensory deficits assessed, mental status assessed, assessed for hypotension, hypovolemia, weakness, fatigue, elimination, and confusion.     Problem: Skin Integrity  Goal: Skin integrity is maintained or improved  Outcome: Progressing     Problem: Psychosocial  Goal: Patient's level of anxiety will decrease  Outcome: Progressing     Problem: Hemodynamics  Goal: Patient's hemodynamics, fluid balance and neurologic status will be stable or improve  Outcome: Progressing  Note: Vital signs stable, CMS intact, signs of bleeding assessed. I/O monitored. IV fluids not in use. Oral intake adequate. Peripheral pulses assessed and monitored. Capillary refill assessed. PRN blood pressure control meds given as needed.         Problem: Respiratory  Goal: Patient will achieve/maintain optimum respiratory ventilation and gas exchange  Outcome: Progressing  Note: All lobes  auscultated. O2 weaned as tolerated. Remaining on 40 L 60% FIO2. Pt keeps taking off her high flow, educated pt on the importance of keeping it on in order to breathe better. Pt says it was an accident and that she was sleeping.

## 2025-07-11 NOTE — PROGRESS NOTES
[facility] Medicine Daily Progress Note    Date of Service  7/11/2025    Chief Complaint  Minerva Tillman is a 50 y.o. female admitted 7/9/2025 with dyspnea    Hospital Course   50 y.o. female who presented 7/9/2025 with history of hypertension, alcohol abuse, hypothyroidism, primary hypertension, fatty liver, portal vein thrombosis for which the patient is on apixaban, chronic hypoxic respiratory failure on 2 L nasal cannula baseline, depression, hypothyroidism.  Patient was recently in the hospital and was discharged on July 4.  She was admitted for acute decompensated cirrhosis and at that time had an RADHA D score of 27.  She was treated for community-acquired pneumonia and volume overload as well as alcohol withdrawal.  She had 2 paracenteses done.  After discharge here, the patient went to [place] to enter an inpatient alcohol detox program however she was felt to be medically too ill for enrollment.     History is obtained from the patient, her  who is a physician, and her mom who is a nurse.     Patient presents today for evaluation of weakness, shortness of breath, and persistent cough.  She has a history of chronic respiratory failure and had been on 2 L nasal cannula oxygen only at night for quite some time however after recent hospitalization earlier this month, she has been on 2 L 24/7.  Over the last 3 or 4 days, her oxygen requirements have increased steadily.  Now on 5 L at home, she is saturating upper 80s or low 90s.  If she takes her oxygen off to walk to the bathroom, she drops to the 30s by the time she gets back.  During this time she has had an increase in cough which is nonproductive and quite persistent.  She denies any fever or chills, there has been no sick contacts that she is aware of.  She is on apixaban for her history of portal vein thrombosis and reports she has been compliant with this medication.  She does have albuterol at home which she has tried on several occasions it  seems to help the cough a little bit but does not do much for the shortness of breath.    She was admitted to the telemetry floor for severe hypoxic respiratory failure requiring high flow nasal cannula.  Follow-up chest CT showing groundglass opacities.  COVID/RSV/influenza negative.  Viral panel negative.  Pulmonology following for which patient was continued on diuresis with spironolactone and was started on intravenous systemic steroids.  MRSA nares negative for recently  This was discontinued and she was transition to a Zosyn/Doxycycline antibiotic regimen.    TSH 35.300 and FT4 0.91, levothyroxine was increased on 7/10  6/24/2025 Echocardiogram with ejection fraction 60 to 65%, no diastolic dysfunction noted.  MELD 23 (19.6% estimated to be month mortality)   C3/C4 normal  Rheumatoid factor high    Carvedilol for esophageal variceal prophylaxis was held    Interval Problem Update  Evaluated at bedside  Feeling better today  Hypoxia improving  Acute diarrhea overnight, resolved  Hemodynamically stable  Heart rate 40-50s, continue holding carvedilol  Down to 6 L nasal cannula  LFTs and bilirubin trending down slowly  Fluid restriction  Continue Spironolactone  Continue IV Pip/tazo and Doxycycline   Continue Eliquis  Continue IV steroids  Switch midodrine to as needed (MAP < 65)  Pulmonology following  Pending TTE and Paracentesis  Labs in a.m.    I have discussed this patient's plan of care and discharge plan at IDT rounds today with Case Management, Nursing, Nursing leadership, and other members of the IDT team.    Consultants/Specialty  Pulmonology    Code Status  Full Code    Disposition    I have placed the appropriate orders for post-discharge needs.    Review of Systems  Review of Systems   Constitutional:  Positive for malaise/fatigue.   HENT: Negative.     Eyes: Negative.    Respiratory:  Positive for cough and shortness of breath.    Cardiovascular: Negative.    Gastrointestinal: Negative.     Genitourinary: Negative.    Musculoskeletal: Negative.    Skin: Negative.    Neurological:  Positive for weakness.   Endo/Heme/Allergies: Negative.    Psychiatric/Behavioral: Negative.          Physical Exam  Temp:  [36.1 °C (97 °F)-36.3 °C (97.3 °F)] 36.3 °C (97.3 °F)  Pulse:  [46-58] 52  Resp:  [16-17] 16  BP: ()/(61-86) 117/78  SpO2:  [92 %-97 %] 93 %    Physical Exam  Constitutional:       Appearance: She is ill-appearing.   HENT:      Head: Normocephalic and atraumatic.      Mouth/Throat:      Mouth: Mucous membranes are moist.   Eyes:      Extraocular Movements: Extraocular movements intact.      Pupils: Pupils are equal, round, and reactive to light.   Cardiovascular:      Rate and Rhythm: Regular rhythm. Bradycardia present.      Pulses: Normal pulses.      Heart sounds: Normal heart sounds.   Pulmonary:      Effort: Pulmonary effort is normal.      Breath sounds: Rales present.   Abdominal:      General: Bowel sounds are normal.      Palpations: Abdomen is soft.   Musculoskeletal:         General: No swelling. Normal range of motion.      Cervical back: Normal range of motion and neck supple.   Skin:     General: Skin is warm.      Coloration: Skin is not jaundiced.   Neurological:      General: No focal deficit present.      Mental Status: She is alert and oriented to person, place, and time. Mental status is at baseline.      Cranial Nerves: No cranial nerve deficit.   Psychiatric:         Mood and Affect: Mood normal.         Behavior: Behavior normal.         Thought Content: Thought content normal.         Judgment: Judgment normal.         Fluids    Intake/Output Summary (Last 24 hours) at 7/11/2025 1525  Last data filed at 7/11/2025 0507  Gross per 24 hour   Intake 600 ml   Output 400 ml   Net 200 ml        Laboratory  Recent Labs     07/09/25  0753 07/10/25  0900 07/11/25  0025   WBC 14.9* 13.9* 17.2*   RBC 3.78* 3.42* 3.42*   HEMOGLOBIN 14.2 13.2 12.9   HEMATOCRIT 43.4 39.0 38.1   MCV  114.8* 114.0* 111.4*   MCH 37.6* 38.6* 37.7*   MCHC 32.7 33.8 33.9   RDW 73.2* 69.3* 67.7*   PLATELETCT 457* 365 454*   MPV 10.4 10.7 10.3     Recent Labs     07/09/25  0753 07/10/25  0900 07/11/25  0025   SODIUM 133* 134*  134* 135   POTASSIUM 4.7 4.2  4.2 3.8   CHLORIDE 101 103  103 105   CO2 20 19*  19* 19*   GLUCOSE 91 154*  154* 161*   BUN 12 15  15 14   CREATININE 0.81 0.71  0.71 0.66   CALCIUM 8.6 8.3*  8.3* 8.1*     Recent Labs     07/10/25  1615   INR 1.98*               Imaging  CT-ABDOMEN-PELVIS W/O   Final Result         1.  Scattered patchy bilateral pulmonary infiltrates.   2.  Nodular hepatic compatible with changes of cirrhosis.   3.  Scattered abdominal ascites.   4.  Atherosclerosis and atherosclerotic coronary artery disease.   5.  Pulmonary nodule, see nodule follow-up recommendations below.      Fleischner Society pulmonary nodule recommendations:   Low Risk: No routine follow-up      High Risk: Optional CT at 12 months      Comments: Nodules less than 6 mm do not require routine follow-up, but certain patients at high risk with suspicious nodule morphology, upper lobe location, or both may warrant 12-month follow-up.      Low Risk - Minimal or absent history of smoking and of other known risk factors.      High Risk - History of smoking or of other known risk factors.      Note: These recommendations do not apply to lung cancer screening, patients with immunosuppression, or patients with known primary cancer.      Fleischner Society 2017 Guidelines for Management of Incidentally Detected Pulmonary Nodules in Adults         CT-CHEST (THORAX) W/O   Final Result      1.  Diffuse groundglass densities, likely multifocal pneumonia. Edema is a less likely differential consideration.   2.  5 mm right lower lobe pulmonary nodule.   3.  Trace pericardial effusion.   4.  Coronary artery calcifications.   5.  Cirrhosis.   6.  Ascites in the upper abdomen.   7.  Hypodense hepatic lesions as  evaluated on recent MRI.         Fleischner society recommendations for follow up:   Low Risk: No routine follow-up      High Risk: Optional CT at 12 months      Comments: Nodules less than 6 mm do not require routine follow-up, but certain patients at high risk with suspicious nodule morphology, upper lobe location, or both may warrant 12-month follow-up.      Low Risk - Minimal or absent history of smoking and of other known risk factors.      High Risk - History of smoking or of other known risk factors.      Note: These recommendations do not apply to lung cancer screening, patients with immunosuppression, or patients with known primary cancer.      Fleischner Society 2017 Guidelines for Management of Incidentally Detected Pulmonary Nodules in Adults         Less than 6 mm: CT at 3-6 months. If stable, consider CT at 2 and 4 years.      6 mm or greater: CT at 3-6 months. Subsequent management based on the most suspicious nodule(s).      Comments: Multiple less than 6 mm pure ground-glass nodules are usually benign, but consider follow-up in selected patients at high risk at 2 and 4 years.      Low Risk - Minimal or absent history of smoking and of other known risk factors.      High Risk - History of smoking or of other known risk factors.      Note: These recommendations do not apply to lung cancer screening, patients with immunosuppression, or patients with known primary cancer.      Fleischner Society 2017 Guidelines for Management of Incidentally Detected Pulmonary Nodules in Adults      DX-CHEST-PORTABLE (1 VIEW)   Final Result      1.  Low lung volumes.   2.  Cardiomegaly, possibly on the basis of hypoventilation.   3.  Mild interstitial infiltrates and/or edema.      EC-ECHOCARDIOGRAM COMPLETE W/O CONT    (Results Pending)   US-ABDOMEN LTD (SOFT TISSUE)    (Results Pending)        Assessment/Plan  * Acute on chronic respiratory failure with hypoxia (HCC)  Assessment & Plan  On HF on ADT  Down 6L   Chest  CT with diffuse groundglass densities, likely multifocal pneumonia.  COVID/RSV/influenza negative  Viral panel negative  Procalcitonin mildly elevated    Fluid restriction  Continue spironolactone  Increase levothyroxine dose  Continue Zosyn/Doxycycline  Continue IV steroids  Pulmonology following  Repeat TTE  Paracentesis  Labs in a.m.    Alcohol use disorder, severe, dependence (HCC)- (present on admission)  Assessment & Plan  Patient was discharged from here on July 4, and went to [place] to be admitted to an inpatient rehab program however she was felt to be medically to ill    Hypothyroidism- (present on admission)  Assessment & Plan  TSH 35.300 and FT4 0.91   Increased levothyroxine    Portal vein thrombosis- (present on admission)  Assessment & Plan  Continue Eliquis    Portal hypertension with esophageal varices (HCC)- (present on admission)  Assessment & Plan  Hold carvedilol for now    Fatty liver, alcoholic- (present on admission)  Assessment & Plan  Patient is currently maintained on lactulose, spironolactone, and Lasix  On recent admission her MELD was 27.  She did receive 1 week of prednisolone however her Delmy score did not improve significantly and therefore it was not continued beyond 7 days.  She is working on her sobriety    Essential hypertension- (present on admission)  Assessment & Plan  As needed antihypertensives    Acute cystitis- (present on admission)  Assessment & Plan  IV ABx  Follow urine cultures       VTE prophylaxis:  Eliquis    I have performed a physical exam and reviewed and updated ROS and Plan today (7/11/2025). In review of yesterday's note (7/10/2025), there are no changes except as documented above.    Greater than 51 minutes spent prepping to see patient (e.g. review of tests) obtaining and/or reviewing separately obtained history. Performing a medically appropriate examination and/ evaluation.  Counseling and educating the patient/family/caregiver.  Ordering medications,  tests, or procedures.  Referring and communicating with other health care professionals.  Documenting clinical information in EPIC.  Independently interpreting results and communicating results to patient/family/caregiver.  Care coordination.

## 2025-07-11 NOTE — DISCHARGE PLANNING
Care Transition Team Assessment  Patient is a 50 year-old female admitted for SOB. Please see pt's H&P for prior medical history. RNCM met with pt and her mother at bedside to complete assessment. Pt A&Ox4 and able to verify the information on the face sheet. Patient was recently discharged from [facility] and went to an inpatient alcohol rehab in , however was not medically stable and was readmitted. Pt lives with spouse. Emergency contact is spouse Jeff Tillman [phone]. Patient agreeable to adding her mother Sherrell Elizabeth (visiting from [place]) to emergency contacts, updated. Prior to admission patient has a FWW/WC and uses 2L O2 at baseline supplied by [employer]. The patient's PCP is Dr. Asif. Patient with extensive ETOH abuse history. Patients confirmed medical coverage with [facility]. RNCM discussed discharge planning, LTAC referral send due to patient's HF O2 requirements.    Information Source  Orientation Level: Oriented X4  Information Given By: Patient  Who is responsible for making decisions for patient? : Patient    Readmission Evaluation  Is this a readmission?: Yes - unplanned readmission  Why do you think you were readmitted?: SOB    Elopement Risk  Legal Hold: No  Ambulatory or Self Mobile in Wheelchair: Yes  Disoriented: No  Psychiatric Symptoms: None  History of Wandering: No  Elopement this Admit: No  Vocalizing Wanting to Leave: No  Displays Behaviors, Body Language Wanting to Leave: No-Not at Risk for Elopement  Elopement Risk: Not at Risk for Elopement    Interdisciplinary Discharge Planning  Lives with - Patient's Self Care Capacity: Spouse  Patient or legal guardian wants to designate a caregiver: No  Support Systems: Family Member(s), Children  Housing / Facility: 2 Story House    Discharge Preparedness  What is your plan after discharge?: Skilled nursing facility  What are your discharge supports?: Spouse, Parent, Child  Prior Functional Level: Ambulatory, Needs Assist with Activities of  Daily Living, Needs Assist with Medication Management, Uses Walker, Uses Wheelchair  Difficulity with ADLs: Walking  Difficulty with ADLs Comment: fww/wc  Difficulity with IADLs: Driving, Managing medication, Shopping  Difficulity with IADL Comments: family    Functional Assesment  Prior Functional Level: Ambulatory, Needs Assist with Activities of Daily Living, Needs Assist with Medication Management, Uses Walker, Uses Wheelchair    Finances  Financial Barriers to Discharge: No  Prescription Coverage: Yes    Vision / Hearing Impairment  Vision Impairment : Yes  Right Eye Vision: Impaired, Wears Glasses  Left Eye Vision: Impaired, Wears Glasses  Hearing Impairment : No    Advance Directive  Advance Directive?: None    Domestic Abuse  Have you ever been the victim of abuse or violence?: No  Possible Abuse/Neglect Reported to:: Not Applicable    Psychological Assessment  History of Substance Abuse: Alcohol  Non-compliant with Treatment: No  Newly Diagnosed Illness: No    Discharge Risks or Barriers  Discharge risks or barriers?: Substance abuse, Complex medical needs, Post-acute placement / services  Patient risk factors: Cognitive / sensory / physical deficit, Complex medical needs, Mental health, Substance abuse    Anticipated Discharge Information  Discharge Disposition: Disch to a long term care facility (63)  Discharge Address: [address] [place] [address] [place] [address]  Discharge Contact Phone Number: [phone]  Case Management Discharge Planning    Admission Date: 7/9/2025  GMLOS: 3.5  ALOS: 2    6-Clicks ADL Score: 22  6-Clicks Mobility Score: 15  PT and/or OT Eval ordered: No  Post-acute Referrals Ordered: Yes  Post-acute Choice Obtained: No  Has referral(s) been sent to post-acute provider:  Yes    Anticipated Discharge Dispo: Discharge Disposition: Disch to a long term care facility (63)  Discharge Address: [address] [place] [id number]  Discharge Contact Phone Number: [phone]    DME Needed:  No    Action(s) Taken: DC Assessment Complete (See below) and Referral(s) sent LTAC referral sent to [facility].     1400: Spoke with [facility] liaison Silvana who reports they are able to accept patient and will submit for insurance auth. Silvana spoke with patient and mother at bedside. Patient is off of high flow O2 but still on multiple IV antibiotics and would qualify for LTAC at this time.     Escalations Completed: None    Medically Clear: No    Next Steps: RNCM to continue to follow patient for DCP needs.     Barriers to Discharge: Medical clearance and Pending Placement

## 2025-07-11 NOTE — PROGRESS NOTES
2/9/2023      RE: Charlie Rubalcava  [address]  [facility] [address]       [facility] Pediatric Dermatology Note   Encounter Date: Feb 9, 2023  Office Visit       Dermatology Problem List:  1. Infantile acne      CC: Derm Problem      HPI:  Charlie Rubalcava is a(n) 12 month old male who presents today as a return patient for acne follow up    - Here with foster mom, Debbie and sibling  - Acne is slightly better  - Using an acne wash in the bath, unsure of the name or active ingredients  - Uses the clindamycin lotion daily which helps  - No signs of virilization. No malodor, axillary, pubic hair, or  changes.  - Patient will be moving to [place]      ROS: 12-point review of systems performed and negative    Social History: Patient lives with foster mom and brother    Allergies:  No Known Allergies    Family History:  Non contributory    Past Medical/Surgical History:   Patient Active Problem List   Diagnosis     Chronic lung disease of prematurity     Aspiration into respiratory tract     Bilateral congenital talipes equinovarus deformity     Maternal hepatitis C, chronic, antepartum (H)     Heart murmur     No past medical history on file.  No past surgical history on file.    Medications:  Current Outpatient Medications   Medication     adapalene (DIFFERIN) 0.1 % external cream     albuterol (PROVENTIL) (2.5 MG/3ML) 0.083% neb solution     amLODIPine benzoate (KATERZIA) 1 MG/ML SUSP     benzoyl peroxide (BREVOXYL) 4 % external gel     benzoyl peroxide 5 % external liquid     budesonide-formoterol (SYMBICORT) 80-4.5 MCG/ACT Inhaler     Cholecalciferol (VITAMIN D3) 10 MCG/ML LIQD     clindamycin (CLEOCIN T) 1 % external lotion     clindamycin (CLEOCIN T) 1 % external lotion     ferrous sulfate 300 (60 Fe) MG/5ML syrup     fluticasone (FLONASE) 50 MCG/ACT nasal spray     ipratropium (ATROVENT) 0.02 % neb solution     prednisoLONE (ORAPRED) 15 MG/5 ML solution     PROAIR  (90 Base) MCG/ACT  Monitor Summary     Rhythm: SB   Rate: 43-54  Ectopy: None   .16/.09/.48          inhaler     saline nasal (AYR SALINE) GEL topical gel     simethicone (MYLICON) 40 MG/0.6ML suspension     zinc oxide (BOUDREAUXS BUTT PASTE) 40 % external ointment     gabapentin (NEURONTIN) 250 MG/5ML solution     No current facility-administered medications for this visit.     Labs/Imaging:  None reviewed.    Physical Exam:  Vitals: Wt 9.888 kg (21 lb 12.8 oz)   SKIN: Focused examination of face was performed.  - Open and closed comedones with few inflammatory pustules on bilateral cheeks  - No other lesions of concern on areas examined.              Assessment & Plan:  1. Infantile acne  Infantile acne is characterized by the presence of comedones, inflammatory papules and pustules, and may lead to scarring. It occurs between 6 weeks and 12 months of age, but may last for longer. It is an uncommon variant of acne however is more common in the males.  It is usually related to maternal hormones.  If lesions do not spontaneously improve by 1 year of age we could consider a hormonal work-up. This can also be considered if there is concern for testicular growth, pubic hair development, or other signs.      For mild infantile acne I suggest the following treatment plan:  - Start adapalene cream three times weekly for the next 1-2 weeks, then try to increase to nightly as tolerated. If the skin is too dry and irritating, you can skip the adapalene and just moisturize  - On the days not using the adapalene, use the benzoyl peroxide wash and clindamycin lotion  - Advised to establish with pediatric dermatologist should acne be persistent       Procedures:  None      Follow-up: 3 months      Kari Casanova MD  [address] [place] [address] on close of this encounter.    Staff and Resident: Greyson Watson MD  PGY-3 Dermatology  Pager: [phone]        Pablo Watson MD

## 2025-07-11 NOTE — CARE PLAN
The patient is Watcher - Medium risk of patient condition declining or worsening    Shift Goals  Clinical Goals: ambulating, brush teeth  Patient Goals: comfort  Family Goals: MISTY    Progress made toward(s) clinical / shift goals:    Problem: Pain - Standard  Goal: Alleviation of pain or a reduction in pain to the patient’s comfort goal  Outcome: Progressing     Problem: Knowledge Deficit - Standard  Goal: Patient and family/care givers will demonstrate understanding of plan of care, disease process/condition, diagnostic tests and medications  Outcome: Progressing     Problem: Fall Risk  Goal: Patient will remain free from falls  Outcome: Progressing     Problem: Skin Integrity  Goal: Skin integrity is maintained or improved  Outcome: Progressing     Problem: Psychosocial  Goal: Patient's level of anxiety will decrease  Outcome: Progressing     Problem: Hemodynamics  Goal: Patient's hemodynamics, fluid balance and neurologic status will be stable or improve  Outcome: Progressing     Problem: Respiratory  Goal: Patient will achieve/maintain optimum respiratory ventilation and gas exchange  Outcome: Progressing       Patient is not progressing towards the following goals:

## 2025-07-11 NOTE — PROGRESS NOTES
Pulmonary Progress Note    Date of Service: 7/11/2025    Date of Admission:  7/9/2025  7:18 AM    Attending Physician: Dr. Yohan Price     Chief Complaint:  Shortness of Breath (C/o shortness of breath x 4 days. Yesterday and today worse. Patient on 3 liters of oxygen 24/7. Hx of Liver Cirrhosis. + non productive cough. Hypotensive in triage.)      History of Present Illness: Minerva Tillman is a 50 y.o. female with a past medical history of hypertension, hypothyroidism, alcohol abuse, cirrhosis (MELD 27), hypothyroidism, portal vein thrombosis (on apixaban), nocturnal respiratory failure (2L baseline) who is presenting in the setting of shortness of breath, fatigue, cough.      The patient reports experiencing shortness of breath, low oxygen readings, and a cough since Friday, which have all progressively worsened. The patient denies coughing up any sputum or blood. There have been no known sick contacts or recent exposures or inhalations. The patient reports recent changes in skin coloration, noting pale lips yesterday and this morning. Additionally, the patient experienced jaundice but denies any current abdominal distention or pain. Review of systems is notable for no productive cough, no hemoptysis, and no abdominal pain. Denies any aspiration or issues with swallowing or regurgitation.      Patient was recently hospitalized from 6/19 - 7/4 for alcohol detox and jaundice. She had two paracenteses performed, and was found to have an acute portal vein thrombosis as well. GI started patient on steroids and performed MRCP as well as EGD found cirrhosis, portal HTN, and esophageal varices with small to moderate volume ascites with no evidence of malignancy. The patient is on nighttime oxygen therapy since a prior admission, she was meant to see pulmonology in the outpatient setting but has not been able to.      In the ED vitals remarkable for hypotension with systolics in 80-90s primarily MAPs > 65, one  value of 65/41 but repeat BP after this showed improved pressures. CT scan show diffuse groundglass densities with occasional cysts, likely multifocal pneumonia. Requiring 60 L HFNC (35% FiO2) initially.       Interval Update:   7/10: Patient now on 40 L and 60% FiO2, procal is elevated. MRSA negative, respiratory viral panel negative. Linezolid was d/[place]. Patient notes her cough is improved and she feels she can walk further without feeling short of breath compared to admission.     7/11: Patient still on 40 L and ~ 60% FiO2, attending physician discussed with RT, who will attempt to wean as patient is saturating well in the high 90s. RF markedly elevated at 80 and C3, C4 complement normal. WBC count trending up (likely 2/2 steroids). HIV negative. Patient notes confusion and agitation last night and many bowel movements overnight. Jaundice and scleral icterus improved. Patient notes her respiratory symptoms are similar to yesterday.      Review of Systems   Constitutional:  Positive for malaise/fatigue. Negative for chills, fever and weight loss.   HENT: Negative.     Eyes: Negative.    Respiratory:  Positive for cough and shortness of breath. Negative for hemoptysis and sputum production.    Cardiovascular:  Negative for chest pain, palpitations, orthopnea, claudication and leg swelling.   Gastrointestinal:  Negative for abdominal pain, constipation, diarrhea, nausea and vomiting.   Genitourinary:  Negative for dysuria, frequency and urgency.   Musculoskeletal:  Negative for back pain, joint pain, myalgias and neck pain.   Skin:  Negative for itching and rash.   Neurological:  Negative for dizziness, tingling, tremors, sensory change, speech change and headaches.   Endo/Heme/Allergies:  Negative for environmental allergies and polydipsia. Does not bruise/bleed easily.   Psychiatric/Behavioral:  Negative for depression, hallucinations, substance abuse and suicidal ideas. The patient is not nervous/anxious.    All  "other systems reviewed and are negative.      Home Medications       Reviewed by Lorna Craft (Pharmacy Tech) on 25 at 1015  Med List Status: Complete     Medication Last Dose Status   apixaban (ELIQUIS) 5 MG Tab 2025 Active   carvedilol (COREG) 3.125 MG Tab 2025 Active   escitalopram (LEXAPRO) 20 MG tablet 2025 Active   gabapentin (NEURONTIN) 300 MG Cap 2025 Active   hydrocortisone (ANUSOL-HC) 25 MG Suppos 2025 Active   hydrOXYzine HCl (ATARAX) 25 MG Tab Unknown Active   lactulose 20 GM/30ML Solution 2025 Active   levothyroxine (SYNTHROID) 50 MCG Tab 2025 Active   oxyCODONE immediate-release (ROXICODONE) 5 MG Tab Unknown Active   potassium chloride SA (KDUR) 20 MEQ Tab CR 2025 Active   spironolactone (ALDACTONE) 100 MG Tab 2025 Active   therapeutic multivitamin-minerals (THERAGRAN-M) Tab 2025 Active   thiamine (THIAMINE) 100 MG tablet 2025 Active                  Audit from Redirected Encounters    **Home medications have not yet been reviewed for this encounter**         Social History[1]     Past Medical History[2]    Past Surgical History[3]    Allergies: Sulfa drugs    Family History   Problem Relation Age of Onset    Hypertension Mother     Hyperlipidemia Mother     Depression Father     Hypertension Father     Asthma Father     Hyperlipidemia Father     Alcohol abuse Father     Hypertension Brother     Depression Brother     Alcohol abuse Brother     Hypertension Brother     Depression Brother     Alcohol abuse Brother     Suicide Attempts Paternal Uncle          by suicide    Cancer Maternal Grandfather 60        esphogus    Cancer Maternal Grandmother 60        breast and skin    Stroke Maternal Grandmother     Other Paternal Grandmother         complications from surgery       Physical Examination:  /86   Pulse (!) 46   Temp 36.1 °C (97 °F) (Temporal)   Resp 16   Ht 1.575 m (5' 2\")   Wt 63.3 kg (139 lb 8.8 oz)   SpO2 95%   BMI 25.52 " kg/m²   Physical Exam  Vitals reviewed.   Constitutional:       Appearance: Normal appearance. She is ill-appearing.   HENT:      Head: Normocephalic and atraumatic.      Nose: Nose normal.      Mouth/Throat:      Mouth: Mucous membranes are moist.      Pharynx: Oropharynx is clear.   Eyes:      General: No scleral icterus.     Extraocular Movements: Extraocular movements intact.      Conjunctiva/sclera: Conjunctivae normal.      Pupils: Pupils are equal, round, and reactive to light.   Cardiovascular:      Rate and Rhythm: Normal rate and regular rhythm.      Pulses: Normal pulses.      Heart sounds: Normal heart sounds. No murmur heard.  Pulmonary:      Effort: Pulmonary effort is normal. No respiratory distress.      Breath sounds: Normal breath sounds. No wheezing, rhonchi or rales.      Comments: High flow cannula in place  Abdominal:      General: Abdomen is flat. Bowel sounds are normal. There is no distension.      Palpations: Abdomen is soft. There is no mass.      Tenderness: There is no abdominal tenderness. There is no guarding or rebound.   Musculoskeletal:         General: Normal range of motion.      Right lower leg: No edema.      Left lower leg: No edema.   Skin:     General: Skin is warm and dry.      Capillary Refill: Capillary refill takes less than 2 seconds.      Findings: No lesion or rash.   Neurological:      General: No focal deficit present.      Mental Status: She is alert and oriented to person, place, and time. Mental status is at baseline.      Cranial Nerves: No cranial nerve deficit.      Motor: No weakness.      Comments: No asterixis on exam   Psychiatric:         Mood and Affect: Mood normal.         Behavior: Behavior normal.         Thought Content: Thought content normal.         Judgment: Judgment normal.         No intake or output data in the 24 hours ending 07/10/25 0811    LABS:  Recent Labs     07/09/25  0753   WBC 14.9*      Recent Labs     07/09/25  0753 07/10/25  0900  "07/11/25  0025   HEMOGLOBIN 14.2 13.2 12.9   HEMATOCRIT 43.4 39.0 38.1   .8* 114.0* 111.4*   MCH 37.6* 38.6* 37.7*   MACROCYTOSIS 2+*  --   --    ANISOCYTOSIS 2+*  --   --    PLATELETCT 457* 365 454*       Recent Labs     07/09/25  0753   SODIUM 133*   POTASSIUM 4.7   CHLORIDE 101   CO2 20   CREATININE 0.81        Recent Labs     07/09/25  0753   ALBUMIN 2.5*        MICRO:  No results found for: \"BLOODCULTU\", \"BLDCULT\", \"BCHOLD\"     Latest pertinent labs were reviewed      levothyroxine, 75 mcg, Oral, AM ES  doxycycline, 100 mg, Intravenous, Q12HRS  omeprazole, 40 mg, Oral, BID  apixaban, 5 mg, Oral, BID  [Held by provider] carvedilol, 3.125 mg, Oral, BID WITH MEALS  escitalopram, 20 mg, Oral, DAILY  gabapentin, 300 mg, Oral, BID  lactulose, 15 mL, Oral, Q EVENING  spironolactone, 200 mg, Oral, DAILY  therapeutic multivitamin-minerals, 1 Tablet, Oral, DAILY  thiamine, 100 mg, Oral, DAILY  midodrine, 10 mg, Oral, TID WITH MEALS  piperacillin-tazobactam, 3.375 g, Intravenous, Q8HRS  hydrocortisone sodium succinate PF, 50 mg, Intravenous, Q6HR        Imaging:  CXR (personally reviewed) -  CT-ABDOMEN-PELVIS W/O   Final Result         1.  Scattered patchy bilateral pulmonary infiltrates.   2.  Nodular hepatic compatible with changes of cirrhosis.   3.  Scattered abdominal ascites.   4.  Atherosclerosis and atherosclerotic coronary artery disease.   5.  Pulmonary nodule, see nodule follow-up recommendations below.      Fleischner Society pulmonary nodule recommendations:   Low Risk: No routine follow-up      High Risk: Optional CT at 12 months      Comments: Nodules less than 6 mm do not require routine follow-up, but certain patients at high risk with suspicious nodule morphology, upper lobe location, or both may warrant 12-month follow-up.      Low Risk - Minimal or absent history of smoking and of other known risk factors.      High Risk - History of smoking or of other known risk factors.      Note: These " recommendations do not apply to lung cancer screening, patients with immunosuppression, or patients with known primary cancer.      Fleischner Society 2017 Guidelines for Management of Incidentally Detected Pulmonary Nodules in Adults         CT-CHEST (THORAX) W/O   Final Result      1.  Diffuse groundglass densities, likely multifocal pneumonia. Edema is a less likely differential consideration.   2.  5 mm right lower lobe pulmonary nodule.   3.  Trace pericardial effusion.   4.  Coronary artery calcifications.   5.  Cirrhosis.   6.  Ascites in the upper abdomen.   7.  Hypodense hepatic lesions as evaluated on recent MRI.         Fleischner society recommendations for follow up:   Low Risk: No routine follow-up      High Risk: Optional CT at 12 months      Comments: Nodules less than 6 mm do not require routine follow-up, but certain patients at high risk with suspicious nodule morphology, upper lobe location, or both may warrant 12-month follow-up.      Low Risk - Minimal or absent history of smoking and of other known risk factors.      High Risk - History of smoking or of other known risk factors.      Note: These recommendations do not apply to lung cancer screening, patients with immunosuppression, or patients with known primary cancer.      Fleischner Society 2017 Guidelines for Management of Incidentally Detected Pulmonary Nodules in Adults         Less than 6 mm: CT at 3-6 months. If stable, consider CT at 2 and 4 years.      6 mm or greater: CT at 3-6 months. Subsequent management based on the most suspicious nodule(s).      Comments: Multiple less than 6 mm pure ground-glass nodules are usually benign, but consider follow-up in selected patients at high risk at 2 and 4 years.      Low Risk - Minimal or absent history of smoking and of other known risk factors.      High Risk - History of smoking or of other known risk factors.      Note: These recommendations do not apply to lung cancer screening,  patients with immunosuppression, or patients with known primary cancer.      Fleischner Society 2017 Guidelines for Management of Incidentally Detected Pulmonary Nodules in Adults      DX-CHEST-PORTABLE (1 VIEW)   Final Result      1.  Low lung volumes.   2.  Cardiomegaly, possibly on the basis of hypoventilation.   3.  Mild interstitial infiltrates and/or edema.      EC-ECHOCARDIOGRAM COMPLETE W/O CONT    (Results Pending)   US-PARACENTESIS, ABD WITH IMAGING    (Results Pending)       Problem List[4]  CT-ABDOMEN-PELVIS W/O  Narrative:   7/9/2025 9:07 PM    HISTORY/REASON FOR EXAM:  R hip and lower abd pain.    TECHNIQUE/EXAM DESCRIPTION:    CT scan of the abdomen and pelvis without contrast.    Noncontrast helical scanning was obtained from the diaphragmatic domes through the pubic symphysis.    Low dose optimization technique was utilized for this CT exam including automated exposure control and adjustment of the mA and/or kV according to patient size.    COMPARISON: March 5, 2024    FINDINGS:    Lower Chest: Scattered patchy bilateral pulmonary opacities are seen. There is 5.7 mm right lower lobe pulmonary nodule is identified on image 9.    Liver: Nodular hepatic contour is seen. Scattered hepatic cysts are seen measuring up to 2.7 cm    Spleen: Unremarkable.    Pancreas: Unremarkable.    Gallbladder: No calcified stones.    Biliary: Nondilated.    Adrenal glands: Normal.    Kidneys: Unremarkable without hydronephrosis.    Bowel: No obstruction or acute inflammation. Radiodensity within the appendix is seen, likely inspissated contrast from prior radiographic examination, otherwise the appendix appears grossly unremarkable.    Lymph nodes: No adenopathy.    Vasculature: Atherosclerotic changes are seen including atherosclerotic coronary artery calcifications.    Peritoneum: Scattered abdominal ascites is seen.    Musculoskeletal: No acute or destructive process.    Pelvis: No adenopathy. The uterus and adnexal  structures appear within physiologic limits.  Impression: 1.  Scattered patchy bilateral pulmonary infiltrates.  2.  Nodular hepatic compatible with changes of cirrhosis.  3.  Scattered abdominal ascites.  4.  Atherosclerosis and atherosclerotic coronary artery disease.  5.  Pulmonary nodule, see nodule follow-up recommendations below.    Fleischner Society pulmonary nodule recommendations:  Low Risk: No routine follow-up    High Risk: Optional CT at 12 months    Comments: Nodules less than 6 mm do not require routine follow-up, but certain patients at high risk with suspicious nodule morphology, upper lobe location, or both may warrant 12-month follow-up.    Low Risk - Minimal or absent history of smoking and of other known risk factors.    High Risk - History of smoking or of other known risk factors.    Note: These recommendations do not apply to lung cancer screening, patients with immunosuppression, or patients with known primary cancer.    Fleischner Society 2017 Guidelines for Management of Incidentally Detected Pulmonary Nodules in Adults      Assessment and Plan:  #Severe Community Acquired Pneumonia   #Multifocal Pneumonia   #Acute Hypoxic Respiratory Failure   #Nocturnal Hypoxia  On HFNC and CT findings consistent with multifocal pneumonia, recent extended hospital stay, so would treat for hospital acquired. No aspiration hx. Was on a brief course of steroids at prior admission (7 days) but not enough to warrant PJP treatment at tis time. Flu, covid, rsv, viral PCR negative. TSH markedly elevated. Ascites is quite minimal so less likely from fluid overload. Potentially component of presumptive OHS contributory. Family history of issues with mucociliary clearance and frequent infections (brothers follow with Pulm - likely primary ciliary dyskinesia) patient denies these symptoms. ESR elevated and procal elevated so infectious etiology more likely.     - Will attempt to wean oxygen given high O2 sats with  the assistance of RT  - Continue Zosyn for total of 7 day course given suspicion for hospital acquired infection   - Given requiring HFNC, continue 50 mg IV Hydrocortisone q6h for 5 days  - Strep pneumoniae & Legionella urine antigens, Beta-D-Glucan, KAELA, MPO and PR3 w/ reflex to ANCA, aspegillus galactomanann antigen pending .   - Titrate oxygen to 88-92%   - Encourage IS usage  - Continue outpatient diuretic regimen  - Patient will need outpatient PFTs and Sleep Medicine follow up  - Pulmonology will continue to follow     Neil Mg MD   PGY-2  Internal Medicine Resident        [1]   Social History  Tobacco Use    Smoking status: Never    Smokeless tobacco: Never   Vaping Use    Vaping status: Never Used   Substance Use Topics    Alcohol use: Yes     Alcohol/week: 8.4 oz     Types: 14 Glasses of wine per week     Comment: Bottle of chardonnay a day.    Drug use: No   [2]   Past Medical History:  Diagnosis Date    Acute alcohol intoxication (HCC) 05/25/2021    Alcohol intoxication delirium (HCC) 04/30/2024    Anxiety     Depression     Gynecological disorder     High cholesterol     Hypertension     Snoring     Urinary tract infection    [3]   Past Surgical History:  Procedure Laterality Date    PA UPPER GI ENDOSCOPY,DIAGNOSIS N/A 6/30/2025    Procedure: GASTROSCOPY;  Surgeon: April Mcknight M.D.;  Location: SURGERY SAME DAY [facility];  Service: Gastroenterology    COLONOSCOPY N/A 6/30/2025    Procedure: COLONOSCOPY;  Surgeon: April Mcknight M.D.;  Location: SURGERY SAME DAY [facility];  Service: Gastroenterology    ORIF, ANKLE Right 12/7/2024    Procedure: ORIF, ANKLE;  Surgeon: Sami Clinton M.D.;  Location: [facility];  Service: Orthopedics    LIPOMA EXCISION  08/12/2024    HYSTEROSCOPY WITH ENDOMETRIAL RADIOFREQUENCY ABLATION  08/02/2017    Procedure: HYSTEROSCOPY NOVASURE;  Surgeon: Hedy Hardy M.D.;  Location: [facility];  Service:     DILATION AND CURETTAGE  08/02/2017     Procedure: DILATION AND CURETTAGE;  Surgeon: Hedy Hardy M.D.;  Location: SURGERY [facility];  Service:     [place] BREAST AUGMENTATION WITH IMPLANT  01/01/2006    MAMMOPLASTY AUGMENTATION     [4]   Patient Active Problem List  Diagnosis    Acute cystitis    Weight gain    Hypercholesterolemia    Essential hypertension    Fatty liver, alcoholic    Vitamin D insufficiency    Hypothyroidism    Elevated hematocrit    Mild episode of recurrent major depressive disorder (HCC)    Other insomnia    Decreased hearing, left    Migraine with aura and without status migrainosus, not intractable    Dysarthria    Swelling    Allergy    Macrocytosis    Social anxiety disorder    Generalized anxiety disorder    Alcohol use disorder, severe, dependence (HCC)    Urinary frequency    Transaminitis    Nausea & vomiting    Generalized abdominal pain    Total bilirubin, elevated    Hypokalemia    Hypomagnesemia    Hyperglycemia    Elevated alkaline phosphatase level    UTI (urinary tract infection)    Ascites    Decompensated hepatic cirrhosis, hypoxia, chronic diarrhea, alcoholism (HCC)    Leukocytosis    High anion gap metabolic acidosis    Advance care planning    Diarrhea    Acute hypoxic respiratory failure (HCC)    Hypophosphatemia    Neuropathy    Balance problem    Portal hypertension with esophageal varices (HCC)    Depression    Alcohol withdrawal syndrome without complication (HCC)    Community acquired pneumonia of left lower lobe of lung    Headache    Primary hypertension    Trimalleolar fracture of ankle, closed, right, initial encounter    Ground-level fall    Thrombocytopenia (HCC)    Alcohol abuse with withdrawal and perceptual disturbance (HCC)    Esophageal varices in alcoholic cirrhosis (HCC)    Alcoholic hepatitis    Hyponatremia    Portal vein thrombosis    Elevated cancer antigen 125 ()    Vitamin A deficiency    Acute on chronic respiratory failure with hypoxia (HCC)    Acute-on-chronic  respiratory failure (HCC)

## 2025-07-11 NOTE — WOUND TEAM
"[facility]  Inpatient Services  Initial Wound and Skin Care Evaluation    Admission Date: 7/9/2025     Last order of IP CONSULT TO WOUND CARE was found on 7/9/2025 from Hospital Encounter on 7/9/2025     HPI, PMH, SH: Reviewed    Past Surgical History[1]  Social History     Tobacco Use    Smoking status: Never    Smokeless tobacco: Never   Substance Use Topics    Alcohol use: Yes     Alcohol/week: 8.4 oz     Types: 14 Glasses of wine per week     Comment: Bottle of chardonnay a day.     Chief Complaint   Patient presents with    Shortness of Breath     C/o shortness of breath x 4 days. Yesterday and today worse. Patient on 3 liters of oxygen 24/7. Hx of Liver Cirrhosis. + non productive cough. Hypotensive in triage.     Diagnosis: Acute-on-chronic respiratory failure (HCC) [J96.20]    Unit where seen by Wound Team: T833/02     WOUND CONSULT RELATED TO:  Sacrum    WOUND TEAM PLAN OF CARE - Frequency of Follow-up:   Nursing to follow dressing orders written for wound care. Contact wound team if area fails to progress, deteriorates or with any questions/concerns if something comes up before next scheduled follow up (See below as to whether wound is following and frequency of wound follow up)   Not following, consult as needed  - sacrum    WOUND HISTORY:   Per MD, \"1 y.o. female who presented 7/9/2025 with history of hypertension, alcohol abuse, hypothyroidism, primary hypertension, fatty liver, portal vein thrombosis for which the patient is on apixaban, chronic hypoxic respiratory failure on 2 L nasal cannula baseline, depression, hypothyroidism. Patient was recently in the hospital and was discharged on July 4. She was admitted for acute decompensated cirrhosis and at that time had an RADHA D score of 27. She was treated for community-acquired pneumonia and volume overload as well as alcohol withdrawal. She had 2 paracenteses done. After discharge here, the patient went to [place] to enter an " "inpatient alcohol detox program \"       WOUND ASSESSMENT/LDA  Wound 07/09/25 Pressure Injury Sacrum POA resolving stage 3 (Active)   Date First Assessed/Time First Assessed: 07/09/25 1400   Present on Original Admission: Yes  Hand Hygiene Completed: Yes  Primary Wound Type: Pressure Injury  Location: Sacrum  Wound Description (Comments): POA resolving stage 3      Assessments 7/10/2025  5:00 PM   Wound Image      Site Assessment White;Red   Periwound Assessment Maceration;Scar tissue   Margins Defined edges;Unattached edges   Closure Open to air   Drainage Amount None   Treatments Cleansed;Nonselective debridement;Site care;Offloading   Offloading/DME Level 3 bed surface   Wound Cleansing Perineal Wipes   Periwound Protectant Barrier Paste   Dressing Status Open to Air   NEXT Weekly Photo (Inpatient Only) 07/16/25   Wound Team Following Not following   Pressure Injury Stage Stage 3        Vascular:    KIMO:   No results found.    Lab Values:    Lab Results   Component Value Date/Time    WBC 13.9 (H) 07/10/2025 09:00 AM    WBC 5.2 02/01/2013 08:46 AM    RBC 3.42 (L) 07/10/2025 09:00 AM    RBC 4.30 02/01/2013 08:46 AM    HEMOGLOBIN 13.2 07/10/2025 09:00 AM    HEMATOCRIT 39.0 07/10/2025 09:00 AM    CREACTPROT 3.15 (H) 03/07/2024 09:54 AM    SEDRATEWES 30 (H) 07/09/2025 07:53 AM    HBA1C 4.7 08/12/2022 07:22 AM    PLATELETCT 365 07/10/2025 09:00 AM         Culture Results show:  No results found for this or any previous visit (from the past 720 hours).    Pain Level/Medicated:  None, Tolerated without pain medication       INTERVENTIONS BY WOUND TEAM:  Chart and images reviewed. Discussed with bedside RN. All areas of concern (based on picture review, LDA review and discussion with bedside RN) have been thoroughly assessed. Documentation of areas based on significant findings. This RN in to assess patient. Performed standard wound care which includes appropriate positioning, dressing removal and non-selective " debridement. Pictures and measurements obtained weekly if/when required.    Wound:  Sacrum  Cleansed/Non-selectively Debrided with:  Perineal Wipes (Barrier wipes)  Mora wound: Cleansed with Perineal Wipes (Barrier wipes), Prepped with Barrier paste  Primary Dressing:  MIKIE    Advanced Wound Care Discharge Planning  Number of Clinicians necessary to complete wound care: 1  Is patient requiring IV pain medications for dressing changes:  No   Length of time for dressing change 20 min. (This does not include chart review, pre-medication time, set up, clean up or time spent charting.)    Interdisciplinary consultation: Patient, Bedside RN (Padmini), N/A.  Pressure injury and staging reviewed with N/A.    EVALUATION / RATIONALE FOR TREATMENT:     Date:  07/10/25  Wound Status:  Initial evaluation    Patient with POA resolving stage 3 to sacrum.  Wound bed is red, and resolving scar tissue present on mora-wound.  Continue to use barrier paste and offloading wedges.           Goals: Steady decrease in wound area and depth weekly.    NURSING PLAN OF CARE ORDERS:  Dressing changes: See Dressing Care orders  Skin care: See Skin Care orders  RN Prevention Protocol    NUTRITION RECOMMENDATIONS   Wound Team Recommendations:  Dietary consult    DIET ORDERS (From admission to next 24h)       Start     Ordered    07/10/25 1057  Diet Order Diet: 2 Gram Sodium; Fluid modifications: (optional): 1500 ml Fluid Restriction  ALL MEALS        Question Answer Comment   Diet: 2 Gram Sodium    Fluid modifications: (optional) 1500 ml Fluid Restriction        07/10/25 1056                    PREVENTATIVE INTERVENTIONS:    Q shift Sam - performed per nursing policy  Q shift pressure point assessments - performed per nursing policy    Surface/Positioning  Low Airloss - Ordered for nursing to apply  Standard/trauma mattress - Currently in Place  Reposition q 2 hours with pillows - Currently in Place  Reposition q 2 hours with wedges - Currently  in Place    Offloading/Redistribution  Heel offloading dressing (Silicone dressing) - Currently in Place      Mobilization      Up to chair     Anticipated discharge plans:  TBD        Vac Discharge Needs:  Vac Discharge plan is purely a recommendation from wound team and not a requirement for discharge unless otherwise stated by physician.  Not Applicable Pt not on a wound vac        [1]   Past Surgical History:  Procedure Laterality Date    [place] UPPER GI ENDOSCOPY,DIAGNOSIS N/A 6/30/2025    Procedure: GASTROSCOPY;  Surgeon: April Mcknight M.D.;  Location: SURGERY SAME DAY [facility];  Service: Gastroenterology    COLONOSCOPY N/A 6/30/2025    Procedure: COLONOSCOPY;  Surgeon: April Mcknight M.D.;  Location: SURGERY SAME DAY [facility];  Service: Gastroenterology    ORIF, ANKLE Right 12/7/2024    Procedure: ORIF, ANKLE;  Surgeon: Sami Clinton M.D.;  [place];  Service: Orthopedics    LIPOMA EXCISION  08/12/2024    HYSTEROSCOPY WITH ENDOMETRIAL RADIOFREQUENCY ABLATION  08/02/2017    Procedure: HYSTEROSCOPY NOVASURE;  Surgeon: Hedy Hardy M.D.;  Location: [facility];  Service:     DILATION AND CURETTAGE  08/02/2017    Procedure: DILATION AND CURETTAGE;  Surgeon: Hedy Hardy M.D.;  Location: [facility];  Service:     [place] BREAST AUGMENTATION WITH IMPLANT  01/01/2006    MAMMOPLASTY AUGMENTATION

## 2025-07-11 NOTE — PROGRESS NOTES
..Bedside report received from off going RN/tech: Josefina  , assumed care of patient.     Fall Risk Score: HIGH RISK  Fall risk interventions in place: Place yellow fall risk ID band on patient, Provide patient/family education based on risk assessment, and Educate patient/family to call staff for assistance when getting out of bed  Bed type: Regular (Sam Score less than 17 interventions in place)  Patient on cardiac monitor: Yes  IVF/IV medications: Infusion per MAR (List Med(s)) antibiotics  Oxygen: How many liters 40L 60% FIO2    Bedside sitter: Not Applicable   Isolation: Not applicable

## 2025-07-11 NOTE — PROGRESS NOTES
Bedside report received from off going RN/tech:Susan Santillan, assumed care of patient.     Fall Risk Score: HIGH RISK  Fall risk interventions in place: Educate patient/family to call staff for assistance when getting out of bed, Place fall precaution signage outside patient door, Utilize bed/chair fall alarm, Notify charge of high risk for huddle, and Bed alarm connected correctly  Bed type: Regular (Sam Score less than 17 interventions in place)  Patient on cardiac monitor: Yes  IVF/IV medications: Infusion per MAR (List Med(s)) ABX  Oxygen: How many liters 40L  Bedside sitter: Not Applicable   Isolation: Not applicable

## 2025-07-12 ENCOUNTER — APPOINTMENT (OUTPATIENT)
Dept: CARDIOLOGY | Facility: MEDICAL CENTER | Age: 50
DRG: 189 | End: 2025-07-12
Attending: STUDENT IN AN ORGANIZED HEALTH CARE EDUCATION/TRAINING PROGRAM
Payer: COMMERCIAL

## 2025-07-12 LAB
1 3 BETA D GLUCAN INTERP Q4483: NEGATIVE
1,3 BETA GLUCAN SER-MCNC: <31 PG/ML
ALBUMIN SERPL BCP-MCNC: 2.6 G/DL (ref 3.2–4.9)
ALBUMIN/GLOB SERPL: 1 G/DL
ALP SERPL-CCNC: 140 U/L (ref 30–99)
ALT SERPL-CCNC: 82 U/L (ref 2–50)
ANION GAP SERPL CALC-SCNC: 10 MMOL/L (ref 7–16)
AST SERPL-CCNC: 102 U/L (ref 12–45)
BILIRUB SERPL-MCNC: 3.3 MG/DL (ref 0.1–1.5)
BUN SERPL-MCNC: 17 MG/DL (ref 8–22)
CALCIUM ALBUM COR SERPL-MCNC: 9.4 MG/DL (ref 8.5–10.5)
CALCIUM SERPL-MCNC: 8.3 MG/DL (ref 8.5–10.5)
CHLORIDE SERPL-SCNC: 106 MMOL/L (ref 96–112)
CO2 SERPL-SCNC: 20 MMOL/L (ref 20–33)
CREAT SERPL-MCNC: 0.66 MG/DL (ref 0.5–1.4)
ERYTHROCYTE [DISTWIDTH] IN BLOOD BY AUTOMATED COUNT: 70.2 FL (ref 35.9–50)
GALACTOMANNAN AG SERPL QL IA: NEGATIVE
GALACTOMANNAN AG SERPL-ACNC: 0.03
GFR SERPLBLD CREATININE-BSD FMLA CKD-EPI: 106 ML/MIN/1.73 M 2
GLOBULIN SER CALC-MCNC: 2.7 G/DL (ref 1.9–3.5)
GLUCOSE SERPL-MCNC: 141 MG/DL (ref 65–99)
HCT VFR BLD AUTO: 38.9 % (ref 37–47)
HGB BLD-MCNC: 12.6 G/DL (ref 12–16)
L PNEUMO AG UR QL IA: NEGATIVE
MAGNESIUM SERPL-MCNC: 1.7 MG/DL (ref 1.5–2.5)
MCH RBC QN AUTO: 37.5 PG (ref 27–33)
MCHC RBC AUTO-ENTMCNC: 32.4 G/DL (ref 32.2–35.5)
MCV RBC AUTO: 115.8 FL (ref 81.4–97.8)
NUCLEAR IGG SER QL IA: NORMAL
PLATELET # BLD AUTO: 442 K/UL (ref 164–446)
PMV BLD AUTO: 10.3 FL (ref 9–12.9)
POTASSIUM SERPL-SCNC: 4.2 MMOL/L (ref 3.6–5.5)
PROT SERPL-MCNC: 5.3 G/DL (ref 6–8.2)
RBC # BLD AUTO: 3.36 M/UL (ref 4.2–5.4)
S PNEUM AG UR QL: NEGATIVE
SODIUM SERPL-SCNC: 136 MMOL/L (ref 135–145)
WBC # BLD AUTO: 15.9 K/UL (ref 4.8–10.8)

## 2025-07-12 PROCEDURE — A9270 NON-COVERED ITEM OR SERVICE: HCPCS | Performed by: STUDENT IN AN ORGANIZED HEALTH CARE EDUCATION/TRAINING PROGRAM

## 2025-07-12 PROCEDURE — A9270 NON-COVERED ITEM OR SERVICE: HCPCS | Performed by: HOSPITALIST

## 2025-07-12 PROCEDURE — 83735 ASSAY OF MAGNESIUM: CPT

## 2025-07-12 PROCEDURE — 700111 HCHG RX REV CODE 636 W/ 250 OVERRIDE (IP): Performed by: STUDENT IN AN ORGANIZED HEALTH CARE EDUCATION/TRAINING PROGRAM

## 2025-07-12 PROCEDURE — 80053 COMPREHEN METABOLIC PANEL: CPT

## 2025-07-12 PROCEDURE — 36415 COLL VENOUS BLD VENIPUNCTURE: CPT

## 2025-07-12 PROCEDURE — 700102 HCHG RX REV CODE 250 W/ 637 OVERRIDE(OP): Performed by: STUDENT IN AN ORGANIZED HEALTH CARE EDUCATION/TRAINING PROGRAM

## 2025-07-12 PROCEDURE — 85027 COMPLETE CBC AUTOMATED: CPT

## 2025-07-12 PROCEDURE — 99233 SBSQ HOSP IP/OBS HIGH 50: CPT | Performed by: GENERAL PRACTICE

## 2025-07-12 PROCEDURE — A9270 NON-COVERED ITEM OR SERVICE: HCPCS | Performed by: GENERAL PRACTICE

## 2025-07-12 PROCEDURE — 700111 HCHG RX REV CODE 636 W/ 250 OVERRIDE (IP): Mod: JZ | Performed by: GENERAL PRACTICE

## 2025-07-12 PROCEDURE — 700111 HCHG RX REV CODE 636 W/ 250 OVERRIDE (IP): Mod: JZ | Performed by: HOSPITALIST

## 2025-07-12 PROCEDURE — 700105 HCHG RX REV CODE 258: Performed by: HOSPITALIST

## 2025-07-12 PROCEDURE — 700102 HCHG RX REV CODE 250 W/ 637 OVERRIDE(OP): Performed by: GENERAL PRACTICE

## 2025-07-12 PROCEDURE — 700105 HCHG RX REV CODE 258: Performed by: STUDENT IN AN ORGANIZED HEALTH CARE EDUCATION/TRAINING PROGRAM

## 2025-07-12 PROCEDURE — 770020 HCHG ROOM/CARE - TELE (206)

## 2025-07-12 PROCEDURE — 700102 HCHG RX REV CODE 250 W/ 637 OVERRIDE(OP): Performed by: HOSPITALIST

## 2025-07-12 RX ORDER — HYDROCORTISONE SODIUM SUCCINATE 100 MG/2ML
50 INJECTION INTRAMUSCULAR; INTRAVENOUS EVERY 6 HOURS
Status: COMPLETED | OUTPATIENT
Start: 2025-07-12 | End: 2025-07-14

## 2025-07-12 RX ORDER — DOXYCYCLINE 100 MG/1
100 TABLET ORAL EVERY 12 HOURS
Status: COMPLETED | OUTPATIENT
Start: 2025-07-12 | End: 2025-07-15

## 2025-07-12 RX ADMIN — HYDROCORTISONE SODIUM SUCCINATE 50 MG: 100 INJECTION, POWDER, FOR SOLUTION INTRAMUSCULAR; INTRAVENOUS at 02:04

## 2025-07-12 RX ADMIN — Medication 1 TABLET: at 08:00

## 2025-07-12 RX ADMIN — HYDROXYZINE HYDROCHLORIDE 25 MG: 25 TABLET ORAL at 06:03

## 2025-07-12 RX ADMIN — HYDROCORTISONE SODIUM SUCCINATE 50 MG: 100 INJECTION, POWDER, FOR SOLUTION INTRAMUSCULAR; INTRAVENOUS at 08:00

## 2025-07-12 RX ADMIN — GABAPENTIN 300 MG: 300 CAPSULE ORAL at 17:03

## 2025-07-12 RX ADMIN — DOXYCYCLINE 100 MG: 100 INJECTION, POWDER, LYOPHILIZED, FOR SOLUTION INTRAVENOUS at 06:07

## 2025-07-12 RX ADMIN — GABAPENTIN 300 MG: 300 CAPSULE ORAL at 06:03

## 2025-07-12 RX ADMIN — HYDROCORTISONE SODIUM SUCCINATE 50 MG: 100 INJECTION, POWDER, FOR SOLUTION INTRAMUSCULAR; INTRAVENOUS at 19:31

## 2025-07-12 RX ADMIN — LEVOTHYROXINE SODIUM 75 MCG: 0.07 TABLET ORAL at 06:02

## 2025-07-12 RX ADMIN — OXYCODONE 5 MG: 5 TABLET ORAL at 21:19

## 2025-07-12 RX ADMIN — OXYCODONE 5 MG: 5 TABLET ORAL at 06:03

## 2025-07-12 RX ADMIN — HYDROCORTISONE SODIUM SUCCINATE 50 MG: 100 INJECTION, POWDER, FOR SOLUTION INTRAMUSCULAR; INTRAVENOUS at 12:48

## 2025-07-12 RX ADMIN — APIXABAN 5 MG: 5 TABLET, FILM COATED ORAL at 17:03

## 2025-07-12 RX ADMIN — HYDROXYZINE HYDROCHLORIDE 25 MG: 25 TABLET ORAL at 17:06

## 2025-07-12 RX ADMIN — ESCITALOPRAM OXALATE 20 MG: 10 TABLET ORAL at 06:02

## 2025-07-12 RX ADMIN — APIXABAN 5 MG: 5 TABLET, FILM COATED ORAL at 06:02

## 2025-07-12 RX ADMIN — SPIRONOLACTONE 200 MG: 100 TABLET ORAL at 06:02

## 2025-07-12 RX ADMIN — DOXYCYCLINE 100 MG: 100 TABLET, FILM COATED ORAL at 17:03

## 2025-07-12 RX ADMIN — PIPERACILLIN AND TAZOBACTAM 3.38 G: 3; .375 INJECTION, POWDER, FOR SOLUTION INTRAVENOUS at 21:23

## 2025-07-12 RX ADMIN — PIPERACILLIN AND TAZOBACTAM 3.38 G: 3; .375 INJECTION, POWDER, FOR SOLUTION INTRAVENOUS at 12:50

## 2025-07-12 RX ADMIN — HYDROXYZINE HYDROCHLORIDE 25 MG: 25 TABLET ORAL at 21:19

## 2025-07-12 RX ADMIN — OMEPRAZOLE 40 MG: 20 CAPSULE, DELAYED RELEASE ORAL at 06:01

## 2025-07-12 RX ADMIN — PIPERACILLIN AND TAZOBACTAM 3.38 G: 3; .375 INJECTION, POWDER, FOR SOLUTION INTRAVENOUS at 06:09

## 2025-07-12 RX ADMIN — OMEPRAZOLE 40 MG: 20 CAPSULE, DELAYED RELEASE ORAL at 17:03

## 2025-07-12 RX ADMIN — Medication 100 MG: at 06:02

## 2025-07-12 ASSESSMENT — FIBROSIS 4 INDEX: FIB4 SCORE: 1.3

## 2025-07-12 ASSESSMENT — PAIN DESCRIPTION - PAIN TYPE
TYPE: ACUTE PAIN
TYPE: ACUTE PAIN

## 2025-07-12 ASSESSMENT — ENCOUNTER SYMPTOMS: COUGH: 1

## 2025-07-12 NOTE — CARE PLAN
The patient is Watcher - Medium risk of patient condition declining or worsening    Shift Goals  Clinical Goals: VSS, cardiac monitor  Patient Goals: sleep  Family Goals: eddie    Progress made toward(s) clinical / shift goals:    Problem: Pain - Standard  Goal: Alleviation of pain or a reduction in pain to the patient’s comfort goal  Outcome: Progressing   Pt reports pain well controlled with current therapy. Additional non-pharmacologic interventions implemented. Education on pain reduction goals, pain rating scale, and potential side effects of pharmacologic interventions. Demonstrates use of appropriate diversional activities and/or relaxation techniques.    Problem: Knowledge Deficit - Standard  Goal: Patient and family/care givers will demonstrate understanding of plan of care, disease process/condition, diagnostic tests and medications  Outcome: Progressing   Discussed plan of care, pt able to actively participate in the learning process and demonstrates understanding by return demonstration or return explanation. Improved ability to communicate regarding their healthcare and current admission. Improved ability to identify barriers to learning and care.    Problem: Fall Risk  Goal: Patient will remain free from falls  Outcome: Progressing   Fall prevention measures in place, bed alarm on and connected correctly, call light within reach, hourly rounding, Education provided on fall prevention. Pt instructed to use call light prior to exiting the bed, educated on use of bed alarms, and risks and complications related to falls. Medication orders evaluated for fall risk, gait/mobility assessed, sensory deficits assessed, mental status assessed, assessed for hypotension, hypovolemia, weakness, fatigue, elimination, and confusion.    Problem: Skin Integrity  Goal: Skin integrity is maintained or improved  Outcome: Progressing   Skin integrity and turgor assessed, pressure points and bony prominences assessed by this  RN. Sam score evaluated and appropriate interventions implemented to maintain skin integrity. Pillows for positioning, q2hour turns, Foam protectors, Mobility encouraged, and Pressure re-distribution mattress Education provided on skin integrity and risk for decubitus ulcers. Measures implemented to improve nutrition as needed. CMS assessed. Positioning pad in use to reduce shearing. RN wound protocol ordered / in place      Patient is not progressing towards the following goals:

## 2025-07-12 NOTE — PROGRESS NOTES
Fall Risk Score: HIGH RISK  Fall risk interventions in place: Place yellow fall risk ID band on patient, Provide patient/family education based on risk assessment, Educate patient/family to call staff for assistance when getting out of bed, Place fall precaution signage outside patient door, Place patient in room close to nursing station, Utilize bed/chair fall alarm, Notify charge of high risk for huddle, and Bed alarm connected correctly  Bed type: Regular (Sam Score less than 17 interventions in place)  Patient on cardiac monitor: Yes  IVF/IV medications: Not Applicable   Oxygen: How many liters 6L and Traced the line to wall oxygen  Bedside sitter: Not Applicable   Isolation: Not applicable

## 2025-07-12 NOTE — PROGRESS NOTES
Hospital Medicine Daily Progress Note    Date of Service  7/12/2025    Chief Complaint  Minerva Tillman is a 50 y.o. female admitted 7/9/2025 with SOB    Hospital Course  This is a 50-year-old female with past medical history of hypertension, portal vein thrombosis on Eliquis, chronic alcohol use, decompensated cirrhosis MELD of 23, chronic hypoxemic respiratory failure on 2 nocturnal, hypothyroidism and mood disorder who was admitted on 7/9 with acute hypoxemic respiratory failure.    Patient was recently admitted in July for community-acquired pneumonia, alcohol withdrawal and liver cirrhosis with ascites requiring 2 paracentesis (negative SBP). She completed only 7 days of prednisolone due to no improvement noted. GI took patient for EGD 6/30 found to have grade 1 esophageal varices, 0.5 cm hiatal hernia recommended to start Coreg and repeat EGD in 6 months.  Colonoscopy also performed found to have hemorrhoids no other lesions seen recommended repeat colonoscopy in 10 years and Anusol [place] twice daily for 7 days.     After discharging, patient went to a [place] inpatient rehab detox program however she was felt to be medically too ill to for enrollment.    On this admission, CT imaging noted GGO. COVID, influenza, RSV negative. B-D- glucan negative.  Patient to continue with Zosyn and doxycycline for coverage of hospital-acquired pneumonia. Patient was started on high dose steroids, with improvement, patient initially required heated high flow nasal cannula, now weaned down to nasal cannula.    Interval Problem Update  Acute on chronic respiratory failure secondary to ?etiology - on this admission, CT imaging noted GGO. COVID, influenza, RSV negative. B-D- glucan negative.      Patient to continue with Zosyn and doxycycline for coverage of hospital-acquired pneumonia.     Patient was started on high dose steroids, with improvement, patient initially required heated high flow nasal cannula, now weaned down  to 3L nasal cannula.  Patient to complete total 5-day course of high-dose steroids.  Encourage patient to continue with increasing mobility.      Patient's mother and brother at bedside, updated on plan of care, all questions answered.    I have discussed this patient's plan of care and discharge plan at IDT rounds today with Case Management, Nursing, Nursing leadership, and other members of the IDT team.    Consultants/Specialty  pulmonary    Code Status  Full Code    Disposition  Patient is not medically clear to discharge home.   I have placed the appropriate orders for post-discharge needs.    Review of Systems  Review of Systems   Respiratory:  Positive for cough.    All other systems reviewed and are negative.       Physical Exam  Temp:  [36.1 °C (97 °F)-36.3 °C (97.3 °F)] 36.3 °C (97.3 °F)  Pulse:  [46-57] 54  Resp:  [16-18] 18  BP: (108-124)/(65-83) 122/81  SpO2:  [92 %-97 %] 95 %    Physical Exam  Vitals and nursing note reviewed.   Constitutional:       General: She is not in acute distress.     Appearance: Normal appearance. She is ill-appearing.   HENT:      Head: Normocephalic and atraumatic.      Mouth/Throat:      Mouth: Mucous membranes are moist.      Pharynx: No oropharyngeal exudate.   Eyes:      Extraocular Movements: Extraocular movements intact.      Pupils: Pupils are equal, round, and reactive to light.   Cardiovascular:      Rate and Rhythm: Normal rate and regular rhythm.      Pulses: Normal pulses.      Heart sounds: No murmur heard.     No friction rub. No gallop.   Pulmonary:      Effort: Pulmonary effort is normal. No respiratory distress.      Breath sounds: No wheezing, rhonchi or rales.   Abdominal:      General: Bowel sounds are normal. There is no distension.      Palpations: Abdomen is soft. There is no mass.      Tenderness: There is no abdominal tenderness.   Musculoskeletal:         General: No swelling or tenderness. Normal range of motion.      Cervical back: Normal range of  motion. No rigidity. No muscular tenderness.      Right lower leg: No edema.      Left lower leg: No edema.   Skin:     General: Skin is warm and dry.      Capillary Refill: Capillary refill takes less than 2 seconds.      Findings: No erythema or rash.   Neurological:      General: No focal deficit present.      Mental Status: She is alert and oriented to person, place, and time.      Motor: No weakness.      Gait: Gait normal.         Fluids    Intake/Output Summary (Last 24 hours) at 7/12/2025 1237  Last data filed at 7/12/2025 1200  Gross per 24 hour   Intake 1358.91 ml   Output 0 ml   Net 1358.91 ml        Laboratory  Recent Labs     07/10/25  0900 07/11/25  0025 07/12/25  0051   WBC 13.9* 17.2* 15.9*   RBC 3.42* 3.42* 3.36*   HEMOGLOBIN 13.2 12.9 12.6   HEMATOCRIT 39.0 38.1 38.9   .0* 111.4* 115.8*   MCH 38.6* 37.7* 37.5*   MCHC 33.8 33.9 32.4   RDW 69.3* 67.7* 70.2*   PLATELETCT 365 454* 442   MPV 10.7 10.3 10.3     Recent Labs     07/10/25  0900 07/11/25  0025 07/12/25  0051   SODIUM 134*  134* 135 136   POTASSIUM 4.2  4.2 3.8 4.2   CHLORIDE 103  103 105 106   CO2 19*  19* 19* 20   GLUCOSE 154*  154* 161* 141*   BUN 15  15 14 17   CREATININE 0.71  0.71 0.66 0.66   CALCIUM 8.3*  8.3* 8.1* 8.3*     Recent Labs     07/10/25  1615   INR 1.98*               Imaging  US-ABDOMEN LTD (SOFT TISSUE)   Final Result      1.  Small amount of ascites in the lower quadrants of the abdomen.   2.  Requested paracentesis was not performed.         CT-ABDOMEN-PELVIS W/O   Final Result         1.  Scattered patchy bilateral pulmonary infiltrates.   2.  Nodular hepatic compatible with changes of cirrhosis.   3.  Scattered abdominal ascites.   4.  Atherosclerosis and atherosclerotic coronary artery disease.   5.  Pulmonary nodule, see nodule follow-up recommendations below.      Fleischner Society pulmonary nodule recommendations:   Low Risk: No routine follow-up      High Risk: Optional CT at 12 months       Comments: Nodules less than 6 mm do not require routine follow-up, but certain patients at high risk with suspicious nodule morphology, upper lobe location, or both may warrant 12-month follow-up.      Low Risk - Minimal or absent history of smoking and of other known risk factors.      High Risk - History of smoking or of other known risk factors.      Note: These recommendations do not apply to lung cancer screening, patients with immunosuppression, or patients with known primary cancer.      Fleischner Society 2017 Guidelines for Management of Incidentally Detected Pulmonary Nodules in Adults         CT-CHEST (THORAX) W/O   Final Result      1.  Diffuse groundglass densities, likely multifocal pneumonia. Edema is a less likely differential consideration.   2.  5 mm right lower lobe pulmonary nodule.   3.  Trace pericardial effusion.   4.  Coronary artery calcifications.   5.  Cirrhosis.   6.  Ascites in the upper abdomen.   7.  Hypodense hepatic lesions as evaluated on recent MRI.         Fleischner society recommendations for follow up:   Low Risk: No routine follow-up      High Risk: Optional CT at 12 months      Comments: Nodules less than 6 mm do not require routine follow-up, but certain patients at high risk with suspicious nodule morphology, upper lobe location, or both may warrant 12-month follow-up.      Low Risk - Minimal or absent history of smoking and of other known risk factors.      High Risk - History of smoking or of other known risk factors.      Note: These recommendations do not apply to lung cancer screening, patients with immunosuppression, or patients with known primary cancer.      Fleischner Society 2017 Guidelines for Management of Incidentally Detected Pulmonary Nodules in Adults         Less than 6 mm: CT at 3-6 months. If stable, consider CT at 2 and 4 years.      6 mm or greater: CT at 3-6 months. Subsequent management based on the most suspicious nodule(s).      Comments: Multiple  less than 6 mm pure ground-glass nodules are usually benign, but consider follow-up in selected patients at high risk at 2 and 4 years.      Low Risk - Minimal or absent history of smoking and of other known risk factors.      High Risk - History of smoking or of other known risk factors.      Note: These recommendations do not apply to lung cancer screening, patients with immunosuppression, or patients with known primary cancer.      Fleischner Society 2017 Guidelines for Management of Incidentally Detected Pulmonary Nodules in Adults      DX-CHEST-PORTABLE (1 VIEW)   Final Result      1.  Low lung volumes.   2.  Cardiomegaly, possibly on the basis of hypoventilation.   3.  Mild interstitial infiltrates and/or edema.           Assessment/Plan  * Acute on chronic respiratory failure with hypoxia (HCC)  Assessment & Plan  On HF on ADT  Down 6L   Chest CT with diffuse groundglass densities, likely multifocal pneumonia.  COVID/RSV/influenza negative  Viral panel negative  Procalcitonin mildly elevated    Fluid restriction  Continue spironolactone  Increase levothyroxine dose  Continue Zosyn/Doxycycline  Continue IV steroids  Pulmonology following  On this admission, CT imaging noted GGO. COVID, influenza, RSV negative. B-D- glucan negative.  Patient to continue with Zosyn and doxycycline for coverage of hospital-acquired pneumonia. Patient was started on high dose steroids, with improvement, patient initially required heated high flow nasal cannula, now weaned down to nasal cannula.    Acute cystitis- (present on admission)  Assessment & Plan  IV ABx  Follow urine cultures    Portal vein thrombosis- (present on admission)  Assessment & Plan  Continue Eliquis    Portal hypertension with esophageal varices (HCC)- (present on admission)  Assessment & Plan  Hold carvedilol for now    Alcohol use disorder, severe, dependence (HCC)- (present on admission)  Assessment & Plan  Patient was discharged from here on July 4, and  went to [place] to be admitted to an inpatient rehab program however she was felt to be medically to ill    Hypothyroidism- (present on admission)  Assessment & Plan  TSH 35.300 and FT4 0.91   Increased levothyroxine    Fatty liver, alcoholic- (present on admission)  Assessment & Plan  Patient is currently maintained on lactulose, spironolactone, and Lasix  On recent admission her MELD was 27.  She did receive 1 week of prednisolone however her Delmy score did not improve significantly and therefore it was not continued beyond 7 days.  She is working on her sobriety    Essential hypertension- (present on admission)  Assessment & Plan  As needed antihypertensives         VTE prophylaxis: Eliquis    I have performed a physical exam and reviewed and updated ROS and Plan today (7/12/2025). In review of yesterday's note (7/11/2025), there are no changes except as documented above.    Greater than 51 minutes spent prepping to see patient (e.g. reviewing EMR) obtaining and/or reviewing separately obtained history. Performing a medically appropriate examination and evaluation. Independently interpreting results and communicating results to patient/family/caregiver. Counseling and educating the patient/family/caregiver. Ordering medications, tests, and/or procedures. Referring and communicating with other health care professionals.  Documenting clinical information in EPIC. Care coordination.

## 2025-07-12 NOTE — CARE PLAN
Problem: Pain - Standard  Goal: Alleviation of pain or a reduction in pain to the patient’s comfort goal  Outcome: Progressing     Problem: Fall Risk  Goal: Patient will remain free from falls  Outcome: Progressing     Problem: Skin Integrity  Goal: Skin integrity is maintained or improved  Outcome: Progressing   The patient is Watcher - Medium risk of patient condition declining or worsening    Shift Goals  Clinical Goals: Rest,comfort  Patient Goals: sleep  Family Goals: eddie    Progress made toward(s) clinical / shift goals:  All needs met.

## 2025-07-12 NOTE — HOSPITAL COURSE
This is a 50-year-old female with past medical history of hypertension, portal vein thrombosis on Eliquis, chronic alcohol use, decompensated cirrhosis MELD of 23, chronic hypoxemic respiratory failure on 2 nocturnal, hypothyroidism and mood disorder who was admitted on 7/9 with acute hypoxemic respiratory failure.    Patient was recently admitted in July for community-acquired pneumonia, alcohol withdrawal and liver cirrhosis with ascites requiring 2 paracentesis (negative SBP). She completed only 7 days of prednisolone due to no improvement noted. GI took patient for EGD 6/30 found to have grade 1 esophageal varices, 0.5 cm hiatal hernia recommended to start Coreg and repeat EGD in 6 months.  Colonoscopy also performed found to have hemorrhoids no other lesions seen recommended repeat colonoscopy in 10 years and Anusol MA twice daily for 7 days.     After discharging, patient went to a [place] inpatient rehab detox program however she was felt to be medically too ill to for enrollment.    On this admission, CT imaging noted GGO. COVID, influenza, RSV negative. B-D- glucan negative.  Patient to continue with Zosyn and doxycycline for coverage of hospital-acquired pneumonia. Patient was started on high dose steroids, with improvement, patient initially required heated high flow nasal cannula, now weaned down to nasal cannula.

## 2025-07-13 LAB
ALBUMIN SERPL BCP-MCNC: 2.5 G/DL (ref 3.2–4.9)
ALBUMIN/GLOB SERPL: 1 G/DL
ALP SERPL-CCNC: 134 U/L (ref 30–99)
ALT SERPL-CCNC: 78 U/L (ref 2–50)
ANION GAP SERPL CALC-SCNC: 10 MMOL/L (ref 7–16)
AST SERPL-CCNC: 84 U/L (ref 12–45)
BILIRUB SERPL-MCNC: 2.7 MG/DL (ref 0.1–1.5)
BUN SERPL-MCNC: 18 MG/DL (ref 8–22)
CALCIUM ALBUM COR SERPL-MCNC: 9.4 MG/DL (ref 8.5–10.5)
CALCIUM SERPL-MCNC: 8.2 MG/DL (ref 8.5–10.5)
CHLORIDE SERPL-SCNC: 108 MMOL/L (ref 96–112)
CO2 SERPL-SCNC: 19 MMOL/L (ref 20–33)
CREAT SERPL-MCNC: 0.7 MG/DL (ref 0.5–1.4)
ERYTHROCYTE [DISTWIDTH] IN BLOOD BY AUTOMATED COUNT: 66.5 FL (ref 35.9–50)
GFR SERPLBLD CREATININE-BSD FMLA CKD-EPI: 104 ML/MIN/1.73 M 2
GLOBULIN SER CALC-MCNC: 2.5 G/DL (ref 1.9–3.5)
GLUCOSE SERPL-MCNC: 156 MG/DL (ref 65–99)
HCT VFR BLD AUTO: 36.4 % (ref 37–47)
HGB BLD-MCNC: 12.2 G/DL (ref 12–16)
MAGNESIUM SERPL-MCNC: 1.6 MG/DL (ref 1.5–2.5)
MCH RBC QN AUTO: 38.2 PG (ref 27–33)
MCHC RBC AUTO-ENTMCNC: 33.5 G/DL (ref 32.2–35.5)
MCV RBC AUTO: 114.1 FL (ref 81.4–97.8)
MYELOPEROXIDASE AB SER-ACNC: 0 AU/ML (ref 0–19)
PLATELET # BLD AUTO: 315 K/UL (ref 164–446)
PMV BLD AUTO: 9.8 FL (ref 9–12.9)
POTASSIUM SERPL-SCNC: 4.4 MMOL/L (ref 3.6–5.5)
PROT SERPL-MCNC: 5 G/DL (ref 6–8.2)
PROTEINASE3 AB SER-ACNC: 1 AU/ML (ref 0–19)
RBC # BLD AUTO: 3.19 M/UL (ref 4.2–5.4)
SODIUM SERPL-SCNC: 137 MMOL/L (ref 135–145)
WBC # BLD AUTO: 11.8 K/UL (ref 4.8–10.8)

## 2025-07-13 PROCEDURE — 83735 ASSAY OF MAGNESIUM: CPT

## 2025-07-13 PROCEDURE — 700102 HCHG RX REV CODE 250 W/ 637 OVERRIDE(OP): Performed by: HOSPITALIST

## 2025-07-13 PROCEDURE — 700111 HCHG RX REV CODE 636 W/ 250 OVERRIDE (IP): Mod: JZ | Performed by: HOSPITALIST

## 2025-07-13 PROCEDURE — 770020 HCHG ROOM/CARE - TELE (206)

## 2025-07-13 PROCEDURE — 700102 HCHG RX REV CODE 250 W/ 637 OVERRIDE(OP): Performed by: GENERAL PRACTICE

## 2025-07-13 PROCEDURE — 80053 COMPREHEN METABOLIC PANEL: CPT

## 2025-07-13 PROCEDURE — 700102 HCHG RX REV CODE 250 W/ 637 OVERRIDE(OP): Performed by: STUDENT IN AN ORGANIZED HEALTH CARE EDUCATION/TRAINING PROGRAM

## 2025-07-13 PROCEDURE — A9270 NON-COVERED ITEM OR SERVICE: HCPCS | Performed by: STUDENT IN AN ORGANIZED HEALTH CARE EDUCATION/TRAINING PROGRAM

## 2025-07-13 PROCEDURE — A9270 NON-COVERED ITEM OR SERVICE: HCPCS | Performed by: HOSPITALIST

## 2025-07-13 PROCEDURE — 36415 COLL VENOUS BLD VENIPUNCTURE: CPT

## 2025-07-13 PROCEDURE — 85027 COMPLETE CBC AUTOMATED: CPT

## 2025-07-13 PROCEDURE — 700105 HCHG RX REV CODE 258: Performed by: HOSPITALIST

## 2025-07-13 PROCEDURE — 700111 HCHG RX REV CODE 636 W/ 250 OVERRIDE (IP): Mod: JZ | Performed by: GENERAL PRACTICE

## 2025-07-13 PROCEDURE — 99232 SBSQ HOSP IP/OBS MODERATE 35: CPT | Performed by: GENERAL PRACTICE

## 2025-07-13 PROCEDURE — A9270 NON-COVERED ITEM OR SERVICE: HCPCS | Performed by: GENERAL PRACTICE

## 2025-07-13 RX ADMIN — HYDROXYZINE HYDROCHLORIDE 25 MG: 25 TABLET ORAL at 15:28

## 2025-07-13 RX ADMIN — Medication 1 TABLET: at 09:01

## 2025-07-13 RX ADMIN — HYDROCORTISONE SODIUM SUCCINATE 50 MG: 100 INJECTION, POWDER, FOR SOLUTION INTRAMUSCULAR; INTRAVENOUS at 20:00

## 2025-07-13 RX ADMIN — SPIRONOLACTONE 200 MG: 100 TABLET ORAL at 05:30

## 2025-07-13 RX ADMIN — HYDROCORTISONE SODIUM SUCCINATE 50 MG: 100 INJECTION, POWDER, FOR SOLUTION INTRAMUSCULAR; INTRAVENOUS at 01:23

## 2025-07-13 RX ADMIN — DOXYCYCLINE 100 MG: 100 TABLET, FILM COATED ORAL at 16:36

## 2025-07-13 RX ADMIN — HYDROXYZINE HYDROCHLORIDE 25 MG: 25 TABLET ORAL at 21:20

## 2025-07-13 RX ADMIN — Medication 100 MG: at 05:29

## 2025-07-13 RX ADMIN — PIPERACILLIN AND TAZOBACTAM 3.38 G: 3; .375 INJECTION, POWDER, FOR SOLUTION INTRAVENOUS at 05:34

## 2025-07-13 RX ADMIN — APIXABAN 5 MG: 5 TABLET, FILM COATED ORAL at 16:36

## 2025-07-13 RX ADMIN — HYDROCORTISONE SODIUM SUCCINATE 50 MG: 100 INJECTION, POWDER, FOR SOLUTION INTRAMUSCULAR; INTRAVENOUS at 08:19

## 2025-07-13 RX ADMIN — OXYCODONE 5 MG: 5 TABLET ORAL at 15:28

## 2025-07-13 RX ADMIN — GABAPENTIN 300 MG: 300 CAPSULE ORAL at 05:30

## 2025-07-13 RX ADMIN — DOXYCYCLINE 100 MG: 100 TABLET, FILM COATED ORAL at 05:29

## 2025-07-13 RX ADMIN — PIPERACILLIN AND TAZOBACTAM 3.38 G: 3; .375 INJECTION, POWDER, FOR SOLUTION INTRAVENOUS at 21:22

## 2025-07-13 RX ADMIN — OXYCODONE 5 MG: 5 TABLET ORAL at 01:21

## 2025-07-13 RX ADMIN — HYDROXYZINE HYDROCHLORIDE 25 MG: 25 TABLET ORAL at 01:21

## 2025-07-13 RX ADMIN — OXYCODONE 5 MG: 5 TABLET ORAL at 21:20

## 2025-07-13 RX ADMIN — HYDROCORTISONE SODIUM SUCCINATE 50 MG: 100 INJECTION, POWDER, FOR SOLUTION INTRAMUSCULAR; INTRAVENOUS at 12:40

## 2025-07-13 RX ADMIN — PIPERACILLIN AND TAZOBACTAM 3.38 G: 3; .375 INJECTION, POWDER, FOR SOLUTION INTRAVENOUS at 12:42

## 2025-07-13 RX ADMIN — APIXABAN 5 MG: 5 TABLET, FILM COATED ORAL at 05:29

## 2025-07-13 RX ADMIN — ESCITALOPRAM OXALATE 20 MG: 10 TABLET ORAL at 05:29

## 2025-07-13 RX ADMIN — OMEPRAZOLE 40 MG: 20 CAPSULE, DELAYED RELEASE ORAL at 16:36

## 2025-07-13 RX ADMIN — OMEPRAZOLE 40 MG: 20 CAPSULE, DELAYED RELEASE ORAL at 05:29

## 2025-07-13 RX ADMIN — GABAPENTIN 300 MG: 300 CAPSULE ORAL at 16:36

## 2025-07-13 RX ADMIN — LEVOTHYROXINE SODIUM 75 MCG: 0.07 TABLET ORAL at 05:29

## 2025-07-13 ASSESSMENT — ENCOUNTER SYMPTOMS: COUGH: 1

## 2025-07-13 ASSESSMENT — PAIN DESCRIPTION - PAIN TYPE
TYPE: ACUTE PAIN

## 2025-07-13 ASSESSMENT — FIBROSIS 4 INDEX: FIB4 SCORE: 1.54

## 2025-07-13 NOTE — CARE PLAN
Problem: Fall Risk  Goal: Patient will remain free from falls  Description: Target End Date:  Prior to discharge or change in level of care    Document interventions on the Alona Talavera Fall Risk Assessment    1.  Assess for fall risk factors  2.  Implement fall precautions  Outcome: Progressing   The patient is Stable - Low risk of patient condition declining or worsening    Shift Goals  Clinical Goals: monitor o2  Patient Goals: rest;update  Family Goals: eddie    Progress made toward(s) clinical / shift goals:  education provided regarding fall safety including use of call bell for assistance, bed kept in lowest position and bed alarm on. Ambulates with standby assist    Patient is not progressing towards the following goals:       pain/decreased ROM/decreased strength

## 2025-07-13 NOTE — PROGRESS NOTES
Fall Risk Score: HIGH RISK  Fall risk interventions in place: Place yellow fall risk ID band on patient, Provide patient/family education based on risk assessment, Educate patient/family to call staff for assistance when getting out of bed, Place fall precaution signage outside patient door, Place patient in room close to nursing station, Utilize bed/chair fall alarm, Notify charge of high risk for huddle, and Bed alarm connected correctly  Bed type: Regular (Sam Score less than 17 interventions in place)  Patient on cardiac monitor: Yes  IVF/IV medications: Not Applicable   Oxygen: How many liters 4L and Traced the line to wall oxygen  Bedside sitter: Not Applicable   Isolation: Not applicable     Pt. Resting on stretcher, eyes open, RR even and non-labored  Pt.  Updated on POC  Will continue to monitor   Family remains at bedside      Dorothy Montanez RN  08/30/21 [id number] [facility] Exercise O2 Evaluation      Patient : Sukh Moran Age: 73 year old Sex: male   MRN: [id number]   Date of Test: 2/28/2022  Physician: Dr. Silveiar  Diagnosis: lung nodule COPD    If patient requires greater than 4L/min of O2, patient must be tested on 4L/min at rest.    Activity O2 LPM Finger  Probe Pulse Ox Head Probe Pulse Ox Heart Rate Symptoms           rest 0  100 83 2                           walk 0  99 96 2                                                                                                                     Symptom Scale: 0 - Nothing at all     1 - Very Slight     2 - Slight     3 - Moderate     4 - Somewhat Severe     5 - Severe    Symptoms / Observations during test: 100 M WALK, NO DISTRESS      Test done by: Priyanka Tena RN

## 2025-07-13 NOTE — PROGRESS NOTES
Monitor Summary  Rhythm: SR SB  Rate: 52-79  Ectopy: rare PVC bigem  .16 / .06 / .48         Pt states he would like to wait for son in the waiting room since he doesn't know how long he will be.  Pt alert, VSS.

## 2025-07-13 NOTE — PROGRESS NOTES
Hospital Medicine Daily Progress Note    Date of Service  7/13/2025    Chief Complaint  Minerva Tillman is a 50 y.o. female admitted 7/9/2025 with SOB    Hospital Course  This is a 50-year-old female with past medical history of hypertension, portal vein thrombosis on Eliquis, chronic alcohol use, decompensated cirrhosis MELD of 23, chronic hypoxemic respiratory failure on 2 nocturnal, hypothyroidism and mood disorder who was admitted on 7/9 with acute hypoxemic respiratory failure.    Patient was recently admitted in July for community-acquired pneumonia, alcohol withdrawal and liver cirrhosis with ascites requiring 2 paracentesis (negative SBP). She completed only 7 days of prednisolone due to no improvement noted. GI took patient for EGD 6/30 found to have grade 1 esophageal varices, 0.5 cm hiatal hernia recommended to start Coreg and repeat EGD in 6 months.  Colonoscopy also performed found to have hemorrhoids no other lesions seen recommended repeat colonoscopy in 10 years and Anusol MD twice daily for 7 days.     After discharging, patient went to a [place] inpatient rehab detox program however she was felt to be medically too ill to for enrollment.    On this admission, CT imaging noted GGO. COVID, influenza, RSV negative. B-D- glucan negative.  Patient to continue with Zosyn and doxycycline for coverage of hospital-acquired pneumonia. Patient was started on high dose steroids, with improvement, patient initially required heated high flow nasal cannula, now weaned down to nasal cannula.    Interval Problem Update  Acute on chronic respiratory failure secondary to ?etiology - on this admission, CT imaging noted GGO. COVID, influenza, RSV negative. B-D- glucan negative.      Patient to continue with Zosyn and doxycycline for coverage of hospital-acquired pneumonia.     Patient was started on high dose steroids, with improvement, patient initially required heated high flow nasal cannula, now weaned down  to 3L nasal cannula.  Patient to complete total 5-day course of high-dose steroids.  Encourage patient to continue with increasing mobility.      Patient's mother and brother at bedside, updated on plan of care, all questions answered.    I have discussed this patient's plan of care and discharge plan at IDT rounds today with Case Management, Nursing, Nursing leadership, and other members of the IDT team.    Consultants/Specialty  pulmonary    Code Status  Full Code    Disposition  Patient is not medically clear to discharge home.   I have placed the appropriate orders for post-discharge needs.    Review of Systems  Review of Systems   Respiratory:  Positive for cough.    All other systems reviewed and are negative.       Physical Exam  Temp:  [36 °C (96.8 °F)-36.5 °C (97.7 °F)] 36 °C (96.8 °F)  Pulse:  [52-65] 65  Resp:  [16-18] 16  BP: (111-126)/(76-88) 111/76  SpO2:  [91 %-98 %] 91 %    Physical Exam  Vitals and nursing note reviewed.   Constitutional:       General: She is not in acute distress.     Appearance: Normal appearance. She is ill-appearing.   HENT:      Head: Normocephalic and atraumatic.      Mouth/Throat:      Mouth: Mucous membranes are moist.      Pharynx: No oropharyngeal exudate.   Eyes:      Extraocular Movements: Extraocular movements intact.      Pupils: Pupils are equal, round, and reactive to light.   Cardiovascular:      Rate and Rhythm: Normal rate and regular rhythm.      Pulses: Normal pulses.      Heart sounds: No murmur heard.     No friction rub. No gallop.   Pulmonary:      Effort: Pulmonary effort is normal. No respiratory distress.      Breath sounds: No wheezing, rhonchi or rales.   Abdominal:      General: Bowel sounds are normal. There is no distension.      Palpations: Abdomen is soft. There is no mass.      Tenderness: There is no abdominal tenderness.   Musculoskeletal:         General: No swelling or tenderness. Normal range of motion.      Cervical back: Normal range of  motion. No rigidity. No muscular tenderness.      Right lower leg: No edema.      Left lower leg: No edema.   Skin:     General: Skin is warm and dry.      Capillary Refill: Capillary refill takes less than 2 seconds.      Findings: No erythema or rash.   Neurological:      General: No focal deficit present.      Mental Status: She is alert and oriented to person, place, and time.      Motor: No weakness.      Gait: Gait normal.         Fluids    Intake/Output Summary (Last 24 hours) at 7/13/2025 1120  Last data filed at 7/13/2025 0925  Gross per 24 hour   Intake 867 ml   Output --   Net 867 ml        Laboratory  Recent Labs     07/11/25  0025 07/12/25  0051 07/13/25  0256   WBC 17.2* 15.9* 11.8*   RBC 3.42* 3.36* 3.19*   HEMOGLOBIN 12.9 12.6 12.2   HEMATOCRIT 38.1 38.9 36.4*   .4* 115.8* 114.1*   MCH 37.7* 37.5* 38.2*   MCHC 33.9 32.4 33.5   RDW 67.7* 70.2* 66.5*   PLATELETCT 454* 442 315   MPV 10.3 10.3 9.8     Recent Labs     07/11/25  0025 07/12/25  0051 07/13/25  0256   SODIUM 135 136 137   POTASSIUM 3.8 4.2 4.4   CHLORIDE 105 106 108   CO2 19* 20 19*   GLUCOSE 161* 141* 156*   BUN 14 17 18   CREATININE 0.66 0.66 0.70   CALCIUM 8.1* 8.3* 8.2*     Recent Labs     07/10/25  1615   INR 1.98*               Imaging  US-ABDOMEN LTD (SOFT TISSUE)   Final Result      1.  Small amount of ascites in the lower quadrants of the abdomen.   2.  Requested paracentesis was not performed.         CT-ABDOMEN-PELVIS W/O   Final Result         1.  Scattered patchy bilateral pulmonary infiltrates.   2.  Nodular hepatic compatible with changes of cirrhosis.   3.  Scattered abdominal ascites.   4.  Atherosclerosis and atherosclerotic coronary artery disease.   5.  Pulmonary nodule, see nodule follow-up recommendations below.      Fleischner Society pulmonary nodule recommendations:   Low Risk: No routine follow-up      High Risk: Optional CT at 12 months      Comments: Nodules less than 6 mm do not require routine follow-up,  but certain patients at high risk with suspicious nodule morphology, upper lobe location, or both may warrant 12-month follow-up.      Low Risk - Minimal or absent history of smoking and of other known risk factors.      High Risk - History of smoking or of other known risk factors.      Note: These recommendations do not apply to lung cancer screening, patients with immunosuppression, or patients with known primary cancer.      Fleischner Society 2017 Guidelines for Management of Incidentally Detected Pulmonary Nodules in Adults         CT-CHEST (THORAX) W/O   Final Result      1.  Diffuse groundglass densities, likely multifocal pneumonia. Edema is a less likely differential consideration.   2.  5 mm right lower lobe pulmonary nodule.   3.  Trace pericardial effusion.   4.  Coronary artery calcifications.   5.  Cirrhosis.   6.  Ascites in the upper abdomen.   7.  Hypodense hepatic lesions as evaluated on recent MRI.         Fleischner society recommendations for follow up:   Low Risk: No routine follow-up      High Risk: Optional CT at 12 months      Comments: Nodules less than 6 mm do not require routine follow-up, but certain patients at high risk with suspicious nodule morphology, upper lobe location, or both may warrant 12-month follow-up.      Low Risk - Minimal or absent history of smoking and of other known risk factors.      High Risk - History of smoking or of other known risk factors.      Note: These recommendations do not apply to lung cancer screening, patients with immunosuppression, or patients with known primary cancer.      Fleischner Society 2017 Guidelines for Management of Incidentally Detected Pulmonary Nodules in Adults         Less than 6 mm: CT at 3-6 months. If stable, consider CT at 2 and 4 years.      6 mm or greater: CT at 3-6 months. Subsequent management based on the most suspicious nodule(s).      Comments: Multiple less than 6 mm pure ground-glass nodules are usually benign, but  consider follow-up in selected patients at high risk at 2 and 4 years.      Low Risk - Minimal or absent history of smoking and of other known risk factors.      High Risk - History of smoking or of other known risk factors.      Note: These recommendations do not apply to lung cancer screening, patients with immunosuppression, or patients with known primary cancer.      Fleischner Society 2017 Guidelines for Management of Incidentally Detected Pulmonary Nodules in Adults      DX-CHEST-PORTABLE (1 VIEW)   Final Result      1.  Low lung volumes.   2.  Cardiomegaly, possibly on the basis of hypoventilation.   3.  Mild interstitial infiltrates and/or edema.           Assessment/Plan  * Acute on chronic respiratory failure with hypoxia (HCC)  Assessment & Plan  On HF on ADT  Down 6L   Chest CT with diffuse groundglass densities, likely multifocal pneumonia.  COVID/RSV/influenza negative  Viral panel negative  Procalcitonin mildly elevated    Fluid restriction  Continue spironolactone  Increase levothyroxine dose  Continue Zosyn/Doxycycline  Continue IV steroids  Pulmonology following  On this admission, CT imaging noted GGO. COVID, influenza, RSV negative. B-D- glucan negative.  Patient to continue with Zosyn and doxycycline for coverage of hospital-acquired pneumonia. Patient was started on high dose steroids, with improvement, patient initially required heated high flow nasal cannula, now weaned down to nasal cannula.    Acute cystitis- (present on admission)  Assessment & Plan  IV ABx  Follow urine cultures    Portal vein thrombosis- (present on admission)  Assessment & Plan  Continue Eliquis    Portal hypertension with esophageal varices (HCC)- (present on admission)  Assessment & Plan  Hold carvedilol for now    Alcohol use disorder, severe, dependence (HCC)- (present on admission)  Assessment & Plan  Patient was discharged from here on July 4, and went to [place] to be admitted to an inpatient rehab program  however she was felt to be medically to ill    Hypothyroidism- (present on admission)  Assessment & Plan  TSH 35.300 and FT4 0.91   Increased levothyroxine    Fatty liver, alcoholic- (present on admission)  Assessment & Plan  Patient is currently maintained on lactulose, spironolactone, and Lasix  On recent admission her MELD was 27.  She did receive 1 week of prednisolone however her Delmy score did not improve significantly and therefore it was not continued beyond 7 days.  She is working on her sobriety    Essential hypertension- (present on admission)  Assessment & Plan  As needed antihypertensives         VTE prophylaxis: Eliquis    I have performed a physical exam and reviewed and updated ROS and Plan today (7/13/2025). In review of yesterday's note (7/12/2025), there are no changes except as documented above.

## 2025-07-14 LAB
ALBUMIN SERPL BCP-MCNC: 2.6 G/DL (ref 3.2–4.9)
ALBUMIN/GLOB SERPL: 1 G/DL
ALP SERPL-CCNC: 145 U/L (ref 30–99)
ALT SERPL-CCNC: 85 U/L (ref 2–50)
ANION GAP SERPL CALC-SCNC: 10 MMOL/L (ref 7–16)
AST SERPL-CCNC: 84 U/L (ref 12–45)
BILIRUB SERPL-MCNC: 2.9 MG/DL (ref 0.1–1.5)
BUN SERPL-MCNC: 17 MG/DL (ref 8–22)
CALCIUM ALBUM COR SERPL-MCNC: 9.3 MG/DL (ref 8.5–10.5)
CALCIUM SERPL-MCNC: 8.2 MG/DL (ref 8.5–10.5)
CHLORIDE SERPL-SCNC: 108 MMOL/L (ref 96–112)
CO2 SERPL-SCNC: 19 MMOL/L (ref 20–33)
CREAT SERPL-MCNC: 0.73 MG/DL (ref 0.5–1.4)
ERYTHROCYTE [DISTWIDTH] IN BLOOD BY AUTOMATED COUNT: 66.7 FL (ref 35.9–50)
GFR SERPLBLD CREATININE-BSD FMLA CKD-EPI: 99 ML/MIN/1.73 M 2
GLOBULIN SER CALC-MCNC: 2.5 G/DL (ref 1.9–3.5)
GLUCOSE SERPL-MCNC: 161 MG/DL (ref 65–99)
HCT VFR BLD AUTO: 36.8 % (ref 37–47)
HGB BLD-MCNC: 12.2 G/DL (ref 12–16)
MAGNESIUM SERPL-MCNC: 1.5 MG/DL (ref 1.5–2.5)
MCH RBC QN AUTO: 37.9 PG (ref 27–33)
MCHC RBC AUTO-ENTMCNC: 33.2 G/DL (ref 32.2–35.5)
MCV RBC AUTO: 114.3 FL (ref 81.4–97.8)
PLATELET # BLD AUTO: 325 K/UL (ref 164–446)
PMV BLD AUTO: 10.1 FL (ref 9–12.9)
POTASSIUM SERPL-SCNC: 4.2 MMOL/L (ref 3.6–5.5)
PROT SERPL-MCNC: 5.1 G/DL (ref 6–8.2)
RBC # BLD AUTO: 3.22 M/UL (ref 4.2–5.4)
SODIUM SERPL-SCNC: 137 MMOL/L (ref 135–145)
WBC # BLD AUTO: 13.5 K/UL (ref 4.8–10.8)

## 2025-07-14 PROCEDURE — 83735 ASSAY OF MAGNESIUM: CPT

## 2025-07-14 PROCEDURE — 770020 HCHG ROOM/CARE - TELE (206)

## 2025-07-14 PROCEDURE — 700111 HCHG RX REV CODE 636 W/ 250 OVERRIDE (IP): Mod: JZ | Performed by: GENERAL PRACTICE

## 2025-07-14 PROCEDURE — 99232 SBSQ HOSP IP/OBS MODERATE 35: CPT | Performed by: GENERAL PRACTICE

## 2025-07-14 PROCEDURE — 700102 HCHG RX REV CODE 250 W/ 637 OVERRIDE(OP): Performed by: STUDENT IN AN ORGANIZED HEALTH CARE EDUCATION/TRAINING PROGRAM

## 2025-07-14 PROCEDURE — 700102 HCHG RX REV CODE 250 W/ 637 OVERRIDE(OP): Performed by: HOSPITALIST

## 2025-07-14 PROCEDURE — A9270 NON-COVERED ITEM OR SERVICE: HCPCS | Performed by: STUDENT IN AN ORGANIZED HEALTH CARE EDUCATION/TRAINING PROGRAM

## 2025-07-14 PROCEDURE — A9270 NON-COVERED ITEM OR SERVICE: HCPCS | Performed by: HOSPITALIST

## 2025-07-14 PROCEDURE — 97162 PT EVAL MOD COMPLEX 30 MIN: CPT

## 2025-07-14 PROCEDURE — 51798 US URINE CAPACITY MEASURE: CPT

## 2025-07-14 PROCEDURE — 85027 COMPLETE CBC AUTOMATED: CPT

## 2025-07-14 PROCEDURE — 36415 COLL VENOUS BLD VENIPUNCTURE: CPT

## 2025-07-14 PROCEDURE — A9270 NON-COVERED ITEM OR SERVICE: HCPCS | Performed by: GENERAL PRACTICE

## 2025-07-14 PROCEDURE — 700111 HCHG RX REV CODE 636 W/ 250 OVERRIDE (IP): Mod: JZ | Performed by: HOSPITALIST

## 2025-07-14 PROCEDURE — 700102 HCHG RX REV CODE 250 W/ 637 OVERRIDE(OP): Performed by: GENERAL PRACTICE

## 2025-07-14 PROCEDURE — 700105 HCHG RX REV CODE 258: Performed by: HOSPITALIST

## 2025-07-14 PROCEDURE — 97535 SELF CARE MNGMENT TRAINING: CPT

## 2025-07-14 PROCEDURE — 80053 COMPREHEN METABOLIC PANEL: CPT

## 2025-07-14 RX ORDER — METHOCARBAMOL 500 MG/1
500 TABLET, FILM COATED ORAL 4 TIMES DAILY PRN
Status: DISCONTINUED | OUTPATIENT
Start: 2025-07-14 | End: 2025-07-16 | Stop reason: HOSPADM

## 2025-07-14 RX ADMIN — OMEPRAZOLE 40 MG: 20 CAPSULE, DELAYED RELEASE ORAL at 04:45

## 2025-07-14 RX ADMIN — OXYCODONE 5 MG: 5 TABLET ORAL at 10:07

## 2025-07-14 RX ADMIN — Medication 100 MG: at 04:44

## 2025-07-14 RX ADMIN — OXYCODONE 5 MG: 5 TABLET ORAL at 01:22

## 2025-07-14 RX ADMIN — OXYCODONE 5 MG: 5 TABLET ORAL at 21:04

## 2025-07-14 RX ADMIN — LEVOTHYROXINE SODIUM 75 MCG: 0.07 TABLET ORAL at 04:44

## 2025-07-14 RX ADMIN — SPIRONOLACTONE 200 MG: 100 TABLET ORAL at 04:45

## 2025-07-14 RX ADMIN — OXYCODONE 5 MG: 5 TABLET ORAL at 17:05

## 2025-07-14 RX ADMIN — ESCITALOPRAM OXALATE 20 MG: 10 TABLET ORAL at 04:44

## 2025-07-14 RX ADMIN — OMEPRAZOLE 40 MG: 20 CAPSULE, DELAYED RELEASE ORAL at 17:05

## 2025-07-14 RX ADMIN — DOXYCYCLINE 100 MG: 100 TABLET, FILM COATED ORAL at 17:05

## 2025-07-14 RX ADMIN — APIXABAN 5 MG: 5 TABLET, FILM COATED ORAL at 17:05

## 2025-07-14 RX ADMIN — HYDROXYZINE HYDROCHLORIDE 25 MG: 25 TABLET ORAL at 21:04

## 2025-07-14 RX ADMIN — HYDROXYZINE HYDROCHLORIDE 25 MG: 25 TABLET ORAL at 01:22

## 2025-07-14 RX ADMIN — HYDROCORTISONE SODIUM SUCCINATE 50 MG: 100 INJECTION, POWDER, FOR SOLUTION INTRAMUSCULAR; INTRAVENOUS at 13:04

## 2025-07-14 RX ADMIN — HYDROCORTISONE SODIUM SUCCINATE 50 MG: 100 INJECTION, POWDER, FOR SOLUTION INTRAMUSCULAR; INTRAVENOUS at 08:28

## 2025-07-14 RX ADMIN — Medication 1 TABLET: at 04:44

## 2025-07-14 RX ADMIN — HYDROCORTISONE SODIUM SUCCINATE 50 MG: 100 INJECTION, POWDER, FOR SOLUTION INTRAMUSCULAR; INTRAVENOUS at 01:30

## 2025-07-14 RX ADMIN — APIXABAN 5 MG: 5 TABLET, FILM COATED ORAL at 04:44

## 2025-07-14 RX ADMIN — HYDROXYZINE HYDROCHLORIDE 25 MG: 25 TABLET ORAL at 10:07

## 2025-07-14 RX ADMIN — PIPERACILLIN AND TAZOBACTAM 3.38 G: 3; .375 INJECTION, POWDER, FOR SOLUTION INTRAVENOUS at 04:47

## 2025-07-14 RX ADMIN — OXYCODONE 5 MG: 5 TABLET ORAL at 05:52

## 2025-07-14 RX ADMIN — HYDROXYZINE HYDROCHLORIDE 25 MG: 25 TABLET ORAL at 17:05

## 2025-07-14 RX ADMIN — HYDROCORTISONE SODIUM SUCCINATE 50 MG: 100 INJECTION, POWDER, FOR SOLUTION INTRAMUSCULAR; INTRAVENOUS at 17:50

## 2025-07-14 RX ADMIN — GABAPENTIN 300 MG: 300 CAPSULE ORAL at 17:05

## 2025-07-14 RX ADMIN — GABAPENTIN 300 MG: 300 CAPSULE ORAL at 04:44

## 2025-07-14 RX ADMIN — DOXYCYCLINE 100 MG: 100 TABLET, FILM COATED ORAL at 04:44

## 2025-07-14 ASSESSMENT — PAIN DESCRIPTION - PAIN TYPE
TYPE: ACUTE PAIN;SURGICAL PAIN
TYPE: ACUTE PAIN

## 2025-07-14 ASSESSMENT — COGNITIVE AND FUNCTIONAL STATUS - GENERAL
SUGGESTED CMS G CODE MODIFIER MOBILITY: CI
MOBILITY SCORE: 23
CLIMB 3 TO 5 STEPS WITH RAILING: A LITTLE

## 2025-07-14 ASSESSMENT — GAIT ASSESSMENTS
GAIT LEVEL OF ASSIST: SUPERVISED
DEVIATION: ANTALGIC
DISTANCE (FEET): 225

## 2025-07-14 ASSESSMENT — ENCOUNTER SYMPTOMS: COUGH: 1

## 2025-07-14 ASSESSMENT — FIBROSIS 4 INDEX: FIB4 SCORE: 1.43

## 2025-07-14 NOTE — CARE PLAN
The patient is Stable - Low risk of patient condition declining or worsening    Shift Goals  Clinical Goals: monitor resp status, mobilize, VSS IV steroids/abx  Patient Goals: comfort, pain mgmt  Family Goals: eddie    Progress made toward(s) clinical / shift goals:    Problem: Pain - Standard  Goal: Alleviation of pain or a reduction in pain to the patient’s comfort goal  Outcome: Progressing  Note: Pt reports pain well controlled with current therapy. Additional non-pharmacologic interventions implemented. Education on pain reduction goals, pain rating scale, and potential side effects of pharmacologic interventions. Demonstrates use of appropriate diversional activities and/or relaxation techniques.       Problem: Knowledge Deficit - Standard  Goal: Patient and family/care givers will demonstrate understanding of plan of care, disease process/condition, diagnostic tests and medications  Outcome: Progressing  Note: Discussed plan of care, pt able to actively participate in the learning process and demonstrates understanding by return demonstration or return explanation. Improved ability to communicate regarding their healthcare and current admission. Improved ability to identify barriers to learning and care.       Problem: Fall Risk  Goal: Patient will remain free from falls  Outcome: Progressing  Note: Fall prevention measures in place, bed alarm on and connected correctly, call light within reach, hourly rounding, Education provided on fall prevention. Pt instructed to use call light prior to exiting the bed, educated on use of bed alarms, and risks and complications related to falls. Medication orders evaluated for fall risk, gait/mobility assessed, sensory deficits assessed, mental status assessed, assessed for hypotension, hypovolemia, weakness, fatigue, elimination, and confusion.       Problem: Skin Integrity  Goal: Skin integrity is maintained or improved  Outcome: Progressing  Note: Education provided.  Turns self in bed. OOB to chair and bathroom and for ambulation.      Problem: Psychosocial  Goal: Patient's level of anxiety will decrease  Outcome: Progressing     Problem: Hemodynamics  Goal: Patient's hemodynamics, fluid balance and neurologic status will be stable or improve  Outcome: Progressing  Note: Vital signs stable, CMS intact, signs of bleeding assessed. I/O monitored. IV fluids not in use. Oral intake adequate. Peripheral pulses assessed and monitored. Capillary refill assessed. PRN blood pressure control meds given as needed.       Problem: Respiratory  Goal: Patient will achieve/maintain optimum respiratory ventilation and gas exchange  Outcome: Progressing  Note: WoB assessed, all lobes auscultated. O2 weaned as tolerated. Remaining on 1L NC. Suction provided as needed.

## 2025-07-14 NOTE — DISCHARGE PLANNING
Care Transition Team Discharge Planning    Anticipated Discharge Information  Discharge Disposition: Discharged to home/self care (01)  Discharge Address: [address] [place] [address]  Discharge Contact Phone Number: [phone]    Discharge Plan:  Patient discussed in IDT rounds. Anticipate DC home tomorrow AM with no needs. Patient is on her baseline O2.

## 2025-07-14 NOTE — PROGRESS NOTES
Bedside report received from off going RN: Lizbeth, assumed care of patient.     Fall Risk Score: MODERATE RISK  Fall risk interventions in place: Provide patient/family education based on risk assessment, Educate patient/family to call staff for assistance when getting out of bed, Place fall precaution signage outside patient door, Place patient in room close to nursing station, and Utilize bed/chair fall alarm  Bed type: Regular (Sam Score less than 17 interventions in place)  Patient on cardiac monitor: Yes  IVF/IV medications: Not Applicable   Oxygen: How many liters 3L, Traced the line to wall oxygen, and No oxygen tank in room  Bedside sitter: Not Applicable   Isolation: Not applicable

## 2025-07-14 NOTE — CARE PLAN
The patient is Watcher - Medium risk of patient condition declining or worsening    Shift Goals  Clinical Goals: wean O2, VSS, cardiac monitor  Patient Goals: sleep  Family Goals: eddie    Progress made toward(s) clinical / shift goals:    Problem: Pain - Standard  Goal: Alleviation of pain or a reduction in pain to the patient’s comfort goal  Outcome: Progressing  Note: Pt reports pain well controlled with current therapy. Additional non-pharmacologic interventions implemented. Education on pain reduction goals, pain rating scale, and potential side effects of pharmacologic interventions. Demonstrates use of appropriate diversional activities and/or relaxation techniques.       Problem: Knowledge Deficit - Standard  Goal: Patient and family/care givers will demonstrate understanding of plan of care, disease process/condition, diagnostic tests and medications  Outcome: Progressing  Note: Discussed plan of care, pt able to actively participate in the learning process and demonstrates understanding by return demonstration or return explanation. Improved ability to communicate regarding their healthcare and current admission. Improved ability to identify barriers to learning and care.         Patient is not progressing towards the following goals:

## 2025-07-14 NOTE — PROGRESS NOTES
Telemetry Shift Summary    Rhythm SR/SB  HR Range 52-63  Ectopy rPVC  Measurement 0.14/0.07/0.42    Normal Values  Rhythm SR  HR Range   Measurement 0.12-.020 / 0.06-0.10 / 0.30-0.52

## 2025-07-14 NOTE — THERAPY
Physical Therapy   Initial Evaluation     Patient Name:  Minerva Tillman  Age:  50 y.o., Sex:  female  Medical Record #:  [id number]  Today's Date: 7/14/2025     Precautions  Medical: Fall Risk    Assessment  Patient is a 50 y.o. female with hx of hypertension, portal vein thrombosis on Eliquis, chronic alcohol use, decompensated cirrhosis MELD of 23, chronic hypoxemic respiratory failure on 2 nocturnal, hypothyroidism, mood disorder, and recent admit earlier in July for PNA/alcohol withdrawal/liver cirrhosis with ascites. Pt admitted with acute on chronic hypoxic respiratory failure and acute cystitis. PT eval complete, pt currently presents at/near her functional baseline and completed all mobility at SPV level with no AD. No significant LE or functional deficits noted on exam. Anticipate safe DC home with family support as needed once medically cleared. Patient will not be actively followed for physical therapy services at this time, however may be seen if requested by physician for 1 more visit within 30 days to address any discharge or equipment needs.     Plan    Physical Therapy Initial Treatment Plan   Duration: Discharge Needs Only    DC Equipment Recommendations: None  Discharge Recommendations: Anticipate that the patient will have no further physical therapy needs after discharge from the hospital         Vitals   Pulse Oximetry 93 %   O2 (LPM) 1   O2 Delivery Device Silicone Nasal Cannula   Pain 0 - 10 Group   Location Hip   Location Orientation Right   Therapist Pain Assessment During Activity;Post Activity Pain Same as Prior to Activity   Non Verbal Descriptors   Non Verbal Scale  Calm   Prior Living Situation   Prior Services None;Home-Independent   Housing / Facility 2 Story House   Steps Into Home   (pt can enter on first or second level to avoid stairs)   Steps In Home   (FOS)   Equipment Owned Front-Wheel Walker;Single Point Cane;Wheelchair   Lives with - Patient's Self Care Capacity Spouse    Comments reports her  usually works during the day, however she has other people she can call for help if needed   Prior Level of Functional Mobility   Bed Mobility Independent   Transfer Status Independent   Ambulation Independent   Ambulation Distance community distances   Assistive Devices Used None   Stairs Independent   Cognition    Cognition / Consciousness WDL   Level of Consciousness Alert   Comments pleasant and participatory   Active ROM Lower Body    Active ROM Lower Body  WDL   Strength Lower Body   Lower Body Strength  WDL   Coordination Lower Body    Coordination Lower Body  WDL   Balance Assessment   Sitting Balance (Static) Good   Sitting Balance (Dynamic) Good   Standing Balance (Static) Fair +   Standing Balance (Dynamic) Fair +   Weight Shift Sitting Good   Weight Shift Standing Fair   Comments no AD   Bed Mobility    Supine to Sit Supervised   Scooting Supervised   Rolling Supervised   Gait Analysis   Gait Level Of Assist Supervised   Assistive Device None   Distance (Feet) 225   # of Times Distance was Traveled 1   Deviation Antalgic  (mildly antalgic with intermittent R hip pain)   # of Stairs Climbed 6   Level of Assist with Stairs Supervised   Weight Bearing Status no restrictions   Functional Mobility   Sit to Stand Supervised   Bed, Chair, Wheelchair Transfer Supervised   Toilet Transfers Supervised   Mobility no AD   6 Clicks Assessment - How much HELP from from another person do you currently need... (If the patient hasn't done an activity recently, how much help from another person do you think he/she would need if he/she tried?)   Turning from your back to your side while in a flat bed without using bedrails? 4   Moving from lying on your back to sitting on the side of a flat bed without using bedrails? 4   Moving to and from a bed to a chair (including a wheelchair)? 4   Standing up from a chair using your arms (e.g., wheelchair, or bedside chair)? 4   Walking in hospital  room? 4   Climbing 3-5 steps with a railing? 3   6 clicks Mobility Score 23   Activity Tolerance   Comments no overt pain, SOB, or fatigue   Education Group   Education Provided Role of Physical Therapist   Role of Physical Therapist Patient Response Patient;Acceptance;Family;Explanation;Verbal Demonstration   Additional Comments education on pt's CLOF, FWW vs no AD, importance of OOB mobility vs pathology of bed rest, energy conservation, activity progression at home   Physical Therapy Initial Treatment Plan    Duration Discharge Needs Only   Problem List    Problems None   Anticipated Discharge Equipment and Recommendations   DC Equipment Recommendations None   Discharge Recommendations Anticipate that the patient will have no further physical therapy needs after discharge from the hospital   Interdisciplinary Plan of Care Collaboration   IDT Collaboration with  Nursing;Family / Caregiver   Patient Position at End of Therapy Seated;Chair Alarm On;Call Light within Reach;Tray Table within Reach;Phone within Reach;Family / Friend in Room   Collaboration Comments RN updated   Session Information   Date / Session Number  7/14- 1x only (DC needs)

## 2025-07-14 NOTE — PROGRESS NOTES
Monitor Summary  Rhythm: SR SB  Rate: 55-83  Ectopy: ovv PVC, rare Bigem  .15 / .06 / .48    ~--~°~--~ No Digital Monitor Summary Available - See Media ~--~°~--~

## 2025-07-14 NOTE — PROGRESS NOTES
Hospital Medicine Daily Progress Note    Date of Service  7/14/2025    Chief Complaint  Minerva Tillman is a 50 y.o. female admitted 7/9/2025 with SOB    Hospital Course  This is a 50-year-old female with past medical history of hypertension, portal vein thrombosis on Eliquis, chronic alcohol use, decompensated cirrhosis MELD of 23, chronic hypoxemic respiratory failure on 2 nocturnal, hypothyroidism and mood disorder who was admitted on 7/9 with acute hypoxemic respiratory failure.    Patient was recently admitted in July for community-acquired pneumonia, alcohol withdrawal and liver cirrhosis with ascites requiring 2 paracentesis (negative SBP). She completed only 7 days of prednisolone due to no improvement noted. GI took patient for EGD 6/30 found to have grade 1 esophageal varices, 0.5 cm hiatal hernia recommended to start Coreg and repeat EGD in 6 months.  Colonoscopy also performed found to have hemorrhoids no other lesions seen recommended repeat colonoscopy in 10 years and Anusol MA twice daily for 7 days.     After discharging, patient went to a [facility] inpatient rehab detox program however she was felt to be medically too ill to for enrollment.    On this admission, CT imaging noted GGO. COVID, influenza, RSV negative. B-D- glucan negative.  Patient to continue with Zosyn and doxycycline for coverage of hospital-acquired pneumonia. Patient was started on high dose steroids, with improvement, patient initially required heated high flow nasal cannula, now weaned down to nasal cannula.    Interval Problem Update  Acute on chronic respiratory failure secondary to ?etiology - on this admission, CT imaging noted GGO. COVID, influenza, RSV negative. B-D- glucan negative.      Patient to continue with Zosyn and doxycycline for coverage of hospital-acquired pneumonia.     Patient was started on high dose steroids, with improvement, patient initially required heated high flow nasal cannula, now weaned down  to 2L nasal cannula.  Patient to complete total 5-day course of high-dose steroids.  Encourage patient to continue with increasing mobility.      Patient's mother at bedside, updated on plan of care, all questions answered.    I have discussed this patient's plan of care and discharge plan at IDT rounds today with Case Management, Nursing, Nursing leadership, and other members of the IDT team.    Consultants/Specialty  pulmonary    Code Status  Full Code    Disposition  Patient is not medically clear to discharge home.   I have placed the appropriate orders for post-discharge needs.    Review of Systems  Review of Systems   Respiratory:  Positive for cough.    All other systems reviewed and are negative.       Physical Exam  Temp:  [36.2 °C (97.2 °F)-36.5 °C (97.7 °F)] 36.2 °C (97.2 °F)  Pulse:  [49-67] 49  Resp:  [16-18] 18  BP: (114-145)/(81-93) 145/91  SpO2:  [92 %-95 %] 92 %    Physical Exam  Vitals and nursing note reviewed.   Constitutional:       General: She is not in acute distress.     Appearance: Normal appearance. She is ill-appearing.   HENT:      Head: Normocephalic and atraumatic.      Mouth/Throat:      Mouth: Mucous membranes are moist.      Pharynx: No oropharyngeal exudate.   Eyes:      Extraocular Movements: Extraocular movements intact.      Pupils: Pupils are equal, round, and reactive to light.   Cardiovascular:      Rate and Rhythm: Normal rate and regular rhythm.      Pulses: Normal pulses.      Heart sounds: No murmur heard.     No friction rub. No gallop.   Pulmonary:      Effort: Pulmonary effort is normal. No respiratory distress.      Breath sounds: No wheezing, rhonchi or rales.   Abdominal:      General: Bowel sounds are normal. There is no distension.      Palpations: Abdomen is soft. There is no mass.      Tenderness: There is no abdominal tenderness.   Musculoskeletal:         General: No swelling or tenderness. Normal range of motion.      Cervical back: Normal range of motion. No  rigidity. No muscular tenderness.      Right lower leg: No edema.      Left lower leg: No edema.   Skin:     General: Skin is warm and dry.      Capillary Refill: Capillary refill takes less than 2 seconds.      Findings: No erythema or rash.   Neurological:      General: No focal deficit present.      Mental Status: She is alert and oriented to person, place, and time.      Motor: No weakness.      Gait: Gait normal.         Fluids    Intake/Output Summary (Last 24 hours) at 7/14/2025 1102  Last data filed at 7/13/2025 2230  Gross per 24 hour   Intake 1052.32 ml   Output --   Net 1052.32 ml        Laboratory  Recent Labs     07/12/25  0051 07/13/25  0256 07/14/25  0321   WBC 15.9* 11.8* 13.5*   RBC 3.36* 3.19* 3.22*   HEMOGLOBIN 12.6 12.2 12.2   HEMATOCRIT 38.9 36.4* 36.8*   .8* 114.1* 114.3*   MCH 37.5* 38.2* 37.9*   MCHC 32.4 33.5 33.2   RDW 70.2* 66.5* 66.7*   PLATELETCT 442 315 325   MPV 10.3 9.8 10.1     Recent Labs     07/12/25  0051 07/13/25  0256 07/14/25  0321   SODIUM 136 137 137   POTASSIUM 4.2 4.4 4.2   CHLORIDE 106 108 108   CO2 20 19* 19*   GLUCOSE 141* 156* 161*   BUN 17 18 17   CREATININE 0.66 0.70 0.73   CALCIUM 8.3* 8.2* 8.2*                     Imaging  US-ABDOMEN LTD (SOFT TISSUE)   Final Result      1.  Small amount of ascites in the lower quadrants of the abdomen.   2.  Requested paracentesis was not performed.         CT-ABDOMEN-PELVIS W/O   Final Result         1.  Scattered patchy bilateral pulmonary infiltrates.   2.  Nodular hepatic compatible with changes of cirrhosis.   3.  Scattered abdominal ascites.   4.  Atherosclerosis and atherosclerotic coronary artery disease.   5.  Pulmonary nodule, see nodule follow-up recommendations below.      Fleischner Society pulmonary nodule recommendations:   Low Risk: No routine follow-up      High Risk: Optional CT at 12 months      Comments: Nodules less than 6 mm do not require routine follow-up, but certain patients at high risk with  suspicious nodule morphology, upper lobe location, or both may warrant 12-month follow-up.      Low Risk - Minimal or absent history of smoking and of other known risk factors.      High Risk - History of smoking or of other known risk factors.      Note: These recommendations do not apply to lung cancer screening, patients with immunosuppression, or patients with known primary cancer.      Fleischner Society 2017 Guidelines for Management of Incidentally Detected Pulmonary Nodules in Adults         CT-CHEST (THORAX) W/O   Final Result      1.  Diffuse groundglass densities, likely multifocal pneumonia. Edema is a less likely differential consideration.   2.  5 mm right lower lobe pulmonary nodule.   3.  Trace pericardial effusion.   4.  Coronary artery calcifications.   5.  Cirrhosis.   6.  Ascites in the upper abdomen.   7.  Hypodense hepatic lesions as evaluated on recent MRI.         Fleischner society recommendations for follow up:   Low Risk: No routine follow-up      High Risk: Optional CT at 12 months      Comments: Nodules less than 6 mm do not require routine follow-up, but certain patients at high risk with suspicious nodule morphology, upper lobe location, or both may warrant 12-month follow-up.      Low Risk - Minimal or absent history of smoking and of other known risk factors.      High Risk - History of smoking or of other known risk factors.      Note: These recommendations do not apply to lung cancer screening, patients with immunosuppression, or patients with known primary cancer.      Fleischner Society 2017 Guidelines for Management of Incidentally Detected Pulmonary Nodules in Adults         Less than 6 mm: CT at 3-6 months. If stable, consider CT at 2 and 4 years.      6 mm or greater: CT at 3-6 months. Subsequent management based on the most suspicious nodule(s).      Comments: Multiple less than 6 mm pure ground-glass nodules are usually benign, but consider follow-up in selected patients at  high risk at 2 and 4 years.      Low Risk - Minimal or absent history of smoking and of other known risk factors.      High Risk - History of smoking or of other known risk factors.      Note: These recommendations do not apply to lung cancer screening, patients with immunosuppression, or patients with known primary cancer.      Fleischner Society 2017 Guidelines for Management of Incidentally Detected Pulmonary Nodules in Adults      DX-CHEST-PORTABLE (1 VIEW)   Final Result      1.  Low lung volumes.   2.  Cardiomegaly, possibly on the basis of hypoventilation.   3.  Mild interstitial infiltrates and/or edema.           Assessment/Plan  * Acute on chronic respiratory failure with hypoxia (HCC)  Assessment & Plan  On HF on ADT  Down 6L   Chest CT with diffuse groundglass densities, likely multifocal pneumonia.  COVID/RSV/influenza negative  Viral panel negative  Procalcitonin mildly elevated    Fluid restriction  Continue spironolactone  Increase levothyroxine dose  Continue Zosyn/Doxycycline  Continue IV steroids  Pulmonology following  On this admission, CT imaging noted GGO. COVID, influenza, RSV negative. B-D- glucan negative.  Patient to continue with Zosyn and doxycycline for coverage of hospital-acquired pneumonia. Patient was started on high dose steroids, with improvement, patient initially required heated high flow nasal cannula, now weaned down to nasal cannula.    Acute cystitis- (present on admission)  Assessment & Plan  IV ABx  Follow urine cultures    Portal vein thrombosis- (present on admission)  Assessment & Plan  Continue Eliquis    Portal hypertension with esophageal varices (HCC)- (present on admission)  Assessment & Plan  Hold carvedilol for now    Alcohol use disorder, severe, dependence (HCC)- (present on admission)  Assessment & Plan  Patient was discharged from here on July 4, and went to [place] to be admitted to an inpatient rehab program however she was felt to be medically to  ill    Hypothyroidism- (present on admission)  Assessment & Plan  TSH 35.300 and FT4 0.91   Increased levothyroxine    Fatty liver, alcoholic- (present on admission)  Assessment & Plan  Patient is currently maintained on lactulose, spironolactone, and Lasix  On recent admission her MELD was 27.  She did receive 1 week of prednisolone however her Delmy score did not improve significantly and therefore it was not continued beyond 7 days.  She is working on her sobriety    Essential hypertension- (present on admission)  Assessment & Plan  As needed antihypertensives         VTE prophylaxis: Eliquis    I have performed a physical exam and reviewed and updated ROS and Plan today (7/14/2025). In review of yesterday's note (7/13/2025), there are no changes except as documented above.

## 2025-07-14 NOTE — PROGRESS NOTES
Fall Risk Score: MODERATE RISK  Fall risk interventions in place: Provide patient/family education based on risk assessment, Educate patient/family to call staff for assistance when getting out of bed, Place fall precaution signage outside patient door, Place patient in room close to nursing station, Utilize bed/chair fall alarm, and Bed alarm connected correctly  Bed type: Regular (Sam Score less than 17 interventions in place)  Patient on cardiac monitor: Yes  IVF/IV medications: Not Applicable   Oxygen: How many liters 3L and Traced the line to wall oxygen  Bedside sitter: Not Applicable   Isolation: Not applicable

## 2025-07-15 ENCOUNTER — APPOINTMENT (OUTPATIENT)
Dept: RADIOLOGY | Facility: MEDICAL CENTER | Age: 50
DRG: 189 | End: 2025-07-15
Attending: STUDENT IN AN ORGANIZED HEALTH CARE EDUCATION/TRAINING PROGRAM
Payer: COMMERCIAL

## 2025-07-15 LAB
ALBUMIN SERPL BCP-MCNC: 2.9 G/DL (ref 3.2–4.9)
ALBUMIN SERPL ELPH-MCNC: 2.44 G/DL (ref 3.75–5.01)
ALBUMIN/GLOB SERPL: 1 G/DL
ALP SERPL-CCNC: 143 U/L (ref 30–99)
ALPHA1 GLOB SERPL ELPH-MCNC: 0.37 G/DL (ref 0.19–0.46)
ALPHA2 GLOB SERPL ELPH-MCNC: 0.67 G/DL (ref 0.48–1.05)
ALT SERPL-CCNC: 88 U/L (ref 2–50)
ANION GAP SERPL CALC-SCNC: 10 MMOL/L (ref 7–16)
AST SERPL-CCNC: 71 U/L (ref 12–45)
B-GLOBULIN SERPL ELPH-MCNC: 0.74 G/DL (ref 0.48–1.1)
BILIRUB SERPL-MCNC: 3.1 MG/DL (ref 0.1–1.5)
BUN SERPL-MCNC: 20 MG/DL (ref 8–22)
CALCIUM ALBUM COR SERPL-MCNC: 9.8 MG/DL (ref 8.5–10.5)
CALCIUM SERPL-MCNC: 8.9 MG/DL (ref 8.5–10.5)
CHLORIDE SERPL-SCNC: 107 MMOL/L (ref 96–112)
CO2 SERPL-SCNC: 20 MMOL/L (ref 20–33)
CREAT SERPL-MCNC: 0.68 MG/DL (ref 0.5–1.4)
ERYTHROCYTE [DISTWIDTH] IN BLOOD BY AUTOMATED COUNT: 68.5 FL (ref 35.9–50)
GAMMA GLOB SERPL ELPH-MCNC: 0.79 G/DL (ref 0.62–1.51)
GFR SERPLBLD CREATININE-BSD FMLA CKD-EPI: 105 ML/MIN/1.73 M 2
GLOBULIN SER CALC-MCNC: 2.8 G/DL (ref 1.9–3.5)
GLUCOSE SERPL-MCNC: 112 MG/DL (ref 65–99)
HCT VFR BLD AUTO: 42.3 % (ref 37–47)
HGB BLD-MCNC: 13.5 G/DL (ref 12–16)
INTERPRETATION SERPL IFE-IMP: ABNORMAL
MAGNESIUM SERPL-MCNC: 1.5 MG/DL (ref 1.5–2.5)
MCH RBC QN AUTO: 37.9 PG (ref 27–33)
MCHC RBC AUTO-ENTMCNC: 31.9 G/DL (ref 32.2–35.5)
MCV RBC AUTO: 118.8 FL (ref 81.4–97.8)
MONOCLON BAND OBS SERPL: ABNORMAL
MONOCLONAL PROTEIN NL11656: ABNORMAL G/DL
NT-PROBNP SERPL IA-MCNC: 225 PG/ML (ref 0–125)
PATHOLOGY STUDY: ABNORMAL
PLATELET # BLD AUTO: 318 K/UL (ref 164–446)
PMV BLD AUTO: 9.9 FL (ref 9–12.9)
POTASSIUM SERPL-SCNC: 4.4 MMOL/L (ref 3.6–5.5)
PROCALCITONIN SERPL-MCNC: 0.09 NG/ML
PROT SERPL-MCNC: 5 G/DL (ref 6.3–8.2)
PROT SERPL-MCNC: 5.7 G/DL (ref 6–8.2)
RBC # BLD AUTO: 3.56 M/UL (ref 4.2–5.4)
SODIUM SERPL-SCNC: 137 MMOL/L (ref 135–145)
WBC # BLD AUTO: 16.1 K/UL (ref 4.8–10.8)

## 2025-07-15 PROCEDURE — 99233 SBSQ HOSP IP/OBS HIGH 50: CPT | Performed by: STUDENT IN AN ORGANIZED HEALTH CARE EDUCATION/TRAINING PROGRAM

## 2025-07-15 PROCEDURE — A9270 NON-COVERED ITEM OR SERVICE: HCPCS | Performed by: STUDENT IN AN ORGANIZED HEALTH CARE EDUCATION/TRAINING PROGRAM

## 2025-07-15 PROCEDURE — 700102 HCHG RX REV CODE 250 W/ 637 OVERRIDE(OP): Performed by: HOSPITALIST

## 2025-07-15 PROCEDURE — 700105 HCHG RX REV CODE 258: Performed by: STUDENT IN AN ORGANIZED HEALTH CARE EDUCATION/TRAINING PROGRAM

## 2025-07-15 PROCEDURE — 36415 COLL VENOUS BLD VENIPUNCTURE: CPT

## 2025-07-15 PROCEDURE — 700102 HCHG RX REV CODE 250 W/ 637 OVERRIDE(OP): Performed by: STUDENT IN AN ORGANIZED HEALTH CARE EDUCATION/TRAINING PROGRAM

## 2025-07-15 PROCEDURE — 83735 ASSAY OF MAGNESIUM: CPT

## 2025-07-15 PROCEDURE — 700111 HCHG RX REV CODE 636 W/ 250 OVERRIDE (IP): Performed by: STUDENT IN AN ORGANIZED HEALTH CARE EDUCATION/TRAINING PROGRAM

## 2025-07-15 PROCEDURE — 700102 HCHG RX REV CODE 250 W/ 637 OVERRIDE(OP): Performed by: GENERAL PRACTICE

## 2025-07-15 PROCEDURE — 85027 COMPLETE CBC AUTOMATED: CPT

## 2025-07-15 PROCEDURE — 80053 COMPREHEN METABOLIC PANEL: CPT

## 2025-07-15 PROCEDURE — 71045 X-RAY EXAM CHEST 1 VIEW: CPT

## 2025-07-15 PROCEDURE — A9270 NON-COVERED ITEM OR SERVICE: HCPCS | Performed by: HOSPITALIST

## 2025-07-15 PROCEDURE — 83880 ASSAY OF NATRIURETIC PEPTIDE: CPT

## 2025-07-15 PROCEDURE — A9270 NON-COVERED ITEM OR SERVICE: HCPCS | Performed by: GENERAL PRACTICE

## 2025-07-15 PROCEDURE — 770020 HCHG ROOM/CARE - TELE (206)

## 2025-07-15 PROCEDURE — 84145 PROCALCITONIN (PCT): CPT

## 2025-07-15 RX ORDER — MAGNESIUM SULFATE HEPTAHYDRATE 40 MG/ML
4 INJECTION, SOLUTION INTRAVENOUS ONCE
Status: COMPLETED | OUTPATIENT
Start: 2025-07-15 | End: 2025-07-15

## 2025-07-15 RX ADMIN — AMPICILLIN AND SULBACTAM 3 G: 1; 2 INJECTION, POWDER, FOR SOLUTION INTRAMUSCULAR; INTRAVENOUS at 13:15

## 2025-07-15 RX ADMIN — OXYCODONE 5 MG: 5 TABLET ORAL at 09:01

## 2025-07-15 RX ADMIN — SPIRONOLACTONE 200 MG: 100 TABLET ORAL at 04:29

## 2025-07-15 RX ADMIN — OXYCODONE 5 MG: 5 TABLET ORAL at 17:25

## 2025-07-15 RX ADMIN — HYDROXYZINE HYDROCHLORIDE 25 MG: 25 TABLET ORAL at 21:22

## 2025-07-15 RX ADMIN — OXYCODONE 5 MG: 5 TABLET ORAL at 04:30

## 2025-07-15 RX ADMIN — GABAPENTIN 300 MG: 300 CAPSULE ORAL at 04:29

## 2025-07-15 RX ADMIN — CARVEDILOL 3.12 MG: 3.12 TABLET, FILM COATED ORAL at 17:22

## 2025-07-15 RX ADMIN — OMEPRAZOLE 40 MG: 20 CAPSULE, DELAYED RELEASE ORAL at 04:30

## 2025-07-15 RX ADMIN — OMEPRAZOLE 40 MG: 20 CAPSULE, DELAYED RELEASE ORAL at 17:22

## 2025-07-15 RX ADMIN — HYDROXYZINE HYDROCHLORIDE 25 MG: 25 TABLET ORAL at 17:25

## 2025-07-15 RX ADMIN — AMPICILLIN AND SULBACTAM 3 G: 1; 2 INJECTION, POWDER, FOR SOLUTION INTRAMUSCULAR; INTRAVENOUS at 23:52

## 2025-07-15 RX ADMIN — DOXYCYCLINE 100 MG: 100 TABLET, FILM COATED ORAL at 04:29

## 2025-07-15 RX ADMIN — HYDROXYZINE HYDROCHLORIDE 25 MG: 25 TABLET ORAL at 04:29

## 2025-07-15 RX ADMIN — MAGNESIUM SULFATE HEPTAHYDRATE 4 G: 4 INJECTION, SOLUTION INTRAVENOUS at 09:11

## 2025-07-15 RX ADMIN — OXYCODONE 5 MG: 5 TABLET ORAL at 12:44

## 2025-07-15 RX ADMIN — LEVOTHYROXINE SODIUM 75 MCG: 0.07 TABLET ORAL at 04:30

## 2025-07-15 RX ADMIN — GABAPENTIN 300 MG: 300 CAPSULE ORAL at 17:22

## 2025-07-15 RX ADMIN — Medication 1 TABLET: at 04:29

## 2025-07-15 RX ADMIN — HYDROXYZINE HYDROCHLORIDE 25 MG: 25 TABLET ORAL at 09:01

## 2025-07-15 RX ADMIN — AMPICILLIN AND SULBACTAM 3 G: 1; 2 INJECTION, POWDER, FOR SOLUTION INTRAMUSCULAR; INTRAVENOUS at 17:22

## 2025-07-15 RX ADMIN — ESCITALOPRAM OXALATE 20 MG: 10 TABLET ORAL at 04:30

## 2025-07-15 RX ADMIN — Medication 100 MG: at 04:29

## 2025-07-15 RX ADMIN — OXYCODONE 5 MG: 5 TABLET ORAL at 21:21

## 2025-07-15 RX ADMIN — APIXABAN 5 MG: 5 TABLET, FILM COATED ORAL at 04:30

## 2025-07-15 RX ADMIN — HYDROXYZINE HYDROCHLORIDE 25 MG: 25 TABLET ORAL at 12:44

## 2025-07-15 ASSESSMENT — ENCOUNTER SYMPTOMS
VOMITING: 0
ABDOMINAL PAIN: 0
FEVER: 0
PALPITATIONS: 0
COUGH: 0
CHILLS: 0
DIZZINESS: 0
NAUSEA: 0
SHORTNESS OF BREATH: 0
HEADACHES: 0
MYALGIAS: 0

## 2025-07-15 ASSESSMENT — PAIN DESCRIPTION - PAIN TYPE
TYPE: ACUTE PAIN

## 2025-07-15 ASSESSMENT — FIBROSIS 4 INDEX: FIB4 SCORE: 1.43

## 2025-07-15 NOTE — PROGRESS NOTES
Monitor Summary  Rhythm: SB-SR  Rate: 53-78  Ectopy: oPVC  .16 / .06 / .46

## 2025-07-15 NOTE — CARE PLAN
Problem: Pain - Standard  Goal: Alleviation of pain or a reduction in pain to the patient’s comfort goal  Description: Target End Date:  Prior to discharge or change in level of care    Document on Vitals flowsheet    1.  Document pain using the appropriate pain scale per order or unit policy  2.  Educate and implement non-pharmacologic comfort measures (i.e. relaxation, distraction, massage, cold/heat therapy, etc.)  3.  Pain management medications as ordered  4.  Reassess pain after pain med administration per policy  5.  If opiods administered assess patient's response to pain medication is appropriate per POSS sedation scale  6.  Follow pain management plan developed in collaboration with patient and interdisciplinary team (including palliative care or pain specialists if applicable)  Outcome: Progressing  Note: Assess and monitor for pain. Provide pharmacological and non-pharmacological interventions as appropriate. Re-evaluate and continue to monitor.        Problem: Knowledge Deficit - Standard  Goal: Patient and family/care givers will demonstrate understanding of plan of care, disease process/condition, diagnostic tests and medications  Description: Target End Date:  1-3 days or as soon as patient condition allows    Document in Patient Education    1.  Patient and family/caregiver oriented to unit, equipment, visitation policy and means for communicating concern  2.  Complete/review Learning Assessment  3.  Assess knowledge level of disease process/condition, treatment plan, diagnostic tests and medications  4.  Explain disease process/condition, treatment plan, diagnostic tests and medications  Outcome: Progressing     Problem: Fall Risk  Goal: Patient will remain free from falls  Description: Target End Date:  Prior to discharge or change in level of care    Document interventions on the [facility] Fall Risk Assessment    1.  Assess for fall risk factors  2.  Implement fall precautions  Outcome:  Progressing     Problem: Skin Integrity  Goal: Skin integrity is maintained or improved  Description: Target End Date:  Prior to discharge or change in level of care    Document interventions on Skin Risk/Sam flowsheet groups and corresponding LDA    1.  Assess and monitor skin integrity, appearance and/or temperature  2.  Assess risk factors for impaired skin integrity and/or pressures ulcers  3.  Implement precautions to protect skin integrity in collaboration with interdisciplinary team  4.  Implement pressure ulcer prevention protocol if at risk for skin breakdown  5.  Confirm wound care consult if at risk for skin breakdown  6.  Ensure patient use of pressure relieving devices  (Low air loss bed, waffle overlay, heel protectors, ROHO cushion, etc)  Outcome: Progressing   The patient is Watcher - Medium risk of patient condition declining or worsening    Shift Goals  Clinical Goals: monitor respiratory status, wean oxygen  Patient Goals: rest, pain mangement  Family Goals: not present    Progress made toward(s) clinical / shift goals:  pain controlled with current pain regimen     Patient is not progressing towards the following goals:

## 2025-07-15 NOTE — CARE PLAN
The patient is Stable - Low risk of patient condition declining or worsening    Shift Goals  Clinical Goals: Monitor VS, safety, ABX  Patient Goals: rest, pain  Family Goals: eddie    Progress made toward(s) clinical / shift goals:        Problem: Knowledge Deficit - Standard  Goal: Patient and family/care givers will demonstrate understanding of plan of care, disease process/condition, diagnostic tests and medications  Outcome: Progressing     Problem: Fall Risk  Goal: Patient will remain free from falls  Outcome: Progressing     Problem: Skin Integrity  Goal: Skin integrity is maintained or improved  Outcome: Progressing     Problem: Psychosocial  Goal: Patient's level of anxiety will decrease  Outcome: Progressing     Problem: Respiratory  Goal: Patient will achieve/maintain optimum respiratory ventilation and gas exchange  Outcome: Progressing       Patient is not progressing towards the following goals:

## 2025-07-15 NOTE — PROGRESS NOTES
Bedside report received from off going RN , assumed care of patient.     Fall Risk Score: MODERATE RISK  Fall risk interventions in place: Provide patient/family education based on risk assessment, Educate patient/family to call staff for assistance when getting out of bed, Place fall precaution signage outside patient door, Utilize bed/chair fall alarm, and Bed alarm connected correctly  Bed type: Regular (Sam Score less than 17 interventions in place)  Patient on cardiac monitor: Yes  IVF/IV medications: Not Applicable   Oxygen: How many liters 1L, Traced the line to wall oxygen, and No oxygen tank in room  Bedside sitter: Not Applicable   Isolation: Not applicable

## 2025-07-15 NOTE — PROGRESS NOTES
Monitor Summary  Rhythm: Sinus Bradycardia  Rate: 48-59  Ectopy: PVCs, Bigeminy   .15 / .06 / .48    12 hr Chart check.

## 2025-07-16 ENCOUNTER — APPOINTMENT (OUTPATIENT)
Dept: RADIOLOGY | Facility: MEDICAL CENTER | Age: 50
DRG: 189 | End: 2025-07-16
Attending: STUDENT IN AN ORGANIZED HEALTH CARE EDUCATION/TRAINING PROGRAM
Payer: COMMERCIAL

## 2025-07-16 ENCOUNTER — PHARMACY VISIT (OUTPATIENT)
Dept: PHARMACY | Facility: MEDICAL CENTER | Age: 50
End: 2025-07-16
Payer: COMMERCIAL

## 2025-07-16 VITALS
OXYGEN SATURATION: 94 % | TEMPERATURE: 97.5 F | RESPIRATION RATE: 17 BRPM | HEIGHT: 62 IN | DIASTOLIC BLOOD PRESSURE: 83 MMHG | HEART RATE: 65 BPM | BODY MASS INDEX: 27.26 KG/M2 | WEIGHT: 148.15 LBS | SYSTOLIC BLOOD PRESSURE: 123 MMHG

## 2025-07-16 PROBLEM — J96.21 ACUTE ON CHRONIC RESPIRATORY FAILURE WITH HYPOXIA (HCC): Status: RESOLVED | Noted: 2025-07-09 | Resolved: 2025-07-16

## 2025-07-16 LAB
ALBUMIN SERPL BCP-MCNC: 2.5 G/DL (ref 3.2–4.9)
ALBUMIN/GLOB SERPL: 1 G/DL
ALP SERPL-CCNC: 139 U/L (ref 30–99)
ALT SERPL-CCNC: 75 U/L (ref 2–50)
ANION GAP SERPL CALC-SCNC: 8 MMOL/L (ref 7–16)
APPEARANCE FLD: NORMAL
AST SERPL-CCNC: 74 U/L (ref 12–45)
BILIRUB SERPL-MCNC: 2.4 MG/DL (ref 0.1–1.5)
BODY FLD TYPE: NORMAL
BUN SERPL-MCNC: 20 MG/DL (ref 8–22)
CALCIUM ALBUM COR SERPL-MCNC: 9.3 MG/DL (ref 8.5–10.5)
CALCIUM SERPL-MCNC: 8.1 MG/DL (ref 8.5–10.5)
CHLORIDE SERPL-SCNC: 108 MMOL/L (ref 96–112)
CO2 SERPL-SCNC: 22 MMOL/L (ref 20–33)
COLOR FLD: YELLOW
CREAT SERPL-MCNC: 0.73 MG/DL (ref 0.5–1.4)
CRYOGLOB SER QL 3D COLD INC: NORMAL
ERYTHROCYTE [DISTWIDTH] IN BLOOD BY AUTOMATED COUNT: 70.5 FL (ref 35.9–50)
GFR SERPLBLD CREATININE-BSD FMLA CKD-EPI: 99 ML/MIN/1.73 M 2
GLOBULIN SER CALC-MCNC: 2.5 G/DL (ref 1.9–3.5)
GLUCOSE SERPL-MCNC: 95 MG/DL (ref 65–99)
GRAM STN SPEC: NORMAL
HCT VFR BLD AUTO: 40.6 % (ref 37–47)
HGB BLD-MCNC: 13 G/DL (ref 12–16)
LYMPHOCYTES NFR FLD: 39 %
MAGNESIUM SERPL-MCNC: 1.7 MG/DL (ref 1.5–2.5)
MCH RBC QN AUTO: 37.7 PG (ref 27–33)
MCHC RBC AUTO-ENTMCNC: 32 G/DL (ref 32.2–35.5)
MCV RBC AUTO: 117.7 FL (ref 81.4–97.8)
MONOS+MACROS NFR FLD MANUAL: 49 %
NEUTROPHILS NFR FLD: 12 %
NUC CELL # FLD: 100 CELLS/UL
PLATELET # BLD AUTO: 275 K/UL (ref 164–446)
PMV BLD AUTO: 9.7 FL (ref 9–12.9)
POTASSIUM SERPL-SCNC: 4.8 MMOL/L (ref 3.6–5.5)
PROT SERPL-MCNC: 5 G/DL (ref 6–8.2)
RBC # BLD AUTO: 3.45 M/UL (ref 4.2–5.4)
RBC # FLD: <2000 CELLS/UL
SIGNIFICANT IND 70042: NORMAL
SITE SITE: NORMAL
SODIUM SERPL-SCNC: 138 MMOL/L (ref 135–145)
SOURCE SOURCE: NORMAL
WBC # BLD AUTO: 15.2 K/UL (ref 4.8–10.8)

## 2025-07-16 PROCEDURE — 87070 CULTURE OTHR SPECIMN AEROBIC: CPT

## 2025-07-16 PROCEDURE — 99239 HOSP IP/OBS DSCHRG MGMT >30: CPT | Performed by: STUDENT IN AN ORGANIZED HEALTH CARE EDUCATION/TRAINING PROGRAM

## 2025-07-16 PROCEDURE — 80053 COMPREHEN METABOLIC PANEL: CPT

## 2025-07-16 PROCEDURE — 36415 COLL VENOUS BLD VENIPUNCTURE: CPT

## 2025-07-16 PROCEDURE — 0W9G3ZZ DRAINAGE OF PERITONEAL CAVITY, PERCUTANEOUS APPROACH: ICD-10-PCS

## 2025-07-16 PROCEDURE — C1729 CATH, DRAINAGE: HCPCS

## 2025-07-16 PROCEDURE — 700102 HCHG RX REV CODE 250 W/ 637 OVERRIDE(OP): Performed by: HOSPITALIST

## 2025-07-16 PROCEDURE — A9270 NON-COVERED ITEM OR SERVICE: HCPCS | Performed by: STUDENT IN AN ORGANIZED HEALTH CARE EDUCATION/TRAINING PROGRAM

## 2025-07-16 PROCEDURE — 700102 HCHG RX REV CODE 250 W/ 637 OVERRIDE(OP): Performed by: STUDENT IN AN ORGANIZED HEALTH CARE EDUCATION/TRAINING PROGRAM

## 2025-07-16 PROCEDURE — 85027 COMPLETE CBC AUTOMATED: CPT

## 2025-07-16 PROCEDURE — RXMED WILLOW AMBULATORY MEDICATION CHARGE: Performed by: STUDENT IN AN ORGANIZED HEALTH CARE EDUCATION/TRAINING PROGRAM

## 2025-07-16 PROCEDURE — 87015 SPECIMEN INFECT AGNT CONCNTJ: CPT

## 2025-07-16 PROCEDURE — A9270 NON-COVERED ITEM OR SERVICE: HCPCS | Performed by: HOSPITALIST

## 2025-07-16 PROCEDURE — 83735 ASSAY OF MAGNESIUM: CPT

## 2025-07-16 PROCEDURE — 89051 BODY FLUID CELL COUNT: CPT

## 2025-07-16 PROCEDURE — 700111 HCHG RX REV CODE 636 W/ 250 OVERRIDE (IP): Mod: JZ | Performed by: STUDENT IN AN ORGANIZED HEALTH CARE EDUCATION/TRAINING PROGRAM

## 2025-07-16 PROCEDURE — 700105 HCHG RX REV CODE 258: Performed by: STUDENT IN AN ORGANIZED HEALTH CARE EDUCATION/TRAINING PROGRAM

## 2025-07-16 PROCEDURE — 87205 SMEAR GRAM STAIN: CPT

## 2025-07-16 RX ORDER — SPIRONOLACTONE 100 MG/1
200 TABLET, FILM COATED ORAL DAILY
Qty: 60 TABLET | Refills: 2 | Status: SHIPPED | OUTPATIENT
Start: 2025-07-16 | End: 2025-07-25 | Stop reason: SDUPTHER

## 2025-07-16 RX ORDER — GABAPENTIN 300 MG/1
300 CAPSULE ORAL 2 TIMES DAILY
Qty: 60 CAPSULE | Refills: 2 | Status: SHIPPED | OUTPATIENT
Start: 2025-07-16 | End: 2025-07-25 | Stop reason: SDUPTHER

## 2025-07-16 RX ORDER — OXYCODONE HYDROCHLORIDE 5 MG/1
5 TABLET ORAL EVERY 8 HOURS PRN
Qty: 15 TABLET | Refills: 0 | Status: SHIPPED | OUTPATIENT
Start: 2025-07-16 | End: 2025-07-21

## 2025-07-16 RX ORDER — CARVEDILOL 3.12 MG/1
3.12 TABLET ORAL 2 TIMES DAILY WITH MEALS
Qty: 60 TABLET | Refills: 2 | Status: SHIPPED | OUTPATIENT
Start: 2025-07-16 | End: 2025-07-25 | Stop reason: SDUPTHER

## 2025-07-16 RX ORDER — MAGNESIUM SULFATE HEPTAHYDRATE 40 MG/ML
4 INJECTION, SOLUTION INTRAVENOUS ONCE
Status: COMPLETED | OUTPATIENT
Start: 2025-07-16 | End: 2025-07-16

## 2025-07-16 RX ORDER — HYDROXYZINE HYDROCHLORIDE 25 MG/1
25 TABLET, FILM COATED ORAL EVERY 4 HOURS PRN
Qty: 60 TABLET | Refills: 1 | Status: SHIPPED | OUTPATIENT
Start: 2025-07-16 | End: 2025-07-25 | Stop reason: SDUPTHER

## 2025-07-16 RX ORDER — M-VIT,TX,IRON,MINS/CALC/FOLIC 27MG-0.4MG
1 TABLET ORAL DAILY
Qty: 30 TABLET | Refills: 2 | Status: SHIPPED | OUTPATIENT
Start: 2025-07-16

## 2025-07-16 RX ORDER — LEVOTHYROXINE SODIUM 75 UG/1
75 TABLET ORAL
Qty: 30 TABLET | Refills: 2 | Status: SHIPPED | OUTPATIENT
Start: 2025-07-17 | End: 2025-07-25 | Stop reason: SDUPTHER

## 2025-07-16 RX ORDER — LACTULOSE 10 G/15ML
15 SOLUTION ORAL EVERY EVENING
Qty: 450 ML | Refills: 2 | Status: SHIPPED | OUTPATIENT
Start: 2025-07-16 | End: 2025-07-25 | Stop reason: SDUPTHER

## 2025-07-16 RX ORDER — FUROSEMIDE 20 MG/1
20 TABLET ORAL DAILY
Qty: 30 TABLET | Refills: 2 | Status: SHIPPED | OUTPATIENT
Start: 2025-07-16 | End: 2025-07-25 | Stop reason: SDUPTHER

## 2025-07-16 RX ORDER — LANOLIN ALCOHOL/MO/W.PET/CERES
100 CREAM (GRAM) TOPICAL DAILY
Qty: 30 TABLET | Refills: 2 | Status: SHIPPED | OUTPATIENT
Start: 2025-07-16 | End: 2025-07-25 | Stop reason: SDUPTHER

## 2025-07-16 RX ORDER — OMEPRAZOLE 40 MG/1
CAPSULE, DELAYED RELEASE ORAL
Qty: 42 CAPSULE | Refills: 2 | Status: SHIPPED | OUTPATIENT
Start: 2025-07-16 | End: 2025-07-25 | Stop reason: SDUPTHER

## 2025-07-16 RX ORDER — ESCITALOPRAM OXALATE 20 MG/1
20 TABLET ORAL DAILY
Qty: 30 TABLET | Refills: 2 | Status: SHIPPED | OUTPATIENT
Start: 2025-07-16 | End: 2025-07-25 | Stop reason: SDUPTHER

## 2025-07-16 RX ADMIN — HYDROXYZINE HYDROCHLORIDE 25 MG: 25 TABLET ORAL at 11:52

## 2025-07-16 RX ADMIN — OXYCODONE 5 MG: 5 TABLET ORAL at 05:57

## 2025-07-16 RX ADMIN — HYDROXYZINE HYDROCHLORIDE 25 MG: 25 TABLET ORAL at 01:52

## 2025-07-16 RX ADMIN — LEVOTHYROXINE SODIUM 75 MCG: 0.07 TABLET ORAL at 05:57

## 2025-07-16 RX ADMIN — ESCITALOPRAM OXALATE 20 MG: 10 TABLET ORAL at 05:57

## 2025-07-16 RX ADMIN — Medication 100 MG: at 05:57

## 2025-07-16 RX ADMIN — MAGNESIUM SULFATE HEPTAHYDRATE 4 G: 4 INJECTION, SOLUTION INTRAVENOUS at 08:38

## 2025-07-16 RX ADMIN — Medication 1 TABLET: at 06:04

## 2025-07-16 RX ADMIN — SPIRONOLACTONE 200 MG: 100 TABLET ORAL at 06:04

## 2025-07-16 RX ADMIN — AMPICILLIN AND SULBACTAM 3 G: 1; 2 INJECTION, POWDER, FOR SOLUTION INTRAMUSCULAR; INTRAVENOUS at 05:59

## 2025-07-16 RX ADMIN — OXYCODONE 5 MG: 5 TABLET ORAL at 01:52

## 2025-07-16 RX ADMIN — OMEPRAZOLE 40 MG: 20 CAPSULE, DELAYED RELEASE ORAL at 05:57

## 2025-07-16 RX ADMIN — GABAPENTIN 300 MG: 300 CAPSULE ORAL at 05:57

## 2025-07-16 RX ADMIN — CARVEDILOL 3.12 MG: 3.12 TABLET, FILM COATED ORAL at 08:24

## 2025-07-16 RX ADMIN — OXYCODONE 5 MG: 5 TABLET ORAL at 11:52

## 2025-07-16 RX ADMIN — AMPICILLIN AND SULBACTAM 3 G: 1; 2 INJECTION, POWDER, FOR SOLUTION INTRAMUSCULAR; INTRAVENOUS at 14:16

## 2025-07-16 RX ADMIN — HYDROXYZINE HYDROCHLORIDE 25 MG: 25 TABLET ORAL at 05:57

## 2025-07-16 ASSESSMENT — PAIN DESCRIPTION - PAIN TYPE
TYPE: ACUTE PAIN

## 2025-07-16 ASSESSMENT — FIBROSIS 4 INDEX: FIB4 SCORE: 1.58

## 2025-07-16 NOTE — PROGRESS NOTES
Patient returned from  with transport on portable O2 tank. Per report from  tech [facility], 2.5L removed during paracentesis. Patient reported feeling light headed upon return from [facility]. /83. MD Mccann notified. Unasyn was given when patient returned from . PharmD Blanca notified to reschedule subsequent doses.

## 2025-07-16 NOTE — PROGRESS NOTES
Patient transported with transport by wheelchair and oxygen to [facility] for paracentesis. Patient's mother at bedside and notified of plans to transport patient to [facility].

## 2025-07-16 NOTE — DISCHARGE SUMMARY
Discharge Summary    CHIEF COMPLAINT ON ADMISSION  Chief Complaint   Patient presents with    Shortness of Breath     C/o shortness of breath x 4 days. Yesterday and today worse. Patient on 3 liters of oxygen 24/7. Hx of Liver Cirrhosis. + non productive cough. Hypotensive in triage.       Reason for Admission  Acute on chronic respiratory failure with hypoxia    Admission Date  7/9/2025    CODE STATUS  Full Code    HPI & HOSPITAL COURSE  Minerva Tillman is a 50-year-old female who presented on 7/9/2025 with worsening shortness of breath.  This is a pleasant woman with a history of decompensated cirrhosis MELD score of 23, portal vein thrombosis on apixaban, primary hypertension, alcohol use disorder with dependence, chronic respiratory failure on 2 LPM oxygen at nighttime, hypothyroidism, and mood disorder.  She was recently admitted in beginning of July for community-acquired pneumonia, alcohol withdrawal, and decompensated cirrhosis of the liver requiring 2 paracentesis negative for spontaneous bacterial peritonitis.  She completed only 7 days of prednisone due to no improvement noted.  At that time, patient underwent an EGD on 6/30/2025, which showed grade 1 esophageal varices with 0.5 cm hiatal hernia and recommended starting carvedilol with repeat EGD in 6 months.  Colonoscopy was also performed and the patient was found to have hemorrhoids with no other lesions seen.  Recommended repeat colonoscopy in 10 years with Anusol MT twice daily for 7 days, which the patient completed.    Following patient's discharge, she went to [place] inpatient rehabilitation detoxification program.  However, it was felt that the patient was medically too ill for enrollment.    Patient presenting with worsening shortness of breath.  CT scan of the abdomen showed scattered patchy bilateral pulmonary infiltrates with nodular hepatic nodules compatible with changes of cirrhosis.  Scattered abdominal ascites.  Atherosclerosis  with atherosclerotic coronary disease.  Pulmonary nodule of 5.7 mm on the right lower lobe.  Per guidelines no outpatient follow-up imaging required.  Findings notable for multifocal pneumonia.  Pulmonology was consulted.  Recommended outpatient pulmonary function testing with sleep medicine follow-up.  COVID-19, influenza, and RSV testing was negative.  Beta D glucan was negative.  Patient was initially initiated on Zosyn and doxycycline for hospital-acquired pneumonia, which was de-escalated to Unasyn with ongoing improvement of white count.  Patient was requiring high flow oxygen and was initiated on high-dose steroids with improvement.  She was able to wean down to 2 LPM oxygen by nasal cannula.  Completed 5-day course of high-dose steroids.  Patient's white count trended down to 15.2. Procalcitonin trending down to 0.09 from 0.37.  Transaminitis continued to improve with total bilirubin improving to 2.4.    Patient underwent ultrasound-guided paracentesis removal of 2.5 L on 7/16/2025.  She remained hemodynamically stable although she did have some dizziness afterwards, which resolved.  She was able to ambulate around the hospital without any further dizziness.    There was some ongoing concerns from nursing about the patient being able to care for self.  Home health was offered to the patient but declined as she stated that she previously had home health, which she felt was not very helpful.    Oxycodone was refilled for patient's ongoing abdominal pain, which she was previously using.  She requested consideration of other medication.  It was recommended to her that she could use low-dose ibuprofen if she refrained from alcohol.  Also Tylenol up to 2 g daily.  Patient was offered tramadol and lieu of oxycodone but declined.    Therefore, she is discharged in good and stable condition to home with close outpatient follow-up.    The patient met 2-midnight criteria for an inpatient stay at the time of  discharge.    Discharge Date  7/16/2025    FOLLOW UP ITEMS POST DISCHARGE  -Follow-up primary care provider in 3 to 5 days with labs.  -Follow-up with gastroenterologist.    DISCHARGE DIAGNOSES  Principal Problem (Resolved):    Acute on chronic respiratory failure with hypoxia (HCC) (POA: No)  Active Problems:    Alcohol use disorder, severe, dependence (HCC) (Chronic) (POA: Yes)    Essential hypertension (POA: Yes)    Fatty liver, alcoholic (POA: Yes)      Overview: Chronic alcohol use       Previously used to drink alcohol - wine( 7-10 drinks).       Sine 2020 - excessive alcohol , drinking 4-5 glasses wine a day.       Severe hepatic steatosis found on imaging       Patient has now stopped alcohol completely since 1 month.     Hypothyroidism (POA: Yes)    Portal hypertension with esophageal varices (HCC) (POA: Yes)    Portal vein thrombosis (POA: Yes)    Ground glass opacity present on imaging of lung (POA: Unknown)  Resolved Problems:    Acute cystitis (POA: Yes)      FOLLOW UP  Future Appointments   Date Time Provider Department Center   7/25/2025 11:00 AM Alexsander Chappell M.D. [facility]     Donita sAif M.D.  [address]  [address]  [place] [id number]  [phone]    Follow up        MEDICATIONS ON DISCHARGE     Medication List        START taking these medications        Instructions   amoxicillin-clavulanate 875-125 MG Tabs  Commonly known as: Augmentin   Take 1 Tablet by mouth 2 times a day for 3 days.  Dose: 1 Tablet     furosemide 20 MG Tabs  Commonly known as: Lasix   Take 1 Tablet by mouth every day.  Dose: 20 mg     omeprazole 40 MG delayed-release capsule  Start taking on: July 16, 2025  Commonly known as: PriLOSEC   Take 1 Capsule by mouth 2 times a day for 14 days, THEN 1 Capsule every day for 14 days.            CHANGE how you take these medications        Instructions   apixaban 5 MG Tabs  What changed: See the new instructions.  Commonly known as: Eliquis   Take 1 Tablet by  mouth 2 times a day.  Dose: 5 mg     levothyroxine 75 MCG Tabs  Start taking on: July 17, 2025  What changed:   medication strength  how much to take  Commonly known as: Synthroid   Take 1 Tablet by mouth every morning on an empty stomach.  Dose: 75 mcg     oxyCODONE immediate-release 5 MG Tabs  What changed: when to take this  Commonly known as: Roxicodone   Take 1 Tablet by mouth every 8 hours as needed for Severe Pain for up to 5 days.  Dose: 5 mg            CONTINUE taking these medications        Instructions   carvedilol 3.125 MG Tabs  Commonly known as: Coreg   Take 1 Tablet by mouth 2 times a day with meals.  Dose: 3.125 mg     escitalopram 20 MG tablet  Commonly known as: Lexapro   Take 1 Tablet by mouth every day for 30 days.  Dose: 20 mg     gabapentin 300 MG Caps  Commonly known as: Neurontin   Take 1 Capsule by mouth 2 times a day for 30 days.  Dose: 300 mg     hydrOXYzine HCl 25 MG Tabs  Commonly known as: Atarax   Take 1 Tablet by mouth every four hours as needed for Anxiety.  Dose: 25 mg     lactulose 20 GM/30ML Soln   Take 15 mL by mouth every evening.  Dose: 15 mL     spironolactone 100 MG Tabs  Commonly known as: Aldactone   Take 2 Tablets by mouth every day.  Dose: 200 mg     therapeutic multivitamin-minerals Tabs   Take 1 Tablet by mouth every day.  Dose: 1 Tablet     thiamine 100 MG tablet  Commonly known as: Thiamine   Take 1 Tablet by mouth every day.  Dose: 100 mg            STOP taking these medications      Anusol-HC 25 MG Supp  Generic drug: hydrocortisone     potassium chloride SA 20 MEQ Tbcr  Commonly known as: Kdur              Allergies  Allergies[1]    DIET  Orders Placed This Encounter   Procedures    Diet Order Diet: 2 Gram Sodium; Fluid modifications: (optional): 1500 ml Fluid Restriction     Standing Status:   Standing     Number of Occurrences:   1     Diet::   2 Gram Sodium [7]     Fluid modifications: (optional):   1500 ml Fluid Restriction [9]       ACTIVITY  As  tolerated.  Weight bearing as tolerated    CONSULTATIONS  Pulmonary    PROCEDURES  Ultrasound guided paracentesis on removal of 2.5 L of ascites fluid on 7/16/2025    LABORATORY  Lab Results   Component Value Date    SODIUM 138 07/16/2025    POTASSIUM 4.8 07/16/2025    CHLORIDE 108 07/16/2025    CO2 22 07/16/2025    GLUCOSE 95 07/16/2025    BUN 20 07/16/2025    CREATININE 0.73 07/16/2025    CREATININE 0.83 02/01/2013        Lab Results   Component Value Date    WBC 15.2 (H) 07/16/2025    WBC 5.2 02/01/2013    HEMOGLOBIN 13.0 07/16/2025    HEMATOCRIT 40.6 07/16/2025    PLATELETCT 275 07/16/2025        Total time of the discharge process 38 minutes.         [1]   Allergies  Allergen Reactions    Sulfa Drugs Hives     UUB=[id number]

## 2025-07-16 NOTE — PROGRESS NOTES
Hospital Medicine Daily Progress Note    Date of Service  7/15/2025    Chief Complaint  Minerva Tillman is a 50 y.o. female admitted 7/9/2025 with SOB    Hospital Course  This is a 50-year-old female with past medical history of hypertension, portal vein thrombosis on Eliquis, chronic alcohol use, decompensated cirrhosis MELD of 23, chronic hypoxemic respiratory failure on 2 nocturnal, hypothyroidism and mood disorder who was admitted on 7/9 with acute hypoxemic respiratory failure.    Patient was recently admitted in July for community-acquired pneumonia, alcohol withdrawal and liver cirrhosis with ascites requiring 2 paracentesis (negative SBP). She completed only 7 days of prednisolone due to no improvement noted. GI took patient for EGD 6/30 found to have grade 1 esophageal varices, 0.5 cm hiatal hernia recommended to start Coreg and repeat EGD in 6 months.  Colonoscopy also performed found to have hemorrhoids no other lesions seen recommended repeat colonoscopy in 10 years and Anusol NH twice daily for 7 days.     After discharging, patient went to a [place] inpatient rehab detox program however she was felt to be medically too ill to for enrollment.    On this admission, CT imaging noted GGO. COVID, influenza, RSV negative. B-D- glucan negative.  Patient to continue with Zosyn and doxycycline for coverage of hospital-acquired pneumonia. Patient was started on high dose steroids, with improvement, patient initially required heated high flow nasal cannula, now weaned down to nasal cannula.    Interval Problem Update  Acute on chronic respiratory failure secondary to ?etiology - on this admission, CT imaging noted GGO. COVID, influenza, RSV negative. B-D- glucan negative.      Patient to continue with Zosyn and doxycycline for coverage of hospital-acquired pneumonia.     Patient was started on high dose steroids, with improvement, patient initially required heated high flow nasal cannula, now weaned down  to 2L nasal cannula.  Patient to complete total 5-day course of high-dose steroids.  Encourage patient to continue with increasing mobility.      Patient's mother at bedside, updated on plan of care, all questions answered.    Above per previous hospitalist.      7/15/2025  Patient was seen and examined on the telemetry floor.  Continues to be o 1.5 LPM oxygen by nasal cannula.  Total bilirubin increased to 3.1 from 2.9.  Improving transaminitis.  Magnesium 1.5 being replaced.  White count increasing to 16.1 from 13.3.  Continues on Unasyn.    Chest x-ray per my read shows elevated right hemidiaphragm.  Sharp costophrenic angles bilaterally.  No significant focal consolidation.    Concern for abdominal distention.  Ultrasound-guided paracentesis ordered.      Reviewed patient's discharge summary from 7/4/2025 with Dr. Dela Cruz.  Patient was admitted with jaundice.  She required CIWA with support for alcohol withdrawal symptoms.  Underwent paracentesis with no evidence of SBP.    I have discussed this patient's plan of care and discharge plan at IDT rounds today with Case Management, Nursing, Nursing leadership, and other members of the IDT team.    Consultants/Specialty  pulmonary    Code Status  Full Code    Disposition  Patient is not medically clear to discharge home.   I have placed the appropriate orders for post-discharge needs.    Review of Systems  Review of Systems   Constitutional:  Positive for malaise/fatigue. Negative for chills and fever.   Respiratory:  Negative for cough and shortness of breath.    Cardiovascular:  Negative for chest pain and palpitations.   Gastrointestinal:  Negative for abdominal pain, nausea and vomiting.   Musculoskeletal:  Negative for joint pain and myalgias.   Neurological:  Negative for dizziness and headaches.        Physical Exam  Temp:  [35.9 °C (96.6 °F)-36.5 °C (97.7 °F)] 35.9 °C (96.6 °F)  Pulse:  [59-82] 82  Resp:  [16-18] 18  BP: (130-156)/() 140/91  SpO2:  [92  %-97 %] 92 %    Physical Exam  Vitals and nursing note reviewed.   Constitutional:       General: She is not in acute distress.     Appearance: Normal appearance. She is ill-appearing.   HENT:      Head: Normocephalic and atraumatic.      Mouth/Throat:      Mouth: Mucous membranes are moist.      Pharynx: No oropharyngeal exudate.   Eyes:      Extraocular Movements: Extraocular movements intact.      Pupils: Pupils are equal, round, and reactive to light.   Cardiovascular:      Rate and Rhythm: Normal rate and regular rhythm.      Pulses: Normal pulses.      Heart sounds: No murmur heard.     No friction rub. No gallop.   Pulmonary:      Effort: Pulmonary effort is normal. No respiratory distress.      Breath sounds: No wheezing, rhonchi or rales.   Abdominal:      General: Bowel sounds are normal. There is distension.      Palpations: Abdomen is soft. There is no mass.      Tenderness: There is no abdominal tenderness.   Musculoskeletal:         General: No swelling or tenderness. Normal range of motion.      Cervical back: Neck supple. No rigidity or tenderness. No muscular tenderness.      Right lower leg: No edema.      Left lower leg: No edema.   Skin:     General: Skin is warm and dry.      Capillary Refill: Capillary refill takes less than 2 seconds.      Findings: No erythema or rash.   Neurological:      General: No focal deficit present.      Mental Status: She is alert and oriented to person, place, and time.      Motor: No weakness.      Gait: Gait normal.         Fluids    Intake/Output Summary (Last 24 hours) at 7/15/2025 1800  Last data filed at 7/15/2025 1400  Gross per 24 hour   Intake 550 ml   Output 400 ml   Net 150 ml        Laboratory  Recent Labs     07/13/25  0256 07/14/25  0321 07/15/25  0444   WBC 11.8* 13.5* 16.1*   RBC 3.19* 3.22* 3.56*   HEMOGLOBIN 12.2 12.2 13.5   HEMATOCRIT 36.4* 36.8* 42.3   .1* 114.3* 118.8*   MCH 38.2* 37.9* 37.9*   MCHC 33.5 33.2 31.9*   RDW 66.5* 66.7* 68.5*    PLATELETCT 315 325 318   MPV 9.8 10.1 9.9     Recent Labs     07/13/25  0256 07/14/25  0321 07/15/25  0444   SODIUM 137 137 137   POTASSIUM 4.4 4.2 4.4   CHLORIDE 108 108 107   CO2 19* 19* 20   GLUCOSE 156* 161* 112*   BUN 18 17 20   CREATININE 0.70 0.73 0.68   CALCIUM 8.2* 8.2* 8.9                     Imaging  DX-CHEST-PORTABLE (1 VIEW)   Final Result      Hypoinflation with mild bibasilar atelectasis.      US-ABDOMEN LTD (SOFT TISSUE)   Final Result      1.  Small amount of ascites in the lower quadrants of the abdomen.   2.  Requested paracentesis was not performed.         CT-ABDOMEN-PELVIS W/O   Final Result         1.  Scattered patchy bilateral pulmonary infiltrates.   2.  Nodular hepatic compatible with changes of cirrhosis.   3.  Scattered abdominal ascites.   4.  Atherosclerosis and atherosclerotic coronary artery disease.   5.  Pulmonary nodule, see nodule follow-up recommendations below.      Fleischner Society pulmonary nodule recommendations:   Low Risk: No routine follow-up      High Risk: Optional CT at 12 months      Comments: Nodules less than 6 mm do not require routine follow-up, but certain patients at high risk with suspicious nodule morphology, upper lobe location, or both may warrant 12-month follow-up.      Low Risk - Minimal or absent history of smoking and of other known risk factors.      High Risk - History of smoking or of other known risk factors.      Note: These recommendations do not apply to lung cancer screening, patients with immunosuppression, or patients with known primary cancer.      Fleischner Society 2017 Guidelines for Management of Incidentally Detected Pulmonary Nodules in Adults         CT-CHEST (THORAX) W/O   Final Result      1.  Diffuse groundglass densities, likely multifocal pneumonia. Edema is a less likely differential consideration.   2.  5 mm right lower lobe pulmonary nodule.   3.  Trace pericardial effusion.   4.  Coronary artery calcifications.   5.   Cirrhosis.   6.  Ascites in the upper abdomen.   7.  Hypodense hepatic lesions as evaluated on recent MRI.         Fleischner society recommendations for follow up:   Low Risk: No routine follow-up      High Risk: Optional CT at 12 months      Comments: Nodules less than 6 mm do not require routine follow-up, but certain patients at high risk with suspicious nodule morphology, upper lobe location, or both may warrant 12-month follow-up.      Low Risk - Minimal or absent history of smoking and of other known risk factors.      High Risk - History of smoking or of other known risk factors.      Note: These recommendations do not apply to lung cancer screening, patients with immunosuppression, or patients with known primary cancer.      Fleischner Society 2017 Guidelines for Management of Incidentally Detected Pulmonary Nodules in Adults         Less than 6 mm: CT at 3-6 months. If stable, consider CT at 2 and 4 years.      6 mm or greater: CT at 3-6 months. Subsequent management based on the most suspicious nodule(s).      Comments: Multiple less than 6 mm pure ground-glass nodules are usually benign, but consider follow-up in selected patients at high risk at 2 and 4 years.      Low Risk - Minimal or absent history of smoking and of other known risk factors.      High Risk - History of smoking or of other known risk factors.      Note: These recommendations do not apply to lung cancer screening, patients with immunosuppression, or patients with known primary cancer.      Fleischner Society 2017 Guidelines for Management of Incidentally Detected Pulmonary Nodules in Adults      DX-CHEST-PORTABLE (1 VIEW)   Final Result      1.  Low lung volumes.   2.  Cardiomegaly, possibly on the basis of hypoventilation.   3.  Mild interstitial infiltrates and/or edema.      US-PARACENTESIS, ABD WITH IMAGING    (Results Pending)        Assessment/Plan  * Acute on chronic respiratory failure with hypoxia (HCC)  Assessment & Plan  On  HF on ADT  Down 6L   Chest CT with diffuse groundglass densities, likely multifocal pneumonia.  COVID/RSV/influenza negative  Viral panel negative  Procalcitonin mildly elevated    Fluid restriction  Continue spironolactone  Increase levothyroxine dose  Continue Zosyn/Doxycycline  Continue IV steroids  Pulmonology following  On this admission, CT imaging noted GGO. COVID, influenza, RSV negative. B-D- glucan negative.  Patient to continue with Zosyn and doxycycline for coverage of hospital-acquired pneumonia. Patient was started on high dose steroids, with improvement, patient initially required heated high flow nasal cannula, now weaned down to nasal cannula.    7/15/2025  De-escalating Zosyn to Unasyn.  As the patient does not have any pseudomonal risk factors.  White count increasing to 16.1 from 13.3.  Chest x-ray appears clear.  Continues to require 1.5 LPM oxygen by nasal cannula.    Alcohol use disorder, severe, dependence (HCC)- (present on admission)  Assessment & Plan  Patient was discharged from here on July 4, and went to [place] to be admitted to an inpatient rehab program however she was felt to be medically to ill    Ground glass opacity present on imaging of lung  Assessment & Plan  7/15/2025  As per imaging findings.  Repeat chest x-ray looks okay.  Outpatient follow-up.    Portal vein thrombosis- (present on admission)  Assessment & Plan  Continue Eliquis    7/15/2025  Continue apixaban    Portal hypertension with esophageal varices (HCC)- (present on admission)  Assessment & Plan  Hold carvedilol for now    7/15/2025  Resume low-dose carvedilol    Hypothyroidism- (present on admission)  Assessment & Plan  TSH 35.300 and FT4 0.91   Increased levothyroxine    7/15/2025  Continue levothyroxine 75 mcg daily    Fatty liver, alcoholic- (present on admission)  Assessment & Plan  Patient is currently maintained on lactulose, spironolactone, and Lasix  On recent admission her MELD was 27.  She did  receive 1 week of prednisolone however her Delmy score did not improve significantly and therefore it was not continued beyond 7 days.  She is working on her sobriety    7/15/2025  Transaminitis improving.  States that she has been off of alcohol  Ordered paracentesis    Essential hypertension- (present on admission)  Assessment & Plan  As needed antihypertensives    Acute cystitis- (present on admission)  Assessment & Plan  IV ABx  Follow urine cultures    7/15/2025  All cultures remaining negative.         VTE prophylaxis:    therapeutic anticoagulation with eliquis 5 mg BID        I have performed a physical exam and reviewed and updated ROS and Plan today (7/15/2025). In review of yesterday's note (7/14/2025), there are no changes except as documented above.      52 minutes spent prepping to see patient (e.g. review of tests) obtaining and/or reviewing separately obtained history. Performing a medically appropriate examination and evaluation.  Counseling and educating the patient/family/caregiver.  Ordering medications, tests, or procedures.  Referring and communicating with other health care professionals.  Documenting clinical information in EPIC.  Independently interpreting results and communicating results to patient/family/caregiver.  Care coordination.

## 2025-07-16 NOTE — DISCHARGE INSTRUCTIONS
Paracentesis, Care After  This sheet gives you information about how to care for yourself after your procedure. Your health care provider may also give you more specific instructions. If you have problems or questions, contact your health care provider.  What can I expect after the procedure?  After the procedure, it is common to have a small amount of clear fluid coming from the puncture site.  Follow these instructions at home:  Puncture site care    Follow instructions from your health care provider about how to take care of your puncture site. Make sure you:  Wash your hands with soap and water before and after you change your bandage (dressing). If soap and water are not available, use hand .  Change your dressing as told by your health care provider.  Check your puncture area every day for signs of infection. Check for:  Redness, swelling, or pain.  More fluid or blood.  Warmth.  Pus or a bad smell.  General instructions  Return to your normal activities as told by your health care provider. Ask your health care provider what activities are safe for you.  Take over-the-counter and prescription medicines only as told by your health care provider.  Do not take baths, swim, or use a hot tub until your health care provider approves. Ask your health care provider if you may take showers. You may only be allowed to take sponge baths.  Keep all follow-up visits as told by your health care provider. This is important.  Contact a health care provider if:  You have redness, swelling, or pain at your puncture site.  You have more fluid or blood coming from your puncture site.  Your puncture site feels warm to the touch.  You have pus or a bad smell coming from your puncture site.  You have a fever.  Get help right away if:  You have chest pain or shortness of breath.  You develop increasing pain, discomfort, or swelling in your abdomen.  You feel dizzy or light-headed or you faint.  Summary  After the procedure,  it is common to have a small amount of clear fluid coming from the puncture site.  Follow instructions from your health care provider about how to take care of your puncture site.  Check your puncture area every day signs of infection.  Keep all follow-up visits as told by your health care provider.  This information is not intended to replace advice given to you by your health care provider. Make sure you discuss any questions you have with your health care provider.  Document Revised: 06/30/2020 Document Reviewed: 10/08/2019  [employer] Patient Education © 2023 [employer]  Community-Acquired Pneumonia, Adult  Pneumonia is a lung infection that causes inflammation and the buildup of mucus and fluids in the lungs. This may cause coughing and difficulty breathing. Community-acquired pneumonia is pneumonia that develops in people who are not, and have not recently been, in a hospital or other health care facility.  Usually, pneumonia develops as a result of an illness that is caused by a virus, such as the common cold and the flu (influenza). It can also be caused by bacteria or fungi. While the common cold and influenza can pass from person to person (are contagious), pneumonia itself is not considered contagious.  What are the causes?  This condition may be caused by:  Viruses.  Bacteria.  Fungi.  What increases the risk?  The following factors may make you more likely to develop this condition:  Being over age 65 or having certain medical conditions, such as:  A long-term (chronic) disease, such as: chronic obstructive pulmonary disease (COPD), asthma, heart failure, diabetes, or kidney disease.  A condition that increases the risk of breathing in (aspirating) mucus and other fluids from your mouth and nose.  A weakened body defense system (immune system).  Having had your spleen removed (splenectomy). The spleen is the organ that helps fight germs and infections.  Not cleaning your teeth and gums well (poor  dental hygiene).  Using tobacco products.  Traveling to places where germs that cause pneumonia are present or being near certain animals or animal habitats that could have germs that cause pneumonia.  What are the signs or symptoms?  Symptoms of this condition include:  A dry cough or a wet (productive) cough.  A fever, sweating, or chills.  Chest pain, especially when breathing deeply or coughing.  Fast breathing, difficulty breathing, or shortness of breath.  Tiredness (fatigue) and muscle aches.  How is this diagnosed?  This condition may be diagnosed based on your medical history or a physical exam. You may also have tests, including:  Imaging, such as a chest X-ray or lung ultrasound.  Tests of:  The level of oxygen and other gases in your blood.  Mucus from your lungs (sputum).  Fluid around your lungs (pleural fluid).  Your urine.  How is this treated?  Treatment for this condition depends on many factors, such as the cause of your pneumonia, your medicines, and other medical conditions that you have.  For most adults, pneumonia may be treated at home. In some cases, treatment must happen in a hospital and may include:  Medicines that are given by mouth (orally) or through an IV, including:  Antibiotic medicines, if bacteria caused the pneumonia.  Medicines that kill viruses (antiviral medicines), if a virus caused the pneumonia.  Oxygen therapy.  Severe pneumonia, although rare, may require the following treatments:  Mechanical ventilation.This procedure uses a machine to help you breathe if you cannot breathe well on your own or maintain a safe level of blood oxygen.  Thoracentesis. This procedure removes any buildup of pleural fluid to help with breathing.  Follow these instructions at home:    Medicines  Take over-the-counter and prescription medicines only as told by your health care provider.  Take cough medicine only if you have trouble sleeping. Cough medicine can prevent your body from removing  mucus from your lungs.  If you were prescribed antibiotics, take them as told by your health care provider. Do not stop taking the antibiotic even if you start to feel better.  Lifestyle         Do not drink alcohol.  Do not use any products that contain nicotine or tobacco. These products include cigarettes, chewing tobacco, and vaping devices, such as e-cigarettes. If you need help quitting, ask your health care provider.  Eat a healthy diet. This includes plenty of vegetables, fruits, whole grains, low-fat dairy products, and lean protein.  General instructions  Rest a lot and get at least 8 hours of sleep each night.  Sleep in a partly upright position at night. Place a few pillows under your head or sleep in a reclining chair.  Return to your normal activities as told by your health care provider. Ask your health care provider what activities are safe for you.  Drink enough fluid to keep your urine pale yellow. This helps to thin the mucus in your lungs.  If your throat is sore, gargle with a mixture of salt and water 3-4 times a day or as needed. To make salt water, completely dissolve ½-1 tsp (3-6 g) of salt in 1 cup (237 mL) of warm water.  Keep all follow-up visits.  How is this prevented?  You can lower your risk of developing community-acquired pneumonia by:  Getting the pneumonia vaccine. There are different types and schedules of pneumonia vaccines. Ask your health care provider which option is best for you. Consider getting the pneumonia vaccine if:  You are older than 65 years of age.  You are 19-65 years of age and are receiving cancer treatment, have chronic lung disease, or have other medical conditions that affect your immune system. Ask your health care provider if this applies to you.  Getting your influenza vaccine every year. Ask your health care provider which type of vaccine is best for you.  Getting regular dental checkups.  Washing your hands often with soap and water for at least 20  seconds. If soap and water are not available, use hand .  Contact a health care provider if:  You have a fever.  You have trouble sleeping because you cannot control your cough with cough medicine.  Get help right away if:  Your shortness of breath becomes worse.  Your chest pain increases.  Your sickness becomes worse, especially if you are an older adult or have a weak immune system.  You cough up blood.  These symptoms may be an emergency. Get help right away. Call 911.  Do not wait to see if the symptoms will go away.  Do not drive yourself to the hospital.  Summary  Pneumonia is an infection of the lungs.  Community-acquired pneumonia develops in people who have not been in the hospital. It can be caused by bacteria, viruses, or fungi.  This condition may be treated with antibiotics or antiviral medicines.  Severe pneumonia may require a hospital stay and treatment to help with breathing.  This information is not intended to replace advice given to you by your health care provider. Make sure you discuss any questions you have with your health care provider.  Document Revised: 02/15/2023 Document Reviewed: 02/15/2023  [employer] Patient Education © 2023 [employer]

## 2025-07-16 NOTE — CARE PLAN
Problem: Pain - Standard  Goal: Alleviation of pain or a reduction in pain to the patient’s comfort goal  Outcome: Progressing     Problem: Knowledge Deficit - Standard  Goal: Patient and family/care givers will demonstrate understanding of plan of care, disease process/condition, diagnostic tests and medications  Outcome: Progressing     Problem: Fall Risk  Goal: Patient will remain free from falls  Outcome: Progressing     Problem: Skin Integrity  Goal: Skin integrity is maintained or improved  Outcome: Progressing     Problem: Respiratory  Goal: Patient will achieve/maintain optimum respiratory ventilation and gas exchange  Outcome: Progressing   The patient is Stable - Low risk of patient condition declining or worsening    Shift Goals  Clinical Goals: monitor respiratory status  Patient Goals: rest, pain mangement  Family Goals: not present    Progress made toward(s) clinical / shift goals:  Progressing    Patient is not progressing towards the following goals:

## 2025-07-16 NOTE — PROGRESS NOTES
Monitor Summary  Rhythm: Normal Sinus Rhythm and Sinus Bradycardia  Rate:   Ectopy: (R) PVC, (R) Bigem  .13 / .07 / .37        12 hr Chart check.

## 2025-07-16 NOTE — DISCHARGE PLANNING
Care Transition Team Discharge Planning    Anticipated Discharge Information  Discharge Disposition: Discharged to home/self care (01)  Discharge Address: [address] [place] [address]  Discharge Contact Phone Number: [phone]    Discharge Plan: Patient discussed in IDT rounds. Not medically clear for DC at this time. Pending paracentesis.

## 2025-07-17 NOTE — PROGRESS NOTES
I educated the patient regarding discharge instructions, discharge medications and follow up instructions. Patient verbalized understanding. I delivered [id number] to the patient. Patient discharged with all belongings significant for cell phone and jewelry - bracelet, anklet, and engagement ring. Patient escorted to front entrance by BARBARA Sanchez to her mother's car.     Please see flowsheets for full vital sign trends and assessment details.

## 2025-07-19 LAB
BACTERIA FLD AEROBE CULT: NORMAL
GRAM STN SPEC: NORMAL
SIGNIFICANT IND 70042: NORMAL
SITE SITE: NORMAL
SOURCE SOURCE: NORMAL

## 2025-07-25 ENCOUNTER — OFFICE VISIT (OUTPATIENT)
Dept: MEDICAL GROUP | Facility: MEDICAL CENTER | Age: 50
End: 2025-07-25
Payer: COMMERCIAL

## 2025-07-25 ENCOUNTER — OFFICE VISIT (OUTPATIENT)
Dept: BEHAVIORAL HEALTH | Facility: PSYCHIATRIC FACILITY | Age: 50
End: 2025-07-25
Payer: COMMERCIAL

## 2025-07-25 VITALS
SYSTOLIC BLOOD PRESSURE: 124 MMHG | HEIGHT: 62 IN | DIASTOLIC BLOOD PRESSURE: 74 MMHG | OXYGEN SATURATION: 97 % | BODY MASS INDEX: 23.77 KG/M2 | WEIGHT: 129.19 LBS | TEMPERATURE: 97.4 F | HEART RATE: 94 BPM

## 2025-07-25 VITALS
OXYGEN SATURATION: 93 % | BODY MASS INDEX: 23.74 KG/M2 | HEIGHT: 62 IN | SYSTOLIC BLOOD PRESSURE: 98 MMHG | HEART RATE: 94 BPM | WEIGHT: 129 LBS | DIASTOLIC BLOOD PRESSURE: 69 MMHG

## 2025-07-25 DIAGNOSIS — Z12.31 ENCOUNTER FOR SCREENING MAMMOGRAM FOR BREAST CANCER: ICD-10-CM

## 2025-07-25 DIAGNOSIS — F32.1 CURRENT MODERATE EPISODE OF MAJOR DEPRESSIVE DISORDER WITHOUT PRIOR EPISODE (HCC): ICD-10-CM

## 2025-07-25 DIAGNOSIS — I85.10 ESOPHAGEAL VARICES IN ALCOHOLIC CIRRHOSIS (HCC): ICD-10-CM

## 2025-07-25 DIAGNOSIS — J96.01 ACUTE HYPOXIC RESPIRATORY FAILURE (HCC): Primary | ICD-10-CM

## 2025-07-25 DIAGNOSIS — K72.90 DECOMPENSATED HEPATIC CIRRHOSIS (HCC): ICD-10-CM

## 2025-07-25 DIAGNOSIS — F41.1 GENERALIZED ANXIETY DISORDER: ICD-10-CM

## 2025-07-25 DIAGNOSIS — F10.20 ALCOHOL USE DISORDER, SEVERE, DEPENDENCE (HCC): Chronic | ICD-10-CM

## 2025-07-25 DIAGNOSIS — D72.829 LEUKOCYTOSIS, UNSPECIFIED TYPE: ICD-10-CM

## 2025-07-25 DIAGNOSIS — K76.6 PORTAL HYPERTENSION WITH ESOPHAGEAL VARICES (HCC): ICD-10-CM

## 2025-07-25 DIAGNOSIS — R35.0 URINARY FREQUENCY: ICD-10-CM

## 2025-07-25 DIAGNOSIS — K70.11 ALCOHOLIC HEPATITIS WITH ASCITES: ICD-10-CM

## 2025-07-25 DIAGNOSIS — I81 PORTAL VEIN THROMBOSIS: ICD-10-CM

## 2025-07-25 DIAGNOSIS — D75.89 MACROCYTOSIS: ICD-10-CM

## 2025-07-25 DIAGNOSIS — K70.30 ESOPHAGEAL VARICES IN ALCOHOLIC CIRRHOSIS (HCC): ICD-10-CM

## 2025-07-25 DIAGNOSIS — K74.60 DECOMPENSATED HEPATIC CIRRHOSIS (HCC): ICD-10-CM

## 2025-07-25 DIAGNOSIS — I85.00 PORTAL HYPERTENSION WITH ESOPHAGEAL VARICES (HCC): ICD-10-CM

## 2025-07-25 DIAGNOSIS — K70.31 ALCOHOLIC CIRRHOSIS OF LIVER WITH ASCITES (HCC): ICD-10-CM

## 2025-07-25 DIAGNOSIS — I10 PRIMARY HYPERTENSION: ICD-10-CM

## 2025-07-25 DIAGNOSIS — J96.11 CHRONIC RESPIRATORY FAILURE WITH HYPOXIA (HCC): ICD-10-CM

## 2025-07-25 DIAGNOSIS — F10.132: ICD-10-CM

## 2025-07-25 DIAGNOSIS — E50.9 VITAMIN A DEFICIENCY: ICD-10-CM

## 2025-07-25 DIAGNOSIS — F10.20 ALCOHOL USE DISORDER, SEVERE, DEPENDENCE (HCC): Primary | ICD-10-CM

## 2025-07-25 DIAGNOSIS — E03.9 ACQUIRED HYPOTHYROIDISM: ICD-10-CM

## 2025-07-25 DIAGNOSIS — J18.9 COMMUNITY ACQUIRED PNEUMONIA OF LEFT LOWER LOBE OF LUNG: ICD-10-CM

## 2025-07-25 DIAGNOSIS — G62.9 NEUROPATHY: ICD-10-CM

## 2025-07-25 DIAGNOSIS — Z09 HOSPITAL DISCHARGE FOLLOW-UP: ICD-10-CM

## 2025-07-25 PROBLEM — E83.39 HYPOPHOSPHATEMIA: Status: RESOLVED | Noted: 2024-04-30 | Resolved: 2025-07-25

## 2025-07-25 PROBLEM — R97.1 ELEVATED CANCER ANTIGEN 125 (CA 125): Status: RESOLVED | Noted: 2025-06-26 | Resolved: 2025-07-25

## 2025-07-25 PROBLEM — S82.851A TRIMALLEOLAR FRACTURE OF ANKLE, CLOSED, RIGHT, INITIAL ENCOUNTER: Status: RESOLVED | Noted: 2024-12-07 | Resolved: 2025-07-25

## 2025-07-25 PROBLEM — Z71.89 ADVANCE CARE PLANNING: Status: RESOLVED | Noted: 2024-03-06 | Resolved: 2025-07-25

## 2025-07-25 PROBLEM — K70.10 ALCOHOLIC HEPATITIS: Status: RESOLVED | Noted: 2025-06-19 | Resolved: 2025-07-25

## 2025-07-25 PROBLEM — D69.6 THROMBOCYTOPENIA (HCC): Status: RESOLVED | Noted: 2024-12-09 | Resolved: 2025-07-25

## 2025-07-25 PROBLEM — E87.1 HYPONATREMIA: Status: RESOLVED | Noted: 2025-06-20 | Resolved: 2025-07-25

## 2025-07-25 PROBLEM — F10.930 ALCOHOL WITHDRAWAL SYNDROME WITHOUT COMPLICATION (HCC): Status: RESOLVED | Noted: 2024-10-22 | Resolved: 2025-07-25

## 2025-07-25 PROBLEM — R74.01 TRANSAMINITIS: Status: RESOLVED | Noted: 2023-05-30 | Resolved: 2025-07-25

## 2025-07-25 PROBLEM — R47.1 DYSARTHRIA: Status: RESOLVED | Noted: 2021-05-25 | Resolved: 2025-07-25

## 2025-07-25 PROBLEM — R73.9 HYPERGLYCEMIA: Status: RESOLVED | Noted: 2023-10-28 | Resolved: 2025-07-25

## 2025-07-25 PROBLEM — F40.10 SOCIAL ANXIETY DISORDER: Status: RESOLVED | Noted: 2022-06-15 | Resolved: 2025-07-25

## 2025-07-25 PROBLEM — R18.8 ASCITES: Status: RESOLVED | Noted: 2023-10-28 | Resolved: 2025-07-25
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
## 2025-07-25 PROBLEM — H91.92 DECREASED HEARING, LEFT: Status: RESOLVED | Noted: 2020-08-13 | Resolved: 2025-07-25

## 2025-07-25 PROBLEM — N39.0 UTI (URINARY TRACT INFECTION): Status: RESOLVED | Noted: 2023-10-28 | Resolved: 2025-07-25

## 2025-07-25 PROBLEM — R19.7 DIARRHEA: Status: RESOLVED | Noted: 2024-03-06 | Resolved: 2025-07-25

## 2025-07-25 PROBLEM — R51.9 HEADACHE: Status: RESOLVED | Noted: 2024-10-23 | Resolved: 2025-07-25

## 2025-07-25 PROBLEM — R91.8 GROUND GLASS OPACITY PRESENT ON IMAGING OF LUNG: Status: RESOLVED | Noted: 2025-07-11 | Resolved: 2025-07-25

## 2025-07-25 PROBLEM — E87.6 HYPOKALEMIA: Status: RESOLVED | Noted: 2023-05-31 | Resolved: 2025-07-25

## 2025-07-25 PROBLEM — K70.0 ALCOHOLIC FATTY LIVER: Status: RESOLVED | Noted: 2019-05-29 | Resolved: 2025-07-25

## 2025-07-25 PROBLEM — R26.89 BALANCE PROBLEM: Status: RESOLVED | Noted: 2024-05-01 | Resolved: 2025-07-25

## 2025-07-25 PROBLEM — E55.9 VITAMIN D INSUFFICIENCY: Status: RESOLVED | Noted: 2019-05-29 | Resolved: 2025-07-25

## 2025-07-25 PROBLEM — E83.42 HYPOMAGNESEMIA: Status: RESOLVED | Noted: 2023-05-31 | Resolved: 2025-07-25

## 2025-07-25 PROBLEM — E87.29 HIGH ANION GAP METABOLIC ACIDOSIS: Status: RESOLVED | Noted: 2024-03-06 | Resolved: 2025-07-25

## 2025-07-25 PROBLEM — R10.84 GENERALIZED ABDOMINAL PAIN: Status: RESOLVED | Noted: 2023-05-30 | Resolved: 2025-07-25

## 2025-07-25 PROBLEM — T78.40XA ALLERGY: Status: RESOLVED | Noted: 2022-05-09 | Resolved: 2025-07-25

## 2025-07-25 PROBLEM — R11.2 NAUSEA & VOMITING: Status: RESOLVED | Noted: 2023-05-30 | Resolved: 2025-07-25

## 2025-07-25 PROBLEM — R71.8 ELEVATED HEMATOCRIT: Status: RESOLVED | Noted: 2019-09-18 | Resolved: 2025-07-25

## 2025-07-25 PROBLEM — R60.9 SWELLING: Status: RESOLVED | Noted: 2022-05-09 | Resolved: 2025-07-25

## 2025-07-25 PROBLEM — R17 TOTAL BILIRUBIN, ELEVATED: Status: RESOLVED | Noted: 2023-05-30 | Resolved: 2025-07-25

## 2025-07-25 PROBLEM — G47.09 OTHER INSOMNIA: Status: RESOLVED | Noted: 2020-06-11 | Resolved: 2025-07-25

## 2025-07-25 PROBLEM — R74.8 ELEVATED ALKALINE PHOSPHATASE LEVEL: Status: RESOLVED | Noted: 2023-10-28 | Resolved: 2025-07-25

## 2025-07-25 LAB
APPEARANCE UR: NORMAL
BILIRUB UR STRIP-MCNC: NEGATIVE MG/DL
COLOR UR AUTO: NORMAL
GLUCOSE UR STRIP.AUTO-MCNC: NEGATIVE MG/DL
KETONES UR STRIP.AUTO-MCNC: NEGATIVE MG/DL
LEUKOCYTE ESTERASE UR QL STRIP.AUTO: NORMAL
NITRITE UR QL STRIP.AUTO: NEGATIVE
PH UR STRIP.AUTO: 7.5 [PH] (ref 5–8)
PROT UR QL STRIP: NEGATIVE MG/DL
RBC UR QL AUTO: NORMAL
SP GR UR STRIP.AUTO: 1.01
UROBILINOGEN UR STRIP-MCNC: 0.2 MG/DL

## 2025-07-25 RX ORDER — LANOLIN ALCOHOL/MO/W.PET/CERES
100 CREAM (GRAM) TOPICAL DAILY
Qty: 90 TABLET | Refills: 0 | Status: SHIPPED | OUTPATIENT
Start: 2025-07-25

## 2025-07-25 RX ORDER — NITROFURANTOIN 25; 75 MG/1; MG/1
100 CAPSULE ORAL 2 TIMES DAILY
Qty: 14 CAPSULE | Refills: 0 | Status: SHIPPED | OUTPATIENT
Start: 2025-07-25 | End: 2025-08-01

## 2025-07-25 RX ORDER — SPIRONOLACTONE 100 MG/1
200 TABLET, FILM COATED ORAL DAILY
Qty: 180 TABLET | Refills: 0 | Status: SHIPPED | OUTPATIENT
Start: 2025-07-25

## 2025-07-25 RX ORDER — GABAPENTIN 300 MG/1
300 CAPSULE ORAL 2 TIMES DAILY
Qty: 180 CAPSULE | Refills: 0 | Status: SHIPPED | OUTPATIENT
Start: 2025-07-25

## 2025-07-25 RX ORDER — OMEPRAZOLE 40 MG/1
40 CAPSULE, DELAYED RELEASE ORAL DAILY
Qty: 90 CAPSULE | Refills: 0 | Status: SHIPPED | OUTPATIENT
Start: 2025-07-25

## 2025-07-25 RX ORDER — LACTULOSE 10 G/15ML
15 SOLUTION ORAL EVERY EVENING
Qty: 450 ML | Refills: 2 | Status: SHIPPED | OUTPATIENT
Start: 2025-07-25

## 2025-07-25 RX ORDER — CARVEDILOL 3.12 MG/1
3.12 TABLET ORAL 2 TIMES DAILY WITH MEALS
Qty: 180 TABLET | Refills: 0 | Status: SHIPPED | OUTPATIENT
Start: 2025-07-25

## 2025-07-25 RX ORDER — HYDROXYZINE HYDROCHLORIDE 25 MG/1
25 TABLET, FILM COATED ORAL EVERY 4 HOURS PRN
Qty: 60 TABLET | Refills: 1 | Status: SHIPPED | OUTPATIENT
Start: 2025-07-25

## 2025-07-25 RX ORDER — ESCITALOPRAM OXALATE 20 MG/1
20 TABLET ORAL DAILY
Qty: 90 TABLET | Refills: 0 | Status: SHIPPED | OUTPATIENT
Start: 2025-07-25

## 2025-07-25 RX ORDER — FUROSEMIDE 20 MG/1
20 TABLET ORAL DAILY
Qty: 90 TABLET | Refills: 0 | Status: SHIPPED | OUTPATIENT
Start: 2025-07-25

## 2025-07-25 RX ORDER — LEVOTHYROXINE SODIUM 75 UG/1
75 TABLET ORAL
Qty: 90 TABLET | Refills: 0 | Status: SHIPPED | OUTPATIENT
Start: 2025-07-25

## 2025-07-25 ASSESSMENT — ANXIETY QUESTIONNAIRES
4. TROUBLE RELAXING: NOT AT ALL
5. BEING SO RESTLESS THAT IT IS HARD TO SIT STILL: NOT AT ALL
IF YOU CHECKED OFF ANY PROBLEMS ON THIS QUESTIONNAIRE, HOW DIFFICULT HAVE THESE PROBLEMS MADE IT FOR YOU TO DO YOUR WORK, TAKE CARE OF THINGS AT HOME, OR GET ALONG WITH OTHER PEOPLE: NOT DIFFICULT AT ALL
2. NOT BEING ABLE TO STOP OR CONTROL WORRYING: NOT AT ALL
3. WORRYING TOO MUCH ABOUT DIFFERENT THINGS: NOT AT ALL
GAD7 TOTAL SCORE: 2
1. FEELING NERVOUS, ANXIOUS, OR ON EDGE: NOT AT ALL
6. BECOMING EASILY ANNOYED OR IRRITABLE: SEVERAL DAYS
7. FEELING AFRAID AS IF SOMETHING AWFUL MIGHT HAPPEN: SEVERAL DAYS

## 2025-07-25 ASSESSMENT — ENCOUNTER SYMPTOMS
CONSTITUTIONAL NEGATIVE: 1
MEMORY LOSS: 0
NERVOUS/ANXIOUS: 1
DIARRHEA: 1
INSOMNIA: 0
DEPRESSION: 0
CARDIOVASCULAR NEGATIVE: 1
RESPIRATORY NEGATIVE: 1
EYES NEGATIVE: 1
NEUROLOGICAL NEGATIVE: 1
MUSCULOSKELETAL NEGATIVE: 1
BRUISES/BLEEDS EASILY: 1
POLYDIPSIA: 0
HALLUCINATIONS: 0

## 2025-07-25 ASSESSMENT — PATIENT HEALTH QUESTIONNAIRE - PHQ9
SUM OF ALL RESPONSES TO PHQ QUESTIONS 1-9: 3
5. POOR APPETITE OR OVEREATING: 1 - SEVERAL DAYS
CLINICAL INTERPRETATION OF PHQ2 SCORE: 0

## 2025-07-25 ASSESSMENT — FIBROSIS 4 INDEX
FIB4 SCORE: 1.58
FIB4 SCORE: 1.58

## 2025-07-25 ASSESSMENT — LIFESTYLE VARIABLES: SUBSTANCE_ABUSE: 0
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
## 2025-07-27 NOTE — PROGRESS NOTES
CC: hospital discharge follow up    1. Hospital discharge follow-up  2. Acute hypoxic respiratory failure (HCC) (Primary)  3. Alcohol abuse with withdrawal and perceptual disturbance (HCC)  4. Decompensated hepatic cirrhosis, hypoxia, chronic diarrhea, alcoholism (HCC)  5. Community acquired pneumonia of left lower lobe of lung  6. Alcohol use disorder, severe, dependence (HCC)  7. Alcoholic hepatitis with ascites  8. Esophageal varices in alcoholic cirrhosis (HCC)  9. Portal hypertension with esophageal varices (HCC)  10. Portal vein thrombosis  11. Alcoholic cirrhosis of liver with ascites (HCC)  12. Leukocytosis, unspecified type  Patient is new to me. PCP is Dr. Asif.   51 yo female with hypothyroidism, chronic alcohol dependence, alcoholic liver cirrhosis, depression, anxiety who was admitted to the hospital on 6/19/2025 and again on 7/9/2025 for decompensated liver cirrhosis, CAP, acute on chronic respiratory failure, ascites needing 3 paracenteses. She was also found to have protal vein thrombosis, esophageal varices. With 2nd hospital admission, she was treated for hospital-induced pneumonia (see HPI). She is doing well after discharged.    - continue carvedilol (COREG) 3.125 MG Tab; Take 1 Tablet by mouth 2 times a day with meals.  Dispense: 180 Tablet; Refill: 0  - continue lactulose 20 GM/30ML Solution; Take 15 mL by mouth every evening.  Dispense: 450 mL; Refill: 2  - continue thiamine (THIAMINE) 100 MG tablet; Take 1 Tablet by mouth every day.  Dispense: 90 Tablet; Refill: 0  - continue apixaban (ELIQUIS) 5 MG Tab; Take 1 Tablet by mouth 2 times a day.  Dispense: 180 Tablet; Refill: 0  - continue furosemide (LASIX) 20 MG Tab; Take 1 Tablet by mouth every day.  Dispense: 90 Tablet; Refill: 0  - continue omeprazole (PRILOSEC) 40 MG delayed-release capsule; Take 1 Capsule by mouth every day.  Dispense: 90 Capsule; Refill: 0  - continue spironolactone (ALDACTONE) 100 MG Tab; Take 2 Tablets by mouth every  day.  Dispense: 180 Tablet; Refill: 0  - Referral to Gastroenterology  - VITAMIN B12; Future  - FOLATE; Future  - VITAMIN B1; Future  - Comp Metabolic Panel; Future  - CBC WITHOUT DIFFERENTIAL; Future  - follow up with PCP in 4 weeks.   - I had long discussion with patient regarding alcohol dependence. She appears to be motivated to seek help for permanent cessation. Follow up with AA, BH, and PCP        13. Acquired hypothyroidism  Chronic, currently taking Levothyroxine 75 mcg qd.   Her TFT was abnormal during hospital admission.   Will repeat labs for reassessment.   - TSH; Future  - FREE THYROXINE; Future  - levothyroxine (SYNTHROID) 75 MCG Tab; Take 1 Tablet by mouth every morning on an empty stomach.  Dispense: 90 Tablet; Refill: 0  - follow up with PCP for continued management of this condition.     14. Macrocytosis  Likely secondary to alcohol abuse   Alcohol cessation discussed above  - VITAMIN B12; Future  - FOLATE; Future  - TSH; Future  - FREE THYROXINE; Future    15. Primary hypertension  BP is low normal.   Patient is on Coreg, Lasix, Spironolactone for BP, esophageal varices, and ascites.   - follow up with PCP and GI as directed.     16. Vitamin A deficiency  - over-the-counter vitamin A supplement   - dietary modification   - VITAMIN A; Future    17. Generalized anxiety disorder  18. Current moderate episode of major depressive disorder without prior episode (HCC)  Chronic, controlled with Lexapro 20 mg qd and Vistaril PRN , no s/e reported, will continue.    - escitalopram (LEXAPRO) 20 MG tablet; Take 1 Tablet by mouth every day.  Dispense: 90 Tablet; Refill: 0  - hydrOXYzine HCl (ATARAX) 25 MG Tab; Take 1 Tablet by mouth every four hours as needed for Anxiety.  Dispense: 60 Tablet; Refill: 1    19. Neuropathy  Chronic, controlled with Gabapentin 300 mg BID, no s/e reported, will continue.    - gabapentin (NEURONTIN) 300 MG Cap; Take 1 Capsule by mouth 2 times a day.  Dispense: 180 Capsule; Refill:  0  - follow up with PCP     20. Urinary frequency  - POCT Urinalysis  - URINALYSIS,CULTURE IF INDICATED; Future  - nitrofurantoin (MACROBID) 100 MG Cap; Take 1 Capsule by mouth 2 times a day for 7 days.  Dispense: 14 Capsule; Refill: 0    21. Chronic respiratory failure with hypoxia (HCC)  - continue oxygen with activities and with sleep  - ok do discontinue oxygen at rest.   - Referral to Pulmonary and Sleep Medicine  - follow up with PCP     22. Encounter for screening mammogram for breast cancer  - MA-SCREENING MAMMO BILAT W/ IMPLANTS W/CAD; Future       Verbal consent was acquired by the patient to use [employer] ambient listening note generation during this visit: YES        Subjective:       HPI:     History of Present Illness  The patient presents for hospital discharge follow up. Patient is new to me.     51 yo female with chronic alcohol dependency, alcoholic liver cirrhosis, depression, FREDA. She was admitted to the hospital on 6/19/2025 for decompensated liver cirrhosis with MELD score of 27, acute on chronic respiratory failure, portal vein thrombosis, and CAP. She was treated with empiric antibiotic for CAP. She also required treatment for acute alcohol withdrawal. She underwent paracentesis and was started on Prednisolone per GI. Fluid analysis was concerned for possible malignancy. She underwent MRCP, EGD during hospital admission and was found to have grade 1 esophageal varices, hiatal hernia, portal hypertension, portal vein thrombosis. Colonoscopy was overall normal. She was started on Coreg, Eliquis, PPI. She needed 2nd paracentesis on 7/1/2025. Fluid analysis was overall normal this time. She was discharged in stable condition on 7/4/2025. She planned to check herself in outpatient rehab in [facility]. Unfortunately, she was readmitted to the hospital on 7/9/2025 due to worsening SOB. She was too ill to be admitted to outpatient rehab in [facility]. She was found to have multifocal pneumonia and was  "treated with antibiotics, high-flow oxygen, high dose steroid. She responded well to treatment and was discharged on 7/16/2025 with supplemental oxygen and 5-day of high dose steroid. Prior to discharge, she again required ultrasound-guided paracentesis. Patient continues to do well after discharge. Denies nausea, vomiting, abdominal pain, increasing abdominal girth, bilateral pedal edema. She continues to use oxygen and takes all her medications as directed.       She has not touched any alcohol since being discharged from the hospital for total 39 days. She denies alcohol craving. She is planning to attend AA meetings. She declines medications to help with alcohol craving or referral to  at this time. She has great support from her  and family.     Social History:  She has abstained from alcohol for 39 days. She has never smoked cigarettes. She reports snoring and feeling fatigued.    PAST SURGICAL HISTORY:  She underwent abdominal fluid drainage during her first hospital admission.      Current Medications[1]     Objective:     Exam:  /74 (BP Location: Right arm, Patient Position: Sitting, BP Cuff Size: Adult)   Pulse 94   Temp 36.3 °C (97.4 °F) (Temporal)   Ht 1.575 m (5' 2\")   Wt 58.6 kg (129 lb 3 oz)   SpO2 97%   BMI 23.63 kg/m²  Body mass index is 23.63 kg/m².    Constitutional: awake, alert, in no distress.  Skin: Warm, dry, good turgor, no rashes, bruises, ulcers in visible areas.  Eye: conjunctiva clear, lids neg for edema or lesions.  ENMT: TM and auditory canals wnl. Oral and nasal mucosa wnl. Lips without lesions, good dentition, oropharynx clear.  Neck: Trachea midline, no masses, no thyromegaly. No cervical or supraclavicular lymphadenopathy  Respiratory: Unlabored respiratory effort, lungs clear to auscultation, no wheezes, no rales.  Cardiovascular: Normal S1, S2, no murmur, no pedal edema.  Abdomen: Soft, non-tender to palpation, active BS, no hernia, no hepatosplenomegaly, " negative rebound or guarding. Minimal ascites noted.   Psych: Oriented x3, affect and mood wnl, intact judgement and insight.     Return in about 3 months (around 10/25/2025) for follow up with PCP as directed.    We use Vision[employer] ([employer]-powered program) to document the visit. I also use Dragon (voice recognition software) to dictate part of the note. I have made every reasonable attempt to correct obvious errors, but I expect that there are errors of grammar and possibly content that I did not discover before finalizing the note.    Total time spent reviewing pt's chart, labs, notes, imaging, and counseling/prescribing medications to patient before, during, and after the visit: 60 minutes.            [1]   Current Outpatient Medications:     apixaban (ELIQUIS) 5 MG Tab, Take 1 Tablet by mouth 2 times a day., Disp: 180 Tablet, Rfl: 0    carvedilol (COREG) 3.125 MG Tab, Take 1 Tablet by mouth 2 times a day with meals., Disp: 180 Tablet, Rfl: 0    escitalopram (LEXAPRO) 20 MG tablet, Take 1 Tablet by mouth every day., Disp: 90 Tablet, Rfl: 0    furosemide (LASIX) 20 MG Tab, Take 1 Tablet by mouth every day., Disp: 90 Tablet, Rfl: 0    gabapentin (NEURONTIN) 300 MG Cap, Take 1 Capsule by mouth 2 times a day., Disp: 180 Capsule, Rfl: 0    hydrOXYzine HCl (ATARAX) 25 MG Tab, Take 1 Tablet by mouth every four hours as needed for Anxiety., Disp: 60 Tablet, Rfl: 1    lactulose 20 GM/30ML Solution, Take 15 mL by mouth every evening., Disp: 450 mL, Rfl: 2    levothyroxine (SYNTHROID) 75 MCG Tab, Take 1 Tablet by mouth every morning on an empty stomach., Disp: 90 Tablet, Rfl: 0    omeprazole (PRILOSEC) 40 MG delayed-release capsule, Take 1 Capsule by mouth every day., Disp: 90 Capsule, Rfl: 0    spironolactone (ALDACTONE) 100 MG Tab, Take 2 Tablets by mouth every day., Disp: 180 Tablet, Rfl: 0    thiamine (THIAMINE) 100 MG tablet, Take 1 Tablet by mouth every day., Disp: 90 Tablet, Rfl: 0    nitrofurantoin (MACROBID) 100 MG Cap, Take 1  Capsule by mouth 2 times a day for 7 days., Disp: 14 Capsule, Rfl: 0    therapeutic multivitamin-minerals (THERAGRAN-M) Tab, Take 1 Tablet by mouth every day., Disp: 30 Tablet, Rfl: 2

## 2025-07-30 NOTE — Clinical Note
REFERRAL APPROVAL NOTICE         Sent on July 30, 2025                   Minerva Tillman  [address] [facility] [place] [address]                   Dear Ms. Tillman,    After a careful review of the medical information and benefit coverage, [facility] has processed your referral. See below for additional details.    If applicable, you must be actively enrolled with your insurance for coverage of the authorized service. If you have any questions regarding your coverage, please contact your insurance directly.    REFERRAL INFORMATION   Referral #:  [id number]  Referred-To Provider    Referred-By Provider:  Gastroenterology    LYDIA Cantu LYNDA      [address]  [address]  [place] [id number] [phone] [address] [place] [id number] [phone]    Referral Start Date:  07/25/2025  Referral End Date:   07/25/2026             SCHEDULING  If you do not already have an appointment, please call [phone] to make an appointment.     MORE INFORMATION  If you do not already have a [employer] account, sign up at: [email or web address]  You can access your medical information, make appointments, see lab results, billing information, and more.  If you have questions regarding this referral, please contact  the [facility] Referrals department at:             [phone]. Monday - Friday 8:00AM - 5:00PM.     Sincerely,    [facility]

## 2025-07-30 NOTE — Clinical Note
REFERRAL APPROVAL NOTICE         Sent on July 30, 2025                   Minerva Tillman  [address] [facility] [address]                   Dear Ms. Tillman,    After a careful review of the medical information and benefit coverage, [facility] has processed your referral. See below for additional details.    If applicable, you must be actively enrolled with your insurance for coverage of the authorized service. If you have any questions regarding your coverage, please contact your insurance directly.    REFERRAL INFORMATION   Referral #:  [id number]  Referred-To Department    Referred-By Provider:  Pulmonary and Sleep Medicine    Asad Mccann M.D.   Pulmonary Rehab [facility]      [address] [facility] [address]  [phone] [id number]  [facility] [address]  [phone]    Referral Start Date:  07/16/2025  Referral End Date:   07/16/2026             SCHEDULING  If you do not already have an appointment, please call [phone] to make an appointment.     MORE INFORMATION  If you do not already have a [employer] account, sign up at: [email or web address]  You can access your medical information, make appointments, see lab results, billing information, and more.  If you have questions regarding this referral, please contact  the [facility] Referrals department at:             [phone]. Monday - Friday 8:00AM - 5:00PM.     Sincerely,    [facility]

## 2025-08-15 ENCOUNTER — OFFICE VISIT (OUTPATIENT)
Dept: BEHAVIORAL HEALTH | Facility: PSYCHIATRIC FACILITY | Age: 50
End: 2025-08-15
Payer: COMMERCIAL

## 2025-08-15 DIAGNOSIS — F10.20 ALCOHOL USE DISORDER, SEVERE, DEPENDENCE (HCC): Primary | ICD-10-CM

## 2025-08-15 PROCEDURE — 99999 PR NO CHARGE: CPT

## 2025-08-15 ASSESSMENT — ENCOUNTER SYMPTOMS
GASTROINTESTINAL NEGATIVE: 1
CONSTITUTIONAL NEGATIVE: 1
BRUISES/BLEEDS EASILY: 1
EYES NEGATIVE: 1
CARDIOVASCULAR NEGATIVE: 1
SHORTNESS OF BREATH: 1
FALLS: 1
COUGH: 1

## 2025-08-22 ENCOUNTER — OFFICE VISIT (OUTPATIENT)
Dept: BEHAVIORAL HEALTH | Facility: PSYCHIATRIC FACILITY | Age: 50
End: 2025-08-22
Payer: COMMERCIAL

## 2025-08-22 DIAGNOSIS — F10.20 ALCOHOL USE DISORDER, SEVERE, DEPENDENCE (HCC): Primary | ICD-10-CM

## 2025-08-22 DIAGNOSIS — K70.31 ALCOHOLIC CIRRHOSIS OF LIVER WITH ASCITES (HCC): ICD-10-CM

## 2025-08-22 DIAGNOSIS — K70.11 ALCOHOLIC HEPATITIS WITH ASCITES: ICD-10-CM

## 2025-08-22 RX ORDER — LACTULOSE 10 G/15ML
SOLUTION ORAL
Qty: 473 ML | Refills: 1 | Status: SHIPPED | OUTPATIENT
Start: 2025-08-22

## 2025-08-29 ENCOUNTER — APPOINTMENT (OUTPATIENT)
Dept: BEHAVIORAL HEALTH | Facility: PSYCHIATRIC FACILITY | Age: 50
End: 2025-08-29
Payer: COMMERCIAL

## 2025-09-05 ENCOUNTER — APPOINTMENT (OUTPATIENT)
Dept: BEHAVIORAL HEALTH | Facility: PSYCHIATRIC FACILITY | Age: 50
End: 2025-09-05
Payer: COMMERCIAL

## 2025-09-12 ENCOUNTER — APPOINTMENT (OUTPATIENT)
Dept: BEHAVIORAL HEALTH | Facility: PSYCHIATRIC FACILITY | Age: 50
End: 2025-09-12
Payer: COMMERCIAL

## 2025-10-03 ENCOUNTER — APPOINTMENT (OUTPATIENT)
Dept: BEHAVIORAL HEALTH | Facility: PSYCHIATRIC FACILITY | Age: 50
End: 2025-10-03
Payer: COMMERCIAL

## 2025-10-10 ENCOUNTER — APPOINTMENT (OUTPATIENT)
Dept: BEHAVIORAL HEALTH | Facility: PSYCHIATRIC FACILITY | Age: 50
End: 2025-10-10
Payer: COMMERCIAL

## 2025-10-17 ENCOUNTER — APPOINTMENT (OUTPATIENT)
Dept: BEHAVIORAL HEALTH | Facility: PSYCHIATRIC FACILITY | Age: 50
End: 2025-10-17
Payer: COMMERCIAL

## 2025-10-24 ENCOUNTER — APPOINTMENT (OUTPATIENT)
Dept: BEHAVIORAL HEALTH | Facility: PSYCHIATRIC FACILITY | Age: 50
End: 2025-10-24
Payer: COMMERCIAL

## 2025-11-07 ENCOUNTER — APPOINTMENT (OUTPATIENT)
Dept: BEHAVIORAL HEALTH | Facility: PSYCHIATRIC FACILITY | Age: 50
End: 2025-11-07
Payer: COMMERCIAL

## 2025-11-14 ENCOUNTER — APPOINTMENT (OUTPATIENT)
Dept: BEHAVIORAL HEALTH | Facility: PSYCHIATRIC FACILITY | Age: 50
End: 2025-11-14
Payer: COMMERCIAL

## 2025-11-21 ENCOUNTER — APPOINTMENT (OUTPATIENT)
Dept: BEHAVIORAL HEALTH | Facility: PSYCHIATRIC FACILITY | Age: 50
End: 2025-11-21
Payer: COMMERCIAL

## (undated) DEVICE — SET EXTENSION WITH 2 PORTS (48EA/CA) ***PART #2C8610 IS A SUBSTITUTE*****

## (undated) DEVICE — ELECTRODE 850 FOAM ADHESIVE - HYDROGEL RADIOTRNSPRNT (50/PK)

## (undated) DEVICE — SUTURE GENERAL

## (undated) DEVICE — KIT PROCEDURE DOUBLE ENDO ONLY (5/CA)

## (undated) DEVICE — LACTATED RINGERS INJ 1000 ML - (14EA/CA 60CA/PF)

## (undated) DEVICE — TRAY SRGPRP PVP IOD WT PRP - (20/CA)

## (undated) DEVICE — TUBING OUTFLOW HYSTEROSCPY (10EA/BX)

## (undated) DEVICE — MASK, LARYNGEAL AIRWAY #4

## (undated) DEVICE — BLOCK BITE MAXI DENTAL RETENTION RIM (100EA/BX)

## (undated) DEVICE — SUCTION INSTRUMENT YANKAUER BULBOUS TIP W/O VENT (50EA/CA)

## (undated) DEVICE — SENSOR SPO2 NEO LNCS ADHESIVE (20/BX) SEE USER NOTES

## (undated) DEVICE — Device

## (undated) DEVICE — ELECTRODE DUAL RETURN W/ CORD - (50/PK)

## (undated) DEVICE — GLOVE BIOGEL INDICATOR SZ 8 SURGICAL PF LTX - (50/BX 4BX/CA)

## (undated) DEVICE — PADDING CAST 6 IN STERILE - 6 X 4 YDS (24/CA)

## (undated) DEVICE — SUTURE 2-0 VICRYL PLUS CT-1 - 8 X 18 INCH(12/BX)

## (undated) DEVICE — DRESSING NON-ADHERING 8 X 3 - (50/BX)

## (undated) DEVICE — SENSOR OXIMETER ADULT SPO2 RD SET (20EA/BX)

## (undated) DEVICE — COVER LIGHT HANDLE ALC PLUS DISP (18EA/BX)

## (undated) DEVICE — WRAP COBAN SELF-ADHERENT 6 IN X 5YDS STERILE TAN (12/CA)

## (undated) DEVICE — HEAD HOLDER JUNIOR/ADULT

## (undated) DEVICE — BANDAGE ELASTIC 6 HONEYCOMB - 6X5YD LF (20/CA)"

## (undated) DEVICE — SUTURE 0 VICRYL PLUS CT-1 - 8 X 18 INCH (12/BX)

## (undated) DEVICE — GLOVE BIOGEL SZ 6.5 SURGICAL PF LTX (50PR/BX 4BX/CA)

## (undated) DEVICE — BLADE SURGICAL #10 - (50/BX)

## (undated) DEVICE — TOWEL STOP TIMEOUT SAFETY FLAG (40EA/CA)

## (undated) DEVICE — CANISTER SUCTION 3000ML MECHANICAL FILTER AUTO SHUTOFF MEDI-VAC NONSTERILE LF DISP (40EA/CA)

## (undated) DEVICE — SET LEADWIRE 5 LEAD BEDSIDE DISPOSABLE ECG (1SET OF 5/EA)

## (undated) DEVICE — DRILL BIT 2.8MM X 155MM CALIBRATED (8TX2=16)

## (undated) DEVICE — GLOVE BIOGEL SZ 8 SURGICAL PF LTX - (50PR/BX 4BX/CA)

## (undated) DEVICE — CANISTER SUCTION 3000ML MECHANICAL FILTER AUTO SHUTOFF MEDI-VAC NONSTERILE LF DISP  (40EA/CA)

## (undated) DEVICE — TUBING CLEARLINK DUO-VENT - C-FLO (48EA/CA)

## (undated) DEVICE — BANDAGE ELASTIC STERILE MATRIX 6 X 10 (20EA/CA)

## (undated) DEVICE — SODIUM CHL IRRIGATION 0.9% 1000ML (12EA/CA)

## (undated) DEVICE — DRAPE LARGE 3 QUARTER - (20/CA)

## (undated) DEVICE — MASK ANESTHESIA ADULT  - (100/CA)

## (undated) DEVICE — PACK LOWER EXTREMITY - (2/CA)

## (undated) DEVICE — KIT ROOM DECONTAMINATION

## (undated) DEVICE — SODIUM CHL. IRRIGATION 0.9% 3000ML (4EA/CA 65CA/PF)

## (undated) DEVICE — SLEEVE, VASO, THIGH, MED

## (undated) DEVICE — JELLY, KY 2 0Z STERILE

## (undated) DEVICE — WATER IRRIGATION STERILE 1000ML (12EA/CA)

## (undated) DEVICE — DRAPE 36X28IN RAD CARM BND BG - (25/CA) O

## (undated) DEVICE — GOWN WARMING STANDARD FLEX - (30/CA)

## (undated) DEVICE — PROTECTOR ULNA NERVE - (36PR/CA)

## (undated) DEVICE — KIT ANESTHESIA W/CIRCUIT & 3/LT BAG W/FILTER (20EA/CA)

## (undated) DEVICE — DRILL BIT 1.8MMX110MM GOLD (6TX2=12)

## (undated) DEVICE — PORT AUXILLARY WATER (50EA/BX)

## (undated) DEVICE — BUTTON ENDOSCOPY DISPOSABLE

## (undated) DEVICE — TUBING INFLOW HYSTEROSCOPY (10EA/CA)

## (undated) DEVICE — GLOVE BIOGEL PI INDICATOR SZ 6.5 SURGICAL PF LF - (50/BX 4BX/CA)

## (undated) DEVICE — CANISTER SUCTION RIGID RED 1500CC (40EA/CA)

## (undated) DEVICE — FILM CASSETTE ENDO

## (undated) DEVICE — TUBE CONNECTING SUCTION - CLEAR PLASTIC STERILE 72 IN (50EA/CA)

## (undated) DEVICE — MASK PANORAMIC OXYGEN PRO2 (30EA/CA)

## (undated) DEVICE — PAD LAP STERILE 18 X 18 - (5/PK 40PK/CA)

## (undated) DEVICE — NEPTUNE 4 PORT MANIFOLD - (20/PK)

## (undated) DEVICE — GOWN SURGEONS X-LARGE - DISP. (30/CA)

## (undated) DEVICE — KIT D & C COLLECTION (10EA/PK)

## (undated) DEVICE — SPLINT PLASTER 5 IN X 30 IN - (50EA/BX 6BX/CA)

## (undated) DEVICE — PAD PREP 24 X 48 CUFFED - (100/CA)

## (undated) DEVICE — ADVANCED NOVASURE STERILE DEVICE (3EA/PK)

## (undated) DEVICE — GLOVE BIOGEL SZ 7.5 SURGICAL PF LTX - (50PR/BX 4BX/CA)

## (undated) DEVICE — SUTURE 3-0 ETHILON FS-1 - (36/BX) 30 INCH

## (undated) DEVICE — PAD SANITARY 11IN MAXI IND WRAPPED  (12EA/PK 24PK/CA)